# Patient Record
Sex: FEMALE | Race: WHITE | NOT HISPANIC OR LATINO | Employment: OTHER | URBAN - METROPOLITAN AREA
[De-identification: names, ages, dates, MRNs, and addresses within clinical notes are randomized per-mention and may not be internally consistent; named-entity substitution may affect disease eponyms.]

---

## 2018-01-12 ENCOUNTER — APPOINTMENT (OUTPATIENT)
Dept: URGENT CARE | Facility: MEDICAL CENTER | Age: 63
End: 2018-01-12
Payer: OTHER MISCELLANEOUS

## 2018-01-12 PROCEDURE — 99284 EMERGENCY DEPT VISIT MOD MDM: CPT

## 2018-01-12 PROCEDURE — G0383 LEV 4 HOSP TYPE B ED VISIT: HCPCS

## 2018-01-26 ENCOUNTER — APPOINTMENT (OUTPATIENT)
Dept: URGENT CARE | Facility: MEDICAL CENTER | Age: 63
End: 2018-01-26
Payer: OTHER MISCELLANEOUS

## 2018-01-26 PROCEDURE — 99213 OFFICE O/P EST LOW 20 MIN: CPT

## 2018-02-13 ENCOUNTER — ANESTHESIA EVENT (OUTPATIENT)
Dept: PERIOP | Facility: HOSPITAL | Age: 63
End: 2018-02-13
Payer: OTHER MISCELLANEOUS

## 2018-02-13 ENCOUNTER — APPOINTMENT (OUTPATIENT)
Dept: PREADMISSION TESTING | Facility: HOSPITAL | Age: 63
End: 2018-02-13
Payer: OTHER MISCELLANEOUS

## 2018-02-13 ENCOUNTER — OFFICE VISIT (OUTPATIENT)
Dept: OBGYN CLINIC | Facility: MEDICAL CENTER | Age: 63
End: 2018-02-13
Payer: OTHER MISCELLANEOUS

## 2018-02-13 ENCOUNTER — TELEPHONE (OUTPATIENT)
Dept: OBGYN CLINIC | Facility: MEDICAL CENTER | Age: 63
End: 2018-02-13

## 2018-02-13 VITALS
HEIGHT: 60 IN | WEIGHT: 206 LBS | SYSTOLIC BLOOD PRESSURE: 128 MMHG | HEART RATE: 83 BPM | DIASTOLIC BLOOD PRESSURE: 79 MMHG | BODY MASS INDEX: 40.44 KG/M2

## 2018-02-13 DIAGNOSIS — M75.122 COMPLETE TEAR OF LEFT ROTATOR CUFF: Primary | ICD-10-CM

## 2018-02-13 PROBLEM — M75.102 TEAR OF LEFT ROTATOR CUFF: Status: ACTIVE | Noted: 2018-02-13

## 2018-02-13 LAB
ANION GAP SERPL CALCULATED.3IONS-SCNC: 6 MMOL/L (ref 4–13)
ATRIAL RATE: 74 BPM
BASOPHILS # BLD AUTO: 0.05 THOUSANDS/ΜL (ref 0–0.1)
BASOPHILS NFR BLD AUTO: 1 % (ref 0–1)
BUN SERPL-MCNC: 16 MG/DL (ref 5–25)
CALCIUM SERPL-MCNC: 8.9 MG/DL (ref 8.3–10.1)
CHLORIDE SERPL-SCNC: 105 MMOL/L (ref 100–108)
CO2 SERPL-SCNC: 31 MMOL/L (ref 21–32)
CREAT SERPL-MCNC: 1.18 MG/DL (ref 0.6–1.3)
EOSINOPHIL # BLD AUTO: 0.26 THOUSAND/ΜL (ref 0–0.61)
EOSINOPHIL NFR BLD AUTO: 4 % (ref 0–6)
ERYTHROCYTE [DISTWIDTH] IN BLOOD BY AUTOMATED COUNT: 14.3 % (ref 11.6–15.1)
GFR SERPL CREATININE-BSD FRML MDRD: 49 ML/MIN/1.73SQ M
GLUCOSE SERPL-MCNC: 104 MG/DL (ref 65–140)
HCT VFR BLD AUTO: 39.6 % (ref 34.8–46.1)
HGB BLD-MCNC: 13.3 G/DL (ref 11.5–15.4)
LYMPHOCYTES # BLD AUTO: 1.97 THOUSANDS/ΜL (ref 0.6–4.47)
LYMPHOCYTES NFR BLD AUTO: 26 % (ref 14–44)
MCH RBC QN AUTO: 29.2 PG (ref 26.8–34.3)
MCHC RBC AUTO-ENTMCNC: 33.6 G/DL (ref 31.4–37.4)
MCV RBC AUTO: 87 FL (ref 82–98)
MONOCYTES # BLD AUTO: 0.74 THOUSAND/ΜL (ref 0.17–1.22)
MONOCYTES NFR BLD AUTO: 10 % (ref 4–12)
NEUTROPHILS # BLD AUTO: 4.43 THOUSANDS/ΜL (ref 1.85–7.62)
NEUTS SEG NFR BLD AUTO: 59 % (ref 43–75)
NRBC BLD AUTO-RTO: 0 /100 WBCS
P AXIS: 51 DEGREES
PLATELET # BLD AUTO: 259 THOUSANDS/UL (ref 149–390)
PMV BLD AUTO: 10.2 FL (ref 8.9–12.7)
POTASSIUM SERPL-SCNC: 4 MMOL/L (ref 3.5–5.3)
PR INTERVAL: 208 MS
QRS AXIS: -20 DEGREES
QRSD INTERVAL: 90 MS
QT INTERVAL: 410 MS
QTC INTERVAL: 455 MS
RBC # BLD AUTO: 4.56 MILLION/UL (ref 3.81–5.12)
SODIUM SERPL-SCNC: 142 MMOL/L (ref 136–145)
T WAVE AXIS: 22 DEGREES
VENTRICULAR RATE: 74 BPM
WBC # BLD AUTO: 7.45 THOUSAND/UL (ref 4.31–10.16)

## 2018-02-13 PROCEDURE — 93005 ELECTROCARDIOGRAM TRACING: CPT

## 2018-02-13 PROCEDURE — 80048 BASIC METABOLIC PNL TOTAL CA: CPT

## 2018-02-13 PROCEDURE — 93010 ELECTROCARDIOGRAM REPORT: CPT | Performed by: INTERNAL MEDICINE

## 2018-02-13 PROCEDURE — 85025 COMPLETE CBC W/AUTO DIFF WBC: CPT

## 2018-02-13 PROCEDURE — 99204 OFFICE O/P NEW MOD 45 MIN: CPT | Performed by: ORTHOPAEDIC SURGERY

## 2018-02-13 RX ORDER — SODIUM CHLORIDE 9 MG/ML
125 INJECTION, SOLUTION INTRAVENOUS CONTINUOUS
Status: CANCELLED | OUTPATIENT
Start: 2018-02-19

## 2018-02-13 RX ORDER — ENALAPRIL MALEATE 5 MG/1
5 TABLET ORAL EVERY EVENING
COMMUNITY
End: 2021-01-08 | Stop reason: SDUPTHER

## 2018-02-13 RX ORDER — ACETAMINOPHEN 500 MG
500-1000 TABLET ORAL EVERY 6 HOURS PRN
COMMUNITY

## 2018-02-13 RX ORDER — FUROSEMIDE 40 MG/1
40 TABLET ORAL DAILY PRN
COMMUNITY
End: 2020-04-15

## 2018-02-13 RX ORDER — CITALOPRAM 40 MG/1
40 TABLET ORAL EVERY EVENING
Refills: 1 | COMMUNITY
Start: 2017-11-18 | End: 2020-08-04 | Stop reason: SDUPTHER

## 2018-02-13 NOTE — PRE-PROCEDURE INSTRUCTIONS
Pre-Surgery Instructions:   Medication Instructions    acetaminophen (TYLENOL) 500 mg tablet Instructed patient per Anesthesia Guidelines   citalopram (CeleXA) 40 mg tablet Instructed patient per Anesthesia Guidelines   enalapril (VASOTEC) 5 mg tablet Instructed patient per Anesthesia Guidelines   furosemide (LASIX) 40 mg tablet Instructed patient per Anesthesia Guidelines  No meds needed am of surgery per anesthesia DR Lolly Contreras

## 2018-02-13 NOTE — ANESTHESIA PREPROCEDURE EVALUATION
Review of Systems/Medical History  Patient summary reviewed  Chart reviewed      Cardiovascular  Exercise tolerance: poor,  Hypertension controlled,    Pulmonary  Negative pulmonary ROS        GI/Hepatic  Negative GI/hepatic ROS          Negative  ROS        Endo/Other  Negative endo/other ROS      GYN  Negative gynecology ROS          Hematology  Negative hematology ROS      Musculoskeletal  Negative musculoskeletal ROS        Neurology  Negative neurology ROS      Psychology   Depression , being treated for depression,              Physical Exam    Airway    Mallampati score: II  TM Distance: <3 FB  Neck ROM: full     Dental       Cardiovascular  Rhythm: regular, Rate: normal,     Pulmonary  Breath sounds clear to auscultation,     Other Findings        Anesthesia Plan  ASA Score- 2     Anesthesia Type- general and regional with ASA Monitors  Additional Monitors:   Airway Plan:         Plan Factors- Patient instructed to abstain from smoking on day of procedure  Patient did not smoke on day of surgery  Induction- intravenous  Postoperative Plan- Plan for postoperative opioid use  Informed Consent- Anesthetic plan and risks discussed with patient

## 2018-02-13 NOTE — LETTER
February 13, 2018     Patient: Joseph Roberts   YOB: 1955   Date of Visit: 2/13/2018       To Whom it May Concern:    Wes Murillo is under my professional care  She was seen in my office on 2/13/2018  She may return to work with limitations 02/13/18  Work restrictions: Continue current 5 lb lifting restriction until surgery  Anticipate 5-6 months postop for unrestricted duty  If you have any questions or concerns, please don't hesitate to call           Sincerely,          Natalie Spence MD        CC: No Recipients

## 2018-02-13 NOTE — PROGRESS NOTES
Orthopaedic Surgery - Office Note  Jamir Sesay (39 y o  female)   : 1955   MRN: [id number]  Encounter Date: 2018    Chief Complaint   Patient presents with    Left Shoulder - Pain       Assessment / Plan  Left acute supraspinatus tear    · The diagnosis and treatment options were reviewed  · The patient wishes to proceed with left shoulder arthroscopy with rotator cuff repair  · The risks, benefits, and alternatives were discussed  · Written consent was obtained  · Work restrictions: Continue current 5 lb lifting restriction until surgery  Anticipate 5-6 months postop for unrestricted duty     Return for F/U 4-5 days postop  History of Present Illness  Jamir Sesay is a 60 y o  female who presents with left shoulder pain since 18  She was at work moving merchandise across a check out [employer]  and felt a pop  She had immediate shoulder pain and tingling down the entire arm  Since the injury, she has not had any improvement  Shoulder pain is diffuse in the shoulder and she has pain and tingling that radiates to the hand  She has pain at night and with attempted overhead reaching  She had an MRI  She has not had formal PT  She has been on a 5 lb lifting restriction and no overhead reaching at work  She does admit to some mild left shoulder pain prior to the injury, but nothing that warranted treatments  Review of Systems  Pertinent items are noted in HPI  All other systems were reviewed and are negative  Physical Exam  /79   Pulse 83   Ht 5' (1 524 m)   Wt 93 4 kg (206 lb)   BMI 40 23 kg/m²   Cons: Appears well  No apparent distress  Psych: Alert  Oriented x3  Mood and affect normal   Eyes: PERRLA, EOMI  Resp: Normal effort  No audible wheezing or stridor  CV: Palpable pulse  No discernable arrhythmia  No LE edema  Lymph:  No palpable cervical, axillary, or inguinal lymphadenopathy  Skin: Warm  No palpable masses  No visible lesions    Neuro: Normal muscle tone   Normal and symmetric DTR's  Left Shoulder Exam  Alignment / Posture:  Normal shoulder posture  Normal scapular position  Inspection:  No swelling  No deformity  Palpation:  moderate subacromial tenderness  ROM:  Normal neck ROM  Shoulder   Shoulder ER 70  Shoulder IR L4  Strength:  Supraspinatus 4-/5  Infraspinatus 4/5  Subscapularis 5/5  Stability:  No objective shoulder instability  Tests: (+) [place]  (+) Neer  (+) Painful arc  (-) Belly press  (-) Speed  (-) Switzer  Neurovascular:  Sensation intact in Ax/R/M/U nerve distributions  2+ radial pulse  Studies Reviewed  I have personally reviewed pertinent films in PACS  MRI of left shoulder - complete tear of the supraspinatus with retraction to top of humeral head  No T1 sagittal views are available to assess muscle fatty atrophy/infiltration  Procedures  No procedures today  Medical, Surgical, Family, and Social History  The patient's medical history, family history, and social history, were reviewed and updated as appropriate  Past Medical History:   Diagnosis Date    Depression     Hypertension        Past Surgical History:   Procedure Laterality Date    ANKLE SURGERY      HAND SURGERY      HYSTERECTOMY      KNEE SURGERY      OVARIAN CYST REMOVAL      TONSILLECTOMY      TUBAL LIGATION         Family History   Problem Relation Age of Onset    Hypertension Mother     Cancer Mother     Lung cancer Father     Thyroid disease Sister     Depression Sister     Cancer Brother     ROBERT disease Brother        Social History     Occupational History    Not on file       Social History Main Topics    Smoking status: Never Smoker    Smokeless tobacco: Never Used    Alcohol use Not on file    Drug use: Unknown    Sexual activity: Not on file       Allergies   Allergen Reactions    Clindamycin     Erythromycin     Iodine      IV IODINE    Keflex [Cephalexin]     Penicillins          Current Outpatient Prescriptions:     citalopram (CeleXA) 40 mg tablet, Take 40 mg by mouth daily, Disp: , Rfl: 1    enalapril (VASOTEC) 5 mg tablet, Take by mouth, Disp: , Rfl:     furosemide (LASIX) 40 mg tablet, Take by mouth, Disp: , Rfl:       Farideh Rivera MD    Scribe Attestation    I,:    am acting as a scribe while in the presence of the attending physician :        I,:    personally performed the services described in this documentation    as scribed in my presence :

## 2018-02-13 NOTE — LETTER
February 13, 2018     Patient: Benito Acosta   YOB: 1955   Date of Visit: 2/13/2018       To Whom it May Concern:    Linda Jamil is under my professional care  She was seen in my office on 2/13/2018  She will be out of work for surgery starting 2/19/18 until next evaluation  Patient is undergoing left shoulder arthroscopy on 2/19/18  If you have any questions or concerns, please don't hesitate to call           Sincerely,          Ava Baron MD        CC: No Recipients

## 2018-02-19 ENCOUNTER — HOSPITAL ENCOUNTER (OUTPATIENT)
Facility: HOSPITAL | Age: 63
Setting detail: OUTPATIENT SURGERY
Discharge: HOME | End: 2018-02-19
Attending: ORTHOPAEDIC SURGERY | Admitting: ORTHOPAEDIC SURGERY
Payer: OTHER MISCELLANEOUS

## 2018-02-19 ENCOUNTER — ANESTHESIA (OUTPATIENT)
Dept: PERIOP | Facility: HOSPITAL | Age: 63
End: 2018-02-19
Payer: OTHER MISCELLANEOUS

## 2018-02-19 VITALS
HEART RATE: 84 BPM | TEMPERATURE: 99.2 F | BODY MASS INDEX: 40.64 KG/M2 | RESPIRATION RATE: 16 BRPM | DIASTOLIC BLOOD PRESSURE: 65 MMHG | SYSTOLIC BLOOD PRESSURE: 140 MMHG | HEIGHT: 60 IN | OXYGEN SATURATION: 96 % | WEIGHT: 207 LBS

## 2018-02-19 DIAGNOSIS — M75.122 COMPLETE TEAR OF LEFT ROTATOR CUFF: Primary | ICD-10-CM

## 2018-02-19 DIAGNOSIS — M75.122 COMPLETE ROTATOR CUFF TEAR OF LEFT SHOULDER: ICD-10-CM

## 2018-02-19 PROCEDURE — 29827 SHO ARTHRS SRG RT8TR CUF RPR: CPT | Performed by: ORTHOPAEDIC SURGERY

## 2018-02-19 PROCEDURE — 29827 SHO ARTHRS SRG RT8TR CUF RPR: CPT | Performed by: PHYSICIAN ASSISTANT

## 2018-02-19 PROCEDURE — 23430 REPAIR BICEPS TENDON: CPT | Performed by: ORTHOPAEDIC SURGERY

## 2018-02-19 PROCEDURE — C1713 ANCHOR/SCREW BN/BN,TIS/BN: HCPCS | Performed by: ORTHOPAEDIC SURGERY

## 2018-02-19 PROCEDURE — 29826 SHO ARTHRS SRG DECOMPRESSION: CPT | Performed by: ORTHOPAEDIC SURGERY

## 2018-02-19 PROCEDURE — 29826 SHO ARTHRS SRG DECOMPRESSION: CPT | Performed by: PHYSICIAN ASSISTANT

## 2018-02-19 PROCEDURE — 23430 REPAIR BICEPS TENDON: CPT | Performed by: PHYSICIAN ASSISTANT

## 2018-02-19 DEVICE — SYSTEM IMPLANT SPEEDBRIDGE W/4.75 BCMPS SWIVELOCK C: Type: IMPLANTABLE DEVICE | Site: SHOULDER | Status: FUNCTIONAL

## 2018-02-19 DEVICE — ANCHOR SUT 4.5 X 14MM BCMPS CRKSCR FLLY THRD: Type: IMPLANTABLE DEVICE | Site: SHOULDER | Status: FUNCTIONAL

## 2018-02-19 DEVICE — SYSTEM IMPLANT DEL PROX TENODSIS REPAIR: Type: IMPLANTABLE DEVICE | Site: SHOULDER | Status: FUNCTIONAL

## 2018-02-19 RX ORDER — FENTANYL CITRATE 50 UG/ML
INJECTION, SOLUTION INTRAMUSCULAR; INTRAVENOUS AS NEEDED
Status: DISCONTINUED | OUTPATIENT
Start: 2018-02-19 | End: 2018-02-19 | Stop reason: SURG

## 2018-02-19 RX ORDER — NAPROXEN 500 MG/1
500 TABLET ORAL 2 TIMES DAILY WITH MEALS
Qty: 60 TABLET | Refills: 0 | Status: SHIPPED | OUTPATIENT
Start: 2018-02-19 | End: 2019-07-20 | Stop reason: ALTCHOICE

## 2018-02-19 RX ORDER — ROCURONIUM BROMIDE 10 MG/ML
INJECTION, SOLUTION INTRAVENOUS AS NEEDED
Status: DISCONTINUED | OUTPATIENT
Start: 2018-02-19 | End: 2018-02-19 | Stop reason: SURG

## 2018-02-19 RX ORDER — OXYCODONE HYDROCHLORIDE AND ACETAMINOPHEN 5; 325 MG/1; MG/1
1 TABLET ORAL EVERY 4 HOURS PRN
Status: DISCONTINUED | OUTPATIENT
Start: 2018-02-19 | End: 2018-02-19 | Stop reason: HOSPADM

## 2018-02-19 RX ORDER — SODIUM CHLORIDE 9 MG/ML
125 INJECTION, SOLUTION INTRAVENOUS CONTINUOUS
Status: DISCONTINUED | OUTPATIENT
Start: 2018-02-19 | End: 2018-02-19 | Stop reason: HOSPADM

## 2018-02-19 RX ORDER — MEPERIDINE HYDROCHLORIDE 50 MG/ML
12.5 INJECTION INTRAMUSCULAR; INTRAVENOUS; SUBCUTANEOUS
Status: DISCONTINUED | OUTPATIENT
Start: 2018-02-19 | End: 2018-02-19 | Stop reason: HOSPADM

## 2018-02-19 RX ORDER — OXYCODONE HYDROCHLORIDE 5 MG/1
5 TABLET ORAL EVERY 4 HOURS PRN
Qty: 40 TABLET | Refills: 0 | Status: SHIPPED | OUTPATIENT
Start: 2018-02-19 | End: 2018-03-01

## 2018-02-19 RX ORDER — PROPOFOL 10 MG/ML
INJECTION, EMULSION INTRAVENOUS AS NEEDED
Status: DISCONTINUED | OUTPATIENT
Start: 2018-02-19 | End: 2018-02-19 | Stop reason: SURG

## 2018-02-19 RX ORDER — FENTANYL CITRATE/PF 50 MCG/ML
25 SYRINGE (ML) INJECTION
Status: DISCONTINUED | OUTPATIENT
Start: 2018-02-19 | End: 2018-02-19 | Stop reason: HOSPADM

## 2018-02-19 RX ORDER — EPHEDRINE SULFATE 50 MG/ML
INJECTION, SOLUTION INTRAVENOUS AS NEEDED
Status: DISCONTINUED | OUTPATIENT
Start: 2018-02-19 | End: 2018-02-19 | Stop reason: SURG

## 2018-02-19 RX ORDER — ONDANSETRON 2 MG/ML
4 INJECTION INTRAMUSCULAR; INTRAVENOUS ONCE
Status: DISCONTINUED | OUTPATIENT
Start: 2018-02-19 | End: 2018-02-19 | Stop reason: HOSPADM

## 2018-02-19 RX ORDER — ROPIVACAINE HYDROCHLORIDE 5 MG/ML
INJECTION, SOLUTION EPIDURAL; INFILTRATION; PERINEURAL AS NEEDED
Status: DISCONTINUED | OUTPATIENT
Start: 2018-02-19 | End: 2018-02-19 | Stop reason: SURG

## 2018-02-19 RX ORDER — PROMETHAZINE HYDROCHLORIDE 12.5 MG/1
12.5 TABLET ORAL EVERY 6 HOURS PRN
Qty: 30 TABLET | Refills: 0 | Status: SHIPPED | OUTPATIENT
Start: 2018-02-19 | End: 2018-04-24 | Stop reason: HOSPADM

## 2018-02-19 RX ORDER — OXYCODONE HYDROCHLORIDE AND ACETAMINOPHEN 5; 325 MG/1; MG/1
2 TABLET ORAL EVERY 4 HOURS PRN
Status: DISCONTINUED | OUTPATIENT
Start: 2018-02-19 | End: 2018-02-19 | Stop reason: HOSPADM

## 2018-02-19 RX ORDER — GLYCOPYRROLATE 0.2 MG/ML
INJECTION INTRAMUSCULAR; INTRAVENOUS AS NEEDED
Status: DISCONTINUED | OUTPATIENT
Start: 2018-02-19 | End: 2018-02-19 | Stop reason: SURG

## 2018-02-19 RX ORDER — ONDANSETRON 2 MG/ML
INJECTION INTRAMUSCULAR; INTRAVENOUS AS NEEDED
Status: DISCONTINUED | OUTPATIENT
Start: 2018-02-19 | End: 2018-02-19 | Stop reason: SURG

## 2018-02-19 RX ORDER — MIDAZOLAM HYDROCHLORIDE 1 MG/ML
INJECTION INTRAMUSCULAR; INTRAVENOUS AS NEEDED
Status: DISCONTINUED | OUTPATIENT
Start: 2018-02-19 | End: 2018-02-19 | Stop reason: SURG

## 2018-02-19 RX ADMIN — FENTANYL CITRATE 100 MCG: 50 INJECTION, SOLUTION INTRAMUSCULAR; INTRAVENOUS at 09:46

## 2018-02-19 RX ADMIN — FENTANYL CITRATE 100 MCG: 50 INJECTION, SOLUTION INTRAMUSCULAR; INTRAVENOUS at 08:26

## 2018-02-19 RX ADMIN — ROPIVACAINE HYDROCHLORIDE 30 ML: 5 INJECTION, SOLUTION EPIDURAL; INFILTRATION; PERINEURAL at 08:26

## 2018-02-19 RX ADMIN — ONDANSETRON HYDROCHLORIDE 4 MG: 2 INJECTION, SOLUTION INTRAVENOUS at 11:05

## 2018-02-19 RX ADMIN — GLYCOPYRROLATE 0.4 MG: 0.2 INJECTION, SOLUTION INTRAMUSCULAR; INTRAVENOUS at 11:15

## 2018-02-19 RX ADMIN — NEOSTIGMINE METHYLSULFATE 3 MG: 1 INJECTION, SOLUTION INTRAMUSCULAR; INTRAVENOUS; SUBCUTANEOUS at 11:15

## 2018-02-19 RX ADMIN — DEXAMETHASONE SODIUM PHOSPHATE 8 MG: 10 INJECTION INTRAMUSCULAR; INTRAVENOUS at 09:53

## 2018-02-19 RX ADMIN — PROPOFOL 150 MG: 10 INJECTION, EMULSION INTRAVENOUS at 09:46

## 2018-02-19 RX ADMIN — CEFAZOLIN SODIUM 2000 MG: 1 SOLUTION INTRAVENOUS at 09:43

## 2018-02-19 RX ADMIN — SODIUM CHLORIDE: 0.9 INJECTION, SOLUTION INTRAVENOUS at 09:50

## 2018-02-19 RX ADMIN — FENTANYL CITRATE 50 MCG: 50 INJECTION, SOLUTION INTRAMUSCULAR; INTRAVENOUS at 10:46

## 2018-02-19 RX ADMIN — EPHEDRINE SULFATE 20 MG: 50 INJECTION, SOLUTION INTRAMUSCULAR; INTRAVENOUS; SUBCUTANEOUS at 09:43

## 2018-02-19 RX ADMIN — LIDOCAINE HYDROCHLORIDE 40 MG: 20 INJECTION, SOLUTION INTRAVENOUS at 09:46

## 2018-02-19 RX ADMIN — EPHEDRINE SULFATE 15 MG: 50 INJECTION, SOLUTION INTRAMUSCULAR; INTRAVENOUS; SUBCUTANEOUS at 09:46

## 2018-02-19 RX ADMIN — SODIUM CHLORIDE 125 ML/HR: 0.9 INJECTION, SOLUTION INTRAVENOUS at 07:46

## 2018-02-19 RX ADMIN — MIDAZOLAM HYDROCHLORIDE 2 MG: 1 INJECTION, SOLUTION INTRAMUSCULAR; INTRAVENOUS at 08:26

## 2018-02-19 RX ADMIN — ROCURONIUM BROMIDE 50 MG: 10 INJECTION INTRAVENOUS at 09:46

## 2018-02-19 RX ADMIN — EPHEDRINE SULFATE 10 MG: 50 INJECTION, SOLUTION INTRAMUSCULAR; INTRAVENOUS; SUBCUTANEOUS at 09:38

## 2018-02-19 RX ADMIN — CEFAZOLIN SODIUM 1000 MG: 1 SOLUTION INTRAVENOUS at 10:23

## 2018-02-19 RX ADMIN — EPHEDRINE SULFATE 10 MG: 50 INJECTION, SOLUTION INTRAMUSCULAR; INTRAVENOUS; SUBCUTANEOUS at 09:40

## 2018-02-19 NOTE — DISCHARGE INSTRUCTIONS
POSTOPERATIVE INSTRUCTIONS following SHOULDER SURGERY    MEDICATIONS:  · Resume all home medications unless otherwise instructed by your surgeon  · Pain Medication:  Oxycodone 5 mg, 1-3 tablets every 3 hours as needed  · If you were given a regional anesthetic (nerve block), please begin taking the pain medication as soon as you get home, even if you have minimal or no pain  DO NOT WAIT FOR THE NERVE BLOCK TO WEAR OFF  · Possible side effects include nausea, constipation, and urinary retention  If you experience these side effects, please call our office for assistance  · Pain med refills are authorized only during office hours (8am-4pm, Mon-Fri)  · Anti-Inflammatory:  Naproxen 500 mg, 1 tablet every 12 hours for 4 weeks  · TAKE WITH FOOD  Stop if you experience nausea, reflux, or stomach pain  · Nausea Medication:  Promethazine 12 5 mg, 1 tablet every 6 hours as needed  · Fill prescription ONLY if you expericnce severe nausea  WOUND CARE:  · Keep the dressing clean and dry  Light drainage may occur the first 2 days postop  · You may remove the dressings and get the incision wet in the shower 72 hours after surgery  DO NOT remove steri-strips or sutures  DO NOT immerse the incision under water  Carefully pat the incision dry  If there is wound drainage, re-apply a fresh dry gauze dressing  · Please call our office ([phone]) if you experience either of the following:  · Sudden increase in swelling, redness, or warmth at the surgical site  · Excessive incisional drainage that persists beyond the 3rd day after surgery  · Oral temperature greater than 101 degrees, not relieved with Tylenol  · Shortness of breath, chest pain, nausea, or any other concerning symptoms    SWELLING CONTROL:  · Cold Therapy: The cold therapy device may be used either continuously or only as needed, according to your preference  Do not let the pad directly touch your skin    Alternatively, apply ice (20 min on, 20 min off) as often as you feel is necessary  SLING:  · Wear your sling AT ALL TIMES (including sleep) until your first postoperative office visit  You may remove the sling for showering but must keep your arm at your side  ACTIVITY:   · DO NOT lift, carry, push, or pull anything with your operative arm  · Shoulder:  DO NOT actively (on your own) raise your operative arm away from the side of you body or rotate it out away from your body unless instructed by your surgeon or physical therapist   · Place a pillow behind the elbow while lying down  · Sleeping in a more upright position (recliner) may be more comfortable initially  · Wrist / Finger Motion:  With the sling on, move your wrist and fingers through a full range of motion 20 times per hour while awake  PHYSICAL THERAPY:  · Begin therapy 5 TO 7 DAYS AFTER SURGERY  You were given a prescription for therapy at your preoperative office visit  If you do not have physical therapy scheduled yet, please call our office for assistance  FOLLOW-UP APPOINTMENT:  · 4-5 days after surgery with:    Dr Curtis Sneed   [facility], [address] [facility]  [address] [place] [address], [address] [place] [address], [facility], [address]   [phone] ([facility] [address])  [phone] (After Hours)

## 2018-02-19 NOTE — ANESTHESIA POSTPROCEDURE EVALUATION
Post-Op Assessment Note      CV Status:  Stable    Mental Status:  Alert and awake    Hydration Status:  Euvolemic    PONV Controlled:  Controlled    Airway Patency:  Patent  Airway: intubated    Post Op Vitals Reviewed: Yes          Staff: Anesthesiologist           /67 (02/19/18 1155)    Temp      Pulse 76 (02/19/18 1155)   Resp 19 (02/19/18 1155)    SpO2 97 % (02/19/18 1155)

## 2018-02-19 NOTE — ANESTHESIA PROCEDURE NOTES
Peripheral Block    Patient location during procedure: holding area  Start time: 2/19/2018 8:27 AM  End time: 2/19/2018 8:37 AM  Staffing  Anesthesiologist: Flavio Rodríguez  Performed: anesthesiologist   Preanesthetic Checklist  Completed: patient identified, site marked, surgical consent, pre-op evaluation, timeout performed, IV checked, risks and benefits discussed and monitors and equipment checked  Peripheral Block  Patient position: supine  Prep: ChloraPrep  Patient monitoring: heart rate, cardiac monitor, frequent blood pressure checks and continuous pulse ox  Block type: interscalene  Laterality: left  Injection technique: single-shot  Procedures: ultrasound guided  Local infiltration: ropivacaine  Infiltration strength: 0 5 %  Dose: 30 mL  Needle  Needle type: Stimuplex   Needle gauge: 22 G  Needle length: 5 cm  Needle localization: ultrasound guidance  Test dose: negative  Assessment  Injection assessment: incremental injection  Paresthesia pain: none  Heart rate change: no  Slow fractionated injection: yes  Post-procedure:  site cleaned  patient tolerated the procedure well with no immediate complications

## 2018-02-19 NOTE — OP NOTE
OPERATIVE REPORT    PATIENT NAME: Hesham Galeano   :  1955  MRN: [id number]  Pt Location: [facility] OR ROOM 02    SURGERY DATE: 2018    SURGEON(S) and ROLE:  Primary: Sharlotte Castleman, MD  Assisting: Deni Wade PA-C    NOTE:  The presence of a physician assistant was necessary to help with patient positioning, surgical exposure, wound retraction, wound closure, and other key portions of the procedure  No qualified resident was available for this case  PREOPERATIVE DIAGNOSES:  Left Shoulder  Rotator Cuff Tear (supraspinatus and subscapularis)  Biceps Tendinitis    POSTOPERATIVE DIAGNOSES:  Left Shoulder  Rotator Cuff Tear (supraspinatus and subscapularis)  Biceps Tendinitis    PROCEDURES:  Left Shoulder Arthroscopy with:  Rotator Cuff Repair (supraspinatus and subscapularis)  Subacromial Decompression  Open Subpectoral Biceps Tenodesis      ANESTHESIA TYPE:  General endotracheal and Interscalene block    ANESTHESIA STAFF:   Anesthesiologist: Elzbieta Ford DO  CRNA: Mary Nielson CRNA    ESTIMATED BLOOD LOSS:  4 mL    PERIOPERATIVE ANTIBIOTICS:  cefazolin, 2 grams    IMPLANTS:  Arthrex Corkscrew 4 5mm (x1)     Speed Bridge (Swivelock 4 75mm x4)     Biceps Tenodesis Button      Implant Name Type Inv  Item Serial No   Lot No  LRB No  Used Action   ANCHOR SUT 4 5 X 14MM BCMPS CRKSCR FLLY THRD - [id number]  ANCHOR SUT 4 5 X 14MM BCMPS CRKSCR FLLY THRD  [employer] [id number] Left 1 Implanted   SYSTEM IMPLANT DEL PROX TENODSIS REPAIR - [id number]  SYSTEM IMPLANT DEL PROX TENODSIS REPAIR  [address] [id number] Left 1 Implanted   SYSTEM IMPLANT SPEEDBRIDGE W/4 75 BCMPS [place] [id number]   SYSTEM IMPLANT SPEEDBRIDGE W/4 75 BCMPS [address] [id number] Left 1 Implanted       SPECIMENS:  * No specimens in log *      OPERATIVE INDICATIONS:  The patient is a 60 y o  female with left shoulder pain due to rotator cuff tear and biceps tendinitis    Surgical treatment was indicated due to persistent symptoms despite non-surgical treatment  After a thorough discussion of the potential risks, benefits, and alternative treatments, the patient agreed to proceed with surgery  The patient understands that the risks of surgery include, but are not limited to: failure of repair, infection, neurovascular injury, wound healing complications, venous thromboembolism, persistent pain, stiffness, instability, recurrence of symptoms, potential need for additional surgeries, and loss of limb or life  Oral and written consent for surgery was obtained from the patient preoperatively  EXAM UNDER ANESTHESIA:  Operative shoulder:   FE-150   ER-90   AER-90   AIR-90    PROCEDURE AND TECHNIQUE:  On the day of surgery, the patient was identified in the preoperative holding area  The operative site was marked by the surgeon  The patient was taken into the operating room  A time-out was conducted to confirm the patient's identity, the operative site, and the proposed procedure  The patient was anesthetized, and perioperative antibiotics were administered  The patient was positioned beach chair on the OSI frame  All bony prominences were padded  The operative site was prepped and draped using standard sterile technique  A posterior portal was established, and the arthroscope was placed into the glenohumeral joint  An anterosuperior portal was established through the rotator interval   Diagnostic arthroscopy was performed  The glenohumeral joint demonstrated moderate synovitis which was debrided with a motorized shaver  The glenoid demonstrated pristine articular cartilage  The humeral head demonstrated pristine articular cartilage  The long head of the biceps tendon had  severe tendinosis and was partially torn, >50%  The superior labrum had Type 1 fraying  The biceps tendon was released from the superior labrum  The superior labrum was debrided with a motorized shaver to remove all unstable tissue  An open subpectoral biceps tenodesis was performed  An incision was made just lateral to the axillary crease centered over the inferior border of the pectoralis major tendon  The biceps tendon was identified in the biciptial groove and delivered into the wound  The tendon was stitiched with a FiberLoop suture  The biciptial groove was prepared with a rasp, and the tenodesis was secured with a unicortical biceps button  The repair demonstrated appropriate soft tissue tension and excellent stability through a full elbow range of motion  The posterior capsulolabral complex  was intact  The anterior capsulolabral complex was intact       The subscapularis tendon had a complete tear with mild retraction  The subscapularis was debrided to remove the torn and degenerative tissue  The tendon was dissected free from all adhesions and completely released to provide mobilization to the lesser tuberosity  The lesser tuberosity was prepared to a bleeding bone bed with a lev  The subscapularis was repaired with a single row construct with mattress sutures using  one 4 5mm Corkscrew (Arthrex) placed in the lesser tuberosity  The tendon was fixed firmly to its footprint, and the repair was secure through 50 degrees of external rotation  The posterosuperior rotator cuff had a medium full-thickness tear involving the supraspinatus measuring 2 cm anterior to posterior with minimal retraction  The subacromial bursa was inflamed  A thorough subacromial bursectomy was performed through a lateral portal   The rotator cuff was debrided to remove the torn and degenerative tissue  The greater tuberosity was prepared to a bleeding bone bed with a lev  The rotator cuff was repaired with a double row suture bridge construct using four 4 75mm Swivelock (Arthrex) placed in the greater tuberosity  The tendon was fixed securely to its footprint without undue tension, and the repair was stable with gentle shoulder rotation   There was a Type 2 acromion, and acromioplasty was performed  At the conclusion of the procedure, the instruments were withdrawn  The portals and incisions were closed with absorbable sutures and steri-strips  A sterile dressing was applied  The surgical drapes were removed  The operative arm was immobilized in a sling with abduction pillow  The patient was awakened from anesthesia and transported to the PACU in stable condition        COMPLICATIONS:  None    PATIENT DISPOSITION:  PACU       SIGNATURE:  Ham Prado MD  DATE:  February 19, 2018  TIME:  11:21 AM

## 2018-02-23 ENCOUNTER — OFFICE VISIT (OUTPATIENT)
Dept: OBGYN CLINIC | Facility: MEDICAL CENTER | Age: 63
End: 2018-02-23

## 2018-02-23 VITALS
HEIGHT: 60 IN | HEART RATE: 80 BPM | DIASTOLIC BLOOD PRESSURE: 74 MMHG | WEIGHT: 205 LBS | BODY MASS INDEX: 40.25 KG/M2 | SYSTOLIC BLOOD PRESSURE: 112 MMHG

## 2018-02-23 DIAGNOSIS — M75.122 COMPLETE TEAR OF LEFT ROTATOR CUFF: Primary | ICD-10-CM

## 2018-02-23 PROCEDURE — 99024 POSTOP FOLLOW-UP VISIT: CPT | Performed by: ORTHOPAEDIC SURGERY

## 2018-02-23 NOTE — PROGRESS NOTES
Orthopaedic Surgery - Office Note  Elida Jiang (69 y o  female)   : 1955   MRN: [id number]  Encounter Date: 2018    Chief Complaint   Patient presents with    Left Shoulder - Follow-up, Post-op       Assessment / Plan  S/p left shoulder scope with supraspinatus and subscapularis repairs, SAD, open LHB tenodesis    · Begin outpatient PT according to accelerated RCR protocol  · Anti-inflammatories or Tylenol prn pain   · She is out of work due to the surgery  She works as a   I anticipate a minimum of 4-5 months until she can return without restrictions  Return in about 1 month (around 3/23/2018)  History of Present Illness  Elida Jiang is a 60 y o  female who presents s/p left shoulder arthroscopy on 18  Pain is well controlled with oral meds  Denies numbness in the LUE  Denies portal drainage  No difficulties with the sling  PT scheduled to begin next week  Review of Systems  Pertinent items are noted in HPI  All other systems were reviewed and are negative  Physical Exam  /74   Pulse 80   Ht 5' (1 524 m)   Wt 93 kg (205 lb)   BMI 40 04 kg/m²   Cons: Appears well  No apparent distress  Psych: Alert  Oriented x3  Mood and affect normal   Eyes: PERRLA, EOMI  Resp: Normal effort  No audible wheezing or stridor  CV: Palpable pulse  No discernable arrhythmia  No LE edema  Lymph:  No palpable cervical, axillary, or inguinal lymphadenopathy  Skin: Warm  No palpable masses  No visible lesions  Neuro: Normal muscle tone  Normal and symmetric DTR's  Left Shoulder Exam  Alignment / Posture:  Normal scapular position  Inspection:  mild swelling  Incisions clean and dry  Palpation:  moderate tenderness  ROM:  Normal elbow ROM  Normal wrist ROM  Strength:  Able to actively flex & extend elbow  Able to actively dorsiflex & volarflex wrist   Stability:  Not tested  Tests: No pertinent positive or negative tests    Neurovascular:  Sensation intact in Ax/R/M/U nerve distributions  2+ radial pulse  Studies Reviewed  No studies to review    Procedures  No procedures today  Medical, Surgical, Family, and Social History  The patient's medical history, family history, and social history, were reviewed and updated as appropriate  Past Medical History:   Diagnosis Date    Arthritis     Cataract     ashley    Depression     Fluid retention     Headache, acute     Heart murmur     Hyperlipidemia     Hypertension     PONV (postoperative nausea and vomiting)     Rotator cuff tear, left     Wears glasses     Wears partial dentures     upper       Past Surgical History:   Procedure Laterality Date    ABDOMINAL ADHESION SURGERY      ANKLE SURGERY Right     tendon tear    APPENDECTOMY      CARPAL TUNNEL RELEASE Bilateral     COLONOSCOPY      HAND SURGERY Right     ganglion cyst    HEMORROIDECTOMY      HYSTERECTOMY      KNEE ARTHROSCOPY Left     OVARIAN CYST REMOVAL      SD SHLDR ARTHROSCOP,SURG,W/ROTAT CUFF REPR Left 2/19/2018    Procedure: ARTHROSCOPIC REPAIR ROTATOR CUFF,  SAD, BICEP TENODESIS;  Surgeon: Jayy Jenkins MD;  [facility];  Service: Orthopedics    REPAIR RECTOCELE      TONSILLECTOMY      TUBAL LIGATION         Family History   Problem Relation Age of Onset    Hypertension Mother     Cancer Mother     Lung cancer Father     Thyroid disease Sister     Depression Sister     Cancer Brother     ROBERT disease Brother        Social History     Occupational History    Not on file       Social History Main Topics    Smoking status: Never Smoker    Smokeless tobacco: Never Used    Alcohol use Yes      Comment: few x year    Drug use: No    Sexual activity: Not on file       Allergies   Allergen Reactions    Iodine Hives     IV IODINE    Keflex [Cephalexin] Other (See Comments)     Mouth sores    Morphine And Related Hives     IV MORPINE    Penicillins Hives    Clindamycin Other (See Comments)     Pt unsure    Erythromycin Hives         Current Outpatient Prescriptions:     acetaminophen (TYLENOL) 500 mg tablet, Take 500-1,000 mg by mouth every 6 (six) hours as needed for mild pain, Disp: , Rfl:     citalopram (CeleXA) 40 mg tablet, Take 40 mg by mouth every evening  , Disp: , Rfl: 1    enalapril (VASOTEC) 5 mg tablet, Take 5 mg by mouth every evening  , Disp: , Rfl:     furosemide (LASIX) 40 mg tablet, Take 40 mg by mouth daily as needed  , Disp: , Rfl:     naproxen (NAPROSYN) 500 mg tablet, Take 1 tablet (500 mg total) by mouth 2 (two) times a day with meals, Disp: 60 tablet, Rfl: 0    oxyCODONE (ROXICODONE) 5 mg immediate release tablet, Take 1 tablet (5 mg total) by mouth every 4 (four) hours as needed for moderate pain for up to 10 days Max Daily Amount: 30 mg, Disp: 40 tablet, Rfl: 0    promethazine (PHENERGAN) 12 5 MG tablet, Take 1 tablet (12 5 mg total) by mouth every 6 (six) hours as needed for nausea or vomiting, Disp: 30 tablet, Rfl: 0      Shorty Zamorano MD    Scribe Attestation    I,:    am acting as a scribe while in the presence of the attending physician :        I,:    personally performed the services described in this documentation    as scribed in my presence :

## 2018-02-26 ENCOUNTER — EVALUATION (OUTPATIENT)
Dept: PHYSICAL THERAPY | Facility: REHABILITATION | Age: 63
End: 2018-02-26
Payer: OTHER MISCELLANEOUS

## 2018-02-26 DIAGNOSIS — M25.512 ACUTE PAIN OF LEFT SHOULDER: Primary | ICD-10-CM

## 2018-02-26 DIAGNOSIS — M75.122 COMPLETE TEAR OF LEFT ROTATOR CUFF: ICD-10-CM

## 2018-02-26 DIAGNOSIS — S46.002D ROTATOR CUFF INJURY, LEFT, SUBSEQUENT ENCOUNTER: ICD-10-CM

## 2018-02-26 PROCEDURE — 97010 HOT OR COLD PACKS THERAPY: CPT | Performed by: PHYSICAL THERAPIST

## 2018-02-26 PROCEDURE — 97161 PT EVAL LOW COMPLEX 20 MIN: CPT | Performed by: PHYSICAL THERAPIST

## 2018-02-26 PROCEDURE — 97014 ELECTRIC STIMULATION THERAPY: CPT | Performed by: PHYSICAL THERAPIST

## 2018-02-26 NOTE — PROGRESS NOTES
Initial Evaluation    Today's date: 2018  Patient name: Leslie Duffy  : 1955  MRN: [id number]  Referring provider: Rossi Conroy MD  Dx:   Encounter Diagnosis     ICD-10-CM    1  Acute pain of left shoulder M25 512    2  Rotator cuff injury, left, subsequent encounter S46 002D                   Assessment    Assessment details: Pt is a 59 yo female who underwent a rotator cuff repair along with SAD and biceps tenodesis on   She presents with mild pain that is well controlled with medication and limited PROM  She would benefit from physical therapy to progress her through the protocol to restore functional mobility and prepare her for RTW as a   Understanding of Dx/Px/POC: excellent  Goals  STG:  Flex 0-125  Pt demonstrates good understanding of HEP and precautions  LTG: RTW as a   No difficulty with IADLs  Strength and ROM WNL      Plan  Patient would benefit from: skilled PT  Planned modality interventions: TENS, H-Wave and cryotherapy  Planned therapy interventions: joint mobilization, manual therapy, neuromuscular re-education and therapeutic exercise  Frequency: 2x week  Duration in weeks: 12        Subjective Evaluation    History of Present Illness  Date of onset: 2018  Date of surgery: 2018  Mechanism of injury: Lifting a heavy box at work to check someone out and her shoulder popped and hurt  Not a recurrent problem Pain  Current pain ratin  At best pain ratin  At worst pain ratin  Location: axilla  Quality: dull ache  Relieving factors: medications    Patient Goals  Patient goals for therapy: return to work, independence with ADLs/IADLs and increased motion  Patient goal: Wants to be able to go for walks        Objective     Observations   Left Shoulder   Positive for edema  Additional Observation Details  Steri strips in place over portals and biceps incision    Has band aids over posterior incision where she reports there was some bleeding but no steri strips  Ecchymosis in axilla  Palpation   Left   Tenderness of the biceps, infraspinatus and subscapularis       Active Range of Motion     Left Elbow   Flexion: 130 degrees   Extension: 0 degrees     Right Elbow   Normal active range of motion    Left Wrist   Normal active range of motion    Right Wrist   Normal active range of motion    Passive Range of Motion   Left Shoulder   Flexion: 90 degrees   Abduction: 90 degrees   External rotation 45°: 45 degrees     Right Shoulder   Normal passive range of motion    Additional Passive Range of Motion Details  Discomfort at end range    General Comments     Shoulder Comments   Strength and AROM NA          Precautions: per protocol    Daily Treatment Diary     Manual  2/26            PROM 5 min                                                                    Exercise Diary              pendullums 20            Wrist flex/ext 10            Elbow flex AA 10            Sup/pron 10                                                                                                                                                                                                                                Modalities              Ice and H-wave 15 min

## 2018-03-01 ENCOUNTER — OFFICE VISIT (OUTPATIENT)
Dept: PHYSICAL THERAPY | Facility: REHABILITATION | Age: 63
End: 2018-03-01
Payer: OTHER MISCELLANEOUS

## 2018-03-01 DIAGNOSIS — M25.512 ACUTE PAIN OF LEFT SHOULDER: Primary | ICD-10-CM

## 2018-03-01 DIAGNOSIS — S46.002D ROTATOR CUFF INJURY, LEFT, SUBSEQUENT ENCOUNTER: ICD-10-CM

## 2018-03-01 DIAGNOSIS — M75.122 COMPLETE TEAR OF LEFT ROTATOR CUFF: ICD-10-CM

## 2018-03-01 DIAGNOSIS — M75.122 COMPLETE ROTATOR CUFF TEAR OF LEFT SHOULDER: ICD-10-CM

## 2018-03-01 PROCEDURE — 97010 HOT OR COLD PACKS THERAPY: CPT

## 2018-03-01 PROCEDURE — 97014 ELECTRIC STIMULATION THERAPY: CPT

## 2018-03-01 PROCEDURE — 97110 THERAPEUTIC EXERCISES: CPT

## 2018-03-01 PROCEDURE — 97140 MANUAL THERAPY 1/> REGIONS: CPT

## 2018-03-01 NOTE — PROGRESS NOTES
Daily Note     Today's date: 3/1/2018  Patient name: Javon Hopkins  : 1955  MRN: [id number]  Referring provider: Alexander Amaro MD  Dx:   Encounter Diagnosis     ICD-10-CM    1  Acute pain of left shoulder M25 512    2  Complete rotator cuff tear of left shoulder M75 122 ECG 12 lead   3  Rotator cuff injury, left, subsequent encounter S46 002D    4  Complete tear of left rotator cuff M75 122                   Subjective: Very mild pain this morning  Objective: See treatment diary below  Precautions: per protocol    Daily Treatment Diary     Manual   3-1           PROM 5 min 8'                                                                   Exercise Diary              pendullums 20 x           Wrist flex/ext 10 x           Elbow flex AA 10 x           Sup/pron 10 x           scap squeeze  5"x10                                                                                                                                                                                                                  Modalities              Ice and H-wave 15 min 20'                                         Assessment: Tolerated treatment well  Patient would benefit from continued PT    Cuing for relaxation with PROM  With asst NT PT      Plan: Progress treament per protocol

## 2018-03-05 ENCOUNTER — OFFICE VISIT (OUTPATIENT)
Dept: PHYSICAL THERAPY | Facility: REHABILITATION | Age: 63
End: 2018-03-05
Payer: OTHER MISCELLANEOUS

## 2018-03-05 DIAGNOSIS — M75.122 COMPLETE ROTATOR CUFF TEAR OF LEFT SHOULDER: Primary | ICD-10-CM

## 2018-03-05 DIAGNOSIS — M25.512 ACUTE PAIN OF LEFT SHOULDER: ICD-10-CM

## 2018-03-05 PROCEDURE — 97014 ELECTRIC STIMULATION THERAPY: CPT | Performed by: PHYSICAL THERAPIST

## 2018-03-05 PROCEDURE — 97112 NEUROMUSCULAR REEDUCATION: CPT | Performed by: PHYSICAL THERAPIST

## 2018-03-05 PROCEDURE — 97110 THERAPEUTIC EXERCISES: CPT | Performed by: PHYSICAL THERAPIST

## 2018-03-05 NOTE — PROGRESS NOTES
Daily Note     Today's date: 3/5/2018  Patient name: Garrick Logan  : 1955  MRN: [id number]  Referring provider: Chaya Kanner, MD  Dx:   Encounter Diagnosis     ICD-10-CM    1  Complete rotator cuff tear of left shoulder M75 122    2  Acute pain of left shoulder M25 512                   Subjective: Pt reports mild shoulder pain  Still sleeping on the recliner  Objective: See treatment diary below  aily Treatment Diary     Manual   3-1 3/5          PROM 5 min 8' 8'                                                                  Exercise Diary              pendullums 20 x x          Wrist flex/ext 10 x 20          Elbow flex AA 10 x 20          Sup/pron 10 x 20          scap squeeze  5"x10 x          bike   10'          Counter stretch   3" 10x                                                                                                                                                                                       Modalities              Ice and H-wave 15 min 20' x                                        Assessment: Tolerated treatment well  Patient exhibited good technique with therapeutic exercises      Plan: Continue per plan of care

## 2018-03-08 ENCOUNTER — OFFICE VISIT (OUTPATIENT)
Dept: PHYSICAL THERAPY | Facility: REHABILITATION | Age: 63
End: 2018-03-08
Payer: OTHER MISCELLANEOUS

## 2018-03-08 DIAGNOSIS — M25.512 ACUTE PAIN OF LEFT SHOULDER: ICD-10-CM

## 2018-03-08 DIAGNOSIS — M75.122 COMPLETE ROTATOR CUFF TEAR OF LEFT SHOULDER: Primary | ICD-10-CM

## 2018-03-08 DIAGNOSIS — S46.002D ROTATOR CUFF INJURY, LEFT, SUBSEQUENT ENCOUNTER: ICD-10-CM

## 2018-03-08 PROCEDURE — 97140 MANUAL THERAPY 1/> REGIONS: CPT | Performed by: PHYSICAL THERAPIST

## 2018-03-08 PROCEDURE — 97014 ELECTRIC STIMULATION THERAPY: CPT | Performed by: PHYSICAL THERAPIST

## 2018-03-08 PROCEDURE — 97112 NEUROMUSCULAR REEDUCATION: CPT | Performed by: PHYSICAL THERAPIST

## 2018-03-08 NOTE — PROGRESS NOTES
Daily Note     Today's date: 3/8/2018  Patient name: Anoop Carmona  : 1955  MRN: [id number]  Referring provider: Mabel Beth MD  Dx:   Encounter Diagnosis     ICD-10-CM    1  Complete rotator cuff tear of left shoulder M75 122    2  Acute pain of left shoulder M25 512    3  Rotator cuff injury, left, subsequent encounter S46 002D                   Subjective: While wearing her sling she bumped her shoulder yesterday by falling back into her [place] closet  It has been more sore since then  Objective: See treatment diary below  aily Treatment Diary     Manual   3-1 3/5 3/8         PROM 5 min 8' 8' 5 min         Edema massage    5 min                                                    Exercise Diary              pendullums 20 x x 30         Wrist flex/ext 10 x 20 20 ea         Elbow flex AA 10 x 20 x         Sup/pron 10 x 20 x         scap squeeze  5"x10 x x         bike   10' x         Counter stretch   3" 10x x         shrugs    10                                                                                                                                                                         Modalities              Ice and H-wave 15 min 20' x x                                   X=same as last visit    Assessment: ROM is improving steadily  A little swollen in the biceps  This was addressed with massage  Plan: Continue per plan of care

## 2018-03-12 ENCOUNTER — OFFICE VISIT (OUTPATIENT)
Dept: PHYSICAL THERAPY | Facility: REHABILITATION | Age: 63
End: 2018-03-12
Payer: OTHER MISCELLANEOUS

## 2018-03-12 DIAGNOSIS — M75.122 COMPLETE TEAR OF LEFT ROTATOR CUFF: ICD-10-CM

## 2018-03-12 DIAGNOSIS — M75.122 COMPLETE ROTATOR CUFF TEAR OF LEFT SHOULDER: Primary | ICD-10-CM

## 2018-03-12 DIAGNOSIS — M25.512 ACUTE PAIN OF LEFT SHOULDER: ICD-10-CM

## 2018-03-12 DIAGNOSIS — S46.002D ROTATOR CUFF INJURY, LEFT, SUBSEQUENT ENCOUNTER: ICD-10-CM

## 2018-03-12 PROCEDURE — 97112 NEUROMUSCULAR REEDUCATION: CPT | Performed by: PHYSICAL THERAPIST

## 2018-03-12 PROCEDURE — 97140 MANUAL THERAPY 1/> REGIONS: CPT | Performed by: PHYSICAL THERAPIST

## 2018-03-12 PROCEDURE — 97014 ELECTRIC STIMULATION THERAPY: CPT | Performed by: PHYSICAL THERAPIST

## 2018-03-12 NOTE — PROGRESS NOTES
Daily Note     Today's date: 3/12/2018  Patient name: Ifeoma Hough  : 1955  MRN: [id number]  Referring provider: Cheyenne Eli MD  Dx:   Encounter Diagnosis     ICD-10-CM    1  Complete rotator cuff tear of left shoulder M75 122    2  Acute pain of left shoulder M25 512    3  Rotator cuff injury, left, subsequent encounter S46 002D                   Subjective:   Still quite sore  Only taking Tylenol for pain  Has been trying to take it easier  Objective: See treatment diary below  aily Treatment Diary     Manual   3-1 3/5 3/8 3/12        PROM 5 min 8' 8' 5 min x        Edema massage    5 min  and UT 5 min                                                   Exercise Diary              pendullums 20 x x 30 x        Wrist flex/ext 10 x 20 20 ea 1# 10x        Elbow flex AA 10 x 20 x x        Sup/pron 10 x 20 x x        scap squeeze  5"x10 x x x        bike   10' x x        Counter stretch   3" 10x x x        shrugs    10 x        Gripper red digiflex     20                                                                                                                                                           Modalities              Ice and H-wave 15 min 20' x x                                   X=same as last visit    Assessment: A little tight over the upper trap  Still swollen  At first very limited with PROM but then she relaxed and it went well  Plan: Continue per plan of care

## 2018-03-15 ENCOUNTER — OFFICE VISIT (OUTPATIENT)
Dept: PHYSICAL THERAPY | Facility: REHABILITATION | Age: 63
End: 2018-03-15
Payer: OTHER MISCELLANEOUS

## 2018-03-15 DIAGNOSIS — M75.122 COMPLETE TEAR OF LEFT ROTATOR CUFF: ICD-10-CM

## 2018-03-15 DIAGNOSIS — M75.122 COMPLETE ROTATOR CUFF TEAR OF LEFT SHOULDER: Primary | ICD-10-CM

## 2018-03-15 PROCEDURE — 97112 NEUROMUSCULAR REEDUCATION: CPT | Performed by: PHYSICAL THERAPIST

## 2018-03-15 PROCEDURE — 97014 ELECTRIC STIMULATION THERAPY: CPT | Performed by: PHYSICAL THERAPIST

## 2018-03-15 PROCEDURE — 97140 MANUAL THERAPY 1/> REGIONS: CPT | Performed by: PHYSICAL THERAPIST

## 2018-03-15 PROCEDURE — 97110 THERAPEUTIC EXERCISES: CPT | Performed by: PHYSICAL THERAPIST

## 2018-03-19 ENCOUNTER — OFFICE VISIT (OUTPATIENT)
Dept: PHYSICAL THERAPY | Facility: REHABILITATION | Age: 63
End: 2018-03-19
Payer: OTHER MISCELLANEOUS

## 2018-03-19 DIAGNOSIS — S46.002D ROTATOR CUFF INJURY, LEFT, SUBSEQUENT ENCOUNTER: ICD-10-CM

## 2018-03-19 DIAGNOSIS — M25.512 ACUTE PAIN OF LEFT SHOULDER: ICD-10-CM

## 2018-03-19 DIAGNOSIS — M75.122 COMPLETE ROTATOR CUFF TEAR OF LEFT SHOULDER: Primary | ICD-10-CM

## 2018-03-19 PROCEDURE — 97110 THERAPEUTIC EXERCISES: CPT | Performed by: PHYSICAL THERAPIST

## 2018-03-19 PROCEDURE — 97112 NEUROMUSCULAR REEDUCATION: CPT | Performed by: PHYSICAL THERAPIST

## 2018-03-19 PROCEDURE — 97014 ELECTRIC STIMULATION THERAPY: CPT | Performed by: PHYSICAL THERAPIST

## 2018-03-19 NOTE — PROGRESS NOTES
Daily Note     Today's date: 3/19/2018  Patient name: Leslie Duffy  : 1955  MRN: [id number]  Referring provider: Rossi Conroy MD  Dx:   Encounter Diagnosis     ICD-10-CM    1  Complete rotator cuff tear of left shoulder M75 122    2  Acute pain of left shoulder M25 512    3  Rotator cuff injury, left, subsequent encounter S46 002D                   Subjective:   Reports "feeling pretty good"  Low levels of discomfort  Objective: See treatment diary below  Daily Treatment Diary     Manual   3-1 3/5 3/8 3/12 3/15 3/19      PROM 5 min 8' 8' 5 min x x x      Edema massage    5 min  and UT 5 min                                                   Exercise Diary              pendullums 20 x x 30 x x x      Wrist flex/ext 10 x 20 20 ea 1# 10x 1# x 20 x      Elbow flex AA 10 x 20 x x x A 10x      Sup/pron 10 x 20 x x x x      scap squeeze  5"x10 x x x x 15x      bike   10' x x x x      Counter stretch   3" 10x x x x x      shrugs    10 x x 15      Gripper red digiflex     20 x x      Wand AA ER             Scapular PRE s/l                                                                                                                                      Modalities              Ice and H-wave 15 min 20' x x  x x                                X=same as last visit  Picture of new exercise given for HEP  Assessment: PROM is up to expectation per protocol        Plan: Continue with plan of care

## 2018-03-22 ENCOUNTER — OFFICE VISIT (OUTPATIENT)
Dept: PHYSICAL THERAPY | Facility: REHABILITATION | Age: 63
End: 2018-03-22
Payer: OTHER MISCELLANEOUS

## 2018-03-22 DIAGNOSIS — M25.512 ACUTE PAIN OF LEFT SHOULDER: ICD-10-CM

## 2018-03-22 DIAGNOSIS — M75.122 COMPLETE ROTATOR CUFF TEAR OF LEFT SHOULDER: Primary | ICD-10-CM

## 2018-03-22 PROCEDURE — 97110 THERAPEUTIC EXERCISES: CPT

## 2018-03-22 PROCEDURE — 97010 HOT OR COLD PACKS THERAPY: CPT

## 2018-03-22 PROCEDURE — 97014 ELECTRIC STIMULATION THERAPY: CPT

## 2018-03-22 PROCEDURE — 97112 NEUROMUSCULAR REEDUCATION: CPT

## 2018-03-22 NOTE — PROGRESS NOTES
Daily Note     Today's date: 3/22/2018  Patient name: Tracey Galindo  : 1955  MRN: [id number]  Referring provider: German Salas MD  Dx:   Encounter Diagnosis     ICD-10-CM    1  Complete rotator cuff tear of left shoulder M75 122    2  Acute pain of left shoulder M25 512                   Subjective:   Reports feeling sore with the weather    Objective: See treatment diary below  Daily Treatment Diary     Manual   3-1 3/5 3/8 3/12 3/15 3/19 3-     PROM 5 min 8' 8' 5 min x x x x     Edema massage    5 min  and UT 5 min                                                   Exercise Diary              pendullums 20 x x 30 x x x x     Wrist flex/ext 10 x 20 20 ea 1# 10x 1# x 20 x x     Elbow flex AA 10 x 20 x x x A 10x x     Sup/pron 10 x 20 x x x x x     scap squeeze  5"x10 x x x x 15x x     bike   10' x x x x x     Counter stretch   3" 10x x x x x x     shrugs    10 x x 15 x     Gripper red digiflex     20 x x 30     Wand AA ER       10 x     Scapular PRE s/l                                                                                                                                      Modalities              Ice and H-wave 15 min 20' x x  x x x                               X=same as last visit    Assessment:  Difficulty relaxing for PROM     No issues with ROM within protocol     Plan: Continue with plan of care

## 2018-03-26 ENCOUNTER — OFFICE VISIT (OUTPATIENT)
Dept: PHYSICAL THERAPY | Facility: REHABILITATION | Age: 63
End: 2018-03-26
Payer: OTHER MISCELLANEOUS

## 2018-03-26 DIAGNOSIS — M25.512 ACUTE PAIN OF LEFT SHOULDER: ICD-10-CM

## 2018-03-26 DIAGNOSIS — M75.122 COMPLETE ROTATOR CUFF TEAR OF LEFT SHOULDER: Primary | ICD-10-CM

## 2018-03-26 PROCEDURE — 97110 THERAPEUTIC EXERCISES: CPT | Performed by: PHYSICAL THERAPIST

## 2018-03-26 PROCEDURE — 97140 MANUAL THERAPY 1/> REGIONS: CPT | Performed by: PHYSICAL THERAPIST

## 2018-03-26 PROCEDURE — 97014 ELECTRIC STIMULATION THERAPY: CPT | Performed by: PHYSICAL THERAPIST

## 2018-03-26 PROCEDURE — 97112 NEUROMUSCULAR REEDUCATION: CPT | Performed by: PHYSICAL THERAPIST

## 2018-03-26 NOTE — PROGRESS NOTES
Daily Note     Today's date: 3/26/2018  Patient name: Elis Freeman  : 1955  MRN: [id number]  Referring provider: Tommy Bergeron MD  Dx:   Encounter Diagnosis     ICD-10-CM    1  Complete rotator cuff tear of left shoulder M75 122    2  Acute pain of left shoulder M25 512                   Subjective:  A little sore  Notes popping behind her shoulder  Objective: See treatment diary below  Daily Treatment Diary     Manual   3-1 3/5 3/8 3/12 3/15 3/19 3-22 3/26    PROM 5 min 8' 8' 5 min x x x x x    Edema massage    5 min  and UT 5 min    Brief scap STM                                               Exercise Diary              pendullums 20 x x 30 x x x x x    Wrist flex/ext 10 x 20 20 ea 1# 10x 1# x 20 x x x    Elbow flex AA 10 x 20 x x x A 10x x 20    Sup/pron 10 x 20 x x x x x  20x 1#   scap squeeze  5"x10 x x x x 15x x 20    bike   10' x x x x x x    Counter stretch   3" 10x x x x x x     shrugs    10 x x 15 x 20    Gripper red digiflex     20 x x 30 x    Wand AA ER       10 x x    Scapular PRE s/l         10 ea    Table glide flex,scaption and ER         10 ea                                                                                                                Modalities              Ice and H-wave 15 min 20' x x  x x x MH and Hwave                              X=same as last visit    Assessment: Some increased tone throughout the scapular muscle  Brief STM applied to decrease pain and spasm  Moving on to next phase of protocol  PROM is in target  Flexion to about 140 today        Plan: Continue with plan of care

## 2018-03-27 ENCOUNTER — OFFICE VISIT (OUTPATIENT)
Dept: OBGYN CLINIC | Facility: MEDICAL CENTER | Age: 63
End: 2018-03-27

## 2018-03-27 VITALS
HEART RATE: 74 BPM | HEIGHT: 60 IN | DIASTOLIC BLOOD PRESSURE: 76 MMHG | BODY MASS INDEX: 41.19 KG/M2 | SYSTOLIC BLOOD PRESSURE: 117 MMHG | WEIGHT: 209.8 LBS

## 2018-03-27 DIAGNOSIS — M75.122 COMPLETE TEAR OF LEFT ROTATOR CUFF: Primary | ICD-10-CM

## 2018-03-27 PROCEDURE — 99024 POSTOP FOLLOW-UP VISIT: CPT | Performed by: ORTHOPAEDIC SURGERY

## 2018-03-27 NOTE — PROGRESS NOTES
Orthopaedic Surgery - Office Note  Avis Cardoza (19 y o  female)   : 1955   MRN: [id number]  Encounter Date: 3/27/2018    Chief Complaint   Patient presents with    Left Shoulder - Post-op, Follow-up       Assessment / Plan  5 weeks s/p L shoulder arthroscopy with supraspinatus and subscapularis repairs, SAD and open LHB tenodesis  · continue formal physical therapy with progression of motion and continued stretching,    · Patient was instructed still no pushing or pulling or lifting anything heavier than a cup of coffee  · It was discussed with the patient that she may start weaning out of the brace at nighttime for sleep and she may start sleeping in her bed if she feels comfortable  It was also discussed with the patient that it is okay to move her arm at waist level at this point in time  No overhead lifting  · At her next appointment we will talk about returning her back to work as a  with restrictions  Return in about 4 weeks (around 2018) for Recheck  History of Present Illness  Avis Cardoza is a 60 y o  female who presents 5 weeks s/p L shoulder arthroscopy with supraspinatus and subscapularis repairs, SAD and open LHB tenodesis  Pt states that she is overall doing very well however she does note some tenderness over her biceps scar  Pt denies numbness and tingling throughout her day however when she does some exercises at therapy she does notices minimal numbness and tingling however she states this has been improving overtime  Pt states due from being in the shoulder sling she notices that she will get knots in her back which cause clicking in her shoulder however she states that once in therapy once the PT adjusts her shoulder blade this relieves the pain and clicking  Pt is still being compliant with her brace and wears it 23 hours   Adjust shoulder blade ale a knot   She releaves it now leaves it out an hour a day still wearing brace 23 hours a day still sleeping in recliner  Review of Systems  Pertinent items are noted in HPI  All other systems were reviewed and are negative  Physical Exam  /76   Pulse 74   Ht 5' (1 524 m)   Wt 95 2 kg (209 lb 12 8 oz)   BMI 40 97 kg/m²   Cons: Appears well  No apparent distress  Psych: Alert  Oriented x3  Mood and affect normal   Eyes: PERRLA, EOMI  Resp: Normal effort  No audible wheezing or stridor  CV: Palpable pulse  No discernable arrhythmia  No LE edema  Lymph:  No palpable cervical, axillary, or inguinal lymphadenopathy  Skin: Warm  No palpable masses  No visible lesions  Neuro: Normal muscle tone  Normal and symmetric DTR's  Left Shoulder Exam  Alignment / Posture:  Normal shoulder posture  Normal scapular position  Inspection:  No swelling  No edema  No erythema  No ecchymosis  No deformity  Incision healed  Palpation:  mild tenderness at biceps incision site  ROM:  Shoulder   Shoulder ER 50  Strength:  Not tested  Stability:  Not tested  Tests: No pertinent positive or negative tests  Neurovascular:  Sensation intact in Ax/R/M/U nerve distributions  2+ radial pulse  Brisk capillary refill in all fingertips  Studies Reviewed  No studies to review    Procedures  No procedures today  Medical, Surgical, Family, and Social History  The patient's medical history, family history, and social history, were reviewed and updated as appropriate      Past Medical History:   Diagnosis Date    Arthritis     Cataract     ashley    Depression     Fluid retention     Headache, acute     Heart murmur     Hyperlipidemia     Hypertension     PONV (postoperative nausea and vomiting)     Rotator cuff tear, left     Wears glasses     Wears partial dentures     upper       Past Surgical History:   Procedure Laterality Date    ABDOMINAL ADHESION SURGERY      ANKLE SURGERY Right     tendon tear    APPENDECTOMY      CARPAL TUNNEL RELEASE Bilateral     CARPAL TUNNEL RELEASE Left     CARPAL TUNNEL RELEASE Right     COLONOSCOPY      HAND SURGERY Right     ganglion cyst    HEMORROIDECTOMY      HYSTERECTOMY      KNEE ARTHROSCOPY Left     OVARIAN CYST REMOVAL      [place] SHLDR ARTHROSCOP,SURG,W/ROTAT CUFF REPR Left 2/19/2018    Procedure: ARTHROSCOPIC REPAIR ROTATOR CUFF,  SAD, BICEP TENODESIS;  Surgeon: Trevor Bingham MD;  Location: [facility];  Service: Orthopedics    REPAIR RECTOCELE      TONSILLECTOMY      TUBAL LIGATION         Family History   Problem Relation Age of Onset    Hypertension Mother     Cancer Mother     Lung cancer Father     Thyroid disease Sister     Depression Sister     Cancer Brother     ROBERT disease Brother        Social History     Occupational History    Not on file       Social History Main Topics    Smoking status: Never Smoker    Smokeless tobacco: Never Used    Alcohol use Yes      Comment: few x year    Drug use: No    Sexual activity: Not on file       Allergies   Allergen Reactions    Iodine Hives     IV IODINE    Keflex [Cephalexin] Other (See Comments)     Mouth sores    Morphine And Related Hives     IV MORPINE    Penicillins Hives    Clindamycin Other (See Comments)     Pt unsure    Erythromycin Hives         Current Outpatient Prescriptions:     acetaminophen (TYLENOL) 500 mg tablet, Take 500-1,000 mg by mouth every 6 (six) hours as needed for mild pain, Disp: , Rfl:     citalopram (CeleXA) 40 mg tablet, Take 40 mg by mouth every evening  , Disp: , Rfl: 1    enalapril (VASOTEC) 5 mg tablet, Take 5 mg by mouth every evening  , Disp: , Rfl:     furosemide (LASIX) 40 mg tablet, Take 40 mg by mouth daily as needed  , Disp: , Rfl:     naproxen (NAPROSYN) 500 mg tablet, Take 1 tablet (500 mg total) by mouth 2 (two) times a day with meals, Disp: 60 tablet, Rfl: 0    promethazine (PHENERGAN) 12 5 MG tablet, Take 1 tablet (12 5 mg total) by mouth every 6 (six) hours as needed for nausea or vomiting, Disp: 30 tablet, Rfl: 0      Colette Lane, Scribe    Scribe Attestation    I,:   Padmini Red, Bonnie am acting as a scribe while in the presence of the attending physician :        I,:   Sherry West MD personally performed the services described in this documentation    as scribed in my presence :

## 2018-03-29 ENCOUNTER — OFFICE VISIT (OUTPATIENT)
Dept: PHYSICAL THERAPY | Facility: REHABILITATION | Age: 63
End: 2018-03-29
Payer: OTHER MISCELLANEOUS

## 2018-03-29 DIAGNOSIS — M75.122 COMPLETE TEAR OF LEFT ROTATOR CUFF: ICD-10-CM

## 2018-03-29 DIAGNOSIS — M25.512 ACUTE PAIN OF LEFT SHOULDER: ICD-10-CM

## 2018-03-29 DIAGNOSIS — M75.122 COMPLETE ROTATOR CUFF TEAR OF LEFT SHOULDER: Primary | ICD-10-CM

## 2018-03-29 DIAGNOSIS — S46.002D ROTATOR CUFF INJURY, LEFT, SUBSEQUENT ENCOUNTER: ICD-10-CM

## 2018-03-29 PROCEDURE — 97110 THERAPEUTIC EXERCISES: CPT

## 2018-03-29 PROCEDURE — 97010 HOT OR COLD PACKS THERAPY: CPT

## 2018-03-29 PROCEDURE — 97140 MANUAL THERAPY 1/> REGIONS: CPT

## 2018-03-29 PROCEDURE — 97112 NEUROMUSCULAR REEDUCATION: CPT

## 2018-03-29 PROCEDURE — 97014 ELECTRIC STIMULATION THERAPY: CPT

## 2018-03-29 NOTE — PROGRESS NOTES
Daily Note     Today's date: 3/29/2018  Patient name: Arnulfo Gottlieb  : 1955  MRN: [id number]  Referring provider: Kal Kwok MD  Dx:   Encounter Diagnosis     ICD-10-CM    1  Complete rotator cuff tear of left shoulder M75 122    2  Acute pain of left shoulder M25 512    3  Rotator cuff injury, left, subsequent encounter S46 002D    4  Complete tear of left rotator cuff M75 122                   Subjective:  Reports her shoulder is not feeling too sore  She saw MD who was pleased  She is starting to wean out of sling      Objective: See treatment diary below  Daily Treatment Diary     Manual   3-1 3/5 3/8 3/12 3/15 3/19 3-22 3/26 3-29   PROM 5 min 8' 8' 5 min x x x x x x   Edema massage    5 min  and UT 5 min    Brief scap STM X  STM scap                                              Exercise Diary              pendullums 20 x x 30 x x x x x x   Wrist flex/ext 10 x 20 20 ea 1# 10x 1# x 20 x x x 30   Elbow flex AA 10 x 20 x x x A 10x x 20 30   Sup/pron 10 x 20 x x x x x  20x 1# 20   scap squeeze  5"x10 x x x x 15x x 20 30   bike   10' x x x x x x x   Counter stretch   3" 10x x x x x x     shrugs    10 x x 15 x 20 30   Gripper red digiflex     20 x x 30 x x   Wand AA ER       10 x x x   Scapular PRE s/l         10 ea x   Table glide flex,scaption and ER         10 ea x   Wandflx          10   SL ER          10                                                                                     Modalities              Ice and H-wave 15 min 20' x x  x x x MH and Hwave x                             X=same as last visit    Assessment: Tight around scap, looser after STM     Plan: Continue with plan of care

## 2018-04-02 ENCOUNTER — OFFICE VISIT (OUTPATIENT)
Dept: PHYSICAL THERAPY | Facility: REHABILITATION | Age: 63
End: 2018-04-02
Payer: OTHER MISCELLANEOUS

## 2018-04-02 DIAGNOSIS — M25.512 ACUTE PAIN OF LEFT SHOULDER: ICD-10-CM

## 2018-04-02 DIAGNOSIS — M75.122 COMPLETE ROTATOR CUFF TEAR OF LEFT SHOULDER: Primary | ICD-10-CM

## 2018-04-02 PROCEDURE — 97112 NEUROMUSCULAR REEDUCATION: CPT | Performed by: PHYSICAL THERAPIST

## 2018-04-02 PROCEDURE — 97014 ELECTRIC STIMULATION THERAPY: CPT | Performed by: PHYSICAL THERAPIST

## 2018-04-02 PROCEDURE — 97140 MANUAL THERAPY 1/> REGIONS: CPT | Performed by: PHYSICAL THERAPIST

## 2018-04-02 NOTE — PROGRESS NOTES
Daily Note     Today's date: 2018  Patient name: Afsaneh Glasgow  : 1955  MRN: [id number]  Referring provider: Yady Godoy MD  Dx:   Encounter Diagnosis     ICD-10-CM    1  Complete rotator cuff tear of left shoulder M75 122    2  Acute pain of left shoulder M25 512                   Subjective:  Has been weaning out of the sling  Finds the new exercises difficult and is overall more painful today  Objective: See treatment diary below  Daily Treatment Diary     Manual   3-1 3/5 3/8 3/12 3/15 3/19 3-22 3/26 3-29   PROM 5 min 8' 8' 5 min x x x x x x   STM scap 5 min   5 min  and UT 5 min    Brief scap STM X  STM scap                                              Exercise Diary              pendullums 30 x x 30 x x x x x x   Wrist flex/ext 30x 1# x 20 20 ea 1# 10x 1# x 20 x x x 30   Elbow flex AA 30 x 20 x x x A 10x x 20 30   Sup/pron 1# 30x x 20 x x x x x  20x 1# 20   scap squeeze 30 5"x10 x x x x 15x x 20 30   bike 10 min  10' x x x x x x x   Counter stretch HEP  3" 10x x x x x x     shrugs 30   10 x x 15 x 20 30   Gripper red digiflex 30    20 x x 30 x x   Wand AA ER 10      10 x x x   Scapular PRE s/l 10        10 ea x   Table glide flex,scaption and ER 10 ea        10 ea x   Wand flx Press up         10   SL ER 10         10                                                                                     Modalities              MH and H-wave 20 min 20' x x  x x x MH and Hwave x                             X=same as last visit    Assessment: Increased pain likely due to combination of damp weather and progression of protocol  ROM improving steadily        Plan: Continue with plan of care

## 2018-04-05 ENCOUNTER — OFFICE VISIT (OUTPATIENT)
Dept: PHYSICAL THERAPY | Facility: REHABILITATION | Age: 63
End: 2018-04-05
Payer: OTHER MISCELLANEOUS

## 2018-04-05 DIAGNOSIS — M75.122 COMPLETE ROTATOR CUFF TEAR OF LEFT SHOULDER: Primary | ICD-10-CM

## 2018-04-05 DIAGNOSIS — M25.512 ACUTE PAIN OF LEFT SHOULDER: ICD-10-CM

## 2018-04-05 PROCEDURE — 97014 ELECTRIC STIMULATION THERAPY: CPT

## 2018-04-05 PROCEDURE — 97140 MANUAL THERAPY 1/> REGIONS: CPT

## 2018-04-05 PROCEDURE — 97112 NEUROMUSCULAR REEDUCATION: CPT

## 2018-04-05 NOTE — PROGRESS NOTES
Daily Note     Today's date: 2018  Patient name: Melida Todd  : 1955  MRN: [id number]  Referring provider: Peyton Landis MD  Dx:   Encounter Diagnosis     ICD-10-CM    1  Complete rotator cuff tear of left shoulder M75 122    2  Acute pain of left shoulder M25 512                   Subjective:  Mils soreness with being out of sling & starting new ex  Also having some mild pain in L wrist extensors & neck  Objective: See treatment diary below  Daily Treatment Diary     Manual  -5 3/5 3/8 3/12 3/15 3/19 3-22 3/26 3   PROM 5 min x 8' 5 min x x x x x x   STM scap 5 min 10  5 min  and UT 5 min    Brief scap STM X  STM scap                                              Exercise Diary              pendullums 30 x x 30 x x x x x x   Wrist flex/ext 30x 1# x 20 20 ea 1# 10x 1# x 20 x x x 30   Elbow flex  30 x 20 x x x A 10x x 20 30   Sup/pron 1# 30x x 20 x x x x x  20x 1# 20   scap squeeze 30 5"x10 x x x x 15x x 20 30   bike 10 min  10' x x x x x x x   Counter stretch 10 x 3" 10x x x x x x     shrugs 30 x  10 x x 15 x 20 30   Gripper red digiflex  D/C   20 x x 30 x x   Wand AA ER 10      10 x x x   Scapular PRE s/l 10        10 ea x   Table glide flex,scaption and ER 10 ea x       10 ea x   Wand flx & ER Press up 10 ea        10   SL ER 10 x        10   pulley  10                                                                                Modalities              MH and H-wave 20 min 20' x x  x x x MH and Hwave x                             X=same as last visit    Assessment: Instr in wrist ext stretch  STM into neck today & instr in 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

## 2018-04-09 ENCOUNTER — OFFICE VISIT (OUTPATIENT)
Dept: PHYSICAL THERAPY | Facility: REHABILITATION | Age: 63
End: 2018-04-09
Payer: OTHER MISCELLANEOUS

## 2018-04-09 DIAGNOSIS — M75.122 COMPLETE ROTATOR CUFF TEAR OF LEFT SHOULDER: Primary | ICD-10-CM

## 2018-04-09 DIAGNOSIS — M25.512 ACUTE PAIN OF LEFT SHOULDER: ICD-10-CM

## 2018-04-09 PROCEDURE — 97110 THERAPEUTIC EXERCISES: CPT

## 2018-04-09 PROCEDURE — 97140 MANUAL THERAPY 1/> REGIONS: CPT

## 2018-04-09 PROCEDURE — 97014 ELECTRIC STIMULATION THERAPY: CPT

## 2018-04-09 PROCEDURE — 97112 NEUROMUSCULAR REEDUCATION: CPT

## 2018-04-09 NOTE — PROGRESS NOTES
Daily Note     Today's date: 2018  Patient name: Ximena Lea  : 1955  MRN: [id number]  Referring provider: Rafael Gray MD  Dx:   Encounter Diagnosis     ICD-10-CM    1  Complete rotator cuff tear of left shoulder M75 122    2  Acute pain of left shoulder M25 512                   Subjective:  No c/o, felt good over the weekend  Mild soreness in wrist ext  Objective: See treatment diary below  Daily Treatment Diary     Manual   4- 4-9 3/8 3/12 3/15 3/19 3-22 3/26 3-29   PROM 5 min x x 5 min x x x x x x   STM scap 5 min 10 x 5 min  and UT 5 min    Brief scap STM X  STM scap                                              Exercise Diary              pendullums 30 x x 30 x x x x x x   Wrist flex/ext 30x 1# x 20 20 ea 1# 10x 1# x 20 x x x 30   Elbow flex  30 x 20 x x x A 10x x 20 30   Sup/pron 1# 30x x 20 x x x x x  20x 1# 20   scap squeeze 30 5"x10 x x x x 15x x 20 30   bike 10 min  10' x x x x x x x   Counter stretch 10 x 3" 10x x x x x x     shrugs 30 D/C  10 x x 15 x 20 30   Gripper red digiflex  D/C   20 x x 30 x x   Wand AA ER 10      10 x x x   Scapular PRE s/l 10 x x      10 ea x   Table glide flex,scaption and ER 10 ea x x      10 ea x   Wand flx & ER Press up 10 ea X flx & ER       10   SL ER 10 x 30       10   pulley  10 20          Supine punch   10          Rhym stab   20x                                                     Modalities              MH and H-wave 20 min 20' x x  x x x MH and Hwave x                             X=same as last visit    Assessment:   Some R sides UT tightness yet  Instr in UT stretch,   Looser after STM     Christ new ex well     Plan: Continue with plan of care

## 2018-04-12 ENCOUNTER — OFFICE VISIT (OUTPATIENT)
Dept: PHYSICAL THERAPY | Facility: REHABILITATION | Age: 63
End: 2018-04-12
Payer: OTHER MISCELLANEOUS

## 2018-04-12 DIAGNOSIS — M75.122 COMPLETE ROTATOR CUFF TEAR OF LEFT SHOULDER: Primary | ICD-10-CM

## 2018-04-12 DIAGNOSIS — M25.512 ACUTE PAIN OF LEFT SHOULDER: ICD-10-CM

## 2018-04-12 PROCEDURE — 97110 THERAPEUTIC EXERCISES: CPT | Performed by: PHYSICAL THERAPIST

## 2018-04-12 PROCEDURE — 97112 NEUROMUSCULAR REEDUCATION: CPT | Performed by: PHYSICAL THERAPIST

## 2018-04-12 PROCEDURE — 97014 ELECTRIC STIMULATION THERAPY: CPT | Performed by: PHYSICAL THERAPIST

## 2018-04-12 PROCEDURE — 97140 MANUAL THERAPY 1/> REGIONS: CPT | Performed by: PHYSICAL THERAPIST

## 2018-04-12 NOTE — PROGRESS NOTES
Daily Note     Today's date: 2018  Patient name: Rosa Devine  : 1955  MRN: [id number]  Referring provider: Rosa Caro MD  Dx:   Encounter Diagnosis     ICD-10-CM    1  Complete rotator cuff tear of left shoulder M75 122    2  Acute pain of left shoulder M25 512                   Subjective: Reports feeling pretty good  Get sore with some of the exercises  Abd and IR most painful  Supine flexion is also still difficult  Objective: See treatment diary below  Daily Treatment Diary     Manual  --9 4/12 3/12 3/15 3/19 3-22 3/26 3   PROM 5 min x x 5 min x x x x x x   STM scap 5 min 10 x 5 min  and UT 5 min    Brief scap STM X  STM scap                                              Exercise Diary              pendullums 30 x x 30 x x x x x x   Wrist flex/ext 30x 1# x 20 30 ea 1# 10x 1# x 20 x x x 30   Elbow flex  30 x 20 30 x x A 10x x 20 30   Sup/pron 1# 30x x 20 30 x x x x  20x 1# 20   scap squeeze 30 5"x10 x 10"x10 x x 15x x 20 30   bike 10 min  10' x x x x x x x   Counter stretch 10 x 3" 10x HEP x x x x     shrugs 30 D/C   x x 15 x 20 30   Gripper red digiflex  D/C   20 x x 30 x x   Wand AA ER 10      10 x x x   Scapular PRE s/l 10 x x x     10 ea x   Table glide flex,scaption and ER 10 ea x x 15 ea     10 ea x   Wand flx & ER Press up 10 ea X flx & ER And Abd      10   SL ER 10 x 30 x      10   pulley  10 20 x         Supine punch   10 15         Rhym stab   20x x         Towel IR    10" 5x                                       Modalities              MH and H-wave 20 min 20' x x  x x x MH and Hwave x                             X=same as last visit    Assessment:   ROM is improving very well        Plan: Continue with plan of care

## 2018-04-16 ENCOUNTER — OFFICE VISIT (OUTPATIENT)
Dept: PHYSICAL THERAPY | Facility: REHABILITATION | Age: 63
End: 2018-04-16
Payer: OTHER MISCELLANEOUS

## 2018-04-16 DIAGNOSIS — M25.512 ACUTE PAIN OF LEFT SHOULDER: ICD-10-CM

## 2018-04-16 DIAGNOSIS — S46.002D ROTATOR CUFF INJURY, LEFT, SUBSEQUENT ENCOUNTER: ICD-10-CM

## 2018-04-16 DIAGNOSIS — M75.122 COMPLETE ROTATOR CUFF TEAR OF LEFT SHOULDER: Primary | ICD-10-CM

## 2018-04-16 DIAGNOSIS — M75.122 COMPLETE TEAR OF LEFT ROTATOR CUFF: ICD-10-CM

## 2018-04-16 PROCEDURE — 97112 NEUROMUSCULAR REEDUCATION: CPT

## 2018-04-16 PROCEDURE — 97014 ELECTRIC STIMULATION THERAPY: CPT

## 2018-04-16 PROCEDURE — 97010 HOT OR COLD PACKS THERAPY: CPT

## 2018-04-16 PROCEDURE — 97110 THERAPEUTIC EXERCISES: CPT

## 2018-04-16 PROCEDURE — 97140 MANUAL THERAPY 1/> REGIONS: CPT

## 2018-04-16 NOTE — PROGRESS NOTES
Daily Note     Today's date: 2018  Patient name: Netta Jackson  : 1955  MRN: [id number]  Referring provider: Joy Tapia MD  Dx:   Encounter Diagnosis     ICD-10-CM    1  Complete rotator cuff tear of left shoulder M75 122    2  Acute pain of left shoulder M25 512    3  Rotator cuff injury, left, subsequent encounter S46 002D                   Subjective: Reports she did some light work in her yard & was sore from that  4/10 today    Objective: See treatment diary below  Daily Treatment Diary     Manual   4- 4-9 -16 3/15 3/19 3-22 3/26 3-   PROM 5 min x x 5 min x x x x x x   STM scap 5 min 10 x 5 min  and UT 5 min    Brief scap STM X  STM scap                                              Exercise Diary              pendullums 30 x x 30 x x x x x x   Wrist flex/ext 30x 1# x 20 30 ea 2# 30x 1# x 20 x x x 30   Elbow flex  30 x 20 30 x x A 10x x 20 30   Sup/pron 1# 30x x 20 30 X 2# x x x  20x 1# 20   scap squeeze 30 5"x10 x 10"x10 x x 15x x 20 30   bike 10 min  10' x x x x x x x   Counter stretch 10 x 3" 10x HEP  x x x     shrugs 30 D/C    x 15 x 20 30   Gripper red digiflex  D/C    x x 30 x x   Wand AA ER 10      10 x x x   Scapular PRE s/l 10 x x x     10 ea x   Table glide flex,scaption and ER 10 ea x x 15 ea x    10 ea x   Wand flx & ER Press up 10 ea X flx & ER And Abd Stand abd 10 rest supine     10   SL ER 10 x 30 x x     10   pulley  10 20 x x        Supine punch   10 15 x        Rhym stab   20x x x        Towel IR    10" 5x x        jay flx,abd,ER     10                         Modalities              MH and H-wave 20 min 20' x x  x x x MH and Hwave x                             X=same as last visit    Assessment:  Christ ex well despite some increase in soreness    PROM cont to look good     Plan: Continue with plan of care

## 2018-04-19 ENCOUNTER — OFFICE VISIT (OUTPATIENT)
Dept: PHYSICAL THERAPY | Facility: REHABILITATION | Age: 63
End: 2018-04-19
Payer: OTHER MISCELLANEOUS

## 2018-04-19 DIAGNOSIS — M75.122 COMPLETE TEAR OF LEFT ROTATOR CUFF: ICD-10-CM

## 2018-04-19 DIAGNOSIS — M75.122 COMPLETE ROTATOR CUFF TEAR OF LEFT SHOULDER: Primary | ICD-10-CM

## 2018-04-19 DIAGNOSIS — S46.002D ROTATOR CUFF INJURY, LEFT, SUBSEQUENT ENCOUNTER: ICD-10-CM

## 2018-04-19 DIAGNOSIS — M25.512 ACUTE PAIN OF LEFT SHOULDER: ICD-10-CM

## 2018-04-19 PROCEDURE — 97140 MANUAL THERAPY 1/> REGIONS: CPT

## 2018-04-19 PROCEDURE — 97112 NEUROMUSCULAR REEDUCATION: CPT

## 2018-04-19 PROCEDURE — 97014 ELECTRIC STIMULATION THERAPY: CPT

## 2018-04-19 PROCEDURE — 97010 HOT OR COLD PACKS THERAPY: CPT

## 2018-04-19 PROCEDURE — 97110 THERAPEUTIC EXERCISES: CPT

## 2018-04-19 NOTE — PROGRESS NOTES
Daily Note     Today's date: 2018  Patient name: Ximena Lea  : 1955  MRN: [id number]  Referring provider: Rafael Gray MD  Dx:   Encounter Diagnosis     ICD-10-CM    1  Complete rotator cuff tear of left shoulder M75 122    2  Acute pain of left shoulder M25 512    3  Rotator cuff injury, left, subsequent encounter S46 002D                   Subjective: Reports she is recovered from yard work  Gets fatigued quickly with using L arm Notices some popping in shoulder at times  Also having some pain over L olecranon, sore with resting her arm on it    Objective: See treatment diary below  Daily Treatment Diary     Manual   4-5 4-9 -16 4-19 3/19 3-22 3/26 3-29   PROM 5 min x x 5 min x x x x x x   STM scap 5 min 10 x 5 min  and UT 5 min    Brief scap STM X  STM scap                                              Exercise Diary            pendullums 30 x x 30 x x x x x x   Wrist flex/ext 30x 1# x 20 30 ea 2# 30x 2# x 30 x x x 30   Elbow flex  30 x 20 30 x x A 10x x 20 30   Sup/pron 1# 30x x 20 30 X 2# x x x  20x 1# 20   scap squeeze 30 5"x10 x 10"x10 x x 15x x 20 30   bike 10 min  10' x x x x x x x   Counter stretch 10 x 3" 10x HEP   x x     shrugs 30 D/C     15 x 20 30   Gripper red digiflex  D/C    AROM flx,abd 10x x 30 x x   Wand AA ER 10      10 x x x   Scapular PRE s/l 10 x x x     10 ea x   Table glide flex,scaption and ER 10 ea x x 15 ea x x   10 ea x   Wand flx & ER Press up 10 ea X flx & ER And Abd Stand abd 10 rest supine x    10   SL ER 10 x 30 x x x    10   pulley  10 20 x x x       Supine punch   10 15 x x       Rhym stab   20x x x        Towel IR    10" 5x x x       jay flx,abd,     10 X IR & ER                        Modalities              MH and H-wave 20 min 20' x x x x x x MH and Hwave x                             X=same as last visit    Assessment:  Tender over olecranon, PT applied some rock tape  No worse with ex     Did well with new ex     Plan: Continue with plan of care

## 2018-04-23 ENCOUNTER — EVALUATION (OUTPATIENT)
Dept: PHYSICAL THERAPY | Facility: REHABILITATION | Age: 63
End: 2018-04-23
Payer: OTHER MISCELLANEOUS

## 2018-04-23 DIAGNOSIS — S46.002D ROTATOR CUFF INJURY, LEFT, SUBSEQUENT ENCOUNTER: ICD-10-CM

## 2018-04-23 DIAGNOSIS — M75.122 COMPLETE ROTATOR CUFF TEAR OF LEFT SHOULDER: Primary | ICD-10-CM

## 2018-04-23 DIAGNOSIS — M25.512 ACUTE PAIN OF LEFT SHOULDER: ICD-10-CM

## 2018-04-23 PROCEDURE — 97110 THERAPEUTIC EXERCISES: CPT | Performed by: PHYSICAL THERAPIST

## 2018-04-23 PROCEDURE — 97014 ELECTRIC STIMULATION THERAPY: CPT | Performed by: PHYSICAL THERAPIST

## 2018-04-23 PROCEDURE — 97112 NEUROMUSCULAR REEDUCATION: CPT | Performed by: PHYSICAL THERAPIST

## 2018-04-23 PROCEDURE — 97140 MANUAL THERAPY 1/> REGIONS: CPT | Performed by: PHYSICAL THERAPIST

## 2018-04-23 NOTE — PROGRESS NOTES
Re evaluation      Today's date: 2018  Patient name: David Monroe  : 1955  MRN: [id number]  Referring provider: Omaira Jarvis MD  Dx:   Encounter Diagnosis     ICD-10-CM    1  Complete rotator cuff tear of left shoulder M75 122    2  Acute pain of left shoulder M25 512    3  Rotator cuff injury, left, subsequent encounter S46 002D                   Subjective: Pt c/o pain 1/10 in the shoulder  Objective: See treatment diary below  oals  STG:  Flex 0-125  Pt demonstrates good understanding of HEP and precautions  LTG: RTW as a   No difficulty with IADLs  Strength and ROM WNL      Plan  Patient would benefit from: skilled PT  Planned modality interventions: TENS, H-Wave and cryotherapy  Planned therapy interventions: joint mobilization, manual therapy, neuromuscular re-education and therapeutic exercise  Frequency: 2x week  Duration in weeks: 12        Patient Goals  Patient goals for therapy: return to work, independence with ADLs/IADLs and increased motion  Patient goal: Wants to be able to go for walks        Objective     Observations   Left Shoulder   Well healed scars    Palpation   Left   Tenderness of the biceps, infraspinatus and subscapularis       Active Range of Motion     Left Elbow   Flexion: 130 degrees   Extension: 0 degrees     Right Elbow   Normal active range of motion    Left Wrist   Normal active range of motion    Right Wrist   Normal active range of motion    Passive Range of Motion   Left Shoulder   Flexion: 162 degrees   Abduction: 165 degrees   External rotation 90°: 58 degrees   IR at 90 70    Right Shoulder   Normal passive range of motion    AROM  Left shoulder  Flex:110  Abd:105  External rotation: T2  Internal rotation: L5    General Comments     Shoulder Comments   Strength and AROM NA  Manual   4-5 4-9  4-16 4-19 4/23 3-22 3/26 3-29   PROM 5 min x x 5 min x x x x x x   STM scap 5 min 10 x 5 min  and UT 5 min  x  Brief scap STM X  STM scap Exercise Diary     4/12 4-19 4/23      pendullums 30 x x 30 x x x x x x   Wrist flex/ext 30x 1# x 20 30 ea 2# 30x 2# x 30 x x x 30   Elbow flex  30 x 20 30 x x x x 20 30   Sup/pron 1# 30x x 20 30 X 2# x x x  20x 1# 20   scap squeeze 30 5"x10 x 10"x10 x x Red TB 20 x 20 30   bike 10 min  10' x x x x x x x   Counter stretch 10 x 3" 10x HEP    x     shrugs 30 D/C      x 20 30   Gripper red digiflex  D/C    AROM flx,abd 10x x 30 x x   Wand AA ER 10      10 x x x   Scapular PRE s/l 10 x x x     10 ea x   Table glide flex,scaption and ER 10 ea x x 15 ea x x Wall climbs 5 flex and 5 abd  10 ea x   Wand flx & ER Press up 10 ea X flx & ER And Abd Stand abd 10 rest supine x x   10   SL ER 10 x 30 x x x x   10   pulley  10 20 x x x x      Supine punch   10 15 x x 20      Rhym stab   20x x x  x      Towel IR    10" 5x x x x      jay flx,abd,     10 X IR & ER x                       Modalities              MH and H-wave 20 min 20' x x x x x x MH and Hwave x                             X=same as last visit    Assessment: Pt is progressing very well through the protocol and will benefit from continued PT to reach her goals  Plan: Continue per plan of care

## 2018-04-24 ENCOUNTER — OFFICE VISIT (OUTPATIENT)
Dept: OBGYN CLINIC | Facility: MEDICAL CENTER | Age: 63
End: 2018-04-24

## 2018-04-24 VITALS
HEART RATE: 70 BPM | HEIGHT: 60 IN | BODY MASS INDEX: 40.25 KG/M2 | SYSTOLIC BLOOD PRESSURE: 120 MMHG | WEIGHT: 205 LBS | DIASTOLIC BLOOD PRESSURE: 76 MMHG

## 2018-04-24 DIAGNOSIS — M75.122 COMPLETE TEAR OF LEFT ROTATOR CUFF: Primary | ICD-10-CM

## 2018-04-24 PROCEDURE — 99024 POSTOP FOLLOW-UP VISIT: CPT | Performed by: ORTHOPAEDIC SURGERY

## 2018-04-24 NOTE — LETTER
April 24, 2018     Patient: Annie Last   YOB: 1955   Date of Visit: 4/24/2018       To Whom it May Concern:    Nurys Sigala is under my professional care  She was seen in my office on 4/24/2018  She should continue with the restrictions of no lifting with her left arm and no overhead arm movements with the left arm at this time  She will be re-evaluated in 1 month at which time we will give her updated restrictions  If you have any questions or concerns, please don't hesitate to call           Sincerely,          Romain Coughlin MD        CC: Annie Gormane

## 2018-04-24 NOTE — PROGRESS NOTES
Orthopaedic Surgery - Office Note  Elvia Scott (89 y o  female)   : 1955   MRN: [id number]  Encounter Date: 2018    Chief Complaint   Patient presents with    Left Shoulder - Follow-up       Assessment / Plan  S/p L shoulder arthroscopy with supraspinatus and subscapularis repairs, SAD and open LHB tenodesis on 2018  · Continue formal physical therapy on accelerated rotator cuff protocol with progression of motion and continued stretching,    · Patient should continue with lifting restriction of no greater than a couple of water at this time  · Continue with ice and analgesics as needed  · Patient was given a note for work that she could return back with the restriction of no lifting with her left arm and no overhead arm movements  Return in about 4 weeks (around 2018)  History of Present Illness  Elvia Scott is a 60 y o  female s/p L shoulder arthroscopy with supraspinatus and subscapularis repairs, SAD and open LHB tenodesis  The patient is doing well at this time reports minimal pain  She does have stiffness in the shoulder  She has been doing outpatient physical therapy and feels that she is progressing well  She does feel that she gets some stiffness in the shoulder  She does use heat before physical therapy and Tylenol following for any soreness  Review of Systems  Pertinent items are noted in HPI  All other systems were reviewed and are negative  Physical Exam  /76   Pulse 70   Ht 5' (1 524 m)   Wt 93 kg (205 lb)   BMI 40 04 kg/m²   Cons: Appears well  No apparent distress  Psych: Alert  Oriented x3  Mood and affect normal   Eyes: PERRLA, EOMI  Resp: Normal effort  No audible wheezing or stridor  CV: Palpable pulse  No discernable arrhythmia  No LE edema  Lymph:  No palpable cervical, axillary, or inguinal lymphadenopathy  Skin: Warm  No palpable masses  No visible lesions  Neuro: Normal muscle tone  Normal and symmetric DTR's       Left Shoulder Exam  Alignment / Posture:  Normal shoulder posture  Normal scapular position  Inspection:  No swelling  No edema  No erythema  No ecchymosis  No deformity  Incision healed  Palpation:  mild tenderness at biceps incision site  ROM:  Shoulder   Shoulder ER 70  Shoulder IR L5  Strength:  Not tested  Stability:  Not tested  Tests: No pertinent positive or negative tests  Neurovascular:  Sensation intact in Ax/R/M/U nerve distributions  2+ radial pulse  Brisk capillary refill in all fingertips  Studies Reviewed  No studies to review    Procedures  No procedures today  Medical, Surgical, Family, and Social History  The patient's medical history, family history, and social history, were reviewed and updated as appropriate      Past Medical History:   Diagnosis Date    Arthritis     Cataract     ashley    Depression     Fluid retention     Headache, acute     Heart murmur     Hyperlipidemia     Hypertension     PONV (postoperative nausea and vomiting)     Rotator cuff tear, left     Wears glasses     Wears partial dentures     upper       Past Surgical History:   Procedure Laterality Date    ABDOMINAL ADHESION SURGERY      ANKLE SURGERY Right     tendon tear    APPENDECTOMY      CARPAL TUNNEL RELEASE Bilateral     CARPAL TUNNEL RELEASE Left     CARPAL TUNNEL RELEASE Right     COLONOSCOPY      HAND SURGERY Right     ganglion cyst    HEMORROIDECTOMY      HYSTERECTOMY      KNEE ARTHROSCOPY Left     OVARIAN CYST REMOVAL      AR SHLDR ARTHROSCOP,SURG,W/ROTAT CUFF REPR Left 2/19/2018    Procedure: ARTHROSCOPIC REPAIR ROTATOR CUFF,  SAD, BICEP TENODESIS;  Surgeon: Elly Ford MD;  Location: [facility];  Service: Orthopedics    REPAIR RECTOCELE      TONSILLECTOMY      TUBAL LIGATION         Family History   Problem Relation Age of Onset    Hypertension Mother     Cancer Mother     Lung cancer Father     Thyroid disease Sister     Depression Sister     Cancer Brother  ROBERT disease Brother        Social History     Occupational History    Not on file       Social History Main Topics    Smoking status: Never Smoker    Smokeless tobacco: Never Used    Alcohol use Yes      Comment: few x year    Drug use: No    Sexual activity: Not on file       Allergies   Allergen Reactions    Iodine Hives     IV IODINE    Keflex [Cephalexin] Other (See Comments)     Mouth sores    Morphine And Related Hives     IV MORPINE    Penicillins Hives    Clindamycin Other (See Comments)     Pt unsure    Erythromycin Hives         Current Outpatient Prescriptions:     acetaminophen (TYLENOL) 500 mg tablet, Take 500-1,000 mg by mouth every 6 (six) hours as needed for mild pain, Disp: , Rfl:     citalopram (CeleXA) 40 mg tablet, Take 40 mg by mouth every evening  , Disp: , Rfl: 1    enalapril (VASOTEC) 5 mg tablet, Take 5 mg by mouth every evening  , Disp: , Rfl:     furosemide (LASIX) 40 mg tablet, Take 40 mg by mouth daily as needed  , Disp: , Rfl:     naproxen (NAPROSYN) 500 mg tablet, Take 1 tablet (500 mg total) by mouth 2 (two) times a day with meals, Disp: 60 tablet, Rfl: 0      Roly Frances PA-C    Scribe Attestation    I,:    am acting as a scribe while in the presence of the attending physician :        I,:    personally performed the services described in this documentation    as scribed in my presence :

## 2018-04-26 ENCOUNTER — OFFICE VISIT (OUTPATIENT)
Dept: PHYSICAL THERAPY | Facility: REHABILITATION | Age: 63
End: 2018-04-26
Payer: OTHER MISCELLANEOUS

## 2018-04-26 DIAGNOSIS — M75.122 COMPLETE ROTATOR CUFF TEAR OF LEFT SHOULDER: Primary | ICD-10-CM

## 2018-04-26 DIAGNOSIS — S46.002D ROTATOR CUFF INJURY, LEFT, SUBSEQUENT ENCOUNTER: ICD-10-CM

## 2018-04-26 DIAGNOSIS — M25.512 ACUTE PAIN OF LEFT SHOULDER: ICD-10-CM

## 2018-04-26 PROCEDURE — 97110 THERAPEUTIC EXERCISES: CPT | Performed by: PHYSICAL THERAPIST

## 2018-04-26 PROCEDURE — 97112 NEUROMUSCULAR REEDUCATION: CPT | Performed by: PHYSICAL THERAPIST

## 2018-04-26 PROCEDURE — 97140 MANUAL THERAPY 1/> REGIONS: CPT | Performed by: PHYSICAL THERAPIST

## 2018-04-26 NOTE — PROGRESS NOTES
Daily Note      Today's date: 2018  Patient name: Deuce Sequeira  : 1955  MRN: [id number]  Referring provider: Celi Sabillon MD  Dx:   Encounter Diagnosis     ICD-10-CM    1  Complete rotator cuff tear of left shoulder M75 122    2  Acute pain of left shoulder M25 512    3  Rotator cuff injury, left, subsequent encounter S46 002D                   Subjective:     Objective: See treatment diary below    Manual   4-5 4- 4--19 4/23 4/26 3/26 3-29   PROM 5 min x x 5 min x x x x x x   STM scap 5 min 10 x 5 min  and UT 5 min  x  Brief scap STM X  STM scap                                              Exercise Diary     -     pendullums 30 x x 30 x x x  x x   Wrist flex/ext 30x 1# x 20 30 ea 2# 30x 2# x 30 x x x 30   Elbow flex  30 x 20 30 x x x 1# 20 20 30   Sup/pron 1# 30x x 20 30 X 2# x x x  20x 1# 20   scap squeeze 30 5"x10 x 10"x10 x x Red TB 20 x 20 30   bike 10 min  10' x x x x x x x   Counter stretch 10 x 3" 10x HEP    x     shrugs 30 D/C       20 30   Gripper red digiflex  D/C    AROM flx,abd 10x x x x x   Wand AA ER 10      10 x x x   Scapular PRE s/l 10 x x x     10 ea x   Table glide flex,scaption and ER 10 ea x x 15 ea x x Wall climbs 5 flex and 5 abd x 10 ea x   Wand flx & ER Press up 10 ea X flx & ER And Abd Stand abd 10 rest supine x x x  10   SL ER 10 x 30 x x x x 1# 2x10  10   pulley  10 20 x x x x x     Supine punch   10 15 x x 20 30     Rhym stab   20x x x  x x     Towel IR    10" 5x x x x x     jay flx,abd,     10 X IR & ER x x     Sleeper stretch        20" 3x         Modalities              MH and H-wave 20 min 20' x x x x x Just [facility] and Hwave x                             X=same as last visit    Assessment: MD was happy with her progress  Continues to progress well  Plan: Continue per plan of care

## 2018-04-30 ENCOUNTER — OFFICE VISIT (OUTPATIENT)
Dept: PHYSICAL THERAPY | Facility: REHABILITATION | Age: 63
End: 2018-04-30
Payer: OTHER MISCELLANEOUS

## 2018-04-30 DIAGNOSIS — S46.002D ROTATOR CUFF INJURY, LEFT, SUBSEQUENT ENCOUNTER: ICD-10-CM

## 2018-04-30 DIAGNOSIS — M75.122 COMPLETE ROTATOR CUFF TEAR OF LEFT SHOULDER: Primary | ICD-10-CM

## 2018-04-30 DIAGNOSIS — M25.512 ACUTE PAIN OF LEFT SHOULDER: ICD-10-CM

## 2018-04-30 DIAGNOSIS — M75.122 COMPLETE TEAR OF LEFT ROTATOR CUFF: ICD-10-CM

## 2018-04-30 PROCEDURE — 97010 HOT OR COLD PACKS THERAPY: CPT

## 2018-04-30 PROCEDURE — 97140 MANUAL THERAPY 1/> REGIONS: CPT

## 2018-04-30 PROCEDURE — 97110 THERAPEUTIC EXERCISES: CPT

## 2018-04-30 PROCEDURE — 97112 NEUROMUSCULAR REEDUCATION: CPT

## 2018-04-30 NOTE — PROGRESS NOTES
Daily Note      Today's date: 2018  Patient name: Osiel Moura  : 1955  MRN: [id number]  Referring provider: Koko Ortega MD  Dx:   Encounter Diagnosis     ICD-10-CM    1  Complete rotator cuff tear of left shoulder M75 122    2  Acute pain of left shoulder M25 512    3  Rotator cuff injury, left, subsequent encounter S46 002D                   Subjective:  Feels tight bicep into forearm    Objective: See treatment diary below    Manual   4-5 4-9  4-- 4-30 3-29   PROM 5 min x x 5 min x x x x x x   STM scap 5 min 10 x 5 min  and UT 5 min  x   X  STM scap                                              Exercise Diary     - 430    pendullums 30 x x 30 x x x   x   Wrist flex/ext 30x 1# x 20 30 ea 2# 30x 2# x 30 x x x 30   Elbow flex  30 x 20 30 x x x 1# 20 30 30   Sup/pron 1# 30x x 20 30 X 2# x x x 2# 30 1# 20   scap squeeze 30 5"x10 x 10"x10 x x Red TB 20 x 10x 30   bike 10 min  10' x x x x x x x   Counter stretch 10 x 3" 10x HEP    x     shrugs 30 D/C       20 30   Gripper red digiflex  D/C    AROM flx,abd 10x x x x x   Wand AA ER 10      10 x  x   Scapular PRE s/l 10 x x x     Prone flx & HABD 10x x   Table glide flex,scaption and ER 10 ea x x 15 ea x x Wall climbs 5 flex and 5 abd x 10 ea x   Wand flx & ER Press up 10 ea X flx & ER And Abd Stand abd 10 rest supine x x x x 10   SL ER 10 x 30 x x x x 1# 2x10 x 10   pulley  10 20 x x x x x x    Supine punch   10 15 x x 20 30 x    Rhym stab   20x x x  x x x    Towel IR    10" 5x x x x x 15"x3    jay flx,abd,     10 X IR & ER x x HEP    Sleeper stretch        20" 3x x        Modalities              MH and H-wave 20 min 20' x x x x x Just MH x x                             X=same as last visit    Assessment:  Instr in bicep-forearm stretch for HEP      Plan: Continue per plan of care

## 2018-05-03 ENCOUNTER — OFFICE VISIT (OUTPATIENT)
Dept: PHYSICAL THERAPY | Facility: REHABILITATION | Age: 63
End: 2018-05-03
Payer: OTHER MISCELLANEOUS

## 2018-05-03 DIAGNOSIS — M75.122 COMPLETE ROTATOR CUFF TEAR OF LEFT SHOULDER: Primary | ICD-10-CM

## 2018-05-03 PROCEDURE — 97140 MANUAL THERAPY 1/> REGIONS: CPT

## 2018-05-03 PROCEDURE — 97110 THERAPEUTIC EXERCISES: CPT

## 2018-05-03 NOTE — PROGRESS NOTES
Daily Note      Today's date: 5/3/2018  Patient name: Yasmani Hendrix  : 1955  MRN: [id number]  Referring provider: Tonya Francisco MD  Dx:   Encounter Diagnosis     ICD-10-CM    1  Complete rotator cuff tear of left shoulder M75 122                   Subjective: Patient reports having mostly stiffness in shoulder, no c/o much pain  : I have a good pain tolerance "    Objective: See treatment diary below    Manual   4- 4-9 -16 4-19 4/23 4/26 4-30 3-29/ 5/3   PROM 5 min x x 5 min x x x x x x   STM scap 5 min 10 x 5 min  and UT 5 min  x   X  STM scap                                              Exercise Diary     4/12  4-19 4/23 4/26 4-30 5/3   pendullums 30 x x 30 x x x   x   Wrist flex/ext 30x 1# x 20 30 ea 2# 30x 2# x 30 x x x 30   Elbow flex  30 x 20 30 x x x 1# 20 30 30   Sup/pron 1# 30x x 20 30 X 2# x x x 2# 30 1# 20   scap squeeze 30 5"x10 x 10"x10 x x Red TB 20 x 10x 30   bike 10 min  10' x x x x x x X 10   Counter stretch 10 x 3" 10x HEP    x     shrugs 30 D/C       20 30   Gripper red digiflex  D/C    AROM flx,abd 10x x x x x10   Wand AA ER 10      10 x  x   Scapular PRE s/l 10 x x x     Prone flx & HABD 10x x10   Table glide flex,scaption and ER 10 ea x x 15 ea x x Wall climbs 5 flex and 5 abd x 10 ea x10   Wand flx & ER Press up 10 ea X flx & ER And Abd Stand abd 10 rest supine x x x x 10   SL ER 10 x 30 x x x x 1# 2x10 x 10   pulley  10 20 x x x x x x    Supine punch   10 15 x x 20 30 x    Rhym stab   20x x x  x x x    Towel IR    10" 5x x x x x 15"x3    jay flx,abd,     10 X IR & ER x x HEP    Sleeper stretch        20" 3x x        Modalities           /3   MH and H-wave 20 min 20' x x x x x Just MH x x                             X=same as last visit    Assessment:  Patient show HEP compliance  ER slightly limited, tolerated passive range well today  Plan: Continue per plan of care

## 2018-05-07 ENCOUNTER — OFFICE VISIT (OUTPATIENT)
Dept: PHYSICAL THERAPY | Facility: REHABILITATION | Age: 63
End: 2018-05-07
Payer: OTHER MISCELLANEOUS

## 2018-05-07 DIAGNOSIS — M75.122 COMPLETE ROTATOR CUFF TEAR OF LEFT SHOULDER: Primary | ICD-10-CM

## 2018-05-07 PROCEDURE — 97110 THERAPEUTIC EXERCISES: CPT | Performed by: PHYSICAL THERAPIST

## 2018-05-07 PROCEDURE — 97140 MANUAL THERAPY 1/> REGIONS: CPT | Performed by: PHYSICAL THERAPIST

## 2018-05-07 PROCEDURE — 97112 NEUROMUSCULAR REEDUCATION: CPT | Performed by: PHYSICAL THERAPIST

## 2018-05-07 NOTE — PROGRESS NOTES
Daily Note      Today's date: 2018  Patient name: Yuko Mcgee  : 1955  MRN: [id number]  Referring provider: Yasemin lA MD  Dx:   Encounter Diagnosis     ICD-10-CM    1  Complete rotator cuff tear of left shoulder M75 122                   Subjective: Patient reports that she is a little tight today in the front of the shoulder  Objective: See treatment diary below    Manual   4- 4---19 4/23 4/26 4-30 3-/ 5/3   PROM 5 min x x 5 min x x x x x x   STM scap 5 min 10 x 5 min  and UT 5 min  x   X  STM scap   rhym stab x                                          Exercise Diary     4/12  4-19 4/23 4/26 4-30 5/3   pendullums  x x 30 x x x   x   Wrist flex/ext 30x 1# x 20 30 ea 2# 30x 2# x 30 x x x 30   Elbow flex  20 1# x 20 30 x x x 1# 20 30 30   Sup/pron 1# 30x x 20 30 X 2# x x x 2# 30 1# 20   scap squeeze 30 red 5"x10 x 10"x10 x x Red TB 20 x 10x 30   bike 10 min  10' x x x x x x X 10   Counter stretch  x 3" 10x HEP    x     shrugs  D/C       20 30   Gripper red digiflex  D/C    AROM flx,abd 10x x x x x10   Wand AA ER 20x      10 x  x   Scapular PRE s/l  x x x     Prone flx & HABD 10x x10   Table glide flex,scaption and ER  x x 15 ea x x Wall climbs 5 flex and 5 abd x 10 ea x10   Wand flx & ER 20x 10 ea X flx & ER And Abd Stand abd 10 rest supine x x x x 10   SL ER 1# 20x x 30 x x x x 1# 2x10 x 10   pulley 30x 10 20 x x x x x x    Supine punch 30x 1#  10 15 x x 20 30 x    Rhym stab   20x x x  x x x    Towel IR x   10" 5x x x x x 15"x3    jay flx,abd, ER ABD 30x    10 X IR & ER x x HEP    Sleeper stretch        20" 3x x        Modalities           5/3   MH and H-wave 15 min 20' x x x x x Just MH x x                             X=same as last visit    Assessment:  Patient did well with tolerating exercises today  Soreness t/o bicep tendon  STM/MFR to decrease myofascial restrictions  Plan: Continue per plan of care

## 2018-05-10 ENCOUNTER — OFFICE VISIT (OUTPATIENT)
Dept: PHYSICAL THERAPY | Facility: REHABILITATION | Age: 63
End: 2018-05-10
Payer: OTHER MISCELLANEOUS

## 2018-05-10 DIAGNOSIS — S46.002D ROTATOR CUFF INJURY, LEFT, SUBSEQUENT ENCOUNTER: ICD-10-CM

## 2018-05-10 DIAGNOSIS — M25.512 ACUTE PAIN OF LEFT SHOULDER: ICD-10-CM

## 2018-05-10 DIAGNOSIS — M75.122 COMPLETE TEAR OF LEFT ROTATOR CUFF: ICD-10-CM

## 2018-05-10 DIAGNOSIS — M75.122 COMPLETE ROTATOR CUFF TEAR OF LEFT SHOULDER: Primary | ICD-10-CM

## 2018-05-10 PROCEDURE — 97140 MANUAL THERAPY 1/> REGIONS: CPT

## 2018-05-10 PROCEDURE — 97014 ELECTRIC STIMULATION THERAPY: CPT

## 2018-05-10 PROCEDURE — 97112 NEUROMUSCULAR REEDUCATION: CPT

## 2018-05-10 PROCEDURE — 97110 THERAPEUTIC EXERCISES: CPT

## 2018-05-10 NOTE — PROGRESS NOTES
Daily Note      Today's date: 5/10/2018  Patient name: Rosa Devine  : 1955  MRN: [id number]  Referring provider: Rosa Caro MD  Dx:   Encounter Diagnosis     ICD-10-CM    1  Complete rotator cuff tear of left shoulder M75 122    2  Acute pain of left shoulder M25 512    3  Rotator cuff injury, left, subsequent encounter S46 002D    4  Complete tear of left rotator cuff M75 122        Start Time: 815  Stop Time: 915  Total time in clinic (min): 60 minutes    Subjective:  "My shoulder is pretty sore today "  Patient notes continuous paresthesias into a median nerve distribution        Objective: See treatment diary below    Manual  5/7 5/10 4---19 4/23 4/26 4-30 3-29/ 5/3   PROM 5 min 5' x 5 min x x x x x x   STM scap 5 min 10' x 5 min  and UT 5 min  x   X  STM scap   rhym stab x                                          Exercise Diary   5/10  4/12  4-19 4/23 4/26 4-30 5/3   pendullums   x 30 x x x   x   Wrist flex/ext 30x 1# x 20 30 ea 2# 30x 2# x 30 x x x 30   Elbow flex  20 1# x 20 30 x x x 1# 20 30 30   Sup/pron 1# 30x x 20 30 X 2# x x x 2# 30 1# 20   scap squeeze 30 red x x 10"x10 x x Red TB 20 x 10x 30   bike 10 min x 10' x x x x x x X 10   Counter stretch   3" 10x HEP    x     shrugs         20 30   Gripper red digiflex      AROM flx,abd 10x x x x x10   Wand AA ER 20x x     10 x  x   Scapular PRE s/l   x x     Prone flx & HABD 10x x10   Table glide flex,scaption and ER   x 15 ea x x Wall climbs 5 flex and 5 abd x 10 ea x10   Wand flx & ER 20x 20x X flx & ER And Abd Stand abd 10 rest supine x x x x 10   SL ER 1# 20x x 30 x x x x 1# 2x10 x 10   pulley 30x 30x 20 x x x x x x    Supine punch 30x 1# 30x 10 15 x x 20 30 x    Rhym stab   20x x x  x x x    Towel IR x x  10" 5x x x x x 15"x3    jay flx,abd, ER ABD 30x 30x   10 X IR & ER x x HEP    Sleeper stretch        20" 3x x        Modalities   5/10        5/3   MH and H-wave 15 min 15' x x x x x Just MH x x X=same as last visit    Assessment: Patient noted selective tissue tension and muscular fatigue post session  Patient is a good rehabilitation candidate and would benefit from continued skilled PT intervention to maximize her current mobility gains  Plan: Continue per plan of care

## 2018-05-14 ENCOUNTER — OFFICE VISIT (OUTPATIENT)
Dept: PHYSICAL THERAPY | Facility: REHABILITATION | Age: 63
End: 2018-05-14
Payer: OTHER MISCELLANEOUS

## 2018-05-14 DIAGNOSIS — M75.122 COMPLETE ROTATOR CUFF TEAR OF LEFT SHOULDER: Primary | ICD-10-CM

## 2018-05-14 PROCEDURE — 97110 THERAPEUTIC EXERCISES: CPT | Performed by: PHYSICAL THERAPIST

## 2018-05-14 PROCEDURE — 97140 MANUAL THERAPY 1/> REGIONS: CPT | Performed by: PHYSICAL THERAPIST

## 2018-05-14 PROCEDURE — 97112 NEUROMUSCULAR REEDUCATION: CPT | Performed by: PHYSICAL THERAPIST

## 2018-05-14 NOTE — PROGRESS NOTES
Daily Note      Today's date: 2018  Patient name: Chano Agarwal  : 1955  MRN: [id number]  Referring provider: Diane Gutierres MD  Dx:   Encounter Diagnosis     ICD-10-CM    1  Complete rotator cuff tear of left shoulder M75 122                   Subjective:  Patient reports that her shoulder feels a little sore from the weather this weekend  Objective: See treatment diary below    Manual  5/7 5/10 5/14 4/12 4--19 4/23 4/26 4-30 3-/ 5/3   PROM 5 min 5' x 5 min x x x x x x   STM scap 5 min 10' x 5 min  and UT 5 min  x   X  STM scap   rhym stab x  x          G-H mobs   x                           Exercise Diary   5/10  4/12  4-19 4/23 4/26 4-30 5/3   pendullums    30 x x x   x   Wrist flex/ext 30x 1# x 20 30 ea 2# 30x 2# x 30 x x x 30   Elbow flex  20 1# x 20 30 x x x 1# 20 30 30   Sup/pron 1# 30x x 20 30 X 2# x x x 2# 30 1# 20   scap squeeze 30 red x Gr 20 10"x10 x x Red TB 20 x 10x 30   bike 10 min x 10' UBE x x x x x x X 10   Counter stretch   3" 10x HEP    x     shrugs         20 30   Gripper red digiflex      AROM flx,abd 10x x x x x10   Wand AA ER 20x x     10 x  x   Scapular PRE s/l   x x     Prone flx & HABD 10x x10   Table glide flex,scaption and ER    15 ea x x Wall climbs 5 flex and 5 abd x 10 ea x10   Wand flx & ER 20x 20x X flx & ER And Abd Stand abd 10 rest supine x x x x 10   SL ER 1# 20x x 2# 2x 10 x x x x 1# 2x10 x 10   pulley 30x 30x 20 x x x x x x    Supine punch 30x 1# 30x 2# 2x 10 15 x x 20 30 x    Rhym stab   20x x x  x x x    Towel IR x x x 10" 5x x x x x 15"x3    jay flx,abd, ER ABD 30x 30x GR 2x10   10 X IR & ER x x HEP    Sleeper stretch        20" 3x x        Modalities   5/10        5/3   MH and H-wave 15 min 15' 15' x x x x Just MH x x                             X=same as last visit    Assessment: Patient demonstrates improved neuromuscular control with rhythmic stabilization exercises  Posterior G-H mobs performed  Patient tolerated tx well      Plan: Continue per plan of care

## 2018-05-17 ENCOUNTER — OFFICE VISIT (OUTPATIENT)
Dept: PHYSICAL THERAPY | Facility: REHABILITATION | Age: 63
End: 2018-05-17
Payer: OTHER MISCELLANEOUS

## 2018-05-17 DIAGNOSIS — M25.512 ACUTE PAIN OF LEFT SHOULDER: ICD-10-CM

## 2018-05-17 DIAGNOSIS — M75.122 COMPLETE ROTATOR CUFF TEAR OF LEFT SHOULDER: Primary | ICD-10-CM

## 2018-05-17 DIAGNOSIS — S46.002D ROTATOR CUFF INJURY, LEFT, SUBSEQUENT ENCOUNTER: ICD-10-CM

## 2018-05-17 PROCEDURE — 97014 ELECTRIC STIMULATION THERAPY: CPT | Performed by: PHYSICAL THERAPIST

## 2018-05-17 PROCEDURE — 97110 THERAPEUTIC EXERCISES: CPT | Performed by: PHYSICAL THERAPIST

## 2018-05-17 PROCEDURE — 97140 MANUAL THERAPY 1/> REGIONS: CPT | Performed by: PHYSICAL THERAPIST

## 2018-05-17 PROCEDURE — 97112 NEUROMUSCULAR REEDUCATION: CPT | Performed by: PHYSICAL THERAPIST

## 2018-05-17 NOTE — PROGRESS NOTES
Daily Note      Today's date: 2018  Patient name: Neno Sky  : 1955  MRN: [id number]  Referring provider: Nirmal Iglesias MD  Dx:   Encounter Diagnosis     ICD-10-CM    1  Complete rotator cuff tear of left shoulder M75 122    2  Acute pain of left shoulder M25 512    3  Rotator cuff injury, left, subsequent encounter S46 002D                   Subjective:  Feeling pretty good  Trying to do more at home       Objective: See treatment diary below    Manual  5/7 5/10 5/14 5/17 4--19 4/23 4/26 4-30 3-29/ 5/3   PROM 5 min 5' x 5 min x x x x x x   STM scap 5 min 10' x 5 min  and UT 5 min  x   X  STM scap   rhym stab x  x 2x20         G-H mobs   x                           Exercise Diary   5/10  5/17  4--30 5/3   pendullums      x x   x   Wrist flex/ext 30x 1# x 20  2# 30x 2# x 30 x x x 30   Elbow flex  20 1# x 20 2# 30 x x x 1# 20 30 30   Sup/pron 1# 30x x 20  X 2# x x x 2# 30 1# 20   scap squeeze 30 red x Gr 20 x x x Red TB 20 x 10x 30   bike 10 min x 10' UBE x x x x x x X 10   Counter stretch   3" 10x HEP    x     shrugs         20 30   Gripper red digiflex    AROM flex and abd 2x10  AROM flx,abd 10x x x x x10   Wand AA ER 20x x     10 x  x   Scapular PRE s/l   x Prone flex and horiz abd 2x10 2#     Prone flx & HABD 10x x10   Table glide flex,scaption and ER    Wall climbs flex x x Wall climbs 5 flex and 5 abd x 10 ea x10   Wand flx & ER 20x 20x X flx & ER And Abd Stand abd 10 rest supine x x x x 10   SL ER 1# 20x x 2# 2x 10 x x x x 1# 2x10 x 10   pulley 30x 30x 30 x x x x x x    Supine punch 30x 1# 30x 2# 2x 10 20x x x 20 30 x    Rhym stab   20x x x  x x x    Towel IR x x x 10" 5x x x x x 15"x3    jay flx,abd, ER ABD 30x 30x GR 2x10  IR/ER 20 ea 10 X IR & ER x x HEP    Sleeper stretch        20" 3x x        Modalities   5/10        5/3   MH and H-wave 15 min 15' 15' x x x x Just MH x x                             X=same as last visit    Assessment: PROM is nearly full    Plan: Continue per plan of care

## 2018-05-21 ENCOUNTER — OFFICE VISIT (OUTPATIENT)
Dept: PHYSICAL THERAPY | Facility: REHABILITATION | Age: 63
End: 2018-05-21
Payer: OTHER MISCELLANEOUS

## 2018-05-21 DIAGNOSIS — S46.002D ROTATOR CUFF INJURY, LEFT, SUBSEQUENT ENCOUNTER: ICD-10-CM

## 2018-05-21 DIAGNOSIS — M75.122 COMPLETE TEAR OF LEFT ROTATOR CUFF: ICD-10-CM

## 2018-05-21 DIAGNOSIS — M25.512 ACUTE PAIN OF LEFT SHOULDER: ICD-10-CM

## 2018-05-21 DIAGNOSIS — M75.122 COMPLETE ROTATOR CUFF TEAR OF LEFT SHOULDER: Primary | ICD-10-CM

## 2018-05-21 PROCEDURE — 97014 ELECTRIC STIMULATION THERAPY: CPT

## 2018-05-21 PROCEDURE — 97112 NEUROMUSCULAR REEDUCATION: CPT

## 2018-05-21 PROCEDURE — 97140 MANUAL THERAPY 1/> REGIONS: CPT

## 2018-05-21 PROCEDURE — 97110 THERAPEUTIC EXERCISES: CPT

## 2018-05-21 PROCEDURE — 97010 HOT OR COLD PACKS THERAPY: CPT

## 2018-05-21 NOTE — PROGRESS NOTES
Daily Note      Today's date: 2018  Patient name: Afsaneh Glasgow  : 1955  MRN: [id number]  Referring provider: Yady Godoy MD  Dx:   Encounter Diagnosis     ICD-10-CM    1  Complete rotator cuff tear of left shoulder M75 122    2  Acute pain of left shoulder M25 512    3  Rotator cuff injury, left, subsequent encounter S46 002D    4   Complete tear of left rotator cuff M75 122                   Subjective:  Feeling sore, someone jerked her shoulder & she has been also using her arm a lot    Objective: See treatment diary below    Manual  5/7 5/10 5/14 5/17 5---30 3-/ 5/3   PROM 5 min 5' x 5 min x x x x x x   STM scap 5 min 10' x 5 min  and UT 5 min  x   X  STM scap   rhym stab x  x 2x20 See body blade        G-H mobs   x                           Exercise Diary   5/10  5/17  4-19 4/23 4/26 4-30 5/3   pendullums     TB ext gr 20x x x   x   Wrist flex/ext 30x 1# x 20  Body blade 15"x3 2# x 30 x x x 30   Elbow flex  20 1# x 20 2# 30 x x x 1# 20 30 30   Sup/pron 1# 30x x 20  X 2# x x x 2# 30 1# 20   scap squeeze 30 red x Gr 20 x x x Red TB 20 x 10x 30   bike 10 min x 10' UBE x x x x x x X 10   Counter stretch   3" 10x HEP    x     shrugs         20 30   Gripper red digiflex    AROM flex and abd 2x10 x AROM flx,abd 10x x x x x10   Wand AA ER 20x x   x  10 x  x   Scapular PRE s/l   x Prone flex and horiz abd 2x10 2# x    Prone flx & HABD 10x x10   Table glide flex,scaption and ER    Wall climbs flex x x Wall climbs 5 flex and 5 abd x 10 ea x10   Wand flx & ER 20x 20x X flx & ER And Abdx Stand abd 10 rest supine x x x x 10   SL ER 1# 20x x 2# 2x 10 x x x x 1# 2x10 x 10   pulley 30x 30x 30 x x x x x x    Supine punch 30x 1# 30x 2# 2x 10 20x x x 20 30 x    Rhym stab   20x x x  x x x    Towel IR x x x 10" 5x x x x x 15"x3    jay flx,abd, ER ABD 30x 30x GR 2x10  IR/ER 20 ea 20 X IR & ER x x HEP    Sleeper stretch     x   20" 3x x        Modalities   5/10        5/3   MH and H-wave 15 min 15' 15' x x x x Just MH x x                             X=same as last visit    Assessment: Increased soreness today with ex   PROM looks good    Plan: Continue per plan of care

## 2018-05-22 ENCOUNTER — OFFICE VISIT (OUTPATIENT)
Dept: OBGYN CLINIC | Facility: MEDICAL CENTER | Age: 63
End: 2018-05-22
Payer: OTHER MISCELLANEOUS

## 2018-05-22 VITALS
SYSTOLIC BLOOD PRESSURE: 107 MMHG | BODY MASS INDEX: 41.03 KG/M2 | DIASTOLIC BLOOD PRESSURE: 71 MMHG | HEIGHT: 60 IN | WEIGHT: 209 LBS | HEART RATE: 78 BPM

## 2018-05-22 DIAGNOSIS — M75.122 COMPLETE ROTATOR CUFF TEAR OF LEFT SHOULDER: Primary | ICD-10-CM

## 2018-05-22 PROCEDURE — 99213 OFFICE O/P EST LOW 20 MIN: CPT | Performed by: ORTHOPAEDIC SURGERY

## 2018-05-22 NOTE — LETTER
May 22, 2018     Patient: Vickie Willoughby   YOB: 1955   Date of Visit: 5/22/2018       To Whom it May Concern:    Moreno Simon is under my professional care  She was seen in my office on 5/22/2018  She should continue with a 1lb lifting restriction as well as no repetitive overhead lifting  Pt is not expected to be fit for unrestricted duty for another 1-3 months  Pt will be re-evaluated in 4 weeks  If you have any questions or concerns, please don't hesitate to call           Sincerely,          Saray Bennett MD        CC: No Recipients

## 2018-05-22 NOTE — PROGRESS NOTES
Orthopaedic Surgery - Office Note  Stephan Brooks (6 y o  female)   : 1955   MRN: [id number]  Encounter Date: 2018    Chief Complaint   Patient presents with    Left Shoulder - Follow-up     Assessment / Plan  S/p L shoulder arthroscopy with supraspinatus and subscapularis repairs, SAD and open LHB tenodesis on 2018  · Continue outpatient PT with progression to strengthening   · Pt should continue a 1lb lifting restriction with no repetitive overhead lifting  Pt is no expected to be fit for unrestricted duty for another 1-3 months  Pt was given this work note today  Return in about 4 weeks (around 2018) for Recheck  History of Present Illness  Stephan Brooks is a 60 y o  female who presents S/p L shoulder arthroscopy with supraspinatus and subscapularis repairs, SAD and open LHB tenodesis on 2018  Pt states that she is overall doing well and is not experiencing any pain at this point in time  Pt is going to formal physical therapy 2x a week and is tolerating the exercises well  Pt states that she is compliant with her 1lb lifting restriction at this time  Pt c/o numbness and tingling that will occasion radiate down into all four fingers  Pt has not been back to work at this point in time  Review of Systems  Pertinent items are noted in HPI  All other systems were reviewed and are negative  Physical Exam  /71   Pulse 78   Ht 5' (1 524 m)   Wt 94 8 kg (209 lb)   BMI 40 82 kg/m²   Cons: Appears well  No apparent distress  Psych: Alert  Oriented x3  Mood and affect normal   Eyes: PERRLA, EOMI  Resp: Normal effort  No audible wheezing or stridor  CV: Palpable pulse  No discernable arrhythmia  No LE edema  Lymph:  No palpable cervical, axillary, or inguinal lymphadenopathy  Skin: Warm  No palpable masses  No visible lesions  Neuro: Normal muscle tone  Normal and symmetric DTR's  Left Shoulder Exam  Alignment / Posture:  Normal shoulder posture  Normal scapular position  Inspection:  No swelling  Incision healed  Palpation:  No tenderness  ROM:  Shoulder   Shoulder ER 80  Shoulder IR T12  Strength:  Supraspinatus 4-/5  Infraspinatus 4/5  Subscapularis 5/5  Stability:  No objective shoulder instability  Tests: No pertinent positive or negative tests  Neurovascular:  Sensation intact in Ax/R/M/U nerve distributions  2+ radial pulse  Studies Reviewed  No studies to review    Procedures  No procedures today  Medical, Surgical, Family, and Social History  The patient's medical history, family history, and social history, were reviewed and updated as appropriate  Past Medical History:   Diagnosis Date    Arthritis     Cataract     ashley    Depression     Fluid retention     Headache, acute     Heart murmur     Hyperlipidemia     Hypertension     PONV (postoperative nausea and vomiting)     Rotator cuff tear, left     Wears glasses     Wears partial dentures     upper       Past Surgical History:   Procedure Laterality Date    ABDOMINAL ADHESION SURGERY      ANKLE SURGERY Right     tendon tear    APPENDECTOMY      CARPAL TUNNEL RELEASE Bilateral     CARPAL TUNNEL RELEASE Left     CARPAL TUNNEL RELEASE Right     COLONOSCOPY      HAND SURGERY Right     ganglion cyst    HEMORROIDECTOMY      HYSTERECTOMY      KNEE ARTHROSCOPY Left     OVARIAN CYST REMOVAL      IL SHLDR ARTHROSCOP,SURG,W/ROTAT CUFF REPR Left 2/19/2018    Procedure: ARTHROSCOPIC REPAIR ROTATOR CUFF,  SAD, BICEP TENODESIS;  Surgeon: José Miguel Jaeger MD;  Location: [facility] OR;  Service: Orthopedics    REPAIR RECTOCELE      TONSILLECTOMY      TUBAL LIGATION         Family History   Problem Relation Age of Onset    Hypertension Mother     Cancer Mother     Lung cancer Father     Thyroid disease Sister     Depression Sister     Cancer Brother     ROBERT disease Brother        Social History     Occupational History    Not on file       Social History Main Topics    Smoking status: Never Smoker    Smokeless tobacco: Never Used    Alcohol use Yes      Comment: few x year    Drug use: No    Sexual activity: Not on file       Allergies   Allergen Reactions    Iodine Hives     IV IODINE    Keflex [Cephalexin] Other (See Comments)     Mouth sores    Morphine And Related Hives     IV MORPINE    Penicillins Hives    Clindamycin Other (See Comments)     Pt unsure    Erythromycin Hives         Current Outpatient Prescriptions:     acetaminophen (TYLENOL) 500 mg tablet, Take 500-1,000 mg by mouth every 6 (six) hours as needed for mild pain, Disp: , Rfl:     citalopram (CeleXA) 40 mg tablet, Take 40 mg by mouth every evening  , Disp: , Rfl: 1    enalapril (VASOTEC) 5 mg tablet, Take 5 mg by mouth every evening  , Disp: , Rfl:     furosemide (LASIX) 40 mg tablet, Take 40 mg by mouth daily as needed  , Disp: , Rfl:     naproxen (NAPROSYN) 500 mg tablet, Take 1 tablet (500 mg total) by mouth 2 (two) times a day with meals, Disp: 60 tablet, Rfl: 0      Shae Reagan    Scribe Attestation    I,:   Nelson Bran am acting as a scribe while in the presence of the attending physician :        I,:   Anais Davenport MD personally performed the services described in this documentation    as scribed in my presence :

## 2018-05-24 ENCOUNTER — OFFICE VISIT (OUTPATIENT)
Dept: PHYSICAL THERAPY | Facility: REHABILITATION | Age: 63
End: 2018-05-24
Payer: OTHER MISCELLANEOUS

## 2018-05-24 DIAGNOSIS — M25.512 ACUTE PAIN OF LEFT SHOULDER: ICD-10-CM

## 2018-05-24 DIAGNOSIS — S46.002D ROTATOR CUFF INJURY, LEFT, SUBSEQUENT ENCOUNTER: ICD-10-CM

## 2018-05-24 DIAGNOSIS — M75.122 COMPLETE ROTATOR CUFF TEAR OF LEFT SHOULDER: Primary | ICD-10-CM

## 2018-05-24 PROCEDURE — 97140 MANUAL THERAPY 1/> REGIONS: CPT | Performed by: PHYSICAL THERAPIST

## 2018-05-24 PROCEDURE — 97110 THERAPEUTIC EXERCISES: CPT | Performed by: PHYSICAL THERAPIST

## 2018-05-24 PROCEDURE — 97112 NEUROMUSCULAR REEDUCATION: CPT | Performed by: PHYSICAL THERAPIST

## 2018-05-24 NOTE — PROGRESS NOTES
Daily Note      Today's date: 2018  Patient name: Deuce Sequeira  : 1955  MRN: [id number]  Referring provider: Celi Sabillon MD  Dx:   Encounter Diagnosis     ICD-10-CM    1  Complete rotator cuff tear of left shoulder M75 122    2  Acute pain of left shoulder M25 512    3  Rotator cuff injury, left, subsequent encounter S46 002D                   Subjective: Pain level 1/10 this morning        Objective: See treatment diary below    Manual  5/7 5/10 5/14 5/17 5-21 5/24 4/23 4/26 4-30 3-29/ 5/3   PROM 5 min 5' x 5 min x x x x x x   STM scap 5 min 10' x 5 min  and UT 5 min  x   X  STM scap   rhym stab x  x 2x20 See body blade        G-H mobs   x                           Exercise Diary   5/10  5/17  5/24 4/23 4/26 4-30 5/3   pendullums     TB ext gr 20x x x   x   Wrist flex/ext 30x 1# x 20  Body blade 15"x3 x x x x 30   Elbow flex  20 1# x 20 2# 30 x 3# x 1# 20 30 30   Sup/pron 1# 30x x 20  X 2# x x x 2# 30 1# 20   scap squeeze 30 red x Gr 20 x x Blue 2x10 Red TB 20 x 10x 30   bike 10 min x 10' UBE x x x x x x X 10   Counter stretch   3" 10x HEP    x     shrugs         20 30   Gripper red digiflex    AROM flex and abd 2x10 2x15 AROM flx,abd 2x15 x x x x10   Wand AA ER 20x x   x x 10 x  x   Scapular PRE s/l   x Prone flex and horiz abd 2x10 2# x x   Prone flx & HABD 10x x10   Table glide flex,scaption and ER    Wall climbs flex x Wall climbsflex/abd 10 ea Wall climbs 5 flex and 5 abd x 10 ea x10   Wand flx & ER 20x 20x X flx & ER And Abdx Stand abd 10 rest supine x x x x 10   SL ER 1# 20x x 2# 2x 10 x x 30 x 1# 2x10 x 10   pulley 30x 30x 30 x x x x x x    Supine punch 30x 1# 30x 2# 2x 10 20x x 30 20 30 x    Rhym stab   20x x x  x x x    Towel IR x x x 10" 5x x x x x 15"x3    jay flx,abd, ER ABD 30x 30x GR 2x10  IR/ER 20 ea 20 Blue ext, IR & ER x x HEP    Sleeper stretch     x   20" 3x x        Modalities   5/10        5/3   MH and H-wave 15 min 15' 15' x x MH x Just MH x x X=same as last visit    Assessment: Progression to blue band in ER was difficult  Fatigued with increase in flexion and abduction    Plan: Continue per plan of care

## 2018-05-29 ENCOUNTER — TELEPHONE (OUTPATIENT)
Dept: OBGYN CLINIC | Facility: CLINIC | Age: 63
End: 2018-05-29

## 2018-05-29 ENCOUNTER — OFFICE VISIT (OUTPATIENT)
Dept: PHYSICAL THERAPY | Facility: REHABILITATION | Age: 63
End: 2018-05-29
Payer: OTHER MISCELLANEOUS

## 2018-05-29 DIAGNOSIS — M75.122 COMPLETE ROTATOR CUFF TEAR OF LEFT SHOULDER: Primary | ICD-10-CM

## 2018-05-29 DIAGNOSIS — S46.002D ROTATOR CUFF INJURY, LEFT, SUBSEQUENT ENCOUNTER: ICD-10-CM

## 2018-05-29 DIAGNOSIS — M25.512 ACUTE PAIN OF LEFT SHOULDER: ICD-10-CM

## 2018-05-29 PROCEDURE — 97110 THERAPEUTIC EXERCISES: CPT | Performed by: PHYSICAL THERAPIST

## 2018-05-29 PROCEDURE — 97140 MANUAL THERAPY 1/> REGIONS: CPT | Performed by: PHYSICAL THERAPIST

## 2018-05-29 PROCEDURE — 97014 ELECTRIC STIMULATION THERAPY: CPT | Performed by: PHYSICAL THERAPIST

## 2018-05-29 PROCEDURE — 97112 NEUROMUSCULAR REEDUCATION: CPT | Performed by: PHYSICAL THERAPIST

## 2018-05-29 NOTE — PROGRESS NOTES
Daily Note      Today's date: 2018  Patient name: Chrissie Martino  : 1955  MRN: [id number]  Referring provider: Ronny Canela MD  Dx:   Encounter Diagnosis     ICD-10-CM    1  Complete rotator cuff tear of left shoulder M75 122    2  Acute pain of left shoulder M25 512    3  Rotator cuff injury, left, subsequent encounter S46 002D                   Subjective: Pt got a call from her employer saying that she should be going back to work according to the doctor so she pushed herself to do a lot of activity this weekend and is quite sore today  Pt thought that the doctor had wanted her out of work  She will call the MD office to clarify RTW and if so what restrictions need to be met         Objective: See treatment diary below    Manual  5/7 5/10 5/14 5/17 5-21 5/24 5/29 4/26 4-30 3-29/ 5/3   PROM 5 min 5' x 5 min x x x x x x   STM scap 5 min 10' x 5 min  and UT 5 min  x   X  STM scap   rhym stab x  x 2x20 See body blade        G-H mobs   x                           Exercise Diary   5/10  5/17  5/24 5/29 4/26 4-30 5/3   pendullums     TB ext gr 20x x x   x   Wrist flex/ext 30x 1# x 20  Body blade 15"x3 x x x x 30   Elbow flex  20 1# x 20 2# 30 x 3# x 1# 20 30 30   Sup/pron 1# 30x x 20  X 2# x x x 2# 30 1# 20   scap squeeze 30 red x Gr 20 x x Blue 2x10 Blue TB 20 x 10x 30   bike 10 min x 10' UBE x x x x x x X 10   Counter stretch   3" 10x HEP    x     shrugs         20 30   Gripper red digiflex    AROM flex and abd 2x10 2x15 AROM flx,abd 2x15 x x x x10   Wand AA ER 20x x   x x 10 x  x   Scapular PRE s/l   x Prone flex and horiz abd 2x10 2# x x   Prone flx & HABD 10x x10   Table glide flex,scaption and ER    Wall climbs flex x Wall climbsflex/abd 10 ea Wall climbs 5 flex and 5 abd x 10 ea x10   Wand flx & ER 20x 20x X flx & ER And Abdx Stand abd 10 rest supine x x x x 10   SL ER 1# 20x x 2# 2x 10 x x 30 x 1# 2x10 x 10   pulley 30x 30x 30 x x x x x x    Supine punch 30x 1# 30x 2# 2x 10 20x x 30 20 30 x Rhym stab   20x x x x x x x    Towel IR x x x 10" 5x x x x x 15"x3    jay flx,abd, ER ABD 30x 30x GR 2x10  IR/ER 20 ea 20 Blue ext, IR & ER x x HEP    Sleeper stretch     x x  20" 3x x        Modalities   5/10        5/3   MH and H-wave 15 min 15' 15' x x MH x Just MH x x                             X=same as last visit    Assessment: Progression of exercises last visit is still quite challenging so no new progression was added today  Plan: Continue per plan of care    Pt will call the doctor about work status

## 2018-05-29 NOTE — TELEPHONE ENCOUNTER
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

## 2018-05-29 NOTE — TELEPHONE ENCOUNTER
Patient called back as she states she had a missed call  She asks that you call her cell  Number provided below  She states that her arm locked up after doing just a little bit of the light work the other day   She is just concerned if she should be going back to work or not and would like to discuss this with you            [phone]

## 2018-05-30 NOTE — TELEPHONE ENCOUNTER
Note in, patient will notify us if she is going to pick it up at [facility] or she needs it sent some where

## 2018-05-31 ENCOUNTER — OFFICE VISIT (OUTPATIENT)
Dept: PHYSICAL THERAPY | Facility: REHABILITATION | Age: 63
End: 2018-05-31
Payer: OTHER MISCELLANEOUS

## 2018-05-31 DIAGNOSIS — M75.122 COMPLETE ROTATOR CUFF TEAR OF LEFT SHOULDER: Primary | ICD-10-CM

## 2018-05-31 DIAGNOSIS — M25.512 ACUTE PAIN OF LEFT SHOULDER: ICD-10-CM

## 2018-05-31 DIAGNOSIS — S46.002D ROTATOR CUFF INJURY, LEFT, SUBSEQUENT ENCOUNTER: ICD-10-CM

## 2018-05-31 PROCEDURE — 97014 ELECTRIC STIMULATION THERAPY: CPT | Performed by: PHYSICAL THERAPIST

## 2018-05-31 PROCEDURE — 97140 MANUAL THERAPY 1/> REGIONS: CPT | Performed by: PHYSICAL THERAPIST

## 2018-05-31 PROCEDURE — 97110 THERAPEUTIC EXERCISES: CPT | Performed by: PHYSICAL THERAPIST

## 2018-05-31 PROCEDURE — 97112 NEUROMUSCULAR REEDUCATION: CPT | Performed by: PHYSICAL THERAPIST

## 2018-05-31 NOTE — PROGRESS NOTES
Daily Note      Today's date: 2018  Patient name: Chalino Herring  : 1955  MRN: [id number]  Referring provider: Ambika Love MD  Dx:   Encounter Diagnosis     ICD-10-CM    1  Complete rotator cuff tear of left shoulder M75 122    2  Acute pain of left shoulder M25 512    3  Rotator cuff injury, left, subsequent encounter S46 002D                   Subjective: Pt is less sore today  Rested yesterday  Also spoke to the doctor and can return to work on  with restrictions      Objective: See treatment diary below    Manual  5/7 5/10 5/14 5/17 5-21 5/24 5/29 5/31 4-30 3-/ 5/3   PROM 5 min 5' x 5 min x x x x x x   STM scap 5 min 10' x 5 min  and UT 5 min  x x  X  STM scap   rhym stab x  x 2x20 See body blade        G-H mobs   x                           Exercise Diary   5/10  5/17  5/24 5/29 5/31 4-30 5/3   pendullums     TB ext gr 20x x x   x   Wrist flex/ext 30x 1# x 20  Body blade 15"x3 x x x x 30   Elbow flex  20 1# x 20 2# 30 x 3# 30x x 30 30   Sup/pron 1# 30x x 20  X 2# x x x 2# 30 1# 20   scap squeeze 30 red x Gr 20 x x Blue 2x10 Blue TB 30 x 10x 30   bike 10 min x 10' UBE x x x x x x X 10   Counter stretch   3" 10x HEP    x     shrugs         20 30   Gripper red digiflex    AROM flex and abd 2x10 2x15 x AROM flx,abd 20 and 10 x x x10   Wand AA ER 20x x   x x 10 x  x   Scapular PRE s/l   x Prone flex and horiz abd 2x10 2# x x 30x  Prone flx & HABD 10x x10   Table glide flex,scaption and ER    Wall climbs flex x Wall climbsflex/abd 10 ea Wall climbs 5 flex and 5 abd x 10 ea x10   Wand flx & ER 20x 20x X flx & ER And Abdx Stand abd 10 rest supine x x x x 10   SL ER 1# 20x x 2# 2x 10 x x 30 x 1# 2x10 x 10   pulley 30x 30x 30 x x x x x x    Supine punch 30x 1# 30x 2# 2x 10 20x x 30 30 30 x    Rhym stab   20x x x x x x x    Towel IR x x x 10" 5x x x x x 15"x3    jay flx,abd, ER ABD 30x 30x GR 2x10  IR/ER 20 ea 20 Blue ext, IR & ER 30x x HEP    Sleeper stretch     x x 20" 3x 20" 3x x Modalities   5/10        5/3   MH and H-wave 15 min 15' 15' x x MH x MH and Hwave x x                             X=same as last visit    Assessment: Pt much less sore and ROM was full and she was able to increase her reps  Tender over the biceps and pec minor  Hwave applied over same  Plan: Continue per plan of care

## 2018-06-04 ENCOUNTER — OFFICE VISIT (OUTPATIENT)
Dept: PHYSICAL THERAPY | Facility: REHABILITATION | Age: 63
End: 2018-06-04
Payer: OTHER MISCELLANEOUS

## 2018-06-04 DIAGNOSIS — M25.512 ACUTE PAIN OF LEFT SHOULDER: ICD-10-CM

## 2018-06-04 DIAGNOSIS — S46.002D ROTATOR CUFF INJURY, LEFT, SUBSEQUENT ENCOUNTER: ICD-10-CM

## 2018-06-04 DIAGNOSIS — M75.122 COMPLETE ROTATOR CUFF TEAR OF LEFT SHOULDER: Primary | ICD-10-CM

## 2018-06-04 PROCEDURE — 97110 THERAPEUTIC EXERCISES: CPT | Performed by: PHYSICAL THERAPIST

## 2018-06-04 PROCEDURE — 97140 MANUAL THERAPY 1/> REGIONS: CPT | Performed by: PHYSICAL THERAPIST

## 2018-06-04 PROCEDURE — 97112 NEUROMUSCULAR REEDUCATION: CPT | Performed by: PHYSICAL THERAPIST

## 2018-06-04 NOTE — PROGRESS NOTES
Daily Note      Today's date: 2018  Patient name: Ximena Lea  : 1955  MRN: [id number]  Referring provider: Rafael Gray MD  Dx:   Encounter Diagnosis     ICD-10-CM    1  Complete rotator cuff tear of left shoulder M75 122    2  Acute pain of left shoulder M25 512    3  Rotator cuff injury, left, subsequent encounter S46 002D                   Subjective: Feels good today  Starts work this afternoon  Getting some tingling in the ulnar distribution of the hand when doing computer work         Objective: See treatment diary below    Manual  5/7 5/10 5/14 5/17 5-21 5/24 5/29 5/31 6/4 3-29/ 5/3   PROM 5 min 5' x 5 min x x x x x x   STM scap 5 min 10' x 5 min  and UT 5 min  x x x X  STM scap   rhym stab x  x 2x20 See body blade        G-H mobs   x                           Exercise Diary   5/10  5/17  5/24 5/29 5/31 6/4 5/3   pendullums     TB ext gr 20x x x   x   Wrist flex/ext 30x 1# x 20  Body blade 15"x3 x x x x 30   Elbow flex  20 1# x 20 2# 30 x 3# 30x x 30 30   Sup/pron 1# 30x x 20  X 2# x x x  1# 20   scap squeeze 30 red x Gr 20 x x Blue 2x10 Blue TB 30 x x 30   bike 10 min x 10' UBE x x x x x x X 10   Counter stretch   3" 10x HEP    x     shrugs          30   Gripper red digiflex    AROM flex and abd 2x10 2x15 x AROM flx,abd 20 and 10 x 1# 2x10 x10   Wand AA ER 20x x   x x 10 x  x   Scapular PRE s/l   x Prone flex and horiz abd 2x10 2# x x 30x  x x10   Table glide flex,scaption and ER    Wall climbs flex x Wall climbsflex/abd 10 ea Wall climbs 5 flex and 5 abd x 10 ea x10   Wand flx & ER 20x 20x X flx & ER And Abdx Stand abd 10 rest supine x x x x 10   SL ER 1# 20x x 2# 2x 10 x x 30 x 1# 2x10 x 10   pulley 30x 30x 30 x x x x x x    Supine punch 30x 1# 30x 2# 2x 10 20x x 30 30 30 x    Rhym stab   20x x x x x x x    Towel IR x x x 10" 5x x x x x 15"x3    jay flx,abd, ER ABD 30x 30x GR 2x10  IR/ER 20 ea 20 Blue ext, IR & ER 30x x HEP    Sleeper stretch     x x 20" 3x 20" 3x x        Modalities 5/10        5/3   MH and H-wave 15 min 15' 15' x x MH x MH and Hwave MH 10 min x                             X=same as last visit    Assessment: ROM was WNL  Progression of exercises was challenging  Pt advised not to perform any lifting with the left at work today  Ok to do light functional tasks as long at they were not too repetitive  Advised her to perform cervical retractions to address tingling  Plan: Continue per plan of care

## 2018-06-07 ENCOUNTER — OFFICE VISIT (OUTPATIENT)
Dept: PHYSICAL THERAPY | Facility: REHABILITATION | Age: 63
End: 2018-06-07
Payer: OTHER MISCELLANEOUS

## 2018-06-07 DIAGNOSIS — S46.002D ROTATOR CUFF INJURY, LEFT, SUBSEQUENT ENCOUNTER: ICD-10-CM

## 2018-06-07 DIAGNOSIS — M25.512 ACUTE PAIN OF LEFT SHOULDER: ICD-10-CM

## 2018-06-07 DIAGNOSIS — M75.122 COMPLETE ROTATOR CUFF TEAR OF LEFT SHOULDER: Primary | ICD-10-CM

## 2018-06-07 PROCEDURE — 97110 THERAPEUTIC EXERCISES: CPT | Performed by: PHYSICAL THERAPIST

## 2018-06-07 PROCEDURE — 97112 NEUROMUSCULAR REEDUCATION: CPT | Performed by: PHYSICAL THERAPIST

## 2018-06-07 PROCEDURE — 97140 MANUAL THERAPY 1/> REGIONS: CPT | Performed by: PHYSICAL THERAPIST

## 2018-06-07 NOTE — PROGRESS NOTES
Daily Note      Today's date: 2018  Patient name: David Monroe  : 1955  MRN: [id number]  Referring provider: Omaira Jarvis MD  Dx:   Encounter Diagnosis     ICD-10-CM    1  Complete rotator cuff tear of left shoulder M75 122    2  Acute pain of left shoulder M25 512    3  Rotator cuff injury, left, subsequent encounter S46 002D                   Subjective: discomfort toward the end of the work day  Working self check out        Objective: See treatment diary below    Manual  5/7 5/10 5/14 5/17 5-21 5/24 5/29 5/31 6/4 6   PROM 5 min 5' x 5 min x x x x x x   STM scap 5 min 10' x 5 min  and UT 5 min  x x x X  STM scap   rhym stab x  x 2x20 See body blade        G-H mobs   x                           Exercise Diary   5/10  5/17  5/24 5/29 5/31 6/4 6/7   pendullums     TB ext gr 20x x blue x x 30   Wrist flex/ext 30x 1# x 20  Body blade 15"x3 x x x x x   Elbow flex  20 1# x 20 2# 30 x 3# 30x x 30 4# 20x   Sup/pron 1# 30x x 20  X 2# x x x     scap squeeze 30 red x Gr 20 x x Blue 2x10 Blue TB 30 x x 30   bike 10 min x 10' UBE x x x x x x x   Counter stretch   3" 10x HEP    x  robbers red 10   shrugs             Gripper red digiflex    AROM flex and abd 2x10 2x15 x AROM flx,abd 20 and 10 x 1# 2x10 2x10   Wand AA ER 20x x   x x 10 x  x   Scapular PRE s/l   x Prone flex and horiz abd 2x10 2# x x 30x  x x   Table glide flex,scaption and ER    Wall climbs flex x Wall climbsflex/abd 10 ea Wall climbs 5 flex and 5 abd x 10 ea x10   Wand flx & ER 20x 20x X flx & ER And Abdx Stand abd 10 rest supine x x x x abd 10   SL ER 1# 20x x 2# 2x 10 x x 30 x 2# 2x10 x 2# 30   pulley 30x 30x 30 x x x x x x x   Supine punch 30x 1# 30x 2# 2x 10 20x x 30 30 30 x x   Rhym stab   20x x x x x x x    Towel IR x x x 10" 5x x x x x 15"x3 x   jay flx,abd, ER ABD 30x 30x GR 2x10  IR/ER 20 ea 20 Blue ext, IR & ER 30x x x x   Sleeper stretch     x x 20" 3x 20" 3x x x       Modalities   5/10        5/3   MH and H-wave 15 min 15' 15' x x MH x MH and Charlee ASHRAF 10 min x                             X=same as last visit  Showed pt doorway pec stretch  Assessment: Work appears to be going well  She is maintaining light duty and stretching frequently throughout the day  Full PROM in flex and abduction noted today  Plan: Continue per plan of care

## 2018-06-11 ENCOUNTER — EVALUATION (OUTPATIENT)
Dept: PHYSICAL THERAPY | Facility: REHABILITATION | Age: 63
End: 2018-06-11
Payer: OTHER MISCELLANEOUS

## 2018-06-11 DIAGNOSIS — M25.512 ACUTE PAIN OF LEFT SHOULDER: ICD-10-CM

## 2018-06-11 DIAGNOSIS — S46.002D ROTATOR CUFF INJURY, LEFT, SUBSEQUENT ENCOUNTER: ICD-10-CM

## 2018-06-11 DIAGNOSIS — M75.122 COMPLETE ROTATOR CUFF TEAR OF LEFT SHOULDER: Primary | ICD-10-CM

## 2018-06-11 PROCEDURE — 97110 THERAPEUTIC EXERCISES: CPT | Performed by: PHYSICAL THERAPIST

## 2018-06-11 PROCEDURE — 97140 MANUAL THERAPY 1/> REGIONS: CPT | Performed by: PHYSICAL THERAPIST

## 2018-06-11 PROCEDURE — 97112 NEUROMUSCULAR REEDUCATION: CPT | Performed by: PHYSICAL THERAPIST

## 2018-06-11 NOTE — PROGRESS NOTES
Reevaluation      Today's date: 2018  Patient name: Raine Nichols  : 1955  MRN: [id number]  Referring provider: Maurizio Paulson MD  Dx:   Encounter Diagnosis     ICD-10-CM    1  Complete rotator cuff tear of left shoulder M75 122    2  Acute pain of left shoulder M25 512    3  Rotator cuff injury, left, subsequent encounter S46 002D                   Subjective: Her work place is being very accommodating and it is going well  No significant increase in pain  Pain level    STG:  Flex 0-125 met  Pt demonstrates good understanding of HEP and precautions met  LTG: RTW as a  partially met  No difficulty with IADLs met  Strength and ROM WNL partially met    Objective:  AROM:   Flex: 145  Abd: 125  IR: T9  ER:T2  Ext: 75    MMT:  Flex 3  Abd  3  IR 5  ER 5  Ext 5    PROM:  Flex 165  Abd 165  IR 70  ER 80             See treatment diary below        Precautions: protocol    Daily Treatment Diary     Manual              PROM       5 min            STM 10 min                                                       Exercise Diary              UBE 10 min            Pulleys 30            Body blade 15" 3x            Elbow flex 4# 30x            TB rows, ext Blue 30x            TB IR/ER Blue 30x            TB robbers Red 30x            AROM flex, Abd 1# 2x10            SL ER 2# 30x            Supine punches 2# 30x            pec stretch 20"3x            Sleeper stretch 20" 3x            Prone flex/abd 2# 30x            Crate carry NV                                                                                              Modalities              MH                                       X=same as last vist        Assessment: Pt is progressing well through the protocol  Still quite weak in flexion and abduction and would benefit from continued PT  Plan: Continue per plan of care  Add more functional tasks

## 2018-06-14 ENCOUNTER — OFFICE VISIT (OUTPATIENT)
Dept: PHYSICAL THERAPY | Facility: REHABILITATION | Age: 63
End: 2018-06-14
Payer: OTHER MISCELLANEOUS

## 2018-06-14 DIAGNOSIS — S46.002D ROTATOR CUFF INJURY, LEFT, SUBSEQUENT ENCOUNTER: ICD-10-CM

## 2018-06-14 DIAGNOSIS — M75.122 COMPLETE TEAR OF LEFT ROTATOR CUFF: ICD-10-CM

## 2018-06-14 DIAGNOSIS — M75.122 COMPLETE ROTATOR CUFF TEAR OF LEFT SHOULDER: Primary | ICD-10-CM

## 2018-06-14 DIAGNOSIS — M25.512 ACUTE PAIN OF LEFT SHOULDER: ICD-10-CM

## 2018-06-14 PROCEDURE — 97112 NEUROMUSCULAR REEDUCATION: CPT | Performed by: PHYSICAL THERAPIST

## 2018-06-14 PROCEDURE — 97140 MANUAL THERAPY 1/> REGIONS: CPT | Performed by: PHYSICAL THERAPIST

## 2018-06-14 PROCEDURE — 97010 HOT OR COLD PACKS THERAPY: CPT | Performed by: PHYSICAL THERAPIST

## 2018-06-14 PROCEDURE — 97110 THERAPEUTIC EXERCISES: CPT | Performed by: PHYSICAL THERAPIST

## 2018-06-14 NOTE — PROGRESS NOTES
Daily Note     Today's date: 2018  Patient name: Chano Agarwal  : 1955  MRN: [id number]  Referring provider: Diane Gutierres MD  Dx:   Encounter Diagnosis     ICD-10-CM    1  Complete rotator cuff tear of left shoulder M75 122    2  Acute pain of left shoulder M25 512    3  Rotator cuff injury, left, subsequent encounter S46 002D                   Subjective: Feels sore all over today  Has been lifting paint cans with her opposite arm  Also may be sore from rainy weather  Objective: See treatment diary below  Precautions: protocol    Daily Treatment Diary     Manual             PROM       5 min            STM 10 min                                                       Exercise Diary              UBE 10 min x           Pulleys 30 x           Body blade 15" 3x            Elbow flex 4# 30x            TB rows, ext Blue 30x Row black  Ext blue 30ea           TB IR/ER Blue 30x x           TB robbers Red 30x x           AROM flex, Abd 1# 2x10 x           SL ER 2# 30x            Supine punches 2# 30x            pec stretch 20"3x            Sleeper stretch 20" 3x            Prone flex/abd 2# 30x            Crate carry NV 10# 40 ft x2                                                                                             Modalities              MH                                       X=same as last vist    Assessment: Decreased ability to do flex with 1# PREs today  Pt is overdoing it  Advised to take tomorrow off from exercise  Plan: Continue per plan of care

## 2018-06-18 ENCOUNTER — OFFICE VISIT (OUTPATIENT)
Dept: PHYSICAL THERAPY | Facility: REHABILITATION | Age: 63
End: 2018-06-18
Payer: OTHER MISCELLANEOUS

## 2018-06-18 DIAGNOSIS — M25.512 ACUTE PAIN OF LEFT SHOULDER: ICD-10-CM

## 2018-06-18 DIAGNOSIS — M75.122 COMPLETE TEAR OF LEFT ROTATOR CUFF: ICD-10-CM

## 2018-06-18 DIAGNOSIS — M75.122 COMPLETE ROTATOR CUFF TEAR OF LEFT SHOULDER: Primary | ICD-10-CM

## 2018-06-18 DIAGNOSIS — S46.002D ROTATOR CUFF INJURY, LEFT, SUBSEQUENT ENCOUNTER: ICD-10-CM

## 2018-06-18 PROCEDURE — 97112 NEUROMUSCULAR REEDUCATION: CPT

## 2018-06-18 PROCEDURE — 97110 THERAPEUTIC EXERCISES: CPT

## 2018-06-18 PROCEDURE — 97140 MANUAL THERAPY 1/> REGIONS: CPT

## 2018-06-18 PROCEDURE — 97010 HOT OR COLD PACKS THERAPY: CPT

## 2018-06-21 ENCOUNTER — OFFICE VISIT (OUTPATIENT)
Dept: PHYSICAL THERAPY | Facility: REHABILITATION | Age: 63
End: 2018-06-21
Payer: OTHER MISCELLANEOUS

## 2018-06-21 DIAGNOSIS — M75.122 COMPLETE TEAR OF LEFT ROTATOR CUFF: ICD-10-CM

## 2018-06-21 DIAGNOSIS — S46.002D ROTATOR CUFF INJURY, LEFT, SUBSEQUENT ENCOUNTER: ICD-10-CM

## 2018-06-21 DIAGNOSIS — M75.122 COMPLETE ROTATOR CUFF TEAR OF LEFT SHOULDER: Primary | ICD-10-CM

## 2018-06-21 DIAGNOSIS — M25.512 ACUTE PAIN OF LEFT SHOULDER: ICD-10-CM

## 2018-06-21 PROCEDURE — 97112 NEUROMUSCULAR REEDUCATION: CPT

## 2018-06-21 PROCEDURE — 97010 HOT OR COLD PACKS THERAPY: CPT

## 2018-06-21 PROCEDURE — 97140 MANUAL THERAPY 1/> REGIONS: CPT

## 2018-06-21 PROCEDURE — 97110 THERAPEUTIC EXERCISES: CPT

## 2018-06-21 NOTE — PROGRESS NOTES
Daily Note     Today's date: 2018  Patient name: Arabella Moore  : 1955  MRN: [id number]  Referring provider: Vero Apodaca MD  Dx:   Encounter Diagnosis     ICD-10-CM    1  Complete rotator cuff tear of left shoulder M75 122    2  Acute pain of left shoulder M25 512    3  Rotator cuff injury, left, subsequent encounter S46 002D    4  Complete tear of left rotator cuff M75 122                   Subjective: Much better      Objective: See treatment diary below  Precautions: protocol    Daily Treatment Diary     Manual   6         PROM       5 min  x          STM 10 min  x                                                     Exercise Diary              UBE 10 min x x x         Pulleys 30 x x x         Body blade 15" 3x  x X  30"x2         Elbow flex 4# 30x  x x         TB rows, ext Blue 30x Row black  Ext blue 30ea x x         TB IR/ER Blue 30x x x Black  20         TB robbers Red 30x x yellow  20x x         AROM flex, Abd 1# 2x10 x x x         SL ER 2# 30x  x 3# 20         Supine punches 2# 30x  x 3# 20         pec stretch 20"3x            Sleeper stretch 20" 3x  x x         Prone flex/abd 2# 30x  x x         Crate carry NV 10# 40 ft x2 x 3x         Crate lift floor to waist   10#     10x x         Wall climbs flx & abd x x x 10x                                                                 Modalities              MH    x                                   X=same as last vist    Assessment: Fatigue with increases but no pain  PROM was very good today  Minimal spasm      Plan: Continue per plan of care

## 2018-06-25 ENCOUNTER — OFFICE VISIT (OUTPATIENT)
Dept: PHYSICAL THERAPY | Facility: REHABILITATION | Age: 63
End: 2018-06-25
Payer: OTHER MISCELLANEOUS

## 2018-06-25 DIAGNOSIS — M75.122 COMPLETE ROTATOR CUFF TEAR OF LEFT SHOULDER: Primary | ICD-10-CM

## 2018-06-25 DIAGNOSIS — S46.002D ROTATOR CUFF INJURY, LEFT, SUBSEQUENT ENCOUNTER: ICD-10-CM

## 2018-06-25 DIAGNOSIS — M25.512 ACUTE PAIN OF LEFT SHOULDER: ICD-10-CM

## 2018-06-25 DIAGNOSIS — M75.122 COMPLETE TEAR OF LEFT ROTATOR CUFF: ICD-10-CM

## 2018-06-25 PROCEDURE — G8991 OTHER PT/OT GOAL STATUS: HCPCS

## 2018-06-25 PROCEDURE — 97112 NEUROMUSCULAR REEDUCATION: CPT

## 2018-06-25 PROCEDURE — G8990 OTHER PT/OT CURRENT STATUS: HCPCS

## 2018-06-25 PROCEDURE — 97140 MANUAL THERAPY 1/> REGIONS: CPT

## 2018-06-25 PROCEDURE — 97110 THERAPEUTIC EXERCISES: CPT

## 2018-06-25 PROCEDURE — 97014 ELECTRIC STIMULATION THERAPY: CPT

## 2018-06-25 PROCEDURE — 97010 HOT OR COLD PACKS THERAPY: CPT

## 2018-06-25 NOTE — PROGRESS NOTES
Daily Note     Today's date: 2018  Patient name: Ximena Lea  : 1955  MRN: [id number]  Referring provider: Rafael Gray MD  Dx:   Encounter Diagnosis     ICD-10-CM    1  Complete rotator cuff tear of left shoulder M75 122    2  Acute pain of left shoulder M25 512    3  Rotator cuff injury, left, subsequent encounter S46 002D    4  Complete tear of left rotator cuff M75 122                   Subjective: Had problems with registers at work & needed to use L arm more yesterday  She was very sore after that & took 4 (2 AM & 2 PM) aleve for pain  She is more sore than usual today yet  Cont to get N/T in L hand on & off thru the day      Objective: See treatment diary below  Precautions: protocol    Daily Treatment Diary     Manual          PROM       5 min  x  x        STM 10 min  x  x                                                   Exercise Diary              UBE 10 min x x x x        Pulleys 30 x x x x        Body blade 15" 3x  x X  30"x2 x        Elbow flex 4# 30x  x x x        TB rows, ext Blue 30x Row black  Ext blue 30ea x x D/C row      x        TB IR/ER Blue 30x x x Black  20 30        TB robbers Red 30x x yellow  20x x x        AROM flex, Abd 1# 2x10 x x x x        SL ER 2# 30x  x 3# 20 X 30        Supine punches 2# 30x  x 3# 20 X 30        pec stretch 20"3x            Sleeper stretch 20" 3x  x x x        Prone flex/abd 2# 30x  x x x        Crate carry NV 10# 40 ft x2 x 3x x        Crate lift floor to waist   10#     10x x x        Wall climbs flx & abd x x x 10x X 20        FW bent row     4# 2x10                                                   Modalities              MH    x & Hwave  today                                  X=same as last vist    Assessment:  Tight in pec minor w TP that recreated N/T in hand  Some tenderness over bicep tendon    H wave today to decrease pain  PT instr in ulnar NG to address N/T   Decreased pain after H wave      Plan: Continue per plan of care

## 2018-06-26 ENCOUNTER — OFFICE VISIT (OUTPATIENT)
Dept: OBGYN CLINIC | Facility: MEDICAL CENTER | Age: 63
End: 2018-06-26
Payer: OTHER MISCELLANEOUS

## 2018-06-26 VITALS
HEART RATE: 76 BPM | SYSTOLIC BLOOD PRESSURE: 125 MMHG | BODY MASS INDEX: 41.23 KG/M2 | DIASTOLIC BLOOD PRESSURE: 77 MMHG | HEIGHT: 60 IN | WEIGHT: 210 LBS

## 2018-06-26 DIAGNOSIS — M75.122 COMPLETE ROTATOR CUFF TEAR OF LEFT SHOULDER: Primary | ICD-10-CM

## 2018-06-26 PROCEDURE — 99213 OFFICE O/P EST LOW 20 MIN: CPT | Performed by: ORTHOPAEDIC SURGERY

## 2018-06-26 NOTE — LETTER
6/26/2018    Patient: Arabella Moore  YOB: 1955  Date of visit: 6/26/2018    To whom it may concern:    Arabella Moore is under my professional care and was seen in the office on 6/26/2018  Work restrictions: 10 pound lifting restriction and no repetitive overhead reaching  Until next MD evaluation in 6 weeks       Please contact us if you have any questions        Sincerely,      Claire Vergara MD

## 2018-06-26 NOTE — PROGRESS NOTES
Orthopaedic Surgery - Office Note  Brenton Roberts (84 y o  female)   : 1955   MRN: [id number]  Encounter Date: 2018    Chief Complaint   Patient presents with    Left Shoulder - Follow-up     Assessment / Plan  S/p L shoulder arthroscopy with supraspinatus and subscapularis repairs, SAD and open LHB tenodesis, making appropriate progress    · Continue outpatient PT, focus on strengthening  · 10 lb lifting restriction with no repetitive overhead lifting  Pt is expected to be fit for unrestricted duty around months postoperatively  Pt was given this work note today  Return in about 6 weeks (around 2018)  History of Present Illness  Brenton Roberts is a 60 y o  female who presents S/p L shoulder arthroscopy with supraspinatus and subscapularis repairs, SAD and open LHB tenodesis on 2018  She is continuing to make appropriate progress  She has some muscle fatigue and cramping, but no pain  She has been on a 1 lb lifting restriction at work and is tolerating this well  Review of Systems  Pertinent items are noted in HPI  All other systems were reviewed and are negative  Physical Exam  /77   Pulse 76   Ht 5' (1 524 m)   Wt 95 3 kg (210 lb)   BMI 41 01 kg/m²   Cons: Appears well  No apparent distress  Psych: Alert  Oriented x3  Mood and affect normal   Eyes: PERRLA, EOMI  Resp: Normal effort  No audible wheezing or stridor  CV: Palpable pulse  No discernable arrhythmia  No LE edema  Lymph:  No palpable cervical, axillary, or inguinal lymphadenopathy  Skin: Warm  No palpable masses  No visible lesions  Neuro: Normal muscle tone  Normal and symmetric DTR's  Left Shoulder Exam  Alignment / Posture:  Normal shoulder posture  Normal scapular position  Inspection:  No swelling  Incision healed  Palpation:  No tenderness  ROM:  Shoulder   Shoulder ER 80  Shoulder IR T8  Strength:  Supraspinatus 4-/5  Infraspinatus 4/5  Subscapularis 5/5    Stability:  No objective shoulder instability  Tests: (-) Machelle Irwin  (-) Neer  (-) Painful arc  (-) Belly press  Neurovascular:  Sensation intact in Ax/R/M/U nerve distributions  2+ radial pulse  Studies Reviewed  No studies to review    Procedures  No procedures today  Medical, Surgical, Family, and Social History  The patient's medical history, family history, and social history, were reviewed and updated as appropriate  Past Medical History:   Diagnosis Date    Arthritis     Cataract     ashley    Depression     Fluid retention     Headache, acute     Heart murmur     Hyperlipidemia     Hypertension     PONV (postoperative nausea and vomiting)     Rotator cuff tear, left     Wears glasses     Wears partial dentures     upper       Past Surgical History:   Procedure Laterality Date    ABDOMINAL ADHESION SURGERY      ANKLE SURGERY Right     tendon tear    APPENDECTOMY      CARPAL TUNNEL RELEASE Bilateral     CARPAL TUNNEL RELEASE Left     CARPAL TUNNEL RELEASE Right     COLONOSCOPY      HAND SURGERY Right     ganglion cyst    HEMORROIDECTOMY      HYSTERECTOMY      KNEE ARTHROSCOPY Left     OVARIAN CYST REMOVAL      KS SHLDR ARTHROSCOP,SURG,W/ROTAT CUFF REPR Left 2/19/2018    Procedure: ARTHROSCOPIC REPAIR ROTATOR CUFF,  SAD, BICEP TENODESIS;  Surgeon: Zeus Zhao MD;  Location: [facility];  Service: Orthopedics    REPAIR RECTOCELE      TONSILLECTOMY      TUBAL LIGATION         Family History   Problem Relation Age of Onset    Hypertension Mother     Cancer Mother     Lung cancer Father     Thyroid disease Sister     Depression Sister     Cancer Brother     ROBERT disease Brother        Social History     Occupational History    Not on file       Social History Main Topics    Smoking status: Never Smoker    Smokeless tobacco: Never Used    Alcohol use Yes      Comment: few x year    Drug use: No    Sexual activity: Not on file       Allergies   Allergen Reactions    Iodine Hives IV IODINE    Keflex [Cephalexin] Other (See Comments)     Mouth sores    Morphine And Related Hives     IV MORPINE    Penicillins Hives    Clindamycin Other (See Comments)     Pt unsure    Erythromycin Hives         Current Outpatient Prescriptions:     acetaminophen (TYLENOL) 500 mg tablet, Take 500-1,000 mg by mouth every 6 (six) hours as needed for mild pain, Disp: , Rfl:     citalopram (CeleXA) 40 mg tablet, Take 40 mg by mouth every evening  , Disp: , Rfl: 1    enalapril (VASOTEC) 5 mg tablet, Take 5 mg by mouth every evening  , Disp: , Rfl:     furosemide (LASIX) 40 mg tablet, Take 40 mg by mouth daily as needed  , Disp: , Rfl:     naproxen (NAPROSYN) 500 mg tablet, Take 1 tablet (500 mg total) by mouth 2 (two) times a day with meals, Disp: 60 tablet, Rfl: 0      Maira Alas MD    Scribe Attestation    I,:    am acting as a scribe while in the presence of the attending physician :        I,:    personally performed the services described in this documentation    as scribed in my presence :

## 2018-06-28 ENCOUNTER — OFFICE VISIT (OUTPATIENT)
Dept: PHYSICAL THERAPY | Facility: REHABILITATION | Age: 63
End: 2018-06-28
Payer: OTHER MISCELLANEOUS

## 2018-06-28 DIAGNOSIS — M75.122 COMPLETE ROTATOR CUFF TEAR OF LEFT SHOULDER: Primary | ICD-10-CM

## 2018-06-28 PROCEDURE — 97140 MANUAL THERAPY 1/> REGIONS: CPT | Performed by: PHYSICAL THERAPIST

## 2018-06-28 PROCEDURE — 97112 NEUROMUSCULAR REEDUCATION: CPT | Performed by: PHYSICAL THERAPIST

## 2018-06-28 PROCEDURE — 97110 THERAPEUTIC EXERCISES: CPT | Performed by: PHYSICAL THERAPIST

## 2018-06-28 NOTE — PROGRESS NOTES
Daily Note     Today's date: 2018  Patient name: Nemesio Olsen  : 1955  MRN: [id number]  Referring provider: Erasmo Ortega MD  Dx:   Encounter Diagnosis     ICD-10-CM    1  Complete rotator cuff tear of left shoulder M75 122                   Subjective:  Patient reports that she is still on light duty at work and has occasional tingling/numbness in the L UE  Objective: See treatment diary below    Precautions: protocol    Daily Treatment Diary     Manual         PROM       5 min  x  x x       STM 10 min  x  x X UT                                                  Exercise Diary              UBE 10 min x x x x x       Pulleys 30 x x x x x       Body blade 15" 3x  x X  30"x2 x x       Elbow flex 4# 30x  x x x X 5#       TB rows, ext Blue 30x Row black  Ext blue 30ea x x D/C row      x x       TB IR/ER Blue 30x x x Black  20 30 x       TB robbers Red 30x x yellow  20x x x x       AROM flex, Abd 1# 2x10 x x x x X abd/ scap on wall       SL ER 2# 30x  x 3# 20 X 30 x       Supine punches 2# 30x  x 3# 20 X 30 x       pec stretch 20"3x     x       Sleeper stretch 20" 3x  x x x np       Prone flex/abd 2# 30x  x x x x       Crate carry NV 10# 40 ft x2 x 3x x x       Crate lift floor to waist   10#     10x x x x       Wall climbs flx & abd x x x 10x X 20 x       FW bent row     4# 2x10 3x10 4#                                                  Modalities              MH    x & Hwave  today x                                 X=same as last vist    Assessment:  Pec minor tightness and moderate myofascial restrictions throughout  Decreased scapular neuromuscular control throughout  Requires verbal and tactile cues to decrease UT dominance     Plan: Continue per plan of care

## 2018-07-02 ENCOUNTER — OFFICE VISIT (OUTPATIENT)
Dept: PHYSICAL THERAPY | Facility: REHABILITATION | Age: 63
End: 2018-07-02
Payer: OTHER MISCELLANEOUS

## 2018-07-02 DIAGNOSIS — S46.002D ROTATOR CUFF INJURY, LEFT, SUBSEQUENT ENCOUNTER: ICD-10-CM

## 2018-07-02 DIAGNOSIS — M75.122 COMPLETE ROTATOR CUFF TEAR OF LEFT SHOULDER: Primary | ICD-10-CM

## 2018-07-02 DIAGNOSIS — M25.512 ACUTE PAIN OF LEFT SHOULDER: ICD-10-CM

## 2018-07-02 PROCEDURE — 97014 ELECTRIC STIMULATION THERAPY: CPT | Performed by: PHYSICAL THERAPIST

## 2018-07-02 PROCEDURE — 97140 MANUAL THERAPY 1/> REGIONS: CPT | Performed by: PHYSICAL THERAPIST

## 2018-07-02 PROCEDURE — 97112 NEUROMUSCULAR REEDUCATION: CPT | Performed by: PHYSICAL THERAPIST

## 2018-07-02 PROCEDURE — 97110 THERAPEUTIC EXERCISES: CPT | Performed by: PHYSICAL THERAPIST

## 2018-07-02 NOTE — PROGRESS NOTES
Daily Note     Today's date: 2018  Patient name: Deuce Sequeira  : 1955  MRN: [id number]  Referring provider: Celi Sabillon MD  Dx:   Encounter Diagnosis     ICD-10-CM    1  Complete rotator cuff tear of left shoulder M75 122    2  Acute pain of left shoulder M25 512    3  Rotator cuff injury, left, subsequent encounter S46 002D                   Subjective: UT has been less painful  Still has occasional tingling  Objective: See treatment diary below    Precautions: protocol    Daily Treatment Diary     Manual        PROM       5 min  x  x x x      STM 10 min  x  x X UT x                                                 Exercise Diary              UBE 10 min x x x x x x      Pulleys 30 x x x x x x      Body blade 15" 3x  x X  30"x2 x x x      Elbow flex 4# 30x  x x x X 5# x      TB rows, ext Blue 30x Row black  Ext blue 30ea x x D/C row      x x x      TB IR/ER Blue 30x x x Black  20 30 x x      TB robbers Red 30x x yellow  20x x x x x      AROM flex, Abd 1# 2x10 x x x x X abd/ scap on wall x      SL ER 2# 30x  x 3# 20 X 30 x 4# 20      Supine punches 2# 30x  x 3# 20 X 30 x 4# 20x      pec stretch 20"3x     x       Sleeper stretch 20" 3x  x x x np       Prone flex/abd 2# 30x  x x x x x      Crate carry NV 10# 40 ft x2 x 3x x x  15#     Crate lift floor to waist   10#     10x x x x  15#     Wall climbs flx & abd x x x 10x X 20 x x      FW bent row     4# 2x10 3x10 4# x      Side lying flexion       2# 30x                                    Modalities              MH    x & Hwave  today x MH and Hwave                                X=same as last vist    Assessment:  Still requires verbal cuing to decrease use of UT  Added SL flexion to facilitate better scapular control and less substitution  Spasm in UT persists  Continue to progress functional activities and facilitate neuromuscular control  Plan: Continue per plan of care

## 2018-07-05 ENCOUNTER — OFFICE VISIT (OUTPATIENT)
Dept: PHYSICAL THERAPY | Facility: REHABILITATION | Age: 63
End: 2018-07-05
Payer: OTHER MISCELLANEOUS

## 2018-07-05 DIAGNOSIS — M75.122 COMPLETE ROTATOR CUFF TEAR OF LEFT SHOULDER: Primary | ICD-10-CM

## 2018-07-05 DIAGNOSIS — M25.512 ACUTE PAIN OF LEFT SHOULDER: ICD-10-CM

## 2018-07-05 PROCEDURE — 97110 THERAPEUTIC EXERCISES: CPT | Performed by: PHYSICAL THERAPIST

## 2018-07-05 PROCEDURE — 97112 NEUROMUSCULAR REEDUCATION: CPT | Performed by: PHYSICAL THERAPIST

## 2018-07-05 PROCEDURE — 97140 MANUAL THERAPY 1/> REGIONS: CPT | Performed by: PHYSICAL THERAPIST

## 2018-07-05 NOTE — PROGRESS NOTES
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 but shoulder was fatigued afterward  Hwave does not make much of a difference in pain  Objective: See treatment diary below    Precautions: protocol    Daily Treatment Diary     Manual       PROM       5 min  x  x x x x     STM 10 min  x  x X UT x x                                                Exercise Diary              UBE 10 min x x x x x x x     Pulleys 30 x x x x x x x     Body blade 15" 3x  x X  30"x2 x x x x     Elbow flex 4# 30x  x x x X 5# x x     TB rows, ext Blue 30x Row black  Ext blue 30ea x x D/C row      x x x x     TB IR/ER Blue 30x x x Black  20 30 x x x     TB robbers Red 30x x yellow  20x x x x x x     AROM flex, Abd 1# 2x10 x x x x X abd/ scap on wall x 2x15     SL ER 2# 30x  x 3# 20 X 30 x 4# 20 x     Supine punches 2# 30x  x 3# 20 X 30 x 4# 20x x     pec stretch 20"3x     x       Sleeper stretch 20" 3x  x x x np       Prone flex/abd 2# 30x  x x x x x      Crate carry NV 10# 40 ft x2 x 3x x x  15# 25 ft 10x     Crate lift floor to waist   10#     10x x x x  15# 5x     Wall climbs flx & abd x x x 10x X 20 x x      FW bent row     4# 2x10 3x10 4# x x     Side lying flexion       2# 30x x                                   Modalities              MH    x & Hwave  today x MH and Hwave MH                               X=same as last vist    Assessment: Still needs cuing to decrease shoulder hike/ UT activation  Needs continued strengthening  Plan: Continue per plan of care

## 2018-07-09 ENCOUNTER — OFFICE VISIT (OUTPATIENT)
Dept: PHYSICAL THERAPY | Facility: REHABILITATION | Age: 63
End: 2018-07-09
Payer: OTHER MISCELLANEOUS

## 2018-07-09 DIAGNOSIS — M25.512 ACUTE PAIN OF LEFT SHOULDER: ICD-10-CM

## 2018-07-09 DIAGNOSIS — S46.002D ROTATOR CUFF INJURY, LEFT, SUBSEQUENT ENCOUNTER: ICD-10-CM

## 2018-07-09 DIAGNOSIS — M75.122 COMPLETE TEAR OF LEFT ROTATOR CUFF: ICD-10-CM

## 2018-07-09 DIAGNOSIS — M75.122 COMPLETE ROTATOR CUFF TEAR OF LEFT SHOULDER: Primary | ICD-10-CM

## 2018-07-09 PROCEDURE — 97140 MANUAL THERAPY 1/> REGIONS: CPT

## 2018-07-09 PROCEDURE — 97112 NEUROMUSCULAR REEDUCATION: CPT

## 2018-07-09 PROCEDURE — 97110 THERAPEUTIC EXERCISES: CPT

## 2018-07-09 NOTE — PROGRESS NOTES
Daily Note     Today's date: 2018  Patient name: Xander Nickerson  : 1955  MRN: [id number]  Referring provider: Dianna Bush MD  Dx:   Encounter Diagnosis     ICD-10-CM    1  Complete rotator cuff tear of left shoulder M75 122    2  Acute pain of left shoulder M25 512    3  Rotator cuff injury, left, subsequent encounter S46 002D    4  Complete tear of left rotator cuff M75 122                   Subjective: Reports she has not been getting as sore with work & ADL  She will have more pain when she has PT & work on the same days  She will try to not book PT on work days  Objective: See treatment diary below    Precautions: protocol    Daily Treatment Diary     Manual   7-    PROM       5 min  x  x x x x x    STM 10 min  x  x X UT x x x                                               Exercise Diary              UBE 10 min x x x x x x x x    Pulleys 30 x x x x x x x x    Body blade 15" 3x  x X  30"x2 x x x x x    Elbow flex 4# 30x  x x x X 5# x x x    TB rows, ext Blue 30x Row black  Ext blue 30ea x x D/C row      x x x x Black   ext    TB IR/ER Blue 30x x x Black  20 30 x x x x    TB robbers Red 30x x yellow  20x x x x x x x    AROM flex, Abd 1# 2x10 x x x x X abd/ scap on wall x 2x15 X 2x10    SL ER 2# 30x  x 3# 20 X 30 x 4# 20 x 20    Supine punches 2# 30x  x 3# 20 X 30 x 4# 20x x 30    pec stretch 20"3x     x       Sleeper stretch 20" 3x  x x x np   HEP    Prone flex/abd 2# 30x  x x x x x  x    Crate carry NV 10# 40 ft x2 x 3x x x  15# 25 ft 10x x    Crate lift floor to waist   10#     10x x x x  15# 5x x    Wall climbs flx & abd x x x 10x X 20 x x  HEP    FW bent row     4# 2x10 3x10 4# x x 5# 2x15    Side lying flexion       2# 30x x x    Median nerve slider         10                     Modalities              MH    x & Hwave  today x MH and Hwave MH                               X=same as last vist    Assessment:  Corrected position for TB IR     Cont to need cuing for shnorma burr   Added nerve glides for N/T  Plan: Continue per plan of care

## 2018-07-12 ENCOUNTER — OFFICE VISIT (OUTPATIENT)
Dept: PHYSICAL THERAPY | Facility: REHABILITATION | Age: 63
End: 2018-07-12
Payer: OTHER MISCELLANEOUS

## 2018-07-12 DIAGNOSIS — S46.002D ROTATOR CUFF INJURY, LEFT, SUBSEQUENT ENCOUNTER: ICD-10-CM

## 2018-07-12 DIAGNOSIS — M25.512 ACUTE PAIN OF LEFT SHOULDER: ICD-10-CM

## 2018-07-12 DIAGNOSIS — M75.122 COMPLETE TEAR OF LEFT ROTATOR CUFF: ICD-10-CM

## 2018-07-12 DIAGNOSIS — M75.122 COMPLETE ROTATOR CUFF TEAR OF LEFT SHOULDER: Primary | ICD-10-CM

## 2018-07-12 PROCEDURE — 97110 THERAPEUTIC EXERCISES: CPT

## 2018-07-12 PROCEDURE — 97140 MANUAL THERAPY 1/> REGIONS: CPT

## 2018-07-12 PROCEDURE — 97112 NEUROMUSCULAR REEDUCATION: CPT

## 2018-07-12 NOTE — PROGRESS NOTES
Daily Note     Today's date: 2018  Patient name: Olga Lazcano  : 1955  MRN: [id number]  Referring provider: Dexter Ocasio MD  Dx:   Encounter Diagnosis     ICD-10-CM    1  Complete rotator cuff tear of left shoulder M75 122    2  Acute pain of left shoulder M25 512    3  Rotator cuff injury, left, subsequent encounter S46 002D    4  Complete tear of left rotator cuff M75 122                   Subjective: Reports nerve glides have been helping     C/o aching in shoulder    Objective: See treatment diary below    Precautions: protocol    Daily Treatment Diary     Manual  --   PROM       5 min  x  x x x x x    STM 10 min  x  x X UT x x x                                               Exercise Diary              UBE 10 min x x x x x x x x x   Pulleys 30 x x x x x x x x x   Body blade 15" 3x  x X  30"x2 x x x x x x   Elbow flex 4# 30x  x x x X 5# x x x x   TB rows, ext Blue 30x Row black  Ext blue 30ea x x D/C row      x x x x Black   ext Apollo 25# x 20   TB IR/ER Blue 30x x x Black  20 30 x x x x X black IR, blue ER   TB robbers Red 30x x yellow  20x x x x x x x Red x   AROM flex, Abd 1# 2x10 x x x x X abd/ scap on wall x 2x15 X 2x10 @ wall   SL ER 2# 30x  x 3# 20 X 30 x 4# 20 x 20 x   Supine punches 2# 30x  x 3# 20 X 30 x 4# 20x x 30 x   pec stretch 20"3x     x       Sleeper stretch 20" 3x  x x x np   HEP    Prone flex/abd 2# 30x  x x x x x  x x   Crate carry NV 10# 40 ft x2 x 3x x x  15# 25 ft 10x x x   Crate lift floor to waist   10#     10x x x x  15# 5x x x   Wall climbs flx & abd x x x 10x X 20 x x  HEP    FW bent row     4# 2x10 3x10 4# x x 5# 2x15 x   Side lying flexion       2# 30x x x 20x   Median nerve slider         10                     Modalities              MH    x & Hwave  today x MH and Hwave                                X=same as last vist    Assessment:    Cont to need cuing for posture, she had better form with AROM flx & abd standing against wall  R side is tighter than L , over use at work & home  She does have some tenderness over L bicep tendon, monitor this  Plan: Continue per plan of care

## 2018-07-16 ENCOUNTER — OFFICE VISIT (OUTPATIENT)
Dept: PHYSICAL THERAPY | Facility: REHABILITATION | Age: 63
End: 2018-07-16
Payer: OTHER MISCELLANEOUS

## 2018-07-16 DIAGNOSIS — M75.122 COMPLETE ROTATOR CUFF TEAR OF LEFT SHOULDER: Primary | ICD-10-CM

## 2018-07-16 DIAGNOSIS — M25.512 ACUTE PAIN OF LEFT SHOULDER: ICD-10-CM

## 2018-07-16 DIAGNOSIS — S46.002D ROTATOR CUFF INJURY, LEFT, SUBSEQUENT ENCOUNTER: ICD-10-CM

## 2018-07-16 DIAGNOSIS — M75.122 COMPLETE TEAR OF LEFT ROTATOR CUFF: ICD-10-CM

## 2018-07-16 PROCEDURE — 97140 MANUAL THERAPY 1/> REGIONS: CPT

## 2018-07-16 PROCEDURE — 97110 THERAPEUTIC EXERCISES: CPT

## 2018-07-16 PROCEDURE — 97010 HOT OR COLD PACKS THERAPY: CPT

## 2018-07-16 PROCEDURE — 97112 NEUROMUSCULAR REEDUCATION: CPT

## 2018-07-16 NOTE — PROGRESS NOTES
Daily Note     Today's date: 2018  Patient name: Jeanine Andrews  : 1955  MRN: [id number]  Referring provider: Harrison Roberson MD  Dx:   Encounter Diagnosis     ICD-10-CM    1  Complete rotator cuff tear of left shoulder M75 122    2  Acute pain of left shoulder M25 512    3  Rotator cuff injury, left, subsequent encounter S46 002D    4  Complete tear of left rotator cuff M75 122                   Subjective: c/o aching    N/T cont to decrease   She has not had the burning at bicep tendon    Objective: See treatment diary below    Precautions: protocol    Daily Treatment Diary     Manual   6-18 6- 6 7- 7-   PROM       5 min  x  x x x x x x   STM 10 min  x  x X UT x x x x                                              Exercise Diary              UBE 10 min x x x x x x x x x   Pulleys 30 x x x x x x x x x   Body blade 15" 3x  x X  30"x2 x x x x x x   Elbow flex 4# 30x  x x x X 5# x x x x   TB rows, ext Blue 30x Row black  Ext blue 30ea x x D/C row      x x x x Black   ext Apollo 25# x 20   TB IR/ER Blue 30x x x Black  20 30 x x x x X black IR, blue ER   TB robbers Red 30x x yellow  20x x x x x x x Red x   AROM flex, Abd 1# 2x10 x x x x X abd/ scap on wall x 2x15 X 2x10 @ wall   SL ER 2# 30x  x 3# 20 X 30 x 4# 20 x 20 x   Supine punches 2# 30x  x 3# 20 X 30 x 4# 20x x 30 x   pec stretch 20"3x     x       Sleeper stretch 20" 3x  x x x np   HEP    Prone flex/abd 2# 30x  x x x x x  x x   Crate carry NV 10# 40 ft x2 x 3x x x  15# 25 ft 10x x x   Crate lift floor to waist   10#     10x x x x  15# 5x x x   Wall climbs flx & abd x x x 10x X 20 x x  HEP    FW bent row     4# 2x10 3x10 4# x x 5# 2x15 x   Side lying flexion       2# 30x x x 20x   Median nerve slider         10                     Modalities              MH    x & Hwave  today x MH and Hwave MH                               X=same as last vist  Precautions: protocol, HBP    Daily Treatment Diary     Manual  7-16 PROM x            STM x                                                       Exercise Diary              UBE 10'            pulley 30x            TB: IR Black 30x            ER Blue 30x            robbers Red 30x            Apollo ext 25# 30x            Elbow curls 5# 30x            Body blade 30"x2            AROM flx, abd 1#            Crate floor to chair 15# 5x            Crate carry 15# 25'x10            FW bent row 5# 30x                         Supine punch 5# 30x            SL ER 4# 20x            SL flx 2# 20x            Prone flx 2# 30x            Prone HABD 2# 30x                                          Modalities              MH x                                            Assessment:    Tighter on R side than L, overuse with work  Decreases with STM  Plan: Continue per plan of care

## 2018-07-19 ENCOUNTER — OFFICE VISIT (OUTPATIENT)
Dept: PHYSICAL THERAPY | Facility: REHABILITATION | Age: 63
End: 2018-07-19
Payer: OTHER MISCELLANEOUS

## 2018-07-19 DIAGNOSIS — S46.002D ROTATOR CUFF INJURY, LEFT, SUBSEQUENT ENCOUNTER: ICD-10-CM

## 2018-07-19 DIAGNOSIS — M75.122 COMPLETE ROTATOR CUFF TEAR OF LEFT SHOULDER: Primary | ICD-10-CM

## 2018-07-19 DIAGNOSIS — M25.512 ACUTE PAIN OF LEFT SHOULDER: ICD-10-CM

## 2018-07-19 PROCEDURE — 97140 MANUAL THERAPY 1/> REGIONS: CPT | Performed by: PHYSICAL THERAPIST

## 2018-07-19 PROCEDURE — 97010 HOT OR COLD PACKS THERAPY: CPT | Performed by: PHYSICAL THERAPIST

## 2018-07-19 PROCEDURE — 97110 THERAPEUTIC EXERCISES: CPT | Performed by: PHYSICAL THERAPIST

## 2018-07-19 PROCEDURE — 97112 NEUROMUSCULAR REEDUCATION: CPT | Performed by: PHYSICAL THERAPIST

## 2018-07-19 NOTE — PROGRESS NOTES
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    Objective: See treatment diary below    Assessment:    Needs cues with exercises  Demonstrates fairly good technique with same one she gets going  Cervical and upper traps and rhomboids very tight  Coming from her neck  Added neck mobs - see how she reacts   (good initial reaction)      Plan: Continue per plan of care       Precautions: protocol    Daily Treatment Diary     Manual   7 7-9 7-12   PROM       5 min  x  x x x x x x   STM 10 min  x  x X UT x x x x                                              Exercise Diary              UBE 10 min x x x x x x x x x   Pulleys 30 x x x x x x x x x   Body blade 15" 3x  x X  30"x2 x x x x x x   Elbow flex 4# 30x  x x x X 5# x x x x   TB rows, ext Blue 30x Row black  Ext blue 30ea x x D/C row      x x x x Black   ext Apollo 25# x 20   TB IR/ER Blue 30x x x Black  20 30 x x x x X black IR, blue ER   TB robbers Red 30x x yellow  20x x x x x x x Red x   AROM flex, Abd 1# 2x10 x x x x X abd/ scap on wall x 2x15 X 2x10 @ wall   SL ER 2# 30x  x 3# 20 X 30 x 4# 20 x 20 x   Supine punches 2# 30x  x 3# 20 X 30 x 4# 20x x 30 x   pec stretch 20"3x     x       Sleeper stretch 20" 3x  x x x np   HEP    Prone flex/abd 2# 30x  x x x x x  x x   Crate carry NV 10# 40 ft x2 x 3x x x  15# 25 ft 10x x x   Crate lift floor to waist   10#     10x x x x  15# 5x x x   Wall climbs flx & abd x x x 10x X 20 x x  HEP    FW bent row     4# 2x10 3x10 4# x x 5# 2x15 x   Side lying flexion       2# 30x x x 20x   Median nerve slider         10                     Modalities              MH    x & Hwave  today x MH and Hwave MH                               X=same as last vist  Precautions: protocol, HBP    Daily Treatment Diary     Manual  7-16 7-19           PROM x x           STM x x           Grade V cervical mob to right  x                                         Exercise Diary              UBE 10' x           pulley 30x x           TB: IR Black 30x x           ER Blue 30x x           robbers Red 30x            Apollo ext 25# 30x 35#  30x           Elbow curls 5# 30x x           Body blade 30"x2 x           AROM flx, abd 1# 2#  3x10           Crate floor to chair 15# 5x np           Crate carry 15# 25'x10 20#  25'x10           FW bent row 5# 30x x                        Supine punch 5# 30x x           SL ER 4# 20x 4# 30x           SL flx 2# 20x 2# 30x           Prone flx 2# 30x 2# 30x           Prone HABD 2# 30x 2# 30x                                         Modalities              MH x

## 2018-07-23 ENCOUNTER — OFFICE VISIT (OUTPATIENT)
Dept: PHYSICAL THERAPY | Facility: REHABILITATION | Age: 63
End: 2018-07-23
Payer: OTHER MISCELLANEOUS

## 2018-07-23 DIAGNOSIS — M25.512 ACUTE PAIN OF LEFT SHOULDER: ICD-10-CM

## 2018-07-23 DIAGNOSIS — M75.122 COMPLETE ROTATOR CUFF TEAR OF LEFT SHOULDER: Primary | ICD-10-CM

## 2018-07-23 DIAGNOSIS — S46.002D ROTATOR CUFF INJURY, LEFT, SUBSEQUENT ENCOUNTER: ICD-10-CM

## 2018-07-23 DIAGNOSIS — M75.122 COMPLETE TEAR OF LEFT ROTATOR CUFF: ICD-10-CM

## 2018-07-23 PROCEDURE — 97010 HOT OR COLD PACKS THERAPY: CPT

## 2018-07-23 PROCEDURE — 97140 MANUAL THERAPY 1/> REGIONS: CPT

## 2018-07-23 PROCEDURE — 97110 THERAPEUTIC EXERCISES: CPT

## 2018-07-23 PROCEDURE — 97112 NEUROMUSCULAR REEDUCATION: CPT

## 2018-07-23 NOTE — PROGRESS NOTES
Daily Note     Today's date: 2018  Patient name: Vickie Willoughby  : 1955  MRN: [id number]  Referring provider: Sj He MD  Dx:   Encounter Diagnosis     ICD-10-CM    1  Complete rotator cuff tear of left shoulder M75 122    2  Acute pain of left shoulder M25 512    3  Rotator cuff injury, left, subsequent encounter S46 002D    4  Complete tear of left rotator cuff M75 122            Subjective: Pt c/o aching in shoulder with the weather  Cerv MOBS were helpful    Objective: See treatment diary below    Assessment:    Weakness yet & fatigue  Not as tight scap & UT    Plan: Continue per plan of care       Precautions: protocol    Daily Treatment Diary     Manual   7- 7   PROM       5 min  x  x x x x x x   STM 10 min  x  x X UT x x x x                                              Exercise Diary              UBE 10 min x x x x x x x x x   Pulleys 30 x x x x x x x x x   Body blade 15" 3x  x X  30"x2 x x x x x x   Elbow flex 4# 30x  x x x X 5# x x x x   TB rows, ext Blue 30x Row black  Ext blue 30ea x x D/C row      x x x x Black   ext Apollo 25# x 20   TB IR/ER Blue 30x x x Black  20 30 x x x x X black IR, blue ER   TB robbers Red 30x x yellow  20x x x x x x x Red x   AROM flex, Abd 1# 2x10 x x x x X abd/ scap on wall x 2x15 X 2x10 @ wall   SL ER 2# 30x  x 3# 20 X 30 x 4# 20 x 20 x   Supine punches 2# 30x  x 3# 20 X 30 x 4# 20x x 30 x   pec stretch 20"3x     x       Sleeper stretch 20" 3x  x x x np   HEP    Prone flex/abd 2# 30x  x x x x x  x x   Crate carry NV 10# 40 ft x2 x 3x x x  15# 25 ft 10x x x   Crate lift floor to waist   10#     10x x x x  15# 5x x x   Wall climbs flx & abd x x x 10x X 20 x x  HEP    FW bent row     4# 2x10 3x10 4# x x 5# 2x15 x   Side lying flexion       2# 30x x x 20x   Median nerve slider         10                     Modalities              MH    x & Charlee  today x PASCALE and Charlee ASHRAF                               X=same as last vist  Precautions: protocol, HBP    Daily Treatment Diary     Manual  7-16 7-19 7-23          PROM x x x          STM x x           Grade V cervical mob to right  x                                         Exercise Diary              UBE 10' x x          pulley 30x x x          TB: IR Black 30x x x          ER Blue 30x x Black x          robbers Red 30x  x          Apollo ext 25# 30x 35#  30x x          Elbow curls 5# 30x x 6# 30x          Body blade 30"x2 x x          AROM flx, abd 1# 2#  3x10 x          Crate floor to chair 15# 5x np           Crate carry 15# 25'x10 20#  25'x10 Lift & carry x          FW bent row 5# 30x x 6#  30x                       Supine punch 5# 30x x 6# 30x          SL ER 4# 20x 4# 30x x          SL flx 2# 20x 2# 30x x          Prone flx 2# 30x 2# 30x x          Prone HABD 2# 30x 2# 30x x                                        Modalities              MH x

## 2018-07-26 ENCOUNTER — EVALUATION (OUTPATIENT)
Dept: PHYSICAL THERAPY | Facility: REHABILITATION | Age: 63
End: 2018-07-26
Payer: OTHER MISCELLANEOUS

## 2018-07-26 DIAGNOSIS — M25.512 ACUTE PAIN OF LEFT SHOULDER: ICD-10-CM

## 2018-07-26 DIAGNOSIS — M75.122 COMPLETE ROTATOR CUFF TEAR OF LEFT SHOULDER: Primary | ICD-10-CM

## 2018-07-26 DIAGNOSIS — S46.002D ROTATOR CUFF INJURY, LEFT, SUBSEQUENT ENCOUNTER: ICD-10-CM

## 2018-07-26 PROCEDURE — 97112 NEUROMUSCULAR REEDUCATION: CPT | Performed by: PHYSICAL THERAPIST

## 2018-07-26 PROCEDURE — 97140 MANUAL THERAPY 1/> REGIONS: CPT | Performed by: PHYSICAL THERAPIST

## 2018-07-26 PROCEDURE — 97110 THERAPEUTIC EXERCISES: CPT | Performed by: PHYSICAL THERAPIST

## 2018-07-26 NOTE — PROGRESS NOTES
Re evaluation    Today's date: 2018  Patient name: Osiel Moura  : 1955  MRN: [id number]  Referring provider: Koko Ortega MD  Dx:   Encounter Diagnosis     ICD-10-CM    1  Complete rotator cuff tear of left shoulder M75 122    2  Acute pain of left shoulder M25 512    3  Rotator cuff injury, left, subsequent encounter S46 002D                   Subjective: Pt reports feeling good  0/10 pain  Anxious to return to her normal position as work  Objective: See treatment diary below  STG:  Flex 0-125 met  Pt demonstrates good understanding of HEP and precautions met  LTG: RTW as a  partially met  No difficulty with IADLs met  Strength and ROM WNL partially met    Objective:  AROM:   Flex: 165  Abd: 155  IR: T9  ER:T3  Ext: 75    MMT:  Flex 3+  Abd  3  IR 5  ER 5  Ext 5    PROM:  Flex 170  Abd 175  IR 80  ER 85  Daily Treatment Diary     Manual           PROM x x x x         STM x x           Grade V cervical mob to right  x                                         Exercise Diary              UBE 10' x x x         pulley 30x x x x         TB: IR Black 30x x x x         ER Blue 30x x Black x x         robbers Red 30x  x x         Apollo ext 25# 30x 35#  30x x x         Elbow curls 5# 30x x 6# 30x x         Body blade 30"x2 x x x         AROM flx, abd 1# 2#  3x10 x x         Crate floor to chair 15# 5x np           Crate carry 15# 25'x10 20#  25'x10 Lift & carry x x         FW bent row 5# 30x x 6#  30x x                      Supine punch 5# 30x x 6# 30x x         SL ER 4# 20x 4# 30x x 5# 20x         SL flx 2# 20x 2# 30x x 3# 20x         Prone flx 2# 30x 2# 30x x 3# 20x         Prone HABD 2# 30x 2# 30x x 3# 20x                                       Modalities              MH x                                      X=same as last visit    Assessment: Pt is demonstrating full AROM now but remains weak in flexion and abduction    She should be able to do her job as a  but I would have her avoid overhead lifting and ask for assist with very heavy items  Plan: Continue per plan of care

## 2018-07-27 ENCOUNTER — OFFICE VISIT (OUTPATIENT)
Dept: OBGYN CLINIC | Facility: MEDICAL CENTER | Age: 63
End: 2018-07-27
Payer: OTHER MISCELLANEOUS

## 2018-07-27 VITALS
HEART RATE: 69 BPM | WEIGHT: 209 LBS | BODY MASS INDEX: 41.03 KG/M2 | HEIGHT: 60 IN | DIASTOLIC BLOOD PRESSURE: 84 MMHG | SYSTOLIC BLOOD PRESSURE: 131 MMHG

## 2018-07-27 DIAGNOSIS — M75.122 COMPLETE ROTATOR CUFF TEAR OF LEFT SHOULDER: Primary | ICD-10-CM

## 2018-07-27 PROCEDURE — 99213 OFFICE O/P EST LOW 20 MIN: CPT | Performed by: ORTHOPAEDIC SURGERY

## 2018-07-27 NOTE — PROGRESS NOTES
Orthopaedic Surgery - Office Note  Chrissie Martino (67 y o  female)   : 1955   MRN: [id number]  Encounter Date: 2018    Chief Complaint   Patient presents with    Left Shoulder - Follow-up     Assessment / Plan  S/p L shoulder arthroscopy with supraspinatus and subscapularis repairs, SAD and open LHB tenodesis on 2018, making appropriate progress    · Continue outpatient PT, continued to improve strengthening  ·  She may increase her lifting restriction to a 20 lb lifting restriction, now with overhead movements and 5 lb overhead lifting restriction  The patient will follow up with us again in 4 weeks at which time we will give her updated restrictions  Usual expectation for unrestricted duty is around 6 months postoperatively  · Continue with ice and analgesics as needed  Return in about 4 weeks (around 2018)  History of Present Illness  Chrissie Martino is a 60 y o  female who presents S/p L shoulder arthroscopy with supraspinatus and subscapularis repairs, SAD and open LHB tenodesis on 2018  She is continuing to make appropriate progress  She is return to work with a 10 lb lifting restriction and is tolerating it well  She still does not feel that she can do her full duty of 40 lb lifting but has no pain doing which she is doing now  She does ice on occasion and she has continued with outpatient physical therapy routinely to improve her strength  Review of Systems  Pertinent items are noted in HPI  All other systems were reviewed and are negative  Physical Exam  /84   Pulse 69   Ht 5' (1 524 m)   Wt 94 8 kg (209 lb)   BMI 40 82 kg/m²   Cons: Appears well  No apparent distress  Psych: Alert  Oriented x3  Mood and affect normal   Eyes: PERRLA, EOMI  Resp: Normal effort  No audible wheezing or stridor  CV: Palpable pulse  No discernable arrhythmia  No LE edema  Lymph:  No palpable cervical, axillary, or inguinal lymphadenopathy  Skin: Warm    No palpable masses  No visible lesions  Neuro: Normal muscle tone  Normal and symmetric DTR's  Left Shoulder Exam  Alignment / Posture:  Normal shoulder posture  Normal scapular position  Inspection:  No swelling  Incision healed  Palpation:  No tenderness  ROM:  Shoulder °  Shoulder ER 80  Shoulder IR T8  Strength:  Supraspinatus 4/5  Infraspinatus 4/5  Subscapularis 5/5  Stability:  No objective shoulder instability  Tests: (-) Shagufta Leija  (-) Neer  (-) Painful arc  (-) Belly press  Neurovascular:  Sensation intact in Ax/R/M/U nerve distributions  2+ radial pulse  Studies Reviewed  No studies to review    Procedures  No procedures today  Medical, Surgical, Family, and Social History  The patient's medical history, family history, and social history, were reviewed and updated as appropriate      Past Medical History:   Diagnosis Date    Arthritis     Cataract     ashley    Depression     Fluid retention     Headache, acute     Heart murmur     Hyperlipidemia     Hypertension     PONV (postoperative nausea and vomiting)     Rotator cuff tear, left     Wears glasses     Wears partial dentures     upper       Past Surgical History:   Procedure Laterality Date    ABDOMINAL ADHESION SURGERY      ANKLE SURGERY Right     tendon tear    APPENDECTOMY      CARPAL TUNNEL RELEASE Bilateral     CARPAL TUNNEL RELEASE Left     CARPAL TUNNEL RELEASE Right     COLONOSCOPY      HAND SURGERY Right     ganglion cyst    HEMORROIDECTOMY      HYSTERECTOMY      KNEE ARTHROSCOPY Left     OVARIAN CYST REMOVAL      NM SHLDR ARTHROSCOP,SURG,W/ROTAT CUFF REPR Left 2/19/2018    Procedure: ARTHROSCOPIC REPAIR ROTATOR CUFF,  SAD, BICEP TENODESIS;  Surgeon: Leelee Cardona MD;  Location: [facility];  Service: Orthopedics    REPAIR RECTOCELE      TONSILLECTOMY      TUBAL LIGATION         Family History   Problem Relation Age of Onset    Hypertension Mother     Cancer Mother     Lung cancer Father     Thyroid disease Sister     Depression Sister     Cancer Brother     ROBERT disease Brother        Social History     Occupational History    Not on file       Social History Main Topics    Smoking status: Never Smoker    Smokeless tobacco: Never Used    Alcohol use Yes      Comment: few x year    Drug use: No    Sexual activity: Not on file       Allergies   Allergen Reactions    Iodine Hives     IV IODINE    Keflex [Cephalexin] Other (See Comments)     Mouth sores    Morphine And Related Hives     IV MORPINE    Penicillins Hives    Clindamycin Other (See Comments)     Pt unsure    Erythromycin Hives         Current Outpatient Prescriptions:     acetaminophen (TYLENOL) 500 mg tablet, Take 500-1,000 mg by mouth every 6 (six) hours as needed for mild pain, Disp: , Rfl:     citalopram (CeleXA) 40 mg tablet, Take 40 mg by mouth every evening  , Disp: , Rfl: 1    enalapril (VASOTEC) 5 mg tablet, Take 5 mg by mouth every evening  , Disp: , Rfl:     furosemide (LASIX) 40 mg tablet, Take 40 mg by mouth daily as needed  , Disp: , Rfl:     naproxen (NAPROSYN) 500 mg tablet, Take 1 tablet (500 mg total) by mouth 2 (two) times a day with meals, Disp: 60 tablet, Rfl: 0      Fei Calderón PA-C    Scribe Attestation    I,:    am acting as a scribe while in the presence of the attending physician :        I,:    personally performed the services described in this documentation    as scribed in my presence :

## 2018-07-27 NOTE — LETTER
July 27, 2018     Patient: Piercehenrry Teresa   YOB: 1955   Date of Visit: 7/27/2018       To Whom it May Concern:    Zander Barraza is under my professional care  She was seen in my office on 7/27/2018  She may increase her lifting restriction to a 20 lb lifting restriction, now with overhead movements and 5 lb overhead lifting restriction  The patient will follow up with us again in 4 weeks at which time we will give her updated restrictions  Usual expectation for unrestricted duty is around 6 months postoperatively  If you have any questions or concerns, please don't hesitate to call           Sincerely,          Claire Vergara MD        CC: Arabella Moore

## 2018-07-30 ENCOUNTER — OFFICE VISIT (OUTPATIENT)
Dept: PHYSICAL THERAPY | Facility: REHABILITATION | Age: 63
End: 2018-07-30
Payer: OTHER MISCELLANEOUS

## 2018-07-30 DIAGNOSIS — M75.122 COMPLETE TEAR OF LEFT ROTATOR CUFF: ICD-10-CM

## 2018-07-30 DIAGNOSIS — M75.122 COMPLETE ROTATOR CUFF TEAR OF LEFT SHOULDER: Primary | ICD-10-CM

## 2018-07-30 DIAGNOSIS — M25.512 ACUTE PAIN OF LEFT SHOULDER: ICD-10-CM

## 2018-07-30 DIAGNOSIS — S46.002D ROTATOR CUFF INJURY, LEFT, SUBSEQUENT ENCOUNTER: ICD-10-CM

## 2018-07-30 PROCEDURE — 97112 NEUROMUSCULAR REEDUCATION: CPT

## 2018-07-30 PROCEDURE — 97010 HOT OR COLD PACKS THERAPY: CPT

## 2018-07-30 PROCEDURE — 97110 THERAPEUTIC EXERCISES: CPT

## 2018-07-30 PROCEDURE — 97140 MANUAL THERAPY 1/> REGIONS: CPT

## 2018-07-30 NOTE — PROGRESS NOTES
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  No c/o pain over the weekend  Occasional N/T which will resolve with nerve glides      Objective: See treatment diary below    Manual   7 7        PROM x x x x x        STM x x   x        Grade V cervical mob to right  x                                         Exercise Diary              UBE 10' x x x x        pulley 30x x x x x        TB: IR Black 30x x x x x        ER Blue 30x x Black x x x        robbers Red 30x  x x Green 30x        Apollo ext 25# 30x 35#  30x x x x        Elbow curls 5# 30x x 6# 30x x x        Body blade 30"x2 x x x x        AROM flx, abd 1# 2#  3x10 x x x        Crate floor to chair 15# 5x np           Crate carry 15# 25'x10 20#  25'x10 Lift & carry x x x        FW bent row 5# 30x x 6#  30x x x                     Supine punch 5# 30x x 6# 30x x NT        SL ER 4# 20x 4# 30x x 5# 20x x        SL flx 2# 20x 2# 30x x 3# 20x x        Prone flx 2# 30x 2# 30x x 3# 20x 25        Prone HABD 2# 30x 2# 30x x 3# 20x 25                                      Modalities              MH x    x                                  X=same as last time  Assessment: Tolerated treatment well  Patient would benefit from continued PT    Fatigue with ex but not pain  Overall tightness R scap & UT are decreased      Plan: Progress treament per protocol

## 2018-08-02 ENCOUNTER — OFFICE VISIT (OUTPATIENT)
Dept: PHYSICAL THERAPY | Facility: REHABILITATION | Age: 63
End: 2018-08-02
Payer: OTHER MISCELLANEOUS

## 2018-08-02 DIAGNOSIS — S46.002D ROTATOR CUFF INJURY, LEFT, SUBSEQUENT ENCOUNTER: ICD-10-CM

## 2018-08-02 DIAGNOSIS — M25.512 ACUTE PAIN OF LEFT SHOULDER: ICD-10-CM

## 2018-08-02 DIAGNOSIS — M75.122 COMPLETE TEAR OF LEFT ROTATOR CUFF: ICD-10-CM

## 2018-08-02 DIAGNOSIS — M75.122 COMPLETE ROTATOR CUFF TEAR OF LEFT SHOULDER: Primary | ICD-10-CM

## 2018-08-02 PROCEDURE — 97112 NEUROMUSCULAR REEDUCATION: CPT

## 2018-08-02 PROCEDURE — 97110 THERAPEUTIC EXERCISES: CPT

## 2018-08-02 PROCEDURE — 97140 MANUAL THERAPY 1/> REGIONS: CPT

## 2018-08-02 NOTE — PROGRESS NOTES
Daily Note     Today's date: 2018  Patient name: David Monroe  : 1955  MRN: [id number]  Referring provider: Omaira Jarvis MD  Dx:   Encounter Diagnosis     ICD-10-CM    1  Complete rotator cuff tear of left shoulder M75 122    2  Acute pain of left shoulder M25 512    3  Rotator cuff injury, left, subsequent encounter S46 002D    4  Complete tear of left rotator cuff M75 122                   Subjective:  Sore with the weather & just got out of work      Objective: See treatment diary below    Manual   7 8-2       PROM x x x x x x       STM x x   x x       Grade V cervical mob to right  x                                         Exercise Diary              UBE 10' x x x x x       pulley 30x x x x x D/C       TB: IR Black 30x x x x x HEP       ER Blue 30x x Black x x x HEP       robbers Red 30x  x x Green 30x HEP       Apollo ext 25# 30x 35#  30x x x x x       Elbow curls 5# 30x x 6# 30x x x x       Body blade 30"x2 x x x x x       AROM flx, abd 1# 2#  3x10 x x x x       Crate floor to chair 15# 5x np           Crate carry 15# 25'x10 20#  25'x10 Lift & carry x x x x       FW bent row 5# 30x x 6#  30x x x x                    Supine punch 5# 30x x 6# 30x x NT Table push up + 20x       SL ER 4# 20x 4# 30x x 5# 20x x x       SL flx 2# 20x 2# 30x x 3# 20x x x       Prone flx 2# 30x 2# 30x x 3# 20x 25 X 30x       Prone HABD 2# 30x 2# 30x x 3# 20x 25 X 30x                                     Modalities              MH x    x                                  X=same as last time  Assessment: Tolerated treatment well  Patient would benefit from continued PT   Overusing R trap, cont to have spasm      Plan: Progress treament per protocol

## 2018-08-06 ENCOUNTER — OFFICE VISIT (OUTPATIENT)
Dept: PHYSICAL THERAPY | Facility: REHABILITATION | Age: 63
End: 2018-08-06
Payer: OTHER MISCELLANEOUS

## 2018-08-06 DIAGNOSIS — M75.122 COMPLETE TEAR OF LEFT ROTATOR CUFF: ICD-10-CM

## 2018-08-06 DIAGNOSIS — S46.002D ROTATOR CUFF INJURY, LEFT, SUBSEQUENT ENCOUNTER: ICD-10-CM

## 2018-08-06 DIAGNOSIS — M25.512 ACUTE PAIN OF LEFT SHOULDER: ICD-10-CM

## 2018-08-06 DIAGNOSIS — M75.122 COMPLETE ROTATOR CUFF TEAR OF LEFT SHOULDER: Primary | ICD-10-CM

## 2018-08-06 PROCEDURE — 97112 NEUROMUSCULAR REEDUCATION: CPT

## 2018-08-06 PROCEDURE — 97140 MANUAL THERAPY 1/> REGIONS: CPT

## 2018-08-06 PROCEDURE — 97110 THERAPEUTIC EXERCISES: CPT

## 2018-08-06 NOTE — PROGRESS NOTES
Daily Note     Today's date: 2018  Patient name: Cyndi Crigler  : 1955  MRN: [id number]  Referring provider: Nirmal Iglesias MD  Dx:   Encounter Diagnosis     ICD-10-CM    1  Complete rotator cuff tear of left shoulder M75 122    2  Acute pain of left shoulder M25 512    3  Rotator cuff injury, left, subsequent encounter S46 002D    4  Complete tear of left rotator cuff M75 122                   Subjective:  Reports she has been able to increase activity level w/o pain  Objective: See treatment diary below    Manual  - 7- 7 8-2 8-6      PROM x x x x x x x      STM x x   x x       Grade V cervical mob to right  x                                         Exercise Diary              UBE 10' x x x x x x      pulley 30x x x x x D/C       TB: IR Black 30x x x x x HEP       ER Blue 30x x Black x x x HEP       robbers Red 30x  x x Green 30x HEP       Apollo ext 25# 30x 35#  30x x x x x x      Elbow curls 5# 30x x 6# 30x x x x x      Body blade 30"x2 x x x x x x      AROM flx, abd 1# 2#  3x10 x x x x x      Crate floor to chair 15# 5x np           Crate carry 15# 25'x10 20#  25'x10 Lift & carry x x x x x      FW bent row 5# 30x x 6#  30x x x x x                   Supine punch 5# 30x x 6# 30x x NT Table push up + 20x x      SL ER 4# 20x 4# 30x x 5# 20x x x X 30      SL flx 2# 20x 2# 30x x 3# 20x x x 30x      Prone flx 2# 30x 2# 30x x 3# 20x 25 X 30x x      Prone HABD 2# 30x 2# 30x x 3# 20x 25 X 30x x                                    Modalities              MH x    x                                  X=same as last time  Assessment: Tolerated treatment well  Patient would benefit from continued PT   Cont to have R sided spasm  Some end range discomfort with PROM L      Plan: Progress treament per protocol

## 2018-08-09 ENCOUNTER — OFFICE VISIT (OUTPATIENT)
Dept: PHYSICAL THERAPY | Facility: REHABILITATION | Age: 63
End: 2018-08-09
Payer: OTHER MISCELLANEOUS

## 2018-08-09 DIAGNOSIS — M75.122 COMPLETE TEAR OF LEFT ROTATOR CUFF: ICD-10-CM

## 2018-08-09 DIAGNOSIS — M75.122 COMPLETE ROTATOR CUFF TEAR OF LEFT SHOULDER: Primary | ICD-10-CM

## 2018-08-09 DIAGNOSIS — S46.002D ROTATOR CUFF INJURY, LEFT, SUBSEQUENT ENCOUNTER: ICD-10-CM

## 2018-08-09 DIAGNOSIS — M25.512 ACUTE PAIN OF LEFT SHOULDER: ICD-10-CM

## 2018-08-09 PROCEDURE — 97140 MANUAL THERAPY 1/> REGIONS: CPT

## 2018-08-09 PROCEDURE — 97110 THERAPEUTIC EXERCISES: CPT

## 2018-08-09 PROCEDURE — 97112 NEUROMUSCULAR REEDUCATION: CPT

## 2018-08-09 NOTE — PROGRESS NOTES
Daily Note     Today's date: 2018  Patient name: Deuce Sequeira  : 1955  MRN: [id number]  Referring provider: Celi Sabillon MD  Dx:   Encounter Diagnosis     ICD-10-CM    1  Complete rotator cuff tear of left shoulder M75 122    2  Acute pain of left shoulder M25 512    3  Rotator cuff injury, left, subsequent encounter S46 002D    4  Complete tear of left rotator cuff M75 122                   Subjective:  Reports she was pushing farhana on carboard boxes at work to compress them today, this made L shoulder sore  Objective: See treatment diary below    Manual  7 7- 7- 8-2 8-6 8-9     PROM x x x x x x x x     STM x x   x x  NT     Grade V cervical mob to right  x                                         Exercise Diary              UBE 10' x x x x x x x     pulley 30x x x x x D/C       TB: IR Black 30x x x x x HEP       ER Blue 30x x Black x x x HEP       robbers Red 30x  x x Green 30x HEP       Apollo ext 25# 30x 35#  30x x x x x x 20x     Elbow curls 5# 30x x 6# 30x x x x x 20x     Body blade 30"x2 x x x x x x 45"x2     AROM flx, abd 1# 2#  3x10 x x x x x 20x     Crate floor to chair 15# 5x np           Crate carry 15# 25'x10 20#  25'x10 Lift & carry x x x x x x     FW bent row 5# 30x x 6#  30x x x x x 20x                  Supine punch 5# 30x x 6# 30x x NT Table push up + 20x x x     SL ER 4# 20x 4# 30x x 5# 20x x x X 30 20     SL flx 2# 20x 2# 30x x 3# 20x x x 30x 20     Prone flx 2# 30x 2# 30x x 3# 20x 25 X 30x x 20     Prone HABD 2# 30x 2# 30x x 3# 20x 25 X 30x x 20                                   Modalities              MH x    x                                  X=same as last time  Assessment: Tolerated treatment well  Patient would benefit from continued PT  Decreased reps today as she came in right from work & is more sore than uausl      Plan: Progress treament per protocol

## 2018-08-13 ENCOUNTER — OFFICE VISIT (OUTPATIENT)
Dept: PHYSICAL THERAPY | Facility: REHABILITATION | Age: 63
End: 2018-08-13
Payer: OTHER MISCELLANEOUS

## 2018-08-13 DIAGNOSIS — M75.122 COMPLETE TEAR OF LEFT ROTATOR CUFF: ICD-10-CM

## 2018-08-13 DIAGNOSIS — M75.122 COMPLETE ROTATOR CUFF TEAR OF LEFT SHOULDER: Primary | ICD-10-CM

## 2018-08-13 DIAGNOSIS — S46.002D ROTATOR CUFF INJURY, LEFT, SUBSEQUENT ENCOUNTER: ICD-10-CM

## 2018-08-13 DIAGNOSIS — M25.512 ACUTE PAIN OF LEFT SHOULDER: ICD-10-CM

## 2018-08-13 PROCEDURE — 97140 MANUAL THERAPY 1/> REGIONS: CPT | Performed by: PHYSICAL THERAPIST

## 2018-08-13 PROCEDURE — 97112 NEUROMUSCULAR REEDUCATION: CPT | Performed by: PHYSICAL THERAPIST

## 2018-08-13 PROCEDURE — 97110 THERAPEUTIC EXERCISES: CPT | Performed by: PHYSICAL THERAPIST

## 2018-08-13 NOTE — PROGRESS NOTES
Daily Note     Today's date: 2018  Patient name: Raine Nichols  : 1955  MRN: [id number]  Referring provider: Maurizio Paulson MD  Dx:   Encounter Diagnosis     ICD-10-CM    1  Complete rotator cuff tear of left shoulder M75 122    2  Acute pain of left shoulder M25 512    3  Rotator cuff injury, left, subsequent encounter S46 002D    4  Complete tear of left rotator cuff M75 122                   Subjective:  Pt reports that she has had some soreness in her L  Forearm  Objective: See treatment diary below    Manual  - 7- 7- 8-2 8-6 8-9     PROM x x x x x x x x x    STM x x   x x  NT     Grade V cervical mob to right  x                                         Exercise Diary              UBE 10' x x x x x x x x    pulley 30x x x x x D/C       TB: IR Black 30x x x x x HEP       ER Blue 30x x Black x x x HEP       robbers Red 30x  x x Green 30x HEP       Apollo ext 25# 30x 35#  30x x x x x x 20x #45 x20    Elbow curls 5# 30x x 6# 30x x x x x 20x x    Body blade 30"x2 x x x x x x 45"x2 x    AROM flx, abd 1# 2#  3x10 x x x x x 20x x30    Crate floor to chair 15# 5x np           Crate carry 15# 25'x10 20#  25'x10 Lift & carry x x x x x x x    FW bent row 5# 30x x 6#  30x x x x x 20x x                 Supine punch 5# 30x x 6# 30x x NT Table push up + 20x x x x    SL ER 4# 20x 4# 30x x 5# 20x x x X 30 20 x    SL flx 2# 20x 2# 30x x 3# 20x x x 30x 20 x    Prone flx 2# 30x 2# 30x x 3# 20x 25 X 30x x 20 x    Prone HABD 2# 30x 2# 30x x 3# 20x 25 X 30x x 20 x                                  Modalities              MH x    x                                  X=same as last time  Assessment: Pt had UT substitution for flex and abd and required VC for correction  She was able to progress the apollo ext with no c/o of pain  Pt required frequent rest breaks t/o session  She was educated on the importance of no using UT substitution when she is at home  Pt verbalized understanding   Pt would benefit from skilled outpatient PT to address posture, pain, muscular endurance, and strength in order to maximize her level of function  Plan: Progress treament per protocol

## 2018-08-16 ENCOUNTER — OFFICE VISIT (OUTPATIENT)
Dept: PHYSICAL THERAPY | Facility: REHABILITATION | Age: 63
End: 2018-08-16
Payer: OTHER MISCELLANEOUS

## 2018-08-16 DIAGNOSIS — M25.512 ACUTE PAIN OF LEFT SHOULDER: ICD-10-CM

## 2018-08-16 DIAGNOSIS — M75.122 COMPLETE ROTATOR CUFF TEAR OF LEFT SHOULDER: Primary | ICD-10-CM

## 2018-08-16 DIAGNOSIS — M75.122 COMPLETE TEAR OF LEFT ROTATOR CUFF: ICD-10-CM

## 2018-08-16 DIAGNOSIS — S46.002D ROTATOR CUFF INJURY, LEFT, SUBSEQUENT ENCOUNTER: ICD-10-CM

## 2018-08-16 PROCEDURE — 97112 NEUROMUSCULAR REEDUCATION: CPT

## 2018-08-16 PROCEDURE — 97110 THERAPEUTIC EXERCISES: CPT

## 2018-08-16 PROCEDURE — 97140 MANUAL THERAPY 1/> REGIONS: CPT

## 2018-08-16 NOTE — PROGRESS NOTES
Daily Note     Today's date: 2018  Patient name: Chrissie Martino  : 1955  MRN: [id number]  Referring provider: Ronny Canela MD  Dx:   Encounter Diagnosis     ICD-10-CM    1  Complete rotator cuff tear of left shoulder M75 122    2  Acute pain of left shoulder M25 512    3  Rotator cuff injury, left, subsequent encounter S46 002D    4  Complete tear of left rotator cuff M75 122                   Subjective:  Pt  Reports she has been using L UE more at work  Reports she has not been getting  sore  Reports she has difficulty reaching out with arm at 79 with overextending it      Objective: See treatment diary below    Manual  7- 7- 7- 8-2 8-6 8--   PROM x x x x x x x x x x   STM x x   x x  NT     Grade V cervical mob to right  x                                         Exercise Diary              UBE 10' x x x x x x x x x   pulley 30x x x x x D/C       TB: IR Black 30x x x x x HEP       ER Blue 30x x Black x x x HEP       robbers Red 30x  x x Green 30x HEP       Apollo ext 25# 30x 35#  30x x x x x x 20x #45 x20 x   Elbow curls 5# 30x x 6# 30x x x x x 20x x x   Body blade 30"x2 x x x x x x 45"x2 x 3x   AROM flx, abd 1# 2#  3x10 x x x x x 20x x30 x   Crate floor to chair 15# 5x np           Crate carry 15# 25'x10 20#  25'x10 Lift & carry x x x x x x x x   FW bent row 5# 30x x 6#  30x x x x x 20x x x30x                Supine punch 5# 30x x 6# 30x x NT Table push up + 20x x x x x   SL ER 4# 20x 4# 30x x 5# 20x x x X 30 20 x 30x   SL flx 2# 20x 2# 30x x 3# 20x x x 30x 20 x 30x   Prone flx 2# 30x 2# 30x x 3# 20x 25 X 30x x 20 x X 30x   Prone HABD 2# 30x 2# 30x x 3# 20x 25 X 30x x 20 x 1# small range                                 Modalities              MH x    x                                  X=same as last time  Assessment:   Changed prone abd to work in smaller range to get arm fully abducted  Some cuing yet for UT hiking        Plan: Progress treament per protocol

## 2018-08-20 ENCOUNTER — OFFICE VISIT (OUTPATIENT)
Dept: PHYSICAL THERAPY | Facility: REHABILITATION | Age: 63
End: 2018-08-20
Payer: OTHER MISCELLANEOUS

## 2018-08-20 DIAGNOSIS — M75.122 COMPLETE ROTATOR CUFF TEAR OF LEFT SHOULDER: Primary | ICD-10-CM

## 2018-08-20 DIAGNOSIS — S46.002D ROTATOR CUFF INJURY, LEFT, SUBSEQUENT ENCOUNTER: ICD-10-CM

## 2018-08-20 DIAGNOSIS — M25.512 ACUTE PAIN OF LEFT SHOULDER: ICD-10-CM

## 2018-08-20 PROCEDURE — 97140 MANUAL THERAPY 1/> REGIONS: CPT | Performed by: PHYSICAL THERAPIST

## 2018-08-20 PROCEDURE — 97110 THERAPEUTIC EXERCISES: CPT | Performed by: PHYSICAL THERAPIST

## 2018-08-20 PROCEDURE — 97112 NEUROMUSCULAR REEDUCATION: CPT | Performed by: PHYSICAL THERAPIST

## 2018-08-20 NOTE — PROGRESS NOTES
Daily Note     Today's date: 2018  Patient name: Afsaneh Glasgow  : 1955  MRN: [id number]  Referring provider: Yady Godoy MD  Dx:   Encounter Diagnosis     ICD-10-CM    1  Complete rotator cuff tear of left shoulder M75 122    2  Acute pain of left shoulder M25 512    3  Rotator cuff injury, left, subsequent encounter S46 002D                   Subjective:  Pt reports that she was able to take boxes off a conveyor belt yesterday and was not sore afterward  They were not very heavy       Objective: See treatment diary below    Manual  7- 7- 7- 8-2 8-6 8-9  8-16   PROM x x x x x x x x x x   STM x x   x x  NT     Grade V cervical mob to right  x                                         Exercise Diary              UBE 10' x x x x x x x x x   pulley 30x x x x x D/C       TB: IR Black 30x x x x x HEP       ER Blue 30x x Black x x x HEP       robbers Red 30x  x x Green 30x HEP       Apollo ext 25# 30x 35#  30x x x x x x 20x #45 x20 x   Elbow curls 5# 30x x 6# 30x x x x x 20x x x   Body blade 30"x2 x x x x x x 45"x2 x 3x   AROM flx, abd 1# 2#  3x10 x x x x x 20x x30 x   Crate floor to chair 15# 5x np           Crate carry 15# 25'x10 20#  25'x10 Lift & carry x x x x x x x x   FW bent row 5# 30x x 6#  30x x x x x 20x x x30x                Supine punch 5# 30x x 6# 30x x NT Table push up + 20x x x x x   SL ER 4# 20x 4# 30x x 5# 20x x x X 30 20 x 30x   SL flx 2# 20x 2# 30x x 3# 20x x x 30x 20 x 30x   Prone flx 2# 30x 2# 30x x 3# 20x 25 X 30x x 20 x X 30x   Prone HABD 2# 30x 2# 30x x 3# 20x 25 X 30x x 20 x 1# small range                                 Modalities              MH x    x                                  X=same as last time      Daily Treatment Diary     Manual              PROM 8 min            STM Brief                                                       Exercise Diary              Apollo ext 45# 30x            Elbow curls 7#30x            Fwd bent row 7# 30x Body blade 3x45"            UBE 10'            Crate lift and carry 20# and put on 38" table 6x            AROM flex/abd 2# 30x            Table push up             Prone flex/abd Flex 3#  Abd 30x, 3# 10 and 1# 20 ER            SL ER 5# 30x            SL flex 3# 30x                                                                                                                                     Modalities                                                             Assessment:  Pt is demonstrating improved strength in the higher ranges, 4- to 4  Struggled a little putting the crate on the higher surface but was able to do it  Plan: Progress treament per protocol

## 2018-08-23 ENCOUNTER — OFFICE VISIT (OUTPATIENT)
Dept: PHYSICAL THERAPY | Facility: REHABILITATION | Age: 63
End: 2018-08-23
Payer: OTHER MISCELLANEOUS

## 2018-08-23 DIAGNOSIS — M75.122 COMPLETE ROTATOR CUFF TEAR OF LEFT SHOULDER: Primary | ICD-10-CM

## 2018-08-23 DIAGNOSIS — M25.512 ACUTE PAIN OF LEFT SHOULDER: ICD-10-CM

## 2018-08-23 DIAGNOSIS — M75.122 COMPLETE TEAR OF LEFT ROTATOR CUFF: ICD-10-CM

## 2018-08-23 DIAGNOSIS — S46.002D ROTATOR CUFF INJURY, LEFT, SUBSEQUENT ENCOUNTER: ICD-10-CM

## 2018-08-23 PROCEDURE — 97140 MANUAL THERAPY 1/> REGIONS: CPT

## 2018-08-23 PROCEDURE — 97112 NEUROMUSCULAR REEDUCATION: CPT

## 2018-08-23 PROCEDURE — 97110 THERAPEUTIC EXERCISES: CPT

## 2018-08-23 NOTE — PROGRESS NOTES
Daily Note     Today's date: 2018  Patient name: Melida Todd  : 1955  MRN: [id number]  Referring provider: Peyton Landis MD  Dx:   Encounter Diagnosis     ICD-10-CM    1  Complete rotator cuff tear of left shoulder M75 122    2  Acute pain of left shoulder M25 512    3  Rotator cuff injury, left, subsequent encounter S46 002D    4  Complete tear of left rotator cuff M75 122                   Subjective:  Has been using arm more at work with minimal soreness      Objective: See treatment diary below    Manual   8- 8-6 8-   PROM x x x x x x x x x x   STM x x   x x  NT     Grade V cervical mob to right  x                                         Exercise Diary              UBE 10' x x x x x x x x x   pulley 30x x x x x D/C       TB: IR Black 30x x x x x HEP       ER Blue 30x x Black x x x HEP       robbers Red 30x  x x Green 30x HEP       Apollo ext 25# 30x 35#  30x x x x x x 20x #45 x20 x   Elbow curls 5# 30x x 6# 30x x x x x 20x x x   Body blade 30"x2 x x x x x x 45"x2 x 3x   AROM flx, abd 1# 2#  3x10 x x x x x 20x x30 x   Crate floor to chair 15# 5x np           Crate carry 15# 25'x10 20#  25'x10 Lift & carry x x x x x x x x   FW bent row 5# 30x x 6#  30x x x x x 20x x x30x                Supine punch 5# 30x x 6# 30x x NT Table push up + 20x x x x x   SL ER 4# 20x 4# 30x x 5# 20x x x X 30 20 x 30x   SL flx 2# 20x 2# 30x x 3# 20x x x 30x 20 x 30x   Prone flx 2# 30x 2# 30x x 3# 20x 25 X 30x x 20 x X 30x   Prone HABD 2# 30x 2# 30x x 3# 20x 25 X 30x x 20 x 1# small range                                 Modalities              MH x    x                                  X=same as last time      Daily Treatment Diary     Manual             PROM 8 min x           STM Brief                                                       Exercise Diary              Apollo ext 45# 30x X  20x           Elbow curls 7#30x x           Fwd bent row 7# 30x x           Body blade 3x45" x           UBE 10' x           Crate lift and carry 20# and put on 38" table 6x X 10x           AROM flex/abd 2# 30x x           Table push up  20           Prone flex/abd Flex 3#  Abd 30x, 3# 10 and 1# 20 ER x           SL ER 5# 30x x           SL flex 3# 30x x           Shelf lift  To 2nd shelf 3# x10                                                                                                                       Modalities                                                             Assessment:  Worked on lifting in higher range, this was challenging        Plan: Progress treament per protocol

## 2018-08-27 ENCOUNTER — APPOINTMENT (OUTPATIENT)
Dept: PHYSICAL THERAPY | Facility: REHABILITATION | Age: 63
End: 2018-08-27
Payer: OTHER MISCELLANEOUS

## 2018-08-28 ENCOUNTER — OFFICE VISIT (OUTPATIENT)
Dept: OBGYN CLINIC | Facility: MEDICAL CENTER | Age: 63
End: 2018-08-28
Payer: OTHER MISCELLANEOUS

## 2018-08-28 VITALS
DIASTOLIC BLOOD PRESSURE: 76 MMHG | BODY MASS INDEX: 41.03 KG/M2 | WEIGHT: 209 LBS | HEART RATE: 71 BPM | SYSTOLIC BLOOD PRESSURE: 113 MMHG | HEIGHT: 60 IN

## 2018-08-28 DIAGNOSIS — M75.122 COMPLETE ROTATOR CUFF TEAR OF LEFT SHOULDER: Primary | ICD-10-CM

## 2018-08-28 PROCEDURE — 99213 OFFICE O/P EST LOW 20 MIN: CPT | Performed by: ORTHOPAEDIC SURGERY

## 2018-08-28 NOTE — PROGRESS NOTES
Orthopaedic Surgery - Office Note  Harika Braxton (63 y o  female)   : 1955   MRN: [id number]  Encounter Date: 2018    Chief Complaint   Patient presents with    Left Shoulder - Follow-up     Assessment / Plan  S/p L shoulder arthroscopy with supraspinatus and subscapularis repairs, SAD and open LHB tenodesis on 2018    · Patient will progress to home physical therapy at this time  · Patient may start working full duty without restriction at this time  · Continue with ice and analgesics as needed  Return if symptoms worsen or fail to improve  History of Present Illness  Harika Braxton is a 60 y o  female who presents S/p L shoulder arthroscopy with supraspinatus and subscapularis repairs, SAD and open LHB tenodesis on 2018  Patient is continue to advance her activity and is tolerating everything well  She currently has been tolerating 20 lb work restriction without a problem and feels she could return back to work full duty without restriction  She continues to ice as needed and occasionally takes Tylenol if sore after physical therapy  Review of Systems  Pertinent items are noted in HPI  All other systems were reviewed and are negative  Physical Exam  /76   Pulse 71   Ht 5' (1 524 m)   Wt 94 8 kg (209 lb)   BMI 40 82 kg/m²   Cons: Appears well  No apparent distress  Psych: Alert  Oriented x3  Mood and affect normal   Eyes: PERRLA, EOMI  Resp: Normal effort  No audible wheezing or stridor  CV: Palpable pulse  No discernable arrhythmia  No LE edema  Lymph:  No palpable cervical, axillary, or inguinal lymphadenopathy  Skin: Warm  No palpable masses  No visible lesions  Neuro: Normal muscle tone  Normal and symmetric DTR's  Left Shoulder Exam  Alignment / Posture:  Normal shoulder posture  Normal scapular position  Inspection:  No swelling  Incision healed  Palpation:  No tenderness  ROM:  Shoulder °  Shoulder ER 80  Shoulder IR T8    Strength: Supraspinatus 4+/5  Infraspinatus 4+/5  Subscapularis 5/5  Stability:  No objective shoulder instability  Tests: (-) Shagufta Leija  (-) Michael  (-) Painful arc  (-) Belly press  Neurovascular:  Sensation intact in Ax/R/M/U nerve distributions  2+ radial pulse  Studies Reviewed  No studies to review    Procedures  No procedures today  Medical, Surgical, Family, and Social History  The patient's medical history, family history, and social history, were reviewed and updated as appropriate  Past Medical History:   Diagnosis Date    Arthritis     Cataract     ashley    Depression     Fluid retention     Headache, acute     Heart murmur     Hyperlipidemia     Hypertension     PONV (postoperative nausea and vomiting)     Rotator cuff tear, left     Wears glasses     Wears partial dentures     upper       Past Surgical History:   Procedure Laterality Date    ABDOMINAL ADHESION SURGERY      ANKLE SURGERY Right     tendon tear    APPENDECTOMY      CARPAL TUNNEL RELEASE Bilateral     CARPAL TUNNEL RELEASE Left     CARPAL TUNNEL RELEASE Right     COLONOSCOPY      HAND SURGERY Right     ganglion cyst    HEMORROIDECTOMY      HYSTERECTOMY      KNEE ARTHROSCOPY Left     OVARIAN CYST REMOVAL      NE SHLDR ARTHROSCOP,SURG,W/ROTAT CUFF REPR Left 2/19/2018    Procedure: ARTHROSCOPIC REPAIR ROTATOR CUFF,  SAD, BICEP TENODESIS;  Surgeon: Leelee Cardona MD;  Location: [facility] OR;  Service: Orthopedics    REPAIR RECTOCELE      TONSILLECTOMY      TUBAL LIGATION         Family History   Problem Relation Age of Onset    Hypertension Mother     Cancer Mother     Lung cancer Father     Thyroid disease Sister     Depression Sister     Cancer Brother     ROBERT disease Brother        Social History     Occupational History    Not on file       Social History Main Topics    Smoking status: Never Smoker    Smokeless tobacco: Never Used    Alcohol use Yes      Comment: few x year    Drug use: No    Sexual activity: Not on file       Allergies   Allergen Reactions    Iodine Hives     IV IODINE    Keflex [Cephalexin] Other (See Comments)     Mouth sores    Morphine And Related Hives     IV MORPINE    Penicillins Hives    Clindamycin Other (See Comments)     Pt unsure    Erythromycin Hives         Current Outpatient Prescriptions:     acetaminophen (TYLENOL) 500 mg tablet, Take 500-1,000 mg by mouth every 6 (six) hours as needed for mild pain, Disp: , Rfl:     citalopram (CeleXA) 40 mg tablet, Take 40 mg by mouth every evening  , Disp: , Rfl: 1    enalapril (VASOTEC) 5 mg tablet, Take 5 mg by mouth every evening  , Disp: , Rfl:     furosemide (LASIX) 40 mg tablet, Take 40 mg by mouth daily as needed  , Disp: , Rfl:     naproxen (NAPROSYN) 500 mg tablet, Take 1 tablet (500 mg total) by mouth 2 (two) times a day with meals, Disp: 60 tablet, Rfl: 0      Reji Tam PA-C    Scribe Attestation    I,:    am acting as a scribe while in the presence of the attending physician :        I,:    personally performed the services described in this documentation    as scribed in my presence :

## 2018-08-28 NOTE — LETTER
August 28, 2018     Patient: Olga Lazcano   YOB: 1955   Date of Visit: 8/28/2018       To Whom it May Concern:    Leopoldo Cove is under my professional care  She was seen in my office on 8/28/2018  She may return to work at full duty with no restriction at this time  If you have any questions or concerns, please don't hesitate to call           Sincerely,          William Che MD        CC: Olga Lazcano

## 2018-08-30 ENCOUNTER — APPOINTMENT (OUTPATIENT)
Dept: PHYSICAL THERAPY | Facility: REHABILITATION | Age: 63
End: 2018-08-30
Payer: OTHER MISCELLANEOUS

## 2019-01-25 ENCOUNTER — TRANSCRIBE ORDERS (OUTPATIENT)
Dept: ADMINISTRATIVE | Facility: HOSPITAL | Age: 64
End: 2019-01-25

## 2019-01-25 DIAGNOSIS — M25.572 ACUTE LEFT ANKLE PAIN: Primary | ICD-10-CM

## 2019-01-25 DIAGNOSIS — M54.16 LUMBAR RADICULOPATHY: ICD-10-CM

## 2019-03-18 ENCOUNTER — HOSPITAL ENCOUNTER (OUTPATIENT)
Dept: NEUROLOGY | Facility: CLINIC | Age: 64
Discharge: HOME | End: 2019-03-18
Payer: COMMERCIAL

## 2019-03-18 DIAGNOSIS — M25.572 ACUTE LEFT ANKLE PAIN: ICD-10-CM

## 2019-03-18 DIAGNOSIS — M54.16 LUMBAR RADICULOPATHY: ICD-10-CM

## 2019-03-18 PROCEDURE — 95886 MUSC TEST DONE W/N TEST COMP: CPT | Performed by: PHYSICAL MEDICINE & REHABILITATION

## 2019-03-18 PROCEDURE — 95909 NRV CNDJ TST 5-6 STUDIES: CPT | Performed by: PHYSICAL MEDICINE & REHABILITATION

## 2019-03-18 NOTE — PROCEDURES
Courtney Gu   [facility] [address], [address]   [phone]        Name: Luis Maciel  Patient ID: [id number]   Age: 58 Years 10 Months  YOB: 1955   Account Number:    Gender: Female   Technologist:    Date of Exam: 3/18/2019 08:02   Referring Physician: Rachel Ortega PA-C  Temperature: 28   Examining Physician: Steve Granger MD  Height:                 Patient History:   58 y o female wtih 1 year history of left foot/ankle pain  It radiates to knee and occasionally hip  She endorses back pain, nonradiating  Referred for lumbar radiculopathy evaluation  5/5 bilateral LE throughout         [address]      Nerve / Sites Muscle Latency Ref  Amplitude Ref  Duration Rel Amp Distance Lat Diff Velocity Ref  ms ms mV mV ms % mm ms m/s m/s   L Peroneal - EDB      Ankle EDB 4 38 ?5 50 5 0 ?2 0 5 68 100 80         Fib head EDB 9 64  4 6  5 89 91 1 240 5 26 46 ?40      Pop fossa EDB 11 56  3 6  6 09 78 8 90 1 93 47 ?40            7 19     L Tibial - AH      Ankle AH 5 83 ?6 00 5 5 ?4 0 6 09 100 100         Pop fossa AH 12 29  4 9  6 82 88 6 290 6 46 45 ?40       SNC      Nerve / Sites Rec  Site Onset Lat Peak Lat Ref  Amp Ref  Distance Velocity Ref  ms ms ms µV µV mm m/s m/s   L Sural - Ankle (Calf)      Calf Ankle 2 81 3 65 ?4 00 7 5 ?6 0 140 50 ?40   L Superficial peroneal - Ankle      Lat leg Ankle 2 71 3 23 ?3 50 7 1 ?6 0 100 37 ?40       H Reflex      Nerve H Lat    ms    Left Right Ref  Tibial - Sole[place] 28 8  ? 34 0       EMG         Needle EMG Examination     Insertional Spontaneous MUAP   Muscle Nerve Roots Activity Fib PSW Fasc Dur  Amp Poly Config Recruitment   L  Vastus medialis Femoral L2-L4 Normal None None None Normal Normal None Normal Normal   L  Tibialis anterior Peroneal L4-L5 Normal None None None Normal Normal None Normal Normal   L  Gastrocnemius (Medial head) Tibial S1-S2 Normal None None None Normal Normal None Normal Normal   L   Peroneus longus Peroneal L5-S1 Normal None None None Normal Normal None Normal Normal   L  Gluteus medius Superior gluteal L4-S1 Normal None None None Normal Normal None Normal Normal   L  Biceps femoris (short head) Sciatic (peroneal division) L5-S2 Normal None None None Normal Normal None Normal Normal         Findings:   Motor:  Left peroneal compound motor action potential (CMAP) to the extensor digitorum brevis demonstrated normal distal latency, normal amplitude, and normal conduction velocity across the fibular head and lower leg  Left tibial compound motor action potential (CMAP) to the abductor hallucis demonstrated normal distal latency, normal amplitude, and normal conduction velocity across the lower leg  Sensory:  Left sural sensory nerve action potential (SNAP) demonstrated normal peak latency and normal amplitude  Left superficial peroneal sensory nerve action potential (SNAP) demonstrated normal peak latency and normal amplitude  Left tibial H-reflex demonstrated normal latency  EMG:  A disposable monopolar needle was used to study selected muscles in the left lower extremity including the tibialis anterior, medial gastrocnemius, peroneus longus, vastus medialis, and gluteus medius  All muscles tested demonstrated normal insertional activity, no abnormal spontaneous activity, and normal volitional motor unit action potentials  Impression: Normal study  1  There is no electrodiagnostic evidence of a left tibial, peroneal, or sural mononeuropathy  2  There is no electrodiagnostic evidence of a left lumbosacral plexopathy or lumbar radiculopathy  3  There is no electrodiagnostic evidence of a large fiber peripheral polyneuropathy               ___________________________  Felicia Hurt MD

## 2019-03-19 ENCOUNTER — APPOINTMENT (OUTPATIENT)
Dept: URGENT CARE | Age: 64
End: 2019-03-19
Payer: OTHER MISCELLANEOUS

## 2019-03-19 PROCEDURE — 99282 EMERGENCY DEPT VISIT SF MDM: CPT

## 2019-03-19 PROCEDURE — G0381 LEV 2 HOSP TYPE B ED VISIT: HCPCS

## 2019-03-20 ENCOUNTER — APPOINTMENT (OUTPATIENT)
Dept: URGENT CARE | Age: 64
End: 2019-03-20
Payer: OTHER MISCELLANEOUS

## 2019-03-20 PROCEDURE — 99213 OFFICE O/P EST LOW 20 MIN: CPT | Performed by: PHYSICIAN ASSISTANT

## 2019-07-20 ENCOUNTER — HOSPITAL ENCOUNTER (EMERGENCY)
Facility: HOSPITAL | Age: 64
Discharge: HOME | End: 2019-07-20
Attending: EMERGENCY MEDICINE | Admitting: EMERGENCY MEDICINE
Payer: COMMERCIAL

## 2019-07-20 VITALS
OXYGEN SATURATION: 94 % | TEMPERATURE: 97.3 F | BODY MASS INDEX: 38.06 KG/M2 | DIASTOLIC BLOOD PRESSURE: 55 MMHG | HEART RATE: 77 BPM | RESPIRATION RATE: 18 BRPM | WEIGHT: 194.89 LBS | SYSTOLIC BLOOD PRESSURE: 110 MMHG

## 2019-07-20 DIAGNOSIS — R53.1 WEAKNESS: Primary | ICD-10-CM

## 2019-07-20 LAB
ANION GAP SERPL CALCULATED.3IONS-SCNC: 8 MMOL/L (ref 4–13)
BASOPHILS # BLD AUTO: 0.06 THOUSANDS/ΜL (ref 0–0.1)
BASOPHILS NFR BLD AUTO: 1 % (ref 0–1)
BUN SERPL-MCNC: 17 MG/DL (ref 5–25)
CALCIUM SERPL-MCNC: 9.5 MG/DL (ref 8.3–10.1)
CHLORIDE SERPL-SCNC: 104 MMOL/L (ref 100–108)
CO2 SERPL-SCNC: 28 MMOL/L (ref 21–32)
CREAT SERPL-MCNC: 0.83 MG/DL (ref 0.6–1.3)
EOSINOPHIL # BLD AUTO: 0.28 THOUSAND/ΜL (ref 0–0.61)
EOSINOPHIL NFR BLD AUTO: 4 % (ref 0–6)
ERYTHROCYTE [DISTWIDTH] IN BLOOD BY AUTOMATED COUNT: 12.9 % (ref 11.6–15.1)
GFR SERPL CREATININE-BSD FRML MDRD: 75 ML/MIN/1.73SQ M
GLUCOSE SERPL-MCNC: 123 MG/DL (ref 65–140)
HCT VFR BLD AUTO: 41.2 % (ref 34.8–46.1)
HGB BLD-MCNC: 13.6 G/DL (ref 11.5–15.4)
IMM GRANULOCYTES # BLD AUTO: 0.03 THOUSAND/UL (ref 0–0.2)
IMM GRANULOCYTES NFR BLD AUTO: 0 % (ref 0–2)
LYMPHOCYTES # BLD AUTO: 1.82 THOUSANDS/ΜL (ref 0.6–4.47)
LYMPHOCYTES NFR BLD AUTO: 26 % (ref 14–44)
MCH RBC QN AUTO: 28.6 PG (ref 26.8–34.3)
MCHC RBC AUTO-ENTMCNC: 33 G/DL (ref 31.4–37.4)
MCV RBC AUTO: 87 FL (ref 82–98)
MONOCYTES # BLD AUTO: 0.56 THOUSAND/ΜL (ref 0.17–1.22)
MONOCYTES NFR BLD AUTO: 8 % (ref 4–12)
NEUTROPHILS # BLD AUTO: 4.15 THOUSANDS/ΜL (ref 1.85–7.62)
NEUTS SEG NFR BLD AUTO: 61 % (ref 43–75)
NRBC BLD AUTO-RTO: 0 /100 WBCS
PLATELET # BLD AUTO: 285 THOUSANDS/UL (ref 149–390)
PMV BLD AUTO: 9.5 FL (ref 8.9–12.7)
POTASSIUM SERPL-SCNC: 3.7 MMOL/L (ref 3.5–5.3)
RBC # BLD AUTO: 4.75 MILLION/UL (ref 3.81–5.12)
SODIUM SERPL-SCNC: 140 MMOL/L (ref 136–145)
WBC # BLD AUTO: 6.9 THOUSAND/UL (ref 4.31–10.16)

## 2019-07-20 PROCEDURE — 99283 EMERGENCY DEPT VISIT LOW MDM: CPT | Performed by: EMERGENCY MEDICINE

## 2019-07-20 PROCEDURE — 93005 ELECTROCARDIOGRAM TRACING: CPT

## 2019-07-20 PROCEDURE — 85025 COMPLETE CBC W/AUTO DIFF WBC: CPT | Performed by: EMERGENCY MEDICINE

## 2019-07-20 PROCEDURE — 80048 BASIC METABOLIC PNL TOTAL CA: CPT | Performed by: EMERGENCY MEDICINE

## 2019-07-20 PROCEDURE — 36415 COLL VENOUS BLD VENIPUNCTURE: CPT | Performed by: EMERGENCY MEDICINE

## 2019-07-20 PROCEDURE — 99283 EMERGENCY DEPT VISIT LOW MDM: CPT

## 2019-07-20 RX ADMIN — SODIUM CHLORIDE 1000 ML: 0.9 INJECTION, SOLUTION INTRAVENOUS at 21:53

## 2019-07-21 LAB
ATRIAL RATE: 67 BPM
P AXIS: 67 DEGREES
PR INTERVAL: 230 MS
QRS AXIS: 6 DEGREES
QRSD INTERVAL: 92 MS
QT INTERVAL: 408 MS
QTC INTERVAL: 431 MS
T WAVE AXIS: 42 DEGREES
VENTRICULAR RATE: 67 BPM

## 2019-07-21 PROCEDURE — 93010 ELECTROCARDIOGRAM REPORT: CPT | Performed by: INTERNAL MEDICINE

## 2019-07-21 NOTE — ED PROVIDER NOTES
History  Chief Complaint   Patient presents with    Fatigue     reports fatigue, nausea, body aches started today  also reports decreased urinary output  pt also reports HA     50-year-old female, by her niece  The patient said she did not sleep very well last night, woke up at 3:15 a m  And then again at 6:15 a m  Syble Brian She then overslept for work  She says when she awoke later this morning, she felt tired, fatigued and run down  She went to work, was in a hot environment, drank 32 oz of liquid only urinated once or twice  She says since awaking this morning she has been feeling, tired, fatigued, rundown, generalized body aches, some occasional gradual-onset headache  No analgesic was some headaches at home  No recent her neck infections, no recent head or neck surgeries  No trauma  No dysuria, no hematuria, no abdominal pain with some occasional nausea earlier which has resolved  No one else sick with similar symptoms, no diarrhea or constipation  Prior to Admission Medications   Prescriptions Last Dose Informant Patient Reported?  Taking?   acetaminophen (TYLENOL) 500 mg tablet  Self Yes Yes   Sig: Take 500-1,000 mg by mouth every 6 (six) hours as needed for mild pain   citalopram (CeleXA) 40 mg tablet  Self Yes Yes   Sig: Take 40 mg by mouth every evening     enalapril (VASOTEC) 5 mg tablet  Self Yes Yes   Sig: Take 5 mg by mouth every evening     furosemide (LASIX) 40 mg tablet  Self Yes Yes   Sig: Take 40 mg by mouth daily as needed        Facility-Administered Medications: None       Past Medical History:   Diagnosis Date    Arthritis     Cataract     ashley    Depression     Fluid retention     Headache, acute     Heart murmur     Hyperlipidemia     Hypertension     PONV (postoperative nausea and vomiting)     Rotator cuff tear, left     Wears glasses     Wears partial dentures     upper       Past Surgical History:   Procedure Laterality Date    ABDOMINAL ADHESION SURGERY      ANKLE SURGERY Right     tendon tear    APPENDECTOMY      CARPAL TUNNEL RELEASE Bilateral     CARPAL TUNNEL RELEASE Left     CARPAL TUNNEL RELEASE Right     COLONOSCOPY      HAND SURGERY Right     ganglion cyst    HEMORROIDECTOMY      HYSTERECTOMY      ILEOSTOMY      KNEE ARTHROSCOPY Left     OVARIAN CYST REMOVAL      IA SHLDR ARTHROSCOP,SURG,W/ROTAT CUFF REPR Left 2/19/2018    Procedure: ARTHROSCOPIC REPAIR ROTATOR CUFF,  SAD, BICEP TENODESIS;  Surgeon: Elly Ford MD;  Location: [facility];  Service: Orthopedics    REPAIR RECTOCELE      TONSILLECTOMY      TUBAL LIGATION         Family History   Problem Relation Age of Onset    Hypertension Mother     Cancer Mother     Lung cancer Father     Thyroid disease Sister     Depression Sister     Cancer Brother     ROBERT disease Brother      I have reviewed and agree with the history as documented  Social History     Tobacco Use    Smoking status: Never Smoker    Smokeless tobacco: Never Used   Substance Use Topics    Alcohol use: Yes     Comment: few x year    Drug use: No        Review of Systems   Constitutional: Negative for unexpected weight change  HENT: Negative for hearing loss and tinnitus  Eyes: Negative for visual disturbance  Respiratory: Negative for cough and shortness of breath  Cardiovascular: Negative for chest pain and palpitations  Gastrointestinal:        As per HPI   Genitourinary: Negative for dysuria and hematuria  Musculoskeletal:        As per HPI   Skin: Negative for rash  Neurological: Positive for weakness  Hematological: Does not bruise/bleed easily  Psychiatric/Behavioral: Negative for dysphoric mood  Physical Exam  Physical Exam   Constitutional: She is oriented to person, place, and time  No distress  HENT:   Head: Normocephalic  Eyes: Pupils are equal, round, and reactive to light  Conjunctivae and EOM are normal    Neck: Neck supple     Cardiovascular: Regular rhythm and normal heart sounds  Pulmonary/Chest: Breath sounds normal  No respiratory distress  She has no wheezes  She has no rales  Abdominal: Soft  Bowel sounds are normal  She exhibits no distension  There is no tenderness  There is no rebound and no guarding  Musculoskeletal: Normal range of motion  Neurological: She is alert and oriented to person, place, and time  No sensory deficit  Awake alert and oriented x3, no slurred speech, no facial droop, cranial nerves 2-12 are intact, no deficit on finger-to-nose testing, no pronator drift, normal sensation to light touch throughout the entire body, strength is 5/5 in the bilateral upper and lower extremities   Skin: Skin is warm and dry  No rash noted     Psychiatric: Her behavior is normal        Vital Signs  ED Triage Vitals   Temperature Pulse Respirations Blood Pressure SpO2   07/20/19 2057 07/20/19 2057 07/20/19 2057 07/20/19 2057 07/20/19 2057   (!) 97 3 °F (36 3 °C) 78 16 151/70 96 %      Temp Source Heart Rate Source Patient Position - Orthostatic VS BP Location FiO2 (%)   07/20/19 2057 07/20/19 2259 07/20/19 2057 07/20/19 2057 --   Temporal Monitor Sitting Right arm       Pain Score       07/20/19 2057       5           Vitals:    07/20/19 2057 07/20/19 2259   BP: 151/70 110/55   Pulse: 78 77   Patient Position - Orthostatic VS: Sitting Lying         Visual Acuity      ED Medications  Medications   sodium chloride 0 9 % bolus 1,000 mL (0 mL Intravenous Stopped 7/20/19 2223)       Diagnostic Studies  Results Reviewed     Procedure Component Value Units Date/Time    Basic metabolic panel [[id number]] Collected:  07/20/19 2151    Lab Status:  Final result Specimen:  Blood from Arm, Right Updated:  07/20/19 2230     Sodium 140 mmol/L      Potassium 3 7 mmol/L      Chloride 104 mmol/L      CO2 28 mmol/L      ANION GAP 8 mmol/L      BUN 17 mg/dL      Creatinine 0 83 mg/dL      Glucose 123 mg/dL      Calcium 9 5 mg/dL      eGFR 75 ml/min/1 73sq m     Narrative:       Consolidated [employer] guidelines for Chronic Kidney Disease (CKD):     Stage 1 with normal or high GFR (GFR > 90 mL/min/1 73 square meters)    Stage 2 Mild CKD (GFR = 60-89 mL/min/1 73 square meters)    Stage 3A Moderate CKD (GFR = 45-59 mL/min/1 73 square meters)    Stage 3B Moderate CKD (GFR = 30-44 mL/min/1 73 square meters)    Stage 4 Severe CKD (GFR = 15-29 mL/min/1 73 square meters)    Stage 5 End Stage CKD (GFR <15 mL/min/1 73 square meters)  Note: GFR calculation is accurate only with a steady state creatinine    CBC and differential [[id number]] Collected:  07/20/19 2151    Lab Status:  Final result Specimen:  Blood from Arm, Right Updated:  07/20/19 2203     WBC 6 90 Thousand/uL      RBC 4 75 Million/uL      Hemoglobin 13 6 g/dL      Hematocrit 41 2 %      MCV 87 fL      MCH 28 6 pg      MCHC 33 0 g/dL      RDW 12 9 %      MPV 9 5 fL      Platelets 445 Thousands/uL      nRBC 0 /100 WBCs      Neutrophils Relative 61 %      Immat GRANS % 0 %      Lymphocytes Relative 26 %      Monocytes Relative 8 %      Eosinophils Relative 4 %      Basophils Relative 1 %      Neutrophils Absolute 4 15 Thousands/µL      Immature Grans Absolute 0 03 Thousand/uL      Lymphocytes Absolute 1 82 Thousands/µL      Monocytes Absolute 0 56 Thousand/µL      Eosinophils Absolute 0 28 Thousand/µL      Basophils Absolute 0 06 Thousands/µL                  No orders to display              Procedures  Procedures       ED Course                               MDM  Number of Diagnoses or Management Options  Weakness:   Diagnosis management comments: On reassessment the patient was feeling the same  Patient has no focal weakness, no acute lab abnormalities which would explain the weakness  While the cause of the patient's complaints is most likely benign, it is possible that this is the early presentation of a more serious condition   I did discuss this diagnostic uncertainty with the patient, the importance of follow up care, as well as the need to return to immediately return to the closest emergency department for the concerning signs and symptoms listed in the discharge instructions  The patient stated they were aware of this diagnostic uncertainty, understood the importance of follow up and were comfortable being discharged  I believe that discharge home with outpatient follow up for further evaluation is medically appropriate  I discussed supportive care, importance of follow-up and return precautions  Patient expressed understanding  Amount and/or Complexity of Data Reviewed  Clinical lab tests: ordered and reviewed  Tests in the medicine section of CPT®: ordered and reviewed        Disposition  Final diagnoses:   Weakness     Time reflects when diagnosis was documented in both MDM as applicable and the Disposition within this note     Time User Action Codes Description Comment    7/20/2019 10:54 PM Consuelo Leidestiny Add [R53 1] Weakness       ED Disposition     ED Disposition Condition Date/Time Comment    Discharge Stable Sat Jul 20, 2019 10:54 PM Doug Morrison discharge to home/self care  Follow-up Information     Follow up With Specialties Details Why Contact Info    Barbara Soto DO Family Medicine Schedule an appointment as soon as possible for a visit in 2 days  [phone]            Discharge Medication List as of 7/20/2019 10:57 PM      CONTINUE these medications which have NOT CHANGED    Details   acetaminophen (TYLENOL) 500 mg tablet Take 500-1,000 mg by mouth every 6 (six) hours as needed for mild pain, Historical Med      citalopram (CeleXA) 40 mg tablet Take 40 mg by mouth every evening  , Starting Sat 11/18/2017, Historical Med      enalapril (VASOTEC) 5 mg tablet Take 5 mg by mouth every evening  , Historical Med      furosemide (LASIX) 40 mg tablet Take 40 mg by mouth daily as needed  , Historical Med           No discharge procedures on file      ED Provider  Electronically Signed by           Caryn Sykes  07/21/19 [place]  07/21/19 [id number]

## 2020-04-15 ENCOUNTER — TELEMEDICINE (OUTPATIENT)
Dept: INTERNAL MEDICINE CLINIC | Facility: CLINIC | Age: 65
End: 2020-04-15
Payer: MEDICARE

## 2020-04-15 VITALS
DIASTOLIC BLOOD PRESSURE: 72 MMHG | SYSTOLIC BLOOD PRESSURE: 118 MMHG | BODY MASS INDEX: 40.44 KG/M2 | WEIGHT: 206 LBS | HEIGHT: 60 IN

## 2020-04-15 DIAGNOSIS — Z78.0 POST-MENOPAUSAL: ICD-10-CM

## 2020-04-15 DIAGNOSIS — Z13.31 POSITIVE DEPRESSION SCREENING: ICD-10-CM

## 2020-04-15 DIAGNOSIS — M15.9 PRIMARY OSTEOARTHRITIS INVOLVING MULTIPLE JOINTS: ICD-10-CM

## 2020-04-15 DIAGNOSIS — F32.A DEPRESSION, UNSPECIFIED DEPRESSION TYPE: ICD-10-CM

## 2020-04-15 DIAGNOSIS — M50.30 DDD (DEGENERATIVE DISC DISEASE), CERVICAL: ICD-10-CM

## 2020-04-15 DIAGNOSIS — Z13.220 SCREENING FOR LIPID DISORDERS: ICD-10-CM

## 2020-04-15 DIAGNOSIS — Z12.11 SCREEN FOR COLON CANCER: ICD-10-CM

## 2020-04-15 DIAGNOSIS — F41.8 ANXIETY WITH DEPRESSION: ICD-10-CM

## 2020-04-15 DIAGNOSIS — Z13.1 SCREENING FOR DIABETES MELLITUS (DM): ICD-10-CM

## 2020-04-15 DIAGNOSIS — G89.4 CHRONIC PAIN SYNDROME: ICD-10-CM

## 2020-04-15 DIAGNOSIS — Z11.59 NEED FOR HEPATITIS C SCREENING TEST: ICD-10-CM

## 2020-04-15 DIAGNOSIS — Z11.4 SCREENING FOR HIV (HUMAN IMMUNODEFICIENCY VIRUS): ICD-10-CM

## 2020-04-15 DIAGNOSIS — I10 ESSENTIAL HYPERTENSION: Primary | ICD-10-CM

## 2020-04-15 DIAGNOSIS — Z13.0 SCREENING FOR DEFICIENCY ANEMIA: ICD-10-CM

## 2020-04-15 DIAGNOSIS — Z13.29 SCREENING FOR THYROID DISORDER: ICD-10-CM

## 2020-04-15 DIAGNOSIS — L30.9 DERMATITIS: ICD-10-CM

## 2020-04-15 DIAGNOSIS — Z12.39 SCREENING FOR BREAST CANCER: ICD-10-CM

## 2020-04-15 DIAGNOSIS — E78.2 MIXED HYPERLIPIDEMIA: ICD-10-CM

## 2020-04-15 DIAGNOSIS — F32.0 MILD MAJOR DEPRESSION, SINGLE EPISODE (HCC): ICD-10-CM

## 2020-04-15 DIAGNOSIS — E28.39 MENOPAUSE OVARIAN FAILURE: ICD-10-CM

## 2020-04-15 PROBLEM — R60.0 LOCALIZED EDEMA: Status: ACTIVE | Noted: 2020-04-15

## 2020-04-15 PROBLEM — M54.50 CHRONIC MIDLINE LOW BACK PAIN WITHOUT SCIATICA: Status: ACTIVE | Noted: 2020-04-15

## 2020-04-15 PROBLEM — M15.0 PRIMARY OSTEOARTHRITIS INVOLVING MULTIPLE JOINTS: Status: ACTIVE | Noted: 2020-04-15

## 2020-04-15 PROBLEM — R26.9 GAIT ABNORMALITY: Status: ACTIVE | Noted: 2020-04-15

## 2020-04-15 PROBLEM — K63.5 BENIGN COLON POLYP: Status: ACTIVE | Noted: 2020-04-15

## 2020-04-15 PROBLEM — K57.90 DIVERTICULOSIS: Status: ACTIVE | Noted: 2020-04-15

## 2020-04-15 PROBLEM — K76.0 FATTY LIVER: Status: ACTIVE | Noted: 2020-04-15

## 2020-04-15 PROBLEM — G89.29 CHRONIC MIDLINE LOW BACK PAIN WITHOUT SCIATICA: Status: ACTIVE | Noted: 2020-04-15

## 2020-04-15 PROCEDURE — 99214 OFFICE O/P EST MOD 30 MIN: CPT | Performed by: INTERNAL MEDICINE

## 2020-04-15 RX ORDER — CLOTRIMAZOLE AND BETAMETHASONE DIPROPIONATE 10; .64 MG/G; MG/G
CREAM TOPICAL 2 TIMES DAILY
Qty: 45 G | Refills: 0 | Status: SHIPPED | OUTPATIENT
Start: 2020-04-15 | End: 2021-08-02

## 2020-04-20 ENCOUNTER — TELEPHONE (OUTPATIENT)
Dept: ADMINISTRATIVE | Facility: OTHER | Age: 65
End: 2020-04-20

## 2020-04-21 ENCOUNTER — APPOINTMENT (OUTPATIENT)
Dept: LAB | Facility: CLINIC | Age: 65
End: 2020-04-21
Payer: MEDICARE

## 2020-04-21 DIAGNOSIS — F41.8 ANXIETY WITH DEPRESSION: ICD-10-CM

## 2020-04-21 DIAGNOSIS — E78.2 MIXED HYPERLIPIDEMIA: ICD-10-CM

## 2020-04-21 DIAGNOSIS — F32.0 MILD MAJOR DEPRESSION, SINGLE EPISODE (HCC): ICD-10-CM

## 2020-04-21 DIAGNOSIS — Z11.59 NEED FOR HEPATITIS C SCREENING TEST: ICD-10-CM

## 2020-04-21 DIAGNOSIS — I10 ESSENTIAL HYPERTENSION: ICD-10-CM

## 2020-04-21 DIAGNOSIS — Z11.4 SCREENING FOR HIV (HUMAN IMMUNODEFICIENCY VIRUS): ICD-10-CM

## 2020-04-21 LAB
ALBUMIN SERPL BCP-MCNC: 4.1 G/DL (ref 3.5–5)
ALP SERPL-CCNC: 90 U/L (ref 46–116)
ALT SERPL W P-5'-P-CCNC: 20 U/L (ref 12–78)
ANION GAP SERPL CALCULATED.3IONS-SCNC: 7 MMOL/L (ref 4–13)
AST SERPL W P-5'-P-CCNC: 11 U/L (ref 5–45)
BASOPHILS # BLD AUTO: 0.07 THOUSANDS/ΜL (ref 0–0.1)
BASOPHILS NFR BLD AUTO: 1 % (ref 0–1)
BILIRUB SERPL-MCNC: 0.53 MG/DL (ref 0.2–1)
BUN SERPL-MCNC: 15 MG/DL (ref 5–25)
CALCIUM SERPL-MCNC: 9.5 MG/DL (ref 8.3–10.1)
CHLORIDE SERPL-SCNC: 105 MMOL/L (ref 100–108)
CHOLEST SERPL-MCNC: 254 MG/DL (ref 50–200)
CO2 SERPL-SCNC: 28 MMOL/L (ref 21–32)
CREAT SERPL-MCNC: 0.8 MG/DL (ref 0.6–1.3)
CREAT UR-MCNC: 34.8 MG/DL
EOSINOPHIL # BLD AUTO: 0.14 THOUSAND/ΜL (ref 0–0.61)
EOSINOPHIL NFR BLD AUTO: 2 % (ref 0–6)
ERYTHROCYTE [DISTWIDTH] IN BLOOD BY AUTOMATED COUNT: 12.7 % (ref 11.6–15.1)
GFR SERPL CREATININE-BSD FRML MDRD: 78 ML/MIN/1.73SQ M
GLUCOSE P FAST SERPL-MCNC: 96 MG/DL (ref 65–99)
HCT VFR BLD AUTO: 42.5 % (ref 34.8–46.1)
HCV AB SER QL: NORMAL
HDLC SERPL-MCNC: 48 MG/DL
HGB BLD-MCNC: 14.1 G/DL (ref 11.5–15.4)
IMM GRANULOCYTES # BLD AUTO: 0.01 THOUSAND/UL (ref 0–0.2)
IMM GRANULOCYTES NFR BLD AUTO: 0 % (ref 0–2)
LDLC SERPL CALC-MCNC: 181 MG/DL (ref 0–100)
LYMPHOCYTES # BLD AUTO: 1.34 THOUSANDS/ΜL (ref 0.6–4.47)
LYMPHOCYTES NFR BLD AUTO: 23 % (ref 14–44)
MCH RBC QN AUTO: 28.8 PG (ref 26.8–34.3)
MCHC RBC AUTO-ENTMCNC: 33.2 G/DL (ref 31.4–37.4)
MCV RBC AUTO: 87 FL (ref 82–98)
MICROALBUMIN UR-MCNC: <5 MG/L (ref 0–20)
MICROALBUMIN/CREAT 24H UR: <14 MG/G CREATININE (ref 0–30)
MONOCYTES # BLD AUTO: 0.46 THOUSAND/ΜL (ref 0.17–1.22)
MONOCYTES NFR BLD AUTO: 8 % (ref 4–12)
NEUTROPHILS # BLD AUTO: 3.79 THOUSANDS/ΜL (ref 1.85–7.62)
NEUTS SEG NFR BLD AUTO: 66 % (ref 43–75)
NRBC BLD AUTO-RTO: 0 /100 WBCS
PLATELET # BLD AUTO: 243 THOUSANDS/UL (ref 149–390)
PMV BLD AUTO: 9.8 FL (ref 8.9–12.7)
POTASSIUM SERPL-SCNC: 4.2 MMOL/L (ref 3.5–5.3)
PROT SERPL-MCNC: 7.6 G/DL (ref 6.4–8.2)
RBC # BLD AUTO: 4.9 MILLION/UL (ref 3.81–5.12)
SODIUM SERPL-SCNC: 140 MMOL/L (ref 136–145)
TRIGL SERPL-MCNC: 126 MG/DL
TSH SERPL DL<=0.05 MIU/L-ACNC: 2.44 UIU/ML (ref 0.36–3.74)
WBC # BLD AUTO: 5.81 THOUSAND/UL (ref 4.31–10.16)

## 2020-04-21 PROCEDURE — 36415 COLL VENOUS BLD VENIPUNCTURE: CPT

## 2020-04-21 PROCEDURE — 87389 HIV-1 AG W/HIV-1&-2 AB AG IA: CPT

## 2020-04-21 PROCEDURE — 82043 UR ALBUMIN QUANTITATIVE: CPT | Performed by: INTERNAL MEDICINE

## 2020-04-21 PROCEDURE — 80061 LIPID PANEL: CPT

## 2020-04-21 PROCEDURE — 84443 ASSAY THYROID STIM HORMONE: CPT

## 2020-04-21 PROCEDURE — 86803 HEPATITIS C AB TEST: CPT

## 2020-04-21 PROCEDURE — 82570 ASSAY OF URINE CREATININE: CPT | Performed by: INTERNAL MEDICINE

## 2020-04-21 PROCEDURE — 85025 COMPLETE CBC W/AUTO DIFF WBC: CPT

## 2020-04-21 PROCEDURE — 80053 COMPREHEN METABOLIC PANEL: CPT

## 2020-04-22 LAB — HIV 1+2 AB+HIV1 P24 AG SERPL QL IA: NORMAL

## 2020-04-24 DIAGNOSIS — E78.2 MIXED HYPERLIPIDEMIA: Primary | ICD-10-CM

## 2020-04-24 RX ORDER — EZETIMIBE 10 MG/1
10 TABLET ORAL DAILY
Qty: 90 TABLET | Refills: 1 | Status: SHIPPED | OUTPATIENT
Start: 2020-04-24 | End: 2020-11-16

## 2020-04-29 ENCOUNTER — TELEMEDICINE (OUTPATIENT)
Dept: INTERNAL MEDICINE CLINIC | Facility: CLINIC | Age: 65
End: 2020-04-29
Payer: MEDICARE

## 2020-04-29 VITALS — WEIGHT: 206 LBS | BODY MASS INDEX: 40.44 KG/M2 | HEIGHT: 60 IN

## 2020-04-29 DIAGNOSIS — E78.2 MIXED HYPERLIPIDEMIA: Primary | ICD-10-CM

## 2020-04-29 DIAGNOSIS — L30.9 DERMATITIS: ICD-10-CM

## 2020-04-29 DIAGNOSIS — Z00.00 MEDICARE ANNUAL WELLNESS VISIT, INITIAL: ICD-10-CM

## 2020-04-29 PROCEDURE — G0402 INITIAL PREVENTIVE EXAM: HCPCS | Performed by: INTERNAL MEDICINE

## 2020-04-29 PROCEDURE — 99213 OFFICE O/P EST LOW 20 MIN: CPT | Performed by: INTERNAL MEDICINE

## 2020-06-09 ENCOUNTER — HOSPITAL ENCOUNTER (OUTPATIENT)
Dept: BONE DENSITY | Facility: HOSPITAL | Age: 65
Discharge: HOME | End: 2020-06-09
Payer: MEDICARE

## 2020-06-09 ENCOUNTER — HOSPITAL ENCOUNTER (OUTPATIENT)
Dept: MAMMOGRAPHY | Facility: HOSPITAL | Age: 65
Discharge: HOME | End: 2020-06-09
Payer: MEDICARE

## 2020-06-09 VITALS — HEIGHT: 60 IN | BODY MASS INDEX: 39.27 KG/M2 | WEIGHT: 200 LBS

## 2020-06-09 DIAGNOSIS — Z12.39 SCREENING FOR BREAST CANCER: ICD-10-CM

## 2020-06-09 DIAGNOSIS — E28.39 MENOPAUSE OVARIAN FAILURE: ICD-10-CM

## 2020-06-09 PROCEDURE — 77063 BREAST TOMOSYNTHESIS BI: CPT

## 2020-06-09 PROCEDURE — 77067 SCR MAMMO BI INCL CAD: CPT

## 2020-06-09 PROCEDURE — 77080 DXA BONE DENSITY AXIAL: CPT

## 2020-06-19 ENCOUNTER — OFFICE VISIT (OUTPATIENT)
Dept: INTERNAL MEDICINE CLINIC | Facility: CLINIC | Age: 65
End: 2020-06-19
Payer: MEDICARE

## 2020-06-19 VITALS
WEIGHT: 208.13 LBS | HEART RATE: 82 BPM | HEIGHT: 60 IN | TEMPERATURE: 97.2 F | OXYGEN SATURATION: 97 % | DIASTOLIC BLOOD PRESSURE: 80 MMHG | SYSTOLIC BLOOD PRESSURE: 124 MMHG | BODY MASS INDEX: 40.86 KG/M2

## 2020-06-19 DIAGNOSIS — M85.89 OSTEOPENIA OF MULTIPLE SITES: Primary | ICD-10-CM

## 2020-06-19 PROCEDURE — 3008F BODY MASS INDEX DOCD: CPT | Performed by: INTERNAL MEDICINE

## 2020-06-19 PROCEDURE — 3079F DIAST BP 80-89 MM HG: CPT | Performed by: INTERNAL MEDICINE

## 2020-06-19 PROCEDURE — 1036F TOBACCO NON-USER: CPT | Performed by: INTERNAL MEDICINE

## 2020-06-19 PROCEDURE — 3074F SYST BP LT 130 MM HG: CPT | Performed by: INTERNAL MEDICINE

## 2020-06-19 PROCEDURE — 99213 OFFICE O/P EST LOW 20 MIN: CPT | Performed by: INTERNAL MEDICINE

## 2020-08-04 DIAGNOSIS — F32.A DEPRESSION, UNSPECIFIED DEPRESSION TYPE: Primary | ICD-10-CM

## 2020-08-04 RX ORDER — CITALOPRAM 40 MG/1
40 TABLET ORAL EVERY EVENING
Qty: 90 TABLET | Refills: 1 | Status: SHIPPED | OUTPATIENT
Start: 2020-08-04 | End: 2021-02-09

## 2020-08-19 PROBLEM — E66.01 MORBID OBESITY (HCC): Status: ACTIVE | Noted: 2020-08-19

## 2020-08-21 ENCOUNTER — OFFICE VISIT (OUTPATIENT)
Dept: INTERNAL MEDICINE CLINIC | Facility: CLINIC | Age: 65
End: 2020-08-21
Payer: MEDICARE

## 2020-08-21 VITALS
BODY MASS INDEX: 40.86 KG/M2 | DIASTOLIC BLOOD PRESSURE: 72 MMHG | HEIGHT: 60 IN | SYSTOLIC BLOOD PRESSURE: 118 MMHG | TEMPERATURE: 97.8 F | WEIGHT: 208.13 LBS | OXYGEN SATURATION: 96 % | HEART RATE: 76 BPM

## 2020-08-21 DIAGNOSIS — Z23 ENCOUNTER FOR VACCINATION: ICD-10-CM

## 2020-08-21 DIAGNOSIS — E78.2 MIXED HYPERLIPIDEMIA: ICD-10-CM

## 2020-08-21 DIAGNOSIS — I10 ESSENTIAL HYPERTENSION: Primary | ICD-10-CM

## 2020-08-21 DIAGNOSIS — G89.4 CHRONIC PAIN SYNDROME: ICD-10-CM

## 2020-08-21 DIAGNOSIS — M15.9 PRIMARY OSTEOARTHRITIS INVOLVING MULTIPLE JOINTS: ICD-10-CM

## 2020-08-21 DIAGNOSIS — F32.A DEPRESSION, UNSPECIFIED DEPRESSION TYPE: ICD-10-CM

## 2020-08-21 PROCEDURE — 3008F BODY MASS INDEX DOCD: CPT | Performed by: INTERNAL MEDICINE

## 2020-08-21 PROCEDURE — 3074F SYST BP LT 130 MM HG: CPT | Performed by: INTERNAL MEDICINE

## 2020-08-21 PROCEDURE — 4040F PNEUMOC VAC/ADMIN/RCVD: CPT | Performed by: INTERNAL MEDICINE

## 2020-08-21 PROCEDURE — 90670 PCV13 VACCINE IM: CPT | Performed by: INTERNAL MEDICINE

## 2020-08-21 PROCEDURE — 3078F DIAST BP <80 MM HG: CPT | Performed by: INTERNAL MEDICINE

## 2020-08-21 PROCEDURE — G0009 ADMIN PNEUMOCOCCAL VACCINE: HCPCS | Performed by: INTERNAL MEDICINE

## 2020-08-21 PROCEDURE — 1036F TOBACCO NON-USER: CPT | Performed by: INTERNAL MEDICINE

## 2020-08-21 PROCEDURE — 99214 OFFICE O/P EST MOD 30 MIN: CPT | Performed by: INTERNAL MEDICINE

## 2020-08-21 RX ORDER — ZOSTER VACCINE RECOMBINANT, ADJUVANTED 50 MCG/0.5
0.5 KIT INTRAMUSCULAR ONCE
Qty: 1 EACH | Refills: 1 | Status: SHIPPED | OUTPATIENT
Start: 2020-08-21 | End: 2020-08-21

## 2020-08-21 NOTE — PATIENT INSTRUCTIONS

## 2020-08-21 NOTE — PROGRESS NOTES
Assessment/Plan:  Problem List Items Addressed This Visit        Cardiovascular and Mediastinum    Hypertension - Primary       Musculoskeletal and Integument    Primary osteoarthritis involving multiple joints       Other    Depression    Mixed hyperlipidemia    Chronic pain syndrome      Other Visit Diagnoses     Encounter for vaccination        Relevant Medications    Zoster Vac Recomb Adjuvanted (Shingrix) 50 MCG/0 5ML SUSR    Other Relevant Orders    PNEUMOCOCCAL CONJUGATE VACCINE 13-VALENT GREATER THAN 6 MONTHS (Completed)           Diagnoses and all orders for this visit:    Essential hypertension    Primary osteoarthritis involving multiple joints    Chronic pain syndrome    Depression, unspecified depression type    Mixed hyperlipidemia    Encounter for vaccination  -     PNEUMOCOCCAL CONJUGATE VACCINE 13-VALENT GREATER THAN 6 MONTHS  -     Zoster Vac Recomb Adjuvanted (Shingrix) 50 MCG/0 5ML SUSR; Inject 0 5 mL into a muscle once for 1 dose Repeat dose in 2 to 6 months        No problem-specific Assessment & Plan notes found for this encounter  A/P: Doing well and labs are up to date  Discussed vaccines and will get her first pneumonia vaccine and script for shingles  Continue current treatment and RTC four months for routine  Subjective:      Patient ID: Nate Malin is a 71 y o  female  WF RTC for f/u htn, hyperlipidemia, etc  Doing well and no new issues  Remains active w/o difficulty and no falls  DJD/chroinic  pain is manageable  MDD is controlled  Labs are up to date  Due for vaccines  The following portions of the patient's history were reviewed and updated as appropriate:   She has a past medical history of Arthritis, Cataract, Depression, Fluid retention, Headache, acute, Heart murmur, Hyperlipidemia, Hypertension, PONV (postoperative nausea and vomiting), Rotator cuff tear, left, Wears glasses, and Wears partial dentures  ,  does not have any pertinent problems on file  ,   has a past surgical history that includes Ankle surgery (Right); Tonsillectomy; Tubal ligation; Hysterectomy; Ovarian cyst removal; Carpal tunnel release (Bilateral); Hand surgery (Right); Abdominal adhesion surgery; Knee arthroscopy (Left); Appendectomy; Colonoscopy; Hemorroidectomy; Repair rectocele; pr shldr arthroscop,surg,w/rotat cuff repr (Left, 2/19/2018); Carpal tunnel release (Left); Carpal tunnel release (Right); Ileostomy; Anal sphincteroplasty; Knee arthrocentesis; and Tonsillectomy and adenoidectomy  ,  family history includes Bone cancer in her family and mother; COPD in her mother; Colon cancer in her family and mother; Depression in her sister; Diabetes in her sister; Emphysema in her mother; ROBERT disease in her brother; Hypertension in her brother, family, mother, sister, and sister; Lead poisoning in her father; No Known Problems in her daughter, maternal aunt, maternal aunt, maternal aunt, maternal aunt, maternal aunt, maternal grandmother, paternal aunt, and paternal grandmother; Skin cancer in her mother; Throat cancer in her brother; Thyroid disease in her sister  ,   reports that she has never smoked  She has never used smokeless tobacco  She reports current alcohol use  She reports that she does not use drugs  ,  is allergic to iodine; keflex [cephalexin]; morphine and related; penicillins; benadryl [diphenhydramine]; clindamycin; and erythromycin     Current Outpatient Medications   Medication Sig Dispense Refill    acetaminophen (TYLENOL) 500 mg tablet Take 500-1,000 mg by mouth every 6 (six) hours as needed for mild pain      citalopram (CeleXA) 40 mg tablet Take 1 tablet (40 mg total) by mouth every evening 90 tablet 1    clotrimazole-betamethasone (LOTRISONE) 1-0 05 % cream Apply topically 2 (two) times a day 45 g 0    enalapril (VASOTEC) 5 mg tablet Take 5 mg by mouth every evening        ezetimibe (ZETIA) 10 mg tablet Take 1 tablet (10 mg total) by mouth daily 90 tablet 1    Nutritional Supp - Diet Aids (DIETERS DETOX PO) Take by mouth      Nutritional Supplements (ADULT GROWTH HORMONE SUPPORT PO) Take by mouth      Tea Tree Oil (NO FUNGUS NAIL PROTECTANT EX) Apply topically      Zoster Vac Recomb Adjuvanted (Shingrix) 50 MCG/0 5ML SUSR Inject 0 5 mL into a muscle once for 1 dose Repeat dose in 2 to 6 months 1 each 1     No current facility-administered medications for this visit  Review of Systems   Constitutional: Negative for activity change, chills, diaphoresis, fatigue and fever  HENT: Negative  Eyes: Negative for visual disturbance  Respiratory: Negative for cough, chest tightness, shortness of breath and wheezing  Cardiovascular: Negative for chest pain, palpitations and leg swelling  Gastrointestinal: Negative for abdominal pain, constipation, diarrhea, nausea and vomiting  Endocrine: Negative for cold intolerance and heat intolerance  Genitourinary: Negative for difficulty urinating, dysuria and frequency  Musculoskeletal: Negative for arthralgias, gait problem and myalgias  Neurological: Negative for dizziness, seizures, syncope, weakness, light-headedness and headaches  Psychiatric/Behavioral: Negative for confusion, dysphoric mood and sleep disturbance  The patient is not nervous/anxious  PHQ-9 Depression Screening    PHQ-9:    Frequency of the following problems over the past two weeks:             Objective:  Vitals:    08/21/20 1028   BP: 118/72   Pulse: 76   Temp: 97 8 °F (36 6 °C)   SpO2: 96%   Weight: 94 4 kg (208 lb 2 oz)   Height: 5' (1 524 m)     Body mass index is 40 65 kg/m²  Physical Exam  Vitals signs and nursing note reviewed  Constitutional:       General: She is not in acute distress  Appearance: Normal appearance  HENT:      Head: Normocephalic and atraumatic  Mouth/Throat:      Mouth: Mucous membranes are moist    Eyes:      Extraocular Movements: Extraocular movements intact        Conjunctiva/sclera: Conjunctivae normal       Pupils: Pupils are equal, round, and reactive to light  Neck:      Musculoskeletal: Neck supple  Vascular: No carotid bruit  Cardiovascular:      Rate and Rhythm: Normal rate and regular rhythm  Heart sounds: Normal heart sounds  Pulmonary:      Effort: Pulmonary effort is normal  No respiratory distress  Breath sounds: Normal breath sounds  No wheezing or rales  Abdominal:      General: Bowel sounds are normal  There is no distension  Palpations: Abdomen is soft  Tenderness: There is no abdominal tenderness  Musculoskeletal:      Right lower leg: No edema  Left lower leg: No edema  Neurological:      General: No focal deficit present  Mental Status: She is alert and oriented to person, place, and time  Mental status is at baseline  Psychiatric:         Mood and Affect: Mood normal          Behavior: Behavior normal          Thought Content:  Thought content normal          Judgment: Judgment normal

## 2020-10-27 PROCEDURE — 88305 TISSUE EXAM BY PATHOLOGIST: CPT | Performed by: PATHOLOGY

## 2020-10-28 ENCOUNTER — LAB REQUISITION (OUTPATIENT)
Dept: LAB | Facility: HOSPITAL | Age: 65
End: 2020-10-28
Payer: MEDICARE

## 2020-10-28 DIAGNOSIS — Z86.010 PERSONAL HISTORY OF COLONIC POLYPS: ICD-10-CM

## 2020-10-28 DIAGNOSIS — Z12.11 ENCOUNTER FOR SCREENING FOR MALIGNANT NEOPLASM OF COLON: ICD-10-CM

## 2020-11-16 DIAGNOSIS — E78.2 MIXED HYPERLIPIDEMIA: ICD-10-CM

## 2020-11-16 RX ORDER — EZETIMIBE 10 MG/1
TABLET ORAL
Qty: 90 TABLET | Refills: 1 | Status: SHIPPED | OUTPATIENT
Start: 2020-11-16 | End: 2021-05-15

## 2020-12-21 ENCOUNTER — OFFICE VISIT (OUTPATIENT)
Dept: INTERNAL MEDICINE CLINIC | Facility: CLINIC | Age: 65
End: 2020-12-21
Payer: MEDICARE

## 2020-12-21 VITALS
RESPIRATION RATE: 18 BRPM | SYSTOLIC BLOOD PRESSURE: 118 MMHG | TEMPERATURE: 97.4 F | BODY MASS INDEX: 40.64 KG/M2 | HEART RATE: 83 BPM | OXYGEN SATURATION: 96 % | DIASTOLIC BLOOD PRESSURE: 78 MMHG | HEIGHT: 60 IN | WEIGHT: 207 LBS

## 2020-12-21 DIAGNOSIS — Z13.31 POSITIVE DEPRESSION SCREENING: ICD-10-CM

## 2020-12-21 DIAGNOSIS — L30.9 DERMATITIS: ICD-10-CM

## 2020-12-21 DIAGNOSIS — R45.84 ANHEDONIA: ICD-10-CM

## 2020-12-21 DIAGNOSIS — F32.1 MODERATE MAJOR DEPRESSION, SINGLE EPISODE (HCC): ICD-10-CM

## 2020-12-21 DIAGNOSIS — Z23 ENCOUNTER FOR VACCINATION: ICD-10-CM

## 2020-12-21 DIAGNOSIS — F41.8 ANXIETY WITH DEPRESSION: ICD-10-CM

## 2020-12-21 DIAGNOSIS — E66.01 MORBID OBESITY (HCC): ICD-10-CM

## 2020-12-21 DIAGNOSIS — K76.0 FATTY LIVER: ICD-10-CM

## 2020-12-21 DIAGNOSIS — G25.2 COARSE TREMORS: ICD-10-CM

## 2020-12-21 DIAGNOSIS — G89.4 CHRONIC PAIN SYNDROME: ICD-10-CM

## 2020-12-21 DIAGNOSIS — F33.9 DEPRESSION, RECURRENT (HCC): ICD-10-CM

## 2020-12-21 DIAGNOSIS — I10 ESSENTIAL HYPERTENSION: Primary | ICD-10-CM

## 2020-12-21 DIAGNOSIS — E78.2 MIXED HYPERLIPIDEMIA: ICD-10-CM

## 2020-12-21 DIAGNOSIS — F32.A DEPRESSION, UNSPECIFIED DEPRESSION TYPE: ICD-10-CM

## 2020-12-21 PROBLEM — M47.817 LUMBOSACRAL SPONDYLOSIS WITHOUT MYELOPATHY: Status: ACTIVE | Noted: 2020-09-16

## 2020-12-21 PROCEDURE — 99214 OFFICE O/P EST MOD 30 MIN: CPT | Performed by: INTERNAL MEDICINE

## 2020-12-21 RX ORDER — TRAZODONE HYDROCHLORIDE 50 MG/1
50 TABLET ORAL
Qty: 90 TABLET | Refills: 3 | Status: SHIPPED | OUTPATIENT
Start: 2020-12-21 | End: 2021-01-22 | Stop reason: SDUPTHER

## 2020-12-21 RX ORDER — ARIPIPRAZOLE 2 MG/1
2 TABLET ORAL EVERY MORNING
Qty: 90 TABLET | Refills: 1 | Status: SHIPPED | OUTPATIENT
Start: 2020-12-21 | End: 2021-01-22 | Stop reason: SDUPTHER

## 2020-12-21 RX ORDER — NYSTATIN 100000 [USP'U]/G
POWDER TOPICAL 2 TIMES DAILY
Qty: 60 G | Refills: 1 | Status: SHIPPED | OUTPATIENT
Start: 2020-12-21

## 2020-12-28 ENCOUNTER — LAB (OUTPATIENT)
Dept: LAB | Facility: CLINIC | Age: 65
End: 2020-12-28
Payer: MEDICARE

## 2020-12-28 DIAGNOSIS — G25.2 COARSE TREMORS: ICD-10-CM

## 2020-12-28 DIAGNOSIS — E78.2 MIXED HYPERLIPIDEMIA: ICD-10-CM

## 2020-12-28 LAB
ALBUMIN SERPL BCP-MCNC: 4 G/DL (ref 3.5–5)
ALP SERPL-CCNC: 92 U/L (ref 46–116)
ALT SERPL W P-5'-P-CCNC: 28 U/L (ref 12–78)
ANION GAP SERPL CALCULATED.3IONS-SCNC: 3 MMOL/L (ref 4–13)
AST SERPL W P-5'-P-CCNC: 12 U/L (ref 5–45)
BASOPHILS # BLD AUTO: 0.06 THOUSANDS/ΜL (ref 0–0.1)
BASOPHILS NFR BLD AUTO: 1 % (ref 0–1)
BILIRUB SERPL-MCNC: 0.41 MG/DL (ref 0.2–1)
BUN SERPL-MCNC: 26 MG/DL (ref 5–25)
CALCIUM SERPL-MCNC: 9.7 MG/DL (ref 8.3–10.1)
CHLORIDE SERPL-SCNC: 107 MMOL/L (ref 100–108)
CO2 SERPL-SCNC: 30 MMOL/L (ref 21–32)
CREAT SERPL-MCNC: 0.95 MG/DL (ref 0.6–1.3)
EOSINOPHIL # BLD AUTO: 0.35 THOUSAND/ΜL (ref 0–0.61)
EOSINOPHIL NFR BLD AUTO: 5 % (ref 0–6)
ERYTHROCYTE [DISTWIDTH] IN BLOOD BY AUTOMATED COUNT: 13.4 % (ref 11.6–15.1)
GFR SERPL CREATININE-BSD FRML MDRD: 63 ML/MIN/1.73SQ M
GLUCOSE P FAST SERPL-MCNC: 99 MG/DL (ref 65–99)
HCT VFR BLD AUTO: 44.3 % (ref 34.8–46.1)
HGB BLD-MCNC: 14.5 G/DL (ref 11.5–15.4)
IMM GRANULOCYTES # BLD AUTO: 0.02 THOUSAND/UL (ref 0–0.2)
IMM GRANULOCYTES NFR BLD AUTO: 0 % (ref 0–2)
LDLC SERPL DIRECT ASSAY-MCNC: 130 MG/DL (ref 0–100)
LYMPHOCYTES # BLD AUTO: 1.65 THOUSANDS/ΜL (ref 0.6–4.47)
LYMPHOCYTES NFR BLD AUTO: 24 % (ref 14–44)
MCH RBC QN AUTO: 28.7 PG (ref 26.8–34.3)
MCHC RBC AUTO-ENTMCNC: 32.7 G/DL (ref 31.4–37.4)
MCV RBC AUTO: 88 FL (ref 82–98)
MONOCYTES # BLD AUTO: 0.51 THOUSAND/ΜL (ref 0.17–1.22)
MONOCYTES NFR BLD AUTO: 7 % (ref 4–12)
NEUTROPHILS # BLD AUTO: 4.43 THOUSANDS/ΜL (ref 1.85–7.62)
NEUTS SEG NFR BLD AUTO: 63 % (ref 43–75)
NRBC BLD AUTO-RTO: 0 /100 WBCS
PLATELET # BLD AUTO: 259 THOUSANDS/UL (ref 149–390)
PMV BLD AUTO: 9.7 FL (ref 8.9–12.7)
POTASSIUM SERPL-SCNC: 4.3 MMOL/L (ref 3.5–5.3)
PROT SERPL-MCNC: 7.4 G/DL (ref 6.4–8.2)
RBC # BLD AUTO: 5.06 MILLION/UL (ref 3.81–5.12)
SODIUM SERPL-SCNC: 140 MMOL/L (ref 136–145)
TRIGL SERPL-MCNC: 128 MG/DL
TSH SERPL DL<=0.05 MIU/L-ACNC: 2.93 UIU/ML (ref 0.36–3.74)
WBC # BLD AUTO: 7.02 THOUSAND/UL (ref 4.31–10.16)

## 2020-12-28 PROCEDURE — 84443 ASSAY THYROID STIM HORMONE: CPT

## 2020-12-28 PROCEDURE — 84478 ASSAY OF TRIGLYCERIDES: CPT

## 2020-12-28 PROCEDURE — 85025 COMPLETE CBC W/AUTO DIFF WBC: CPT

## 2020-12-28 PROCEDURE — 80053 COMPREHEN METABOLIC PANEL: CPT

## 2020-12-28 PROCEDURE — 36415 COLL VENOUS BLD VENIPUNCTURE: CPT

## 2020-12-28 PROCEDURE — 83721 ASSAY OF BLOOD LIPOPROTEIN: CPT

## 2021-01-08 DIAGNOSIS — I10 ESSENTIAL HYPERTENSION: Primary | ICD-10-CM

## 2021-01-08 RX ORDER — ENALAPRIL MALEATE 5 MG/1
5 TABLET ORAL EVERY EVENING
Qty: 90 TABLET | Refills: 0 | Status: SHIPPED | OUTPATIENT
Start: 2021-01-08 | End: 2021-03-18

## 2021-01-22 ENCOUNTER — OFFICE VISIT (OUTPATIENT)
Dept: INTERNAL MEDICINE CLINIC | Facility: CLINIC | Age: 66
End: 2021-01-22
Payer: MEDICARE

## 2021-01-22 VITALS
HEIGHT: 60 IN | HEART RATE: 89 BPM | DIASTOLIC BLOOD PRESSURE: 66 MMHG | OXYGEN SATURATION: 95 % | BODY MASS INDEX: 40.52 KG/M2 | WEIGHT: 206.38 LBS | TEMPERATURE: 98.9 F | SYSTOLIC BLOOD PRESSURE: 118 MMHG

## 2021-01-22 DIAGNOSIS — G25.2 COARSE TREMORS: ICD-10-CM

## 2021-01-22 DIAGNOSIS — G47.9 SLEEP DISORDER: ICD-10-CM

## 2021-01-22 DIAGNOSIS — F33.9 DEPRESSION, RECURRENT (HCC): ICD-10-CM

## 2021-01-22 DIAGNOSIS — R45.84 ANHEDONIA: ICD-10-CM

## 2021-01-22 DIAGNOSIS — F32.1 MODERATE MAJOR DEPRESSION, SINGLE EPISODE (HCC): Primary | ICD-10-CM

## 2021-01-22 DIAGNOSIS — E66.01 MORBID OBESITY (HCC): ICD-10-CM

## 2021-01-22 DIAGNOSIS — E78.2 MIXED HYPERLIPIDEMIA: ICD-10-CM

## 2021-01-22 DIAGNOSIS — L30.9 DERMATITIS: ICD-10-CM

## 2021-01-22 PROCEDURE — 99214 OFFICE O/P EST MOD 30 MIN: CPT | Performed by: INTERNAL MEDICINE

## 2021-01-22 RX ORDER — PRIMIDONE 50 MG/1
25 TABLET ORAL EVERY 8 HOURS SCHEDULED
Qty: 270 TABLET | Refills: 0 | Status: SHIPPED | OUTPATIENT
Start: 2021-01-22 | End: 2021-02-22 | Stop reason: SDUPTHER

## 2021-01-22 RX ORDER — ARIPIPRAZOLE 5 MG/1
5 TABLET ORAL EVERY MORNING
Qty: 90 TABLET | Refills: 1 | Status: SHIPPED | OUTPATIENT
Start: 2021-01-22 | End: 2021-07-07

## 2021-01-22 RX ORDER — TRAZODONE HYDROCHLORIDE 100 MG/1
100 TABLET ORAL
Qty: 90 TABLET | Refills: 1 | Status: SHIPPED | OUTPATIENT
Start: 2021-01-22 | End: 2021-07-07

## 2021-01-22 NOTE — PROGRESS NOTES
BMI Counseling: There is no height or weight on file to calculate BMI  The BMI is above normal  Nutrition recommendations include decreasing portion sizes and encouraging healthy choices of fruits and vegetables  Exercise recommendations include moderate physical activity 150 minutes/week  No pharmacotherapy was ordered  Assessment/Plan:  Problem List Items Addressed This Visit        Other    Depression, recurrent ([facility] )    Relevant Medications    ARIPiprazole (ABILIFY) 5 mg tablet    traZODone (DESYREL) 100 mg tablet    Mixed hyperlipidemia    Morbid obesity ([facility] )      Other Visit Diagnoses     Moderate major depression, single episode ([facility] )    -  Primary    Relevant Medications    ARIPiprazole (ABILIFY) 5 mg tablet    traZODone (DESYREL) 100 mg tablet    Dermatitis        Coarse tremors        Relevant Medications    primidone (MYSOLINE) 50 mg tablet    Anhedonia        Sleep disorder               Diagnoses and all orders for this visit:    Moderate major depression, single episode (HCC)    Dermatitis    Coarse tremors  -     primidone (MYSOLINE) 50 mg tablet; Take 0 5 tablets (25 mg total) by mouth every 8 (eight) hours    Anhedonia    Sleep disorder    Mixed hyperlipidemia    Depression, recurrent (HCC)  -     ARIPiprazole (ABILIFY) 5 mg tablet; Take 1 tablet (5 mg total) by mouth every morning  -     traZODone (DESYREL) 100 mg tablet; Take 1 tablet (100 mg total) by mouth daily at bedtime    Morbid obesity (HCC)        No problem-specific Assessment & Plan notes found for this encounter  A/P: Doing ok and tolerating the meds, but could be better  Discussed the labs  Will watch her diet closer and continue the zetia  Will increase the abilify and trazodone  Recommend counseling and needs to reduce her stressors(ie move out of her daughter's house)  Suspect an essential/familial tremor and will try mysoline  RTC one month for f/u MDD, sleep, and tremors       Subjective:      Patient ID: Andrea Alas is a 76 y o  female  WF RTC for f/u labs, MDD/MAGGIE, sleep issues, rash, and tremors  Abilify and trazodone add  Labs were good except for borderline LDL  Tolerating the meds  Reports MDD/MAGGIE and sleep are no worse, but not much better  Stressors still persists  No suicidal ideations  Rash is better  Tremors unchanged  No new issues  The following portions of the patient's history were reviewed and updated as appropriate:   She has a past medical history of Arthritis, Cataract, Depression, Fluid retention, Headache, acute, Heart murmur, Hyperlipidemia, Hypertension, PONV (postoperative nausea and vomiting), Rotator cuff tear, left, Wears glasses, and Wears partial dentures  ,  does not have any pertinent problems on file  ,   has a past surgical history that includes Ankle surgery (Right); Tonsillectomy; Tubal ligation; Hysterectomy; Ovarian cyst removal; Carpal tunnel release (Bilateral); Hand surgery (Right); Abdominal adhesion surgery; Knee arthroscopy (Left); Appendectomy; Colonoscopy; Hemorroidectomy; Repair rectocele; pr shldr arthroscop,surg,w/rotat cuff repr (Left, 2/19/2018); Carpal tunnel release (Left); Carpal tunnel release (Right); Ileostomy; Anal sphincteroplasty; Knee arthrocentesis; and Tonsillectomy and adenoidectomy  ,  family history includes Alzheimer's disease in her sister; Bone cancer in her family and mother; COPD in her mother; Colon cancer in her family and mother; Depression in her sister; Diabetes in her sister; Emphysema in her mother; ROBERT disease in her brother; Hypertension in her brother, family, mother, sister, and sister; Lead poisoning in her father; No Known Problems in her daughter, maternal aunt, maternal aunt, maternal aunt, maternal aunt, maternal aunt, maternal grandmother, paternal aunt, and paternal grandmother; Skin cancer in her mother; Throat cancer in her brother; Thyroid disease in her sister  ,   reports that she has never smoked   She has never used smokeless tobacco  She reports current alcohol use  She reports that she does not use drugs  ,  is allergic to iodine; keflex [cephalexin]; morphine and related; penicillins; benadryl [diphenhydramine]; clindamycin; and erythromycin     Current Outpatient Medications   Medication Sig Dispense Refill    acetaminophen (TYLENOL) 500 mg tablet Take 500-1,000 mg by mouth every 6 (six) hours as needed for mild pain      ARIPiprazole (ABILIFY) 5 mg tablet Take 1 tablet (5 mg total) by mouth every morning 90 tablet 1    citalopram (CeleXA) 40 mg tablet Take 1 tablet (40 mg total) by mouth every evening 90 tablet 1    enalapril (Vasotec) 5 mg tablet Take 1 tablet (5 mg total) by mouth every evening 90 tablet 0    ezetimibe (ZETIA) 10 mg tablet take 1 tablet by mouth once daily 90 tablet 1    traZODone (DESYREL) 100 mg tablet Take 1 tablet (100 mg total) by mouth daily at bedtime 90 tablet 1    clotrimazole-betamethasone (LOTRISONE) 1-0 05 % cream Apply topically 2 (two) times a day (Patient not taking: Reported on 12/21/2020) 45 g 0    nystatin (MYCOSTATIN) powder Apply topically 2 (two) times a day 60 g 1    primidone (MYSOLINE) 50 mg tablet Take 0 5 tablets (25 mg total) by mouth every 8 (eight) hours 270 tablet 0    Tea Tree Oil (NO FUNGUS NAIL PROTECTANT EX) Apply topically       No current facility-administered medications for this visit  Review of Systems   Constitutional: Negative for activity change, chills, diaphoresis, fatigue and fever  Respiratory: Negative for cough, chest tightness, shortness of breath and wheezing  Cardiovascular: Negative for chest pain, palpitations and leg swelling  Gastrointestinal: Negative for abdominal pain, constipation, diarrhea, nausea and vomiting  Genitourinary: Negative for difficulty urinating, dysuria and frequency  Musculoskeletal: Negative for arthralgias, gait problem and myalgias  Neurological: Positive for tremors   Negative for dizziness, seizures, syncope, weakness, light-headedness and headaches  Psychiatric/Behavioral: Positive for dysphoric mood and sleep disturbance  Negative for confusion and suicidal ideas  The patient is nervous/anxious  PHQ-9 Depression Screening    PHQ-9:   Frequency of the following problems over the past two weeks:            Objective:  Vitals:    01/22/21 1046   BP: 118/66   Pulse: 89   Temp: 98 9 °F (37 2 °C)   SpO2: 95%   Weight: 93 6 kg (206 lb 6 oz)   Height: 5' (1 524 m)     Body mass index is 40 3 kg/m²  Physical Exam  Constitutional:       General: She is not in acute distress  Appearance: Normal appearance  She is not ill-appearing  HENT:      Head: Normocephalic and atraumatic  Mouth/Throat:      Mouth: Mucous membranes are moist    Eyes:      Extraocular Movements: Extraocular movements intact  Conjunctiva/sclera: Conjunctivae normal       Pupils: Pupils are equal, round, and reactive to light  Cardiovascular:      Rate and Rhythm: Normal rate and regular rhythm  Heart sounds: Normal heart sounds  Pulmonary:      Effort: Pulmonary effort is normal  No respiratory distress  Breath sounds: Normal breath sounds  No wheezing or rales  Neurological:      General: No focal deficit present  Mental Status: She is alert and oriented to person, place, and time  Mental status is at baseline  Comments: Coarse tremors of bilat hands   Psychiatric:         Mood and Affect: Mood normal          Behavior: Behavior normal          Thought Content: Thought content normal          Judgment: Judgment normal          BMI Counseling: Body mass index is 40 3 kg/m²  The BMI is above normal  Nutrition recommendations include reducing portion sizes, decreasing overall calorie intake, reducing intake of saturated fat and trans fat and reducing intake of cholesterol  Exercise recommendations include moderate aerobic physical activity for 150 minutes/week

## 2021-01-22 NOTE — PATIENT INSTRUCTIONS
Obesity   AMBULATORY CARE:   Obesity  is when your body mass index (BMI) is greater than 30  Your healthcare provider will use your height and weight to measure your BMI  The risks of obesity include  many health problems, such as injuries or physical disability  You may need tests to check for the following:  · Diabetes    · High blood pressure or high cholesterol    · Heart disease    · Gallbladder or liver disease    · Cancer of the colon, breast, prostate, liver, or kidney    · Sleep apnea    · Arthritis or gout    Seek care immediately if:   · You have a severe headache, confusion, or difficulty speaking  · You have weakness on one side of your body  · You have chest pain, sweating, or shortness of breath  Contact your healthcare provider if:   · You have symptoms of gallbladder or liver disease, such as pain in your upper abdomen  · You have knee or hip pain and discomfort while walking  · You have symptoms of diabetes, such as intense hunger and thirst, and frequent urination  · You have symptoms of sleep apnea, such as snoring or daytime sleepiness  · You have questions or concerns about your condition or care  Treatment for obesity  focuses on helping you lose weight to improve your health  Even a small decrease in BMI can reduce the risk for many health problems  Your healthcare provider will help you set a weight-loss goal   · Lifestyle changes  are the first step in treating obesity  These include making healthy food choices and getting regular physical activity  Your healthcare provider may suggest a weight-loss program that involves coaching, education, and therapy  · Medicine  may help you lose weight when it is used with a healthy diet and physical activity  · Surgery  can help you lose weight if you are very obese and have other health problems  There are several types of weight-loss surgery  Ask your healthcare provider for more information      Be successful losing weight:   · Set small, realistic goals  An example of a small goal is to walk for 20 minutes 5 days a week  Anther goal is to lose 5% of your body weight  · Tell friends, family members, and coworkers about your goals  and ask for their support  Ask a friend to lose weight with you, or join a weight-loss support group  · Identify foods or triggers that may cause you to overeat , and find ways to avoid them  Remove tempting high-calorie foods from your home and workplace  Place a bowl of fresh fruit on your kitchen counter  If stress causes you to eat, then find other ways to cope with stress  · Keep a diary to track what you eat and drink  Also write down how many minutes of physical activity you do each day  Weigh yourself once a week and record it in your diary  Eating changes: You will need to eat 500 to 1,000 fewer calories each day than you currently eat to lose 1 to 2 pounds a week  The following changes will help you cut calories:  · Eat smaller portions  Use small plates, no larger than 9 inches in diameter  Fill your plate half full of fruits and vegetables  Measure your food using measuring cups until you know what a serving size looks like  · Eat 3 meals and 1 or 2 snacks each day  Plan your meals in advance  Luis Enrique Higuera and eat at home most of the time  Eat slowly  Do not skip meals  Skipping meals can lead to overeating later in the day  This can make it harder for you to lose weight  Talk with a dietitian to help you make a meal plan and schedule that is right for you  · Eat fruits and vegetables at every meal   They are low in calories and high in fiber, which makes you feel full  Do not add butter, margarine, or cream sauce to vegetables  Use herbs to season steamed vegetables  · Eat less fat and fewer fried foods  Eat more baked or grilled chicken and fish  These protein sources are lower in calories and fat than red meat  Limit fast food   Dress your salads with olive oil and vinegar instead of bottled dressing  · Limit the amount of sugar you eat  Do not drink sugary beverages  Limit alcohol  Activity changes:  Physical activity is good for your body in many ways  It helps you burn calories and build strong muscles  It decreases stress and depression, and improves your mood  It can also help you sleep better  Talk to your healthcare provider before you begin an exercise program   · Exercise for at least 30 minutes 5 days a week  Start slowly  Set aside time each day for physical activity that you enjoy and that is convenient for you  It is best to do both weight training and an activity that increases your heart rate, such as walking, bicycling, or swimming  · Find ways to be more active  Do yard work and housecleaning  Walk up the stairs instead of using elevators  Spend your leisure time going to events that require walking, such as outdoor festivals or fairs  This extra physical activity can help you lose weight and keep it off  Follow up with your healthcare provider as directed: You may need to meet with a dietitian  Write down your questions so you remember to ask them during your visits  © Copyright [address] [place] [address] Drive Information is for End User's use only and may not be sold, redistributed or otherwise used for commercial purposes  All illustrations and images included in CareNotes® are the copyrighted property of A D A M , Inc  or [facility] Martín ramone   The above information is an  only  It is not intended as medical advice for individual conditions or treatments  Talk to your doctor, nurse or pharmacist before following any medical regimen to see if it is safe and effective for you  Low Fat Diet   AMBULATORY CARE:   A low-fat diet  is an eating plan that is low in total fat, unhealthy fat, and cholesterol  You may need to follow a low-fat diet if you have trouble digesting or absorbing fat   You may also need to follow this diet if you have high cholesterol  You can also lower your cholesterol by increasing the amount of fiber in your diet  Soluble fiber is a type of fiber that helps to decrease cholesterol levels  Different types of fat in food:   · Limit unhealthy fats  A diet that is high in cholesterol, saturated fat, and trans fat may cause unhealthy cholesterol levels  Unhealthy cholesterol levels increase your risk of heart disease  ? Cholesterol:  Limit intake of cholesterol to less than 200 mg per day  Cholesterol is found in meat, eggs, and dairy  ? Saturated fat:  Limit saturated fat to less than 7% of your total daily calories  Ask your dietitian how many calories you need each day  Saturated fat is found in butter, cheese, ice cream, whole milk, and palm oil  Saturated fat is also found in meat, such as beef, pork, chicken skin, and processed meats  Processed meats include sausage, hot dogs, and bologna  ? Trans fat:  Avoid trans fat as much as possible  Trans fat is used in fried and baked foods  Foods that say trans fat free on the label may still have up to 0 5 grams of trans fat per serving  · Include healthy fats  Replace foods that are high in saturated and trans fat with foods high in healthy fats  This may help to decrease high cholesterol levels  ? Monounsaturated fats: These are found in avocados, nuts, and vegetable oils, such as olive, canola, and sunflower oil  ? Polyunsaturated fats: These can be found in vegetable oils, such as soybean or corn oil  Omega-3 fats can help to decrease the risk of heart disease  Omega-3 fats are found in fish, such as salmon, herring, trout, and tuna  Omega-3 fats can also be found in plant foods, such as walnuts, flaxseed, soybeans, and canola oil  Foods to limit or avoid:   · Grains:      ? Snacks that are made with partially hydrogenated oils, such as chips, regular crackers, and butter-flavored popcorn    ?  High-fat baked goods, such as biscuits, croissants, doughnuts, pies, cookies, and pastries    · Dairy:      ? Whole milk, 2% milk, and yogurt and ice cream made with whole milk    ? Half and half creamer, heavy cream, and whipping cream    ? Cheese, cream cheese, and sour cream    · Meats and proteins:      ? High-fat cuts of meat (T-bone steak, regular hamburger, and ribs)    ? Fried meat, poultry (turkey and chicken), and fish    ? Poultry (chicken and turkey) with skin    ? Cold cuts (salami or bologna), hot dogs, lou, and sausage    ? Whole eggs and egg yolks    · Vegetables and fruits with added fat:      ? Fried vegetables or vegetables in butter or high-fat sauces, such as cream or cheese sauces    ? Fried fruit or fruit served with butter or cream    · Fats:      ? Butter, stick margarine, and shortening    ? Coconut, palm oil, and palm kernel oil    Foods to include:   · Grains:      ? Whole-grain breads, cereals, pasta, and brown rice    ? Low-fat crackers and pretzels    · Vegetables and fruits:      ? Fresh, frozen, or canned vegetables (no salt or low-sodium)    ? Fresh, frozen, dried, or canned fruit (canned in light syrup or fruit juice)    ? Avocado    · Low-fat dairy products:      ? Nonfat (skim) or 1% milk    ? Nonfat or low-fat cheese, yogurt, and cottage cheese    · Meats and proteins:      ? Chicken or turkey with no skin    ? Baked or broiled fish    ? Lean beef and pork (loin, round, extra lean hamburger)    ? Beans and peas, unsalted nuts, soy products    ? Egg whites and substitutes    ? Seeds and nuts    · Fats:      ? Unsaturated oil, such as canola, olive, peanut, soybean, or sunflower oil    ? Soft or liquid margarine and vegetable oil spread    ? Low-fat salad dressing    Other ways to decrease fat:   · Read food labels before you buy foods  Choose foods that have less than 30% of calories from fat  Choose low-fat or fat-free dairy products  Remember that fat free does not mean calorie free   These foods still contain calories, and too many calories can lead to weight gain  · Trim fat from meat and avoid fried food  Trim all visible fat from meat before you cook it  Remove the skin from poultry  Do not salgado meat, fish, or poultry  Bake, roast, boil, or broil these foods instead  Avoid fried foods  Eat a baked potato instead of Western Suze fries  Steam vegetables instead of sautéing them in butter  · Add less fat to foods  Use imitation lou bits on salads and baked potatoes instead of regular lou bits  Use fat-free or low-fat salad dressings instead of regular dressings  Use low-fat or nonfat butter-flavored topping instead of regular butter or margarine on popcorn and other foods  Ways to decrease fat in recipes:  Replace high-fat ingredients with low-fat or nonfat ones  This may cause baked goods to be drier than usual  You may need to use nonfat cooking spray on pans to prevent food from sticking  You also may need to change the amount of other ingredients, such as water, in the recipe  Try the following:  · Use low-fat or light margarine instead of regular margarine or shortening  · Use lean ground turkey breast or chicken, or lean ground beef (less than 5% fat) instead of hamburger  · Add 1 teaspoon of canola oil to 8 ounces of skim milk instead of using cream or half and half  · Use grated zucchini, carrots, or apples in breads instead of coconut  · Use blenderized, low-fat cottage cheese, plain tofu, or low-fat ricotta cheese instead of cream cheese  · Use 1 egg white and 1 teaspoon of canola oil, or use ¼ cup (2 ounces) of fat-free egg substitute instead of a whole egg  · Replace half of the oil that is called for in a recipe with applesauce when you bake  Use 3 tablespoons of cocoa powder and 1 tablespoon of canola oil instead of a square of baking chocolate  How to increase fiber:  Eat enough high-fiber foods to get 20 to 30 grams of fiber every day   Slowly increase your fiber intake to avoid stomach cramps, gas, and other problems  · Eat 3 ounces of whole-grain foods each day  An ounce is about 1 slice of bread  Eat whole-grain breads, such as whole-wheat bread  Whole wheat, whole-wheat flour, or other whole grains should be listed as the first ingredient on the food label  Replace white flour with whole-grain flour or use half of each in recipes  Whole-grain flour is heavier than white flour, so you may have to add more yeast or baking powder  · Eat a high-fiber cereal for breakfast   Oatmeal is a good source of soluble fiber  Look for cereals that have bran or fiber in the name  Choose whole-grain products, such as brown rice, barley, and whole-wheat pasta  · Eat more beans, peas, and lentils  For example, add beans to soups or salads  Eat at least 5 cups of fruits and vegetables each day  Eat fruits and vegetables with the peel because the peel is high in fiber  © Copyright [address] Information is for End User's use only and may not be sold, redistributed or otherwise used for commercial purposes  All illustrations and images included in CareNotes® are the copyrighted property of A D A M , Inc  or [employer] [facility]  The above information is an  only  It is not intended as medical advice for individual conditions or treatments  Talk to your doctor, nurse or pharmacist before following any medical regimen to see if it is safe and effective for you  Heart Healthy Diet   AMBULATORY CARE:   A heart healthy diet  is an eating plan low in unhealthy fats and sodium (salt)  The plan is high in healthy fats and fiber  A heart healthy diet helps improve your cholesterol levels and lowers your risk for heart disease and stroke  A dietitian will teach you how to read and understand food labels  Heart healthy diet guidelines to follow:   · Choose foods that contain healthy fats  ? Unsaturated fats  include monounsaturated and polyunsaturated fats  Unsaturated fat is found in foods such as soybean, canola, olive, corn, and safflower oils  It is also found in soft tub margarine that is made with liquid vegetable oil  ? Omega-3 fat  is found in certain fish, such as salmon, tuna, and trout, and in walnuts and flaxseed  Eat fish high in omega-3 fats at least 2 times a week  · Get 20 to 30 grams of fiber each day  Fruits, vegetables, whole-grain foods, and legumes (cooked beans) are good sources of fiber  · Limit or do not have unhealthy fats  ? Cholesterol  is found in animal foods, such as eggs and lobster, and in dairy products made from whole milk  Limit cholesterol to less than 200 mg each day  ? Saturated fat  is found in meats, such as lou and hamburger  It is also found in chicken or turkey skin, whole milk, and butter  Limit saturated fat to less than 7% of your total daily calories  ? Trans fat  is found in packaged foods, such as potato chips and cookies  It is also in hard margarine, some fried foods, and shortening  Do not eat foods that contain trans fats  · Limit sodium as directed  You may be told to limit sodium to 2,000 to 2,300 mg each day  Choose low-sodium or no-salt-added foods  Add little or no salt to food you prepare  Use herbs and spices in place of salt  Include the following in your heart healthy plan:  Ask your dietitian or healthcare provider how many servings to have from each of the following food groups:  · Grains:      ? Whole-wheat breads, cereals, and pastas, and brown rice    ? Low-fat, low-sodium crackers and chips    · Vegetables:      ? Broccoli, green beans, green peas, and spinach    ? Collards, kale, and lima beans    ? Carrots, sweet potatoes, tomatoes, and peppers    ? Canned vegetables with no salt added    · Fruits:      ? Bananas, peaches, pears, and pineapple    ? Grapes, raisins, and dates    ?  Oranges, tangerines, grapefruit, orange juice, and grapefruit juice    ? Apricots, mangoes, melons, and papaya    ? Raspberries and strawberries    ? Canned fruit with no added sugar    · Low-fat dairy:      ? Nonfat (skim) milk, 1% milk, and low-fat almond, cashew, or soy milks fortified with calcium    ? Low-fat cheese, regular or frozen yogurt, and cottage cheese    · Meats and proteins:      ? Lean cuts of beef and pork (loin, leg, round), skinless chicken and turkey    ? Legumes, soy products, egg whites, or nuts    Limit or do not include the following in your heart healthy plan:   · Unhealthy fats and oils:      ? Whole or 2% milk, cream cheese, sour cream, or cheese    ? High-fat cuts of beef (T-bone steaks, ribs), chicken or turkey with skin, and organ meats such as liver    ? Butter, stick margarine, shortening, and cooking oils such as coconut or palm oil    · Foods and liquids high in sodium:      ? Packaged foods, such as frozen dinners, cookies, macaroni and cheese, and cereals with more than 300 mg of sodium per serving    ? Vegetables with added sodium, such as instant potatoes, vegetables with added sauces, or regular canned vegetables    ? Cured or smoked meats, such as hot dogs, lou, and sausage    ? High-sodium ketchup, barbecue sauce, salad dressing, pickles, olives, soy sauce, or miso    · Foods and liquids high in sugar:      ? Candy, cake, cookies, pies, or doughnuts    ? Soft drinks (soda), sports drinks, or sweetened tea    ? Canned or dry mixes for cakes, soups, sauces, or gravies    Other healthy heart guidelines:   · Do not smoke  Nicotine and other chemicals in cigarettes and cigars can cause lung and heart damage  Ask your healthcare provider for information if you currently smoke and need help to quit  E-cigarettes or smokeless tobacco still contain nicotine  Talk to your healthcare provider before you use these products  · Limit or do not drink alcohol as directed  Alcohol can damage your heart and raise your blood pressure   Your healthcare provider may give you specific daily and weekly limits  The general recommended limit is 1 drink a day for women 21 or older and for men 72 or older  Do not have more than 3 drinks in a day or 7 in a week  The recommended limit is 2 drinks a day for men 24to 59years of age  Do not have more than 4 drinks in a day or 14 in a week  A drink of alcohol is 12 ounces of beer, 5 ounces of wine, or 1½ ounces of liquor  · Exercise regularly  Exercise can help you maintain a healthy weight and improve your blood pressure and cholesterol levels  Regular exercise can also decrease your risk for heart problems  Ask your healthcare provider about the best exercise plan for you  Do not start an exercise program without asking your healthcare provider  Follow up with your doctor or cardiologist as directed:  Write down your questions so you remember to ask them during your visits  © Copyright [facility] [address] Information is for End User's use only and may not be sold, redistributed or otherwise used for commercial purposes  All illustrations and images included in CareNotes® are the copyrighted property of A D A M , Inc  or [facility] Martín Mojica   The above information is an  only  It is not intended as medical advice for individual conditions or treatments  Talk to your doctor, nurse or pharmacist before following any medical regimen to see if it is safe and effective for you

## 2021-01-25 ENCOUNTER — TELEPHONE (OUTPATIENT)
Dept: INTERNAL MEDICINE CLINIC | Facility: CLINIC | Age: 66
End: 2021-01-25

## 2021-01-25 DIAGNOSIS — F32.1 MODERATE MAJOR DEPRESSION, SINGLE EPISODE (HCC): Primary | ICD-10-CM

## 2021-01-25 NOTE — TELEPHONE ENCOUNTER
Pt called, stated last time she was here, she discussed with you her depression and seeing a counselor, could you please put in a referral?

## 2021-02-09 DIAGNOSIS — F32.A DEPRESSION, UNSPECIFIED DEPRESSION TYPE: ICD-10-CM

## 2021-02-09 RX ORDER — CITALOPRAM 40 MG/1
TABLET ORAL
Qty: 90 TABLET | Refills: 1 | Status: SHIPPED | OUTPATIENT
Start: 2021-02-09 | End: 2021-08-14

## 2021-02-22 ENCOUNTER — OFFICE VISIT (OUTPATIENT)
Dept: INTERNAL MEDICINE CLINIC | Facility: CLINIC | Age: 66
End: 2021-02-22
Payer: MEDICARE

## 2021-02-22 VITALS
BODY MASS INDEX: 42.01 KG/M2 | TEMPERATURE: 97.2 F | HEART RATE: 86 BPM | DIASTOLIC BLOOD PRESSURE: 70 MMHG | OXYGEN SATURATION: 97 % | SYSTOLIC BLOOD PRESSURE: 118 MMHG | RESPIRATION RATE: 18 BRPM | HEIGHT: 60 IN | WEIGHT: 214 LBS

## 2021-02-22 DIAGNOSIS — G47.9 SLEEP DISORDER: ICD-10-CM

## 2021-02-22 DIAGNOSIS — F33.9 DEPRESSION, RECURRENT (HCC): Primary | ICD-10-CM

## 2021-02-22 DIAGNOSIS — G25.2 COARSE TREMORS: ICD-10-CM

## 2021-02-22 DIAGNOSIS — R45.84 ANHEDONIA: ICD-10-CM

## 2021-02-22 PROCEDURE — 99213 OFFICE O/P EST LOW 20 MIN: CPT | Performed by: INTERNAL MEDICINE

## 2021-02-22 RX ORDER — MULTIVITAMIN
1 TABLET ORAL DAILY
COMMUNITY
End: 2022-01-07

## 2021-02-22 RX ORDER — PRIMIDONE 50 MG/1
50 TABLET ORAL EVERY 8 HOURS SCHEDULED
Qty: 270 TABLET | Refills: 0
Start: 2021-02-22 | End: 2021-06-28 | Stop reason: SDUPTHER

## 2021-02-22 NOTE — PROGRESS NOTES
Assessment/Plan:  Problem List Items Addressed This Visit        Other    Depression, recurrent ([address] ) - Primary      Other Visit Diagnoses     Coarse tremors        Relevant Medications    primidone (MYSOLINE) 50 mg tablet    Anhedonia        Sleep disorder               Diagnoses and all orders for this visit:    Depression, recurrent (HCC)    Coarse tremors  -     primidone (MYSOLINE) 50 mg tablet; Take 1 tablet (50 mg total) by mouth every 8 (eight) hours    Anhedonia    Sleep disorder    Other orders  -     Cholecalciferol (VITAMIN D3 PO); Take 50 mcg by mouth daily  -     Multiple Vitamin (multivitamin) tablet; Take 1 tablet by mouth daily        No problem-specific Assessment & Plan notes found for this encounter  A/P: No worse, but could be better  Discussed med change for MDD and sleep and pt feels these will improve in the near future as her stressors decrease like moving into her own place, recent divorce settlement, and starting a new job  Will increase the mysoline for the tremors  May need to try sinemet  RTC one month for routine and f/u  Subjective:      Patient ID: Lindy Sharp is a 76 y o  female  WF RTC for f/u MDD, sleep issues, and tremors after increasing her Abilify and trazodone  Started mysoline for the tremors  Tolerating the meds  Depression slightly better and so is the sleep  Still with a lot of stressors  Has tried to get into counseling, but is having problems with the insurance  Tremors not much better  No new issues  The following portions of the patient's history were reviewed and updated as appropriate:   She has a past medical history of Arthritis, Cataract, Depression, Fluid retention, Headache, acute, Heart murmur, Hyperlipidemia, Hypertension, PONV (postoperative nausea and vomiting), Rotator cuff tear, left, Wears glasses, and Wears partial dentures  ,  does not have any pertinent problems on file  ,   has a past surgical history that includes Ankle surgery (Right); Tonsillectomy; Tubal ligation; Hysterectomy; Ovarian cyst removal; Carpal tunnel release (Bilateral); Hand surgery (Right); Abdominal adhesion surgery; Knee arthroscopy (Left); Appendectomy; Colonoscopy; Hemorroidectomy; Repair rectocele; pr shldr arthroscop,surg,w/rotat cuff repr (Left, 2/19/2018); Carpal tunnel release (Left); Carpal tunnel release (Right); Ileostomy; Anal sphincteroplasty; Knee arthrocentesis; and Tonsillectomy and adenoidectomy  ,  family history includes Alzheimer's disease in her sister; Bone cancer in her family and mother; COPD in her mother; Colon cancer in her family and mother; Depression in her sister; Diabetes in her sister; Emphysema in her mother; ROBERT disease in her brother; Hypertension in her brother, family, mother, sister, and sister; Lead poisoning in her father; No Known Problems in her daughter, maternal aunt, maternal aunt, maternal aunt, maternal aunt, maternal aunt, maternal grandmother, paternal aunt, and paternal grandmother; Skin cancer in her mother; Throat cancer in her brother; Thyroid disease in her sister  ,   reports that she has never smoked  She has never used smokeless tobacco  She reports current alcohol use  She reports that she does not use drugs  ,  is allergic to iodine; keflex [cephalexin]; morphine and related; penicillins; benadryl [diphenhydramine]; clindamycin; and erythromycin     Current Outpatient Medications   Medication Sig Dispense Refill    acetaminophen (TYLENOL) 500 mg tablet Take 500-1,000 mg by mouth every 6 (six) hours as needed for mild pain      ARIPiprazole (ABILIFY) 5 mg tablet Take 1 tablet (5 mg total) by mouth every morning 90 tablet 1    Cholecalciferol (VITAMIN D3 PO) Take 50 mcg by mouth daily      citalopram (CeleXA) 40 mg tablet take 1 tablet by mouth every evening 90 tablet 1    enalapril (Vasotec) 5 mg tablet Take 1 tablet (5 mg total) by mouth every evening 90 tablet 0    ezetimibe (ZETIA) 10 mg tablet take 1 tablet by mouth once daily 90 tablet 1    Multiple Vitamin (multivitamin) tablet Take 1 tablet by mouth daily      nystatin (MYCOSTATIN) powder Apply topically 2 (two) times a day 60 g 1    primidone (MYSOLINE) 50 mg tablet Take 1 tablet (50 mg total) by mouth every 8 (eight) hours 270 tablet 0    traZODone (DESYREL) 100 mg tablet Take 1 tablet (100 mg total) by mouth daily at bedtime 90 tablet 1    clotrimazole-betamethasone (LOTRISONE) 1-0 05 % cream Apply topically 2 (two) times a day (Patient not taking: Reported on 12/21/2020) 45 g 0    Tea Tree Oil (NO FUNGUS NAIL PROTECTANT EX) Apply topically       No current facility-administered medications for this visit  Review of Systems   Constitutional: Negative for activity change, chills, diaphoresis, fatigue and fever  Respiratory: Negative for cough, chest tightness, shortness of breath and wheezing  Cardiovascular: Negative for chest pain, palpitations and leg swelling  Gastrointestinal: Negative for abdominal pain, constipation, diarrhea, nausea and vomiting  Genitourinary: Negative for difficulty urinating, dysuria and frequency  Musculoskeletal: Negative for arthralgias, gait problem and myalgias  Neurological: Positive for tremors  Negative for light-headedness and headaches  Psychiatric/Behavioral: Positive for dysphoric mood and sleep disturbance  Negative for confusion, self-injury and suicidal ideas  The patient is not nervous/anxious  PHQ-9 Depression Screening    PHQ-9:   Frequency of the following problems over the past two weeks:            Objective:  Vitals:    02/22/21 1247   BP: 118/70   BP Location: Left arm   Patient Position: Sitting   Cuff Size: Adult   Pulse: 86   Resp: 18   Temp: (!) 97 2 °F (36 2 °C)   TempSrc: Temporal   SpO2: 97%   Weight: 97 1 kg (214 lb)   Height: 5' (1 524 m)     Body mass index is 41 79 kg/m²  Physical Exam  Vitals signs and nursing note reviewed     Constitutional:       General: She is not in acute distress  Appearance: Normal appearance  She is not ill-appearing  HENT:      Head: Normocephalic and atraumatic  Mouth/Throat:      Mouth: Mucous membranes are moist    Eyes:      Extraocular Movements: Extraocular movements intact  Conjunctiva/sclera: Conjunctivae normal       Pupils: Pupils are equal, round, and reactive to light  Cardiovascular:      Rate and Rhythm: Normal rate and regular rhythm  Heart sounds: Normal heart sounds  Pulmonary:      Effort: Pulmonary effort is normal  No respiratory distress  Breath sounds: Normal breath sounds  No wheezing or rales  Neurological:      General: No focal deficit present  Mental Status: She is alert and oriented to person, place, and time  Mental status is at baseline  Psychiatric:         Mood and Affect: Mood normal          Behavior: Behavior normal          Thought Content:  Thought content normal          Judgment: Judgment normal

## 2021-02-28 ENCOUNTER — OFFICE VISIT (OUTPATIENT)
Dept: URGENT CARE | Facility: CLINIC | Age: 66
End: 2021-02-28
Payer: MEDICARE

## 2021-02-28 ENCOUNTER — APPOINTMENT (EMERGENCY)
Dept: RADIOLOGY | Facility: HOSPITAL | Age: 66
End: 2021-02-28
Payer: MEDICARE

## 2021-02-28 ENCOUNTER — HOSPITAL ENCOUNTER (OUTPATIENT)
Facility: HOSPITAL | Age: 66
Setting detail: OBSERVATION
Discharge: HOME | End: 2021-03-01
Attending: FAMILY MEDICINE | Admitting: INTERNAL MEDICINE
Payer: MEDICARE

## 2021-02-28 VITALS
OXYGEN SATURATION: 98 % | DIASTOLIC BLOOD PRESSURE: 88 MMHG | HEART RATE: 73 BPM | RESPIRATION RATE: 20 BRPM | SYSTOLIC BLOOD PRESSURE: 162 MMHG

## 2021-02-28 DIAGNOSIS — R07.9 CHEST PAIN: Primary | ICD-10-CM

## 2021-02-28 DIAGNOSIS — R07.9 CHEST PAIN, UNSPECIFIED TYPE: Primary | ICD-10-CM

## 2021-02-28 PROBLEM — I16.0 HYPERTENSIVE URGENCY: Status: ACTIVE | Noted: 2021-02-28

## 2021-02-28 LAB
ANION GAP SERPL CALCULATED.3IONS-SCNC: 7 MMOL/L (ref 4–13)
APTT PPP: 27 SECONDS (ref 23–37)
BASOPHILS # BLD AUTO: 0.1 THOUSANDS/ΜL (ref 0–0.1)
BASOPHILS NFR BLD AUTO: 1 % (ref 0–2)
BNP SERPL-MCNC: 47 PG/ML (ref 1–100)
BUN SERPL-MCNC: 19 MG/DL (ref 7–25)
CALCIUM SERPL-MCNC: 9.4 MG/DL (ref 8.6–10.5)
CHLORIDE SERPL-SCNC: 100 MMOL/L (ref 98–107)
CO2 SERPL-SCNC: 30 MMOL/L (ref 21–31)
CREAT SERPL-MCNC: 0.76 MG/DL (ref 0.6–1.2)
EOSINOPHIL # BLD AUTO: 0.5 THOUSAND/ΜL (ref 0–0.61)
EOSINOPHIL NFR BLD AUTO: 8 % (ref 0–5)
ERYTHROCYTE [DISTWIDTH] IN BLOOD BY AUTOMATED COUNT: 13.6 % (ref 11.5–14.5)
FLUAV RNA RESP QL NAA+PROBE: NEGATIVE
FLUBV RNA RESP QL NAA+PROBE: NEGATIVE
GFR SERPL CREATININE-BSD FRML MDRD: 82 ML/MIN/1.73SQ M
GLUCOSE SERPL-MCNC: 102 MG/DL (ref 65–99)
HCT VFR BLD AUTO: 36.1 % (ref 42–47)
HGB BLD-MCNC: 12.3 G/DL (ref 12–16)
INR PPP: 1.02 (ref 0.84–1.19)
LYMPHOCYTES # BLD AUTO: 1.5 THOUSANDS/ΜL (ref 0.6–4.47)
LYMPHOCYTES NFR BLD AUTO: 26 % (ref 21–51)
MCH RBC QN AUTO: 29.3 PG (ref 26–34)
MCHC RBC AUTO-ENTMCNC: 34 G/DL (ref 31–37)
MCV RBC AUTO: 86 FL (ref 81–99)
MONOCYTES # BLD AUTO: 0.5 THOUSAND/ΜL (ref 0.17–1.22)
MONOCYTES NFR BLD AUTO: 9 % (ref 2–12)
NEUTROPHILS # BLD AUTO: 3.3 THOUSANDS/ΜL (ref 1.4–6.5)
NEUTS SEG NFR BLD AUTO: 56 % (ref 42–75)
PLATELET # BLD AUTO: 236 THOUSANDS/UL (ref 149–390)
PMV BLD AUTO: 7.7 FL (ref 8.6–11.7)
POTASSIUM SERPL-SCNC: 4.2 MMOL/L (ref 3.5–5.5)
PROTHROMBIN TIME: 13.3 SECONDS (ref 11.6–14.5)
RBC # BLD AUTO: 4.19 MILLION/UL (ref 3.9–5.2)
RSV RNA RESP QL NAA+PROBE: NEGATIVE
SARS-COV-2 RNA RESP QL NAA+PROBE: NEGATIVE
SODIUM SERPL-SCNC: 137 MMOL/L (ref 134–143)
TROPONIN I SERPL-MCNC: <0.03 NG/ML
WBC # BLD AUTO: 5.8 THOUSAND/UL (ref 4.8–10.8)

## 2021-02-28 PROCEDURE — 85610 PROTHROMBIN TIME: CPT | Performed by: PHYSICIAN ASSISTANT

## 2021-02-28 PROCEDURE — 1124F ACP DISCUSS-NO DSCNMKR DOCD: CPT | Performed by: PHYSICIAN ASSISTANT

## 2021-02-28 PROCEDURE — G0463 HOSPITAL OUTPT CLINIC VISIT: HCPCS | Performed by: NURSE PRACTITIONER

## 2021-02-28 PROCEDURE — 0241U HB NFCT DS VIR RESP RNA 4 TRGT: CPT | Performed by: PHYSICIAN ASSISTANT

## 2021-02-28 PROCEDURE — 99285 EMERGENCY DEPT VISIT HI MDM: CPT

## 2021-02-28 PROCEDURE — 99220 PR INITIAL OBSERVATION CARE/DAY 70 MINUTES: CPT | Performed by: INTERNAL MEDICINE

## 2021-02-28 PROCEDURE — 99213 OFFICE O/P EST LOW 20 MIN: CPT | Performed by: NURSE PRACTITIONER

## 2021-02-28 PROCEDURE — 71045 X-RAY EXAM CHEST 1 VIEW: CPT

## 2021-02-28 PROCEDURE — 99285 EMERGENCY DEPT VISIT HI MDM: CPT | Performed by: PHYSICIAN ASSISTANT

## 2021-02-28 PROCEDURE — 83880 ASSAY OF NATRIURETIC PEPTIDE: CPT | Performed by: PHYSICIAN ASSISTANT

## 2021-02-28 PROCEDURE — 84484 ASSAY OF TROPONIN QUANT: CPT | Performed by: PHYSICIAN ASSISTANT

## 2021-02-28 PROCEDURE — 93005 ELECTROCARDIOGRAM TRACING: CPT

## 2021-02-28 PROCEDURE — 93005 ELECTROCARDIOGRAM TRACING: CPT | Performed by: NURSE PRACTITIONER

## 2021-02-28 PROCEDURE — 94640 AIRWAY INHALATION TREATMENT: CPT

## 2021-02-28 PROCEDURE — 85025 COMPLETE CBC W/AUTO DIFF WBC: CPT | Performed by: PHYSICIAN ASSISTANT

## 2021-02-28 PROCEDURE — 36415 COLL VENOUS BLD VENIPUNCTURE: CPT | Performed by: PHYSICIAN ASSISTANT

## 2021-02-28 PROCEDURE — 85730 THROMBOPLASTIN TIME PARTIAL: CPT | Performed by: PHYSICIAN ASSISTANT

## 2021-02-28 PROCEDURE — 80048 BASIC METABOLIC PNL TOTAL CA: CPT | Performed by: PHYSICIAN ASSISTANT

## 2021-02-28 PROCEDURE — 84484 ASSAY OF TROPONIN QUANT: CPT | Performed by: INTERNAL MEDICINE

## 2021-02-28 RX ORDER — ONDANSETRON 2 MG/ML
4 INJECTION INTRAMUSCULAR; INTRAVENOUS EVERY 6 HOURS PRN
Status: DISCONTINUED | OUTPATIENT
Start: 2021-02-28 | End: 2021-03-01 | Stop reason: HOSPADM

## 2021-02-28 RX ORDER — ACETAMINOPHEN 325 MG/1
650 TABLET ORAL EVERY 6 HOURS PRN
Status: DISCONTINUED | OUTPATIENT
Start: 2021-02-28 | End: 2021-03-01 | Stop reason: HOSPADM

## 2021-02-28 RX ORDER — CITALOPRAM 20 MG/1
40 TABLET ORAL EVERY EVENING
Status: DISCONTINUED | OUTPATIENT
Start: 2021-02-28 | End: 2021-03-01 | Stop reason: HOSPADM

## 2021-02-28 RX ORDER — ASPIRIN 81 MG/1
324 TABLET, CHEWABLE ORAL ONCE
Status: DISCONTINUED | OUTPATIENT
Start: 2021-02-28 | End: 2021-02-28 | Stop reason: HOSPADM

## 2021-02-28 RX ORDER — SODIUM CHLORIDE 9 MG/ML
3 INJECTION INTRAVENOUS
Status: DISCONTINUED | OUTPATIENT
Start: 2021-02-28 | End: 2021-03-01 | Stop reason: HOSPADM

## 2021-02-28 RX ORDER — LORAZEPAM 2 MG/ML
0.5 INJECTION INTRAMUSCULAR ONCE
Status: COMPLETED | OUTPATIENT
Start: 2021-02-28 | End: 2021-02-28

## 2021-02-28 RX ORDER — TRAZODONE HYDROCHLORIDE 50 MG/1
100 TABLET ORAL
Status: DISCONTINUED | OUTPATIENT
Start: 2021-02-28 | End: 2021-03-01 | Stop reason: HOSPADM

## 2021-02-28 RX ORDER — ARIPIPRAZOLE 5 MG/1
5 TABLET ORAL EVERY MORNING
Status: DISCONTINUED | OUTPATIENT
Start: 2021-02-28 | End: 2021-03-01 | Stop reason: HOSPADM

## 2021-02-28 RX ORDER — PRIMIDONE 50 MG/1
25 TABLET ORAL EVERY 8 HOURS SCHEDULED
Status: DISCONTINUED | OUTPATIENT
Start: 2021-02-28 | End: 2021-03-01 | Stop reason: HOSPADM

## 2021-02-28 RX ORDER — ALBUTEROL SULFATE 2.5 MG/3ML
2.5 SOLUTION RESPIRATORY (INHALATION) ONCE
Status: COMPLETED | OUTPATIENT
Start: 2021-02-28 | End: 2021-02-28

## 2021-02-28 RX ORDER — LISINOPRIL 20 MG/1
20 TABLET ORAL DAILY
Status: DISCONTINUED | OUTPATIENT
Start: 2021-02-28 | End: 2021-03-01 | Stop reason: HOSPADM

## 2021-02-28 RX ORDER — HYDRALAZINE HYDROCHLORIDE 20 MG/ML
5 INJECTION INTRAMUSCULAR; INTRAVENOUS EVERY 6 HOURS PRN
Status: DISCONTINUED | OUTPATIENT
Start: 2021-02-28 | End: 2021-03-01 | Stop reason: HOSPADM

## 2021-02-28 RX ORDER — EZETIMIBE 10 MG/1
10 TABLET ORAL DAILY
Status: DISCONTINUED | OUTPATIENT
Start: 2021-02-28 | End: 2021-03-01 | Stop reason: HOSPADM

## 2021-02-28 RX ORDER — PRIMIDONE 50 MG/1
50 TABLET ORAL EVERY 8 HOURS SCHEDULED
Status: DISCONTINUED | OUTPATIENT
Start: 2021-02-28 | End: 2021-02-28

## 2021-02-28 RX ADMIN — LORAZEPAM 0.5 MG: 2 INJECTION INTRAMUSCULAR; INTRAVENOUS at 13:58

## 2021-02-28 RX ADMIN — ASPIRIN 324 MG: 81 TABLET, CHEWABLE ORAL at 11:27

## 2021-02-28 RX ADMIN — ALBUTEROL SULFATE 2.5 MG: 2.5 SOLUTION RESPIRATORY (INHALATION) at 13:58

## 2021-02-28 RX ADMIN — ENOXAPARIN SODIUM 40 MG: 40 INJECTION SUBCUTANEOUS at 16:36

## 2021-02-28 RX ADMIN — EZETIMIBE 10 MG: 10 TABLET ORAL at 21:00

## 2021-02-28 RX ADMIN — PRIMIDONE 25 MG: 50 TABLET ORAL at 21:53

## 2021-02-28 RX ADMIN — TRAZODONE HYDROCHLORIDE 100 MG: 50 TABLET ORAL at 21:53

## 2021-02-28 RX ADMIN — CITALOPRAM HYDROBROMIDE 40 MG: 20 TABLET ORAL at 17:23

## 2021-02-28 NOTE — ASSESSMENT & PLAN NOTE
· SBP greater than 200 in the ER  · Patient is only on enalapril at home which she states she takes at night, did not take a dose today  · Also states that she does not watch her diet and eats whatever she wants  · Will initiate lisinopril 20 mg daily in place of patient's home enalapril  · Hydralazine p r n    · Will add Norvasc if BP still elevated

## 2021-02-28 NOTE — ED PROVIDER NOTES
History  Chief Complaint   Patient presents with    Chest Pain       77-year-old female history of hypertension, hyperlipidemia, obesity and a family history of cardiac disease presents complaining of chest pain  She reports that it started at approximately 6:00 a m  this morning  She describes it as a tight squeezing like sensation that is in the left side of her chest radiating up to the left shoulder and neck  She denies pain going into her back  States that she took her blood pressure medication last night and that her family members were smoking inside the house which is abnormal for her  She remembers wheezing a bit this morning which is also abnormal for her  She denies a history of asthma or COPD and has not smoked since she was 11years old  She states that the pain has been intermittent but that it seemed to be relieved after she had 324 of aspirin prior to arrival at Urgent Care  EMS gave 1 sublingual nitro that she reports no relief in symptoms but rather it giving her a headache  She denies feeling short of breath this time  Denies any fevers, chills, cough, abdominal pain, urinary symptoms, lower leg pain or swelling or history of DVT / PE  Prior to Admission Medications   Prescriptions Last Dose Informant Patient Reported? Taking?    ARIPiprazole (ABILIFY) 5 mg tablet   No No   Sig: Take 1 tablet (5 mg total) by mouth every morning   Cholecalciferol (VITAMIN D3 PO)   Yes No   Sig: Take 50 mcg by mouth daily   Multiple Vitamin (multivitamin) tablet   Yes No   Sig: Take 1 tablet by mouth daily   Tea Tree Oil (NO FUNGUS NAIL PROTECTANT EX)   Yes No   Sig: Apply topically   acetaminophen (TYLENOL) 500 mg tablet  Self Yes No   Sig: Take 500-1,000 mg by mouth every 6 (six) hours as needed for mild pain   citalopram (CeleXA) 40 mg tablet   No No   Sig: take 1 tablet by mouth every evening   clotrimazole-betamethasone (LOTRISONE) 1-0 05 % cream   No No   Sig: Apply topically 2 (two) times a day   Patient not taking: Reported on 12/21/2020   enalapril (Vasotec) 5 mg tablet   No No   Sig: Take 1 tablet (5 mg total) by mouth every evening   ezetimibe (ZETIA) 10 mg tablet   No No   Sig: take 1 tablet by mouth once daily   nystatin (MYCOSTATIN) powder   No No   Sig: Apply topically 2 (two) times a day   primidone (MYSOLINE) 50 mg tablet   No No   Sig: Take 1 tablet (50 mg total) by mouth every 8 (eight) hours   traZODone (DESYREL) 100 mg tablet   No No   Sig: Take 1 tablet (100 mg total) by mouth daily at bedtime      Facility-Administered Medications Last Administration Doses Remaining   aspirin chewable tablet 324 mg 2/28/2021 11:27 AM 0          Past Medical History:   Diagnosis Date    Arthritis     Cataract     ashley    Depression     Fluid retention     Headache, acute     Heart murmur     Hyperlipidemia     Hypertension     PONV (postoperative nausea and vomiting)     Rotator cuff tear, left     Wears glasses     Wears partial dentures     upper       Past Surgical History:   Procedure Laterality Date    ABDOMINAL ADHESION SURGERY      ANAL SPHINCTEROPLASTY      ANKLE SURGERY Right     tendon tear    APPENDECTOMY      CARPAL TUNNEL RELEASE Bilateral     CARPAL TUNNEL RELEASE Left     CARPAL TUNNEL RELEASE Right     COLONOSCOPY      HAND SURGERY Right     ganglion cyst    HEMORROIDECTOMY      HYSTERECTOMY      ILEOSTOMY      KNEE ARTHROCENTESIS      ganglion Cyst    KNEE ARTHROSCOPY Left     OVARIAN CYST REMOVAL      IL SHLDR ARTHROSCOP,SURG,W/ROTAT CUFF REPR Left 2/19/2018    Procedure: ARTHROSCOPIC REPAIR ROTATOR CUFF,  SAD, BICEP TENODESIS;  Surgeon: Valeria Oakes MD;  Location: [facility];  Service: Orthopedics    REPAIR RECTOCELE      TONSILLECTOMY      TONSILLECTOMY AND ADENOIDECTOMY      TUBAL LIGATION         Family History   Problem Relation Age of Onset    Hypertension Mother     Colon cancer Mother     Bone cancer Mother     COPD Mother     Emphysema Mother     Skin cancer Mother     Lead poisoning Father         lead poisoning in lungs     Thyroid disease Sister     Depression Sister     Hypertension Sister     Alzheimer's disease Sister     ROBERT disease Brother     Throat cancer Brother     Hypertension Brother     Colon cancer Family     Bone cancer Family     Hypertension Family     Diabetes Sister     Hypertension Sister     No Known Problems Daughter     No Known Problems Maternal Grandmother     No Known Problems Paternal Grandmother     No Known Problems Maternal Aunt     No Known Problems Maternal Aunt     No Known Problems Maternal Aunt     No Known Problems Maternal Aunt     No Known Problems Maternal Aunt     No Known Problems Paternal Aunt      I have reviewed and agree with the history as documented  E-Cigarette/Vaping    E-Cigarette Use Never User      E-Cigarette/Vaping Substances     Social History     Tobacco Use    Smoking status: Never Smoker    Smokeless tobacco: Never Used   Substance Use Topics    Alcohol use: Yes     Comment: few x year    Drug use: No       Review of Systems   Constitutional: Negative for chills, fatigue and fever  HENT: Negative for ear pain and sore throat  Eyes: Negative for pain  Respiratory: Negative for cough, shortness of breath and wheezing  Cardiovascular: Positive for chest pain  Negative for palpitations and leg swelling  Gastrointestinal: Negative for abdominal pain, constipation, diarrhea, nausea and vomiting  Endocrine: Negative for polyuria  Genitourinary: Negative for dysuria and pelvic pain  Musculoskeletal: Negative for arthralgias, myalgias, neck pain and neck stiffness  Skin: Negative for rash  Neurological: Negative for dizziness, syncope, light-headedness and headaches  All other systems reviewed and are negative  Physical Exam  Physical Exam  Constitutional:       Appearance: She is well-developed  She is obese     HENT:      Head: Normocephalic and atraumatic  Mouth/Throat:      Pharynx: No oropharyngeal exudate  Neck:      Musculoskeletal: Normal range of motion  Cardiovascular:      Rate and Rhythm: Normal rate and regular rhythm  Heart sounds: Normal heart sounds  Pulmonary:      Effort: Pulmonary effort is normal       Breath sounds: Normal breath sounds  Abdominal:      General: Bowel sounds are normal       Palpations: Abdomen is soft  Tenderness: There is no abdominal tenderness  Musculoskeletal: Normal range of motion  Skin:     General: Skin is warm  Capillary Refill: Capillary refill takes less than 2 seconds  Neurological:      General: No focal deficit present  Mental Status: She is alert and oriented to person, place, and time  Vital Signs  ED Triage Vitals [02/28/21 1214]   Temperature Pulse Respirations Blood Pressure SpO2   (!) 97 2 °F (36 2 °C) 72 18 148/85 98 %      Temp Source Heart Rate Source Patient Position - Orthostatic VS BP Location FiO2 (%)   Temporal Monitor Sitting Left arm --      Pain Score       3           Vitals:    02/28/21 1214   BP: 148/85   Pulse: 72   Patient Position - Orthostatic VS: Sitting         Visual Acuity      ED Medications  Medications   sodium chloride (PF) 0 9 % injection 3 mL (has no administration in time range)   albuterol inhalation solution 2 5 mg (has no administration in time range)   LORazepam (ATIVAN) injection 0 5 mg (has no administration in time range)       Diagnostic Studies  Results Reviewed     Procedure Component Value Units Date/Time    COVID19, Influenza A/B, RSV PCR, [facility] [[id number]]  (Normal) Collected: 02/28/21 1226    Lab Status: Final result Specimen: Nasopharyngeal Swab Updated: 02/28/21 1332     SARS-CoV-2 Negative     INFLUENZA A PCR Negative     INFLUENZA B PCR Negative     RSV PCR Negative    Narrative: This test has been authorized by FDA under an EUA (Emergency Use Assay) for use by authorized laboratories  Clinical caution and judgement should be used with the interpretation of these results with consideration of the clinical impression and other laboratory testing  Testing reported as "Positive" or "Negative" has been proven to be accurate according to standard laboratory validation requirements  All testing is performed with control materials showing appropriate reactivity at standard intervals      Troponin I [[id number]]     Lab Status: No result Specimen: Blood     Protime-INR [[id number]]  (Normal) Collected: 02/28/21 1224    Lab Status: Final result Specimen: Blood from Arm, Left Updated: 02/28/21 1317     Protime 13 3 seconds      INR 1 02    APTT [[id number]]  (Normal) Collected: 02/28/21 1224    Lab Status: Final result Specimen: Blood from Arm, Left Updated: 02/28/21 1317     PTT 27 seconds     B-Type Natriuretic Peptide ([address]) [[id number]]  (Normal) Collected: 02/28/21 1224    Lab Status: Final result Specimen: Blood from Arm, Left Updated: 02/28/21 1309     BNP 47 pg/mL     Basic metabolic panel [[id number]]  (Abnormal) Collected: 02/28/21 1224    Lab Status: Final result Specimen: Blood from Arm, Left Updated: 02/28/21 1308     Sodium 137 mmol/L      Potassium 4 2 mmol/L      Chloride 100 mmol/L      CO2 30 mmol/L      ANION GAP 7 mmol/L      BUN 19 mg/dL      Creatinine 0 76 mg/dL      Glucose 102 mg/dL      Calcium 9 4 mg/dL      eGFR 82 ml/min/1 73sq m     Narrative:      Mickey guidelines for Chronic Kidney Disease (CKD):     Stage 1 with normal or high GFR (GFR > 90 mL/min/1 73 square meters)    Stage 2 Mild CKD (GFR = 60-89 mL/min/1 73 square meters)    Stage 3A Moderate CKD (GFR = 45-59 mL/min/1 73 square meters)    Stage 3B Moderate CKD (GFR = 30-44 mL/min/1 73 square meters)    Stage 4 Severe CKD (GFR = 15-29 mL/min/1 73 square meters)    Stage 5 End Stage CKD (GFR <15 mL/min/1 73 square meters)  Note: GFR calculation is accurate only with a steady state creatinine    Troponin I repeat in 3 hrs [[id number]]  (Normal) Collected: 02/28/21 1224    Lab Status: Final result Specimen: Blood from Arm, Left Updated: 02/28/21 1307     Troponin I <0 03 ng/mL     CBC and differential [[id number]]  (Abnormal) Collected: 02/28/21 1224    Lab Status: Final result Specimen: Blood from Arm, Left Updated: 02/28/21 1249     WBC 5 80 Thousand/uL      RBC 4 19 Million/uL      Hemoglobin 12 3 g/dL      Hematocrit 36 1 %      MCV 86 fL      MCH 29 3 pg      MCHC 34 0 g/dL      RDW 13 6 %      MPV 7 7 fL      Platelets 289 Thousands/uL      Neutrophils Relative 56 %      Lymphocytes Relative 26 %      Monocytes Relative 9 %      Eosinophils Relative 8 %      Basophils Relative 1 %      Neutrophils Absolute 3 30 Thousands/µL      Lymphocytes Absolute 1 50 Thousands/µL      Monocytes Absolute 0 50 Thousand/µL      Eosinophils Absolute 0 50 Thousand/µL      Basophils Absolute 0 10 Thousands/µL                  X-ray chest 1 view portable    (Results Pending)              Procedures  ECG 12 Lead Documentation Only    Date/Time: 2/28/2021 1:33 PM  Performed by: Mickey Faith PA-C  Authorized by:  Mickey Faith PA-C     ECG reviewed by me, the ED Provider: yes    Patient location:  ED  Previous ECG:     Previous ECG:  Compared to current    Similarity:  No change    Comparison to cardiac monitor: Yes    Interpretation:     Interpretation: normal    Rate:     ECG rate:  72    ECG rate assessment: normal    Rhythm:     Rhythm: sinus rhythm    Ectopy:     Ectopy: none    QRS:     QRS axis:  Normal  Conduction:     Conduction: normal    ST segments:     ST segments:  Normal  T waves:     T waves: normal    Comments:      No evidence of acute cardiac ischemia             ED Course             HEART Risk Score      Most Recent Value   Heart Score Risk Calculator   History  1 Filed at: 02/28/2021 1323   ECG  0 Filed at: 02/28/2021 1323   Age  2 Filed at: 02/28/2021 1323   Risk Factors 2 Filed at: 02/28/2021 1323   Troponin  0 Filed at: 02/28/2021 1323   HEART Score  5 Filed at: 02/28/2021 1323                      SBIRT 20yo+      Most Recent Value   SBIRT (25 yo +)   In order to provide better care to our patients, we are screening all of our patients for alcohol and drug use  Would it be okay to ask you these screening questions? Yes Filed at: 02/28/2021 1218   Initial Alcohol Screen: US AUDIT-C    1  How often do you have a drink containing alcohol?  0 Filed at: 02/28/2021 1218   2  How many drinks containing alcohol do you have on a typical day you are drinking? 0 Filed at: 02/28/2021 1218   3a  Male UNDER 65: How often do you have five or more drinks on one occasion? 0 Filed at: 02/28/2021 1218   3b  FEMALE Any Age, or MALE 65+: How often do you have 4 or more drinks on one occassion? 0 Filed at: 02/28/2021 1218   Audit-C Score  0 Filed at: 02/28/2021 1218   TAI: How many times in the past year have you    Used an illegal drug or used a prescription medication for non-medical reasons? Never Filed at: 02/28/2021 1218                    MDM  Number of Diagnoses or Management Options  Chest pain:   Diagnosis management comments: Patient presented complaining of chest pain radiating to the left shoulder and jaw  History concerning for cardiac chest pain however patient has been under a lot were stress with a negative troponin and EKG  Suspect anxiety as most likely etiology of chest pain however due to heart score of at least 5, will admit for observation  Patient agreeable to plan        Disposition  Final diagnoses:   Chest pain     Time reflects when diagnosis was documented in both MDM as applicable and the Disposition within this note     Time User Action Codes Description Comment    2/28/2021  1:32 PM Brigid Dill Add [R07 9] Chest pain       ED Disposition     ED Disposition Condition Date/Time Comment    Admit [facility] Feb 28, 2021  1:32 PM Case was discussed with   Sarah Alegre and the patient's admission status was agreed to be Admission Status: observation status to the service of Dr aSrah Alegre  Follow-up Information    None         Patient's Medications   Discharge Prescriptions    No medications on file     No discharge procedures on file      PDMP Review     None          ED Provider  Electronically Signed by           Julian Baig PA-C  02/28/21 3925

## 2021-02-28 NOTE — H&P
H&P- Sandeep Zhu 1955, 76 y o  female MRN: [id number]    Unit/Bed#: -02 Encounter: [id number]    Primary Care Provider: Ty Mckeon DO   Date and time admitted to hospital: 2/28/2021 12:09 PM        * Hypertensive urgency  Assessment & Plan  · SBP greater than 200 in the ER  · Patient is only on enalapril at home which she states she takes at night, did not take a dose today  · Also states that she does not watch her diet and eats whatever she wants  · Will initiate lisinopril 20 mg daily in place of patient's home enalapril  · Hydralazine p r n  · Will add Norvasc if BP still elevated    Chest pain  Assessment & Plan  · EKG reviewed in the ER, no signs of acute ischemic changes  · Initial troponin negative, will continue to trend  · Monitor on tele  · Check echo  · Cardio eval in the morning  · Possibly secondary to hypertensive urgency as blood pressure was noted to be elevated with an SBP greater than 200 in the ER    Morbid obesity (HCC)  Assessment & Plan  · Follow-up with PCP for possible bariatric referral or weight management program    Mixed hyperlipidemia  Assessment & Plan  · Continue Zetia    Depression, recurrent ([facility] [address] )  Assessment & Plan  · Stable  · Continue home medications      VTE Prophylaxis: Enoxaparin (Lovenox)  Code Status:  Full code  POLST: There is no POLST form on file for this patient (pre-hospital)  Discussion with family:  Spoke with patient's significant other at bedside regarding plan of care    Anticipated Length of Stay:  Patient will be admitted on an Observation basis with an anticipated length of stay of  < 2 midnights  Justification for Hospital Stay:  Chest pain    Chief Complaint:   Chest pain    History of Present Illness:    Sandeep Zhu is a 76 y o  female who presents with chest pain  Patient states that she developed chest pain this morning which woke her up from sleep  Pain was left-sided, sharp/aching pain  4/10 at worse    Radiating to left upper extremity  No shortness of breath or palpitations  Patient did have a similar episode many years ago which ended up being diagnosed as anxiety  Patient denies any fever or chills  No cough  In the ER patient's blood pressure was noted to be elevated  Patient was given nitro which did not help much  Initial troponin negative  Review of Systems:  Review of Systems   Constitutional: Positive for activity change and fatigue  Respiratory: Negative for cough  Cardiovascular: Positive for chest pain and leg swelling  Negative for palpitations  Gastrointestinal: Negative for abdominal pain  All other systems reviewed and are negative  Past Medical and Surgical History:   Past Medical History:   Diagnosis Date    Arthritis     Cataract     ashley    Depression     Fluid retention     Headache, acute     Heart murmur     Hyperlipidemia     Hypertension     PONV (postoperative nausea and vomiting)     Rotator cuff tear, left     Wears glasses     Wears partial dentures     upper       Past Surgical History:   Procedure Laterality Date    ABDOMINAL ADHESION SURGERY      ANAL SPHINCTEROPLASTY      ANKLE SURGERY Right     tendon tear    APPENDECTOMY      CARPAL TUNNEL RELEASE Bilateral     CARPAL TUNNEL RELEASE Left     CARPAL TUNNEL RELEASE Right     COLONOSCOPY      HAND SURGERY Right     ganglion cyst    HEMORROIDECTOMY      HYSTERECTOMY      ILEOSTOMY      KNEE ARTHROCENTESIS      ganglion Cyst    KNEE ARTHROSCOPY Left     OVARIAN CYST REMOVAL      KS SHLDR ARTHROSCOP,SURG,W/ROTAT CUFF REPR Left 2/19/2018    Procedure: ARTHROSCOPIC REPAIR ROTATOR CUFF,  SAD, BICEP TENODESIS;  Surgeon: Anette Chan MD;  Location: [facility];  Service: Orthopedics    REPAIR RECTOCELE      TONSILLECTOMY      TONSILLECTOMY AND ADENOIDECTOMY      TUBAL LIGATION         Meds/Allergies:  Prior to Admission medications    Medication Sig Start Date End Date Taking?  Authorizing Provider ARIPiprazole (ABILIFY) 5 mg tablet Take 1 tablet (5 mg total) by mouth every morning 1/22/21  Yes Harjinder Barragan DO   Cholecalciferol (VITAMIN D3 PO) Take 50 mcg by mouth daily   Yes Historical Provider, MD   citalopram (CeleXA) 40 mg tablet take 1 tablet by mouth every evening 2/9/21  Yes Harjinder Barragan DO   clotrimazole-betamethasone (LOTRISONE) 1-0 05 % cream Apply topically 2 (two) times a day 4/15/20  Yes Harjinder Barragan DO   enalapril (Vasotec) 5 mg tablet Take 1 tablet (5 mg total) by mouth every evening 1/8/21  Yes Harjinder Barragan DO   ezetimibe (ZETIA) 10 mg tablet take 1 tablet by mouth once daily 11/16/20  Yes Harjinder Barragan DO   Multiple Vitamin (multivitamin) tablet Take 1 tablet by mouth daily   Yes Historical Provider, MD   nystatin (MYCOSTATIN) powder Apply topically 2 (two) times a day 12/21/20  Yes Harjinder Barragan DO   primidone (MYSOLINE) 50 mg tablet Take 1 tablet (50 mg total) by mouth every 8 (eight) hours 2/22/21  Yes Harjinder Barragan DO   traZODone (DESYREL) 100 mg tablet Take 1 tablet (100 mg total) by mouth daily at bedtime 1/22/21  Yes Harjinder Barragan DO   acetaminophen (TYLENOL) 500 mg tablet Take 500-1,000 mg by mouth every 6 (six) hours as needed for mild pain    Historical Provider, MD   Tea Tree Oil (NO FUNGUS NAIL PROTECTANT EX) Apply topically    Historical Provider, MD     I have reviewed home medications with patient personally  Allergies:    Allergies   Allergen Reactions    Iodine (Food Allergy) Hives     IV IODINE    Keflex [Cephalexin] Other (See Comments)     Mouth sores    Morphine And Related Hives     IV MORPINE    Penicillins Hives    Benadryl [Diphenhydramine] Itching and Swelling     IV benadryl in hospital    Ciprofloxacin Hives    Clindamycin Other (See Comments)     Pt unsure    Erythromycin Hives       Social History:  Marital Status:    Occupation:  None  Patient Pre-hospital Living Situation:  Lives with boyfriend  Patient Pre-hospital Level of Mobility:  Ambulatory with cane  Patient Pre-hospital Diet Restrictions:  None  Substance Use History:   Social History     Substance and Sexual Activity   Alcohol Use Yes    Comment: few x year     Social History     Tobacco Use   Smoking Status Never Smoker   Smokeless Tobacco Never Used     Social History     Substance and Sexual Activity   Drug Use No       Family History:  I have reviewed the patients family history    Physical Exam:   Vitals:   Blood Pressure: 128/74 (02/28/21 1449)  Pulse: 88 (02/28/21 1449)  Temperature: 97 9 °F (36 6 °C) (02/28/21 1449)  Temp Source: Tympanic (02/28/21 1449)  Respirations: 16 (02/28/21 1449)  Weight - Scale: 98 6 kg (217 lb 6 oz) (02/28/21 1449)  SpO2: 98 % (02/28/21 1449)    Physical Exam  Constitutional:       General: She is not in acute distress  Appearance: She is obese  HENT:      Head: Normocephalic and atraumatic  Nose: Nose normal       Mouth/Throat:      Mouth: Mucous membranes are moist    Eyes:      Extraocular Movements: Extraocular movements intact  Conjunctiva/sclera: Conjunctivae normal    Neck:      Musculoskeletal: Normal range of motion and neck supple  Cardiovascular:      Rate and Rhythm: Normal rate and regular rhythm  Pulmonary:      Effort: Pulmonary effort is normal  No respiratory distress  Abdominal:      Palpations: Abdomen is soft  Tenderness: There is no abdominal tenderness  Musculoskeletal: Normal range of motion  Comments: Trace bilateral lower extremity edema   Skin:     General: Skin is warm and dry  Neurological:      General: No focal deficit present  Mental Status: She is alert  Cranial Nerves: No cranial nerve deficit  Psychiatric:         Mood and Affect: Mood normal          Behavior: Behavior normal          Additional Data:   Lab Results: I have personally reviewed pertinent reports      Results from last 7 days   Lab Units 02/28/21  1224   WBC Thousand/uL 5 80   HEMOGLOBIN g/dL 12 3 HEMATOCRIT % 36 1*   PLATELETS Thousands/uL 236   NEUTROS PCT % 56   LYMPHS PCT % 26   MONOS PCT % 9   EOS PCT % 8*     Results from last 7 days   Lab Units 02/28/21  1224   SODIUM mmol/L 137   POTASSIUM mmol/L 4 2   CHLORIDE mmol/L 100   CO2 mmol/L 30   BUN mg/dL 19   CREATININE mg/dL 0 76   CALCIUM mg/dL 9 4     Results from last 7 days   Lab Units 02/28/21  1224   INR  1 02               Imaging: I have personally reviewed pertinent reports  X-ray chest 1 view portable   Final Result by Turner Wood MD (02/28 4355)      No significant interval change from previous examination  No acute abnormalities  Workstation performed: [id number]           Epic Records Reviewed: Yes     ** Please Note: This note has been constructed using a voice recognition system   **

## 2021-02-28 NOTE — PROGRESS NOTES
[facility]        NAME: Blossom Vila is a 76 y o  female  : 1955    MRN: [id number]  DATE: 2021  TIME: 11:33 AM    Assessment and Plan   Chest pain, unspecified type [R07 9]  1  Chest pain, unspecified type  aspirin chewable tablet 324 mg       Patient arrived to clinic an EKG was immediately obtained  No acute changes on EKG  Patient was given 324 chewable aspirin  EMS was called for transportation to the ER  Patient Instructions     There are no Patient Instructions on file for this visit  Chief Complaint     Chief Complaint   Patient presents with    Chest Pain         History of Present Illness   Blossom Vila presents to the clinic c/o    This is a 76year old female here today with chest pain and SOB  She states symptoms started today upon awakening at 6 AM   She states it is left sided  She states it is intermittent  She describes it as pressure  She denies any history of   MI in the past   She does have a history of hypertension  Denies history of diabetes  She has noticed shortness of breath and some wheezing  She does have a history of asthma  She states  This does not feel like her normal asthma  She has not used an inhaler in a long time  She does not have an inhaler at home  She denies any numbness or tingling down her arm  She has some low back pain but states she has at baseline  She does have a slight headache  She denies any fevers bodies or chills  No cough or congestion  No recent illness  She denies any exposure to COVID-19  She has not tried anything at home for pain  Review of Systems   Review of Systems   Constitutional: Negative for activity change, chills, fatigue and fever  HENT: Negative for congestion, postnasal drip, rhinorrhea, sinus pressure and sinus pain  Cardiovascular: Positive for chest pain  Gastrointestinal: Negative for diarrhea, nausea and vomiting  Genitourinary: Negative      Musculoskeletal: Negative  Neurological: Negative  Psychiatric/Behavioral: Negative  Current Medications     Long-Term Medications   Medication Sig Dispense Refill    ARIPiprazole (ABILIFY) 5 mg tablet Take 1 tablet (5 mg total) by mouth every morning 90 tablet 1    citalopram (CeleXA) 40 mg tablet take 1 tablet by mouth every evening 90 tablet 1    enalapril (Vasotec) 5 mg tablet Take 1 tablet (5 mg total) by mouth every evening 90 tablet 0    ezetimibe (ZETIA) 10 mg tablet take 1 tablet by mouth once daily 90 tablet 1    Multiple Vitamin (multivitamin) tablet Take 1 tablet by mouth daily      nystatin (MYCOSTATIN) powder Apply topically 2 (two) times a day 60 g 1    primidone (MYSOLINE) 50 mg tablet Take 1 tablet (50 mg total) by mouth every 8 (eight) hours 270 tablet 0    traZODone (DESYREL) 100 mg tablet Take 1 tablet (100 mg total) by mouth daily at bedtime 90 tablet 1    clotrimazole-betamethasone (LOTRISONE) 1-0 05 % cream Apply topically 2 (two) times a day (Patient not taking: Reported on 12/21/2020) 45 g 0       Current Allergies     Allergies as of 02/28/2021 - Reviewed 02/28/2021   Allergen Reaction Noted    Iodine (food allergy) Hives 02/13/2018    Keflex [cephalexin] Other (See Comments) 02/13/2018    Morphine and related Hives 02/13/2018    Penicillins Hives 02/13/2018    Benadryl [diphenhydramine] Itching and Swelling 04/15/2020    Clindamycin Other (See Comments) 02/13/2018    Erythromycin Hives 02/13/2018            The following portions of the patient's history were reviewed and updated as appropriate: allergies, current medications, past family history, past medical history, past social history, past surgical history and problem list     Objective   /88   Pulse 73   Resp 20   SpO2 98%        Physical Exam     Physical Exam  Constitutional:       Appearance: She is not toxic-appearing  Comments: Appears sob when walking into exam room      Cardiovascular:      Rate and Rhythm: Normal rate and regular rhythm  Heart sounds: Normal heart sounds  Pulmonary:      Effort: Pulmonary effort is normal  Tachypnea present  No respiratory distress  Breath sounds: Normal breath sounds  Neurological:      General: No focal deficit present  Mental Status: She is alert and oriented to person, place, and time     Psychiatric:         Mood and Affect: Mood normal          Behavior: Behavior normal

## 2021-02-28 NOTE — ASSESSMENT & PLAN NOTE
· EKG reviewed in the ER, no signs of acute ischemic changes  · Initial troponin negative, will continue to trend  · Monitor on tele  · Check echo  · Cardio eval in the morning  · Possibly secondary to hypertensive urgency as blood pressure was noted to be elevated with an SBP greater than 200 in the ER

## 2021-03-01 ENCOUNTER — APPOINTMENT (OUTPATIENT)
Dept: NON INVASIVE DIAGNOSTICS | Facility: HOSPITAL | Age: 66
End: 2021-03-01
Payer: MEDICARE

## 2021-03-01 ENCOUNTER — TRANSITIONAL CARE MANAGEMENT (OUTPATIENT)
Dept: INTERNAL MEDICINE CLINIC | Facility: CLINIC | Age: 66
End: 2021-03-01

## 2021-03-01 VITALS
RESPIRATION RATE: 16 BRPM | SYSTOLIC BLOOD PRESSURE: 122 MMHG | WEIGHT: 217.37 LBS | BODY MASS INDEX: 42.45 KG/M2 | DIASTOLIC BLOOD PRESSURE: 57 MMHG | OXYGEN SATURATION: 94 % | TEMPERATURE: 98 F | HEART RATE: 81 BPM

## 2021-03-01 LAB
ATRIAL RATE: 70 BPM
ATRIAL RATE: 72 BPM
ATRIAL RATE: 73 BPM
ATRIAL RATE: 89 BPM
ATRIAL RATE: 91 BPM
GLUCOSE SERPL-MCNC: 88 MG/DL (ref 65–140)
P AXIS: 24 DEGREES
P AXIS: 28 DEGREES
P AXIS: 46 DEGREES
P AXIS: 49 DEGREES
P AXIS: 54 DEGREES
PR INTERVAL: 176 MS
PR INTERVAL: 198 MS
PR INTERVAL: 202 MS
PR INTERVAL: 208 MS
PR INTERVAL: 216 MS
QRS AXIS: -12 DEGREES
QRS AXIS: -16 DEGREES
QRS AXIS: -18 DEGREES
QRS AXIS: -20 DEGREES
QRS AXIS: -4 DEGREES
QRSD INTERVAL: 84 MS
QRSD INTERVAL: 88 MS
QRSD INTERVAL: 88 MS
QRSD INTERVAL: 92 MS
QRSD INTERVAL: 94 MS
QT INTERVAL: 380 MS
QT INTERVAL: 396 MS
QT INTERVAL: 396 MS
QT INTERVAL: 402 MS
QT INTERVAL: 402 MS
QTC INTERVAL: 427 MS
QTC INTERVAL: 433 MS
QTC INTERVAL: 442 MS
QTC INTERVAL: 462 MS
QTC INTERVAL: 494 MS
T WAVE AXIS: 14 DEGREES
T WAVE AXIS: 15 DEGREES
T WAVE AXIS: 22 DEGREES
T WAVE AXIS: 29 DEGREES
T WAVE AXIS: 44 DEGREES
VENTRICULAR RATE: 70 BPM
VENTRICULAR RATE: 72 BPM
VENTRICULAR RATE: 73 BPM
VENTRICULAR RATE: 89 BPM
VENTRICULAR RATE: 91 BPM

## 2021-03-01 PROCEDURE — 99217 PR OBSERVATION CARE DISCHARGE MANAGEMENT: CPT | Performed by: INTERNAL MEDICINE

## 2021-03-01 PROCEDURE — 93010 ELECTROCARDIOGRAM REPORT: CPT | Performed by: INTERNAL MEDICINE

## 2021-03-01 PROCEDURE — 93306 TTE W/DOPPLER COMPLETE: CPT | Performed by: INTERNAL MEDICINE

## 2021-03-01 PROCEDURE — 99204 OFFICE O/P NEW MOD 45 MIN: CPT | Performed by: INTERNAL MEDICINE

## 2021-03-01 PROCEDURE — 82948 REAGENT STRIP/BLOOD GLUCOSE: CPT

## 2021-03-01 PROCEDURE — 93306 TTE W/DOPPLER COMPLETE: CPT

## 2021-03-01 RX ADMIN — ENOXAPARIN SODIUM 40 MG: 40 INJECTION SUBCUTANEOUS at 09:33

## 2021-03-01 RX ADMIN — ARIPIPRAZOLE 5 MG: 5 TABLET ORAL at 09:33

## 2021-03-01 RX ADMIN — PRIMIDONE 25 MG: 50 TABLET ORAL at 05:28

## 2021-03-01 RX ADMIN — LISINOPRIL 20 MG: 20 TABLET ORAL at 09:33

## 2021-03-01 NOTE — DISCHARGE SUMMARY
Discharge- Mason Healy 1955, 76 y o  female MRN: [id number]    Unit/Bed#: -02 Encounter: [id number]    Primary Care Provider: Adwoa Yang DO   Date and time admitted to hospital: 2/28/2021 12:09 PM        * Hypertensive urgency  Assessment & Plan  · Blood pressure has improved  · Patient did not require significant intervention for blood pressure control in the hospital  · Continue home enalapril  · Follow-up with PCP and Cardiology for further blood pressure management    Chest pain  Assessment & Plan  · Resolved  · Troponins negative x3  · Cardiology input appreciated  · Outpatient stress test    Morbid obesity ([facility] [address] )  Assessment & Plan  · Follow-up with PCP for possible bariatric referral or weight management program    Mixed hyperlipidemia  Assessment & Plan  · Continue Zetia    Depression, recurrent ([facility] )  Assessment & Plan  · Stable  · Continue home medications      Discharging Physician / Practitioner: Aleksandar Young MD  PCP: Adwoa Yang DO  Admission Date:   Admission Orders (From admission, onward)     Ordered        02/28/21 1333  Place in Observation  Once                   Discharge Date: 03/01/21    Resolved Problems  Date Reviewed: 3/1/2021    None          Consultations During Hospital Stay:  · Cardiology    Procedures Performed:   · None    Significant Findings / Test Results:   · Chest pain    Incidental Findings:   · None     Test Results Pending at Discharge (will require follow up): · None     Outpatient Tests Requested:  · Stress testing with Cardiology as outpatient    Complications:     None    Reason for Admission:  Chest pain    Hospital Course:     Mason Healy is a 76 y o  female patient who originally presented to the hospital on 2/28/2021 due to chest pain  Patient found to have hypertensive urgency and admitted for observation  Troponins were negative x3  Blood pressure significantly improved    Patient was seen by cardiology recommended outpatient stress testing  Patient resumed on home enalapril  Please see above list of diagnoses and related plan for additional information  Condition at Discharge: stable     Discharge Day Visit / Exam:     Subjective:  No complaints at this time    Vitals: Blood Pressure: 122/57 (03/01/21 0700)  Pulse: 81 (03/01/21 0700)  Temperature: 98 °F (36 7 °C) (03/01/21 0700)  Temp Source: Temporal (03/01/21 0700)  Respirations: 16 (03/01/21 0700)  Weight - Scale: 98 6 kg (217 lb 6 oz) (02/28/21 1449)  SpO2: 94 % (03/01/21 0700)     Exam:   Physical Exam  Constitutional:       General: She is not in acute distress  Appearance: She is obese  HENT:      Head: Normocephalic and atraumatic  Nose: Nose normal       Mouth/Throat:      Mouth: Mucous membranes are moist    Eyes:      Extraocular Movements: Extraocular movements intact  Conjunctiva/sclera: Conjunctivae normal    Neck:      Musculoskeletal: Normal range of motion and neck supple  Cardiovascular:      Rate and Rhythm: Normal rate and regular rhythm  Pulmonary:      Effort: Pulmonary effort is normal  No respiratory distress  Abdominal:      Palpations: Abdomen is soft  Tenderness: There is no abdominal tenderness  Musculoskeletal: Normal range of motion  General: No swelling  Skin:     General: Skin is warm and dry  Neurological:      General: No focal deficit present  Mental Status: She is alert  Cranial Nerves: No cranial nerve deficit  Psychiatric:         Mood and Affect: Mood normal          Behavior: Behavior normal          Discharge instructions/Information to patient and family:   See after visit summary for information provided to patient and family  Provisions for Follow-Up Care:  See after visit summary for information related to follow-up care and any pertinent home health orders        Disposition:     Home      Planned Readmission:    no     Discharge Statement:  I spent 35 minutes discharging the patient  This time was spent on the day of discharge  I had direct contact with the patient on the day of discharge  Greater than 50% of the total time was spent examining patient, answering all patient questions, arranging and discussing plan of care with patient as well as directly providing post-discharge instructions  Additional time then spent on discharge activities  Discharge Medications:  See after visit summary for reconciled discharge medications provided to patient and family        ** Please Note: This note has been constructed using a voice recognition system **

## 2021-03-01 NOTE — CONSULTS
Consult- Gabby Lockett 1955, 76 y o  female MRN: [id number]    Unit/Bed#: -02 Encounter: [id number]    Primary Care Provider: Duke Lei DO   Date and time admitted to hospital: 2/28/2021 12:09 PM      Inpatient consult to Cardiology  Consult performed by: IVAN Ascencio  Consult ordered by: Mark Fitch MD        Chest pain  Assessment & Plan  Likely related to hypertension, anxiety  Troponin negative x3, no ischemic changes on EKG  We will arrange outpatient stress echo    Mixed hyperlipidemia  Assessment & Plan  Continue Zetia    * Hypertensive urgency  Assessment & Plan  Blood pressure improved following Ativan  Hydralazine ordered p r n , none given  Continue home enalapril 5 mg daily      Other summary comments:   Blood pressure improved following administration of lorazepam   Patient that she has been under a lot of stress lately  Chest pain likely related to hypertension and anxiety  No concerning findings on workup thus far  Stable for discharge from a cardiac perspective for outpatient follow-up  Outpatient Cardiologist: none    HPI: Gabby Lockett is seen in consultation for hypertension and chest pain  Antoine Posada is a pleasant 62-year-old female who presented to the emergency department with chest pain  She was noted to be hypertensive at that time  Presently she is comfortable, she did have a brief episode of left-sided chest discomfort this morning that was self-limited, lasting 5 min, without any associated factors  Antoine Posada notes that she has been under a lot of stress lately  She recently went through a divorce  She is currently living at her daughter's living room  She is trying to get herself reestablished  She notes that she is a little short of breath when walking to the bathroom and has felt mild dyspnea over the past few days  It has been improving  She relates this to being exposed to secondhand smoke in her daughter's home    She also notes that she sometimes feels like she can't take a deep breath  She notes that this occurs when she is feeling more anxious  She denies any palpitations, lightheadedness, she does have chronic, but mild lower extremity edema  She reports a cardiac evaluation over 20 years ago  She was lost to follow-up when her physician left the practice  Details of these encounters are not available at this time  EKG:   SR, no ischemia     MOST  RECENT CARDIAC IMAGING:   none      Review of Systems: a 10 point review of systems was conducted and is negative except for as mentioned in the HPI or as below          Historical Information   Past Medical History:   Diagnosis Date    Arthritis     Cataract     ashley    Depression     Fluid retention     Headache, acute     Heart murmur     Hyperlipidemia     Hypertension     PONV (postoperative nausea and vomiting)     Rotator cuff tear, left     Wears glasses     Wears partial dentures     upper     Past Surgical History:   Procedure Laterality Date    ABDOMINAL ADHESION SURGERY      ANAL SPHINCTEROPLASTY      ANKLE SURGERY Right     tendon tear    APPENDECTOMY      CARPAL TUNNEL RELEASE Bilateral     CARPAL TUNNEL RELEASE Left     CARPAL TUNNEL RELEASE Right     COLONOSCOPY      HAND SURGERY Right     ganglion cyst    HEMORROIDECTOMY      HYSTERECTOMY      ILEOSTOMY      KNEE ARTHROCENTESIS      ganglion Cyst    KNEE ARTHROSCOPY Left     OVARIAN CYST REMOVAL      CO SHLDR ARTHROSCOP,SURG,W/ROTAT CUFF REPR Left 2/19/2018    Procedure: ARTHROSCOPIC REPAIR ROTATOR CUFF,  SAD, BICEP TENODESIS;  Surgeon: Chris Loza MD;  Location: [facility];  Service: Orthopedics    REPAIR RECTOCELE      TONSILLECTOMY      TONSILLECTOMY AND ADENOIDECTOMY      TUBAL LIGATION       Social History     Substance and Sexual Activity   Alcohol Use Yes    Comment: few x year     Social History     Substance and Sexual Activity   Drug Use No     Social History     Tobacco Use Smoking Status Never Smoker   Smokeless Tobacco Never Used       Family History:   Grandfather with hx MI in his 52's    Meds/Allergies   all current active meds have been reviewed  Facility-Administered Medications Prior to Admission   Medication    [COMPLETED] aspirin chewable tablet 324 mg     Medications Prior to Admission   Medication    ARIPiprazole (ABILIFY) 5 mg tablet    Cholecalciferol (VITAMIN D3 PO)    citalopram (CeleXA) 40 mg tablet    clotrimazole-betamethasone (LOTRISONE) 1-0 05 % cream    enalapril (Vasotec) 5 mg tablet    ezetimibe (ZETIA) 10 mg tablet    Multiple Vitamin (multivitamin) tablet    nystatin (MYCOSTATIN) powder    primidone (MYSOLINE) 50 mg tablet    traZODone (DESYREL) 100 mg tablet    acetaminophen (TYLENOL) 500 mg tablet    Tea Tree Oil (NO FUNGUS NAIL PROTECTANT EX)       Allergies   Allergen Reactions    Iodine (Food Allergy) Hives     IV IODINE    Keflex [Cephalexin] Other (See Comments)     Mouth sores    Morphine And Related Hives     IV MORPINE    Penicillins Hives    Benadryl [Diphenhydramine] Itching and Swelling     IV benadryl in hospital    Ciprofloxacin Hives    Clindamycin Other (See Comments)     Pt unsure    Erythromycin Hives       Objective   Vitals: Blood pressure 122/57, pulse 81, temperature 98 °F (36 7 °C), temperature source Temporal, resp  rate 16, weight 98 6 kg (217 lb 6 oz), SpO2 94 %, not currently breastfeeding , Body mass index is 42 45 kg/m² ,   Orthostatic Blood Pressures      Most Recent Value   Blood Pressure  122/57 filed at 03/01/2021 0700   Patient Position - Orthostatic VS  Lying filed at 03/01/2021 4607          Systolic (15QMB), WQB:332 , Min:113 , BZZ:187     Diastolic (87NFN), JVS:05, Min:57, Max:95    Physical Exam:    General:  Normal appearance in no distress  Eyes:  Anicteric  Oral mucosa:  Moist   Neck:  No JVD  Carotid upstrokes are brisk without bruits  No masses    Chest:  Clear to auscultation and percussion  Cardiac:  No palpable PMI  Normal S1 and S2  No murmur gallop or rub  Abdomen:  Soft and nontender  No palpable organomegaly or aortic enlargement  Extremities:  Trace lower extremity edema  Musculoskeletal:  Symmetric  Vascular:  Pedal pulses are intact  Neuro:  Grossly symmetric  Psych:  Alert and oriented x3      Lab Results:     Troponins:   Results from last 7 days   Lab Units 02/28/21  1818 02/28/21  1527 02/28/21  1224   TROPONIN I ng/mL <0 03 <0 03 <0 03     BNP:   Results from last 6 Months   Lab Units 02/28/21  1224   BNP pg/mL 47       CBC :   Results from last 7 days   Lab Units 02/28/21  1224   WBC Thousand/uL 5 80   HEMOGLOBIN g/dL 12 3   HEMATOCRIT % 36 1*   MCV fL 86   PLATELETS Thousands/uL 236     TSH:     CMP:   Results from last 7 days   Lab Units 02/28/21  1224   POTASSIUM mmol/L 4 2   CHLORIDE mmol/L 100   CO2 mmol/L 30   BUN mg/dL 19   CREATININE mg/dL 0 76   EGFR ml/min/1 73sq m 82     Lipid Profile:     Coags:   Results from last 7 days   Lab Units 02/28/21  1224   INR  1 02

## 2021-03-01 NOTE — NURSING NOTE
Patient discharged home  IV removed  Verbalizes understanding of discharge instructions  Patient has all belongigns   Escorted out by [address]

## 2021-03-01 NOTE — UTILIZATION REVIEW
Initial Clinical Review    Admission: Date/Time/Statement:   Admission Orders (From admission, onward)     Ordered        02/28/21 1333  Place in Observation  Once                   Orders Placed This Encounter   Procedures    Place in Observation     Standing Status:   Standing     Number of Occurrences:   1     Order Specific Question:   Level of Care     Answer:   Med Surg [16]     Order Specific Question:   Bed request comments     Answer:   tele     ED Arrival Information     Expected Arrival Acuity Means of Arrival Escorted By Service Admission Type    2/28/2021 11:37 2/28/2021 12:06 [facility]    Arrival Complaint    chest pain        Chief Complaint   Patient presents with    Chest Pain     Assessment/Plan:    76year old female presents to ed via ems for evaluation and treatment of hypertension which quickly resolved   3-1 discharged home with self care            ED Triage Vitals [02/28/21 1214]   (!) 97 2 °F (36 2 °C) 72 18 148/85 98 %      Temporal Monitor         Pain Score       3          02/28/21 98 6 kg (217 lb 6 oz)     Additional Vital Signs:     Date/Time  Temp  Pulse  Resp  BP  MAP (mmHg)  SpO2  O2 Device    03/01/21 0700  98 °F (36 7 °C)  81  16  122/57  --  94 %  None (Room air)    03/01/21 0300  97 7 °F (36 5 °C)  68  18  136/65  94  95 %  None (Room air)    02/28/21 2219  97 3 °F (36 3 °C)Abnormal   79  18  138/64  --  95 %  None (Room air)    02/28/21 2018  --  --  --  --  --  --  None (Room air)    02/28/21 1857  97 4 °F (36 3 °C)Abnormal   90  18  113/77  --  96 %  None (Room air)    02/28/21 1449  97 9 °F (36 6 °C)  88  16  128/74  --  98 %  None (Room air)    02/28/21 1402  --  71  16  167/65  107  98 %  None (Room air)    02/28/21 1400  --  67  12  206/95Abnormal   136  99 %  None (Room air)                  Pertinent Labs/Diagnostic Test Results:     X-ray chest 1 view portable   Final  (02/28 1416)      No significant interval change from previous examination  No acute abnormalities             Results from last 7 days   Lab Units 02/28/21  1226   SARS-COV-2  Negative     Results from last 7 days   Lab Units 02/28/21  1224   WBC Thousand/uL 5 80   HEMOGLOBIN g/dL 12 3   HEMATOCRIT % 36 1*   PLATELETS Thousands/uL 236   NEUTROS ABS Thousands/µL 3 30         Results from last 7 days   Lab Units 02/28/21  1224   SODIUM mmol/L 137   POTASSIUM mmol/L 4 2   CHLORIDE mmol/L 100   CO2 mmol/L 30   ANION GAP mmol/L 7   BUN mg/dL 19   CREATININE mg/dL 0 76   EGFR ml/min/1 73sq m 82   CALCIUM mg/dL 9 4         Results from last 7 days   Lab Units 03/01/21  0545   POC GLUCOSE mg/dl 88     Results from last 7 days   Lab Units 02/28/21  1224   GLUCOSE RANDOM mg/dL 102*         Results from last 7 days   Lab Units 02/28/21  1818 02/28/21  1527 02/28/21  1224   TROPONIN I ng/mL <0 03 <0 03 <0 03         Results from last 7 days   Lab Units 02/28/21  1224   PROTIME seconds 13 3   INR  1 02   PTT seconds 27       Results from last 7 days   Lab Units 02/28/21  1224   BNP pg/mL 47       Results from last 7 days   Lab Units 02/28/21  1226   INFLUENZA A PCR  Negative   INFLUENZA B PCR  Negative   RSV PCR  Negative       ED Treatment:   Medication Administration from 02/28/2021 1137 to 02/28/2021 1414       Date/Time Order Dose Route Action     02/28/2021 1358 albuterol inhalation solution 2 5 mg 2 5 mg Nebulization Given     02/28/2021 1358 LORazepam (ATIVAN) injection 0 5 mg 0 5 mg Intravenous Given        Past Medical History:   Diagnosis Date    Arthritis     Cataract     ashley    Depression     Fluid retention     Headache, acute     Heart murmur     Hyperlipidemia     Hypertension     PONV (postoperative nausea and vomiting)     Rotator cuff tear, left     Wears glasses     Wears partial dentures     upper     Present on Admission:   Depression, recurrent ([address] )   Mixed hyperlipidemia   Morbid obesity ([facility] [address] )      Admitting Diagnosis: Chest pain [R07 9]  Age/Sex: 76 y o  female  Admission Orders:  Scheduled Medications:  ARIPiprazole, 5 mg, Oral, QAM  citalopram, 40 mg, Oral, QPM  enoxaparin, 40 mg, Subcutaneous, Daily  ezetimibe, 10 mg, Oral, Daily  lisinopril, 20 mg, Oral, Daily  primidone, 25 mg, Oral, Q8H BRENDAN  traZODone, 100 mg, Oral, HS      Continuous IV Infusions:     PRN Meds:  acetaminophen, 650 mg, Oral, Q6H PRN  hydrALAZINE, 5 mg, Intravenous, Q6H PRN  ondansetron, 4 mg, Intravenous, Q6H PRN  sodium chloride (PF), 3 mL, Intravenous, Q1H PRN        IP CONSULT TO CARDIOLOGY    Network Utilization Review Department  ATTENTION: Please call with any questions or concerns to [phone] and carefully listen to the prompts so that you are directed to the right person  All voicemails are confidential   Ochoa Campuzano all requests for admission clinical reviews, approved or denied determinations and any other requests to dedicated fax number below belonging to the campus where the patient is receiving treatment   List of dedicated fax numbers for the Facilities:  [address] DENIALS (Administrative/Medical Necessity) [phone]   [address] (Maternity/NICU/Pediatrics) [phone] [address] Nico Nye 9761 (  Oskar Khan "Mary Jane" 103) [address] [place] [address] O[address] [place] [address] [phone]

## 2021-03-01 NOTE — CASE MANAGEMENT
Pt is not a 30 day readmission, pt was made aware of cm role, pt is aware that she is here as an obs status for hypertensive urgency & chest pain, pt is independent, works & drives, she lives with her daughter in a 4 story home ( basement, 1& 2 & attic) br on the 1st & 2nd floor, pt stays on the 1st floor, she stated she does sometimes need to use the 2nd floor br, DME: cane, RX plan [facility] [place] no hx of HHC, hx of depression, pt wear glasses and she states she is able to read the medications labels, pt denies smoking and states she drinks alcohol on rare occasions  d/c order written, pt denies any d/c needs, pt's family will transport the pt home, pt is in agreement with the d/c and d/c plan home, CM reviewed d/c planning process including the following: identifying help at home, patient preference for d/c planning needs, availability of treatment team to discuss questions or concerns patient and/or family may have regarding understanding medications and recognizing signs and symptoms once discharged  CM also encouraged patient to follow up with all recommended appointments after discharge  Patient advised of importance for patient and family to participate in managing patients medical well being  Patient/caregiver received discharge checklist   Content reviewed  Patient/caregiver encouraged to participate in discharge plan of care prior to discharge home

## 2021-03-01 NOTE — ASSESSMENT & PLAN NOTE
Blood pressure improved following Ativan  Hydralazine ordered p r n , none given  Continue home enalapril 5 mg daily

## 2021-03-01 NOTE — ASSESSMENT & PLAN NOTE
Likely related to hypertension, anxiety  Troponin negative x3, no ischemic changes on EKG  We will arrange outpatient stress echo

## 2021-03-01 NOTE — NURSING NOTE
Awake and pleasant  Sitting up in bed  resp easy  No sob  Lungs clear  Skin is dry to touch  C/o mild pain under left breast periodically  Abdomen is soft and obese  Positive bsx4  Plus 1 ble edema  Tele maintained nsr first degree av block  Call bell given

## 2021-03-01 NOTE — ASSESSMENT & PLAN NOTE
· Blood pressure has improved  · Patient did not require significant intervention for blood pressure control in the hospital  · Continue home enalapril  · Follow-up with PCP and Cardiology for further blood pressure management

## 2021-03-01 NOTE — PLAN OF CARE
Problem: Potential for Falls  Goal: Patient will remain free of falls  Description: INTERVENTIONS:  - Assess patient frequently for physical needs  -  Identify cognitive and physical deficits and behaviors that affect risk of falls    -  [facility] fall precautions as indicated by assessment   - Educate patient/family on patient safety including physical limitations  - Instruct patient to call for assistance with activity based on assessment  - Modify environment to reduce risk of injury  - Consider OT/PT consult to assist with strengthening/mobility  Outcome: Progressing     Problem: PAIN - ADULT  Goal: Verbalizes/displays adequate comfort level or baseline comfort level  Description: Interventions:  - Encourage patient to monitor pain and request assistance  - Assess pain using appropriate pain scale  - Administer analgesics based on type and severity of pain and evaluate response  - Implement non-pharmacological measures as appropriate and evaluate response  - Consider cultural and social influences on pain and pain management  - Notify physician/advanced practitioner if interventions unsuccessful or patient reports new pain  Outcome: Progressing     Problem: INFECTION - ADULT  Goal: Absence or prevention of progression during hospitalization  Description: INTERVENTIONS:  - Assess and monitor for signs and symptoms of infection  - Monitor lab/diagnostic results  - Monitor all insertion sites, i e  indwelling lines, tubes, and drains  - Monitor endotracheal if appropriate and nasal secretions for changes in amount and color  - [facility] appropriate cooling/warming therapies per order  - Administer medications as ordered  - Instruct and encourage patient and family to use good hand hygiene technique  - Identify and instruct in appropriate isolation precautions for identified infection/condition  Outcome: Progressing  Goal: Absence of fever/infection during neutropenic period  Description: INTERVENTIONS:  - Monitor WBC    Outcome: Progressing     Problem: SAFETY ADULT  Goal: Patient will remain free of falls  Description: INTERVENTIONS:  - Assess patient frequently for physical needs  -  Identify cognitive and physical deficits and behaviors that affect risk of falls  -  [place] fall precautions as indicated by assessment   - Educate patient/family on patient safety including physical limitations  - Instruct patient to call for assistance with activity based on assessment  - Modify environment to reduce risk of injury  - Consider OT/PT consult to assist with strengthening/mobility  Outcome: Progressing  Goal: Maintain or return to baseline ADL function  Description: INTERVENTIONS:  - Assess patient frequently for physical needs  -  Identify cognitive and physical deficits and behaviors that affect risk of falls    -  [place] fall precautions as indicated by assessment   - Educate patient/family on patient safety including physical limitations  - Instruct patient to call for assistance with activity based on assessment  - Modify environment to reduce risk of injury  - Consider OT/PT consult to assist with strengthening/mobility  Outcome: Progressing  Goal: Maintain or return mobility status to optimal level  Description: INTERVENTIONS:  -  Assess patient's ability to carry out ADLs; assess patient's baseline for ADL function and identify physical deficits which impact ability to perform ADLs (bathing, care of mouth/teeth, toileting, grooming, dressing, etc )  - Assess/evaluate cause of self-care deficits   - Assess range of motion  - Assess patient's mobility; develop plan if impaired  - Assess patient's need for assistive devices and provide as appropriate  - Encourage maximum independence but intervene and supervise when necessary  - Involve family in performance of ADLs  - Assess for home care needs following discharge   - Consider OT consult to assist with ADL evaluation and planning for discharge  - Provide patient education as appropriate  Outcome: Progressing     Problem: DISCHARGE PLANNING  Goal: Discharge to home or other facility with appropriate resources  Description: INTERVENTIONS:  - Identify barriers to discharge w/patient and caregiver  - Arrange for needed discharge resources and transportation as appropriate  - Identify discharge learning needs (meds, wound care, etc )  - Arrange for interpretive services to assist at discharge as needed  - Refer to Case Management Department for coordinating discharge planning if the patient needs post-hospital services based on physician/advanced practitioner order or complex needs related to functional status, cognitive ability, or social support system  Outcome: Progressing     Problem: Knowledge Deficit  Goal: Patient/family/caregiver demonstrates understanding of disease process, treatment plan, medications, and discharge instructions  Description: Complete learning assessment and assess knowledge base    Interventions:  - Provide teaching at level of understanding  - Provide teaching via preferred learning methods  Outcome: Progressing

## 2021-03-02 ENCOUNTER — TELEPHONE (OUTPATIENT)
Dept: INTERNAL MEDICINE CLINIC | Facility: CLINIC | Age: 66
End: 2021-03-02

## 2021-03-02 NOTE — TELEPHONE ENCOUNTER
----- Message from Mahogany Harris DO sent at 2/22/2021  1:04 PM EST -----  Call pt in one week and see how her tremors are doing

## 2021-03-02 NOTE — TELEPHONE ENCOUNTER
Have pt increase the mysoline from 50 tid to 100mg tid  BUT take one in the AM one in the PM and then two at bedtime for three days and then One in the AM and two in the PM and HS for three days and the two po tid  Call pt in three weeks for up date

## 2021-03-08 ENCOUNTER — OFFICE VISIT (OUTPATIENT)
Dept: INTERNAL MEDICINE CLINIC | Facility: CLINIC | Age: 66
End: 2021-03-08
Payer: MEDICARE

## 2021-03-08 VITALS
TEMPERATURE: 97.6 F | OXYGEN SATURATION: 97 % | HEART RATE: 106 BPM | BODY MASS INDEX: 42.41 KG/M2 | DIASTOLIC BLOOD PRESSURE: 70 MMHG | SYSTOLIC BLOOD PRESSURE: 138 MMHG | HEIGHT: 60 IN | WEIGHT: 216 LBS | RESPIRATION RATE: 18 BRPM

## 2021-03-08 DIAGNOSIS — R07.89 ATYPICAL CHEST PAIN: Primary | ICD-10-CM

## 2021-03-08 DIAGNOSIS — R06.00 DOE (DYSPNEA ON EXERTION): ICD-10-CM

## 2021-03-08 DIAGNOSIS — F41.1 GAD (GENERALIZED ANXIETY DISORDER): ICD-10-CM

## 2021-03-08 DIAGNOSIS — I16.0 HYPERTENSIVE URGENCY: ICD-10-CM

## 2021-03-08 LAB
ATRIAL RATE: 91 BPM
P AXIS: 49 DEGREES
PR INTERVAL: 216 MS
QRS AXIS: -20 DEGREES
QRSD INTERVAL: 94 MS
QT INTERVAL: 402 MS
QTC INTERVAL: 494 MS
T WAVE AXIS: 14 DEGREES
VENTRICULAR RATE: 91 BPM

## 2021-03-08 PROCEDURE — 99495 TRANSJ CARE MGMT MOD F2F 14D: CPT | Performed by: INTERNAL MEDICINE

## 2021-03-08 PROCEDURE — 93010 ELECTROCARDIOGRAM REPORT: CPT | Performed by: INTERNAL MEDICINE

## 2021-03-08 NOTE — PROGRESS NOTES
Assessment/Plan:     No problem-specific Assessment & Plan notes found for this encounter  Diagnoses and all orders for this visit:    Atypical chest pain  -     Echo stress test w contrast if indicated; Future    Hypertensive urgency  -     Echo stress test w contrast if indicated; Future    CHRISTIANSON (dyspnea on exertion)  -     Echo stress test w contrast if indicated; Future    MAGGIE (generalized anxiety disorder)    Other orders  -     Calcium Carbonate (CALCIUM 600 PO); Take by mouth daily     A/P: Back to her baseline  ??cause  BP is better  Activity and diet back to baseline  Tolerating the meds  Labs at d/c were good  ?CHRISTIANSON due to deconditioning and if MAGGIE may be playing a role  Continue current treatment and will arrange for stress echo as recommended  RTC as scheduled in several weeks for routine  Keep f/u with counseling  If stress is negative, consider PFT's  Doubt PE  Subjective:     Patient ID: Baldo Langford is a 76 y o  female  WF presents for f/u short admission for CP  W/u revealed very high BP  R/o for ischemia with serial ekg and enzymes  Dx with hypertensive urgency and quickly came under controlled and d/c to home on her prior meds  Since d/c, doing ok and no fever or chills  No CP, SOB, edema, palpitations, orthopnea, or PND  No stroke like events  Activity and diet are back to baseline  Tolerating her meds  No new c/o's  Review of Systems      Objective:     Physical Exam      Vitals:    03/08/21 1347   BP: 138/70   BP Location: Left arm   Patient Position: Sitting   Cuff Size: Adult   Pulse: (!) 106   Resp: 18   Temp: 97 6 °F (36 4 °C)   TempSrc: Temporal   SpO2: 97%   Weight: 98 kg (216 lb)   Height: 5' (1 524 m)       Transitional Care Management Review:  Baldo Langford is a 76 y o  female here for TCM follow up       During the TCM phone call patient stated:    TCM Call (since 2/5/2021)     Date and time call was made  3/1/2021  5:39 PM    Hospital care reviewed  Records reviewed Patient was hospitialized at  [address]        Date of Admission  02/28/21    Date of discharge  03/01/21    Diagnosis  hypertensive urgency    Disposition  Home    Were the patients medications reviewed and updated  Yes    Current Symptoms  None      TCM Call (since 2/5/2021)     Post hospital issues  None    Should patient be enrolled in anticoag monitoring? No    Scheduled for follow up?   Yes    Did you obtain your prescribed medications  Yes    Do you need help managing your prescriptions or medications  No    Is transportation to your appointment needed  No    I have advised the patient to call PCP with any new or worsening symptoms  verito Gandhi, DO

## 2021-03-08 NOTE — PATIENT INSTRUCTIONS

## 2021-03-12 ENCOUNTER — OFFICE VISIT (OUTPATIENT)
Dept: URGENT CARE | Facility: CLINIC | Age: 66
End: 2021-03-12
Payer: MEDICARE

## 2021-03-12 VITALS
HEIGHT: 60 IN | SYSTOLIC BLOOD PRESSURE: 173 MMHG | DIASTOLIC BLOOD PRESSURE: 77 MMHG | BODY MASS INDEX: 40.44 KG/M2 | WEIGHT: 206 LBS | TEMPERATURE: 98.2 F | OXYGEN SATURATION: 97 % | HEART RATE: 71 BPM | RESPIRATION RATE: 18 BRPM

## 2021-03-12 DIAGNOSIS — J30.2 SEASONAL ALLERGIC RHINITIS, UNSPECIFIED TRIGGER: Primary | ICD-10-CM

## 2021-03-12 PROCEDURE — G0463 HOSPITAL OUTPT CLINIC VISIT: HCPCS | Performed by: NURSE PRACTITIONER

## 2021-03-12 PROCEDURE — 99213 OFFICE O/P EST LOW 20 MIN: CPT | Performed by: NURSE PRACTITIONER

## 2021-03-12 RX ORDER — LORATADINE 10 MG/1
10 TABLET ORAL DAILY
Qty: 30 TABLET | Refills: 0 | Status: SHIPPED | OUTPATIENT
Start: 2021-03-12

## 2021-03-12 RX ORDER — FLUTICASONE PROPIONATE 50 MCG
1 SPRAY, SUSPENSION (ML) NASAL DAILY
Qty: 9.9 ML | Refills: 0 | Status: SHIPPED | OUTPATIENT
Start: 2021-03-12 | End: 2022-01-07

## 2021-03-12 NOTE — PATIENT INSTRUCTIONS
Take medication as directed  Rest and drink plenty of fluids  A cool mist humidifier and saline rinse can be helpful  If you develop a worsening cough, chest pain, shortness of breath, palpitations, loss of taste or smell,  prolonged high fever, severe headache, dizziness, any new or concerning symptoms please return or proceed ER  Recommend following up with PCP in 3-5 days        Allergic Rhinitis   WHAT YOU NEED TO KNOW:   Allergic rhinitis, or hay fever, is swelling of the inside of your nose  The swelling is a reaction to allergens in the air  An allergen can be anything that causes an allergic reaction  Allergies to weeds, grass, trees, or mold often cause seasonal allergic rhinitis  Indoor dust mites, cockroaches, pet dander, or mold can also cause allergic rhinitis  DISCHARGE INSTRUCTIONS:   Call 911 for the following:   · You have chest pain or shortness of breath  Return to the emergency department if:   · You have severe pain  · You cough up blood  Contact your healthcare provider if:   · You have a fever  · You have ear or sinus pain, or a headache  · Your symptoms get worse, even after treatment  · You have yellow, green, brown, or bloody mucus coming from your nose  · Your nose is bleeding or you have pain inside your nose  · You have trouble sleeping because of your symptoms  · You have questions or concerns about your condition or care  Medicines:   · Medicines  help decrease your symptoms and clear your stuffy nose  · Take your medicine as directed  Contact your healthcare provider if you think your medicine is not helping or if you have side effects  Tell him of her if you are allergic to any medicine  Keep a list of the medicines, vitamins, and herbs you take  Include the amounts, and when and why you take them  Bring the list or the pill bottles to follow-up visits  Carry your medicine list with you in case of an emergency      How to manage allergic rhinitis:  The best way to manage allergic rhinitis is to avoid allergens that can trigger your symptoms  Any of the following may help decrease your symptoms:  · Rinse your nose and sinuses  with a salt water solution or use a salt water nasal spray  This will help thin the mucus in your nose and rinse away pollen and dirt  It will also help reduce swelling so you can breathe normally  Ask your healthcare provider how often to rinse your nose  · Reduce exposure to dust mites  Wash sheets and towels in hot water every week  Cover your pillows and mattresses with allergen-free covers  Limit the number of stuffed animals and soft toys your child has  Wash your child's toys in hot water regularly  Vacuum weekly and use a vacuum  with an air filter  If possible, get rid of carpets and curtains  These collect dust and dust mites  · Reduce exposure to pollen  Keep windows and doors closed in your house and car  Stay inside when air pollution or the pollen count is high  Run your air conditioner on recycle, and change air filters often  Shower and wash your hair before bed every night to rinse away pollen  · Reduce exposure to pet dander  If possible, do not keep cats, dogs, birds, or other pets  If you do keep pets in your home, keep them out of bedrooms and carpeted rooms  Bathe them often  · Reduce exposure to mold  Do not spend time in basements  Choose artificial plants instead of live plants  Keep your home's humidity at less than 45%  Do not have ponds or standing water in your home or yard  · Do not smoke  Avoid others who smoke  Ask your healthcare provider for information if you currently smoke and need help to quit  Follow up with your healthcare provider as directed: You may need to see an allergist often to control your symptoms  Write down your questions so you remember to ask them during your visits    © Copyright [employer] 2020 Information is for End User's use only and may not be sold, redistributed or otherwise used for commercial purposes  All illustrations and images included in CareNotes® are the copyrighted property of [employer]  or Jannet Pierre  The above information is an  only  It is not intended as medical advice for individual conditions or treatments  Talk to your doctor, nurse or pharmacist before following any medical regimen to see if it is safe and effective for you

## 2021-03-12 NOTE — PROGRESS NOTES
[address] [facility]        NAME: Andrea Alas is a 76 y o  female  : 1955    MRN: [id number]  DATE: 2021  TIME: 11:24 AM    Assessment and Plan   Seasonal allergic rhinitis, unspecified trigger [J30 2]  1  Seasonal allergic rhinitis, unspecified trigger  loratadine (CLARITIN) 10 mg tablet    fluticasone (FLONASE) 50 mcg/act nasal spray         Patient Instructions     Patient Instructions   Take medication as directed  Rest and drink plenty of fluids  A cool mist humidifier and saline rinse can be helpful  If you develop a worsening cough, chest pain, shortness of breath, palpitations, loss of taste or smell,  prolonged high fever, severe headache, dizziness, any new or concerning symptoms please return or proceed ER  Recommend following up with PCP in 3-5 days        Allergic Rhinitis   WHAT YOU NEED TO KNOW:   Allergic rhinitis, or hay fever, is swelling of the inside of your nose  The swelling is a reaction to allergens in the air  An allergen can be anything that causes an allergic reaction  Allergies to weeds, grass, trees, or mold often cause seasonal allergic rhinitis  Indoor dust mites, cockroaches, pet dander, or mold can also cause allergic rhinitis  DISCHARGE INSTRUCTIONS:   Call 911 for the following:   · You have chest pain or shortness of breath  Return to the emergency department if:   · You have severe pain  · You cough up blood  Contact your healthcare provider if:   · You have a fever  · You have ear or sinus pain, or a headache  · Your symptoms get worse, even after treatment  · You have yellow, green, brown, or bloody mucus coming from your nose  · Your nose is bleeding or you have pain inside your nose  · You have trouble sleeping because of your symptoms  · You have questions or concerns about your condition or care  Medicines:   · Medicines  help decrease your symptoms and clear your stuffy nose  · Take your medicine as directed  Contact your healthcare provider if you think your medicine is not helping or if you have side effects  Tell him of her if you are allergic to any medicine  Keep a list of the medicines, vitamins, and herbs you take  Include the amounts, and when and why you take them  Bring the list or the pill bottles to follow-up visits  Carry your medicine list with you in case of an emergency  How to manage allergic rhinitis:  The best way to manage allergic rhinitis is to avoid allergens that can trigger your symptoms  Any of the following may help decrease your symptoms:  · Rinse your nose and sinuses  with a salt water solution or use a salt water nasal spray  This will help thin the mucus in your nose and rinse away pollen and dirt  It will also help reduce swelling so you can breathe normally  Ask your healthcare provider how often to rinse your nose  · Reduce exposure to dust mites  Wash sheets and towels in hot water every week  Cover your pillows and mattresses with allergen-free covers  Limit the number of stuffed animals and soft toys your child has  Wash your child's toys in hot water regularly  Vacuum weekly and use a vacuum  with an air filter  If possible, get rid of carpets and curtains  These collect dust and dust mites  · Reduce exposure to pollen  Keep windows and doors closed in your house and car  Stay inside when air pollution or the pollen count is high  Run your air conditioner on recycle, and change air filters often  Shower and wash your hair before bed every night to rinse away pollen  · Reduce exposure to pet dander  If possible, do not keep cats, dogs, birds, or other pets  If you do keep pets in your home, keep them out of bedrooms and carpeted rooms  Bathe them often  · Reduce exposure to mold  Do not spend time in basements  Choose artificial plants instead of live plants  Keep your home's humidity at less than 45%   Do not have ponds or standing water in your home or yard      · Do not smoke  Avoid others who smoke  Ask your healthcare provider for information if you currently smoke and need help to quit  Follow up with your healthcare provider as directed: You may need to see an allergist often to control your symptoms  Write down your questions so you remember to ask them during your visits  © Copyright [address] Information is for End User's use only and may not be sold, redistributed or otherwise used for commercial purposes  All illustrations and images included in CareNotes® are the copyrighted property of [employer] M [employer] Inc  or [address] [place] [address]  The above information is an  only  It is not intended as medical advice for individual conditions or treatments  Talk to your doctor, nurse or pharmacist before following any medical regimen to see if it is safe and effective for you  Follow up with PCP in 3-5 days  Proceed to  ER if symptoms worsen  Chief Complaint     Chief Complaint   Patient presents with    Nasal Congestion    Earache     pt states her right ear feels blocked          History of Present Illness         Patient is a 18-year-old female who presents with a one-day history of right ear pressure,  Rhinorrhea, and itchy watery eyes  Patient states "I think it's just my allergies  "  Patient states she typically takes Claritin but ran out  Denies any eye discharge, pain, or redness  Denies any sinus pain or pressure, earache, or sore throat  Denies any cough, chest pain, palpitations  Denies any loss of taste or smell  Denies any recent sick contacts or known exposure COVID-19      Review of Systems   Review of Systems   Constitutional: Negative for chills, diaphoresis, fatigue and fever  HENT: Positive for congestion and rhinorrhea  Negative for ear discharge, ear pain, facial swelling, postnasal drip, sinus pressure, sinus pain, sore throat, tinnitus and trouble swallowing  Eyes: Negative    Negative for pain, discharge, redness and itching  Watery eyes     Respiratory: Negative for cough, chest tightness, shortness of breath, wheezing and stridor  Cardiovascular: Negative for chest pain and palpitations  Gastrointestinal: Negative  Musculoskeletal: Negative for arthralgias, back pain, joint swelling, myalgias, neck pain and neck stiffness  Neurological: Negative for dizziness, facial asymmetry, weakness, light-headedness, numbness and headaches           Current Medications       Current Outpatient Medications:     acetaminophen (TYLENOL) 500 mg tablet, Take 500-1,000 mg by mouth every 6 (six) hours as needed for mild pain, Disp: , Rfl:     ARIPiprazole (ABILIFY) 5 mg tablet, Take 1 tablet (5 mg total) by mouth every morning, Disp: 90 tablet, Rfl: 1    Calcium Carbonate (CALCIUM 600 PO), Take by mouth daily, Disp: , Rfl:     Cholecalciferol (VITAMIN D3 PO), Take 50 mcg by mouth daily, Disp: , Rfl:     citalopram (CeleXA) 40 mg tablet, take 1 tablet by mouth every evening, Disp: 90 tablet, Rfl: 1    enalapril (Vasotec) 5 mg tablet, Take 1 tablet (5 mg total) by mouth every evening, Disp: 90 tablet, Rfl: 0    ezetimibe (ZETIA) 10 mg tablet, take 1 tablet by mouth once daily, Disp: 90 tablet, Rfl: 1    Multiple Vitamin (multivitamin) tablet, Take 1 tablet by mouth daily, Disp: , Rfl:     nystatin (MYCOSTATIN) powder, Apply topically 2 (two) times a day, Disp: 60 g, Rfl: 1    primidone (MYSOLINE) 50 mg tablet, Take 1 tablet (50 mg total) by mouth every 8 (eight) hours, Disp: 270 tablet, Rfl: 0    Tea Tree Oil (NO FUNGUS NAIL PROTECTANT EX), Apply topically, Disp: , Rfl:     traZODone (DESYREL) 100 mg tablet, Take 1 tablet (100 mg total) by mouth daily at bedtime, Disp: 90 tablet, Rfl: 1    clotrimazole-betamethasone (LOTRISONE) 1-0 05 % cream, Apply topically 2 (two) times a day (Patient not taking: Reported on 3/12/2021), Disp: 45 g, Rfl: 0    fluticasone (FLONASE) 50 mcg/act nasal spray, 1 spray into each nostril daily, Disp: 9 9 mL, Rfl: 0    loratadine (CLARITIN) 10 mg tablet, Take 1 tablet (10 mg total) by mouth daily, Disp: 30 tablet, Rfl: 0    Current Allergies     Allergies as of 03/12/2021 - Reviewed 03/12/2021   Allergen Reaction Noted    Iodine Hives 02/13/2018    Keflex [cephalexin] Other (See Comments) 02/13/2018    Morphine and related Hives 02/13/2018    Penicillins Hives 02/13/2018    Benadryl [diphenhydramine] Itching and Swelling 04/15/2020    Ciprofloxacin Hives 02/28/2021    Clindamycin Other (See Comments) 02/13/2018    Erythromycin Hives 02/13/2018            The following portions of the patient's history were reviewed and updated as appropriate: allergies, current medications, past family history, past medical history, past social history, past surgical history and problem list      Past Medical History:   Diagnosis Date    Arthritis     Cataract     ashley    Depression     Fluid retention     Headache, acute     Heart murmur     Hyperlipidemia     Hypertension     PONV (postoperative nausea and vomiting)     Rotator cuff tear, left     Wears glasses     Wears partial dentures     upper       Past Surgical History:   Procedure Laterality Date    ABDOMINAL ADHESION SURGERY      ANAL SPHINCTEROPLASTY      ANKLE SURGERY Right     tendon tear    APPENDECTOMY      CARPAL TUNNEL RELEASE Bilateral     CARPAL TUNNEL RELEASE Left     CARPAL TUNNEL RELEASE Right     COLONOSCOPY      HAND SURGERY Right     ganglion cyst    HEMORROIDECTOMY      HYSTERECTOMY      ILEOSTOMY      KNEE ARTHROCENTESIS      ganglion Cyst    KNEE ARTHROSCOPY Left     OVARIAN CYST REMOVAL      DE SHLDR ARTHROSCOP,SURG,W/ROTAT CUFF REPR Left 2/19/2018    Procedure: ARTHROSCOPIC REPAIR ROTATOR CUFF,  SAD, BICEP TENODESIS;  Surgeon: Radha Camejo MD;  Location: [facility];  Service: Orthopedics    REPAIR RECTOCELE      TONSILLECTOMY      TONSILLECTOMY AND ADENOIDECTOMY      TUBAL LIGATION         Family History   Problem Relation Age of Onset    Hypertension Mother     Colon cancer Mother     Bone cancer Mother     COPD Mother     Emphysema Mother     Skin cancer Mother     Lead poisoning Father         lead poisoning in lungs     Thyroid disease Sister     Depression Sister     Hypertension Sister     Alzheimer's disease Sister     ROBERT disease Brother     Throat cancer Brother     Hypertension Brother     Colon cancer Family     Bone cancer Family     Hypertension Family     Diabetes Sister     Hypertension Sister     Heart failure Sister     No Known Problems Daughter     No Known Problems Maternal Grandmother     No Known Problems Paternal Grandmother     No Known Problems Maternal Aunt     No Known Problems Maternal Aunt     No Known Problems Maternal Aunt     No Known Problems Maternal Aunt     No Known Problems Maternal Aunt     No Known Problems Paternal Aunt          Medications have been verified  Objective   BP (!) 173/77   Pulse 71   Temp 98 2 °F (36 8 °C) (Temporal)   Resp 18   Ht 5' (1 524 m)   Wt 93 4 kg (206 lb)   SpO2 97%   BMI 40 23 kg/m²   No LMP recorded  Patient has had a hysterectomy  Physical Exam     Physical Exam  Constitutional:       General: She is not in acute distress  Appearance: She is well-developed  She is not diaphoretic  HENT:      Head: Normocephalic and atraumatic  Right Ear: Hearing, ear canal and external ear normal  A middle ear effusion is present  Left Ear: Hearing, ear canal and external ear normal  A middle ear effusion is present  Nose: Nose normal       Right Sinus: No maxillary sinus tenderness or frontal sinus tenderness  Left Sinus: No maxillary sinus tenderness or frontal sinus tenderness  Mouth/Throat:      Pharynx: Oropharynx is clear  Uvula midline  Cardiovascular:      Rate and Rhythm: Normal rate and regular rhythm        Heart sounds: Normal heart sounds, S1 normal and S2 normal    Pulmonary:      Effort: Pulmonary effort is normal       Breath sounds: Normal breath sounds  Lymphadenopathy:      Cervical: No cervical adenopathy  Skin:     General: Skin is warm and dry  Capillary Refill: Capillary refill takes less than 2 seconds  Neurological:      Mental Status: She is alert and oriented to person, place, and time

## 2021-03-18 ENCOUNTER — OFFICE VISIT (OUTPATIENT)
Dept: CARDIOLOGY CLINIC | Facility: CLINIC | Age: 66
End: 2021-03-18
Payer: MEDICARE

## 2021-03-18 VITALS
BODY MASS INDEX: 41.03 KG/M2 | DIASTOLIC BLOOD PRESSURE: 78 MMHG | HEIGHT: 60 IN | HEART RATE: 76 BPM | SYSTOLIC BLOOD PRESSURE: 136 MMHG | WEIGHT: 209 LBS

## 2021-03-18 DIAGNOSIS — E78.2 MIXED HYPERLIPIDEMIA: ICD-10-CM

## 2021-03-18 DIAGNOSIS — I10 ESSENTIAL HYPERTENSION: ICD-10-CM

## 2021-03-18 DIAGNOSIS — R07.9 CHEST PAIN, UNSPECIFIED TYPE: Primary | ICD-10-CM

## 2021-03-18 DIAGNOSIS — E66.01 MORBID OBESITY (HCC): ICD-10-CM

## 2021-03-18 PROCEDURE — 99214 OFFICE O/P EST MOD 30 MIN: CPT | Performed by: INTERNAL MEDICINE

## 2021-03-18 RX ORDER — ENALAPRIL MALEATE 10 MG/1
10 TABLET ORAL DAILY
Qty: 90 TABLET | Refills: 1 | Status: SHIPPED | OUTPATIENT
Start: 2021-03-18 | End: 2021-03-30 | Stop reason: SDUPTHER

## 2021-03-18 NOTE — PROGRESS NOTES
[facility] [address] [facility], [address]   [address] [facility], [address]   [phone]                                              Cardiology Office [address], 76 y o  female  YOB: 1955  MRN: [id number] Encounter: [id number]      PCP - Cindy Jerome,     Assessment  1  Chest pain  2  Hypertension  3  Hyperlipidemia  4  Morbid obesity, Body mass index is 40 82 kg/m²  Plan  Chest pain  · Symptoms mostly improved since discharge, were atypical  · ? CAD v anxiety  · Stress echo is pending for next Wednesday 3/24, and she thinks she will be able to exercise for it    Hypertension  · Currently on enalapril 5 mg daily   · Blood pressure today 136/78 and has been little bit on the higher side recently,  And was significantly elevated in the hospital as well   · Increase enalapril to 10 mg daily    Hyperlipidemia  · Previously intolerant to atorvastatin - cramps  · Currently ezetimibe 10  · Check lipid panel to consider rosuvastatin    ECG today -  No results found for this visit on 03/18/21  Orders Placed This Encounter   Procedures    Lipid Panel with Direct LDL reflex     Return in about 4 months (around 7/18/2021), or if symptoms worsen or fail to improve  History of Present Illness    69-year-old female comes in as a new patient for establishment of care after recent hospitalization for chest pain  On 1st March, patient came in with to the hospital with sudden onset left-sided chest pressure which woke her up from sleep in the morning  She received nitroglycerin without much relief  Subsequently her blood pressure was noted to be elevated in the 200s and she received Ativan which improved her pain and blood pressure  She has had a lot of social stressors recently  She was ruled out with 3 negative troponins and discharged with outpatient stress      Since discharge, she has not had any further major recurrences of chest pain or shortness of breath    She ambulates with the help of a cane, due to pain, but feels she can exercise on treadmill for stress test     Historical Information   Past Medical History:   Diagnosis Date    Arthritis     Cataract     ashley    Depression     Fluid retention     Headache, acute     Heart murmur     Hyperlipidemia     Hypertension     PONV (postoperative nausea and vomiting)     Rotator cuff tear, left     Wears glasses     Wears partial dentures     upper     Past Surgical History:   Procedure Laterality Date    ABDOMINAL ADHESION SURGERY      ANAL SPHINCTEROPLASTY      ANKLE SURGERY Right     tendon tear    APPENDECTOMY      CARPAL TUNNEL RELEASE Bilateral     CARPAL TUNNEL RELEASE Left     CARPAL TUNNEL RELEASE Right     COLONOSCOPY      HAND SURGERY Right     ganglion cyst    HEMORROIDECTOMY      HYSTERECTOMY      ILEOSTOMY      KNEE ARTHROCENTESIS      ganglion Cyst    KNEE ARTHROSCOPY Left     OVARIAN CYST REMOVAL      IN SHLDR ARTHROSCOP,SURG,W/ROTAT CUFF REPR Left 2/19/2018    Procedure: ARTHROSCOPIC REPAIR ROTATOR CUFF,  SAD, BICEP TENODESIS;  Surgeon: Castillo Shepherd MD;  Location: [facility];  Service: Orthopedics    REPAIR RECTOCELE      TONSILLECTOMY      TONSILLECTOMY AND ADENOIDECTOMY      TUBAL LIGATION       Family History   Problem Relation Age of Onset    Hypertension Mother     Colon cancer Mother     Bone cancer Mother    Tristan Hoops COPD Mother     Emphysema Mother     Skin cancer Mother     Lead poisoning Father         lead poisoning in lungs     Thyroid disease Sister     Depression Sister     Hypertension Sister     Alzheimer's disease Sister     ROBERT disease Brother     Throat cancer Brother     Hypertension Brother     Colon cancer Family     Bone cancer Family     Hypertension Family     Diabetes Sister     Hypertension Sister     Heart failure Sister     No Known Problems Daughter     No Known Problems Maternal Grandmother     No Known Problems Paternal Grandmother     No Known Problems Maternal Aunt     No Known Problems Maternal Aunt     No Known Problems Maternal Aunt     No Known Problems Maternal Aunt     No Known Problems Maternal Aunt     No Known Problems Paternal Aunt      Current Outpatient Medications on File Prior to Visit   Medication Sig Dispense Refill    acetaminophen (TYLENOL) 500 mg tablet Take 500-1,000 mg by mouth every 6 (six) hours as needed for mild pain      ARIPiprazole (ABILIFY) 5 mg tablet Take 1 tablet (5 mg total) by mouth every morning 90 tablet 1    Calcium Carbonate (CALCIUM 600 PO) Take by mouth daily      Cholecalciferol (VITAMIN D3 PO) Take 50 mcg by mouth daily      citalopram (CeleXA) 40 mg tablet take 1 tablet by mouth every evening 90 tablet 1    ezetimibe (ZETIA) 10 mg tablet take 1 tablet by mouth once daily 90 tablet 1    fluticasone (FLONASE) 50 mcg/act nasal spray 1 spray into each nostril daily 9 9 mL 0    loratadine (CLARITIN) 10 mg tablet Take 1 tablet (10 mg total) by mouth daily 30 tablet 0    Multiple Vitamin (multivitamin) tablet Take 1 tablet by mouth daily      nystatin (MYCOSTATIN) powder Apply topically 2 (two) times a day 60 g 1    primidone (MYSOLINE) 50 mg tablet Take 1 tablet (50 mg total) by mouth every 8 (eight) hours 270 tablet 0    Tea Tree Oil (NO FUNGUS NAIL PROTECTANT EX) Apply topically      traZODone (DESYREL) 100 mg tablet Take 1 tablet (100 mg total) by mouth daily at bedtime 90 tablet 1    clotrimazole-betamethasone (LOTRISONE) 1-0 05 % cream Apply topically 2 (two) times a day (Patient not taking: Reported on 3/12/2021) 45 g 0     No current facility-administered medications on file prior to visit        Allergies   Allergen Reactions    Iodine Hives     IV IODINE    Keflex [Cephalexin] Other (See Comments)     Mouth sores    Morphine And Related Hives     IV MORPINE    Penicillins Hives    Benadryl [Diphenhydramine] Itching and Swelling     IV benadryl in hospital    Ciprofloxacin Hives    Clindamycin Other (See Comments)     Pt unsure    Erythromycin Hives     Social History     Socioeconomic History    Marital status:      Spouse name: None    Number of children: None    Years of education: None    Highest education level: None   Occupational History    None   Social Needs    Financial resource strain: None    Food insecurity     Worry: None     Inability: None    Transportation needs     Medical: None     Non-medical: None   Tobacco Use    Smoking status: Never Smoker    Smokeless tobacco: Never Used   Substance and Sexual Activity    Alcohol use: Yes     Frequency: Monthly or less     Comment: few x year    Drug use: No    Sexual activity: Not Currently     Partners: Male   Lifestyle    Physical activity     Days per week: None     Minutes per session: None    Stress: None   Relationships    Social connections     Talks on phone: None     Gets together: None     Attends Gnosticist service: None     Active member of club or organization: None     Attends meetings of clubs or organizations: None     Relationship status: None    Intimate partner violence     Fear of current or ex partner: None     Emotionally abused: None     Physically abused: None     Forced sexual activity: None   Other Topics Concern    None   Social History Narrative    Getting   Two children    Lives with her daughter    On disability  Prior   Review of Systems   All other systems reviewed and are negative  Vitals:  Vitals:    03/18/21 1430   BP: 136/78   Pulse: 76   Weight: 94 8 kg (209 lb)   Height: 5' (1 524 m)     BMI - Body mass index is 40 82 kg/m²    Wt Readings from Last 7 Encounters:   03/18/21 94 8 kg (209 lb)   03/12/21 93 4 kg (206 lb)   03/08/21 98 kg (216 lb)   02/28/21 98 6 kg (217 lb 6 oz)   02/22/21 97 1 kg (214 lb)   01/22/21 93 6 kg (206 lb 6 oz)   12/21/20 93 9 kg (207 lb)       Physical Exam  Vitals signs and nursing note reviewed  Constitutional:       General: She is not in acute distress  Appearance: Normal appearance  She is well-developed  She is obese  She is not ill-appearing  HENT:      Head: Normocephalic and atraumatic  Nose: No congestion  Eyes:      General: No scleral icterus  Conjunctiva/sclera: Conjunctivae normal    Neck:      Musculoskeletal: Neck supple  Vascular: No carotid bruit or JVD  Cardiovascular:      Rate and Rhythm: Normal rate and regular rhythm  Pulses: Normal pulses  Heart sounds: Normal heart sounds  No murmur  No friction rub  No gallop  Pulmonary:      Effort: Pulmonary effort is normal  No respiratory distress  Breath sounds: Normal breath sounds  No rales  Abdominal:      General: There is no distension  Palpations: Abdomen is soft  Tenderness: There is no abdominal tenderness  Musculoskeletal:         General: No swelling or tenderness  Right lower leg: Edema present  Left lower leg: Edema present  Skin:     General: Skin is warm  Neurological:      General: No focal deficit present  Mental Status: She is alert and oriented to person, place, and time  Mental status is at baseline  Psychiatric:         Mood and Affect: Mood normal          Behavior: Behavior normal          Thought Content:  Thought content normal          Labs:  CBC:   Lab Results   Component Value Date    WBC 5 80 02/28/2021    RBC 4 19 02/28/2021    HGB 12 3 02/28/2021    HCT 36 1 (L) 02/28/2021    MCV 86 02/28/2021     02/28/2021    RDW 13 6 02/28/2021       CMP:   Lab Results   Component Value Date     09/24/2014    K 4 2 02/28/2021     02/28/2021    CO2 30 02/28/2021    ANIONGAP 8 09/24/2014    BUN 19 02/28/2021    CREATININE 0 76 02/28/2021    EGFR 82 02/28/2021    GLUCOSE 116 09/24/2014    CALCIUM 9 4 02/28/2021    AST 12 12/28/2020    ALT 28 12/28/2020    ALKPHOS 92 12/28/2020       Magnesium:  No results found for: MG    Lipid Profile:   Lab Results   Component Value Date    HDL 48 2020    TRIG 128 2020    LDLCALC 181 (H) 2020       Thyroid Studies:   Lab Results   Component Value Date    [id number] 2 930 2020       No components found for: [facility]    Lab Results   Component Value Date    INR 1 02 2021   5    Imaging: X-ray Chest 1 View Portable    Result Date: 2021  Narrative: CHEST INDICATION:   chest pain  COMPARISON:  2015 EXAM PERFORMED/VIEWS:  XR CHEST PORTABLE  AP semierect FINDINGS: Cardiomediastinal silhouette appears unremarkable  The lungs are clear  No pneumothorax or pleural effusion  Osseous structures appear within normal limits for patient age  Impression: No significant interval change from previous examination  No acute abnormalities  Workstation performed: [id number]       Cardiac testing:   Results for orders placed during the hospital encounter of 21   Echo complete with contrast if indicated    Narrative [address] [facility], [address]    Transthoracic Echocardiogram  2D, M-mode, Doppler, and Color Doppler    Study date:  01-Mar-2021    Patient: Erick Khan  MR number: [id number]  Account number: [de-identified]  : 1955  Age: 76 years  Gender: Female  Status: Outpatient  Location: [facility]   Height: 60 in  Weight: 216 5 lb  BP: 122/ 57 mmHg    Indications: Chest pain  Diagnoses: R07 9 - Chest pain, unspecified    Sonographer:  Leonarda Banuelos RDCS  Referring Physician:  Gerardo Blount MD  Group:  [facility]  Interpreting Physician:  Damian Christopher MD    SUMMARY    LEFT VENTRICLE:  Size was normal   Systolic function was normal  Ejection fraction was estimated to be 60 %  There were no regional wall motion abnormalities  Wall thickness was mildly increased    The changes were consistent with concentric remodeling (increased wall thickness with normal wall mass)  MITRAL VALVE:  There was mild annular calcification  There was trace regurgitation  HISTORY: Symptoms: chest pain  Lower extremity edema  PRIOR HISTORY: Risk factors: hypertension, hypercholesterolemia, and morbid obesity  PROCEDURE: The study was performed in the [facility]  This was a routine study  The transthoracic approach was used  The study included complete 2D imaging, M-mode, complete spectral Doppler, and color Doppler  The  heart rate was 72 bpm, at the start of the study  Images were obtained from the parasternal, apical, subcostal, and suprasternal notch acoustic windows  Echocardiographic views were limited due to decreased penetration and lung  interference  This was a technically difficult study  LEFT VENTRICLE: Size was normal  Systolic function was normal  Ejection fraction was estimated to be 60 %  There were no regional wall motion abnormalities  Wall thickness was mildly increased  The changes were consistent with concentric  remodeling (increased wall thickness with normal wall mass)  DOPPLER: Left ventricular diastolic function parameters were normal     RIGHT VENTRICLE: The size was normal  Systolic function was normal  Wall thickness was normal     LEFT ATRIUM: Size was normal     RIGHT ATRIUM: Size was normal     MITRAL VALVE: There was mild annular calcification  Valve structure was normal  There was normal leaflet separation  DOPPLER: The transmitral velocity was within the normal range  There was no evidence for stenosis  There was trace  regurgitation  AORTIC VALVE: The valve was trileaflet  Leaflets exhibited mildly increased thickness, mild calcification, and lower normal cuspal separation  DOPPLER: Transaortic velocity was minimally increased  There was no evidence for stenosis  There  was no significant regurgitation  TRICUSPID VALVE: The valve structure was normal  There was normal leaflet separation   DOPPLER: The transtricuspid velocity was within the normal range  There was no evidence for stenosis  There was no significant regurgitation  PULMONIC VALVE: Leaflets exhibited normal thickness, no calcification, and normal cuspal separation  DOPPLER: The transpulmonic velocity was within the normal range  There was trace regurgitation  PERICARDIUM: There was no pericardial effusion  The pericardium was normal in appearance  AORTA: The root exhibited normal size  SYSTEMIC VEINS: IVC: The inferior vena cava was not well visualized  The inferior vena cava was normal in size  SYSTEM MEASUREMENT TABLES    2D  %FS: 32 21 %  Ao Diam: 2 88 cm  Ao asc: 3 18 cm  EDV(Teich): 93 69 ml  EF(Teich): 60 54 %  ESV(Teich): 36 97 ml  IVSd: 1 33 cm  LA Area: 18 19 cm2  LA Diam: 3 78 cm  LVEDV MOD A4C: 99 62 ml  LVEF MOD A4C: 66 08 %  LVESV MOD A4C: 33 79 ml  LVIDd: 4 53 cm  LVIDs: 3 07 cm  LVLd A4C: 7 35 cm  LVLs A4C: 5 92 cm  LVOT Diam: 2 cm  LVPWd: 1 18 cm  RA Area: 9 49 cm2  RVIDd: 2 77 cm  SV MOD A4C: 65 83 ml  SV(Teich): 56 72 ml    CW  AV Env  Ti: 304 47 ms  AV VTI: 33 67 cm  AV Vmax: 1 59 m/s  AV Vmean: 1 11 m/s  AV maxPG: 10 08 mmHg  AV meanP 58 mmHg  PV Env  Ti: 304 47 ms  PV VTI: 22 11 cm  PV Vmax: 0 98 m/s  PV Vmean: 0 73 m/s  PV maxPG: 3 87 mmHg  PV meanP 34 mmHg    MM  TAPSE: 2 27 cm    PW  AIDAN (VTI): 1 93 cm2  AIDAN Vmax: 1 83 cm2  AVAI (VTI): 0 cm2/m2  AVAI Vmax: 0 cm2/m2  E' Sept: 0 09 m/s  E/E' Sept: 8 67  LVOT Env  Ti: 296 86 ms  LVOT VTI: 20 62 cm  LVOT Vmax: 0 92 m/s  LVOT Vmean: 0 69 m/s  LVOT maxPG: 3 4 mmHg  LVOT meanP 14 mmHg  LVSI Dopp: 33 67 ml/m2  LVSV Dopp: 64 99 ml  MV A Cristian: 0 92 m/s  MV Dec [place]: 3 17 m/s2  MV DecT: 236 25 ms  MV E Cristian: 0 75 m/s  MV E/A Ratio: 0 81  RVOT Env  Ti: 319 7 ms  RVOT VTI: 13 63 cm  RVOT Vmax: 0 68 m/s  RVOT Vmean: 0 43 m/s  RVOT maxP 83 mmHg  RVOT meanP 86 mmHg    [facility] Accredited Echocardiography Laboratory    Prepared and electronically signed by    Ramila Veras MD  Signed 01-Mar-2021 12:04:58       No results found for this or any previous visit  No results found for this or any previous visit  No results found for this or any previous visit

## 2021-03-20 ENCOUNTER — OFFICE VISIT (OUTPATIENT)
Dept: URGENT CARE | Age: 66
End: 2021-03-20
Payer: MEDICARE

## 2021-03-20 VITALS — RESPIRATION RATE: 16 BRPM | HEART RATE: 76 BPM | OXYGEN SATURATION: 97 % | TEMPERATURE: 97.1 F

## 2021-03-20 DIAGNOSIS — L29.9 ITCHY SKIN: Primary | ICD-10-CM

## 2021-03-20 PROCEDURE — G0463 HOSPITAL OUTPT CLINIC VISIT: HCPCS | Performed by: PREVENTIVE MEDICINE

## 2021-03-20 PROCEDURE — 99203 OFFICE O/P NEW LOW 30 MIN: CPT | Performed by: PREVENTIVE MEDICINE

## 2021-03-20 RX ORDER — PERMETHRIN 50 MG/G
CREAM TOPICAL ONCE
Qty: 60 G | Refills: 0 | Status: SHIPPED | OUTPATIENT
Start: 2021-03-20 | End: 2021-03-20

## 2021-03-20 RX ORDER — PREDNISONE 10 MG/1
20 TABLET ORAL DAILY
Qty: 10 TABLET | Refills: 0 | Status: SHIPPED | OUTPATIENT
Start: 2021-03-20 | End: 2021-03-25

## 2021-03-20 NOTE — PATIENT INSTRUCTIONS
Scabies   AMBULATORY CARE:   Scabies  is a skin condition that is caused by scabies mites  Scabies mites are tiny bugs that burrow, lay eggs, and live underneath the skin  Scabies is spread through close contact with a person who has scabies  This includes having sex, sleeping in the same bed, or sharing towels or clothing  Scabies can spread quickly and must be treated as soon as it is found  Common signs and symptoms: You may not know you have scabies until a few weeks after mites are under your skin  Scabies mites are too small to be seen on your body  You may have any of the following:  · Red, raised bumps on your skin    · Bad itching that is usually worse at night    · Burrow marks (short wavy lines) between your fingers, or on your ankles, elbows, groin, armpits, or breasts    Seek care immediately if:   · You develop a fever and red, swollen, painful areas on your skin  Contact your healthcare provider if:   · The bites become crusty or filled with pus  · You have worsening itching after scabies treatment  · You have new bite or burrow marks after treatment  · You have questions or concerns about your condition or care  Treatment:  Your healthcare provider may want to treat scabies even if signs of mites are not found  Several kinds of medicine may be used to treat scabies  The medicine may be a cream or pill  Always follow the directions for the scabies medicine you are told to use  · Your healthcare provider may tell you to rub a thin layer of the medicine onto your entire body from the neck down  · Leave the cream on for the amount of time that is required for the medicine you are using  This may be between 8 to 14 hours  · Take a bath or shower to wash all medicine from your skin after the scabies treatment is done  · Put on clean clothes after you have rinsed the medicine off   You may need another scabies treatment in about 7 to 10 days if you continue to have symptoms  Help relieve itching: Your skin may continue to itch for 2 or 3 weeks, even after the scabies mites are gone  Over-the-counter antihistamines or cortisone cream may help relieve itching  Trim your fingernails so you do not spread any mites that are still alive after treatment  Do not scratch your skin  Scratches may cause a skin infection  A cool bath may also help relieve the itching  Prevent the spread of scabies:   · Have all family members use scabies medicine  Tell all sex partners and anyone who has shared your clothing or bed for the past month about the scabies  Tell them to ask their healthcare provider for scabies medicine even if they have no itching, rash, or burrow marks  · Wash all items that you have used since 3 days before you learned about your scabies  Use hot water to wash all clothing, bedding, and towels  Dry them for at least 20 minutes on the hot cycle of a dryer  Take items to be dry cleaned that cannot be washed in a washing machine  Place any clothing or bedding that cannot be washed or dry cleaned in a closed plastic bag for 1 week  · Do not have close body contact with anyone until the scabies mites are gone  Talk to your healthcare provider about how long you need to wait  Also ask about public places to avoid, such as the gym  Return to school or work: You may return to school or work 24 hours after using scabies medicine  Follow up with your healthcare provider as directed:  Write down your questions so you remember to ask them during your visits  © Copyright [address] Information is for End User's use only and may not be sold, redistributed or otherwise used for commercial purposes  All illustrations and images included in CareNotes® are the copyrighted property of [employer]  or [facility] Martín Mojica   The above information is an  only  It is not intended as medical advice for individual conditions or treatments   Talk to your doctor, nurse or pharmacist before following any medical regimen to see if it is safe and effective for you

## 2021-03-24 ENCOUNTER — HOSPITAL ENCOUNTER (OUTPATIENT)
Dept: NON INVASIVE DIAGNOSTICS | Facility: HOSPITAL | Age: 66
Discharge: HOME | End: 2021-03-24
Attending: INTERNAL MEDICINE
Payer: MEDICARE

## 2021-03-24 DIAGNOSIS — I16.0 HYPERTENSIVE URGENCY: ICD-10-CM

## 2021-03-24 DIAGNOSIS — R07.89 ATYPICAL CHEST PAIN: ICD-10-CM

## 2021-03-24 DIAGNOSIS — R06.00 DOE (DYSPNEA ON EXERTION): ICD-10-CM

## 2021-03-24 PROCEDURE — 93351 STRESS TTE COMPLETE: CPT | Performed by: INTERNAL MEDICINE

## 2021-03-24 PROCEDURE — 93350 STRESS TTE ONLY: CPT

## 2021-03-24 NOTE — PROGRESS NOTES
[employer] [facility]        NAME: Ross Nova is a 76 y o  female  : 1955    MRN: [id number]  DATE: 2021  TIME: 7:03 PM    Assessment and Plan   Itchy skin [L29 9]  1  Itchy skin  permethrin (ELIMITE) 5 % cream    predniSONE 10 mg tablet         Patient Instructions       Follow up with PCP in 3-5 days  Proceed to  ER if symptoms worsen  Chief Complaint     Chief Complaint   Patient presents with    Rash     pt presents with rash that started 2 weeks ago// tried OTC medications without relief         History of Present Illness       Rash  This is a new problem  The current episode started 1 to 4 weeks ago  The problem is unchanged  The rash is diffuse  The rash is characterized by redness, dryness and peeling  She was exposed to nothing  Pertinent negatives include no fever  Past treatments include nothing  The treatment provided no relief  Review of Systems   Review of Systems   Constitutional: Negative  Negative for fever  Respiratory: Negative  Cardiovascular: Negative  Skin: Positive for rash  All other systems reviewed and are negative          Current Medications       Current Outpatient Medications:     acetaminophen (TYLENOL) 500 mg tablet, Take 500-1,000 mg by mouth every 6 (six) hours as needed for mild pain, Disp: , Rfl:     ARIPiprazole (ABILIFY) 5 mg tablet, Take 1 tablet (5 mg total) by mouth every morning, Disp: 90 tablet, Rfl: 1    Calcium Carbonate (CALCIUM 600 PO), Take by mouth daily, Disp: , Rfl:     Cholecalciferol (VITAMIN D3 PO), Take 50 mcg by mouth daily, Disp: , Rfl:     citalopram (CeleXA) 40 mg tablet, take 1 tablet by mouth every evening, Disp: 90 tablet, Rfl: 1    enalapril (VASOTEC) 10 mg tablet, Take 1 tablet (10 mg total) by mouth daily, Disp: 90 tablet, Rfl: 1    ezetimibe (ZETIA) 10 mg tablet, take 1 tablet by mouth once daily, Disp: 90 tablet, Rfl: 1    fluticasone (FLONASE) 50 mcg/act nasal spray, 1 spray into each nostril daily, Disp: 9 9 mL, Rfl: 0    loratadine (CLARITIN) 10 mg tablet, Take 1 tablet (10 mg total) by mouth daily, Disp: 30 tablet, Rfl: 0    Multiple Vitamin (multivitamin) tablet, Take 1 tablet by mouth daily, Disp: , Rfl:     nystatin (MYCOSTATIN) powder, Apply topically 2 (two) times a day, Disp: 60 g, Rfl: 1    primidone (MYSOLINE) 50 mg tablet, Take 1 tablet (50 mg total) by mouth every 8 (eight) hours, Disp: 270 tablet, Rfl: 0    traZODone (DESYREL) 100 mg tablet, Take 1 tablet (100 mg total) by mouth daily at bedtime, Disp: 90 tablet, Rfl: 1    clotrimazole-betamethasone (LOTRISONE) 1-0 05 % cream, Apply topically 2 (two) times a day (Patient not taking: Reported on 3/12/2021), Disp: 45 g, Rfl: 0    predniSONE 10 mg tablet, Take 2 tablets (20 mg total) by mouth daily for 5 days, Disp: 10 tablet, Rfl: 0    Tea Tree Oil (NO FUNGUS NAIL PROTECTANT EX), Apply topically, Disp: , Rfl:     Current Allergies     Allergies as of 03/20/2021 - Reviewed 03/20/2021   Allergen Reaction Noted    Iodine Hives 02/13/2018    Keflex [cephalexin] Other (See Comments) 02/13/2018    Morphine and related Hives 02/13/2018    Penicillins Hives 02/13/2018    Benadryl [diphenhydramine] Itching and Swelling 04/15/2020    Ciprofloxacin Hives 02/28/2021    Clindamycin Other (See Comments) 02/13/2018    Erythromycin Hives 02/13/2018            The following portions of the patient's history were reviewed and updated as appropriate: allergies, current medications, past family history, past medical history, past social history, past surgical history and problem list      Past Medical History:   Diagnosis Date    Arthritis     Cataract     ashley    Depression     Fluid retention     Headache, acute     Heart murmur     Hyperlipidemia     Hypertension     PONV (postoperative nausea and vomiting)     Rotator cuff tear, left     Wears glasses     Wears partial dentures     upper       Past Surgical History:   Procedure Laterality Date    ABDOMINAL ADHESION SURGERY      ANAL SPHINCTEROPLASTY      ANKLE SURGERY Right     tendon tear    APPENDECTOMY      CARPAL TUNNEL RELEASE Bilateral     CARPAL TUNNEL RELEASE Left     CARPAL TUNNEL RELEASE Right     COLONOSCOPY      HAND SURGERY Right     ganglion cyst    HEMORROIDECTOMY      HYSTERECTOMY      ILEOSTOMY      KNEE ARTHROCENTESIS      ganglion Cyst    KNEE ARTHROSCOPY Left     OVARIAN CYST REMOVAL      CT SHLDR ARTHROSCOP,SURG,W/ROTAT CUFF REPR Left 2/19/2018    Procedure: ARTHROSCOPIC REPAIR ROTATOR CUFF,  SAD, BICEP TENODESIS;  Surgeon: Chris Loza MD;  Location: [facility];  Service: Orthopedics    REPAIR RECTOCELE      TONSILLECTOMY      TONSILLECTOMY AND ADENOIDECTOMY      TUBAL LIGATION         Family History   Problem Relation Age of Onset    Hypertension Mother     Colon cancer Mother     Bone cancer Mother     COPD Mother     Emphysema Mother     Skin cancer Mother     Lead poisoning Father         lead poisoning in lungs     Thyroid disease Sister     Depression Sister     Hypertension Sister     Alzheimer's disease Sister     ROBERT disease Brother     Throat cancer Brother     Hypertension Brother     Colon cancer Family     Bone cancer Family     Hypertension Family     Diabetes Sister     Hypertension Sister     Heart failure Sister     No Known Problems Daughter     No Known Problems Maternal Grandmother     No Known Problems Paternal Grandmother     No Known Problems Maternal Aunt     No Known Problems Maternal Aunt     No Known Problems Maternal Aunt     No Known Problems Maternal Aunt     No Known Problems Maternal Aunt     No Known Problems Paternal Aunt          Medications have been verified  Objective   Pulse 76   Temp (!) 97 1 °F (36 2 °C)   Resp 16   SpO2 97%        Physical Exam     Physical Exam  Vitals signs and nursing note reviewed  Constitutional:       Appearance: She is well-developed  Cardiovascular:      Rate and Rhythm: Normal rate and regular rhythm  Pulmonary:      Effort: Pulmonary effort is normal       Breath sounds: Normal breath sounds  Skin:     Findings: Erythema and rash present  Rash is papular

## 2021-03-29 LAB
CHEST PAIN STATEMENT: NORMAL
MAX DIASTOLIC BP: 90 MMHG
MAX HEART RATE: 164 BPM
MAX PREDICTED HEART RATE: 154 BPM
MAX. SYSTOLIC BP: 160 MMHG
PROTOCOL NAME: NORMAL
REASON FOR TERMINATION: NORMAL
TARGET HR FORMULA: NORMAL
TEST INDICATION: NORMAL
TIME IN EXERCISE PHASE: NORMAL

## 2021-03-30 ENCOUNTER — APPOINTMENT (OUTPATIENT)
Dept: LAB | Facility: CLINIC | Age: 66
End: 2021-03-30
Payer: MEDICARE

## 2021-03-30 ENCOUNTER — OFFICE VISIT (OUTPATIENT)
Dept: INTERNAL MEDICINE CLINIC | Facility: CLINIC | Age: 66
End: 2021-03-30
Payer: MEDICARE

## 2021-03-30 VITALS
WEIGHT: 213 LBS | HEIGHT: 60 IN | DIASTOLIC BLOOD PRESSURE: 74 MMHG | OXYGEN SATURATION: 99 % | HEART RATE: 69 BPM | TEMPERATURE: 96.1 F | BODY MASS INDEX: 41.82 KG/M2 | SYSTOLIC BLOOD PRESSURE: 132 MMHG

## 2021-03-30 DIAGNOSIS — E78.2 MIXED HYPERLIPIDEMIA: ICD-10-CM

## 2021-03-30 DIAGNOSIS — G89.4 CHRONIC PAIN SYNDROME: ICD-10-CM

## 2021-03-30 DIAGNOSIS — Z12.31 ENCOUNTER FOR SCREENING MAMMOGRAM FOR MALIGNANT NEOPLASM OF BREAST: Primary | ICD-10-CM

## 2021-03-30 DIAGNOSIS — F33.9 DEPRESSION, RECURRENT (HCC): ICD-10-CM

## 2021-03-30 DIAGNOSIS — I10 ESSENTIAL HYPERTENSION: ICD-10-CM

## 2021-03-30 DIAGNOSIS — M15.9 PRIMARY OSTEOARTHRITIS INVOLVING MULTIPLE JOINTS: ICD-10-CM

## 2021-03-30 DIAGNOSIS — E66.01 MORBID OBESITY (HCC): ICD-10-CM

## 2021-03-30 DIAGNOSIS — Z79.899 LONG TERM CURRENT USE OF ANTIPSYCHOTIC MEDICATION: ICD-10-CM

## 2021-03-30 DIAGNOSIS — R06.00 DOE (DYSPNEA ON EXERTION): ICD-10-CM

## 2021-03-30 PROBLEM — M75.102 TEAR OF LEFT ROTATOR CUFF: Status: RESOLVED | Noted: 2018-02-13 | Resolved: 2021-03-30

## 2021-03-30 PROBLEM — R07.9 CHEST PAIN: Status: RESOLVED | Noted: 2021-02-28 | Resolved: 2021-03-30

## 2021-03-30 PROBLEM — I16.0 HYPERTENSIVE URGENCY: Status: RESOLVED | Noted: 2021-02-28 | Resolved: 2021-03-30

## 2021-03-30 PROBLEM — M54.50 LOW BACK PAIN: Status: RESOLVED | Noted: 2020-04-15 | Resolved: 2021-03-30

## 2021-03-30 LAB
ALBUMIN SERPL BCP-MCNC: 4 G/DL (ref 3.5–5)
ALP SERPL-CCNC: 83 U/L (ref 46–116)
ALT SERPL W P-5'-P-CCNC: 22 U/L (ref 12–78)
ANION GAP SERPL CALCULATED.3IONS-SCNC: 3 MMOL/L (ref 4–13)
AST SERPL W P-5'-P-CCNC: 7 U/L (ref 5–45)
BILIRUB SERPL-MCNC: 0.24 MG/DL (ref 0.2–1)
BUN SERPL-MCNC: 17 MG/DL (ref 5–25)
CALCIUM SERPL-MCNC: 9.3 MG/DL (ref 8.3–10.1)
CHLORIDE SERPL-SCNC: 108 MMOL/L (ref 100–108)
CHOLEST SERPL-MCNC: 219 MG/DL (ref 50–200)
CO2 SERPL-SCNC: 29 MMOL/L (ref 21–32)
CREAT SERPL-MCNC: 0.95 MG/DL (ref 0.6–1.3)
EST. AVERAGE GLUCOSE BLD GHB EST-MCNC: 114 MG/DL
GFR SERPL CREATININE-BSD FRML MDRD: 63 ML/MIN/1.73SQ M
GLUCOSE P FAST SERPL-MCNC: 108 MG/DL (ref 65–99)
HBA1C MFR BLD: 5.6 %
HDLC SERPL-MCNC: 52 MG/DL
LDLC SERPL CALC-MCNC: 119 MG/DL (ref 0–100)
POTASSIUM SERPL-SCNC: 4.1 MMOL/L (ref 3.5–5.3)
PROLACTIN SERPL-MCNC: 3 NG/ML
PROT SERPL-MCNC: 7.2 G/DL (ref 6.4–8.2)
SODIUM SERPL-SCNC: 140 MMOL/L (ref 136–145)
TRIGL SERPL-MCNC: 241 MG/DL

## 2021-03-30 PROCEDURE — 36415 COLL VENOUS BLD VENIPUNCTURE: CPT

## 2021-03-30 PROCEDURE — 83036 HEMOGLOBIN GLYCOSYLATED A1C: CPT

## 2021-03-30 PROCEDURE — 80061 LIPID PANEL: CPT

## 2021-03-30 PROCEDURE — 99214 OFFICE O/P EST MOD 30 MIN: CPT | Performed by: INTERNAL MEDICINE

## 2021-03-30 PROCEDURE — 84146 ASSAY OF PROLACTIN: CPT

## 2021-03-30 PROCEDURE — 80053 COMPREHEN METABOLIC PANEL: CPT

## 2021-03-30 RX ORDER — ENALAPRIL MALEATE 10 MG/1
10 TABLET ORAL DAILY
Qty: 90 TABLET | Refills: 1 | Status: SHIPPED | OUTPATIENT
Start: 2021-03-30 | End: 2022-03-18 | Stop reason: SDUPTHER

## 2021-03-30 NOTE — PATIENT INSTRUCTIONS

## 2021-03-30 NOTE — PROGRESS NOTES
Assessment/Plan:  Problem List Items Addressed This Visit        Cardiovascular and Mediastinum    Hypertension    Relevant Medications    enalapril (VASOTEC) 10 mg tablet    Other Relevant Orders    Comprehensive metabolic panel       Musculoskeletal and Integument    Primary osteoarthritis involving multiple joints       Other    Morbid obesity (HCC)    Mixed hyperlipidemia    Depression, recurrent ([address] )    Relevant Orders    Prolactin    Hemoglobin A1C    Chronic pain syndrome      Other Visit Diagnoses     Encounter for screening mammogram for malignant neoplasm of breast    -  Primary    Relevant Orders    Mammo screening bilateral w 3d & cad    Long term current use of antipsychotic medication         Relevant Orders    Hemoglobin A1C    CHRISTIANSON (dyspnea on exertion)        Relevant Orders    Complete PFT with post bronchodilator           Diagnoses and all orders for this visit:    Encounter for screening mammogram for malignant neoplasm of breast  -     Mammo screening bilateral w 3d & cad; Future    Essential hypertension  -     Comprehensive metabolic panel; Future  -     enalapril (VASOTEC) 10 mg tablet; Take 1 tablet (10 mg total) by mouth daily    Primary osteoarthritis involving multiple joints    Depression, recurrent (HCC)  -     Prolactin; Future  -     Hemoglobin A1C; Future    Mixed hyperlipidemia    Chronic pain syndrome    Morbid obesity (HCC)    Long term current use of antipsychotic medication   -     Hemoglobin A1C; Future    CHRISTIANSON (dyspnea on exertion)  -     Complete PFT with post bronchodilator; Future        No problem-specific Assessment & Plan notes found for this encounter  A/P: Doing better and BP is good  Will check labs and order mammo  Discussed stress echo  Discussed the CHRISTIANSON and will check PFT's  Keep f/u with pain management  Continue current treatment and RTC four months for routine  Subjective:      Patient ID: Trevin Davidson is a 76 y o  female      WF RTC for f/u atypical CP and SOB and routine for htn, hyperlipidemia, etc  Doing ok and no new issues  Stress echo was normal  No more CP and SOB no worse  Denies smoking, but reports significant second hand exposure  occassional wheezing, but better since starting the anti histamine  Remains active w/o difficulty and no falls  MDD/MAGGIE is controlled  Chronic pain is manageable  Due for labs and mammo  The following portions of the patient's history were reviewed and updated as appropriate:   She has a past medical history of Arthritis, Cataract, Depression, Fluid retention, Headache, acute, Heart murmur, Hyperlipidemia, Hypertension, PONV (postoperative nausea and vomiting), Rotator cuff tear, left, Wears glasses, and Wears partial dentures  ,  does not have any pertinent problems on file  ,   has a past surgical history that includes Ankle surgery (Right); Tonsillectomy; Tubal ligation; Hysterectomy; Ovarian cyst removal; Carpal tunnel release (Bilateral); Hand surgery (Right); Abdominal adhesion surgery; Knee arthroscopy (Left); Appendectomy; Colonoscopy; Hemorroidectomy; Repair rectocele; pr shldr arthroscop,surg,w/rotat cuff repr (Left, 2/19/2018); Carpal tunnel release (Left); Carpal tunnel release (Right); Ileostomy; Anal sphincteroplasty; Knee arthrocentesis; and Tonsillectomy and adenoidectomy  ,  family history includes Alzheimer's disease in her sister; Bone cancer in her family and mother; COPD in her mother; Colon cancer in her family and mother; Depression in her sister; Diabetes in her sister; Emphysema in her mother; ROBERT disease in her brother; Heart failure in her sister; Hypertension in her brother, family, mother, sister, and sister; Lead poisoning in her father; No Known Problems in her daughter, maternal aunt, maternal aunt, maternal aunt, maternal aunt, maternal aunt, maternal grandmother, paternal aunt, and paternal grandmother; Skin cancer in her mother; Throat cancer in her brother;  Thyroid disease in her sister  ,   reports that she has never smoked  She has never used smokeless tobacco  She reports current alcohol use  She reports that she does not use drugs  ,  is allergic to iodine; keflex [cephalexin]; morphine and related; penicillins; benadryl [diphenhydramine]; ciprofloxacin; clindamycin; and erythromycin     Current Outpatient Medications   Medication Sig Dispense Refill    acetaminophen (TYLENOL) 500 mg tablet Take 500-1,000 mg by mouth every 6 (six) hours as needed for mild pain      ARIPiprazole (ABILIFY) 5 mg tablet Take 1 tablet (5 mg total) by mouth every morning 90 tablet 1    Calcium Carbonate (CALCIUM 600 PO) Take by mouth daily      Cholecalciferol (VITAMIN D3 PO) Take 50 mcg by mouth daily      citalopram (CeleXA) 40 mg tablet take 1 tablet by mouth every evening 90 tablet 1    ezetimibe (ZETIA) 10 mg tablet take 1 tablet by mouth once daily 90 tablet 1    fluticasone (FLONASE) 50 mcg/act nasal spray 1 spray into each nostril daily 9 9 mL 0    loratadine (CLARITIN) 10 mg tablet Take 1 tablet (10 mg total) by mouth daily 30 tablet 0    Multiple Vitamin (multivitamin) tablet Take 1 tablet by mouth daily      nystatin (MYCOSTATIN) powder Apply topically 2 (two) times a day 60 g 1    primidone (MYSOLINE) 50 mg tablet Take 1 tablet (50 mg total) by mouth every 8 (eight) hours 270 tablet 0    traZODone (DESYREL) 100 mg tablet Take 1 tablet (100 mg total) by mouth daily at bedtime 90 tablet 1    clotrimazole-betamethasone (LOTRISONE) 1-0 05 % cream Apply topically 2 (two) times a day 45 g 0    enalapril (VASOTEC) 10 mg tablet Take 1 tablet (10 mg total) by mouth daily 90 tablet 1    Tea Tree Oil (NO FUNGUS NAIL PROTECTANT EX) Apply topically       No current facility-administered medications for this visit  Review of Systems   Constitutional: Negative for activity change, chills, diaphoresis, fatigue and fever  HENT: Negative  Eyes: Negative for visual disturbance     Respiratory: Positive for shortness of breath and wheezing  Negative for cough and chest tightness  Cardiovascular: Negative for chest pain, palpitations and leg swelling  Gastrointestinal: Negative for abdominal pain, constipation, diarrhea, nausea and vomiting  Endocrine: Negative for cold intolerance and heat intolerance  Genitourinary: Negative for difficulty urinating, dysuria and frequency  Musculoskeletal: Negative for arthralgias, gait problem and myalgias  Neurological: Negative for seizures, syncope, weakness, light-headedness and headaches  Psychiatric/Behavioral: Negative for confusion, dysphoric mood and sleep disturbance  The patient is not nervous/anxious  PHQ-9 Depression Screening    PHQ-9:   Frequency of the following problems over the past two weeks:      Little interest or pleasure in doing things: 0 - not at all  Feeling down, depressed, or hopeless: 0 - not at all  Trouble falling or staying asleep, or sleeping too much: 0 - not at all  Feeling tired or having little energy: 0 - not at all  Poor appetite or overeatin - not at all  Feeling bad about yourself - or that you are a failure or have let yourself or your family down: 0 - not at all  Trouble concentrating on things, such as reading the newspaper or watching television: 0 - not at all  Moving or speaking so slowly that other people could have noticed  Or the opposite - being so fidgety or restless that you have been moving around a lot more than usual: 0 - not at all  Thoughts that you would be better off dead, or of hurting yourself in some way: 0 - not at all  PHQ-2 Score: 0  PHQ-9 Score: 0        Objective:  Vitals:    21 1101   BP: 132/74   Pulse: 69   Temp: (!) 96 1 °F (35 6 °C)   SpO2: 99%   Weight: 96 6 kg (213 lb)   Height: 5' (1 524 m)     Body mass index is 41 6 kg/m²  Physical Exam  Vitals signs and nursing note reviewed  Constitutional:       General: She is not in acute distress       Appearance: Normal appearance  She is not ill-appearing  HENT:      Head: Normocephalic and atraumatic  Mouth/Throat:      Mouth: Mucous membranes are moist    Eyes:      Extraocular Movements: Extraocular movements intact  Conjunctiva/sclera: Conjunctivae normal       Pupils: Pupils are equal, round, and reactive to light  Neck:      Musculoskeletal: Neck supple  Vascular: No carotid bruit  Cardiovascular:      Rate and Rhythm: Normal rate and regular rhythm  Heart sounds: Normal heart sounds  Pulmonary:      Effort: Pulmonary effort is normal  No respiratory distress  Breath sounds: Normal breath sounds  No wheezing or rales  Abdominal:      General: Bowel sounds are normal  There is no distension  Palpations: Abdomen is soft  Tenderness: There is no abdominal tenderness  Musculoskeletal:      Right lower leg: No edema  Left lower leg: No edema  Neurological:      General: No focal deficit present  Mental Status: She is alert and oriented to person, place, and time  Mental status is at baseline  Psychiatric:         Mood and Affect: Mood normal          Behavior: Behavior normal          Thought Content:  Thought content normal          Judgment: Judgment normal

## 2021-04-01 DIAGNOSIS — E78.2 MIXED HYPERLIPIDEMIA: Primary | ICD-10-CM

## 2021-04-01 RX ORDER — OMEGA-3-ACID ETHYL ESTERS 1 G/1
2 CAPSULE, LIQUID FILLED ORAL 2 TIMES DAILY
Qty: 180 CAPSULE | Refills: 0 | Status: SHIPPED | OUTPATIENT
Start: 2021-04-01

## 2021-04-05 DIAGNOSIS — I10 ESSENTIAL HYPERTENSION: ICD-10-CM

## 2021-04-05 RX ORDER — ENALAPRIL MALEATE 5 MG/1
5 TABLET ORAL EVERY EVENING
Qty: 90 TABLET | Refills: 1 | Status: SHIPPED | OUTPATIENT
Start: 2021-04-05 | End: 2021-08-02

## 2021-04-23 ENCOUNTER — OFFICE VISIT (OUTPATIENT)
Dept: OBGYN CLINIC | Facility: MEDICAL CENTER | Age: 66
End: 2021-04-23
Payer: MEDICARE

## 2021-04-23 VITALS
DIASTOLIC BLOOD PRESSURE: 64 MMHG | SYSTOLIC BLOOD PRESSURE: 110 MMHG | BODY MASS INDEX: 41.43 KG/M2 | WEIGHT: 211 LBS | HEIGHT: 60 IN

## 2021-04-23 DIAGNOSIS — Z11.59 ENCOUNTER FOR SCREENING FOR OTHER VIRAL DISEASES: ICD-10-CM

## 2021-04-23 DIAGNOSIS — N95.2 VAGINAL ATROPHY: ICD-10-CM

## 2021-04-23 DIAGNOSIS — Z11.3 ROUTINE SCREENING FOR STI (SEXUALLY TRANSMITTED INFECTION): ICD-10-CM

## 2021-04-23 DIAGNOSIS — Z01.419 ENCOUNTER FOR GYNECOLOGICAL EXAMINATION: Primary | ICD-10-CM

## 2021-04-23 PROCEDURE — 87491 CHLMYD TRACH DNA AMP PROBE: CPT | Performed by: STUDENT IN AN ORGANIZED HEALTH CARE EDUCATION/TRAINING PROGRAM

## 2021-04-23 PROCEDURE — 87591 N.GONORRHOEAE DNA AMP PROB: CPT | Performed by: STUDENT IN AN ORGANIZED HEALTH CARE EDUCATION/TRAINING PROGRAM

## 2021-04-23 PROCEDURE — G0101 CA SCREEN;PELVIC/BREAST EXAM: HCPCS | Performed by: STUDENT IN AN ORGANIZED HEALTH CARE EDUCATION/TRAINING PROGRAM

## 2021-04-23 RX ORDER — ESTRADIOL 0.1 MG/G
2 CREAM VAGINAL 2 TIMES WEEKLY
Qty: 42.5 G | Refills: 3 | Status: SHIPPED | OUTPATIENT
Start: 2021-04-26 | End: 2022-04-26

## 2021-04-23 NOTE — PROGRESS NOTES
[facility] [address] [place] [address]    ASSESSMENT/PLAN: Michelle Huerta is a 76 y o  [id number] who presents for annual gynecologic exam     Encounter for routine gynecologic examination  - Routine well woman exam completed today  - Cervical Cancer Screening complete  S/p hysterectomy for pelvic pain   - STI screening offered including HIV: Requests  - Breast Cancer Screening: Last Mammogram 06/09/2020, ordered  - Colorectal cancer screening 2020  - The following were reviewed in today's visit: Vaginal atrophy  Desire to start Estrace cream   No conraindications  Additional problems addressed at this visit:  1  Encounter for gynecological examination    2  Vaginal atrophy  -     estradiol (ESTRACE) 0 1 mg/g vaginal cream; Insert 2 g into the vagina 2 (two) times a week    3  Routine screening for STI (sexually transmitted infection)  -     HIV 1/2 ANTIGEN/ANTIBODY (4TH GENERATION) W REFLEX [facility]; Future  -     Hepatitis B surface antigen; Future  -     RPR; Future    4  Encounter for screening for other viral diseases   -     Hepatitis B surface antigen; Future  -     Chlamydia/GC amplified DNA by PCR          CC:  Annual Gynecologic Examination    HPI: Michelle Huerta is a 76 y o  [id number] who presents for annual gynecologic examination  She reports no new changes to her health  She reports no breast concerns  Her last mammogram was 2020  Her last colonoscopy was 1 years ago and recommended follow up is in 10 years  She is postmenopausal and reports no postmenopausal bleeding  She has some mild vaginal discharge  No vulvar or vaginal lesions, pelvic pain  She has no sexual health concerns and is currently sexually active  Requests STI testing  She has no symptoms of pelvic organ prolapse, urinary, or fecal incontinence  She denies intimate partner violence            The following portions of the patient's history were reviewed and updated as appropriate: allergies, current medications, past family history, past medical history, obstetric history, gynecologic history, past social history, past surgical history and problem list     Review of Systems   Constitutional: Negative for chills and fever  Respiratory: Negative for cough and shortness of breath  Cardiovascular: Negative for chest pain and leg swelling  Gastrointestinal: Negative for abdominal pain, nausea and vomiting  Genitourinary: Negative for dysuria, frequency and urgency  Neurological: Negative for dizziness, light-headedness and headaches  Objective:  /64   Ht 5' (1 524 m)   Wt 95 7 kg (211 lb)   BMI 41 21 kg/m²    Physical Exam  Exam conducted with a chaperone present  Constitutional:       Appearance: Normal appearance  HENT:      Head: Normocephalic and atraumatic  Cardiovascular:      Rate and Rhythm: Normal rate  Pulmonary:      Effort: Pulmonary effort is normal    Chest:      Breasts: Breasts are symmetrical          Right: Normal  No swelling, bleeding, inverted nipple, mass, nipple discharge, skin change or tenderness  Left: Normal  No swelling, bleeding, inverted nipple, mass, nipple discharge, skin change or tenderness  Abdominal:      General: There is no distension  Tenderness: There is no abdominal tenderness  There is no guarding  Genitourinary:     Exam position: Lithotomy position  Pubic Area: No rash  Labia:         Right: No rash, tenderness or lesion  Left: No rash, tenderness or lesion  Urethra: No prolapse, urethral swelling or urethral lesion  Vagina: Normal  No vaginal discharge, erythema, tenderness, bleeding or lesions  Uterus: Absent  Adnexa:         Right: No mass, tenderness or fullness  Left: No mass, tenderness or fullness  Comments: Cervix and Uterus are surgically absent  No palpable abnormalities at the vaginal cuff    Lymphadenopathy:      Upper Body:      Right upper body: No axillary adenopathy  Left upper body: No axillary adenopathy  Lower Body: No right inguinal adenopathy  No left inguinal adenopathy  Neurological:      Mental Status: She is alert               Kasia Verma MD  [facility]  04/23/21 2:22 PM

## 2021-04-26 ENCOUNTER — APPOINTMENT (OUTPATIENT)
Dept: LAB | Facility: CLINIC | Age: 66
End: 2021-04-26
Payer: MEDICARE

## 2021-04-26 DIAGNOSIS — Z11.3 ROUTINE SCREENING FOR STI (SEXUALLY TRANSMITTED INFECTION): ICD-10-CM

## 2021-04-26 DIAGNOSIS — Z11.59 ENCOUNTER FOR SCREENING FOR OTHER VIRAL DISEASES: ICD-10-CM

## 2021-04-26 LAB
C TRACH DNA SPEC QL NAA+PROBE: NEGATIVE
HBV SURFACE AG SER QL: NORMAL
N GONORRHOEA DNA SPEC QL NAA+PROBE: NEGATIVE
RPR SER QL: NORMAL

## 2021-04-26 PROCEDURE — 87340 HEPATITIS B SURFACE AG IA: CPT

## 2021-04-26 PROCEDURE — 87389 HIV-1 AG W/HIV-1&-2 AB AG IA: CPT

## 2021-04-26 PROCEDURE — 36415 COLL VENOUS BLD VENIPUNCTURE: CPT

## 2021-04-26 PROCEDURE — 86592 SYPHILIS TEST NON-TREP QUAL: CPT

## 2021-04-27 ENCOUNTER — TELEPHONE (OUTPATIENT)
Dept: OBGYN CLINIC | Facility: MEDICAL CENTER | Age: 66
End: 2021-04-27

## 2021-04-27 LAB — HIV 1+2 AB+HIV1 P24 AG SERPL QL IA: NORMAL

## 2021-04-27 NOTE — TELEPHONE ENCOUNTER
Patient called regarding lab work results  Explained to patient that once provider reviews results she will be getting a call from clinical team to go over them, please review

## 2021-04-29 ENCOUNTER — TELEPHONE (OUTPATIENT)
Dept: OBGYN CLINIC | Facility: MEDICAL CENTER | Age: 66
End: 2021-04-29

## 2021-04-29 NOTE — TELEPHONE ENCOUNTER
Patient called in office today slightly upset, pt has been calling every day this week to find out labwork results from earlier this week  Explained that it takes the office a few days for lab results to come back, as well has having a provider review them  Patient explained that she wants a call back as soon as possible regarding these results  Please review

## 2021-04-30 ENCOUNTER — HOSPITAL ENCOUNTER (OUTPATIENT)
Dept: PULMONOLOGY | Facility: HOSPITAL | Age: 66
Discharge: HOME | End: 2021-04-30
Attending: INTERNAL MEDICINE
Payer: MEDICARE

## 2021-04-30 DIAGNOSIS — R06.00 DOE (DYSPNEA ON EXERTION): ICD-10-CM

## 2021-04-30 PROCEDURE — 94060 EVALUATION OF WHEEZING: CPT | Performed by: INTERNAL MEDICINE

## 2021-04-30 PROCEDURE — 94729 DIFFUSING CAPACITY: CPT

## 2021-04-30 PROCEDURE — 94060 EVALUATION OF WHEEZING: CPT

## 2021-04-30 PROCEDURE — 94729 DIFFUSING CAPACITY: CPT | Performed by: INTERNAL MEDICINE

## 2021-04-30 PROCEDURE — 94726 PLETHYSMOGRAPHY LUNG VOLUMES: CPT | Performed by: INTERNAL MEDICINE

## 2021-04-30 PROCEDURE — 94760 N-INVAS EAR/PLS OXIMETRY 1: CPT

## 2021-04-30 PROCEDURE — 94726 PLETHYSMOGRAPHY LUNG VOLUMES: CPT

## 2021-04-30 RX ORDER — ALBUTEROL SULFATE 2.5 MG/3ML
2.5 SOLUTION RESPIRATORY (INHALATION) ONCE AS NEEDED
Status: COMPLETED | OUTPATIENT
Start: 2021-04-30 | End: 2021-04-30

## 2021-04-30 RX ADMIN — ALBUTEROL SULFATE 2.5 MG: 2.5 SOLUTION RESPIRATORY (INHALATION) at 08:09

## 2021-05-15 DIAGNOSIS — E78.2 MIXED HYPERLIPIDEMIA: ICD-10-CM

## 2021-05-15 RX ORDER — EZETIMIBE 10 MG/1
TABLET ORAL
Qty: 90 TABLET | Refills: 1 | Status: SHIPPED | OUTPATIENT
Start: 2021-05-15 | End: 2021-08-02 | Stop reason: SDUPTHER

## 2021-05-21 ENCOUNTER — TELEPHONE (OUTPATIENT)
Dept: INTERNAL MEDICINE CLINIC | Facility: CLINIC | Age: 66
End: 2021-05-21

## 2021-05-21 NOTE — TELEPHONE ENCOUNTER
Pt requesting refills on zetia 10mg #90 please send to [facility] in [place]

## 2021-06-09 ENCOUNTER — HOSPITAL ENCOUNTER (OUTPATIENT)
Dept: MAMMOGRAPHY | Facility: HOSPITAL | Age: 66
Discharge: HOME | End: 2021-06-09
Attending: INTERNAL MEDICINE
Payer: MEDICARE

## 2021-06-09 VITALS — WEIGHT: 211 LBS | BODY MASS INDEX: 41.43 KG/M2 | HEIGHT: 60 IN

## 2021-06-09 DIAGNOSIS — Z12.31 ENCOUNTER FOR SCREENING MAMMOGRAM FOR MALIGNANT NEOPLASM OF BREAST: ICD-10-CM

## 2021-06-09 PROCEDURE — 77067 SCR MAMMO BI INCL CAD: CPT

## 2021-06-09 PROCEDURE — 77063 BREAST TOMOSYNTHESIS BI: CPT

## 2021-06-12 DIAGNOSIS — F33.9 DEPRESSION, RECURRENT (HCC): ICD-10-CM

## 2021-06-13 RX ORDER — ARIPIPRAZOLE 2 MG/1
TABLET ORAL
Qty: 90 TABLET | Refills: 1 | Status: SHIPPED | OUTPATIENT
Start: 2021-06-13 | End: 2021-08-02

## 2021-06-28 DIAGNOSIS — G25.2 COARSE TREMORS: ICD-10-CM

## 2021-06-28 RX ORDER — PRIMIDONE 50 MG/1
50 TABLET ORAL EVERY 8 HOURS SCHEDULED
Qty: 270 TABLET | Refills: 1 | Status: SHIPPED | OUTPATIENT
Start: 2021-06-28 | End: 2022-01-07 | Stop reason: SDUPTHER

## 2021-06-28 NOTE — TELEPHONE ENCOUNTER
Pt id requesting a refill on Primidone 50mg, TID  Please send to [facility] in [facility]

## 2021-07-07 DIAGNOSIS — F33.9 DEPRESSION, RECURRENT (HCC): ICD-10-CM

## 2021-07-07 RX ORDER — ARIPIPRAZOLE 5 MG/1
TABLET ORAL
Qty: 90 TABLET | Refills: 1 | Status: SHIPPED | OUTPATIENT
Start: 2021-07-07 | End: 2022-01-07

## 2021-07-07 RX ORDER — TRAZODONE HYDROCHLORIDE 100 MG/1
TABLET ORAL
Qty: 90 TABLET | Refills: 1 | Status: SHIPPED | OUTPATIENT
Start: 2021-07-07 | End: 2021-08-02 | Stop reason: SDUPTHER

## 2021-08-02 ENCOUNTER — APPOINTMENT (OUTPATIENT)
Dept: LAB | Facility: CLINIC | Age: 66
End: 2021-08-02
Payer: MEDICARE

## 2021-08-02 ENCOUNTER — OFFICE VISIT (OUTPATIENT)
Dept: INTERNAL MEDICINE CLINIC | Facility: CLINIC | Age: 66
End: 2021-08-02
Payer: MEDICARE

## 2021-08-02 VITALS
OXYGEN SATURATION: 96 % | RESPIRATION RATE: 18 BRPM | TEMPERATURE: 97.5 F | WEIGHT: 209 LBS | DIASTOLIC BLOOD PRESSURE: 78 MMHG | SYSTOLIC BLOOD PRESSURE: 112 MMHG | BODY MASS INDEX: 41.03 KG/M2 | HEART RATE: 96 BPM | HEIGHT: 60 IN

## 2021-08-02 DIAGNOSIS — E78.2 MIXED HYPERLIPIDEMIA: ICD-10-CM

## 2021-08-02 DIAGNOSIS — Z13.31 POSITIVE DEPRESSION SCREENING: ICD-10-CM

## 2021-08-02 DIAGNOSIS — K76.0 FATTY LIVER: ICD-10-CM

## 2021-08-02 DIAGNOSIS — F33.9 DEPRESSION, RECURRENT (HCC): ICD-10-CM

## 2021-08-02 DIAGNOSIS — F32.1 MODERATE MAJOR DEPRESSION, SINGLE EPISODE (HCC): ICD-10-CM

## 2021-08-02 DIAGNOSIS — E66.01 MORBID OBESITY (HCC): ICD-10-CM

## 2021-08-02 DIAGNOSIS — M15.9 PRIMARY OSTEOARTHRITIS INVOLVING MULTIPLE JOINTS: ICD-10-CM

## 2021-08-02 DIAGNOSIS — J30.2 SEASONAL ALLERGIES: ICD-10-CM

## 2021-08-02 DIAGNOSIS — I10 ESSENTIAL HYPERTENSION: Primary | ICD-10-CM

## 2021-08-02 DIAGNOSIS — G89.4 CHRONIC PAIN SYNDROME: ICD-10-CM
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
## 2021-08-02 LAB
CREAT UR-MCNC: 27 MG/DL
MICROALBUMIN UR-MCNC: <5 MG/L (ref 0–20)
MICROALBUMIN/CREAT 24H UR: <19 MG/G CREATININE (ref 0–30)

## 2021-08-02 PROCEDURE — 99214 OFFICE O/P EST MOD 30 MIN: CPT | Performed by: INTERNAL MEDICINE

## 2021-08-02 PROCEDURE — 82043 UR ALBUMIN QUANTITATIVE: CPT | Performed by: INTERNAL MEDICINE

## 2021-08-02 PROCEDURE — 82570 ASSAY OF URINE CREATININE: CPT | Performed by: INTERNAL MEDICINE

## 2021-08-02 RX ORDER — MONTELUKAST SODIUM 10 MG/1
10 TABLET ORAL
Qty: 90 TABLET | Refills: 3 | Status: SHIPPED | OUTPATIENT
Start: 2021-08-02 | End: 2022-07-20

## 2021-08-02 RX ORDER — EZETIMIBE 10 MG/1
10 TABLET ORAL DAILY
Qty: 90 TABLET | Refills: 1 | Status: SHIPPED | OUTPATIENT
Start: 2021-08-02 | End: 2022-01-24

## 2021-08-02 RX ORDER — TRAZODONE HYDROCHLORIDE 100 MG/1
100 TABLET ORAL
Qty: 90 TABLET | Refills: 1 | Status: SHIPPED | OUTPATIENT
Start: 2021-08-02 | End: 2022-07-12

## 2021-08-02 NOTE — PROGRESS NOTES
Assessment/Plan:  Problem List Items Addressed This Visit        Digestive    Fatty liver       Cardiovascular and Mediastinum    Hypertension - Primary    Relevant Orders    Microalbumin / creatinine urine ratio       Musculoskeletal and Integument    Primary osteoarthritis involving multiple joints       Other    Morbid obesity (HCC)    Mixed hyperlipidemia    Relevant Medications    ezetimibe (ZETIA) 10 mg tablet    Depression, recurrent (HCC)    Relevant Medications    traZODone (DESYREL) 100 mg tablet    Chronic pain syndrome      Other Visit Diagnoses     Positive depression screening        Moderate major depression, single episode ([facility] )        Relevant Medications    traZODone (DESYREL) 100 mg tablet    Fall, initial encounter        Seasonal allergies        Relevant Medications    montelukast (SINGULAIR) 10 mg tablet           Diagnoses and all orders for this visit:    Essential hypertension  -     Microalbumin / creatinine urine ratio    Mixed hyperlipidemia  -     ezetimibe (ZETIA) 10 mg tablet; Take 1 tablet (10 mg total) by mouth daily    Depression, recurrent (HCC)  -     traZODone (DESYREL) 100 mg tablet; Take 1 tablet (100 mg total) by mouth daily at bedtime    Chronic pain syndrome    Morbid obesity (HCC)    Primary osteoarthritis involving multiple joints    Fatty liver    Positive depression screening    Moderate major depression, single episode ([facility] )    Fall, initial encounter    Seasonal allergies  -     montelukast (SINGULAIR) 10 mg tablet; Take 1 tablet (10 mg total) by mouth daily at bedtime    Other orders  -     Cancel: CBC and differential; Future  -     Cancel: Comprehensive metabolic panel; Future  -     Cancel: Lipid Panel with Direct LDL reflex; Future  -     Cancel: Hemoglobin A1C; Future  -     Cancel: TSH, 3rd generation with Free T4 reflex; Future  -     Cancel: Prolactin; Future        No problem-specific Assessment & Plan notes found for this encounter      A/P: Doing well and will check her urine  MDD controlled and continue celexa  Will add singulair for the allergies  Discussed PT for fall and defers  Continue current treatment and RTC four months for routine  Keep f/u with the specialist for f/u positive genetic testing        Subjective:      Patient ID: Jr Silva is a 76 y o  female  [facility] RTC for f/u HTN, hyperlipidemia, etc Pt requested a genetic testing for CAD after she was called and was told that she qualified for this test  Pt told that her insurance may not cover it, but she still wanted it and reported that she was told that she qualified for it and it would be covered  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  Doing ok and no new issues  ROS positive for allergies  Taking INS and claritin    Remains active w/o difficulty but one mechanical fall w/o injury  Chronic pain is manageable  MAGGIE/MDD is controlled  Labs up to date except for microalbumin  The following portions of the patient's history were reviewed and updated as appropriate:   She has a past medical history of Arthritis, Cataract, Depression, Fluid retention, Headache, acute, Heart murmur, Hyperlipidemia, Hypertension, PONV (postoperative nausea and vomiting), Rotator cuff tear, left, Wears glasses, and Wears partial dentures  ,  does not have any pertinent problems on file  ,   has a past surgical history that includes Ankle surgery (Right); Tonsillectomy; Tubal ligation; Hysterectomy; Ovarian cyst removal; Carpal tunnel release (Bilateral); Hand surgery (Right); Abdominal adhesion surgery; Knee arthroscopy (Left); Appendectomy; Colonoscopy; Hemorroidectomy; Repair rectocele; pr shldr arthroscop,surg,w/rotat cuff repr (Left, 2/19/2018); Carpal tunnel release (Left); Carpal tunnel release (Right); Ileostomy; Anal sphincteroplasty; Knee arthrocentesis; and Tonsillectomy and adenoidectomy  ,  family history includes Alzheimer's disease in her sister and sister; Bone cancer in her family and mother; COPD in her mother; Colon cancer in her family and mother; Depression in her sister; Diabetes in her sister; Emphysema in her mother; ROBERT disease in her brother; Heart failure in her sister; Hypertension in her brother, family, mother, sister, and sister; Lead poisoning in her father; No Known Problems in her daughter, maternal aunt, maternal aunt, maternal aunt, maternal aunt, maternal aunt, maternal grandmother, paternal aunt, and paternal grandmother; Skin cancer in her mother; Throat cancer in her brother; Thyroid disease in her sister  ,   reports that she has never smoked  She has never used smokeless tobacco  She reports previous alcohol use  She reports that she does not use drugs  ,  is allergic to iodine - food allergy, keflex [cephalexin], morphine and related, penicillins, benadryl [diphenhydramine], ciprofloxacin, clindamycin, and erythromycin     Current Outpatient Medications   Medication Sig Dispense Refill    acetaminophen (TYLENOL) 500 mg tablet Take 500-1,000 mg by mouth every 6 (six) hours as needed for mild pain      ARIPiprazole (ABILIFY) 5 mg tablet take 1 tablet by mouth every morning 90 tablet 1    Calcium Carbonate (CALCIUM 600 PO) Take by mouth daily      Cholecalciferol (VITAMIN D3 PO) Take 50 mcg by mouth daily      citalopram (CeleXA) 40 mg tablet take 1 tablet by mouth every evening 90 tablet 1    enalapril (VASOTEC) 10 mg tablet Take 1 tablet (10 mg total) by mouth daily 90 tablet 1    estradiol (ESTRACE) 0 1 mg/g vaginal cream Insert 2 g into the vagina 2 (two) times a week 42 5 g 3    ezetimibe (ZETIA) 10 mg tablet Take 1 tablet (10 mg total) by mouth daily 90 tablet 1    fluticasone (FLONASE) 50 mcg/act nasal spray 1 spray into each nostril daily 9 9 mL 0    loratadine (CLARITIN) 10 mg tablet Take 1 tablet (10 mg total) by mouth daily 30 tablet 0    Multiple Vitamin (multivitamin) tablet Take 1 tablet by mouth daily      nystatin (MYCOSTATIN) powder Apply topically 2 (two) times a day 60 g 1    omega-3-acid ethyl esters (LOVAZA) 1 g capsule Take 2 capsules (2 g total) by mouth 2 (two) times a day 180 capsule 0    primidone (MYSOLINE) 50 mg tablet Take 1 tablet (50 mg total) by mouth every 8 (eight) hours 270 tablet 1    traZODone (DESYREL) 100 mg tablet Take 1 tablet (100 mg total) by mouth daily at bedtime 90 tablet 1    montelukast (SINGULAIR) 10 mg tablet Take 1 tablet (10 mg total) by mouth daily at bedtime 90 tablet 3    Tea Tree Oil (NO FUNGUS NAIL PROTECTANT EX) Apply topically (Patient not taking: Reported on 2021)       No current facility-administered medications for this visit  Review of Systems   Constitutional: Negative for activity change, chills, diaphoresis, fatigue and fever  HENT: Negative  Eyes: Negative for visual disturbance  Respiratory: Negative for cough, chest tightness, shortness of breath and wheezing  Cardiovascular: Negative for chest pain, palpitations and leg swelling  Gastrointestinal: Negative for abdominal pain, constipation, diarrhea, nausea and vomiting  Endocrine: Negative for cold intolerance and heat intolerance  Genitourinary: Negative for difficulty urinating, dysuria and frequency  Musculoskeletal: Negative for arthralgias, gait problem and myalgias  Neurological: Negative for dizziness, seizures, syncope, weakness, light-headedness and headaches  Psychiatric/Behavioral: Negative for confusion, dysphoric mood and sleep disturbance  The patient is not nervous/anxious          PHQ-9 Depression Screening    PHQ-9:   Frequency of the following problems over the past two weeks:      Little interest or pleasure in doing things: 1 - several days  Feeling down, depressed, or hopeless: 1 - several days  Trouble falling or staying asleep, or sleeping too much: 1 - several days  Feeling tired or having little energy: 1 - several days  Poor appetite or overeatin - not at all  Feeling bad about yourself - or that you are a failure or have let yourself or your family down: 0 - not at all  Trouble concentrating on things, such as reading the newspaper or watching television: 0 - not at all  Moving or speaking so slowly that other people could have noticed  Or the opposite - being so fidgety or restless that you have been moving around a lot more than usual: 0 - not at all  Thoughts that you would be better off dead, or of hurting yourself in some way: 0 - not at all  PHQ-2 Score: 2  PHQ-9 Score: 4        Objective:  Vitals:    08/02/21 1337   BP: 112/78   BP Location: Left arm   Patient Position: Sitting   Cuff Size: Large   Pulse: 96   Resp: 18   Temp: 97 5 °F (36 4 °C)   TempSrc: Temporal   SpO2: 96%   Weight: 94 8 kg (209 lb)   Height: 5' (1 524 m)     Body mass index is 40 82 kg/m²  Physical Exam  Constitutional:       General: She is not in acute distress  Appearance: Normal appearance  She is not ill-appearing  HENT:      Head: Normocephalic and atraumatic  Mouth/Throat:      Mouth: Mucous membranes are moist    Eyes:      Extraocular Movements: Extraocular movements intact  Conjunctiva/sclera: Conjunctivae normal       Pupils: Pupils are equal, round, and reactive to light  Neck:      Vascular: No carotid bruit  Cardiovascular:      Rate and Rhythm: Normal rate and regular rhythm  Heart sounds: Normal heart sounds  Pulmonary:      Effort: Pulmonary effort is normal  No respiratory distress  Breath sounds: Normal breath sounds  No wheezing or rales  Abdominal:      General: Bowel sounds are normal  There is no distension  Palpations: Abdomen is soft  Tenderness: There is no abdominal tenderness  Musculoskeletal:      Cervical back: Neck supple  Right lower leg: No edema  Left lower leg: No edema  Neurological:      General: No focal deficit present  Mental Status: She is alert and oriented to person, place, and time  Mental status is at baseline  Psychiatric:         Mood and Affect: Mood normal          Behavior: Behavior normal          Thought Content: Thought content normal          Judgment: Judgment normal          Depression Screening Follow-up Plan: Patient's depression screening was positive with a PHQ-2 score of 2  Their PHQ-9 score was 4  Patient assessed for underlying major depression  They have no active suicidal ideations  Brief counseling provided and recommend additional follow-up/re-evaluation next office visit  Falls Plan of Care: Balance, strength, and gait training instructions were provided

## 2021-08-02 NOTE — PATIENT INSTRUCTIONS
Chronic Hypertension   AMBULATORY CARE:   Hypertension  is high blood pressure  Your blood pressure is the force of your blood moving against the walls of your arteries  Hypertension causes your blood pressure to get so high that your heart has to work much harder than normal  This can damage your heart  Even if you have hypertension for years, lifestyle changes, medicines, or both can help bring your blood pressure to normal   Call your local emergency number (911 in the [address] [facility]) or have someone call if:   · You have chest pain  · You have any of the following signs of a heart attack:      ? Squeezing, pressure, or pain in your chest    ? You may  also have any of the following:     § Discomfort or pain in your back, neck, jaw, stomach, or arm    § Shortness of breath    § Nausea or vomiting    § Lightheadedness or a sudden cold sweat    · You become confused or have difficulty speaking  · You suddenly feel lightheaded or have trouble breathing  Seek care immediately if:   · You have a severe headache or vision loss  · You have weakness in an arm or leg  Call your doctor if:   · You feel faint, dizzy, confused, or drowsy  · You have been taking your blood pressure medicine but your pressure is higher than your provider says it should be  · You have questions or concerns about your condition or care  Treatment for chronic hypertension  may include medicine to lower your blood pressure and cholesterol levels  A low cholesterol level helps prevent heart disease and makes it easier to control your blood pressure  Heart disease can make your blood pressure harder to control  You may also need to make lifestyle changes  What you need to know about the stages of hypertension:       · Normal blood pressure is 119/79 or lower   Your healthcare provider may only check your blood pressure each year if it stays at a normal level  · Elevated blood pressure is 120/79 to 129/79    This is sometimes called prehypertension  Your healthcare provider may suggest lifestyle changes to help lower your blood pressure to a normal level  He or she may then check it again in 3 to 6 months  · Stage 1 hypertension is 130/80  to 139/89   Your provider may recommend lifestyle changes, medication, and checks every 3 to 6 months until your blood pressure is controlled  · Stage 2 hypertension is 140/90 or higher   Your provider will recommend lifestyle changes and have you take 2 kinds of hypertension medicines  You will also need to have your blood pressure checked monthly until it is controlled  Manage chronic hypertension:   · Check your blood pressure at home  Avoid smoking, caffeine, and exercise at least 30 minutes before checking your blood pressure  Sit and rest for 5 minutes before you take your blood pressure  Extend your arm and support it on a flat surface  Your arm should be at the same level as your heart  Follow the directions that came with your blood pressure monitor  Check your blood pressure 2 times, 1 minute apart, before you take your medicine in the morning  Also check your blood pressure before your evening meal  Keep a record of your readings and bring it to your follow-up visits  Ask your healthcare provider what your blood pressure should be  · Manage any other health conditions you have  Health conditions such as diabetes can increase your risk for hypertension  Follow your healthcare provider's instructions and take all your medicines as directed  Talk to your healthcare provider about any new health conditions you have recently developed  · Ask about all medicines  Certain medicines can increase your blood pressure  Examples include oral birth control pills, decongestants, herbal supplements, and NSAIDs, such as ibuprofen  Your healthcare provider can tell you which medicines are safe for you to take  This includes prescription and over-the-counter medicines      Lifestyle changes you can make to lower your blood pressure: Your provider may want you to make more lifestyle changes if you are having trouble controlling your blood pressure  This may feel difficult over time, especially if you think you are making good changes but your pressure is still high  It might help to focus on one new change at a time  For example, try to add 1 more day of exercise, or exercise for an extra 10 minutes on 2 days  Small changes can make a big difference  Your healthcare provider can also refer you to specialists such as a dietitian who can help you make small changes  Your family members may be included in helping you learn to create lifestyle changes, such as the following:  · Limit sodium (salt) as directed  Too much sodium can affect your fluid balance  Check labels to find low-sodium or no-salt-added foods  Some low-sodium foods use potassium salts for flavor  Too much potassium can also cause health problems  Your healthcare provider will tell you how much sodium and potassium are safe for you to have in a day  He or she may recommend that you limit sodium to 2,300 mg a day  · Follow the meal plan recommended by your healthcare provider  A dietitian or your provider can give you more information on low-sodium plans or the DASH (Dietary Approaches to Stop Hypertension) eating plan  The DASH plan is low in sodium, processed sugar, unhealthy fats, and total fat  It is high in potassium, calcium, and fiber  These can be found in vegetables, fruit, and whole-grain foods  · Be physically active throughout the day  Physical activity, such as exercise, can help control your blood pressure and your weight  Be physically active for at least 30 minutes per day, on most days of the week  Include aerobic activity, such as walking or riding a bicycle  Also include strength training at least 2 times each week  Your healthcare providers can help you create a physical activity plan  · Decrease stress  This may help lower your blood pressure  Learn ways to relax, such as deep breathing or listening to music  · Limit alcohol as directed  Alcohol can increase your blood pressure  A drink of alcohol is 12 ounces of beer, 5 ounces of wine, or 1½ ounces of liquor  · Do not smoke  Nicotine and other chemicals in cigarettes and cigars can increase your blood pressure and also cause lung damage  Ask your healthcare provider for information if you currently smoke and need help to quit  E-cigarettes or smokeless tobacco still contain nicotine  Talk to your healthcare provider before you use these products  Follow up with your doctor as directed: You will need to return to have your blood pressure checked and to have other lab tests done  Write down your questions so you remember to ask them during your visits  © Copyright [employer] 2021 Information is for End User's use only and may not be sold, redistributed or otherwise used for commercial purposes  All illustrations and images included in CareNotes® are the copyrighted property of [employer] D A M [employer] Inc  or [facility] [employer]   The above information is an  only  It is not intended as medical advice for individual conditions or treatments  Talk to your doctor, nurse or pharmacist before following any medical regimen to see if it is safe and effective for you  Depression   AMBULATORY CARE:   Depression  is a medical condition that causes feelings of sadness or hopelessness that do not go away  Depression may cause you to lose interest in things you used to enjoy  These feelings may interfere with your daily life    Common symptoms include the following:   · Appetite changes, or weight gain or loss    · Trouble going to sleep or staying asleep, or sleeping too much    · Fatigue or lack of energy    · Feeling restless, irritable, or withdrawn    · Feeling worthless, hopeless, discouraged, or guilty    · Trouble concentrating, remembering things, doing daily tasks, or making decisions    · Thoughts about hurting or killing yourself    Call your local emergency number (911 in the [address]) if:   · You think about harming yourself or someone else  · You have done something on purpose to hurt yourself  Call your therapist or doctor if:   · Your symptoms do not improve  · You cannot make it to your next appointment  · You have new symptoms  · You have questions or concerns about your condition or care  The following resources are available at any time to help you, if needed:   · [address] [facility]: 1-148.760.5468 (7-545-237-RWTS)     · Suicide Hotline: 8-718.696.1602 (7-296-PTLBORT)     · For a list of international numbers: https://save org/find-help/international-resources/    Treatment for depression  may include medicine to relieve depression  Medicine is often used together with therapy  Therapy is a way for you to talk about your feelings and anything that may be causing depression  Therapy can be done alone or in a group  It may also be done with family members or a significant other  Self-care:   · Get regular physical activity  Try to be active for 30 minutes, 3 to 5 days a week  Physical activity can help relieve depression  Work with your healthcare provider to develop a plan that you enjoy  It may help to ask someone to be active with you  · Create a regular sleep schedule  A routine can help you relax before bed  Listen to music, read, or do yoga  Try to go to bed and wake up at the same time every day  Sleep is important for emotional health  · Eat a variety of healthy foods  Healthy foods include fruits, vegetables, whole-grain breads, low-fat dairy products, lean meats, fish, and cooked beans  A healthy meal plan is low in fat, salt, and added sugar  · Do not drink alcohol or use drugs  Alcohol and drugs can make depression worse   Talk to your therapist or doctor if you need help quitting  Follow up with your healthcare provider as directed: Your healthcare provider will monitor your progress at follow-up visits  He or she will also monitor your medicine if you take antidepressants  Your healthcare provider will ask if the medicine is helping  Tell him or her about any side effects or problems you may have with your medicine  The type or amount of medicine may need to be changed  Write down your questions so you remember to ask them during your visits  © Copyright [employer] 2021 Information is for End User's use only and may not be sold, redistributed or otherwise used for commercial purposes  All illustrations and images included in CareNotes® are the copyrighted property of [employer] M [employer] Inc  or [employer]   The above information is an  only  It is not intended as medical advice for individual conditions or treatments  Talk to your doctor, nurse or pharmacist before following any medical regimen to see if it is safe and effective for you  Fall Prevention   AMBULATORY CARE:   Fall prevention  includes ways to make your home and other areas safer  It also includes ways you can move more carefully to prevent a fall  Health conditions that cause changes in your blood pressure, vision, or muscle strength and coordination may increase your risk for falls  Medicines may also increase your risk for falls if they make you dizzy, weak, or sleepy  Call 911 or have someone else call if:   · You have fallen and are unconscious  · You have fallen and cannot move part of your body  Contact your healthcare provider if:   · You have fallen and have pain or a headache  · You have questions or concerns about your condition or care  Fall prevention tips:   · Stand or sit up slowly  This may help you keep your balance and prevent falls  · Use assistive devices as directed    Your healthcare provider may suggest that you use a cane or walker to help you keep your balance  You may need to have grab bars put in your bathroom near the toilet or in the shower  · Wear shoes that fit well and have soles that   Wear shoes both inside and outside  Use slippers with good   Do not wear shoes with high heels  · Wear a personal alarm  This is a device that allows you to call 911 if you fall and need help  Ask your healthcare provider for more information  · Stay active  Exercise can help strengthen your muscles and improve your balance  Your healthcare provider may recommend water aerobics or walking  He or she may also recommend physical therapy to improve your coordination  Never start an exercise program without talking to your healthcare provider first          · Manage your medical conditions  Keep all appointments with your healthcare providers  Visit your eye doctor as directed  Home safety tips:       · Add items to prevent falls in the bathroom  Put nonslip strips on your bath or shower floor to prevent you from slipping  Use a bath mat if you do not have carpet in the bathroom  This will prevent you from falling when you step out of the bath or shower  Use a shower seat so you do not need to stand while you shower  Sit on the toilet or a chair in your bathroom to dry yourself and put on clothing  This will prevent you from losing your balance from drying or dressing yourself while you are standing  · Keep paths clear  Remove books, shoes, and other objects from walkways and stairs  Place cords for telephones and lamps out of the way so that you do not need to walk over them  Tape them down if you cannot move them  Remove small rugs  If you cannot remove a rug, secure it with double-sided tape  This will prevent you from tripping  · Install bright lights in your home  Use night lights to help light paths to the bathroom or kitchen  Always turn on the light before you start walking      · Keep items you use often on shelves within reach  Do not use a step stool to help you reach an item  · Paint or place reflective tape on the edges of your stairs  This will help you see the stairs better  Follow up with your healthcare provider as directed:  Write down your questions so you remember to ask them during your visits  © Copyright [employer] 2021 Information is for End User's use only and may not be sold, redistributed or otherwise used for commercial purposes  All illustrations and images included in CareNotes® are the copyrighted property of [employer]  or [facility] Martín Mojica   The above information is an  only  It is not intended as medical advice for individual conditions or treatments  Talk to your doctor, nurse or pharmacist before following any medical regimen to see if it is safe and effective for you

## 2021-08-05 ENCOUNTER — OFFICE VISIT (OUTPATIENT)
Dept: CARDIOLOGY CLINIC | Facility: CLINIC | Age: 66
End: 2021-08-05
Payer: MEDICARE

## 2021-08-05 VITALS
DIASTOLIC BLOOD PRESSURE: 72 MMHG | SYSTOLIC BLOOD PRESSURE: 110 MMHG | BODY MASS INDEX: 40.84 KG/M2 | HEART RATE: 68 BPM | WEIGHT: 208 LBS | HEIGHT: 60 IN

## 2021-08-05 DIAGNOSIS — E66.01 MORBID OBESITY (HCC): ICD-10-CM

## 2021-08-05 DIAGNOSIS — Z15.89 GENETIC PREDISPOSITION TO CARDIOMYOPATHY: ICD-10-CM

## 2021-08-05 DIAGNOSIS — R60.0 PEDAL EDEMA: ICD-10-CM

## 2021-08-05 DIAGNOSIS — E78.2 MIXED HYPERLIPIDEMIA: ICD-10-CM

## 2021-08-05 DIAGNOSIS — I10 ESSENTIAL HYPERTENSION: ICD-10-CM

## 2021-08-05 DIAGNOSIS — G47.33 OSA (OBSTRUCTIVE SLEEP APNEA): ICD-10-CM

## 2021-08-05 DIAGNOSIS — R06.01 ORTHOPNEA: Primary | ICD-10-CM

## 2021-08-05 PROCEDURE — 99214 OFFICE O/P EST MOD 30 MIN: CPT | Performed by: INTERNAL MEDICINE

## 2021-08-05 RX ORDER — ROSUVASTATIN CALCIUM 5 MG/1
5 TABLET, COATED ORAL DAILY
Qty: 30 TABLET | Refills: 1 | Status: SHIPPED | OUTPATIENT
Start: 2021-08-05 | End: 2021-10-01 | Stop reason: SDUPTHER

## 2021-08-05 RX ORDER — FUROSEMIDE 40 MG/1
40 TABLET ORAL DAILY PRN
Qty: 30 TABLET | Refills: 0 | Status: SHIPPED | OUTPATIENT
Start: 2021-08-05 | End: 2021-09-01 | Stop reason: SDUPTHER

## 2021-08-05 NOTE — PROGRESS NOTES
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  · Echo is without any dilated cardiomyopathy, EF 60%  · Unclear significance  · Can monitor clinically    Hypertension  · BP better controlled on increased dose of enalapril  · Continue enalapril 10    Hyperlipidemia   4/21/2020 09:35 12/28/2020 09:43 3/30/2021 11:27   Cholesterol 254 (H)  219 (H)   Triglycerides 126 128 241 (H)   HDL 48  52   LDL Calculated 181 (H)  119 (H)   LDL CHOLESTEROL DIRECT  130 (H)      · Previously intolerant to atorvastatin - cramps  · Currently ezetimibe 10  · Start rosuvastatin 5    REKHA  · Has clinical features to suggest sleep apnea  · Check formal sleep study    ECG today -  No results found for this visit on 08/05/21       Orders Placed This Encounter   Procedures    Lipid Panel with Direct LDL reflex    Home Study    Diagnostic Sleep Study     Return in about 6 months (around 2/5/2022), or if symptoms worsen or fail to improve  History of Present Illness    79-year-old female comes in as a new patient for establishment of care after recent hospitalization for chest pain  On 1st March, patient came in with to the hospital with sudden onset left-sided chest pressure which woke her up from sleep in the morning  She received nitroglycerin without much relief  Subsequently her blood pressure was noted to be elevated in the 200s and she received Ativan which improved her pain and blood pressure  She has had a lot of social stressors recently  She was ruled out with 3 negative troponins and discharged with outpatient stress  Since discharge, she has not had any further major recurrences of chest pain or shortness of breath  She ambulates with the help of a cane, due to pain, but feels she can exercise on treadmill for stress test     Interval history - 8/5/2021  She comes back after completing stress testing  She has been doing well over the past few months, and her chest pain has otherwise resolved  She is currently working with cleaning places, and occasionally gets short of breath with it  She does report some orthopnea, and sleeps elevated  Also has edema    Additionally , she states that she recently underwent some genetic testing which had abnormal results and has questions regarding the same  On further inquiry she states that she had previously but a weight loss supplement from bile about a tree, and had received some in epic testing at along with it, and so underwent same, and was found to have pathogenic mutation and as a result was asked to discuss it here today        Historical Information   Past Medical History:   Diagnosis Date    Arthritis     Cataract     ashley    Depression     Fluid retention     Headache, acute     Heart murmur     Hyperlipidemia     Hypertension     PONV (postoperative nausea and vomiting)     Rotator cuff tear, left     Wears glasses     Wears partial dentures     upper     Past Surgical History:   Procedure Laterality Date    ABDOMINAL ADHESION SURGERY      ANAL SPHINCTEROPLASTY      ANKLE SURGERY Right     tendon tear    APPENDECTOMY      CARPAL TUNNEL RELEASE Bilateral     CARPAL TUNNEL RELEASE Left     CARPAL TUNNEL RELEASE Right     COLONOSCOPY      HAND SURGERY Right     ganglion cyst    HEMORROIDECTOMY      HYSTERECTOMY      ILEOSTOMY      KNEE ARTHROCENTESIS      ganglion Cyst    KNEE ARTHROSCOPY Left     OVARIAN CYST REMOVAL      IN SHLDR ARTHROSCOP,SURG,W/ROTAT CUFF REPR Left 2/19/2018    Procedure: ARTHROSCOPIC REPAIR ROTATOR CUFF,  SAD, BICEP TENODESIS;  Surgeon: Antionette Real MD;  Location: [facility];  Service: Orthopedics    REPAIR RECTOCELE      TONSILLECTOMY      TONSILLECTOMY AND ADENOIDECTOMY      TUBAL LIGATION       Family History   Problem Relation Age of Onset    Hypertension Mother     Colon cancer Mother     Bone cancer Mother     COPD Mother     Emphysema Mother     Skin cancer Mother     Lead poisoning Father         lead poisoning in lungs     Thyroid disease Sister     Depression Sister     Hypertension Sister     Alzheimer's disease Sister     ROBERT disease Brother     Throat cancer Brother     Hypertension Brother     Colon cancer Family     Bone cancer Family     Hypertension Family     Diabetes Sister     Hypertension Sister     Heart failure Sister     Alzheimer's disease Sister     No Known Problems Daughter     No Known Problems Maternal Grandmother     No Known Problems Paternal Grandmother     No Known Problems Maternal Aunt     No Known Problems Maternal Aunt     No Known Problems Maternal Aunt     No Known Problems Maternal Aunt     No Known Problems Maternal Aunt     No Known Problems Paternal Aunt      Current Outpatient Medications on File Prior to Visit   Medication Sig Dispense Refill    acetaminophen (TYLENOL) 500 mg tablet Take 500-1,000 mg by mouth every 6 (six) hours as needed for mild pain      ARIPiprazole (ABILIFY) 5 mg tablet take 1 tablet by mouth every morning 90 tablet 1    Calcium Carbonate (CALCIUM 600 PO) Take by mouth daily      Cholecalciferol (VITAMIN D3 PO) Take 50 mcg by mouth daily      citalopram (CeleXA) 40 mg tablet take 1 tablet by mouth every evening 90 tablet 1    enalapril (VASOTEC) 10 mg tablet Take 1 tablet (10 mg total) by mouth daily 90 tablet 1    estradiol (ESTRACE) 0 1 mg/g vaginal cream Insert 2 g into the vagina 2 (two) times a week 42 5 g 3    ezetimibe (ZETIA) 10 mg tablet Take 1 tablet (10 mg total) by mouth daily 90 tablet 1    fluticasone (FLONASE) 50 mcg/act nasal spray 1 spray into each nostril daily 9 9 mL 0    loratadine (CLARITIN) 10 mg tablet Take 1 tablet (10 mg total) by mouth daily 30 tablet 0    montelukast (SINGULAIR) 10 mg tablet Take 1 tablet (10 mg total) by mouth daily at bedtime 90 tablet 3    Multiple Vitamin (multivitamin) tablet Take 1 tablet by mouth daily      nystatin (MYCOSTATIN) powder Apply topically 2 (two) times a day 60 g 1    omega-3-acid ethyl esters (LOVAZA) 1 g capsule Take 2 capsules (2 g total) by mouth 2 (two) times a day 180 capsule 0    primidone (MYSOLINE) 50 mg tablet Take 1 tablet (50 mg total) by mouth every 8 (eight) hours 270 tablet 1    traZODone (DESYREL) 100 mg tablet Take 1 tablet (100 mg total) by mouth daily at bedtime 90 tablet 1    Tea Tree Oil (NO FUNGUS NAIL PROTECTANT EX) Apply topically (Patient not taking: Reported on 8/2/2021)       No current facility-administered medications on file prior to visit       Allergies   Allergen Reactions    Iodine - Food Allergy Hives     IV IODINE    Keflex [Cephalexin] Other (See Comments)     Mouth sores    Morphine And Related Hives     IV MORPINE    Penicillins Hives    Benadryl [Diphenhydramine] Itching and Swelling     IV benadryl in hospital    Ciprofloxacin Hives    Clindamycin Other (See Comments)     Pt unsure    Erythromycin Hives     Social History     Socioeconomic History    Marital status:      Spouse name: None    Number of children: None    Years of education: None    Highest education level: None   Occupational History    None   Tobacco Use    Smoking status: Never Smoker    Smokeless tobacco: Never Used   Vaping Use    Vaping Use: Never used   Substance and Sexual Activity    Alcohol use: Not Currently     Comment: few x year    Drug use: No    Sexual activity: Yes     Partners: Male   Other Topics Concern    None   Social History Narrative    Getting   Two children    Lives with her daughter    On disability  Prior   Social Determinants of Health     Financial Resource Strain:     Difficulty of Paying Living Expenses:    Food Insecurity:     Worried About Running Out of Food in the Last Year:     [address] in the Last Year:    Transportation Needs:     Lack of Transportation (Medical):  Lack of Transportation (Non-Medical):    Physical Activity:     Days of Exercise per Week:     Minutes of Exercise per Session:    Stress:     Feeling of Stress :    Social Connections:     Frequency of Communication with Friends and Family:     Frequency of Social Gatherings with Friends and Family:     Attends Judaism Services:     Active Member of Clubs or Organizations:     Attends Club or Organization Meetings:     Marital Status:    Intimate Partner Violence:     Fear of Current or Ex-Partner:     Emotionally Abused:     Physically Abused:     Sexually Abused:         Review of Systems   All other systems reviewed and are negative  Vitals:  Vitals:    08/05/21 1325   BP: 110/72   Pulse: 68   Weight: 94 3 kg (208 lb)   Height: 5' (1 524 m)     BMI - Body mass index is 40 62 kg/m²    Wt Readings from Last 7 Encounters:   08/05/21 94 3 kg (208 lb) 08/02/21 94 8 kg (209 lb)   06/09/21 95 7 kg (211 lb)   04/23/21 95 7 kg (211 lb)   03/30/21 96 6 kg (213 lb)   03/18/21 94 8 kg (209 lb)   03/12/21 93 4 kg (206 lb)       Physical Exam  Vitals and nursing note reviewed  Constitutional:       General: She is not in acute distress  Appearance: Normal appearance  She is well-developed  She is obese  She is not ill-appearing  HENT:      Head: Normocephalic and atraumatic  Nose: No congestion  Eyes:      General: No scleral icterus  Conjunctiva/sclera: Conjunctivae normal    Neck:      Vascular: No carotid bruit or JVD  Cardiovascular:      Rate and Rhythm: Normal rate and regular rhythm  Pulses: Normal pulses  Heart sounds: Normal heart sounds  No murmur heard  No friction rub  No gallop  Pulmonary:      Effort: Pulmonary effort is normal  No respiratory distress  Breath sounds: Normal breath sounds  No rales  Abdominal:      General: There is no distension  Palpations: Abdomen is soft  Tenderness: There is no abdominal tenderness  Musculoskeletal:         General: No swelling or tenderness  Cervical back: Neck supple  Right lower leg: Edema present  Left lower leg: Edema present  Skin:     General: Skin is warm  Neurological:      General: No focal deficit present  Mental Status: She is alert and oriented to person, place, and time  Mental status is at baseline  Psychiatric:         Mood and Affect: Mood normal          Behavior: Behavior normal          Thought Content:  Thought content normal          Labs:  CBC:   Lab Results   Component Value Date    WBC 5 80 02/28/2021    RBC 4 19 02/28/2021    HGB 12 3 02/28/2021    HCT 36 1 (L) 02/28/2021    MCV 86 02/28/2021     02/28/2021    RDW 13 6 02/28/2021       CMP:   Lab Results   Component Value Date     09/24/2014    K 4 1 03/30/2021     03/30/2021    CO2 29 03/30/2021    ANIONGAP 8 09/24/2014    BUN 17 03/30/2021 CREATININE 0 95 2021    EGFR 63 2021    GLUCOSE 116 2014    CALCIUM 9 3 2021    AST 7 2021    ALT 22 2021    ALKPHOS 83 2021       Magnesium:  No results found for: MG    Lipid Profile:   Lab Results   Component Value Date    HDL 52 2021    TRIG 241 (H) 2021    LDLCALC 119 (H) 2021       Thyroid Studies:   Lab Results   Component Value Date    MOS5OIRBLFBH 2 930 2020       No components found for: [employer] [place]    Lab Results   Component Value Date    INR 1 02 2021   5    Imaging: X-ray Chest 1 View Portable    Result Date: 2021  Narrative: CHEST INDICATION:   chest pain  COMPARISON:  2015 EXAM PERFORMED/VIEWS:  XR CHEST PORTABLE  AP semierect FINDINGS: Cardiomediastinal silhouette appears unremarkable  The lungs are clear  No pneumothorax or pleural effusion  Osseous structures appear within normal limits for patient age  Impression: No significant interval change from previous examination  No acute abnormalities  Workstation performed: [id number]       Cardiac testing:   Results for orders placed during the hospital encounter of 21   Echo complete with contrast if indicated    Narrative [address]  [address]    Transthoracic Echocardiogram  2D, M-mode, Doppler, and Color Doppler    Study date:  01-Mar-2021    Patient: Davida Santos  MR number: [id number]  Account number: [de-identified]  : 1955  Age: 76 years  Gender: Female  Status: Outpatient  [facility]   Height: 60 in  Weight: 216 5 lb  BP: 122/ 57 mmHg    Indications: Chest pain      Diagnoses: R07 9 - Chest pain, unspecified    Sonographer:  Nathen Sparrow RDCS  Referring Physician:  Sonia Christina MD  Group:  [facility]  Interpreting Physician:  Gabe Rios MD    SUMMARY    LEFT VENTRICLE:  Size was normal   Systolic function was normal  Ejection fraction was estimated to be 60 %  There were no regional wall motion abnormalities  Wall thickness was mildly increased  The changes were consistent with concentric remodeling (increased wall thickness with normal wall mass)  MITRAL VALVE:  There was mild annular calcification  There was trace regurgitation  HISTORY: Symptoms: chest pain  Lower extremity edema  PRIOR HISTORY: Risk factors: hypertension, hypercholesterolemia, and morbid obesity  PROCEDURE: The study was performed in the [facility]  This was a routine study  The transthoracic approach was used  The study included complete 2D imaging, M-mode, complete spectral Doppler, and color Doppler  The  heart rate was 72 bpm, at the start of the study  Images were obtained from the parasternal, apical, subcostal, and suprasternal notch acoustic windows  Echocardiographic views were limited due to decreased penetration and lung  interference  This was a technically difficult study  LEFT VENTRICLE: Size was normal  Systolic function was normal  Ejection fraction was estimated to be 60 %  There were no regional wall motion abnormalities  Wall thickness was mildly increased  The changes were consistent with concentric  remodeling (increased wall thickness with normal wall mass)  DOPPLER: Left ventricular diastolic function parameters were normal     RIGHT VENTRICLE: The size was normal  Systolic function was normal  Wall thickness was normal     LEFT ATRIUM: Size was normal     RIGHT ATRIUM: Size was normal     MITRAL VALVE: There was mild annular calcification  Valve structure was normal  There was normal leaflet separation  DOPPLER: The transmitral velocity was within the normal range  There was no evidence for stenosis  There was trace  regurgitation  AORTIC VALVE: The valve was trileaflet  Leaflets exhibited mildly increased thickness, mild calcification, and lower normal cuspal separation   DOPPLER: Transaortic velocity was minimally increased  There was no evidence for stenosis  There  was no significant regurgitation  TRICUSPID VALVE: The valve structure was normal  There was normal leaflet separation  DOPPLER: The transtricuspid velocity was within the normal range  There was no evidence for stenosis  There was no significant regurgitation  PULMONIC VALVE: Leaflets exhibited normal thickness, no calcification, and normal cuspal separation  DOPPLER: The transpulmonic velocity was within the normal range  There was trace regurgitation  PERICARDIUM: There was no pericardial effusion  The pericardium was normal in appearance  AORTA: The root exhibited normal size  SYSTEMIC VEINS: IVC: The inferior vena cava was not well visualized  The inferior vena cava was normal in size  SYSTEM MEASUREMENT TABLES    2D  %FS: 32 21 %  Ao Diam: 2 88 cm  Ao asc: 3 18 cm  EDV(Teich): 93 69 ml  EF(Teich): 60 54 %  ESV(Teich): 36 97 ml  IVSd: 1 33 cm  LA Area: 18 19 cm2  LA Diam: 3 78 cm  LVEDV MOD A4C: 99 62 ml  LVEF MOD A4C: 66 08 %  LVESV MOD A4C: 33 79 ml  LVIDd: 4 53 cm  LVIDs: 3 07 cm  LVLd A4C: 7 35 cm  LVLs A4C: 5 92 cm  LVOT Diam: 2 cm  LVPWd: 1 18 cm  RA Area: 9 49 cm2  RVIDd: 2 77 cm  SV MOD A4C: 65 83 ml  SV(Teich): 56 72 ml    CW  AV Env  Ti: 304 47 ms  AV VTI: 33 67 cm  AV Vmax: 1 59 m/s  AV Vmean: 1 11 m/s  AV maxPG: 10 08 mmHg  AV meanP 58 mmHg  PV Env  Ti: 304 47 ms  PV VTI: 22 11 cm  PV Vmax: 0 98 m/s  PV Vmean: 0 73 m/s  PV maxPG: 3 87 mmHg  PV meanP 34 mmHg    MM  TAPSE: 2 27 cm    PW  AIDAN (VTI): 1 93 cm2  AIDAN Vmax: 1 83 cm2  AVAI (VTI): 0 cm2/m2  AVAI Vmax: 0 cm2/m2  E' Sept: 0 09 m/s  E/E' Sept: 8 67  LVOT Env  Ti: 296 86 ms  LVOT VTI: 20 62 cm  LVOT Vmax: 0 92 m/s  LVOT Vmean: 0 69 m/s  LVOT maxPG: 3 4 mmHg  LVOT meanP 14 mmHg  LVSI Dopp: 33 67 ml/m2  LVSV Dopp: 64 99 ml  MV A Cristian: 0 92 m/s  MV Dec Chase: 3 17 m/s2  MV DecT: 236 25 ms  MV E Cristian: 0 75 m/s  MV E/A Ratio: 0 81  RVOT Env  Ti: 319 7 ms  RVOT VTI: 13 63 cm  RVOT Vmax: 0 68 m/s  RVOT Vmean: 0 43 m/s  RVOT maxP 83 mmHg  RVOT meanP 86 mmHg    [facility]    Prepared and electronically signed by    Cheryle Moore MD  Signed 01-Mar-2021 12:04:58       No results found for this or any previous visit  No results found for this or any previous visit  No results found for this or any previous visit

## 2021-08-06 ENCOUNTER — HOSPITAL ENCOUNTER (OUTPATIENT)
Dept: SLEEP CENTER | Facility: CLINIC | Age: 66
Discharge: HOME | End: 2021-08-06
Payer: MEDICARE

## 2021-08-06 ENCOUNTER — TELEPHONE (OUTPATIENT)
Dept: SLEEP CENTER | Facility: CLINIC | Age: 66
End: 2021-08-06

## 2021-08-06 DIAGNOSIS — G47.33 OSA (OBSTRUCTIVE SLEEP APNEA): ICD-10-CM

## 2021-08-06 PROCEDURE — 95810 POLYSOM 6/> YRS 4/> PARAM: CPT

## 2021-08-06 NOTE — TELEPHONE ENCOUNTER
----- Message from Jennyfer Laurent MD sent at 8/6/2021  1:33 PM EDT -----  approved  ----- Message -----  From: Katrin Palmer  Sent: 6/8/2171  10:00 AM EDT  To: Sleep Medicine [facility] Provider    This sleep study needs approval      If approved please sign and return to clerical pool  If denied please include reasons why  Also provide alternative testing if warranted  Please sign and return to clerical pool

## 2021-08-07 NOTE — PROGRESS NOTES
Sleep Study Documentation    Pre-Sleep Study       Sleep testing procedure explained to patient:YES    Patient napped prior to study:NO    Caffeine:Dayshift worker after 12PM   Caffeine use:NO    Alcohol:Dayshift workers after 5PM: Alcohol use:NO    Typical day for patient:YES       Study Documentation    Sleep Study Indications: Snoring, Excessive Daytime Sleepiness, Chronic or AM headache, Unrefreshing sleep, Witnessed apneas    Sleep Study: Diagnostic   Snore:Severe  Supplemental O2: no    O2 flow rate (L/min) range   O2 flow rate (L/min) final   Minimum SaO2 79%  Baseline SaO2 97%    EKG abnormalities: no     EEG abnormalities: no    Sleep Study Recorded < 2 hours: N/A    Sleep Study Recorded > 2 hours but incomplete study: N/A    Sleep Study Recorded 6 hours but no sleep obtained: NO    Patient classification: employed       Post-Sleep Study    Medication used at bedtime or during sleep study:YES prescription sleep aid and other prescription medications    Patient reports time it took to fall asleep:less than 20 minutes    Patient reports waking up during study:3 or more times  Patient reports returning to sleep without difficulty  Patient reports sleeping 4 to 6 hours without dreaming  Patient reports sleep during study:better than usual    Patient rated sleepiness: Somewhat sleepy or tired    PAP treatment:no

## 2021-08-09 DIAGNOSIS — G47.33 OSA (OBSTRUCTIVE SLEEP APNEA): Primary | ICD-10-CM

## 2021-08-11 ENCOUNTER — TELEPHONE (OUTPATIENT)
Dept: INTERNAL MEDICINE CLINIC | Facility: CLINIC | Age: 66
End: 2021-08-11

## 2021-08-13 DIAGNOSIS — G47.33 OSA (OBSTRUCTIVE SLEEP APNEA): Primary | ICD-10-CM

## 2021-08-13 DIAGNOSIS — F32.A DEPRESSION, UNSPECIFIED DEPRESSION TYPE: ICD-10-CM

## 2021-08-14 RX ORDER — CITALOPRAM 40 MG/1
TABLET ORAL
Qty: 90 TABLET | Refills: 1 | Status: SHIPPED | OUTPATIENT
Start: 2021-08-14 | End: 2022-02-14

## 2021-08-18 ENCOUNTER — TELEPHONE (OUTPATIENT)
Dept: CARDIOLOGY CLINIC | Facility: CLINIC | Age: 66
End: 2021-08-18

## 2021-08-18 NOTE — TELEPHONE ENCOUNTER
Bartolome Galeana recently saw you  She needs cardiac clearance for total left knee replacement 9/28 with dr Lennox Dally  She has a form for you to fill out  Do you need to see her again or are you willing to clear her off of your last visit? She has an EKG scheduled 9/9, I can always bring her in earlier if you want that before you clear her

## 2021-08-18 NOTE — TELEPHONE ENCOUNTER
Spoke to isai, she is not having any symptoms/issues  She said she will fax us the form that needs to be signed and sent to dr eastman's office

## 2021-09-01 DIAGNOSIS — R60.0 PEDAL EDEMA: ICD-10-CM

## 2021-09-01 DIAGNOSIS — R06.01 ORTHOPNEA: ICD-10-CM

## 2021-09-01 RX ORDER — FUROSEMIDE 40 MG/1
40 TABLET ORAL DAILY PRN
Qty: 30 TABLET | Refills: 1 | Status: SHIPPED | OUTPATIENT
Start: 2021-09-01 | End: 2021-10-29 | Stop reason: SDUPTHER

## 2021-09-02 ENCOUNTER — TELEPHONE (OUTPATIENT)
Dept: SLEEP CENTER | Facility: CLINIC | Age: 66
End: 2021-09-02

## 2021-09-02 NOTE — TELEPHONE ENCOUNTER
----- Message from Ochoa Santillan MD sent at 9/2/2021 12:05 PM EDT -----  Approved  ----- Message -----  From: Wendi Mckenzie  Sent: 9/2/2021   9:03 AM EDT  To: Sleep Medicine [facility] Provider    This sleep study needs approval      If approved please sign and return to clerical pool  If denied please include reasons why  Also provide alternative testing if warranted  Please sign and return to clerical pool

## 2021-09-03 ENCOUNTER — HOSPITAL ENCOUNTER (OUTPATIENT)
Dept: SLEEP CENTER | Facility: CLINIC | Age: 66
Discharge: HOME | End: 2021-09-03
Payer: MEDICARE

## 2021-09-03 DIAGNOSIS — G47.33 OSA (OBSTRUCTIVE SLEEP APNEA): ICD-10-CM

## 2021-09-03 PROCEDURE — 95811 POLYSOM 6/>YRS CPAP 4/> PARM: CPT

## 2021-09-04 NOTE — PROGRESS NOTES
Sleep Study Documentation    Pre-Sleep Study       Sleep testing procedure explained to patient:YES    Patient napped prior to study:NO    Caffeine:Dayshift worker after 12PM   Caffeine use:YES- soda  12 ounces    Alcohol:Dayshift workers after 5PM: Alcohol use:NO    Typical day for patient:YES       Study Documentation    Sleep Study Indications:  REKHA-diagnosed in-lab study at [facility]  Sleep Study: Treatment   Optimal PAP pressure:  19 cm H2O  Leak:Small  Snore:Eliminated  REM Obtained:yes  Supplemental O2: no    Minimum SaO2  87 %  Baseline SaO2  95 9 %  PAP mask tried (list all) ResMed AirFit N20 Small Nasal Mask with no humidity  ResMed AirTouch F20 Medium-switched to Small-Full Face Mask with no humidity  PAP mask choice (final) ResMed AirTouch F20 Full Fall Face  PAP mask type:full face  PAP pressure at which snoring was eliminated  19 cm H2O  Minimum SaO2 at final PAP pressure  94 %  Mode of Therapy:CPAP  ETCO2:No  CPAP changed to BiPAP:No    Mode of Therapy:CPAP    EKG abnormalities: no     EEG abnormalities: yes:  ECG artifact throughout study  Averaged Ms  Sleep Study Recorded < 2 hours: N/A    Sleep Study Recorded > 2 hours but incomplete study: N/A    Sleep Study Recorded 6 hours but no sleep obtained: NO           Post-Sleep Study    Medication used at bedtime or during sleep study:YES prescription sleep aid, other prescription medications  Patient reports time it took to fall asleep:less than 20 minutes    Patient reports waking up during study:1 to 2 times  Patient reports returning to sleep in 10 to 30 minutes  Patient reports sleeping 6 to 8 hours with dreaming  Patient reports sleep during study:better than usual    Patient rated sleepiness: Not sleepy or tired    PAP treatment:yes: Post PAP treatment patient reports feeling better and  would wear PAP mask at home

## 2021-09-07 ENCOUNTER — TELEPHONE (OUTPATIENT)
Dept: SLEEP CENTER | Facility: CLINIC | Age: 66
End: 2021-09-07

## 2021-09-07 NOTE — TELEPHONE ENCOUNTER
Spoke with patient, advised sleep study resulted and recommend treatment  Asked if patient following up with sleep specialist patient states she will follow up with PCP and call if she needs to schedule consult

## 2021-09-13 ENCOUNTER — TELEPHONE (OUTPATIENT)
Dept: SLEEP CENTER | Facility: CLINIC | Age: 66
End: 2021-09-13

## 2021-09-13 NOTE — TELEPHONE ENCOUNTER
----- Message from Afshan Davidson MD sent at 9/10/2021 12:37 PM EDT -----  Approved  ----- Message -----  From: Kwadwo Meyer  Sent: 8/18/2021   9:14 AM EDT  To: Sleep Medicine [place] Provider    This sleep study needs approval      If approved please sign and return to clerical pool  If denied please include reasons why  Also provide alternative testing if warranted  Please sign and return to clerical pool

## 2021-09-20 ENCOUNTER — CONSULT (OUTPATIENT)
Dept: INTERNAL MEDICINE CLINIC | Facility: CLINIC | Age: 66
End: 2021-09-20
Payer: MEDICARE

## 2021-09-20 VITALS
TEMPERATURE: 97.6 F | HEART RATE: 67 BPM | WEIGHT: 204.13 LBS | HEIGHT: 60 IN | SYSTOLIC BLOOD PRESSURE: 110 MMHG | DIASTOLIC BLOOD PRESSURE: 70 MMHG | OXYGEN SATURATION: 97 % | BODY MASS INDEX: 40.08 KG/M2

## 2021-09-20 DIAGNOSIS — M15.9 PRIMARY OSTEOARTHRITIS INVOLVING MULTIPLE JOINTS: ICD-10-CM

## 2021-09-20 DIAGNOSIS — I10 ESSENTIAL HYPERTENSION: ICD-10-CM

## 2021-09-20 DIAGNOSIS — Z01.818 VISIT FOR PRE-OPERATIVE EXAMINATION: Primary | ICD-10-CM

## 2021-09-20 DIAGNOSIS — N39.0 URINARY TRACT INFECTION WITHOUT HEMATURIA, SITE UNSPECIFIED: ICD-10-CM

## 2021-09-20 PROCEDURE — 99214 OFFICE O/P EST MOD 30 MIN: CPT | Performed by: INTERNAL MEDICINE

## 2021-09-20 RX ORDER — NITROFURANTOIN 25; 75 MG/1; MG/1
100 CAPSULE ORAL 2 TIMES DAILY
Qty: 10 CAPSULE | Refills: 0 | Status: SHIPPED | OUTPATIENT
Start: 2021-09-20 | End: 2021-09-25

## 2021-09-20 NOTE — PROGRESS NOTES
Assessment/Plan:  Problem List Items Addressed This Visit        Cardiovascular and Mediastinum    Hypertension       Musculoskeletal and Integument    Primary osteoarthritis involving multiple joints      Other Visit Diagnoses     Visit for pre-operative examination    -  Primary    Relevant Medications    nitrofurantoin (MACROBID) 100 mg capsule    Urinary tract infection without hematuria, site unspecified        Relevant Medications    nitrofurantoin (MACROBID) 100 mg capsule           Diagnoses and all orders for this visit:    Visit for pre-operative examination  -     nitrofurantoin (MACROBID) 100 mg capsule; Take 1 capsule (100 mg total) by mouth 2 (two) times a day for 5 days    Primary osteoarthritis involving multiple joints    Essential hypertension    Urinary tract infection without hematuria, site unspecified  -     nitrofurantoin (MACROBID) 100 mg capsule; Take 1 capsule (100 mg total) by mouth 2 (two) times a day for 5 days        No problem-specific Assessment & Plan notes found for this encounter  A/P: PAT tests reviewed and c/o ekg, urine, and labs  EKG w/o acute changes and pt with a negative stress echo 3/21  Labs ok,but urine with borderline UTI  Will treat with macrobid based on sensitivities and pt allergies due to upcoming sx    Pt and co-morbidities are stable  Pt is a [employer] I and carries a cardiac risk of about 1%  Recommend holding any ASA, NSAID's, omega 3, and MVT one week prior to the procedure  ONe the day prior to sx, would hold her vasotec  ON the day of sx, may take abilify, celexa, and primidone with a sip of water  Pt to remove her dentures prior to sx  Given body habitus, aggressive pulmonary secretion needed and DVT prophylaxis  No other recs at this time  Thanks and good luck  Subjective:      Patient ID: Afsaneh Glasgow is a 78 y o  female      WF presents at the request of Dr Bibi Francisco for pre-op eval for upcoming left TKR  tentatively scheduled for 9/28/21  Since last visit, doing well and no recent illnesses  Remains active w/o difficulty and no falls  No travel history  Denies depression  No recent illnesses  No fever, chills, or sweats  No unexplained wt changes  Denies CP, SOB, or palpitations  No edema  No orthopnea or PND  No sz or syncope  No changes in bowel or bladder habits  PMH includes REKHA, fatty liver, diverticulosis, HTN, DJD/DDD, hyperlipidemia, and MDD  Past sx include tubal, tonsils, hysto, ovarian cyst, shoulder sx, adhesions, anal sx, appy, CTR, colonoscopy, Hemorrhoids, ileostomy, TKR, etc  and reports no problems with prior procedures or anesthesia  Denies smoking and rare ETOH use  No history of DVT or PE  NO history of bleeding issues and is not on anti-coagulants  Has dental plates  Denies C spine issues  No objections to getting blood products if deemed necessary  Had PAT testing done  The following portions of the patient's history were reviewed and updated as appropriate:   She has a past medical history of Arthritis, Cataract, Depression, Fluid retention, Headache, acute, Heart murmur, Hyperlipidemia, Hypertension, PONV (postoperative nausea and vomiting), Rotator cuff tear, left, Wears glasses, and Wears partial dentures  ,  does not have any pertinent problems on file  ,   has a past surgical history that includes Ankle surgery (Right); Tonsillectomy; Tubal ligation; Hysterectomy; Ovarian cyst removal; Carpal tunnel release (Bilateral); Hand surgery (Right); Abdominal adhesion surgery; Knee arthroscopy (Left); Appendectomy; Colonoscopy; Hemorroidectomy; Repair rectocele; pr shldr arthroscop,surg,w/rotat cuff repr (Left, 2/19/2018); Carpal tunnel release (Left); Carpal tunnel release (Right); Ileostomy; Anal sphincteroplasty; Knee arthrocentesis; and Tonsillectomy and adenoidectomy  ,  family history includes Alzheimer's disease in her sister and sister; Bone cancer in her family and mother; COPD in her mother; Colon cancer in her family and mother; Depression in her sister; Diabetes in her sister; Emphysema in her mother; ROBERT disease in her brother; Heart failure in her sister; Hypertension in her brother, family, mother, sister, and sister; Lead poisoning in her father; No Known Problems in her daughter, maternal aunt, maternal aunt, maternal aunt, maternal aunt, maternal aunt, maternal grandmother, paternal aunt, and paternal grandmother; Skin cancer in her mother; Throat cancer in her brother; Thyroid disease in her sister  ,   reports that she has never smoked  She has never used smokeless tobacco  She reports previous alcohol use  She reports that she does not use drugs  ,  is allergic to iodine - food allergy, keflex [cephalexin], morphine and related, penicillins, benadryl [diphenhydramine], ciprofloxacin, clindamycin, and erythromycin     Current Outpatient Medications   Medication Sig Dispense Refill    acetaminophen (TYLENOL) 500 mg tablet Take 500-1,000 mg by mouth every 6 (six) hours as needed for mild pain      ARIPiprazole (ABILIFY) 5 mg tablet take 1 tablet by mouth every morning 90 tablet 1    Calcium Carbonate (CALCIUM 600 PO) Take by mouth daily      citalopram (CeleXA) 40 mg tablet take 1 tablet by mouth every evening 90 tablet 1    enalapril (VASOTEC) 10 mg tablet Take 1 tablet (10 mg total) by mouth daily 90 tablet 1    estradiol (ESTRACE) 0 1 mg/g vaginal cream Insert 2 g into the vagina 2 (two) times a week 42 5 g 3    ezetimibe (ZETIA) 10 mg tablet Take 1 tablet (10 mg total) by mouth daily 90 tablet 1    fluticasone (FLONASE) 50 mcg/act nasal spray 1 spray into each nostril daily 9 9 mL 0    furosemide (LASIX) 40 mg tablet Take 1 tablet (40 mg total) by mouth daily as needed (worsening shortness of breath, weight gain > 3lbs/day) 30 tablet 1    loratadine (CLARITIN) 10 mg tablet Take 1 tablet (10 mg total) by mouth daily 30 tablet 0    montelukast (SINGULAIR) 10 mg tablet Take 1 tablet (10 mg total) by mouth daily at bedtime 90 tablet 3    Multiple Vitamin (multivitamin) tablet Take 1 tablet by mouth daily      nystatin (MYCOSTATIN) powder Apply topically 2 (two) times a day 60 g 1    omega-3-acid ethyl esters (LOVAZA) 1 g capsule Take 2 capsules (2 g total) by mouth 2 (two) times a day 180 capsule 0    primidone (MYSOLINE) 50 mg tablet Take 1 tablet (50 mg total) by mouth every 8 (eight) hours 270 tablet 1    rosuvastatin (CRESTOR) 5 mg tablet Take 1 tablet (5 mg total) by mouth daily 30 tablet 1    traZODone (DESYREL) 100 mg tablet Take 1 tablet (100 mg total) by mouth daily at bedtime 90 tablet 1    Cholecalciferol (VITAMIN D3 PO) Take 50 mcg by mouth daily      nitrofurantoin (MACROBID) 100 mg capsule Take 1 capsule (100 mg total) by mouth 2 (two) times a day for 5 days 10 capsule 0    Tea Tree Oil (NO FUNGUS NAIL PROTECTANT EX) Apply topically (Patient not taking: Reported on 8/2/2021)       No current facility-administered medications for this visit  Review of Systems   Constitutional: Positive for activity change  Negative for chills, diaphoresis, fatigue and fever  HENT: Negative  Eyes: Negative for visual disturbance  Respiratory: Negative for cough, chest tightness, shortness of breath and wheezing  Cardiovascular: Negative for chest pain, palpitations and leg swelling  Gastrointestinal: Negative for abdominal pain, constipation, diarrhea, nausea and vomiting  Endocrine: Negative for cold intolerance and heat intolerance  Genitourinary: Negative for difficulty urinating, dysuria and frequency  Musculoskeletal: Positive for arthralgias and gait problem  Negative for joint swelling and myalgias  Neurological: Negative for dizziness, seizures, syncope, weakness, light-headedness and headaches  Psychiatric/Behavioral: Negative for confusion, dysphoric mood and sleep disturbance  The patient is not nervous/anxious          PHQ-9 Depression Screening    PHQ-9:   Frequency of the following problems over the past two weeks:            Objective:  Vitals:    09/20/21 1508   BP: 110/70   Pulse: 67   Temp: 97 6 °F (36 4 °C)   SpO2: 97%   Weight: 92 6 kg (204 lb 2 oz)   Height: 5' (1 524 m)     Body mass index is 39 87 kg/m²  Physical Exam  Vitals and nursing note reviewed  Constitutional:       General: She is not in acute distress  Appearance: Normal appearance  She is obese  She is not ill-appearing  HENT:      Head: Normocephalic and atraumatic  Comments: NO oropharyngeal obstruction, but partial plate noted  Mouth/Throat:      Mouth: Mucous membranes are moist    Eyes:      Extraocular Movements: Extraocular movements intact  Conjunctiva/sclera: Conjunctivae normal       Pupils: Pupils are equal, round, and reactive to light  Neck:      Vascular: No carotid bruit  Comments: No c spine restriction  Cardiovascular:      Rate and Rhythm: Normal rate and regular rhythm  Heart sounds: Normal heart sounds  Pulmonary:      Effort: Pulmonary effort is normal  No respiratory distress  Breath sounds: Normal breath sounds  No wheezing or rales  Abdominal:      General: Bowel sounds are normal  There is no distension  Palpations: Abdomen is soft  Tenderness: There is no abdominal tenderness  Musculoskeletal:      Cervical back: Neck supple  Right lower leg: No edema  Left lower leg: No edema  Neurological:      General: No focal deficit present  Mental Status: She is alert and oriented to person, place, and time  Mental status is at baseline  Psychiatric:         Mood and Affect: Mood normal          Behavior: Behavior normal          Thought Content:  Thought content normal          Judgment: Judgment normal

## 2021-09-21 ENCOUNTER — TELEPHONE (OUTPATIENT)
Dept: INTERNAL MEDICINE CLINIC | Facility: CLINIC | Age: 66
End: 2021-09-21

## 2021-10-01 DIAGNOSIS — E78.2 MIXED HYPERLIPIDEMIA: ICD-10-CM

## 2021-10-01 RX ORDER — ROSUVASTATIN CALCIUM 5 MG/1
5 TABLET, COATED ORAL DAILY
Qty: 30 TABLET | Refills: 5 | Status: SHIPPED | OUTPATIENT
Start: 2021-10-01 | End: 2022-04-18 | Stop reason: SDUPTHER

## 2021-10-25 ENCOUNTER — RA CDI HCC (OUTPATIENT)
Dept: OTHER | Facility: HOSPITAL | Age: 66
End: 2021-10-25

## 2021-10-29 DIAGNOSIS — R60.0 PEDAL EDEMA: ICD-10-CM

## 2021-10-29 DIAGNOSIS — R06.01 ORTHOPNEA: ICD-10-CM

## 2021-10-29 RX ORDER — FUROSEMIDE 40 MG/1
40 TABLET ORAL DAILY PRN
Qty: 30 TABLET | Refills: 5 | Status: SHIPPED | OUTPATIENT
Start: 2021-10-29 | End: 2022-04-18

## 2021-11-01 ENCOUNTER — OFFICE VISIT (OUTPATIENT)
Dept: INTERNAL MEDICINE CLINIC | Facility: CLINIC | Age: 66
End: 2021-11-01
Payer: MEDICARE

## 2021-11-01 VITALS
HEART RATE: 79 BPM | WEIGHT: 208.4 LBS | BODY MASS INDEX: 40.91 KG/M2 | SYSTOLIC BLOOD PRESSURE: 136 MMHG | TEMPERATURE: 98.7 F | OXYGEN SATURATION: 99 % | DIASTOLIC BLOOD PRESSURE: 80 MMHG | HEIGHT: 60 IN | RESPIRATION RATE: 16 BRPM

## 2021-11-01 DIAGNOSIS — Z96.652 STATUS POST TOTAL LEFT KNEE REPLACEMENT: Primary | ICD-10-CM

## 2021-11-01 DIAGNOSIS — I10 PRIMARY HYPERTENSION: ICD-10-CM

## 2021-11-01 DIAGNOSIS — Z23 ENCOUNTER FOR VACCINATION: ICD-10-CM

## 2021-11-01 DIAGNOSIS — M15.9 PRIMARY OSTEOARTHRITIS INVOLVING MULTIPLE JOINTS: ICD-10-CM

## 2021-11-01 PROCEDURE — 99214 OFFICE O/P EST MOD 30 MIN: CPT | Performed by: INTERNAL MEDICINE

## 2021-11-01 PROCEDURE — G0008 ADMIN INFLUENZA VIRUS VAC: HCPCS | Performed by: INTERNAL MEDICINE

## 2021-11-01 PROCEDURE — 90662 IIV NO PRSV INCREASED AG IM: CPT | Performed by: INTERNAL MEDICINE

## 2021-11-01 RX ORDER — ASPIRIN 81 MG/1
81 TABLET ORAL 2 TIMES DAILY
COMMUNITY
Start: 2021-09-29 | End: 2021-11-10

## 2021-11-01 RX ORDER — METHOCARBAMOL 500 MG/1
500 TABLET, FILM COATED ORAL EVERY 6 HOURS PRN
COMMUNITY
Start: 2021-10-12

## 2021-11-21 ENCOUNTER — OFFICE VISIT (OUTPATIENT)
Dept: URGENT CARE | Facility: CLINIC | Age: 66
End: 2021-11-21
Payer: MEDICARE

## 2021-11-21 VITALS
TEMPERATURE: 97.6 F | SYSTOLIC BLOOD PRESSURE: 168 MMHG | DIASTOLIC BLOOD PRESSURE: 78 MMHG | HEART RATE: 86 BPM | RESPIRATION RATE: 16 BRPM | OXYGEN SATURATION: 99 %

## 2021-11-21 DIAGNOSIS — L24.9 IRRITANT CONTACT DERMATITIS, UNSPECIFIED TRIGGER: Primary | ICD-10-CM

## 2021-11-21 PROCEDURE — 96372 THER/PROPH/DIAG INJ SC/IM: CPT | Performed by: NURSE PRACTITIONER

## 2021-11-21 PROCEDURE — 99213 OFFICE O/P EST LOW 20 MIN: CPT | Performed by: NURSE PRACTITIONER

## 2021-11-21 PROCEDURE — G0463 HOSPITAL OUTPT CLINIC VISIT: HCPCS | Performed by: NURSE PRACTITIONER

## 2021-11-21 RX ORDER — DEXAMETHASONE SODIUM PHOSPHATE 10 MG/ML
10 INJECTION, SOLUTION INTRAMUSCULAR; INTRAVENOUS ONCE
Status: COMPLETED | OUTPATIENT
Start: 2021-11-21 | End: 2021-11-21

## 2021-11-21 RX ORDER — TRIAMCINOLONE ACETONIDE 1 MG/G
CREAM TOPICAL 2 TIMES DAILY PRN
Qty: 60 G | Refills: 1 | Status: SHIPPED | OUTPATIENT
Start: 2021-11-21

## 2021-11-21 RX ADMIN — DEXAMETHASONE SODIUM PHOSPHATE 10 MG: 10 INJECTION, SOLUTION INTRAMUSCULAR; INTRAVENOUS at 20:18

## 2022-01-04 ENCOUNTER — OFFICE VISIT (OUTPATIENT)
Dept: URGENT CARE | Facility: CLINIC | Age: 67
End: 2022-01-04
Payer: MEDICARE

## 2022-01-04 VITALS
RESPIRATION RATE: 18 BRPM | HEIGHT: 60 IN | TEMPERATURE: 97.3 F | WEIGHT: 206 LBS | HEART RATE: 81 BPM | OXYGEN SATURATION: 96 % | BODY MASS INDEX: 40.44 KG/M2

## 2022-01-04 DIAGNOSIS — J06.9 VIRAL URI WITH COUGH: Primary | ICD-10-CM

## 2022-01-04 PROCEDURE — 99213 OFFICE O/P EST LOW 20 MIN: CPT | Performed by: PHYSICIAN ASSISTANT

## 2022-01-04 PROCEDURE — 87636 SARSCOV2 & INF A&B AMP PRB: CPT | Performed by: PHYSICIAN ASSISTANT

## 2022-01-04 PROCEDURE — G0463 HOSPITAL OUTPT CLINIC VISIT: HCPCS | Performed by: PHYSICIAN ASSISTANT

## 2022-01-04 NOTE — PATIENT INSTRUCTIONS
COVID/Flu swab performed in office, results to be in and 1-3 days, quarantine until results are in  Continue over-the-counter ibuprofen/ Tylenol or OTC cold medication as needed for symptoms  Adequate rest and fluid hydration are recommended  Follow-up PCP  Return or be seen in ER with any progressing worsening symptoms  Patient understands and is agreeable with this plan

## 2022-01-04 NOTE — PROGRESS NOTES
[facility]        NAME: Ifeoma Hough is a 78 y o  female  : 1955    MRN: [id number]  DATE: 2022  TIME: 10:43 AM    Assessment and Plan   Viral URI with cough [J06 9]  1  Viral URI with cough  COVID/FLU- Office Collect         Patient Instructions     Patient Instructions   COVID/Flu swab performed in office, results to be in and 1-3 days, quarantine until results are in  Continue over-the-counter ibuprofen/ Tylenol or OTC cold medication as needed for symptoms  Adequate rest and fluid hydration are recommended  Follow-up PCP  Return or be seen in ER with any progressing worsening symptoms  Patient understands and is agreeable with this plan  Follow up with PCP in 3-5 days  Proceed to  ER if symptoms worsen  Chief Complaint     Chief Complaint   Patient presents with    Cough     started yesterday, pt describes as "croupy"         History of Present Illness       Patient is a 57-year-old female presenting today with cold symptoms x1 day  Patient notes starting yesterday she was experiencing some nasal congestion, postnasal drip and a dry cough, notes that she has had multiple sick contacts at home as she is staying with her daughter and grand children who are experiencing similar symptoms  Has not taken any medications or alleviating factors for her symptoms  Notes that she is fully vaccinated for COVID-19  Denies fever, chills, shortness of breath, trouble swallowing, N/V/D  Review of Systems   Review of Systems   Constitutional: Negative for chills, fatigue and fever  HENT: Positive for congestion, postnasal drip and sore throat  Eyes: Negative for redness and itching  Respiratory: Positive for cough  Negative for chest tightness and shortness of breath  Cardiovascular: Negative for chest pain  Gastrointestinal: Negative for diarrhea, nausea and vomiting  Musculoskeletal: Negative for arthralgias and myalgias     Neurological: Negative for light-headedness and headaches           Current Medications       Current Outpatient Medications:     acetaminophen (TYLENOL) 500 mg tablet, Take 500-1,000 mg by mouth every 6 (six) hours as needed for mild pain  , Disp: , Rfl:     ARIPiprazole (ABILIFY) 5 mg tablet, take 1 tablet by mouth every morning, Disp: 90 tablet, Rfl: 1    Calcium Carbonate (CALCIUM 600 PO), Take by mouth daily  , Disp: , Rfl:     Cholecalciferol (VITAMIN D3 PO), Take 50 mcg by mouth daily  , Disp: , Rfl:     citalopram (CeleXA) 40 mg tablet, take 1 tablet by mouth every evening, Disp: 90 tablet, Rfl: 1    enalapril (VASOTEC) 10 mg tablet, Take 1 tablet (10 mg total) by mouth daily, Disp: 90 tablet, Rfl: 1    estradiol (ESTRACE) 0 1 mg/g vaginal cream, Insert 2 g into the vagina 2 (two) times a week, Disp: 42 5 g, Rfl: 3    ezetimibe (ZETIA) 10 mg tablet, Take 1 tablet (10 mg total) by mouth daily, Disp: 90 tablet, Rfl: 1    furosemide (LASIX) 40 mg tablet, Take 1 tablet (40 mg total) by mouth daily as needed (worsening shortness of breath, weight gain > 3lbs/day), Disp: 30 tablet, Rfl: 5    fluticasone (FLONASE) 50 mcg/act nasal spray, 1 spray into each nostril daily (Patient not taking: Reported on 1/4/2022 ), Disp: 9 9 mL, Rfl: 0    loratadine (CLARITIN) 10 mg tablet, Take 1 tablet (10 mg total) by mouth daily, Disp: 30 tablet, Rfl: 0    methocarbamol (ROBAXIN) 500 mg tablet, Take 500 mg by mouth every 6 (six) hours as needed, Disp: , Rfl:     montelukast (SINGULAIR) 10 mg tablet, Take 1 tablet (10 mg total) by mouth daily at bedtime, Disp: 90 tablet, Rfl: 3    Multiple Vitamin (multivitamin) tablet, Take 1 tablet by mouth daily (Patient not taking: Reported on 11/1/2021), Disp: , Rfl:     nystatin (MYCOSTATIN) powder, Apply topically 2 (two) times a day, Disp: 60 g, Rfl: 1    omega-3-acid ethyl esters (LOVAZA) 1 g capsule, Take 2 capsules (2 g total) by mouth 2 (two) times a day (Patient not taking: Reported on 11/1/2021), Disp: 180 capsule, Rfl: 0    primidone (MYSOLINE) 50 mg tablet, Take 1 tablet (50 mg total) by mouth every 8 (eight) hours, Disp: 270 tablet, Rfl: 1    rosuvastatin (CRESTOR) 5 mg tablet, Take 1 tablet (5 mg total) by mouth daily, Disp: 30 tablet, Rfl: 5    Tea Tree Oil (NO FUNGUS NAIL PROTECTANT EX), Apply topically (Patient not taking: Reported on 8/2/2021), Disp: , Rfl:     traZODone (DESYREL) 100 mg tablet, Take 1 tablet (100 mg total) by mouth daily at bedtime, Disp: 90 tablet, Rfl: 1    triamcinolone (KENALOG) 0 1 % cream, Apply topically 2 (two) times a day as needed for irritation or rash, Disp: 60 g, Rfl: 1    Current Allergies     Allergies as of 01/04/2022 - Reviewed 01/04/2022   Allergen Reaction Noted    Iodine - food allergy Hives 02/13/2018    Keflex [cephalexin] Other (See Comments) 02/13/2018    Morphine and related Hives 02/13/2018    Penicillins Hives 02/13/2018    Benadryl [diphenhydramine] Itching and Swelling 04/15/2020    Ciprofloxacin Hives 02/28/2021    Clindamycin Other (See Comments) 02/13/2018    Erythromycin Hives 02/13/2018            The following portions of the patient's history were reviewed and updated as appropriate: allergies, current medications, past family history, past medical history, past social history, past surgical history and problem list      Past Medical History:   Diagnosis Date    Arthritis     Cataract     ashley    Depression     Fluid retention     Headache, acute     Heart murmur     Hyperlipidemia     Hypertension     PONV (postoperative nausea and vomiting)     Rotator cuff tear, left     Wears glasses     Wears partial dentures     upper       Past Surgical History:   Procedure Laterality Date    ABDOMINAL ADHESION SURGERY      ANAL SPHINCTEROPLASTY      ANKLE SURGERY Right     tendon tear    APPENDECTOMY      CARPAL TUNNEL RELEASE Bilateral     CARPAL TUNNEL RELEASE Left     CARPAL TUNNEL RELEASE Right     COLONOSCOPY      HAND SURGERY Right     ganglion cyst    HEMORROIDECTOMY      HYSTERECTOMY      ILEOSTOMY      JOINT REPLACEMENT      KNEE ARTHROCENTESIS      ganglion Cyst    KNEE ARTHROSCOPY Left     OVARIAN CYST REMOVAL      MA SHLDR ARTHROSCOP,SURG,W/ROTAT CUFF REPR Left 2/19/2018    Procedure: ARTHROSCOPIC REPAIR ROTATOR CUFF,  SAD, BICEP TENODESIS;  Surgeon: Ava Baron MD;  Location: [facility];  Service: Orthopedics    REPAIR RECTOCELE      TONSILLECTOMY      TONSILLECTOMY AND ADENOIDECTOMY      TUBAL LIGATION         Family History   Problem Relation Age of Onset    Hypertension Mother     Colon cancer Mother     Bone cancer Mother     COPD Mother     Emphysema Mother     Skin cancer Mother     Lead poisoning Father         lead poisoning in lungs     Thyroid disease Sister     Depression Sister     Hypertension Sister     Alzheimer's disease Sister     ROBERT disease Brother     Throat cancer Brother     Hypertension Brother     Colon cancer Family     Bone cancer Family     Hypertension Family     Diabetes Sister     Hypertension Sister     Heart failure Sister     Alzheimer's disease Sister     No Known Problems Daughter     No Known Problems Maternal Grandmother     No Known Problems Paternal Grandmother     No Known Problems Maternal Aunt     No Known Problems Maternal Aunt     No Known Problems Maternal Aunt     No Known Problems Maternal Aunt     No Known Problems Maternal Aunt     No Known Problems Paternal Aunt          Medications have been verified  Objective   Pulse 81   Temp (!) 97 3 °F (36 3 °C) (Temporal)   Resp 18   Ht 5' (1 524 m)   Wt 93 4 kg (206 lb)   SpO2 96%   BMI 40 23 kg/m²        Physical Exam     Physical Exam  Vitals reviewed  Constitutional:       General: She is not in acute distress  Appearance: Normal appearance  She is not toxic-appearing  HENT:      Head: Normocephalic and atraumatic        Right Ear: Tympanic membrane, ear canal and external ear normal       Left Ear: Tympanic membrane, ear canal and external ear normal       Nose: Congestion present  Comments: Inflamed nasal mucosa     Mouth/Throat:      Mouth: Mucous membranes are moist       Pharynx: Oropharynx is clear  No oropharyngeal exudate or posterior oropharyngeal erythema  Eyes:      Conjunctiva/sclera: Conjunctivae normal    Cardiovascular:      Rate and Rhythm: Normal rate and regular rhythm  Pulses: Normal pulses  Heart sounds: Normal heart sounds  Pulmonary:      Effort: Pulmonary effort is normal       Breath sounds: Normal breath sounds  Lymphadenopathy:      Cervical: No cervical adenopathy  Skin:     General: Skin is warm  Capillary Refill: Capillary refill takes less than 2 seconds  Neurological:      General: No focal deficit present  Mental Status: She is alert and oriented to person, place, and time

## 2022-01-07 ENCOUNTER — OFFICE VISIT (OUTPATIENT)
Dept: INTERNAL MEDICINE CLINIC | Facility: CLINIC | Age: 67
End: 2022-01-07
Payer: MEDICARE

## 2022-01-07 VITALS
HEIGHT: 60 IN | OXYGEN SATURATION: 95 % | SYSTOLIC BLOOD PRESSURE: 130 MMHG | DIASTOLIC BLOOD PRESSURE: 80 MMHG | WEIGHT: 207.38 LBS | BODY MASS INDEX: 40.72 KG/M2 | HEART RATE: 90 BPM | TEMPERATURE: 97.8 F

## 2022-01-07 DIAGNOSIS — E66.01 MORBID OBESITY (HCC): ICD-10-CM

## 2022-01-07 DIAGNOSIS — I10 PRIMARY HYPERTENSION: Primary | ICD-10-CM

## 2022-01-07 DIAGNOSIS — G47.33 OSA (OBSTRUCTIVE SLEEP APNEA): ICD-10-CM

## 2022-01-07 DIAGNOSIS — G89.4 CHRONIC PAIN SYNDROME: ICD-10-CM

## 2022-01-07 DIAGNOSIS — Z13.31 NEGATIVE DEPRESSION SCREENING: ICD-10-CM

## 2022-01-07 DIAGNOSIS — F33.9 DEPRESSION, RECURRENT (HCC): ICD-10-CM

## 2022-01-07 DIAGNOSIS — G25.2 COARSE TREMORS: ICD-10-CM

## 2022-01-07 DIAGNOSIS — Z00.00 MEDICARE ANNUAL WELLNESS VISIT, SUBSEQUENT: ICD-10-CM

## 2022-01-07 DIAGNOSIS — Z23 ENCOUNTER FOR VACCINATION: ICD-10-CM

## 2022-01-07 PROCEDURE — 99214 OFFICE O/P EST MOD 30 MIN: CPT | Performed by: INTERNAL MEDICINE

## 2022-01-07 PROCEDURE — G0438 PPPS, INITIAL VISIT: HCPCS | Performed by: INTERNAL MEDICINE

## 2022-01-07 PROCEDURE — 1123F ACP DISCUSS/DSCN MKR DOCD: CPT | Performed by: INTERNAL MEDICINE

## 2022-01-07 PROCEDURE — 90732 PPSV23 VACC 2 YRS+ SUBQ/IM: CPT | Performed by: INTERNAL MEDICINE

## 2022-01-07 PROCEDURE — G0009 ADMIN PNEUMOCOCCAL VACCINE: HCPCS | Performed by: INTERNAL MEDICINE

## 2022-01-07 RX ORDER — PRIMIDONE 50 MG/1
100 TABLET ORAL EVERY 8 HOURS SCHEDULED
Qty: 540 TABLET | Refills: 1 | Status: SHIPPED | OUTPATIENT
Start: 2022-01-07 | End: 2022-07-05

## 2022-01-07 RX ORDER — ARIPIPRAZOLE 5 MG/1
TABLET ORAL
Qty: 90 TABLET | Refills: 1 | Status: SHIPPED | OUTPATIENT
Start: 2022-01-07 | End: 2022-03-17

## 2022-01-07 NOTE — PROGRESS NOTES
Assessment and Plan:     Problem List Items Addressed This Visit        Respiratory    REKHA (obstructive sleep apnea)       Cardiovascular and Mediastinum    Hypertension - Primary       Other    Depression, recurrent (HCC)    Chronic pain syndrome      Other Visit Diagnoses     Encounter for vaccination        Negative depression screening        Medicare annual wellness visit, subsequent               Preventive health issues were discussed with patient, and age appropriate screening tests were ordered as noted in patient's After Visit Summary  Personalized health advice and appropriate referrals for health education or preventive services given if needed, as noted in patient's After Visit Summary       History of Present Illness:     Patient presents for Medicare Annual Wellness visit    Patient Care Team:  Nasim Saha DO as PCP - General (Internal Medicine)     Problem List:     Patient Active Problem List   Diagnosis    Complete rotator cuff tear of left shoulder    Depression, recurrent ([address] )    Hypertension    Mixed hyperlipidemia    Primary osteoarthritis involving multiple joints    DDD (degenerative disc disease), cervical    Post-menopausal    Chronic pain syndrome    Fatty liver    Benign colon polyp    Diverticulosis    Localized edema    Gait abnormality    Morbid obesity ([address] )    Lumbosacral spondylosis without myelopathy    REKHA (obstructive sleep apnea)      Past Medical and Surgical History:     Past Medical History:   Diagnosis Date    Arthritis     Cataract     ashley    Depression     Fluid retention     Headache, acute     Heart murmur     Hyperlipidemia     Hypertension     PONV (postoperative nausea and vomiting)     Rotator cuff tear, left     Wears glasses     Wears partial dentures     upper     Past Surgical History:   Procedure Laterality Date    ABDOMINAL ADHESION SURGERY      ANAL SPHINCTEROPLASTY      ANKLE SURGERY Right     tendon tear    APPENDECTOMY  CARPAL TUNNEL RELEASE Bilateral     CARPAL TUNNEL RELEASE Left     CARPAL TUNNEL RELEASE Right     COLONOSCOPY      HAND SURGERY Right     ganglion cyst    HEMORROIDECTOMY      HYSTERECTOMY      ILEOSTOMY      JOINT REPLACEMENT      KNEE ARTHROCENTESIS      ganglion Cyst    KNEE ARTHROSCOPY Left     OVARIAN CYST REMOVAL      SC SHLDR ARTHROSCOP,SURG,W/ROTAT CUFF REPR Left 2/19/2018    Procedure: ARTHROSCOPIC REPAIR ROTATOR CUFF,  SAD, BICEP TENODESIS;  Surgeon: Ava Baron MD;  Location: [facility];  Service: Orthopedics    REPAIR RECTOCELE      TONSILLECTOMY      TONSILLECTOMY AND ADENOIDECTOMY      TUBAL LIGATION        Family History:     Family History   Problem Relation Age of Onset    Hypertension Mother     Colon cancer Mother     Bone cancer Mother     COPD Mother     Emphysema Mother     Skin cancer Mother     Lead poisoning Father         lead poisoning in lungs     Thyroid disease Sister     Depression Sister     Hypertension Sister     Alzheimer's disease Sister     ROBERT disease Brother     Throat cancer Brother     Hypertension Brother     Colon cancer Family     Bone cancer Family     Hypertension Family     Diabetes Sister     Hypertension Sister     Heart failure Sister     Alzheimer's disease Sister     No Known Problems Daughter     No Known Problems Maternal Grandmother     No Known Problems Paternal Grandmother     No Known Problems Maternal Aunt     No Known Problems Maternal Aunt     No Known Problems Maternal Aunt     No Known Problems Maternal Aunt     No Known Problems Maternal Aunt     No Known Problems Paternal Aunt       Social History:     Social History     Socioeconomic History    Marital status:      Spouse name: None    Number of children: None    Years of education: None    Highest education level: None   Occupational History    Occupation: retired   Tobacco Use    Smoking status: Never Smoker    Smokeless tobacco: Never Used   Vaping Use    Vaping Use: Never used   Substance and Sexual Activity    Alcohol use: Not Currently     Comment: few x year    Drug use: No    Sexual activity: Yes     Partners: Male   Other Topics Concern    None   Social History Narrative    Getting   Two children    Lives with her daughter    On disability  Prior         Social Determinants of Health     Financial Resource Strain: Not on file   Food Insecurity: Not on file   Transportation Needs: Not on file   Physical Activity: Not on file   Stress: Not on file   Social Connections: Not on file   Intimate Partner Violence: Not on file   Housing Stability: Not on file      Medications and Allergies:     Current Outpatient Medications   Medication Sig Dispense Refill    acetaminophen (TYLENOL) 500 mg tablet Take 500-1,000 mg by mouth every 6 (six) hours as needed for mild pain        ARIPiprazole (ABILIFY) 5 mg tablet take 1 tablet by mouth every morning 90 tablet 1    Calcium Carbonate (CALCIUM 600 PO) Take by mouth daily        Cholecalciferol (VITAMIN D3 PO) Take 50 mcg by mouth daily        citalopram (CeleXA) 40 mg tablet take 1 tablet by mouth every evening 90 tablet 1    enalapril (VASOTEC) 10 mg tablet Take 1 tablet (10 mg total) by mouth daily 90 tablet 1    estradiol (ESTRACE) 0 1 mg/g vaginal cream Insert 2 g into the vagina 2 (two) times a week 42 5 g 3    ezetimibe (ZETIA) 10 mg tablet Take 1 tablet (10 mg total) by mouth daily 90 tablet 1    furosemide (LASIX) 40 mg tablet Take 1 tablet (40 mg total) by mouth daily as needed (worsening shortness of breath, weight gain > 3lbs/day) 30 tablet 5    loratadine (CLARITIN) 10 mg tablet Take 1 tablet (10 mg total) by mouth daily 30 tablet 0    methocarbamol (ROBAXIN) 500 mg tablet Take 500 mg by mouth every 6 (six) hours as needed      omega-3-acid ethyl esters (LOVAZA) 1 g capsule Take 2 capsules (2 g total) by mouth 2 (two) times a day 180 capsule 0    primidone (MYSOLINE) 50 mg tablet Take 1 tablet (50 mg total) by mouth every 8 (eight) hours 270 tablet 1    rosuvastatin (CRESTOR) 5 mg tablet Take 1 tablet (5 mg total) by mouth daily 30 tablet 5    traZODone (DESYREL) 100 mg tablet Take 1 tablet (100 mg total) by mouth daily at bedtime 90 tablet 1    triamcinolone (KENALOG) 0 1 % cream Apply topically 2 (two) times a day as needed for irritation or rash 60 g 1    fluticasone (FLONASE) 50 mcg/act nasal spray 1 spray into each nostril daily (Patient not taking: Reported on 1/4/2022 ) 9 9 mL 0    montelukast (SINGULAIR) 10 mg tablet Take 1 tablet (10 mg total) by mouth daily at bedtime 90 tablet 3    Multiple Vitamin (multivitamin) tablet Take 1 tablet by mouth daily (Patient not taking: Reported on 11/1/2021)      nystatin (MYCOSTATIN) powder Apply topically 2 (two) times a day 60 g 1    Tea Tree Oil (NO FUNGUS NAIL PROTECTANT EX) Apply topically (Patient not taking: Reported on 8/2/2021)       No current facility-administered medications for this visit       Allergies   Allergen Reactions    Iodine - Food Allergy Hives     IV IODINE    Keflex [Cephalexin] Other (See Comments)     Mouth sores    Morphine And Related Hives     IV MORPINE    Penicillins Hives    Benadryl [Diphenhydramine] Itching and Swelling     IV benadryl in hospital    Ciprofloxacin Hives    Clindamycin Other (See Comments)     Pt unsure    Erythromycin Hives      Immunizations:     Immunization History   Administered Date(s) Administered    COVID-19 MODERNA VACC 0 5 ML IM 04/01/2021, 04/29/2021    INFLUENZA 09/16/2014, 10/19/2018    Influenza Injectable, MDCK, Preservative Free, Quadrivalent, 0 5 mL 10/19/2018    Influenza Split High Dose Preservative Free IM 10/18/2019    Influenza, high dose seasonal 0 7 mL 08/25/2020, 11/01/2021    Pneumococcal Conjugate 13-Valent 08/21/2020    Tdap 07/18/2014    Tuberculin Skin Test 10/07/2021      Health Maintenance:         Topic Date Due    Breast Cancer Screening: Mammogram  06/09/2022    Colorectal Cancer Screening  10/27/2023    Hepatitis C Screening  Completed         Topic Date Due    Pneumococcal Vaccine: 65+ Years (2 of 2 - PPSV23) 08/21/2021    COVID-19 Vaccine (3 - Booster for Moderna series) 10/29/2021      Medicare Health Risk Assessment:     /80   Pulse 90   Temp 97 8 °F (36 6 °C)   Ht 5' (1 524 m)   Wt 94 1 kg (207 lb 6 oz)   SpO2 95%   BMI 40 50 kg/m²      Obdulio Torres is here for her Subsequent Wellness visit  Last Medicare Wellness visit information reviewed, patient interviewed and updates made to the record today  Health Risk Assessment:   Patient rates overall health as good  Patient feels that their physical health rating is slightly worse  Patient is satisfied with their life  Eyesight was rated as same  Hearing was rated as same  Patient feels that their emotional and mental health rating is same  Patients states they are never, rarely angry  Patient states they are never, rarely unusually tired/fatigued  Pain experienced in the last 7 days has been none  Patient states that she has experienced no weight loss or gain in last 6 months  Depression Screening:   PHQ-9 Score: 0      Urinary Incontinence Screening:   Patient has not leaked urine accidently in the last six months  Home Safety:  Patient does not have trouble with stairs inside or outside of their home  Patient has working smoke alarms and has working carbon monoxide detector  Home safety hazards include: none  Nutrition:   Current diet is Regular  Medications:   Patient is currently taking over-the-counter supplements  OTC medications include: see medication list  Patient is able to manage medications  Activities of Daily Living (ADLs)/Instrumental Activities of Daily Living (IADLs):   Walk and transfer into and out of bed and chair?: Yes  Dress and groom yourself?: Yes    Bathe or shower yourself?: Yes    Feed yourself? Yes  Do your laundry/housekeeping?: Yes  Manage your money, pay your bills and track your expenses?: Yes  Make your own meals?: Yes    Do your own shopping?: Yes    Previous Hospitalizations:   Any hospitalizations or ED visits within the last 12 months?: Yes    How many hospitalizations have you had in the last year?: 1-2    Advance Care Planning:   Living will: No    Durable POA for healthcare: No    Advanced directive: No    Advanced directive counseling given: Yes    Five wishes given: Yes    Patient declined ACP directive: No    End of Life Decisions reviewed with patient: Yes    Provider agrees with end of life decisions: Yes      Comments: Will discuss further once pt reviews information given today  Cognitive Screening:   Provider or family/friend/caregiver concerned regarding cognition?: No    PREVENTIVE SCREENINGS      Cardiovascular Screening:    General: Screening Not Indicated, History Lipid Disorder and Screening Current      Diabetes Screening:     General: Screening Current      Colorectal Cancer Screening:     General: Screening Current      Breast Cancer Screening:     General: Screening Current      Cervical Cancer Screening:    General: Screening Not Indicated      Osteoporosis Screening:    General: Screening Current      Abdominal Aortic Aneurysm (AAA) Screening:        General: Screening Not Indicated      Lung Cancer Screening:     General: Screening Not Indicated      Hepatitis C Screening:    General: Screening Current    Screening, Brief Intervention, and Referral to Treatment (SBIRT)    Screening  Typical number of drinks in a day: 0  Typical number of drinks in a week: 0  Interpretation: Low risk drinking behavior      Single Item Drug Screening:  How often have you used an illegal drug (including marijuana) or a prescription medication for non-medical reasons in the past year? never    Single Item Drug Screen Score: 0  Interpretation: Negative screen for possible drug use disorder    Other Counseling Topics:   Car/seat belt/driving safety, sunscreen and regular weightbearing exercise and calcium and vitamin D intake  A/P: Doing well and no falls  Denies depression and feels safe at home  Diverse diet  No problems operating a MV and uses seat belts  No living will or POA  Information give for pt to review  No DME or referrals needed today  RTC in one year for medicare wellness       Caryn Wood DO

## 2022-01-07 NOTE — PROGRESS NOTES
BMI Counseling: Body mass index is 40 5 kg/m²  The BMI is above normal  Nutrition recommendations include decreasing portion sizes and encouraging healthy choices of fruits and vegetables  Exercise recommendations include moderate physical activity 150 minutes/week  No pharmacotherapy was ordered  Rationale for BMI follow-up plan is due to patient being overweight or obese  Assessment/Plan:  Problem List Items Addressed This Visit        Respiratory    RKEHA (obstructive sleep apnea)       Cardiovascular and Mediastinum    Hypertension - Primary       Other    Depression, recurrent (HCC)    Chronic pain syndrome      Other Visit Diagnoses     Encounter for vaccination        Negative depression screening        Medicare annual wellness visit, subsequent               Diagnoses and all orders for this visit:    Primary hypertension    Chronic pain syndrome    Depression, recurrent ([address] )    REKHA (obstructive sleep apnea)    Encounter for vaccination    Negative depression screening    Medicare annual wellness visit, subsequent        No problem-specific Assessment & Plan notes found for this encounter  A/P: DOing ok except for the tremors  Labs are up to date  ?tremors worse due to natural progression of her disease or appears the abilify may have been increased at the time it worsened  Will wean off the abilify  Will increase the primidone  No s/s of Parkinson's otherwise  May consider sinemet trial or referral to neuro  Will update her second pneumonia vaccine  Awaiting pt getting her CPAP  Continue current treatment and RTC six weeks for f/u tremors and CPAP  Subjective:      Patient ID: Jcarlos Barragan is a 78 y o  female  [facility] RTC for f/u htn, DJD, etc  Doing ok, but reports some tremors are worse  No new meds or dietary changes  Remains active w/o difficulty and no falls  Chronic pain is controlled  MDD/MAGGIE is manageable  Labs are up to date , Due for vaccines         The following portions of the patient's history were reviewed and updated as appropriate:   She has a past medical history of Arthritis, Cataract, Depression, Fluid retention, Headache, acute, Heart murmur, Hyperlipidemia, Hypertension, PONV (postoperative nausea and vomiting), Rotator cuff tear, left, Wears glasses, and Wears partial dentures  ,  does not have any pertinent problems on file  ,   has a past surgical history that includes Ankle surgery (Right); Tonsillectomy; Tubal ligation; Hysterectomy; Ovarian cyst removal; Carpal tunnel release (Bilateral); Hand surgery (Right); Abdominal adhesion surgery; Knee arthroscopy (Left); Appendectomy; Colonoscopy; Hemorroidectomy; Repair rectocele; pr shldr arthroscop,surg,w/rotat cuff repr (Left, 2/19/2018); Carpal tunnel release (Left); Carpal tunnel release (Right); Ileostomy; Anal sphincteroplasty; Knee arthrocentesis; Tonsillectomy and adenoidectomy; and Joint replacement  ,  family history includes Alzheimer's disease in her sister and sister; Bone cancer in her family and mother; COPD in her mother; Colon cancer in her family and mother; Depression in her sister; Diabetes in her sister; Emphysema in her mother; ROBERT disease in her brother; Heart failure in her sister; Hypertension in her brother, family, mother, sister, and sister; Lead poisoning in her father; No Known Problems in her daughter, maternal aunt, maternal aunt, maternal aunt, maternal aunt, maternal aunt, maternal grandmother, paternal aunt, and paternal grandmother; Skin cancer in her mother; Throat cancer in her brother; Thyroid disease in her sister  ,   reports that she has never smoked  She has never used smokeless tobacco  She reports previous alcohol use  She reports that she does not use drugs  ,  is allergic to iodine - food allergy, keflex [cephalexin], morphine and related, penicillins, benadryl [diphenhydramine], ciprofloxacin, clindamycin, and erythromycin     Current Outpatient Medications   Medication Sig Dispense Refill  acetaminophen (TYLENOL) 500 mg tablet Take 500-1,000 mg by mouth every 6 (six) hours as needed for mild pain        ARIPiprazole (ABILIFY) 5 mg tablet take 1 tablet by mouth every morning 90 tablet 1    Calcium Carbonate (CALCIUM 600 PO) Take by mouth daily        Cholecalciferol (VITAMIN D3 PO) Take 50 mcg by mouth daily        citalopram (CeleXA) 40 mg tablet take 1 tablet by mouth every evening 90 tablet 1    enalapril (VASOTEC) 10 mg tablet Take 1 tablet (10 mg total) by mouth daily 90 tablet 1    estradiol (ESTRACE) 0 1 mg/g vaginal cream Insert 2 g into the vagina 2 (two) times a week 42 5 g 3    ezetimibe (ZETIA) 10 mg tablet Take 1 tablet (10 mg total) by mouth daily 90 tablet 1    furosemide (LASIX) 40 mg tablet Take 1 tablet (40 mg total) by mouth daily as needed (worsening shortness of breath, weight gain > 3lbs/day) 30 tablet 5    loratadine (CLARITIN) 10 mg tablet Take 1 tablet (10 mg total) by mouth daily 30 tablet 0    methocarbamol (ROBAXIN) 500 mg tablet Take 500 mg by mouth every 6 (six) hours as needed      omega-3-acid ethyl esters (LOVAZA) 1 g capsule Take 2 capsules (2 g total) by mouth 2 (two) times a day 180 capsule 0    primidone (MYSOLINE) 50 mg tablet Take 1 tablet (50 mg total) by mouth every 8 (eight) hours 270 tablet 1    rosuvastatin (CRESTOR) 5 mg tablet Take 1 tablet (5 mg total) by mouth daily 30 tablet 5    traZODone (DESYREL) 100 mg tablet Take 1 tablet (100 mg total) by mouth daily at bedtime 90 tablet 1    triamcinolone (KENALOG) 0 1 % cream Apply topically 2 (two) times a day as needed for irritation or rash 60 g 1    fluticasone (FLONASE) 50 mcg/act nasal spray 1 spray into each nostril daily (Patient not taking: Reported on 1/4/2022 ) 9 9 mL 0    montelukast (SINGULAIR) 10 mg tablet Take 1 tablet (10 mg total) by mouth daily at bedtime 90 tablet 3    Multiple Vitamin (multivitamin) tablet Take 1 tablet by mouth daily (Patient not taking: Reported on 2021)      nystatin (MYCOSTATIN) powder Apply topically 2 (two) times a day 60 g 1    Tea Tree Oil (NO FUNGUS NAIL PROTECTANT EX) Apply topically (Patient not taking: Reported on 2021)       No current facility-administered medications for this visit  Review of Systems   Constitutional: Negative for activity change, chills, diaphoresis, fatigue and fever  HENT: Negative  Eyes: Negative for visual disturbance  Respiratory: Negative for cough, chest tightness, shortness of breath and wheezing  Cardiovascular: Negative for chest pain, palpitations and leg swelling  Gastrointestinal: Negative for abdominal pain, constipation, diarrhea, nausea and vomiting  Endocrine: Negative for cold intolerance and heat intolerance  Genitourinary: Negative for difficulty urinating, dysuria and frequency  Musculoskeletal: Negative for arthralgias, gait problem and myalgias  Neurological: Positive for tremors  Negative for dizziness, seizures, syncope, weakness, light-headedness and headaches  Psychiatric/Behavioral: Negative for confusion, dysphoric mood and sleep disturbance  The patient is not nervous/anxious  PHQ-2/9 Depression Screening    Little interest or pleasure in doing things: 0 - not at all  Feeling down, depressed, or hopeless: 0 - not at all  Trouble falling or staying asleep, or sleeping too much: 0 - not at all  Feeling tired or having little energy: 0 - not at all  Poor appetite or overeatin - not at all  Feeling bad about yourself - or that you are a failure or have let yourself or your family down: 0 - not at all  Trouble concentrating on things, such as reading the newspaper or watching television: 0 - not at all  Moving or speaking so slowly that other people could have noticed   Or the opposite - being so fidgety or restless that you have been moving around a lot more than usual: 0 - not at all  Thoughts that you would be better off dead, or of hurting yourself in some way: 0 - not at all  PHQ-9 Score: 0   PHQ-9 Interpretation: No or Minimal depression         Objective:  Vitals:    01/07/22 0809   BP: 130/80   Pulse: 90   Temp: 97 8 °F (36 6 °C)   SpO2: 95%   Weight: 94 1 kg (207 lb 6 oz)   Height: 5' (1 524 m)     Body mass index is 40 5 kg/m²  Physical Exam  Vitals and nursing note reviewed  Constitutional:       General: She is not in acute distress  Appearance: Normal appearance  She is not ill-appearing  HENT:      Head: Normocephalic and atraumatic  Mouth/Throat:      Mouth: Mucous membranes are moist    Eyes:      Extraocular Movements: Extraocular movements intact  Conjunctiva/sclera: Conjunctivae normal       Pupils: Pupils are equal, round, and reactive to light  Neck:      Vascular: No carotid bruit  Cardiovascular:      Rate and Rhythm: Normal rate and regular rhythm  Heart sounds: Normal heart sounds  Pulmonary:      Effort: Pulmonary effort is normal  No respiratory distress  Breath sounds: Normal breath sounds  No wheezing or rales  Abdominal:      General: Bowel sounds are normal  There is no distension  Palpations: Abdomen is soft  Tenderness: There is no abdominal tenderness  Musculoskeletal:      Cervical back: Neck supple  Right lower leg: No edema  Left lower leg: No edema  Neurological:      General: No focal deficit present  Mental Status: She is alert and oriented to person, place, and time  Mental status is at baseline  Comments: Coarse tremors    Psychiatric:         Mood and Affect: Mood normal          Behavior: Behavior normal          Thought Content:  Thought content normal          Judgment: Judgment normal

## 2022-01-07 NOTE — PATIENT INSTRUCTIONS
Medicare Preventive Visit Patient Instructions  Thank you for completing your Welcome to Medicare Visit or Medicare Annual Wellness Visit today  Your next wellness visit will be due in one year (1/8/2023)  The screening/preventive services that you may require over the next 5-10 years are detailed below  Some tests may not apply to you based off risk factors and/or age  Screening tests ordered at today's visit but not completed yet may show as past due  Also, please note that scanned in results may not display below  Preventive Screenings:  Service Recommendations Previous Testing/Comments   Colorectal Cancer Screening  * Colonoscopy    * Fecal Occult Blood Test (FOBT)/Fecal Immunochemical Test (FIT)  * Fecal DNA/Cologuard Test  * Flexible Sigmoidoscopy Age: 54-65 years old   Colonoscopy: every 10 years (may be performed more frequently if at higher risk)  OR  FOBT/FIT: every 1 year  OR  Cologuard: every 3 years  OR  Sigmoidoscopy: every 5 years  Screening may be recommended earlier than age 48 if at higher risk for colorectal cancer  Also, an individualized decision between you and your healthcare provider will decide whether screening between the ages of 74-80 would be appropriate  Colonoscopy: 10/27/2020  FOBT/FIT: Not on file  Cologuard: Not on file  Sigmoidoscopy: Not on file    Screening Current     Breast Cancer Screening Age: 36 years old  Frequency: every 1-2 years  Not required if history of left and right mastectomy Mammogram: 06/09/2021    Screening Current   Cervical Cancer Screening Between the ages of 21-29, pap smear recommended once every 3 years  Between the ages of 33-67, can perform pap smear with HPV co-testing every 5 years     Recommendations may differ for women with a history of total hysterectomy, cervical cancer, or abnormal pap smears in past  Pap Smear: 04/23/2021    Screening Not Indicated   Hepatitis C Screening Once for adults born between 1945 and 1965  More frequently in patients at high risk for Hepatitis C Hep C Antibody: 04/21/2020    Screening Current   Diabetes Screening 1-2 times per year if you're at risk for diabetes or have pre-diabetes Fasting glucose: 108 mg/dL   A1C: 5 6 %    Screening Current   Cholesterol Screening Once every 5 years if you don't have a lipid disorder  May order more often based on risk factors  Lipid panel: 03/30/2021    Screening Not Indicated  History Lipid Disorder     Other Preventive Screenings Covered by Medicare:  1  Abdominal Aortic Aneurysm (AAA) Screening: covered once if your at risk  You're considered to be at risk if you have a family history of AAA  2  Lung Cancer Screening: covers low dose CT scan once per year if you meet all of the following conditions: (1) Age 50-69; (2) No signs or symptoms of lung cancer; (3) Current smoker or have quit smoking within the last 15 years; (4) You have a tobacco smoking history of at least 30 pack years (packs per day multiplied by number of years you smoked); (5) You get a written order from a healthcare provider  3  Glaucoma Screening: covered annually if you're considered high risk: (1) You have diabetes OR (2) Family history of glaucoma OR (3)  aged 48 and older OR (3)  American aged 72 and older  3  Osteoporosis Screening: covered every 2 years if you meet one of the following conditions: (1) You're estrogen deficient and at risk for osteoporosis based off medical history and other findings; (2) Have a vertebral abnormality; (3) On glucocorticoid therapy for more than 3 months; (4) Have primary hyperparathyroidism; (5) On osteoporosis medications and need to assess response to drug therapy  · Last bone density test (DXA Scan): 06/09/2020   5  HIV Screening: covered annually if you're between the age of 15-65  Also covered annually if you are younger than 13 and older than 72 with risk factors for HIV infection   For pregnant patients, it is covered up to 3 times per pregnancy  Immunizations:  Immunization Recommendations   Influenza Vaccine Annual influenza vaccination during flu season is recommended for all persons aged >= 6 months who do not have contraindications   Pneumococcal Vaccine (Prevnar and Pneumovax)  * Prevnar = PCV13  * Pneumovax = PPSV23   Adults 25-60 years old: 1-3 doses may be recommended based on certain risk factors  Adults 72 years old: Prevnar (PCV13) vaccine recommended followed by Pneumovax (PPSV23) vaccine  If already received PPSV23 since turning 65, then PCV13 recommended at least one year after PPSV23 dose  Hepatitis B Vaccine 3 dose series if at intermediate or high risk (ex: diabetes, end stage renal disease, liver disease)   Tetanus (Td) Vaccine - COST NOT COVERED BY MEDICARE PART B Following completion of primary series, a booster dose should be given every 10 years to maintain immunity against tetanus  Td may also be given as tetanus wound prophylaxis  Tdap Vaccine - COST NOT COVERED BY MEDICARE PART B Recommended at least once for all adults  For pregnant patients, recommended with each pregnancy  Shingles Vaccine (Shingrix) - COST NOT COVERED BY MEDICARE PART B  2 shot series recommended in those aged 48 and above     Health Maintenance Due:      Topic Date Due    Breast Cancer Screening: Mammogram  06/09/2022    Colorectal Cancer Screening  10/27/2023    Hepatitis C Screening  Completed     Immunizations Due:      Topic Date Due    Pneumococcal Vaccine: 65+ Years (2 of 2 - PPSV23) 08/21/2021    COVID-19 Vaccine (3 - Booster for Moderna series) 10/29/2021     Advance Directives   What are advance directives? Advance directives are legal documents that state your wishes and plans for medical care  These plans are made ahead of time in case you lose your ability to make decisions for yourself  Advance directives can apply to any medical decision, such as the treatments you want, and if you want to donate organs     What are the types of advance directives? There are many types of advance directives, and each state has rules about how to use them  You may choose a combination of any of the following:  · Living will: This is a written record of the treatment you want  You can also choose which treatments you do not want, which to limit, and which to stop at a certain time  This includes surgery, medicine, IV fluid, and tube feedings  · Durable power of  for healthcare [facility]): This is a written record that states who you want to make healthcare choices for you when you are unable to make them for yourself  This person, called a proxy, is usually a family member or a friend  You may choose more than 1 proxy  · Do not resuscitate (DNR) order:  A DNR order is used in case your heart stops beating or you stop breathing  It is a request not to have certain forms of treatment, such as CPR  A DNR order may be included in other types of advance directives  · Medical directive: This covers the care that you want if you are in a coma, near death, or unable to make decisions for yourself  You can list the treatments you want for each condition  Treatment may include pain medicine, surgery, blood transfusions, dialysis, IV or tube feedings, and a ventilator (breathing machine)  · Values history: This document has questions about your views, beliefs, and how you feel and think about life  This information can help others choose the care that you would choose  Why are advance directives important? An advance directive helps you control your care  Although spoken wishes may be used, it is better to have your wishes written down  Spoken wishes can be misunderstood, or not followed  Treatments may be given even if you do not want them  An advance directive may make it easier for your family to make difficult choices about your care  Urinary Incontinence   Urinary incontinence (UI)  is when you lose control of your bladder   UI develops because your bladder cannot store or empty urine properly  The 3 most common types of UI are stress incontinence, urge incontinence, or both  Medicines:   · May be given to help strengthen your bladder control  Report any side effects of medication to your healthcare provider  Do pelvic muscle exercises often:  Your pelvic muscles help you stop urinating  Squeeze these muscles tight for 5 seconds, then relax for 5 seconds  Gradually work up to squeezing for 10 seconds  Do 3 sets of 15 repetitions a day, or as directed  This will help strengthen your pelvic muscles and improve bladder control  Train your bladder:  Go to the bathroom at set times, such as every 2 hours, even if you do not feel the urge to go  You can also try to hold your urine when you feel the urge to go  For example, hold your urine for 5 minutes when you feel the urge to go  As that becomes easier, hold your urine for 10 minutes  Self-care:   · Keep a UI record  Write down how often you leak urine and how much you leak  Make a note of what you were doing when you leaked urine  · Drink liquids as directed  You may need to limit the amount of liquid you drink to help control your urine leakage  Do not drink any liquid right before you go to bed  Limit or do not have drinks that contain caffeine or alcohol  · Prevent constipation  Eat a variety of high-fiber foods  Good examples are high-fiber cereals, beans, vegetables, and whole-grain breads  Walking is the best way to trigger your intestines to have a bowel movement  · Exercise regularly and maintain a healthy weight  Weight loss and exercise will decrease pressure on your bladder and help you control your leakage  · Use a catheter as directed  to help empty your bladder  A catheter is a tiny, plastic tube that is put into your bladder to drain your urine  · Go to behavior therapy as directed    Behavior therapy may be used to help you learn to control your urge to urinate  Weight Management   Why it is important to manage your weight:  Being overweight increases your risk of health conditions such as heart disease, high blood pressure, type 2 diabetes, and certain types of cancer  It can also increase your risk for osteoarthritis, sleep apnea, and other respiratory problems  Aim for a slow, steady weight loss  Even a small amount of weight loss can lower your risk of health problems  How to lose weight safely:  A safe and healthy way to lose weight is to eat fewer calories and get regular exercise  You can lose up about 1 pound a week by decreasing the number of calories you eat by 500 calories each day  Healthy meal plan for weight management:  A healthy meal plan includes a variety of foods, contains fewer calories, and helps you stay healthy  A healthy meal plan includes the following:  · Eat whole-grain foods more often  A healthy meal plan should contain fiber  Fiber is the part of grains, fruits, and vegetables that is not broken down by your body  Whole-grain foods are healthy and provide extra fiber in your diet  Some examples of whole-grain foods are whole-wheat breads and pastas, oatmeal, brown rice, and bulgur  · Eat a variety of vegetables every day  Include dark, leafy greens such as spinach, kale, teri greens, and mustard greens  Eat yellow and orange vegetables such as carrots, sweet potatoes, and winter squash  · Eat a variety of fruits every day  Choose fresh or canned fruit (canned in its own juice or light syrup) instead of juice  Fruit juice has very little or no fiber  · Eat low-fat dairy foods  Drink fat-free (skim) milk or 1% milk  Eat fat-free yogurt and low-fat cottage cheese  Try low-fat cheeses such as mozzarella and other reduced-fat cheeses  · Choose meat and other protein foods that are low in fat  Choose beans or other legumes such as split peas or lentils   Choose fish, skinless poultry (chicken or turkey), or lean cuts of red meat (beef or pork)  Before you cook meat or poultry, cut off any visible fat  · Use less fat and oil  Try baking foods instead of frying them  Add less fat, such as margarine, sour cream, regular salad dressing and mayonnaise to foods  Eat fewer high-fat foods  Some examples of high-fat foods include french fries, doughnuts, ice cream, and cakes  · Eat fewer sweets  Limit foods and drinks that are high in sugar  This includes candy, cookies, regular soda, and sweetened drinks  Exercise:  Exercise at least 30 minutes per day on most days of the week  Some examples of exercise include walking, biking, dancing, and swimming  You can also fit in more physical activity by taking the stairs instead of the elevator or parking farther away from stores  Ask your healthcare provider about the best exercise plan for you  © Copyright [employer] 2018 Information is for End User's use only and may not be sold, redistributed or otherwise used for commercial purposes  All illustrations and images included in CareNotes® are the copyrighted property of [employer]  or [address] [place] [address]  Chronic Hypertension   AMBULATORY CARE:   Hypertension  is high blood pressure  Your blood pressure is the force of your blood moving against the walls of your arteries  Hypertension causes your blood pressure to get so high that your heart has to work much harder than normal  This can damage your heart  Even if you have hypertension for years, lifestyle changes, medicines, or both can help bring your blood pressure to normal   Call your local emergency number (911 in the [address] [facility]) or have someone call if:   · You have chest pain  · You have any of the following signs of a heart attack:      ?  Squeezing, pressure, or pain in your chest    ? You may  also have any of the following:     § Discomfort or pain in your back, neck, jaw, stomach, or arm    § Shortness of breath    § Nausea or vomiting    § Lightheadedness or a sudden cold sweat    · You become confused or have difficulty speaking  · You suddenly feel lightheaded or have trouble breathing  Seek care immediately if:   · You have a severe headache or vision loss  · You have weakness in an arm or leg  Call your doctor or cardiologist if:   · You feel faint, dizzy, confused, or drowsy  · You have been taking your blood pressure medicine but your pressure is higher than your provider says it should be  · You have questions or concerns about your condition or care  Treatment for chronic hypertension  may include medicine to lower your blood pressure and cholesterol levels  A low cholesterol level helps prevent heart disease and makes it easier to control your blood pressure  Heart disease can make your blood pressure harder to control  You may also need to make lifestyle changes  What you need to know about the stages of hypertension:       · Normal blood pressure is 119/79 or lower   Your healthcare provider may only check your blood pressure each year if it stays at a normal level  · Elevated blood pressure is 120/79 to 129/79   This is sometimes called prehypertension  Your healthcare provider may suggest lifestyle changes to help lower your blood pressure to a normal level  He or she may then check it again in 3 to 6 months  · Stage 1 hypertension is 130/80  to 139/89   Your provider may recommend lifestyle changes, medication, and checks every 3 to 6 months until your blood pressure is controlled  · Stage 2 hypertension is 140/90 or higher   Your provider will recommend lifestyle changes and have you take 2 kinds of hypertension medicines  You will also need to have your blood pressure checked monthly until it is controlled  Manage chronic hypertension:   · Check your blood pressure at home  Avoid smoking, caffeine, and exercise at least 30 minutes before checking your blood pressure  Sit and rest for 5 minutes before you take your blood pressure  Extend your arm and support it on a flat surface  Your arm should be at the same level as your heart  Follow the directions that came with your blood pressure monitor  Check your blood pressure 2 times, 1 minute apart, before you take your medicine in the morning  Also check your blood pressure before your evening meal  Keep a record of your readings and bring it to your follow-up visits  Ask your healthcare provider what your blood pressure should be  · Manage any other health conditions you have  Health conditions such as diabetes can increase your risk for hypertension  Follow your healthcare provider's instructions and take all your medicines as directed  Talk to your healthcare provider about any new health conditions you have recently developed  · Ask about all medicines  Certain medicines can increase your blood pressure  Examples include oral birth control pills, decongestants, herbal supplements, and NSAIDs, such as ibuprofen  Your healthcare provider can tell you which medicines are safe for you to take  This includes prescription and over-the-counter medicines  Lifestyle changes you can make to lower your blood pressure: Your provider may want you to make more lifestyle changes if you are having trouble controlling your blood pressure  This may feel difficult over time, especially if you think you are making good changes but your pressure is still high  It might help to focus on one new change at a time  For example, try to add 1 more day of exercise, or exercise for an extra 10 minutes on 2 days  Small changes can make a big difference  Your healthcare provider can also refer you to specialists such as a dietitian who can help you make small changes  Your family members may be included in helping you learn to create lifestyle changes, such as the following:     · Limit sodium (salt) as directed  Too much sodium can affect your fluid balance   Check labels to find low-sodium or no-salt-added foods  Some low-sodium foods use potassium salts for flavor  Too much potassium can also cause health problems  Your healthcare provider will tell you how much sodium and potassium are safe for you to have in a day  He or she may recommend that you limit sodium to 2,300 mg a day  · Follow the meal plan recommended by your healthcare provider  A dietitian or your provider can give you more information on low-sodium plans or the DASH (Dietary Approaches to Stop Hypertension) eating plan  The DASH plan is low in sodium, processed sugar, unhealthy fats, and total fat  It is high in potassium, calcium, and fiber  These can be found in vegetables, fruit, and whole-grain foods  · Be physically active throughout the day  Physical activity, such as exercise, can help control your blood pressure and your weight  Be physically active for at least 30 minutes per day, on most days of the week  Include aerobic activity, such as walking or riding a bicycle  Also include strength training at least 2 times each week  Your healthcare providers can help you create a physical activity plan  · Decrease stress  This may help lower your blood pressure  Learn ways to relax, such as deep breathing or listening to music  · Limit alcohol as directed  Alcohol can increase your blood pressure  A drink of alcohol is 12 ounces of beer, 5 ounces of wine, or 1½ ounces of liquor  · Do not smoke  Nicotine and other chemicals in cigarettes and cigars can increase your blood pressure and also cause lung damage  Ask your healthcare provider for information if you currently smoke and need help to quit  E-cigarettes or smokeless tobacco still contain nicotine  Talk to your healthcare provider before you use these products  Follow up with your doctor or cardiologist as directed: You will need to return to have your blood pressure checked and to have other lab tests done   Write down your questions so you remember to ask them during your visits  © Copyright [employer] 2021 Information is for End User's use only and may not be sold, redistributed or otherwise used for commercial purposes  All illustrations and images included in CareNotes® are the copyrighted property of [employer]  or Jannet Pierre  The above information is an  only  It is not intended as medical advice for individual conditions or treatments  Talk to your doctor, nurse or pharmacist before following any medical regimen to see if it is safe and effective for you  Medicare Preventive Visit Patient Instructions  Thank you for completing your Welcome to Medicare Visit or Medicare Annual Wellness Visit today  Your next wellness visit will be due in one year (1/8/2023)  The screening/preventive services that you may require over the next 5-10 years are detailed below  Some tests may not apply to you based off risk factors and/or age  Screening tests ordered at today's visit but not completed yet may show as past due  Also, please note that scanned in results may not display below  Preventive Screenings:  Service Recommendations Previous Testing/Comments   Colorectal Cancer Screening  * Colonoscopy    * Fecal Occult Blood Test (FOBT)/Fecal Immunochemical Test (FIT)  * Fecal DNA/Cologuard Test  * Flexible Sigmoidoscopy Age: 54-65 years old   Colonoscopy: every 10 years (may be performed more frequently if at higher risk)  OR  FOBT/FIT: every 1 year  OR  Cologuard: every 3 years  OR  Sigmoidoscopy: every 5 years  Screening may be recommended earlier than age 48 if at higher risk for colorectal cancer  Also, an individualized decision between you and your healthcare provider will decide whether screening between the ages of 74-80 would be appropriate   Colonoscopy: 10/27/2020  FOBT/FIT: Not on file  Cologuard: Not on file  Sigmoidoscopy: Not on file    Screening Current     Breast Cancer Screening Age: 40+ years old  Frequency: every 1-2 years  Not required if history of left and right mastectomy Mammogram: 06/09/2021    Screening Current   Cervical Cancer Screening Between the ages of 21-29, pap smear recommended once every 3 years  Between the ages of 33-67, can perform pap smear with HPV co-testing every 5 years  Recommendations may differ for women with a history of total hysterectomy, cervical cancer, or abnormal pap smears in past  Pap Smear: 04/23/2021    Screening Not Indicated   Hepatitis C Screening Once for adults born between 1945 and 1965  More frequently in patients at high risk for Hepatitis C Hep C Antibody: 04/21/2020    Screening Current   Diabetes Screening 1-2 times per year if you're at risk for diabetes or have pre-diabetes Fasting glucose: 108 mg/dL   A1C: 5 6 %    Screening Current   Cholesterol Screening Once every 5 years if you don't have a lipid disorder  May order more often based on risk factors  Lipid panel: 03/30/2021    Screening Not Indicated  History Lipid Disorder     Other Preventive Screenings Covered by Medicare:  6  Abdominal Aortic Aneurysm (AAA) Screening: covered once if your at risk  You're considered to be at risk if you have a family history of AAA  7  Lung Cancer Screening: covers low dose CT scan once per year if you meet all of the following conditions: (1) Age 50-69; (2) No signs or symptoms of lung cancer; (3) Current smoker or have quit smoking within the last 15 years; (4) You have a tobacco smoking history of at least 30 pack years (packs per day multiplied by number of years you smoked); (5) You get a written order from a healthcare provider  8  Glaucoma Screening: covered annually if you're considered high risk: (1) You have diabetes OR (2) Family history of glaucoma OR (3)  aged 48 and older OR (3)  American aged 72 and older  5   Osteoporosis Screening: covered every 2 years if you meet one of the following conditions: (1) You're estrogen deficient and at risk for osteoporosis based off medical history and other findings; (2) Have a vertebral abnormality; (3) On glucocorticoid therapy for more than 3 months; (4) Have primary hyperparathyroidism; (5) On osteoporosis medications and need to assess response to drug therapy  · Last bone density test (DXA Scan): 06/09/2020  10  HIV Screening: covered annually if you're between the age of 12-76  Also covered annually if you are younger than 13 and older than 72 with risk factors for HIV infection  For pregnant patients, it is covered up to 3 times per pregnancy  Immunizations:  Immunization Recommendations   Influenza Vaccine Annual influenza vaccination during flu season is recommended for all persons aged >= 6 months who do not have contraindications   Pneumococcal Vaccine (Prevnar and Pneumovax)  * Prevnar = PCV13  * Pneumovax = PPSV23   Adults 25-60 years old: 1-3 doses may be recommended based on certain risk factors  Adults 72 years old: Prevnar (PCV13) vaccine recommended followed by Pneumovax (PPSV23) vaccine  If already received PPSV23 since turning 65, then PCV13 recommended at least one year after PPSV23 dose  Hepatitis B Vaccine 3 dose series if at intermediate or high risk (ex: diabetes, end stage renal disease, liver disease)   Tetanus (Td) Vaccine - COST NOT COVERED BY MEDICARE PART B Following completion of primary series, a booster dose should be given every 10 years to maintain immunity against tetanus  Td may also be given as tetanus wound prophylaxis  Tdap Vaccine - COST NOT COVERED BY MEDICARE PART B Recommended at least once for all adults  For pregnant patients, recommended with each pregnancy     Shingles Vaccine (Shingrix) - COST NOT COVERED BY MEDICARE PART B  2 shot series recommended in those aged 48 and above     Health Maintenance Due:      Topic Date Due    Breast Cancer Screening: Mammogram  06/09/2022    Colorectal Cancer Screening  10/27/2023    Hepatitis C Screening  Completed     Immunizations Due:      Topic Date Due    Pneumococcal Vaccine: 65+ Years (2 of 2 - PPSV23) 08/21/2021    COVID-19 Vaccine (3 - Booster for Moderna series) 10/29/2021     Advance Directives   What are advance directives? Advance directives are legal documents that state your wishes and plans for medical care  These plans are made ahead of time in case you lose your ability to make decisions for yourself  Advance directives can apply to any medical decision, such as the treatments you want, and if you want to donate organs  What are the types of advance directives? There are many types of advance directives, and each state has rules about how to use them  You may choose a combination of any of the following:  · Living will: This is a written record of the treatment you want  You can also choose which treatments you do not want, which to limit, and which to stop at a certain time  This includes surgery, medicine, IV fluid, and tube feedings  · Durable power of  for healthcare [facility]): This is a written record that states who you want to make healthcare choices for you when you are unable to make them for yourself  This person, called a proxy, is usually a family member or a friend  You may choose more than 1 proxy  · Do not resuscitate (DNR) order:  A DNR order is used in case your heart stops beating or you stop breathing  It is a request not to have certain forms of treatment, such as CPR  A DNR order may be included in other types of advance directives  · Medical directive: This covers the care that you want if you are in a coma, near death, or unable to make decisions for yourself  You can list the treatments you want for each condition  Treatment may include pain medicine, surgery, blood transfusions, dialysis, IV or tube feedings, and a ventilator (breathing machine)  · Values history:   This document has questions about your views, beliefs, and how you feel and think about life  This information can help others choose the care that you would choose  Why are advance directives important? An advance directive helps you control your care  Although spoken wishes may be used, it is better to have your wishes written down  Spoken wishes can be misunderstood, or not followed  Treatments may be given even if you do not want them  An advance directive may make it easier for your family to make difficult choices about your care  Urinary Incontinence   Urinary incontinence (UI)  is when you lose control of your bladder  UI develops because your bladder cannot store or empty urine properly  The 3 most common types of UI are stress incontinence, urge incontinence, or both  Medicines:   · May be given to help strengthen your bladder control  Report any side effects of medication to your healthcare provider  Do pelvic muscle exercises often:  Your pelvic muscles help you stop urinating  Squeeze these muscles tight for 5 seconds, then relax for 5 seconds  Gradually work up to squeezing for 10 seconds  Do 3 sets of 15 repetitions a day, or as directed  This will help strengthen your pelvic muscles and improve bladder control  Train your bladder:  Go to the bathroom at set times, such as every 2 hours, even if you do not feel the urge to go  You can also try to hold your urine when you feel the urge to go  For example, hold your urine for 5 minutes when you feel the urge to go  As that becomes easier, hold your urine for 10 minutes  Self-care:   · Keep a UI record  Write down how often you leak urine and how much you leak  Make a note of what you were doing when you leaked urine  · Drink liquids as directed  You may need to limit the amount of liquid you drink to help control your urine leakage  Do not drink any liquid right before you go to bed  Limit or do not have drinks that contain caffeine or alcohol  · Prevent constipation  Eat a variety of high-fiber foods   Good examples are high-fiber cereals, beans, vegetables, and whole-grain breads  Walking is the best way to trigger your intestines to have a bowel movement  · Exercise regularly and maintain a healthy weight  Weight loss and exercise will decrease pressure on your bladder and help you control your leakage  · Use a catheter as directed  to help empty your bladder  A catheter is a tiny, plastic tube that is put into your bladder to drain your urine  · Go to behavior therapy as directed  Behavior therapy may be used to help you learn to control your urge to urinate  Weight Management   Why it is important to manage your weight:  Being overweight increases your risk of health conditions such as heart disease, high blood pressure, type 2 diabetes, and certain types of cancer  It can also increase your risk for osteoarthritis, sleep apnea, and other respiratory problems  Aim for a slow, steady weight loss  Even a small amount of weight loss can lower your risk of health problems  How to lose weight safely:  A safe and healthy way to lose weight is to eat fewer calories and get regular exercise  You can lose up about 1 pound a week by decreasing the number of calories you eat by 500 calories each day  Healthy meal plan for weight management:  A healthy meal plan includes a variety of foods, contains fewer calories, and helps you stay healthy  A healthy meal plan includes the following:  · Eat whole-grain foods more often  A healthy meal plan should contain fiber  Fiber is the part of grains, fruits, and vegetables that is not broken down by your body  Whole-grain foods are healthy and provide extra fiber in your diet  Some examples of whole-grain foods are whole-wheat breads and pastas, oatmeal, brown rice, and bulgur  · Eat a variety of vegetables every day  Include dark, leafy greens such as spinach, kale, teri greens, and mustard greens   Eat yellow and orange vegetables such as carrots, sweet potatoes, and winter squash  · Eat a variety of fruits every day  Choose fresh or canned fruit (canned in its own juice or light syrup) instead of juice  Fruit juice has very little or no fiber  · Eat low-fat dairy foods  Drink fat-free (skim) milk or 1% milk  Eat fat-free yogurt and low-fat cottage cheese  Try low-fat cheeses such as mozzarella and other reduced-fat cheeses  · Choose meat and other protein foods that are low in fat  Choose beans or other legumes such as split peas or lentils  Choose fish, skinless poultry (chicken or turkey), or lean cuts of red meat (beef or pork)  Before you cook meat or poultry, cut off any visible fat  · Use less fat and oil  Try baking foods instead of frying them  Add less fat, such as margarine, sour cream, regular salad dressing and mayonnaise to foods  Eat fewer high-fat foods  Some examples of high-fat foods include french fries, doughnuts, ice cream, and cakes  · Eat fewer sweets  Limit foods and drinks that are high in sugar  This includes candy, cookies, regular soda, and sweetened drinks  Exercise:  Exercise at least 30 minutes per day on most days of the week  Some examples of exercise include walking, biking, dancing, and swimming  You can also fit in more physical activity by taking the stairs instead of the elevator or parking farther away from stores  Ask your healthcare provider about the best exercise plan for you  © Copyright [employer] 2018 Information is for End User's use only and may not be sold, redistributed or otherwise used for commercial purposes   All illustrations and images included in CareNotes® are the copyrighted property of A D A M , Inc  or [address] [place] [address] [employer]

## 2022-01-09 LAB
FLUAV RNA RESP QL NAA+PROBE: NEGATIVE
FLUBV RNA RESP QL NAA+PROBE: NEGATIVE
SARS-COV-2 RNA RESP QL NAA+PROBE: POSITIVE

## 2022-01-10 ENCOUNTER — TELEMEDICINE (OUTPATIENT)
Dept: INTERNAL MEDICINE CLINIC | Facility: CLINIC | Age: 67
End: 2022-01-10
Payer: MEDICARE

## 2022-01-10 DIAGNOSIS — U07.1 LAB TEST POSITIVE FOR DETECTION OF COVID-19 VIRUS: Primary | ICD-10-CM

## 2022-01-10 PROCEDURE — 99213 OFFICE O/P EST LOW 20 MIN: CPT | Performed by: INTERNAL MEDICINE

## 2022-01-10 NOTE — PROGRESS NOTES
COVID-19 Outpatient Progress Note    Assessment/Plan:    Problem List Items Addressed This Visit     None      Visit Diagnoses     Lab test positive for detection of COVID-19 virus    -  Primary         Disposition:     Patient has COVID-19 infection  Based off CDC guidelines, they were recommended to isolate for 5 days from the date of the positive test  If they remain asymptomatic, isolation may be ended followed by 5 days of wearing a mask when around othes to minimize risk of infecting others  If they have a fever, continue to stay home until fever resolves for at least 24 hours  I recommended continued isolation until at least 24 hours have passed since recovery defined as resolution of fever without the use of fever-reducing medications AND improvement in COVID symptoms AND 10 days have passed since onset of symptoms (or 10 days have passed since date of first positive viral diagnostic test for asymptomatic patients)  A/P: Day # 6 since onset of COVID s/s  Covid test was positive    Doing better and main s/s is slight cough and nasal congestion  NO fever or SOB  Doesn't meet the criteria for monoclonal ab infusion  Can lift isolation/quarantine, increase fluids, PRN motrin/tylenol, and breathing exercises  RTC as scheduled  for f/u  I have spent 20 minutes directly with the patient  Greater than 50% of this time was spent in counseling/coordination of care regarding: diagnostic results, prognosis, risks and benefits of treatment options, instructions for management, patient and family education, importance of treatment compliance, risk factor reductions and impressions        Encounter provider Carleene Phalen, DO    Provider located at [address] [place] [id number]    Recent Visits  Date Type Provider Dept   01/07/22 Office Visit Carleene Phalen, DO [facility] recent visits within past 7 days and meeting all other requirements  Today's Visits  Date Type Provider Dept   01/10/22 Telemedicine DO Gopi Ramirez today's visits and meeting all other requirements  Future Appointments  No visits were found meeting these conditions  Showing future appointments within next 150 days and meeting all other requirements       Patient agrees to participate in a virtual check in via telephone or video visit instead of presenting to the office to address urgent/immediate medical needs  Patient is aware this is a billable service  After connecting through [facility], the patient was identified by name and date of birth  Ifeoma Hough was informed that this was a telemedicine visit and that the exam was being conducted confidentially over secure lines  My office door was closed  Other methods to assure confidentiality were taken: unable to navigate [email or web address] or epic  [email or web address] used    No one else was in the room  Ifeoma Hough acknowledged consent and understanding of privacy and security of the telemedicine visit  I informed the patient that I have reviewed her record in Epic and presented the opportunity for her to ask any questions regarding the visit today  The patient agreed to participate  Verification of patient location:  Patient is located in the following state in which I hold an active license: [place]    Subjective:   Ifeoma Hough is a 78 y o  female who has been screened for COVID-19  Symptom change since last report: improving  Patient's symptoms include nasal congestion, rhinorrhea and cough  Patient denies fever, chills, fatigue, sore throat, shortness of breath, chest tightness, abdominal pain, nausea, vomiting, diarrhea, myalgias and headaches  - Date of symptom onset: 1/4/2022  - Date of positive COVID-19 PCR: 1/4/2022  Type of test: PCR    COVID-19 vaccination status: Fully vaccinated (primary series)    Miguel Chakraborty has been staying home and has isolated themselves in her home   She is taking care to not share personal items and is cleaning all surfaces that are touched often, like counters, tabletops, and doorknobs using household cleaning sprays or wipes  She is wearing a mask when she leaves her room       Lab Results   Component Value Date    SARSCOV2 Positive (A) 01/04/2022     Past Medical History:   Diagnosis Date    Arthritis     Cataract     ashley    Depression     Fluid retention     Headache, acute     Heart murmur     Hyperlipidemia     Hypertension     PONV (postoperative nausea and vomiting)     Rotator cuff tear, left     Wears glasses     Wears partial dentures     upper     Past Surgical History:   Procedure Laterality Date    ABDOMINAL ADHESION SURGERY      ANAL SPHINCTEROPLASTY      ANKLE SURGERY Right     tendon tear    APPENDECTOMY      CARPAL TUNNEL RELEASE Bilateral     CARPAL TUNNEL RELEASE Left     CARPAL TUNNEL RELEASE Right     COLONOSCOPY      HAND SURGERY Right     ganglion cyst    HEMORROIDECTOMY      HYSTERECTOMY      ILEOSTOMY      JOINT REPLACEMENT      KNEE ARTHROCENTESIS      ganglion Cyst    KNEE ARTHROSCOPY Left     OVARIAN CYST REMOVAL      MS SHLDR ARTHROSCOP,SURG,W/ROTAT CUFF REPR Left 2/19/2018    Procedure: ARTHROSCOPIC REPAIR ROTATOR CUFF,  SAD, BICEP TENODESIS;  Surgeon: Dc Kendall MD;  Location: [facility];  Service: Orthopedics    REPAIR RECTOCELE      TONSILLECTOMY      TONSILLECTOMY AND ADENOIDECTOMY      TUBAL LIGATION       Current Outpatient Medications   Medication Sig Dispense Refill    acetaminophen (TYLENOL) 500 mg tablet Take 500-1,000 mg by mouth every 6 (six) hours as needed for mild pain        ARIPiprazole (ABILIFY) 5 mg tablet take 1 tablet by mouth every morning (Patient taking differently: 2 5 mg  ) 90 tablet 1    Calcium Carbonate (CALCIUM 600 PO) Take by mouth daily        Cholecalciferol (VITAMIN D3 PO) Take 50 mcg by mouth daily        citalopram (CeleXA) 40 mg tablet take 1 tablet by mouth every evening 90 tablet 1    enalapril (VASOTEC) 10 mg tablet Take 1 tablet (10 mg total) by mouth daily 90 tablet 1    estradiol (ESTRACE) 0 1 mg/g vaginal cream Insert 2 g into the vagina 2 (two) times a week 42 5 g 3    ezetimibe (ZETIA) 10 mg tablet Take 1 tablet (10 mg total) by mouth daily 90 tablet 1    furosemide (LASIX) 40 mg tablet Take 1 tablet (40 mg total) by mouth daily as needed (worsening shortness of breath, weight gain > 3lbs/day) 30 tablet 5    loratadine (CLARITIN) 10 mg tablet Take 1 tablet (10 mg total) by mouth daily 30 tablet 0    methocarbamol (ROBAXIN) 500 mg tablet Take 500 mg by mouth every 6 (six) hours as needed      montelukast (SINGULAIR) 10 mg tablet Take 1 tablet (10 mg total) by mouth daily at bedtime 90 tablet 3    nystatin (MYCOSTATIN) powder Apply topically 2 (two) times a day 60 g 1    omega-3-acid ethyl esters (LOVAZA) 1 g capsule Take 2 capsules (2 g total) by mouth 2 (two) times a day 180 capsule 0    primidone (MYSOLINE) 50 mg tablet Take 2 tablets (100 mg total) by mouth every 8 (eight) hours 540 tablet 1    rosuvastatin (CRESTOR) 5 mg tablet Take 1 tablet (5 mg total) by mouth daily 30 tablet 5    traZODone (DESYREL) 100 mg tablet Take 1 tablet (100 mg total) by mouth daily at bedtime 90 tablet 1    triamcinolone (KENALOG) 0 1 % cream Apply topically 2 (two) times a day as needed for irritation or rash 60 g 1    Tea Tree Oil (NO FUNGUS NAIL PROTECTANT EX) Apply topically (Patient not taking: Reported on 8/2/2021)       No current facility-administered medications for this visit       Allergies   Allergen Reactions    Iodine - Food Allergy Hives     IV IODINE    Keflex [Cephalexin] Other (See Comments)     Mouth sores    Morphine And Related Hives     IV MORPINE    Penicillins Hives    Benadryl [Diphenhydramine] Itching and Swelling     IV benadryl in hospital    Ciprofloxacin Hives    Clindamycin Other (See Comments)     Pt unsure    Erythromycin Hives Review of Systems   Constitutional: Positive for activity change  Negative for chills, diaphoresis, fatigue and fever  HENT: Positive for congestion, postnasal drip and rhinorrhea  Negative for ear discharge, ear pain, facial swelling, sinus pressure, sinus pain and sore throat  No loss of taste/smell  Respiratory: Positive for cough  Negative for chest tightness, shortness of breath and wheezing  Cardiovascular: Negative for chest pain, palpitations and leg swelling  Gastrointestinal: Negative for abdominal pain, constipation, diarrhea, nausea and vomiting  Genitourinary: Negative for difficulty urinating, dysuria and frequency  Musculoskeletal: Negative for arthralgias, gait problem and myalgias  Neurological: Negative for light-headedness and headaches  Psychiatric/Behavioral: Negative for confusion  The patient is not nervous/anxious  Objective:    Vitals:       Physical Exam  Vitals and nursing note reviewed  Constitutional:       General: She is not in acute distress  Appearance: Normal appearance  She is not ill-appearing  HENT:      Head: Normocephalic and atraumatic  Mouth/Throat:      Mouth: Mucous membranes are moist    Eyes:      Extraocular Movements: Extraocular movements intact  Conjunctiva/sclera: Conjunctivae normal       Pupils: Pupils are equal, round, and reactive to light  Pulmonary:      Effort: Pulmonary effort is normal  No respiratory distress  Neurological:      General: No focal deficit present  Mental Status: She is alert and oriented to person, place, and time  Mental status is at baseline  Psychiatric:         Mood and Affect: Mood normal          Behavior: Behavior normal          Thought Content: Thought content normal          Judgment: Judgment normal          VIRTUAL VISIT [address] verbally agrees to participate in [employer]   Pt is aware that Virtual Care Services could be limited without vital signs or the ability to perform a full hands-on physical exam  Sri Martins understands she or the provider may request at any time to terminate the video visit and request the patient to seek care or treatment in person

## 2022-01-10 NOTE — PATIENT INSTRUCTIONS
How to Recover from COVID-19 at [address]:   What you need to know about recovering from COVID-19 at home:  COVID-19 can cause a range of symptoms, from mild to severe  If you do not need to be treated in a hospital, you will be given instructions to use at home  You will need to watch for worsening symptoms and seek immediate care if needed  You will also need to stay physically apart from others so you do not spread the virus to anyone  Information about COVID-19 is still being learned  It is not known if a person can be infected with the virus again after recovering from COVID-19  It is also not known if or for how long the virus can continue to be passed to others  If you think someone in your home may be infected,  do the following to protect others:  · If emergency care is needed,  tell the  about the possible infection, or call ahead and tell the emergency department  · Call a healthcare provider  for instructions if symptoms are mild  Anyone who may be infected should not  arrive without calling first  The provider will need to protect staff members and other patients  · The person who may be infected needs to wear a face covering  while getting medical care  This will help lower the risk of infecting others  Coverings are not used for anyone who is younger than 2 years, has breathing problems, or cannot remove it  The provider can give you instructions for anyone who cannot wear a covering  Call your local emergency number (911 in the [address] [place] [address],[address]) or an emergency department if:   · You have trouble breathing or shortness of breath at rest     · You have chest pain or pressure that lasts longer than 5 minutes  · You become confused or hard to wake  · Your lips or face are blue  · You have a fever of 104°F (40°C) or higher  Call your doctor if:   · You have new, returning, or worsening symptoms      · Someone in your home does not  have symptoms of COVID-19 but had close physical contact within 14 days with you  · You have questions or concerns about your condition or care  Self-care:  Mild symptoms may get better on their own  The following may be used to manage your symptoms:  · Decongestants  help reduce nasal congestion and help you breathe more easily  If you take decongestant pills, they may make you feel restless or cause problems with your sleep  Do not use decongestant sprays for more than a few days  · Cough suppressants  help reduce coughing  Ask your healthcare provider which type of cough medicine is best for you  · To soothe a sore throat,  gargle with warm salt water, or use throat lozenges or a throat spray  Your healthcare provider may recommend a cough medicine  Drink more liquids to thin and loosen mucus and to prevent dehydration  Use decongestants or saline drops as directed for nasal congestion  · NSAIDs , such as ibuprofen, help decrease swelling, pain, and fever  NSAIDs can cause stomach bleeding or kidney problems in certain people  If you take blood thinner medicine, always ask your healthcare provider if NSAIDs are safe for you  Always read the medicine label and follow directions  · Acetaminophen  decreases pain and fever  It is available without a doctor's order  Ask how much to take and how often to take it  Follow directions  Read the labels of all other medicines you are using to see if they also contain acetaminophen, or ask your doctor or pharmacist  Acetaminophen can cause liver damage if not taken correctly  Do not use more than 4 grams (4,000 milligrams) total of acetaminophen in one day  Keep others safe while you are recovering at home:  Healthcare providers will give you specific instructions to follow  The following are general guidelines to remind you how to keep others safe until you are well:     · Wash your hands often  Use soap and water as much as possible   You can use hand  that contains alcohol if soap and water are not available  Do not share towels with anyone  If you use paper towels, throw them away in a lined trash can kept in your room or area  Use a covered trash can, if possible  · Cover sneezes and coughs  Turn your face away and cover your mouth and nose with a tissue  Throw the tissue away  Use the bend of your arm if a tissue is not available  Then wash your hands well with soap and water or use hand   · Wear a face covering (mask) around anyone who does not live in your home  A covering will help prevent you from passing the virus to others  It is not known for sure if or for how long the virus can be passed after recovery  Do not  wear a plastic face shield instead of a covering  Use a cloth covering with at least 2 layers  You can also create layers by putting a cloth covering over a disposable non-medical mask  Cover your mouth and your nose  The covering should fit snugly against the bridge of your nose  Securely fasten it under your chin and on the sides of your face  A face covering is not a substitute for other safety measures  Continue social distancing and washing your hands often  · Do not go out of your home unless it is necessary  If possible, ask someone who is not infected to go out for groceries, medicines, and household items  Ask your healthcare provider for other ways to have appointments  Some providers offer phone, video, or other types of appointments  If you need to be seen in person, call ahead to make sure the office will be ready for you  · Do not let anyone into your home, room, or area unless it is necessary  If possible, stay in a separate area or room of your home if you live with others  No one should go into the area or room except to give you care  Only allow medical professionals or other necessary helpers in  Wear a face covering  Remind them to wear face coverings and to wash their hands   If possible, ask someone who is well to care for your baby  You can put breast milk in bottles for the person to use, if needed  Wear a clean face covering if you need to breastfeed or express or pump breast milk  Family members and friends should not visit you  You can visit with others by phone, video chat, e-mail, or similar systems  It is important to stay connected with others in your life while you recover  · Talk to your healthcare provider about your baby  Tell him or her if you have any questions or concerns about caring for or bonding with your baby  He or she will tell you when to bring your baby in for check-ups and vaccines  He or she will also tell you what to do if you think your baby was infected with the coronavirus  · Do not handle live animals unless it is necessary  Until more is known, it is best not to touch, play with, or handle live animals  Some animals, including pets, have been infected with the new coronavirus  Do not handle or care for animals until you are well  Care includes feeding, petting, and cuddling your pet  Do not let your pet lick you or share your food  Ask someone who is not infected to take care of your pet, if possible  If you must care for a pet, wear a face covering  Wash your hands before and after you give care  Talk to your healthcare provider about how to keep a service animal safe, if needed  · Follow directions from your healthcare provider for being around others after you recover  It is not known for sure if or for how long a recovered person can pass the virus to others  Your provider may give you instructions, such as continuing social distancing or wearing a face covering around others  The following are general guidelines for when you can be around others:    ? If you never developed any symptoms,  wait at least 10 days after your positive test  Your provider may want you to have 2 negative tests in a row at least 24 hours apart  This depends on how available testing is in your area  ?  If you did have symptoms,  wait at least 10 days after the symptoms first appeared  Then you will need to have no fever for 24 hours without fever medicine  Most of your symptoms will also need to be gone  A loss of taste or smell may continue for several months  It is considered okay to be around others if this is your only symptom  ? If you were hospitalized for COVID-19 and needed oxygen,  your provider will tell you how long to wait  You may need to wait until 20 days after symptoms appeared  It may be less if you have 2 negative tests in a row at least 24 hours apart  This will depend on how available testing is in your area  Follow up with your doctor as directed:  Write down your questions so you remember to ask them during your visits  For more information:   · Centers for Disease Control and Prevention  [address] , [address]  Phone: 8- 496 - [phone]  Web Address: [place]    © 4617 [facility] 2021 Information is for End User's use only and may not be sold, redistributed or otherwise used for commercial purposes  All illustrations and images included in CareNotes® are the copyrighted property of [employer]  or [facility] [place] [facility]   The above information is an  only  It is not intended as medical advice for individual conditions or treatments  Talk to your doctor, nurse or pharmacist before following any medical regimen to see if it is safe and effective for you

## 2022-01-11 ENCOUNTER — TELEMEDICINE (OUTPATIENT)
Dept: INTERNAL MEDICINE CLINIC | Facility: CLINIC | Age: 67
End: 2022-01-11
Payer: MEDICARE

## 2022-01-11 DIAGNOSIS — U07.1 LAB TEST POSITIVE FOR DETECTION OF COVID-19 VIRUS: Primary | ICD-10-CM

## 2022-01-11 PROCEDURE — 99213 OFFICE O/P EST LOW 20 MIN: CPT | Performed by: INTERNAL MEDICINE

## 2022-01-11 NOTE — PATIENT INSTRUCTIONS
How to Recover from COVID-19 at [address]:   What you need to know about recovering from COVID-19 at home:  COVID-19 can cause a range of symptoms, from mild to severe  If you do not need to be treated in a hospital, you will be given instructions to use at home  You will need to watch for worsening symptoms and seek immediate care if needed  You will also need to stay physically apart from others so you do not spread the virus to anyone  Information about COVID-19 is still being learned  It is not known if a person can be infected with the virus again after recovering from COVID-19  It is also not known if or for how long the virus can continue to be passed to others  If you think someone in your home may be infected,  do the following to protect others:  · If emergency care is needed,  tell the  about the possible infection, or call ahead and tell the emergency department  · Call a healthcare provider  for instructions if symptoms are mild  Anyone who may be infected should not  arrive without calling first  The provider will need to protect staff members and other patients  · The person who may be infected needs to wear a face covering  while getting medical care  This will help lower the risk of infecting others  Coverings are not used for anyone who is younger than 2 years, has breathing problems, or cannot remove it  The provider can give you instructions for anyone who cannot wear a covering  Call your local emergency number (911 in the [address] [place] [address],[address]) or an emergency department if:   · You have trouble breathing or shortness of breath at rest     · You have chest pain or pressure that lasts longer than 5 minutes  · You become confused or hard to wake  · Your lips or face are blue  · You have a fever of 104°F (40°C) or higher  Call your doctor if:   · You have new, returning, or worsening symptoms      · Someone in your home does not  have symptoms of COVID-19 but had close physical contact within 14 days with you  · You have questions or concerns about your condition or care  Self-care:  Mild symptoms may get better on their own  The following may be used to manage your symptoms:  · Decongestants  help reduce nasal congestion and help you breathe more easily  If you take decongestant pills, they may make you feel restless or cause problems with your sleep  Do not use decongestant sprays for more than a few days  · Cough suppressants  help reduce coughing  Ask your healthcare provider which type of cough medicine is best for you  · To soothe a sore throat,  gargle with warm salt water, or use throat lozenges or a throat spray  Your healthcare provider may recommend a cough medicine  Drink more liquids to thin and loosen mucus and to prevent dehydration  Use decongestants or saline drops as directed for nasal congestion  · NSAIDs , such as ibuprofen, help decrease swelling, pain, and fever  NSAIDs can cause stomach bleeding or kidney problems in certain people  If you take blood thinner medicine, always ask your healthcare provider if NSAIDs are safe for you  Always read the medicine label and follow directions  · Acetaminophen  decreases pain and fever  It is available without a doctor's order  Ask how much to take and how often to take it  Follow directions  Read the labels of all other medicines you are using to see if they also contain acetaminophen, or ask your doctor or pharmacist  Acetaminophen can cause liver damage if not taken correctly  Do not use more than 4 grams (4,000 milligrams) total of acetaminophen in one day  Keep others safe while you are recovering at home:  Healthcare providers will give you specific instructions to follow  The following are general guidelines to remind you how to keep others safe until you are well:     · Wash your hands often  Use soap and water as much as possible   You can use hand  that contains alcohol if soap and water are not available  Do not share towels with anyone  If you use paper towels, throw them away in a lined trash can kept in your room or area  Use a covered trash can, if possible  · Cover sneezes and coughs  Turn your face away and cover your mouth and nose with a tissue  Throw the tissue away  Use the bend of your arm if a tissue is not available  Then wash your hands well with soap and water or use hand   · Wear a face covering (mask) around anyone who does not live in your home  A covering will help prevent you from passing the virus to others  It is not known for sure if or for how long the virus can be passed after recovery  Do not  wear a plastic face shield instead of a covering  Use a cloth covering with at least 2 layers  You can also create layers by putting a cloth covering over a disposable non-medical mask  Cover your mouth and your nose  The covering should fit snugly against the bridge of your nose  Securely fasten it under your chin and on the sides of your face  A face covering is not a substitute for other safety measures  Continue social distancing and washing your hands often  · Do not go out of your home unless it is necessary  If possible, ask someone who is not infected to go out for groceries, medicines, and household items  Ask your healthcare provider for other ways to have appointments  Some providers offer phone, video, or other types of appointments  If you need to be seen in person, call ahead to make sure the office will be ready for you  · Do not let anyone into your home, room, or area unless it is necessary  If possible, stay in a separate area or room of your home if you live with others  No one should go into the area or room except to give you care  Only allow medical professionals or other necessary helpers in  Wear a face covering  Remind them to wear face coverings and to wash their hands   If possible, ask someone who is well to care for your baby  You can put breast milk in bottles for the person to use, if needed  Wear a clean face covering if you need to breastfeed or express or pump breast milk  Family members and friends should not visit you  You can visit with others by phone, video chat, e-mail, or similar systems  It is important to stay connected with others in your life while you recover  · Talk to your healthcare provider about your baby  Tell him or her if you have any questions or concerns about caring for or bonding with your baby  He or she will tell you when to bring your baby in for check-ups and vaccines  He or she will also tell you what to do if you think your baby was infected with the coronavirus  · Do not handle live animals unless it is necessary  Until more is known, it is best not to touch, play with, or handle live animals  Some animals, including pets, have been infected with the new coronavirus  Do not handle or care for animals until you are well  Care includes feeding, petting, and cuddling your pet  Do not let your pet lick you or share your food  Ask someone who is not infected to take care of your pet, if possible  If you must care for a pet, wear a face covering  Wash your hands before and after you give care  Talk to your healthcare provider about how to keep a service animal safe, if needed  · Follow directions from your healthcare provider for being around others after you recover  It is not known for sure if or for how long a recovered person can pass the virus to others  Your provider may give you instructions, such as continuing social distancing or wearing a face covering around others  The following are general guidelines for when you can be around others:    ? If you never developed any symptoms,  wait at least 10 days after your positive test  Your provider may want you to have 2 negative tests in a row at least 24 hours apart  This depends on how available testing is in your area  ?  If you did have symptoms,  wait at least 10 days after the symptoms first appeared  Then you will need to have no fever for 24 hours without fever medicine  Most of your symptoms will also need to be gone  A loss of taste or smell may continue for several months  It is considered okay to be around others if this is your only symptom  ? If you were hospitalized for COVID-19 and needed oxygen,  your provider will tell you how long to wait  You may need to wait until 20 days after symptoms appeared  It may be less if you have 2 negative tests in a row at least 24 hours apart  This will depend on how available testing is in your area  Follow up with your doctor as directed:  Write down your questions so you remember to ask them during your visits  For more information:   · Centers for Disease Control and Prevention  [address] , [address]  Phone: 1- 573 - 936-7681  Web Address: [employer]    © 9818 [facility] 2021 Information is for End User's use only and may not be sold, redistributed or otherwise used for commercial purposes  All illustrations and images included in CareNotes® are the copyrighted property of [employer]  or [facility]   The above information is an  only  It is not intended as medical advice for individual conditions or treatments  Talk to your doctor, nurse or pharmacist before following any medical regimen to see if it is safe and effective for you

## 2022-01-11 NOTE — PROGRESS NOTES
COVID-19 Outpatient Progress Note    Assessment/Plan:    Problem List Items Addressed This Visit     None      Visit Diagnoses     Lab test positive for detection of COVID-19 virus    -  Primary         Disposition:     Patient has COVID-19 infection  Based off CDC guidelines, they were recommended to isolate for 5 days from the date of the positive test  If they remain asymptomatic, isolation may be ended followed by 5 days of wearing a mask when around othes to minimize risk of infecting others  If they have a fever, continue to stay home until fever resolves for at least 24 hours  I recommended continued isolation until at least 24 hours have passed since recovery defined as resolution of fever without the use of fever-reducing medications AND improvement in COVID symptoms AND 10 days have passed since onset of symptoms (or 10 days have passed since date of first positive viral diagnostic test for asymptomatic patients)  A/P: Day # 7 since onset of COVID s/s  Covid test was positive  Doing much better and no fevers and just a slight cough  NO SOB    Can lift  Isolation/quarantine, but continue with  increase fluids, PRN motrin/tylenol, and breathing exercises  RTC as scheduled for f/u  I have spent 15 minutes directly with the patient  Greater than 50% of this time was spent in counseling/coordination of care regarding: prognosis, risks and benefits of treatment options, instructions for management, patient and family education, importance of treatment compliance, risk factor reductions and impressions        Encounter provider Clover Ricardo DO    Provider located at [address] [place] [id number]    Recent Visits  Date Type Provider Dept   01/10/22 Telemedicine Clover Ricardo DO [facility]   01/07/22 Office Visit DO Gopi Coulter recent visits within past 7 days and meeting all other requirements  Today's Visits  Date Type Provider Dept   01/11/22 Telemedicine DO Gopi Butlre today's visits and meeting all other requirements  Future Appointments  No visits were found meeting these conditions  Showing future appointments within next 150 days and meeting all other requirements     This virtual check-in was done via [address] and patient was informed that this is a secure, HIPAA-compliant platform  She agrees to proceed  Patient agrees to participate in a virtual check in via telephone or video visit instead of presenting to the office to address urgent/immediate medical needs  Patient is aware this is a billable service  After connecting through [facility], the patient was identified by name and date of birth  Usha Molina was informed that this was a telemedicine visit and that the exam was being conducted confidentially over secure lines  My office door was closed  No one else was in the room  Usha Molina acknowledged consent and understanding of privacy and security of the telemedicine visit  I informed the patient that I have reviewed her record in Epic and presented the opportunity for her to ask any questions regarding the visit today  The patient agreed to participate  Verification of patient location:  Patient is located in the following state in which I hold an active license: [place]    Subjective:   Usha Molina is a 78 y o  female who has been screened for COVID-19  Symptom change since last report: improving  Patient's symptoms include cough  Patient denies fever, chills, fatigue, congestion, rhinorrhea, sore throat, anosmia, loss of taste, shortness of breath, chest tightness, abdominal pain, nausea, vomiting, diarrhea, myalgias and headaches  - Date of symptom onset: 1/4/2022  - Date of positive COVID-19 PCR: 1/4/2022   Type of test: PCR    COVID-19 vaccination status: Fully vaccinated (primary series)    Pepe Buck has been staying home and has isolated themselves in her home  She is taking care to not share personal items and is cleaning all surfaces that are touched often, like counters, tabletops, and doorknobs using household cleaning sprays or wipes  She is wearing a mask when she leaves her room       Lab Results   Component Value Date    SARSCOV2 Positive (A) 01/04/2022     Past Medical History:   Diagnosis Date    Arthritis     Cataract     ashley    Depression     Fluid retention     Headache, acute     Heart murmur     Hyperlipidemia     Hypertension     PONV (postoperative nausea and vomiting)     Rotator cuff tear, left     Wears glasses     Wears partial dentures     upper     Past Surgical History:   Procedure Laterality Date    ABDOMINAL ADHESION SURGERY      ANAL SPHINCTEROPLASTY      ANKLE SURGERY Right     tendon tear    APPENDECTOMY      CARPAL TUNNEL RELEASE Bilateral     CARPAL TUNNEL RELEASE Left     CARPAL TUNNEL RELEASE Right     COLONOSCOPY      HAND SURGERY Right     ganglion cyst    HEMORROIDECTOMY      HYSTERECTOMY      ILEOSTOMY      JOINT REPLACEMENT      KNEE ARTHROCENTESIS      ganglion Cyst    KNEE ARTHROSCOPY Left     OVARIAN CYST REMOVAL      NV SHLDR ARTHROSCOP,SURG,W/ROTAT CUFF REPR Left 2/19/2018    Procedure: ARTHROSCOPIC REPAIR ROTATOR CUFF,  SAD, BICEP TENODESIS;  Surgeon: Po Gracia MD;  Location: [facility];  Service: Orthopedics    REPAIR RECTOCELE      TONSILLECTOMY      TONSILLECTOMY AND ADENOIDECTOMY      TUBAL LIGATION       Current Outpatient Medications   Medication Sig Dispense Refill    acetaminophen (TYLENOL) 500 mg tablet Take 500-1,000 mg by mouth every 6 (six) hours as needed for mild pain        ARIPiprazole (ABILIFY) 5 mg tablet take 1 tablet by mouth every morning (Patient taking differently: 2 5 mg  ) 90 tablet 1    Calcium Carbonate (CALCIUM 600 PO) Take by mouth daily        Cholecalciferol (VITAMIN D3 PO) Take 50 mcg by mouth daily        citalopram (CeleXA) 40 mg tablet take 1 tablet by mouth every evening 90 tablet 1    enalapril (VASOTEC) 10 mg tablet Take 1 tablet (10 mg total) by mouth daily 90 tablet 1    estradiol (ESTRACE) 0 1 mg/g vaginal cream Insert 2 g into the vagina 2 (two) times a week 42 5 g 3    ezetimibe (ZETIA) 10 mg tablet Take 1 tablet (10 mg total) by mouth daily 90 tablet 1    furosemide (LASIX) 40 mg tablet Take 1 tablet (40 mg total) by mouth daily as needed (worsening shortness of breath, weight gain > 3lbs/day) 30 tablet 5    loratadine (CLARITIN) 10 mg tablet Take 1 tablet (10 mg total) by mouth daily 30 tablet 0    methocarbamol (ROBAXIN) 500 mg tablet Take 500 mg by mouth every 6 (six) hours as needed      montelukast (SINGULAIR) 10 mg tablet Take 1 tablet (10 mg total) by mouth daily at bedtime 90 tablet 3    nystatin (MYCOSTATIN) powder Apply topically 2 (two) times a day 60 g 1    omega-3-acid ethyl esters (LOVAZA) 1 g capsule Take 2 capsules (2 g total) by mouth 2 (two) times a day 180 capsule 0    primidone (MYSOLINE) 50 mg tablet Take 2 tablets (100 mg total) by mouth every 8 (eight) hours 540 tablet 1    rosuvastatin (CRESTOR) 5 mg tablet Take 1 tablet (5 mg total) by mouth daily 30 tablet 5    Tea Tree Oil (NO FUNGUS NAIL PROTECTANT EX) Apply topically        traZODone (DESYREL) 100 mg tablet Take 1 tablet (100 mg total) by mouth daily at bedtime 90 tablet 1    triamcinolone (KENALOG) 0 1 % cream Apply topically 2 (two) times a day as needed for irritation or rash 60 g 1     No current facility-administered medications for this visit       Allergies   Allergen Reactions    Iodine - Food Allergy Hives     IV IODINE    Keflex [Cephalexin] Other (See Comments)     Mouth sores    Morphine And Related Hives     IV MORPINE    Penicillins Hives    Benadryl [Diphenhydramine] Itching and Swelling     IV benadryl in hospital    Ciprofloxacin Hives    Clindamycin Other (See Comments)     Pt unsure    Erythromycin Hives       Review of Systems   Constitutional: Positive for activity change  Negative for chills, diaphoresis, fatigue and fever  HENT: Negative for congestion, ear discharge, ear pain, facial swelling, postnasal drip, rhinorrhea, sinus pressure, sinus pain and sore throat  No loss of taste/smell  Respiratory: Positive for cough  Negative for chest tightness, shortness of breath and wheezing  Cardiovascular: Negative for chest pain, palpitations and leg swelling  Gastrointestinal: Negative for abdominal pain, constipation, diarrhea, nausea and vomiting  Genitourinary: Negative for difficulty urinating, dysuria and frequency  Musculoskeletal: Negative for arthralgias, gait problem and myalgias  Neurological: Negative for light-headedness and headaches  Psychiatric/Behavioral: Negative for confusion  The patient is not nervous/anxious  Objective:    Vitals:       Physical Exam  Vitals and nursing note reviewed  Constitutional:       General: She is not in acute distress  Appearance: Normal appearance  She is not ill-appearing  HENT:      Head: Normocephalic and atraumatic  Mouth/Throat:      Mouth: Mucous membranes are moist    Eyes:      Extraocular Movements: Extraocular movements intact  Conjunctiva/sclera: Conjunctivae normal       Pupils: Pupils are equal, round, and reactive to light  Pulmonary:      Effort: Pulmonary effort is normal  No respiratory distress  Neurological:      General: No focal deficit present  Mental Status: She is alert and oriented to person, place, and time  Mental status is at baseline  Psychiatric:         Mood and Affect: Mood normal          Behavior: Behavior normal          Thought Content: Thought content normal          Judgment: Judgment normal          VIRTUAL VISIT [address] verbally agrees to participate in [facility]   Pt is aware that Virtual Care Services could be limited without vital signs or the ability to perform a full hands-on physical exam  Sri Perez understands she or the provider may request at any time to terminate the video visit and request the patient to seek care or treatment in person

## 2022-01-22 DIAGNOSIS — E78.2 MIXED HYPERLIPIDEMIA: ICD-10-CM

## 2022-01-24 RX ORDER — EZETIMIBE 10 MG/1
TABLET ORAL
Qty: 90 TABLET | Refills: 1 | Status: SHIPPED | OUTPATIENT
Start: 2022-01-24 | End: 2022-07-20

## 2022-02-13 DIAGNOSIS — F32.A DEPRESSION, UNSPECIFIED DEPRESSION TYPE: ICD-10-CM

## 2022-02-14 RX ORDER — CITALOPRAM 40 MG/1
TABLET ORAL
Qty: 90 TABLET | Refills: 1 | Status: SHIPPED | OUTPATIENT
Start: 2022-02-14

## 2022-02-17 ENCOUNTER — OFFICE VISIT (OUTPATIENT)
Dept: CARDIOLOGY CLINIC | Facility: CLINIC | Age: 67
End: 2022-02-17
Payer: MEDICARE

## 2022-02-17 VITALS
HEART RATE: 73 BPM | WEIGHT: 205.8 LBS | BODY MASS INDEX: 40.4 KG/M2 | SYSTOLIC BLOOD PRESSURE: 98 MMHG | HEIGHT: 60 IN | DIASTOLIC BLOOD PRESSURE: 60 MMHG

## 2022-02-17 DIAGNOSIS — E78.2 MIXED HYPERLIPIDEMIA: ICD-10-CM

## 2022-02-17 DIAGNOSIS — I10 ESSENTIAL HYPERTENSION: ICD-10-CM

## 2022-02-17 DIAGNOSIS — Z15.89 GENETIC PREDISPOSITION TO CARDIOMYOPATHY: ICD-10-CM

## 2022-02-17 DIAGNOSIS — R60.0 PEDAL EDEMA: Primary | ICD-10-CM

## 2022-02-17 DIAGNOSIS — G47.33 OSA (OBSTRUCTIVE SLEEP APNEA): ICD-10-CM

## 2022-02-17 DIAGNOSIS — E66.01 MORBID OBESITY (HCC): ICD-10-CM

## 2022-02-17 PROCEDURE — 93000 ELECTROCARDIOGRAM COMPLETE: CPT | Performed by: INTERNAL MEDICINE

## 2022-02-17 PROCEDURE — 99214 OFFICE O/P EST MOD 30 MIN: CPT | Performed by: INTERNAL MEDICINE

## 2022-02-17 NOTE — PROGRESS NOTES
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  · Echo is without any dilated cardiomyopathy, EF 60%  · Unclear significance  · Can monitor clinically    Hypertension  · BP on lower side today at 98/60, but asymptomatic without any dizziness  · Continue enalapril 10 for now --> if persistently below 100/60, consider lowering enalapril to 5 mg daily  · Has follow up with Dr Hadley tomorrow    Hyperlipidemia   4/21/2020 09:35 12/28/2020 09:43 3/30/2021 11:27   Cholesterol 254 (H)  219 (H)   Triglycerides 126 128 241 (H)   HDL 48  52   LDL Calculated 181 (H)  119 (H)   LDL CHOLESTEROL DIRECT  130 (H)      · Previously intolerant to atorvastatin - cramps  · Tolerating rosuvastatin 5mg well  · Continue rosuvastatin 5 mg + ezetimibe 10 mg daily  · Follow up lipid panel    REKHA  · CPAP titration study done in 9/2021, but still not using CPAP  · Encouraged follow up with sleep medicine  · Weight loss suggested    ? Positional vertigo  · 3 short episodes over the past 2 weeks with laying down or turning head from side to side  · Mild, and self-resolved within a few seconds   · Monitor clinically and follow up with PCP    ECG today -  Results for orders placed or performed in visit on 02/17/22   POCT ECG    Impression    Normal sinus rhythm  Low-voltage QRS   Borderline ECG        Orders Placed This Encounter   Procedures    POCT ECG     Return in about 6 months (around 8/17/2022), or if symptoms worsen or fail to improve  History of Present Illness   78 y o   female comes in as a new patient for establishment of care after recent hospitalization for chest pain  On 1st March, patient came in with to the hospital with sudden onset left-sided chest pressure which woke her up from sleep in the morning  She received nitroglycerin without much relief  Subsequently her blood pressure was noted to be elevated in the 200s and she received Ativan which improved her pain and blood pressure  She has had a lot of social stressors recently  She was ruled out with 3 negative troponins and discharged with outpatient stress  Since discharge, she has not had any further major recurrences of chest pain or shortness of breath  She ambulates with the help of a cane, due to pain, but feels she can exercise on treadmill for stress test     Interval history - 8/5/2021  She comes back after completing stress testing  She has been doing well over the past few months, and her chest pain has otherwise resolved  She is currently working with cleaning places, and occasionally gets short of breath with it  She does report some orthopnea, and sleeps elevated  Also has edema    Additionally , she states that she recently underwent some genetic testing which had abnormal results and has questions regarding the same    On further inquiry she states that she had previously but a weight loss supplement from bile about a tree, and had received some in epic testing at along with it, and so underwent same, and was found to have pathogenic mutation and as a result was asked to discuss it here today  Interval history - 2/17/2022  She comes back for follow-up after about 6 months  She has been doing well over the past few months and reports improvement in edema and is feeling much better  No current complains of shortness of breath or orthopnea  She continues to use Lasix about 1-2 times per week  No complains of chest pain, palpitation  She does report having some symptoms of head spinning sensation when she lays down or when she turns her head from side to side over the past 2 weeks, about 3 episodes, which have resolved on their own          Historical Information   Past Medical History:   Diagnosis Date    Arthritis     Cataract     ashley    Depression     Fluid retention     Headache, acute     Heart murmur     Hyperlipidemia     Hypertension     PONV (postoperative nausea and vomiting)     Rotator cuff tear, left     Wears glasses     Wears partial dentures     upper     Past Surgical History:   Procedure Laterality Date    ABDOMINAL ADHESION SURGERY      ANAL SPHINCTEROPLASTY      ANKLE SURGERY Right     tendon tear    APPENDECTOMY      CARPAL TUNNEL RELEASE Bilateral     CARPAL TUNNEL RELEASE Left     CARPAL TUNNEL RELEASE Right     COLONOSCOPY      HAND SURGERY Right     ganglion cyst    HEMORROIDECTOMY      HYSTERECTOMY      ILEOSTOMY      JOINT REPLACEMENT      KNEE ARTHROCENTESIS      ganglion Cyst    KNEE ARTHROSCOPY Left     OVARIAN CYST REMOVAL      DE SHLDR ARTHROSCOP,SURG,W/ROTAT CUFF REPR Left 2/19/2018    Procedure: ARTHROSCOPIC REPAIR ROTATOR CUFF,  SAD, BICEP TENODESIS;  Surgeon: Jolynn Murphy MD;  Location: [facility] OR;  Service: Orthopedics    REPAIR RECTOCELE      REPLACEMENT TOTAL KNEE Left 09/28/2021    TONSILLECTOMY      TONSILLECTOMY AND ADENOIDECTOMY      TUBAL LIGATION       Family History   Problem Relation Age of Onset    Hypertension Mother     Colon cancer Mother     Bone cancer Mother     COPD Mother     Emphysema Mother     Skin cancer Mother     Lead poisoning Father         lead poisoning in lungs     Thyroid disease Sister     Depression Sister     Hypertension Sister     Alzheimer's disease Sister     ROBERT disease Brother     Throat cancer Brother     Hypertension Brother     Colon cancer Family     Bone cancer Family     Hypertension Family     Diabetes Sister     Hypertension Sister     Heart failure Sister     Alzheimer's disease Sister     No Known Problems Daughter     No Known Problems Maternal Grandmother     No Known Problems Paternal Grandmother     No Known Problems Maternal Aunt     No Known Problems Maternal Aunt     No Known Problems Maternal Aunt     No Known Problems Maternal Aunt     No Known Problems Maternal Aunt     No Known Problems Paternal Aunt      Current Outpatient Medications on File Prior to Visit   Medication Sig Dispense Refill    acetaminophen (TYLENOL) 500 mg tablet Take 500-1,000 mg by mouth every 6 (six) hours as needed for mild pain        Calcium Carbonate (CALCIUM 600 PO) Take by mouth daily        Cholecalciferol (VITAMIN D3 PO) Take 50 mcg by mouth daily        citalopram (CeleXA) 40 mg tablet take 1 tablet by mouth every evening 90 tablet 1    enalapril (VASOTEC) 10 mg tablet Take 1 tablet (10 mg total) by mouth daily 90 tablet 1    estradiol (ESTRACE) 0 1 mg/g vaginal cream Insert 2 g into the vagina 2 (two) times a week 42 5 g 3    ezetimibe (ZETIA) 10 mg tablet take 1 tablet by mouth once daily 90 tablet 1    furosemide (LASIX) 40 mg tablet Take 1 tablet (40 mg total) by mouth daily as needed (worsening shortness of breath, weight gain > 3lbs/day) 30 tablet 5    montelukast (SINGULAIR) 10 mg tablet Take 1 tablet (10 mg total) by mouth daily at bedtime 90 tablet 3    nystatin (MYCOSTATIN) powder Apply topically 2 (two) times a day 60 g 1    omega-3-acid ethyl esters (LOVAZA) 1 g capsule Take 2 capsules (2 g total) by mouth 2 (two) times a day 180 capsule 0    primidone (MYSOLINE) 50 mg tablet Take 2 tablets (100 mg total) by mouth every 8 (eight) hours 540 tablet 1    rosuvastatin (CRESTOR) 5 mg tablet Take 1 tablet (5 mg total) by mouth daily 30 tablet 5    traZODone (DESYREL) 100 mg tablet Take 1 tablet (100 mg total) by mouth daily at bedtime 90 tablet 1    ARIPiprazole (ABILIFY) 5 mg tablet take 1 tablet by mouth every morning (Patient not taking: Reported on 2/17/2022) 90 tablet 1    loratadine (CLARITIN) 10 mg tablet Take 1 tablet (10 mg total) by mouth daily (Patient not taking: Reported on 2/17/2022 ) 30 tablet 0    methocarbamol (ROBAXIN) 500 mg tablet Take 500 mg by mouth every 6 (six) hours as needed (Patient not taking: Reported on 2/17/2022 )      Tea Tree Oil (NO FUNGUS NAIL PROTECTANT EX) Apply topically   (Patient not taking: Reported on 2/17/2022 )      triamcinolone (KENALOG) 0 1 % cream Apply topically 2 (two) times a day as needed for irritation or rash (Patient not taking: Reported on 2/17/2022 ) 60 g 1     No current facility-administered medications on file prior to visit       Allergies   Allergen Reactions    Iodine - Food Allergy Hives     IV IODINE    Keflex [Cephalexin] Other (See Comments)     Mouth sores    Morphine And Related Hives     IV MORPINE    Penicillins Hives    Benadryl [Diphenhydramine] Itching and Swelling     IV benadryl in hospital    Ciprofloxacin Hives    Clindamycin Other (See Comments)     Pt unsure    Erythromycin Hives     Social History     Socioeconomic History    Marital status:      Spouse name: None    Number of children: None    Years of education: None    Highest education level: None   Occupational History    Occupation: retired   Tobacco Use    Smoking status: Never Smoker    Smokeless tobacco: Never Used   Vaping Use    Vaping Use: Never used   Substance and Sexual Activity    Alcohol use: Not Currently     Comment: few x year    Drug use: No    Sexual activity: Yes     Partners: Male   Other Topics Concern    None   Social History Narrative    Getting   Two children    Lives with her daughter    On disability  Prior   Social Determinants of Health     Financial Resource Strain: Not on file   Food Insecurity: Not on file   Transportation Needs: Not on file   Physical Activity: Not on file   Stress: Not on file   Social Connections: Not on file   Intimate Partner Violence: Not on file   Housing Stability: Not on file        Review of Systems   All other systems reviewed and are negative  Vitals:  Vitals:    02/17/22 0852   BP: 98/60   Pulse: 73   Weight: 93 4 kg (205 lb 12 8 oz)   Height: 5' (1 524 m)     BMI - Body mass index is 40 19 kg/m²  Wt Readings from Last 7 Encounters:   02/17/22 93 4 kg (205 lb 12 8 oz)   01/07/22 94 1 kg (207 lb 6 oz)   01/04/22 93 4 kg (206 lb)   11/01/21 94 5 kg (208 lb 6 4 oz)   09/20/21 92 6 kg (204 lb 2 oz)   08/05/21 94 3 kg (208 lb)   08/02/21 94 8 kg (209 lb)       Physical Exam  Vitals and nursing note reviewed  Constitutional:       General: She is not in acute distress  Appearance: Normal appearance  She is well-developed  She is obese  She is not ill-appearing  HENT:      Head: Normocephalic and atraumatic  Nose: No congestion  Eyes:      General: No scleral icterus  Conjunctiva/sclera: Conjunctivae normal    Neck:      Vascular: No carotid bruit or JVD  Cardiovascular:      Rate and Rhythm: Normal rate and regular rhythm  Pulses: Normal pulses  Heart sounds: Normal heart sounds  No murmur heard  No friction rub  No gallop  Pulmonary:      Effort: Pulmonary effort is normal  No respiratory distress  Breath sounds: Normal breath sounds  No rales     Abdominal:      General: There is no distension  Palpations: Abdomen is soft  Tenderness: There is no abdominal tenderness  Musculoskeletal:         General: No swelling or tenderness  Cervical back: Neck supple  Right lower leg: Edema present  Left lower leg: Edema present  Skin:     General: Skin is warm  Neurological:      General: No focal deficit present  Mental Status: She is alert and oriented to person, place, and time  Mental status is at baseline  Psychiatric:         Mood and Affect: Mood normal          Behavior: Behavior normal          Thought Content: Thought content normal          Labs:  CBC:   Lab Results   Component Value Date    WBC 5 80 02/28/2021    RBC 4 19 02/28/2021    HGB 12 3 02/28/2021    HCT 36 1 (L) 02/28/2021    MCV 86 02/28/2021     02/28/2021    RDW 13 6 02/28/2021       CMP:   Lab Results   Component Value Date     09/24/2014    K 4 1 03/30/2021     03/30/2021    CO2 29 03/30/2021    ANIONGAP 8 09/24/2014    BUN 17 03/30/2021    CREATININE 0 95 03/30/2021    EGFR 63 03/30/2021    GLUCOSE 116 09/24/2014    CALCIUM 9 3 03/30/2021    AST 7 03/30/2021    ALT 22 03/30/2021    ALKPHOS 83 03/30/2021       Magnesium:  No results found for: MG    Lipid Profile:   Lab Results   Component Value Date    HDL 52 03/30/2021    TRIG 241 (H) 03/30/2021    LDLCALC 119 (H) 03/30/2021       Thyroid Studies:   Lab Results   Component Value Date    [id number] 2 930 12/28/2020       No components found for: SeeFuture [facility]    Lab Results   Component Value Date    INR 1 02 02/28/2021   5    Imaging: X-ray Chest 1 View Portable    Result Date: 2/28/2021  Narrative: CHEST INDICATION:   chest pain  COMPARISON:  12/11/2015 EXAM PERFORMED/VIEWS:  XR CHEST PORTABLE  AP semierect FINDINGS: Cardiomediastinal silhouette appears unremarkable  The lungs are clear  No pneumothorax or pleural effusion  Osseous structures appear within normal limits for patient age       Impression: No significant interval change from previous examination  No acute abnormalities  Workstation performed: [id number]       Cardiac testing:   Results for orders placed during the hospital encounter of 21   Echo complete with contrast if indicated    Narrative [address]  [address]    Transthoracic Echocardiogram  2D, M-mode, Doppler, and Color Doppler    Study date:  01-Mar-2021    Patient: Jeanne May  MR number: [id number]  Account number: [de-identified]  : 1955  Age: 76 years  Gender: Female  Status: Outpatient  Location: [facility]   Height: 60 in  Weight: 216 5 lb  BP: 122/ 57 mmHg    Indications: Chest pain  Diagnoses: R07 9 - Chest pain, unspecified    Sonographer:  Yue Mcpherson RDCS  Referring Physician:  Lord Michael MD  Group:  [facility]  Interpreting Physician:  Harris Quiñones MD    SUMMARY    LEFT VENTRICLE:  Size was normal   Systolic function was normal  Ejection fraction was estimated to be 60 %  There were no regional wall motion abnormalities  Wall thickness was mildly increased  The changes were consistent with concentric remodeling (increased wall thickness with normal wall mass)  MITRAL VALVE:  There was mild annular calcification  There was trace regurgitation  HISTORY: Symptoms: chest pain  Lower extremity edema  PRIOR HISTORY: Risk factors: hypertension, hypercholesterolemia, and morbid obesity  PROCEDURE: The study was performed in the [facility]  This was a routine study  The transthoracic approach was used  The study included complete 2D imaging, M-mode, complete spectral Doppler, and color Doppler  The  heart rate was 72 bpm, at the start of the study  Images were obtained from the parasternal, apical, subcostal, and suprasternal notch acoustic windows  Echocardiographic views were limited due to decreased penetration and lung  interference   This was a technically difficult study  LEFT VENTRICLE: Size was normal  Systolic function was normal  Ejection fraction was estimated to be 60 %  There were no regional wall motion abnormalities  Wall thickness was mildly increased  The changes were consistent with concentric  remodeling (increased wall thickness with normal wall mass)  DOPPLER: Left ventricular diastolic function parameters were normal     RIGHT VENTRICLE: The size was normal  Systolic function was normal  Wall thickness was normal     LEFT ATRIUM: Size was normal     RIGHT ATRIUM: Size was normal     MITRAL VALVE: There was mild annular calcification  Valve structure was normal  There was normal leaflet separation  DOPPLER: The transmitral velocity was within the normal range  There was no evidence for stenosis  There was trace  regurgitation  AORTIC VALVE: The valve was trileaflet  Leaflets exhibited mildly increased thickness, mild calcification, and lower normal cuspal separation  DOPPLER: Transaortic velocity was minimally increased  There was no evidence for stenosis  There  was no significant regurgitation  TRICUSPID VALVE: The valve structure was normal  There was normal leaflet separation  DOPPLER: The transtricuspid velocity was within the normal range  There was no evidence for stenosis  There was no significant regurgitation  PULMONIC VALVE: Leaflets exhibited normal thickness, no calcification, and normal cuspal separation  DOPPLER: The transpulmonic velocity was within the normal range  There was trace regurgitation  PERICARDIUM: There was no pericardial effusion  The pericardium was normal in appearance  AORTA: The root exhibited normal size  SYSTEMIC VEINS: IVC: The inferior vena cava was not well visualized  The inferior vena cava was normal in size      SYSTEM MEASUREMENT TABLES    2D  %FS: 32 21 %  Ao Diam: 2 88 cm  Ao asc: 3 18 cm  EDV(Teich): 93 69 ml  EF(Teich): 60 54 %  ESV(Teich): 36 97 ml  IVSd: 1 33 cm  LA Area: 18 19 cm2  LA Diam: 3 78 cm  LVEDV MOD A4C: 99 62 ml  LVEF MOD A4C: 66 08 %  LVESV MOD A4C: 33 79 ml  LVIDd: 4 53 cm  LVIDs: 3 07 cm  LVLd A4C: 7 35 cm  LVLs A4C: 5 92 cm  LVOT Diam: 2 cm  LVPWd: 1 18 cm  RA Area: 9 49 cm2  RVIDd: 2 77 cm  SV MOD A4C: 65 83 ml  SV(Teich): 56 72 ml    CW  AV Env  Ti: 304 47 ms  AV VTI: 33 67 cm  AV Vmax: 1 59 m/s  AV Vmean: 1 11 m/s  AV maxPG: 10 08 mmHg  AV meanP 58 mmHg  PV Env  Ti: 304 47 ms  PV VTI: 22 11 cm  PV Vmax: 0 98 m/s  PV Vmean: 0 73 m/s  PV maxPG: 3 87 mmHg  PV meanP 34 mmHg    MM  TAPSE: 2 27 cm    PW  AIDAN (VTI): 1 93 cm2  AIDAN Vmax: 1 83 cm2  AVAI (VTI): 0 cm2/m2  AVAI Vmax: 0 cm2/m2  E' Sept: 0 09 m/s  E/E' Sept: 8 67  LVOT Env  Ti: 296 86 ms  LVOT VTI: 20 62 cm  LVOT Vmax: 0 92 m/s  LVOT Vmean: 0 69 m/s  LVOT maxPG: 3 4 mmHg  LVOT meanP 14 mmHg  LVSI Dopp: 33 67 ml/m2  LVSV Dopp: 64 99 ml  MV A Cristian: 0 92 m/s  MV Dec Gordon: 3 17 m/s2  MV DecT: 236 25 ms  MV E Cristian: 0 75 m/s  MV E/A Ratio: 0 81  RVOT Env  Ti: 319 7 ms  RVOT VTI: 13 63 cm  RVOT Vmax: 0 68 m/s  RVOT Vmean: 0 43 m/s  RVOT maxP 83 mmHg  RVOT meanP 86 mmHg    [employer] Accredited Echocardiography Laboratory    Prepared and electronically signed by    Ruby Hodge MD  Signed 01-Mar-2021 12:04:58       No results found for this or any previous visit  No results found for this or any previous visit  No results found for this or any previous visit

## 2022-02-17 NOTE — PATIENT INSTRUCTIONS

## 2022-02-18 ENCOUNTER — OFFICE VISIT (OUTPATIENT)
Dept: INTERNAL MEDICINE CLINIC | Facility: CLINIC | Age: 67
End: 2022-02-18
Payer: MEDICARE

## 2022-02-18 VITALS
WEIGHT: 205.6 LBS | HEIGHT: 60 IN | TEMPERATURE: 97.9 F | DIASTOLIC BLOOD PRESSURE: 76 MMHG | SYSTOLIC BLOOD PRESSURE: 110 MMHG | HEART RATE: 75 BPM | BODY MASS INDEX: 40.37 KG/M2 | OXYGEN SATURATION: 97 %

## 2022-02-18 DIAGNOSIS — G25.2 COARSE TREMORS: Primary | ICD-10-CM

## 2022-02-18 DIAGNOSIS — I10 PRIMARY HYPERTENSION: ICD-10-CM

## 2022-02-18 DIAGNOSIS — F33.9 DEPRESSION, RECURRENT (HCC): ICD-10-CM

## 2022-02-18 PROCEDURE — 99213 OFFICE O/P EST LOW 20 MIN: CPT | Performed by: INTERNAL MEDICINE

## 2022-02-18 NOTE — PROGRESS NOTES
Assessment/Plan:  Problem List Items Addressed This Visit        Cardiovascular and Mediastinum    Hypertension       Other    Depression, recurrent (HCC)    Coarse tremors - Primary    Relevant Medications    carbidopa-levodopa (Sinemet)  mg per tablet           Diagnoses and all orders for this visit:    Coarse tremors  -     carbidopa-levodopa (Sinemet)  mg per tablet; Take 1 tablet by mouth 3 (three) times a day    Depression, recurrent ([address] )    Primary hypertension        No problem-specific Assessment & Plan notes found for this encounter  A/P: Doing ok, but tremors still persists and interferring with QOL  Will add sinemet  Appreciate cards input and BP ok today  Will continue current dose of ACEI and pt to start checking BP as an OP  MDD is good despite stopping the adjunct therapy and will continue current treatment  RTC one month for f/u BP and tremors  Subjective:      Patient ID: Jack Rosenthal is a 78 y o  female  WF RTC for f/u worsening tremors felt to possibly be due to natural progression or abilify  Abilify weaned off and primidone increased  Tolerating the meds  MDD is good  Reports tremors are no better  Seen by cards yesterday and doing well except for a low BP with pt asymptomatic  Karrie Nissen No new issues  The following portions of the patient's history were reviewed and updated as appropriate:   She has a past medical history of Arthritis, Cataract, Depression, Fluid retention, Headache, acute, Heart murmur, Hyperlipidemia, Hypertension, PONV (postoperative nausea and vomiting), Rotator cuff tear, left, Wears glasses, and Wears partial dentures  ,  does not have any pertinent problems on file  ,   has a past surgical history that includes Ankle surgery (Right); Tonsillectomy; Tubal ligation; Hysterectomy; Ovarian cyst removal; Carpal tunnel release (Bilateral); Hand surgery (Right); Abdominal adhesion surgery; Knee arthroscopy (Left);  Appendectomy; Colonoscopy; Hemorroidectomy; Repair rectocele; pr shldr arthroscop,surg,w/rotat cuff repr (Left, 2/19/2018); Carpal tunnel release (Left); Carpal tunnel release (Right); Ileostomy; Anal sphincteroplasty; Knee arthrocentesis; Tonsillectomy and adenoidectomy; Joint replacement; and Replacement total knee (Left, 09/28/2021)  ,  family history includes Alzheimer's disease in her sister and sister; Bone cancer in her family and mother; COPD in her mother; Colon cancer in her family and mother; Depression in her sister; Diabetes in her sister; Emphysema in her mother; ROBERT disease in her brother; Heart failure in her sister; Hypertension in her brother, family, mother, sister, and sister; Lead poisoning in her father; No Known Problems in her daughter, maternal aunt, maternal aunt, maternal aunt, maternal aunt, maternal aunt, maternal grandmother, paternal aunt, and paternal grandmother; Skin cancer in her mother; Throat cancer in her brother; Thyroid disease in her sister  ,   reports that she has never smoked  She has never used smokeless tobacco  She reports previous alcohol use  She reports that she does not use drugs  ,  is allergic to iodine - food allergy, keflex [cephalexin], morphine and related, penicillins, benadryl [diphenhydramine], ciprofloxacin, clindamycin, and erythromycin     Current Outpatient Medications   Medication Sig Dispense Refill    acetaminophen (TYLENOL) 500 mg tablet Take 500-1,000 mg by mouth every 6 (six) hours as needed for mild pain        Calcium Carbonate (CALCIUM 600 PO) Take by mouth daily        Cholecalciferol (VITAMIN D3 PO) Take 50 mcg by mouth daily        citalopram (CeleXA) 40 mg tablet take 1 tablet by mouth every evening 90 tablet 1    enalapril (VASOTEC) 10 mg tablet Take 1 tablet (10 mg total) by mouth daily 90 tablet 1    estradiol (ESTRACE) 0 1 mg/g vaginal cream Insert 2 g into the vagina 2 (two) times a week 42 5 g 3    ezetimibe (ZETIA) 10 mg tablet take 1 tablet by mouth once daily 90 tablet 1    furosemide (LASIX) 40 mg tablet Take 1 tablet (40 mg total) by mouth daily as needed (worsening shortness of breath, weight gain > 3lbs/day) 30 tablet 5    loratadine (CLARITIN) 10 mg tablet Take 1 tablet (10 mg total) by mouth daily 30 tablet 0    methocarbamol (ROBAXIN) 500 mg tablet Take 500 mg by mouth every 6 (six) hours as needed        montelukast (SINGULAIR) 10 mg tablet Take 1 tablet (10 mg total) by mouth daily at bedtime 90 tablet 3    nystatin (MYCOSTATIN) powder Apply topically 2 (two) times a day (Patient taking differently: Apply 1 application topically 2 (two) times a day as needed  ) 60 g 1    omega-3-acid ethyl esters (LOVAZA) 1 g capsule Take 2 capsules (2 g total) by mouth 2 (two) times a day (Patient taking differently: Take 1 g by mouth daily  ) 180 capsule 0    primidone (MYSOLINE) 50 mg tablet Take 2 tablets (100 mg total) by mouth every 8 (eight) hours 540 tablet 1    rosuvastatin (CRESTOR) 5 mg tablet Take 1 tablet (5 mg total) by mouth daily 30 tablet 5    traZODone (DESYREL) 100 mg tablet Take 1 tablet (100 mg total) by mouth daily at bedtime 90 tablet 1    ARIPiprazole (ABILIFY) 5 mg tablet take 1 tablet by mouth every morning (Patient not taking: Reported on 2/17/2022) 90 tablet 1    carbidopa-levodopa (Sinemet)  mg per tablet Take 1 tablet by mouth 3 (three) times a day 270 tablet 1    Tea Tree Oil (NO FUNGUS NAIL PROTECTANT EX) Apply topically   (Patient not taking: Reported on 2/17/2022 )      triamcinolone (KENALOG) 0 1 % cream Apply topically 2 (two) times a day as needed for irritation or rash (Patient not taking: Reported on 2/17/2022 ) 60 g 1     No current facility-administered medications for this visit  Review of Systems   Constitutional: Negative for activity change, chills, diaphoresis, fatigue and fever  HENT: Negative  Eyes: Negative for visual disturbance     Respiratory: Negative for cough, chest tightness, shortness of breath and wheezing  Cardiovascular: Negative for chest pain, palpitations and leg swelling  Gastrointestinal: Negative for abdominal pain, constipation, diarrhea, nausea and vomiting  Endocrine: Negative for cold intolerance and heat intolerance  Genitourinary: Negative for difficulty urinating, dysuria and frequency  Musculoskeletal: Negative for arthralgias, gait problem and myalgias  Neurological: Positive for tremors  Negative for dizziness, seizures, light-headedness and headaches  Psychiatric/Behavioral: Negative for confusion  The patient is not nervous/anxious  PHQ-2/9 Depression Screening          Objective:  Vitals:    02/18/22 0811   BP: 110/76   Pulse: 75   Temp: 97 9 °F (36 6 °C)   TempSrc: Tympanic   SpO2: 97%   Weight: 93 3 kg (205 lb 9 6 oz)   Height: 5' (1 524 m)     Body mass index is 40 15 kg/m²  Physical Exam  Vitals and nursing note reviewed  Constitutional:       General: She is not in acute distress  Appearance: Normal appearance  She is not ill-appearing  HENT:      Head: Normocephalic and atraumatic  Mouth/Throat:      Mouth: Mucous membranes are moist    Eyes:      Extraocular Movements: Extraocular movements intact  Conjunctiva/sclera: Conjunctivae normal       Pupils: Pupils are equal, round, and reactive to light  Cardiovascular:      Rate and Rhythm: Normal rate and regular rhythm  Heart sounds: Normal heart sounds  Pulmonary:      Effort: Pulmonary effort is normal  No respiratory distress  Breath sounds: Normal breath sounds  No wheezing or rales  Neurological:      General: No focal deficit present  Mental Status: She is alert and oriented to person, place, and time  Mental status is at baseline  Psychiatric:         Mood and Affect: Mood normal          Behavior: Behavior normal          Thought Content:  Thought content normal          Judgment: Judgment normal

## 2022-03-17 DIAGNOSIS — F33.9 DEPRESSION, RECURRENT (HCC): ICD-10-CM

## 2022-03-17 RX ORDER — ARIPIPRAZOLE 5 MG/1
TABLET ORAL
Qty: 90 TABLET | Refills: 1 | Status: SHIPPED | OUTPATIENT
Start: 2022-03-17

## 2022-03-18 ENCOUNTER — OFFICE VISIT (OUTPATIENT)
Dept: INTERNAL MEDICINE CLINIC | Facility: CLINIC | Age: 67
End: 2022-03-18
Payer: MEDICARE

## 2022-03-18 VITALS
HEIGHT: 60 IN | BODY MASS INDEX: 40.68 KG/M2 | OXYGEN SATURATION: 98 % | DIASTOLIC BLOOD PRESSURE: 66 MMHG | TEMPERATURE: 98.1 F | WEIGHT: 207.2 LBS | SYSTOLIC BLOOD PRESSURE: 102 MMHG | HEART RATE: 71 BPM

## 2022-03-18 DIAGNOSIS — I10 ESSENTIAL HYPERTENSION: ICD-10-CM

## 2022-03-18 DIAGNOSIS — G25.2 COARSE TREMORS: ICD-10-CM

## 2022-03-18 DIAGNOSIS — F33.9 DEPRESSION, RECURRENT (HCC): ICD-10-CM

## 2022-03-18 DIAGNOSIS — K59.00 CONSTIPATION, UNSPECIFIED CONSTIPATION TYPE: ICD-10-CM

## 2022-03-18 DIAGNOSIS — I10 PRIMARY HYPERTENSION: Primary | ICD-10-CM

## 2022-03-18 PROCEDURE — 99213 OFFICE O/P EST LOW 20 MIN: CPT | Performed by: INTERNAL MEDICINE

## 2022-03-18 RX ORDER — ENALAPRIL MALEATE 5 MG/1
5 TABLET ORAL DAILY
Qty: 90 TABLET | Refills: 1 | Status: SHIPPED | OUTPATIENT
Start: 2022-03-18

## 2022-03-18 NOTE — PATIENT INSTRUCTIONS
Chronic Hypertension   AMBULATORY CARE:   Hypertension  is high blood pressure  Your blood pressure is the force of your blood moving against the walls of your arteries  Hypertension causes your blood pressure to get so high that your heart has to work much harder than normal  This can damage your heart  Even if you have hypertension for years, lifestyle changes, medicines, or both can help bring your blood pressure to normal   Call your local emergency number (911 in the [address] [facility]) or have someone call if:   · You have chest pain  · You have any of the following signs of a heart attack:      ? Squeezing, pressure, or pain in your chest    ? You may  also have any of the following:     § Discomfort or pain in your back, neck, jaw, stomach, or arm    § Shortness of breath    § Nausea or vomiting    § Lightheadedness or a sudden cold sweat    · You become confused or have difficulty speaking  · You suddenly feel lightheaded or have trouble breathing  Seek care immediately if:   · You have a severe headache or vision loss  · You have weakness in an arm or leg  Call your doctor or cardiologist if:   · You feel faint, dizzy, confused, or drowsy  · You have been taking your blood pressure medicine but your pressure is higher than your provider says it should be  · You have questions or concerns about your condition or care  Treatment for chronic hypertension  may include medicine to lower your blood pressure and cholesterol levels  A low cholesterol level helps prevent heart disease and makes it easier to control your blood pressure  Heart disease can make your blood pressure harder to control  You may also need to make lifestyle changes  What you need to know about the stages of hypertension:       · Normal blood pressure is 119/79 or lower   Your healthcare provider may only check your blood pressure each year if it stays at a normal level  · Elevated blood pressure is 120/79 to 129/79    This is sometimes called prehypertension  Your healthcare provider may suggest lifestyle changes to help lower your blood pressure to a normal level  He or she may then check it again in 3 to 6 months  · Stage 1 hypertension is 130/80  to 139/89   Your provider may recommend lifestyle changes, medication, and checks every 3 to 6 months until your blood pressure is controlled  · Stage 2 hypertension is 140/90 or higher   Your provider will recommend lifestyle changes and have you take 2 kinds of hypertension medicines  You will also need to have your blood pressure checked monthly until it is controlled  Manage chronic hypertension:   · Check your blood pressure at home  Avoid smoking, caffeine, and exercise at least 30 minutes before checking your blood pressure  Sit and rest for 5 minutes before you take your blood pressure  Extend your arm and support it on a flat surface  Your arm should be at the same level as your heart  Follow the directions that came with your blood pressure monitor  Check your blood pressure 2 times, 1 minute apart, before you take your medicine in the morning  Also check your blood pressure before your evening meal  Keep a record of your readings and bring it to your follow-up visits  Ask your healthcare provider what your blood pressure should be  · Manage any other health conditions you have  Health conditions such as diabetes can increase your risk for hypertension  Follow your healthcare provider's instructions and take all your medicines as directed  Talk to your healthcare provider about any new health conditions you have recently developed  · Ask about all medicines  Certain medicines can increase your blood pressure  Examples include oral birth control pills, decongestants, herbal supplements, and NSAIDs, such as ibuprofen  Your healthcare provider can tell you which medicines are safe for you to take   This includes prescription and over-the-counter medicines  Lifestyle changes you can make to lower your blood pressure: Your provider may want you to make more lifestyle changes if you are having trouble controlling your blood pressure  This may feel difficult over time, especially if you think you are making good changes but your pressure is still high  It might help to focus on one new change at a time  For example, try to add 1 more day of exercise, or exercise for an extra 10 minutes on 2 days  Small changes can make a big difference  Your healthcare provider can also refer you to specialists such as a dietitian who can help you make small changes  Your family members may be included in helping you learn to create lifestyle changes, such as the following:     · Limit sodium (salt) as directed  Too much sodium can affect your fluid balance  Check labels to find low-sodium or no-salt-added foods  Some low-sodium foods use potassium salts for flavor  Too much potassium can also cause health problems  Your healthcare provider will tell you how much sodium and potassium are safe for you to have in a day  He or she may recommend that you limit sodium to 2,300 mg a day  · Follow the meal plan recommended by your healthcare provider  A dietitian or your provider can give you more information on low-sodium plans or the DASH (Dietary Approaches to Stop Hypertension) eating plan  The DASH plan is low in sodium, processed sugar, unhealthy fats, and total fat  It is high in potassium, calcium, and fiber  These can be found in vegetables, fruit, and whole-grain foods  · Be physically active throughout the day  Physical activity, such as exercise, can help control your blood pressure and your weight  Be physically active for at least 30 minutes per day, on most days of the week  Include aerobic activity, such as walking or riding a bicycle  Also include strength training at least 2 times each week   Your healthcare providers can help you create a physical activity plan  · Decrease stress  This may help lower your blood pressure  Learn ways to relax, such as deep breathing or listening to music  · Limit alcohol as directed  Alcohol can increase your blood pressure  A drink of alcohol is 12 ounces of beer, 5 ounces of wine, or 1½ ounces of liquor  · Do not smoke  Nicotine and other chemicals in cigarettes and cigars can increase your blood pressure and also cause lung damage  Ask your healthcare provider for information if you currently smoke and need help to quit  E-cigarettes or smokeless tobacco still contain nicotine  Talk to your healthcare provider before you use these products  Follow up with your doctor or cardiologist as directed: You will need to return to have your blood pressure checked and to have other lab tests done  Write down your questions so you remember to ask them during your visits  © Copyright [employer] 2022 Information is for End User's use only and may not be sold, redistributed or otherwise used for commercial purposes  All illustrations and images included in CareNotes® are the copyrighted property of [employer]  or [facility]   The above information is an  only  It is not intended as medical advice for individual conditions or treatments  Talk to your doctor, nurse or pharmacist before following any medical regimen to see if it is safe and effective for you

## 2022-03-18 NOTE — PROGRESS NOTES
Assessment/Plan:  Problem List Items Addressed This Visit        Cardiovascular and Mediastinum    Hypertension - Primary    Relevant Medications    enalapril (VASOTEC) 5 mg tablet       Other    Depression, recurrent (HCC)    Constipation    Coarse tremors    Relevant Orders    Ambulatory Referral to Neurology      Other Visit Diagnoses     Essential hypertension        Relevant Medications    enalapril (VASOTEC) 5 mg tablet           Diagnoses and all orders for this visit:    Primary hypertension    Coarse tremors  -     Ambulatory Referral to Neurology; Future    Depression, recurrent (HCC)    Essential hypertension  -     enalapril (VASOTEC) 5 mg tablet; Take 1 tablet (5 mg total) by mouth daily    Constipation, unspecified constipation type        No problem-specific Assessment & Plan notes found for this encounter  A/P: Stable and tolerating the meds  MDD is controlled  BP is still low with pt asymptomatic and will decrease the ACEI in half  May need to stop it completely  Tremors no better despite primidone and sinemet  ?destinee or zonegran trial, but will refer to neuro as it is affecting her QOL  Discussed the constipation and recommend starting metamucil  Continue current treatment and RTC three months for routine  Subjective:      Patient ID: Jamir Sesay is a 78 y o  female  WF RTC for f/u HTN, MDD, and tremors  MDD remains stable despite stopping her adjunct therapy  OP BP remain low with pt asyptomatic      Started on sinemet and reports tremors are no better  Tolerating the meds  No new issues, but ROS positive for constipation at times  The following portions of the patient's history were reviewed and updated as appropriate:   She has a past medical history of Arthritis, Cataract, Depression, Fluid retention, Headache, acute, Heart murmur, Hyperlipidemia, Hypertension, PONV (postoperative nausea and vomiting), Rotator cuff tear, left, Wears glasses, and Wears partial dentures  ,  does not have any pertinent problems on file  ,   has a past surgical history that includes Ankle surgery (Right); Tonsillectomy; Tubal ligation; Hysterectomy; Ovarian cyst removal; Carpal tunnel release (Bilateral); Hand surgery (Right); Abdominal adhesion surgery; Knee arthroscopy (Left); Appendectomy; Colonoscopy; Hemorroidectomy; Repair rectocele; pr shldr arthroscop,surg,w/rotat cuff repr (Left, 2/19/2018); Carpal tunnel release (Left); Carpal tunnel release (Right); Ileostomy; Anal sphincteroplasty; Knee arthrocentesis; Tonsillectomy and adenoidectomy; Joint replacement; and Replacement total knee (Left, 09/28/2021)  ,  family history includes Alzheimer's disease in her sister and sister; Bone cancer in her family and mother; COPD in her mother; Colon cancer in her family and mother; Depression in her sister; Diabetes in her sister; Emphysema in her mother; ROBERT disease in her brother; Heart failure in her sister; Hypertension in her brother, family, mother, sister, and sister; Lead poisoning in her father; No Known Problems in her daughter, maternal aunt, maternal aunt, maternal aunt, maternal aunt, maternal aunt, maternal grandmother, paternal aunt, and paternal grandmother; Skin cancer in her mother; Throat cancer in her brother; Thyroid disease in her sister  ,   reports that she has never smoked  She has never used smokeless tobacco  She reports current alcohol use  She reports that she does not use drugs  ,  is allergic to iodine - food allergy, keflex [cephalexin], morphine and related, penicillins, benadryl [diphenhydramine], ciprofloxacin, clindamycin, and erythromycin     Current Outpatient Medications   Medication Sig Dispense Refill    acetaminophen (TYLENOL) 500 mg tablet Take 500-1,000 mg by mouth every 6 (six) hours as needed for mild pain        Calcium Carbonate (CALCIUM 600 PO) Take by mouth daily        carbidopa-levodopa (Sinemet)  mg per tablet Take 1 tablet by mouth 3 (three) times a day 270 tablet 1    Cholecalciferol (VITAMIN D3 PO) Take 50 mcg by mouth daily        citalopram (CeleXA) 40 mg tablet take 1 tablet by mouth every evening 90 tablet 1    enalapril (VASOTEC) 5 mg tablet Take 1 tablet (5 mg total) by mouth daily 90 tablet 1    estradiol (ESTRACE) 0 1 mg/g vaginal cream Insert 2 g into the vagina 2 (two) times a week (Patient taking differently: Insert 2 g into the vagina if needed  ) 42 5 g 3    ezetimibe (ZETIA) 10 mg tablet take 1 tablet by mouth once daily 90 tablet 1    furosemide (LASIX) 40 mg tablet Take 1 tablet (40 mg total) by mouth daily as needed (worsening shortness of breath, weight gain > 3lbs/day) 30 tablet 5    loratadine (CLARITIN) 10 mg tablet Take 1 tablet (10 mg total) by mouth daily 30 tablet 0    montelukast (SINGULAIR) 10 mg tablet Take 1 tablet (10 mg total) by mouth daily at bedtime 90 tablet 3    nystatin (MYCOSTATIN) powder Apply topically 2 (two) times a day (Patient taking differently: Apply 1 application topically 2 (two) times a day as needed  ) 60 g 1    primidone (MYSOLINE) 50 mg tablet Take 2 tablets (100 mg total) by mouth every 8 (eight) hours 540 tablet 1    rosuvastatin (CRESTOR) 5 mg tablet Take 1 tablet (5 mg total) by mouth daily 30 tablet 5    traZODone (DESYREL) 100 mg tablet Take 1 tablet (100 mg total) by mouth daily at bedtime 90 tablet 1    ARIPiprazole (ABILIFY) 5 mg tablet take 1 tablet by mouth every morning (Patient not taking: Reported on 3/18/2022) 90 tablet 1    methocarbamol (ROBAXIN) 500 mg tablet Take 500 mg by mouth every 6 (six) hours as needed   (Patient not taking: Reported on 3/18/2022 )      omega-3-acid ethyl esters (LOVAZA) 1 g capsule Take 2 capsules (2 g total) by mouth 2 (two) times a day (Patient not taking: Reported on 3/18/2022 ) 180 capsule 0    Tea Tree Oil (NO FUNGUS NAIL PROTECTANT EX) Apply topically   (Patient not taking: Reported on 2/17/2022 )      triamcinolone (KENALOG) 0 1 % cream Apply topically 2 (two) times a day as needed for irritation or rash (Patient not taking: Reported on 2/17/2022 ) 60 g 1     No current facility-administered medications for this visit  Review of Systems   Constitutional: Positive for activity change  Negative for chills, diaphoresis, fatigue and fever  Respiratory: Negative for cough, chest tightness, shortness of breath and wheezing  Cardiovascular: Negative for chest pain, palpitations and leg swelling  Gastrointestinal: Positive for constipation  Negative for abdominal pain, diarrhea, nausea and vomiting  Genitourinary: Negative for difficulty urinating, dysuria and frequency  Musculoskeletal: Negative for arthralgias, gait problem and myalgias  Neurological: Positive for tremors  Negative for dizziness, seizures, syncope, weakness, light-headedness and headaches  Psychiatric/Behavioral: Negative for confusion, dysphoric mood and sleep disturbance  The patient is not nervous/anxious  PHQ-2/9 Depression Screening          Objective:  Vitals:    03/18/22 0825   BP: 102/66   Pulse: 71   Temp: 98 1 °F (36 7 °C)   TempSrc: Tympanic   SpO2: 98%   Weight: 94 kg (207 lb 3 2 oz)   Height: 5' (1 524 m)     Body mass index is 40 47 kg/m²  Physical Exam  Vitals and nursing note reviewed  Constitutional:       General: She is not in acute distress  Appearance: Normal appearance  She is obese  She is not ill-appearing  HENT:      Head: Normocephalic and atraumatic  Mouth/Throat:      Mouth: Mucous membranes are moist    Eyes:      Extraocular Movements: Extraocular movements intact  Conjunctiva/sclera: Conjunctivae normal       Pupils: Pupils are equal, round, and reactive to light  Neck:      Vascular: No carotid bruit  Cardiovascular:      Rate and Rhythm: Normal rate and regular rhythm  Heart sounds: Normal heart sounds  Pulmonary:      Effort: Pulmonary effort is normal  No respiratory distress        Breath sounds: Normal breath sounds  No wheezing or rales  Abdominal:      General: Bowel sounds are normal  There is no distension  Palpations: Abdomen is soft  Tenderness: There is no abdominal tenderness  Musculoskeletal:      Cervical back: Neck supple  Right lower leg: No edema  Left lower leg: No edema  Neurological:      General: No focal deficit present  Mental Status: She is alert and oriented to person, place, and time  Mental status is at baseline  Comments: Continued coarse tremors of both hands noted  NO head or leg tremors at this time  Lajean Cassette Psychiatric:         Mood and Affect: Mood normal          Behavior: Behavior normal          Thought Content:  Thought content normal

## 2022-03-25 NOTE — PROGRESS NOTES
Daily Note     Today's date: 2018  Patient name: Xander Nickerson  : 1955  MRN: [id number]  Referring provider: Dianna Bush MD  Dx:   Encounter Diagnosis     ICD-10-CM    1  Complete rotator cuff tear of left shoulder M75 122    2  Acute pain of left shoulder M25 512    3  Rotator cuff injury, left, subsequent encounter [id number]    4  Complete tear of left rotator cuff M75 122                   Subjective: Feeling much better today, she took 2 days off of ex      Objective: See treatment diary below  Precautions: protocol    Daily Treatment Diary     Manual   6-18          PROM       5 min  x          STM 10 min  x                                                     Exercise Diary              UBE 10 min x x          Pulleys 30 x x          Body blade 15" 3x  x          Elbow flex 4# 30x  x          TB rows, ext Blue 30x Row black  Ext blue 30ea x          TB IR/ER Blue 30x x x          TB robbers Red 30x x yellow  20x          AROM flex, Abd 1# 2x10 x x          SL ER 2# 30x  x          Supine punches 2# 30x  x          pec stretch 20"3x            Sleeper stretch 20" 3x  x          Prone flex/abd 2# 30x  x          Crate carry NV 10# 40 ft x2 x          Crate lift floor to waist   10#     10x                                                                               Modalities              MH                                       X=same as last vist    Assessment:   Decreased to yellow TB for robbers, she was compensating with red  Fatigue with ex & scapular soreness  Decreased with STM & MH      Plan: Continue per plan of care  Full

## 2022-04-15 DIAGNOSIS — R60.0 PEDAL EDEMA: ICD-10-CM

## 2022-04-15 DIAGNOSIS — R06.01 ORTHOPNEA: ICD-10-CM

## 2022-04-18 DIAGNOSIS — E78.2 MIXED HYPERLIPIDEMIA: ICD-10-CM

## 2022-04-18 DIAGNOSIS — R60.0 PEDAL EDEMA: ICD-10-CM

## 2022-04-18 DIAGNOSIS — R06.01 ORTHOPNEA: ICD-10-CM

## 2022-04-18 RX ORDER — FUROSEMIDE 40 MG/1
TABLET ORAL
Qty: 30 TABLET | Refills: 5 | Status: SHIPPED | OUTPATIENT
Start: 2022-04-18 | End: 2022-04-18 | Stop reason: SDUPTHER

## 2022-04-18 RX ORDER — FUROSEMIDE 40 MG/1
TABLET ORAL
Qty: 30 TABLET | Refills: 5 | Status: SHIPPED | OUTPATIENT
Start: 2022-04-18

## 2022-04-18 RX ORDER — ROSUVASTATIN CALCIUM 5 MG/1
5 TABLET, COATED ORAL DAILY
Qty: 30 TABLET | Refills: 5 | Status: SHIPPED | OUTPATIENT
Start: 2022-04-18 | End: 2022-07-14

## 2022-06-24 ENCOUNTER — APPOINTMENT (OUTPATIENT)
Dept: RADIOLOGY | Facility: CLINIC | Age: 67
End: 2022-06-24
Payer: MEDICARE

## 2022-06-24 ENCOUNTER — OFFICE VISIT (OUTPATIENT)
Dept: URGENT CARE | Facility: CLINIC | Age: 67
End: 2022-06-24
Payer: MEDICARE

## 2022-06-24 VITALS — HEART RATE: 75 BPM | OXYGEN SATURATION: 95 % | RESPIRATION RATE: 18 BRPM | TEMPERATURE: 97.2 F

## 2022-06-24 DIAGNOSIS — M79.89 FINGER SWELLING: ICD-10-CM

## 2022-06-24 DIAGNOSIS — M79.89 FINGER SWELLING: Primary | ICD-10-CM

## 2022-06-24 PROCEDURE — G0463 HOSPITAL OUTPT CLINIC VISIT: HCPCS | Performed by: NURSE PRACTITIONER

## 2022-06-24 PROCEDURE — 99213 OFFICE O/P EST LOW 20 MIN: CPT | Performed by: NURSE PRACTITIONER

## 2022-06-24 PROCEDURE — 73140 X-RAY EXAM OF FINGER(S): CPT

## 2022-06-24 NOTE — PROGRESS NOTES
[address] [facility]        NAME: Maru Montano is a 78 y o  female  : 1955    MRN: [id number]  DATE: 2022  TIME: 9:54 AM    Assessment and Plan   Finger swelling [M79 89]  1  Finger swelling  XR finger right fifth digit-pinkie    Diclofenac Sodium (VOLTAREN) 1 %     Xray shows no acute osseous abnormality per my read  Patient Instructions     Patient Instructions   Use gel as directed  Tylenol as needed for pain  If you develop any increased pain, swelling, numbness, tingling, or any new or concerning symptoms please return or proceed to ER  Follow up with pcp in 3-5 days  Follow up with PCP in 3-5 days  Proceed to  ER if symptoms worsen  Chief Complaint     Chief Complaint   Patient presents with    Hand Pain     Right pinky finger, pain/lump that started 5 days ago         History of Present Illness       Hand Pain   The incident occurred 5 to 7 days ago  The incident occurred at home  There was no injury mechanism  The pain is present in the right fingers  The quality of the pain is described as aching  The pain does not radiate  The pain is moderate  The pain has been constant since the incident  Pertinent negatives include no muscle weakness, numbness or tingling  The symptoms are aggravated by palpation and movement  She has tried nothing for the symptoms  Review of Systems   Review of Systems   Constitutional: Negative for chills, diaphoresis, fatigue and fever  Respiratory: Negative  Cardiovascular: Negative  Musculoskeletal: Positive for arthralgias and joint swelling  Negative for back pain, gait problem, myalgias, neck pain and neck stiffness  Skin: Negative for rash  Neurological: Negative for tingling, weakness and numbness           Current Medications       Current Outpatient Medications:     acetaminophen (TYLENOL) 500 mg tablet, Take 500-1,000 mg by mouth every 6 (six) hours as needed for mild pain  , Disp: , Rfl:     Calcium Carbonate (CALCIUM 600 PO), Take by mouth daily  , Disp: , Rfl:     carbidopa-levodopa (Sinemet)  mg per tablet, Take 1 tablet by mouth 3 (three) times a day, Disp: 270 tablet, Rfl: 1    Cholecalciferol (VITAMIN D3 PO), Take 50 mcg by mouth daily  , Disp: , Rfl:     citalopram (CeleXA) 40 mg tablet, take 1 tablet by mouth every evening, Disp: 90 tablet, Rfl: 1    Diclofenac Sodium (VOLTAREN) 1 %, Apply 2 g topically 4 (four) times a day, Disp: 100 g, Rfl: 0    enalapril (VASOTEC) 5 mg tablet, Take 1 tablet (5 mg total) by mouth daily, Disp: 90 tablet, Rfl: 1    ezetimibe (ZETIA) 10 mg tablet, take 1 tablet by mouth once daily, Disp: 90 tablet, Rfl: 1    furosemide (LASIX) 40 mg tablet, Take 1 tablet by oral route daily as needed for worsening SOB or weight gain greater than 3 lbs in a day , Disp: 30 tablet, Rfl: 5    loratadine (CLARITIN) 10 mg tablet, Take 1 tablet (10 mg total) by mouth daily, Disp: 30 tablet, Rfl: 0    methocarbamol (ROBAXIN) 500 mg tablet, Take 500 mg by mouth every 6 (six) hours as needed, Disp: , Rfl:     montelukast (SINGULAIR) 10 mg tablet, Take 1 tablet (10 mg total) by mouth daily at bedtime, Disp: 90 tablet, Rfl: 3    primidone (MYSOLINE) 50 mg tablet, Take 2 tablets (100 mg total) by mouth every 8 (eight) hours, Disp: 540 tablet, Rfl: 1    rosuvastatin (CRESTOR) 5 mg tablet, Take 1 tablet (5 mg total) by mouth daily, Disp: 30 tablet, Rfl: 5    traZODone (DESYREL) 100 mg tablet, Take 1 tablet (100 mg total) by mouth daily at bedtime, Disp: 90 tablet, Rfl: 1    ARIPiprazole (ABILIFY) 5 mg tablet, take 1 tablet by mouth every morning (Patient not taking: No sig reported), Disp: 90 tablet, Rfl: 1    estradiol (ESTRACE) 0 1 mg/g vaginal cream, Insert 2 g into the vagina 2 (two) times a week (Patient taking differently: Insert 2 g into the vagina if needed  ), Disp: 42 5 g, Rfl: 3    nystatin (MYCOSTATIN) powder, Apply topically 2 (two) times a day (Patient not taking: Reported on 6/24/2022), Disp: 60 g, Rfl: 1    omega-3-acid ethyl esters (LOVAZA) 1 g capsule, Take 2 capsules (2 g total) by mouth 2 (two) times a day (Patient not taking: No sig reported), Disp: 180 capsule, Rfl: 0    Tea Tree Oil (NO FUNGUS NAIL PROTECTANT EX), Apply topically   (Patient not taking: No sig reported), Disp: , Rfl:     triamcinolone (KENALOG) 0 1 % cream, Apply topically 2 (two) times a day as needed for irritation or rash (Patient not taking: No sig reported), Disp: 60 g, Rfl: 1    Current Allergies     Allergies as of 06/24/2022 - Reviewed 06/24/2022   Allergen Reaction Noted    Iodine - food allergy Hives 02/13/2018    Keflex [cephalexin] Other (See Comments) 02/13/2018    Morphine and related Hives 02/13/2018    Penicillins Hives 02/13/2018    Benadryl [diphenhydramine] Itching and Swelling 04/15/2020    Ciprofloxacin Hives 02/28/2021    Clindamycin Other (See Comments) 02/13/2018    Erythromycin Hives 02/13/2018            The following portions of the patient's history were reviewed and updated as appropriate: allergies, current medications, past family history, past medical history, past social history, past surgical history and problem list      Past Medical History:   Diagnosis Date    Arthritis     Cataract     ashley    Depression     Fluid retention     Headache, acute     Heart murmur     Hyperlipidemia     Hypertension     PONV (postoperative nausea and vomiting)     Rotator cuff tear, left     Wears glasses     Wears partial dentures     upper       Past Surgical History:   Procedure Laterality Date    ABDOMINAL ADHESION SURGERY      ANAL SPHINCTEROPLASTY      ANKLE SURGERY Right     tendon tear    APPENDECTOMY      CARPAL TUNNEL RELEASE Bilateral     CARPAL TUNNEL RELEASE Left     CARPAL TUNNEL RELEASE Right     COLONOSCOPY      HAND SURGERY Right     ganglion cyst    HEMORROIDECTOMY      HYSTERECTOMY      ILEOSTOMY      JOINT REPLACEMENT      KNEE ARTHROCENTESIS ganglion Cyst    KNEE ARTHROSCOPY Left     OVARIAN CYST REMOVAL      NH SHLDR ARTHROSCOP,SURG,W/ROTAT CUFF REPR Left 2/19/2018    Procedure: ARTHROSCOPIC REPAIR ROTATOR CUFF,  SAD, BICEP TENODESIS;  Surgeon: Carol Dove MD;  Location: [facility];  Service: Orthopedics    REPAIR RECTOCELE      REPLACEMENT TOTAL KNEE Left 09/28/2021    TONSILLECTOMY      TONSILLECTOMY AND ADENOIDECTOMY      TUBAL LIGATION         Family History   Problem Relation Age of Onset    Hypertension Mother     Colon cancer Mother     Bone cancer Mother     COPD Mother     Emphysema Mother     Skin cancer Mother     Lead poisoning Father         lead poisoning in lungs     Thyroid disease Sister     Depression Sister     Hypertension Sister     Alzheimer's disease Sister     ROBERT disease Brother     Throat cancer Brother     Hypertension Brother     Colon cancer Family     Bone cancer Family     Hypertension Family     Diabetes Sister     Hypertension Sister     Heart failure Sister     Alzheimer's disease Sister     No Known Problems Daughter     No Known Problems Maternal Grandmother     No Known Problems Paternal Grandmother     No Known Problems Maternal Aunt     No Known Problems Maternal Aunt     No Known Problems Maternal Aunt     No Known Problems Maternal Aunt     No Known Problems Maternal Aunt     No Known Problems Paternal Aunt          Medications have been verified  Objective   Pulse 75   Temp (!) 97 2 °F (36 2 °C)   Resp 18   SpO2 95%   No LMP recorded  Patient has had a hysterectomy  Physical Exam     Physical Exam  Constitutional:       General: She is not in acute distress  Appearance: Normal appearance  She is not diaphoretic  HENT:      Head: Normocephalic and atraumatic  Cardiovascular:      Rate and Rhythm: Normal rate and regular rhythm  Pulses: Normal pulses  Radial pulses are 2+ on the right side and 2+ on the left side        Heart sounds: Normal heart sounds, S1 normal and S2 normal    Pulmonary:      Effort: Pulmonary effort is normal       Breath sounds: Normal breath sounds and air entry  Musculoskeletal:      Left hand: Swelling (to distal aspect of right 5th digit at DIP joint  ) and bony tenderness present  No deformity  Decreased range of motion  Normal strength  Normal sensation  There is no disruption of two-point discrimination  Normal pulse  Skin:     General: Skin is warm and dry  Capillary Refill: Capillary refill takes less than 2 seconds  Neurological:      Mental Status: She is alert

## 2022-06-24 NOTE — PATIENT INSTRUCTIONS
Use gel as directed  Tylenol as needed for pain  If you develop any increased pain, swelling, numbness, tingling, or any new or concerning symptoms please return or proceed to ER  Follow up with pcp in 3-5 days

## 2022-07-01 ENCOUNTER — APPOINTMENT (EMERGENCY)
Dept: RADIOLOGY | Facility: HOSPITAL | Age: 67
End: 2022-07-01
Payer: MEDICARE

## 2022-07-01 ENCOUNTER — HOSPITAL ENCOUNTER (EMERGENCY)
Facility: HOSPITAL | Age: 67
Discharge: HOME | End: 2022-07-01
Attending: INTERNAL MEDICINE
Payer: MEDICARE

## 2022-07-01 VITALS
DIASTOLIC BLOOD PRESSURE: 60 MMHG | HEART RATE: 99 BPM | RESPIRATION RATE: 20 BRPM | SYSTOLIC BLOOD PRESSURE: 132 MMHG | TEMPERATURE: 98.8 F | OXYGEN SATURATION: 95 %

## 2022-07-01 DIAGNOSIS — J45.901 ASTHMA EXACERBATION: ICD-10-CM

## 2022-07-01 DIAGNOSIS — J18.9 PNEUMONIA: Primary | ICD-10-CM

## 2022-07-01 LAB
ALBUMIN SERPL BCP-MCNC: 4.2 G/DL (ref 3.5–5)
ALP SERPL-CCNC: 88 U/L (ref 34–104)
ALT SERPL W P-5'-P-CCNC: 11 U/L (ref 7–52)
ANION GAP SERPL CALCULATED.3IONS-SCNC: 9 MMOL/L (ref 4–13)
AST SERPL W P-5'-P-CCNC: 17 U/L (ref 13–39)
BASOPHILS # BLD AUTO: 0.04 THOUSANDS/ΜL (ref 0–0.1)
BASOPHILS NFR BLD AUTO: 1 % (ref 0–1)
BILIRUB SERPL-MCNC: 0.32 MG/DL (ref 0.2–1)
BUN SERPL-MCNC: 13 MG/DL (ref 5–25)
CALCIUM SERPL-MCNC: 9.1 MG/DL (ref 8.4–10.2)
CHLORIDE SERPL-SCNC: 103 MMOL/L (ref 96–108)
CO2 SERPL-SCNC: 25 MMOL/L (ref 21–32)
CREAT SERPL-MCNC: 1.01 MG/DL (ref 0.6–1.3)
EOSINOPHIL # BLD AUTO: 0.22 THOUSAND/ΜL (ref 0–0.61)
EOSINOPHIL NFR BLD AUTO: 4 % (ref 0–6)
ERYTHROCYTE [DISTWIDTH] IN BLOOD BY AUTOMATED COUNT: 13.2 % (ref 11.6–15.1)
FLUAV RNA RESP QL NAA+PROBE: NEGATIVE
FLUBV RNA RESP QL NAA+PROBE: NEGATIVE
GFR SERPL CREATININE-BSD FRML MDRD: 57 ML/MIN/1.73SQ M
GLUCOSE SERPL-MCNC: 121 MG/DL (ref 65–140)
HCT VFR BLD AUTO: 40.2 % (ref 34.8–46.1)
HGB BLD-MCNC: 13.3 G/DL (ref 11.5–15.4)
IMM GRANULOCYTES # BLD AUTO: 0.02 THOUSAND/UL (ref 0–0.2)
IMM GRANULOCYTES NFR BLD AUTO: 0 % (ref 0–2)
LYMPHOCYTES # BLD AUTO: 1.36 THOUSANDS/ΜL (ref 0.6–4.47)
LYMPHOCYTES NFR BLD AUTO: 26 % (ref 14–44)
MCH RBC QN AUTO: 29.8 PG (ref 26.8–34.3)
MCHC RBC AUTO-ENTMCNC: 33.1 G/DL (ref 31.4–37.4)
MCV RBC AUTO: 90 FL (ref 82–98)
MONOCYTES # BLD AUTO: 0.61 THOUSAND/ΜL (ref 0.17–1.22)
MONOCYTES NFR BLD AUTO: 12 % (ref 4–12)
NEUTROPHILS # BLD AUTO: 2.93 THOUSANDS/ΜL (ref 1.85–7.62)
NEUTS SEG NFR BLD AUTO: 57 % (ref 43–75)
NRBC BLD AUTO-RTO: 0 /100 WBCS
PLATELET # BLD AUTO: 226 THOUSANDS/UL (ref 149–390)
PMV BLD AUTO: 9.6 FL (ref 8.9–12.7)
POTASSIUM SERPL-SCNC: 3.8 MMOL/L (ref 3.5–5.3)
PROT SERPL-MCNC: 6.7 G/DL (ref 6.4–8.4)
RBC # BLD AUTO: 4.46 MILLION/UL (ref 3.81–5.12)
RSV RNA RESP QL NAA+PROBE: NEGATIVE
SARS-COV-2 RNA RESP QL NAA+PROBE: NEGATIVE
SODIUM SERPL-SCNC: 137 MMOL/L (ref 135–147)
WBC # BLD AUTO: 5.18 THOUSAND/UL (ref 4.31–10.16)

## 2022-07-01 PROCEDURE — 94640 AIRWAY INHALATION TREATMENT: CPT

## 2022-07-01 PROCEDURE — 99285 EMERGENCY DEPT VISIT HI MDM: CPT | Performed by: INTERNAL MEDICINE

## 2022-07-01 PROCEDURE — 85025 COMPLETE CBC W/AUTO DIFF WBC: CPT | Performed by: INTERNAL MEDICINE

## 2022-07-01 PROCEDURE — 71045 X-RAY EXAM CHEST 1 VIEW: CPT

## 2022-07-01 PROCEDURE — 80053 COMPREHEN METABOLIC PANEL: CPT | Performed by: INTERNAL MEDICINE

## 2022-07-01 PROCEDURE — 0241U HB NFCT DS VIR RESP RNA 4 TRGT: CPT | Performed by: INTERNAL MEDICINE

## 2022-07-01 PROCEDURE — 96374 THER/PROPH/DIAG INJ IV PUSH: CPT

## 2022-07-01 PROCEDURE — 99285 EMERGENCY DEPT VISIT HI MDM: CPT

## 2022-07-01 PROCEDURE — 93005 ELECTROCARDIOGRAM TRACING: CPT

## 2022-07-01 PROCEDURE — 36415 COLL VENOUS BLD VENIPUNCTURE: CPT | Performed by: INTERNAL MEDICINE

## 2022-07-01 RX ORDER — PREDNISONE 10 MG/1
TABLET ORAL
Qty: 20 TABLET | Refills: 0 | Status: SHIPPED | OUTPATIENT
Start: 2022-07-01 | End: 2022-08-17

## 2022-07-01 RX ORDER — METHYLPREDNISOLONE SODIUM SUCCINATE 125 MG/2ML
80 INJECTION, POWDER, LYOPHILIZED, FOR SOLUTION INTRAMUSCULAR; INTRAVENOUS ONCE
Status: COMPLETED | OUTPATIENT
Start: 2022-07-01 | End: 2022-07-01

## 2022-07-01 RX ORDER — ALBUTEROL SULFATE 90 UG/1
2 AEROSOL, METERED RESPIRATORY (INHALATION) EVERY 4 HOURS PRN
Qty: 18 G | Refills: 0 | Status: SHIPPED | OUTPATIENT
Start: 2022-07-01

## 2022-07-01 RX ORDER — ALBUTEROL SULFATE 2.5 MG/3ML
2.5 SOLUTION RESPIRATORY (INHALATION) ONCE
Status: COMPLETED | OUTPATIENT
Start: 2022-07-01 | End: 2022-07-01

## 2022-07-01 RX ORDER — AZITHROMYCIN 250 MG/1
TABLET, FILM COATED ORAL
Qty: 4 TABLET | Refills: 0 | Status: SHIPPED | OUTPATIENT
Start: 2022-07-01 | End: 2022-07-05

## 2022-07-01 RX ORDER — BENZONATATE 100 MG/1
100 CAPSULE ORAL ONCE
Status: COMPLETED | OUTPATIENT
Start: 2022-07-01 | End: 2022-07-01

## 2022-07-01 RX ORDER — AZITHROMYCIN 250 MG/1
500 TABLET, FILM COATED ORAL ONCE
Status: COMPLETED | OUTPATIENT
Start: 2022-07-01 | End: 2022-07-01

## 2022-07-01 RX ADMIN — BENZONATATE 100 MG: 100 CAPSULE ORAL at 21:24

## 2022-07-01 RX ADMIN — METHYLPREDNISOLONE SODIUM SUCCINATE 80 MG: 125 INJECTION, POWDER, FOR SOLUTION INTRAMUSCULAR; INTRAVENOUS at 21:25

## 2022-07-01 RX ADMIN — AZITHROMYCIN MONOHYDRATE 500 MG: 250 TABLET ORAL at 21:24

## 2022-07-01 RX ADMIN — ALBUTEROL SULFATE 2.5 MG: 2.5 SOLUTION RESPIRATORY (INHALATION) at 21:29

## 2022-07-01 NOTE — ED PROVIDER NOTES
History  Chief Complaint   Patient presents with    Shortness of Breath     Was watching dogs for two weeks and thinks this may be from that  Hx of asthma and unknown allergy to dogs  25-year-old female with underlying asthma issues presents with persistent cough and congestion  Started yesterday  She apparently was house sitting dogs, left the house yesterday  She returned home, started she has mild sinus drainage  Denies sore throat  Denies diarrhea  Denies any other recent travel  Has a chronic depression history as well as Parkinson's  She experienced 1 chill but has not had any fevers  Ambulating back to the room today she was at 93% on room air  Prior to Admission Medications   Prescriptions Last Dose Informant Patient Reported? Taking?    ARIPiprazole (ABILIFY) 5 mg tablet   No No   Sig: take 1 tablet by mouth every morning   Patient not taking: No sig reported   Calcium Carbonate (CALCIUM 600 PO)   Yes No   Sig: Take by mouth daily     Cholecalciferol (VITAMIN D3 PO)   Yes No   Sig: Take 50 mcg by mouth daily     Diclofenac Sodium (VOLTAREN) 1 %   No No   Sig: Apply 2 g topically 4 (four) times a day   Tea Tree Oil (NO FUNGUS NAIL PROTECTANT EX)   Yes No   Sig: Apply topically     Patient not taking: No sig reported   acetaminophen (TYLENOL) 500 mg tablet  Self Yes No   Sig: Take 500-1,000 mg by mouth every 6 (six) hours as needed for mild pain     carbidopa-levodopa (Sinemet)  mg per tablet   No No   Sig: Take 1 tablet by mouth 3 (three) times a day   citalopram (CeleXA) 40 mg tablet   No No   Sig: take 1 tablet by mouth every evening   enalapril (VASOTEC) 5 mg tablet   No No   Sig: Take 1 tablet (5 mg total) by mouth daily   estradiol (ESTRACE) 0 1 mg/g vaginal cream   No No   Sig: Insert 2 g into the vagina 2 (two) times a week   Patient taking differently: Insert 2 g into the vagina if needed     ezetimibe (ZETIA) 10 mg tablet   No No   Sig: take 1 tablet by mouth once daily furosemide (LASIX) 40 mg tablet   No No   Sig: Take 1 tablet by oral route daily as needed for worsening SOB or weight gain greater than 3 lbs in a day     loratadine (CLARITIN) 10 mg tablet   No No   Sig: Take 1 tablet (10 mg total) by mouth daily   methocarbamol (ROBAXIN) 500 mg tablet   Yes No   Sig: Take 500 mg by mouth every 6 (six) hours as needed   montelukast (SINGULAIR) 10 mg tablet   No No   Sig: Take 1 tablet (10 mg total) by mouth daily at bedtime   nystatin (MYCOSTATIN) powder   No No   Sig: Apply topically 2 (two) times a day   Patient not taking: Reported on 6/24/2022   omega-3-acid ethyl esters (LOVAZA) 1 g capsule   No No   Sig: Take 2 capsules (2 g total) by mouth 2 (two) times a day   Patient not taking: No sig reported   primidone (MYSOLINE) 50 mg tablet   No No   Sig: Take 2 tablets (100 mg total) by mouth every 8 (eight) hours   rosuvastatin (CRESTOR) 5 mg tablet   No No   Sig: Take 1 tablet (5 mg total) by mouth daily   traZODone (DESYREL) 100 mg tablet   No No   Sig: Take 1 tablet (100 mg total) by mouth daily at bedtime   triamcinolone (KENALOG) 0 1 % cream   No No   Sig: Apply topically 2 (two) times a day as needed for irritation or rash   Patient not taking: No sig reported      Facility-Administered Medications: None       Past Medical History:   Diagnosis Date    Arthritis     Cataract     ashley    Depression     Fluid retention     Headache, acute     Heart murmur     Hyperlipidemia     Hypertension     PONV (postoperative nausea and vomiting)     Rotator cuff tear, left     Wears glasses     Wears partial dentures     upper       Past Surgical History:   Procedure Laterality Date    ABDOMINAL ADHESION SURGERY      ANAL SPHINCTEROPLASTY      ANKLE SURGERY Right     tendon tear    APPENDECTOMY      CARPAL TUNNEL RELEASE Bilateral     CARPAL TUNNEL RELEASE Left     CARPAL TUNNEL RELEASE Right     COLONOSCOPY      HAND SURGERY Right     ganglion cyst    HEMORROIDECTOMY      HYSTERECTOMY      ILEOSTOMY      JOINT REPLACEMENT      KNEE ARTHROCENTESIS      ganglion Cyst    KNEE ARTHROSCOPY Left     OVARIAN CYST REMOVAL      HI SHLDR ARTHROSCOP,SURG,W/ROTAT CUFF REPR Left 2/19/2018    Procedure: ARTHROSCOPIC REPAIR ROTATOR CUFF,  SAD, BICEP TENODESIS;  Surgeon: Richard Garcia MD;  Location: [facility];  Service: Orthopedics    REPAIR RECTOCELE      REPLACEMENT TOTAL KNEE Left 09/28/2021    TONSILLECTOMY      TONSILLECTOMY AND ADENOIDECTOMY      TUBAL LIGATION         Family History   Problem Relation Age of Onset    Hypertension Mother     Colon cancer Mother     Bone cancer Mother     COPD Mother     Emphysema Mother     Skin cancer Mother     Lead poisoning Father         lead poisoning in lungs     Thyroid disease Sister     Depression Sister     Hypertension Sister     Alzheimer's disease Sister     ROBERT disease Brother     Throat cancer Brother     Hypertension Brother     Colon cancer Family     Bone cancer Family     Hypertension Family     Diabetes Sister     Hypertension Sister     Heart failure Sister     Alzheimer's disease Sister     No Known Problems Daughter     No Known Problems Maternal Grandmother     No Known Problems Paternal Grandmother     No Known Problems Maternal Aunt     No Known Problems Maternal Aunt     No Known Problems Maternal Aunt     No Known Problems Maternal Aunt     No Known Problems Maternal Aunt     No Known Problems Paternal Aunt      I have reviewed and agree with the history as documented      E-Cigarette/Vaping    E-Cigarette Use Never User      E-Cigarette/Vaping Substances    Nicotine No     THC No     CBD No     Flavoring No     Other No     Unknown No      Social History     Tobacco Use    Smoking status: Never Smoker    Smokeless tobacco: Never Used   Vaping Use    Vaping Use: Never used   Substance Use Topics    Alcohol use: Yes     Comment: special events    Drug use: No       Review of Systems   Constitutional: Positive for chills  Negative for fever  HENT: Negative for rhinorrhea and sore throat  Eyes: Negative for visual disturbance  Respiratory: Negative for cough and shortness of breath  Cardiovascular: Negative for chest pain and leg swelling  Gastrointestinal: Negative for abdominal pain, diarrhea, nausea and vomiting  Genitourinary: Negative for dysuria  Musculoskeletal: Negative for back pain and myalgias  Skin: Negative for rash  Neurological: Negative for dizziness and headaches  Psychiatric/Behavioral: Negative for confusion  All other systems reviewed and are negative  Physical Exam  Physical Exam  Vitals and nursing note reviewed  Constitutional:       Appearance: She is well-developed  Comments: Over weight   HENT:      Head: Normocephalic and atraumatic  Nose: Nose normal       Mouth/Throat:      Pharynx: No pharyngeal swelling or oropharyngeal exudate  Eyes:      General: No scleral icterus  Conjunctiva/sclera: Conjunctivae normal       Pupils: Pupils are equal, round, and reactive to light  Neck:      Vascular: No JVD  Trachea: No tracheal deviation  Cardiovascular:      Rate and Rhythm: Normal rate and regular rhythm  Heart sounds: Normal heart sounds  No murmur heard  Pulmonary:      Effort: Pulmonary effort is normal  No respiratory distress  Breath sounds: Normal breath sounds  No wheezing or rales  Comments: Mild end-expiratory wheezing  Abdominal:      General: Bowel sounds are normal       Palpations: Abdomen is soft  Tenderness: There is no abdominal tenderness  There is no guarding  Musculoskeletal:         General: No tenderness  Normal range of motion  Cervical back: Normal range of motion and neck supple  Skin:     General: Skin is warm and dry  Neurological:      Mental Status: She is alert and oriented to person, place, and time        Cranial Nerves: No cranial nerve deficit  Sensory: No sensory deficit  Motor: No abnormal muscle tone  Comments: 5/5 motor, nl sens   Psychiatric:         Behavior: Behavior normal          Vital Signs  ED Triage Vitals   Temperature Pulse Respirations Blood Pressure SpO2   07/01/22 1958 07/01/22 1958 07/01/22 1958 07/01/22 2000 07/01/22 1958   98 8 °F (37 1 °C) 91 (!) 26 138/69 94 %      Temp Source Heart Rate Source Patient Position - Orthostatic VS BP Location FiO2 (%)   07/01/22 1958 07/01/22 1958 07/01/22 1958 07/01/22 1958 --   Oral Monitor Sitting Left arm       Pain Score       07/01/22 2017       No Pain           Vitals:    07/01/22 1958 07/01/22 2000   BP:  138/69   Pulse: 91    Patient Position - Orthostatic VS: Sitting          Visual Acuity      ED Medications  Medications   albuterol inhalation solution 2 5 mg (2 5 mg Nebulization Given 7/1/22 2129)   azithromycin (ZITHROMAX) tablet 500 mg (500 mg Oral Given 7/1/22 2124)   methylPREDNISolone sodium succinate (Solu-MEDROL) injection 80 mg (80 mg Intravenous Given 7/1/22 2125)   benzonatate (TESSALON PERLES) capsule 100 mg (100 mg Oral Given 7/1/22 2124)       Diagnostic Studies  Results Reviewed     Procedure Component Value Units Date/Time    COVID/FLU/RSV - 2 hour TAT [[id number]]  (Normal) Collected: 07/01/22 2021    Lab Status: Final result Specimen: Nares from Nose Updated: 07/01/22 2105     SARS-CoV-2 Negative     INFLUENZA A PCR Negative     INFLUENZA B PCR Negative     RSV PCR Negative    Narrative:      FOR PEDIATRIC PATIENTS - copy/paste COVID Guidelines URL to browser: [email or web address]    SARS-CoV-2 assay is a Nucleic Acid Amplification assay intended for the  qualitative detection of nucleic acid from SARS-CoV-2 in nasopharyngeal  swabs  Results are for the presumptive identification of SARS-CoV-2 RNA      Positive results are indicative of infection with SARS-CoV-2, the virus  causing COVID-19, but do not rule out bacterial infection or co-infection  with other viruses  Laboratories within the [place] and its  territories are required to report all positive results to the appropriate  public health authorities  Negative results do not preclude SARS-CoV-2  infection and should not be used as the sole basis for treatment or other  patient management decisions  Negative results must be combined with  clinical observations, patient history, and epidemiological information  This test has not been FDA cleared or approved  This test has been authorized by FDA under an Emergency Use Authorization  (EUA)  This test is only authorized for the duration of time the  declaration that circumstances exist justifying the authorization of the  emergency use of an in vitro diagnostic tests for detection of SARS-CoV-2  virus and/or diagnosis of COVID-19 infection under section 564(b)(1) of  the Act, 21 U  S C  871UZV-7(I)(5), unless the authorization is terminated  or revoked sooner  The test has been validated but independent review by FDA  and CLIA is pending  Test performed using [employer]: This RT-PCR assay targets N2,  a region unique to SARS-CoV-2  A conserved region in the E-gene was chosen  for pan-Sarbecovirus detection which includes SARS-CoV-2      Comprehensive metabolic panel [[id number]] Collected: 07/01/22 2012    Lab Status: Final result Specimen: Blood from Arm, Right Updated: 07/01/22 2044     Sodium 137 mmol/L      Potassium 3 8 mmol/L      Chloride 103 mmol/L      CO2 25 mmol/L      ANION GAP 9 mmol/L      BUN 13 mg/dL      Creatinine 1 01 mg/dL      Glucose 121 mg/dL      Calcium 9 1 mg/dL      AST 17 U/L      ALT 11 U/L      Alkaline Phosphatase 88 U/L      Total Protein 6 7 g/dL      Albumin 4 2 g/dL      Total Bilirubin 0 32 mg/dL      eGFR 57 ml/min/1 73sq m     Narrative:      [place] guidelines for Chronic Kidney Disease (CKD):     Stage 1 with normal or high GFR (GFR > 90 mL/min/1 73 square meters)    Stage 2 Mild CKD (GFR = 60-89 mL/min/1 73 square meters)    Stage 3A Moderate CKD (GFR = 45-59 mL/min/1 73 square meters)    Stage 3B Moderate CKD (GFR = 30-44 mL/min/1 73 square meters)    Stage 4 Severe CKD (GFR = 15-29 mL/min/1 73 square meters)    Stage 5 End Stage CKD (GFR <15 mL/min/1 73 square meters)  Note: GFR calculation is accurate only with a steady state creatinine    CBC and differential [[id number]] Collected: 07/01/22 2012    Lab Status: Final result Specimen: Blood from Arm, Right Updated: 07/01/22 2022     WBC 5 18 Thousand/uL      RBC 4 46 Million/uL      Hemoglobin 13 3 g/dL      Hematocrit 40 2 %      MCV 90 fL      MCH 29 8 pg      MCHC 33 1 g/dL      RDW 13 2 %      MPV 9 6 fL      Platelets 303 Thousands/uL      nRBC 0 /100 WBCs      Neutrophils Relative 57 %      Immat GRANS % 0 %      Lymphocytes Relative 26 %      Monocytes Relative 12 %      Eosinophils Relative 4 %      Basophils Relative 1 %      Neutrophils Absolute 2 93 Thousands/µL      Immature Grans Absolute 0 02 Thousand/uL      Lymphocytes Absolute 1 36 Thousands/µL      Monocytes Absolute 0 61 Thousand/µL      Eosinophils Absolute 0 22 Thousand/µL      Basophils Absolute 0 04 Thousands/µL                  XR chest 1 view portable   ED Interpretation by Tiki Stoner DO (07/01 2103)   Possible right lower lobe infiltrate                 Procedures  ECG 12 Lead Documentation Only    Date/Time: 7/1/2022 8:27 PM  Performed by: Tiki Stoner DO  Authorized by: Tiki Stoner DO     Indications / Diagnosis:  Cough  ECG reviewed by me, the ED Provider: yes    Patient location:  ED  Previous ECG:     Previous ECG:  Compared to current    Similarity:  No change    Comparison to cardiac monitor: Yes    Interpretation:     Interpretation: non-specific    Rate:     ECG rate assessment: normal    Rhythm:     Rhythm: sinus rhythm    Ectopy:     Ectopy: none    QRS:     QRS axis:  Left    QRS intervals:  Normal  Conduction:     Conduction: normal    ST segments:     ST segments:  Normal  T waves:     T waves: normal               ED Course  ED Course as of 07/01/22 2130 Fri Jul 01, 2022 2102 COVID Rapid Antigen   2121 Communicated with patient's nurse with regards to her tremor which may follow her neb  She may need weight little while before discharge  Overall she is ready for discharge, vitals in oxygen saturations remained stable enter COVID is negative  MDM    Disposition  Final diagnoses:   Pneumonia   Asthma exacerbation     Time reflects when diagnosis was documented in both MDM as applicable and the Disposition within this note     Time User Action Codes Description Comment    7/1/2022  9:11 PM Viveca Locks Add [J18 9] Pneumonia     7/1/2022  9:11 PM Viveca Locks Add [R68 411] Asthma exacerbation       ED Disposition     ED Disposition   Discharge    Condition   Stable    Date/Time   Fri Jul 1, 2022  9:11 PM    [facility]  discharge to home/self care  Follow-up Information     Follow up With Specialties Details Why Contact Info Carleene Phalen,  Internal Medicine Call  For follow-up says in 7 days if possible Joselyn Strickland [phone] [facility] [address]  [phone]            Patient's Medications   Discharge Prescriptions    ALBUTEROL (PROAIR HFA) 90 MCG/ACT INHALER    Inhale 2 puffs every 4 (four) hours as needed for wheezing       Start Date: 7/1/2022  End Date: --       Order Dose: 2 puffs       Quantity: 18 g    Refills: 0    AZITHROMYCIN (ZITHROMAX) 250 MG TABLET    1 tablet daily x 4 days       Start Date: 7/1/2022  End Date: 7/5/2022       Order Dose: --       Quantity: 4 tablet    Refills: 0    PREDNISONE 10 MG TABLET    4 pills for 2 days, then 3 pills for 2 days, then 2 pills for 2 days then 1 pill for 2 days         Start Date: 7/1/2022  End Date: --       Order Dose: -- Quantity: 20 tablet    Refills: 0       No discharge procedures on file      PDMP Review     None          ED Provider  Electronically Signed by           Iván Harris DO  07/01/22 2125       Iván Harris DO  07/01/22 2130

## 2022-07-02 NOTE — ED NOTES
Per Triage RN, Luis Enrique Suresh, patient ambulated to room and maintained a 93% pulse ox reading while on room air        Aguila Zarate RN  07/01/22 2019

## 2022-07-02 NOTE — DISCHARGE INSTRUCTIONS
Note, your on a blood pressure pill that actually causes cough, so if your cough persist may want to consider alternative      Take Robitussin DM, 5 cc every 4 hours as needed for cough

## 2022-07-03 LAB
ATRIAL RATE: 93 BPM
P AXIS: 54 DEGREES
PR INTERVAL: 200 MS
QRS AXIS: -27 DEGREES
QRSD INTERVAL: 90 MS
QT INTERVAL: 376 MS
QTC INTERVAL: 467 MS
T WAVE AXIS: 33 DEGREES
VENTRICULAR RATE: 93 BPM

## 2022-07-03 PROCEDURE — 93010 ELECTROCARDIOGRAM REPORT: CPT | Performed by: INTERNAL MEDICINE

## 2022-07-05 DIAGNOSIS — G25.2 COARSE TREMORS: ICD-10-CM

## 2022-07-05 RX ORDER — PRIMIDONE 50 MG/1
TABLET ORAL
Qty: 540 TABLET | Refills: 1 | Status: SHIPPED | OUTPATIENT
Start: 2022-07-05

## 2022-07-12 DIAGNOSIS — F33.9 DEPRESSION, RECURRENT (HCC): ICD-10-CM

## 2022-07-12 RX ORDER — TRAZODONE HYDROCHLORIDE 100 MG/1
TABLET ORAL
Qty: 90 TABLET | Refills: 1 | Status: SHIPPED | OUTPATIENT
Start: 2022-07-12

## 2022-07-14 DIAGNOSIS — E78.2 MIXED HYPERLIPIDEMIA: ICD-10-CM

## 2022-07-14 RX ORDER — ROSUVASTATIN CALCIUM 5 MG/1
5 TABLET, COATED ORAL DAILY
Qty: 90 TABLET | Refills: 3 | Status: SHIPPED | OUTPATIENT
Start: 2022-07-14

## 2022-07-18 PROBLEM — R06.02 SHORTNESS OF BREATH: Status: ACTIVE | Noted: 2022-07-18

## 2022-07-19 DIAGNOSIS — E78.2 MIXED HYPERLIPIDEMIA: ICD-10-CM

## 2022-07-19 DIAGNOSIS — J30.2 SEASONAL ALLERGIES: ICD-10-CM

## 2022-07-20 RX ORDER — MONTELUKAST SODIUM 10 MG/1
TABLET ORAL
Qty: 90 TABLET | Refills: 3 | Status: SHIPPED | OUTPATIENT
Start: 2022-07-20

## 2022-07-20 RX ORDER — EZETIMIBE 10 MG/1
TABLET ORAL
Qty: 90 TABLET | Refills: 1 | Status: SHIPPED | OUTPATIENT
Start: 2022-07-20

## 2022-07-29 ENCOUNTER — APPOINTMENT (OUTPATIENT)
Dept: RADIOLOGY | Facility: CLINIC | Age: 67
End: 2022-07-29
Payer: MEDICARE

## 2022-07-29 ENCOUNTER — OFFICE VISIT (OUTPATIENT)
Dept: URGENT CARE | Facility: CLINIC | Age: 67
End: 2022-07-29
Payer: MEDICARE

## 2022-07-29 VITALS
OXYGEN SATURATION: 97 % | DIASTOLIC BLOOD PRESSURE: 83 MMHG | RESPIRATION RATE: 18 BRPM | SYSTOLIC BLOOD PRESSURE: 131 MMHG | HEART RATE: 88 BPM | HEIGHT: 60 IN | WEIGHT: 200.4 LBS | TEMPERATURE: 97.6 F | BODY MASS INDEX: 39.34 KG/M2

## 2022-07-29 DIAGNOSIS — S89.92XA LEFT KNEE INJURY, INITIAL ENCOUNTER: ICD-10-CM

## 2022-07-29 DIAGNOSIS — M79.18 MUSCULOSKELETAL PAIN: Primary | ICD-10-CM

## 2022-07-29 DIAGNOSIS — S49.92XA ARM INJURY, LEFT, INITIAL ENCOUNTER: ICD-10-CM

## 2022-07-29 PROCEDURE — G0463 HOSPITAL OUTPT CLINIC VISIT: HCPCS | Performed by: NURSE PRACTITIONER

## 2022-07-29 PROCEDURE — 73564 X-RAY EXAM KNEE 4 OR MORE: CPT

## 2022-07-29 PROCEDURE — 73030 X-RAY EXAM OF SHOULDER: CPT

## 2022-07-29 PROCEDURE — 99213 OFFICE O/P EST LOW 20 MIN: CPT | Performed by: NURSE PRACTITIONER

## 2022-07-29 PROCEDURE — 73060 X-RAY EXAM OF HUMERUS: CPT

## 2022-07-29 NOTE — PROGRESS NOTES
[employer]        NAME: Anoop Carmona is a 78 y o  female  : 1955    MRN: [id number]  DATE: 2022  TIME: 1:17 PM      Assessment and Plan     Musculoskeletal pain [M79 18]  1  Musculoskeletal pain     2  Arm injury, left, initial encounter  XR shoulder 2+ vw left    XR humerus left   3  Left knee injury, initial encounter  XR knee 4+ vw left injury         Patient Instructions     Patient Instructions     No fractures seen on x-ray; arthritic changes noted  Hardware in left knee intact  Radiology does the final read; if they see anything I did not, we will call you  The first 24-48 hours is the most painful  Rest, use ice and/or heat (ice helps inflammation; heat can relax tense muscles)  Continue using over the counter medications to treat symptoms  If pain is not improving, follow-up with ortho or your PCP  Musculoskeletal Pain   AMBULATORY CARE:   Musculoskeletal pain  can occur in muscles, bones, ligaments, tendons, or nerves  The pain can be dull, achy, or sharp  You may have pain and tenderness to the touch as well  The pain can occur anywhere in your body  Musculoskeletal pain can be from an injury, or a medical condition such as polymyositis  Seek care immediately if:   · You have severe pain when you move the area  · You lose feeling in the area  · You have new or worse pain or swelling in the area  Your skin may feel tight  Call your doctor or pain specialist if:   · You have a fever  · You have pain that does not get better with treatment  · You have trouble sleeping because of your pain  · Your painful area becomes more tender, red, and warm to the touch  · You have less movement of the painful area  · You have questions or concerns about your condition or care  Treatment  may include any of the following:  · NSAIDs  help decrease swelling and pain or fever  This medicine is available with or without a doctor's order   NSAIDs can cause stomach bleeding or kidney problems in certain people  If you take blood thinner medicine, always ask your healthcare provider if NSAIDs are safe for you  Always read the medicine label and follow directions  · Acetaminophen  decreases pain and fever  It is available without a doctor's order  Ask how much to take and how often to take it  Follow directions  Read the labels of all other medicines you are using to see if they also contain acetaminophen, or ask your doctor or pharmacist  Acetaminophen can cause liver damage if not taken correctly  Do not use more than 4 grams (4,000 milligrams) total of acetaminophen in one day  · Muscle relaxers  help relax your muscles to decrease pain and muscle spasms  · Steroids  may be given to decrease redness, pain, and swelling  Self-care:   · Rest as directed  Avoid activity that causes pain  You may be able to return to normal activity when you can move without pain  Follow directions for rest and activity  You are at risk for injury for 3 weeks after your symptoms go away  · Ice the painful area to decrease pain and swelling  Use an ice pack, or put ice in a plastic bag and cover it with a towel  Always  put a cloth between the ice and your skin  Apply the ice as often as directed for the first 24 to 48 hours  · Apply compression to the area, if directed  Your healthcare provider may want you to use a splint, brace, or elastic bandage  Compression helps decrease pain and swelling in an arm or leg  A splint, brace, or bandage will also help protect the painful area when you move around  · Elevate a painful arm or leg to reduce swelling and pain  Elevate your limb while you are sitting or lying  Prop a painful leg on pillows to keep it above the level of your heart  Follow up with your doctor or pain specialist as directed: You may need more tests to help healthcare providers find the cause of your muscle pain   You may need physical therapy to learn muscle strengthening exercises  Write down your questions so you remember to ask them during your visits  © Copyright [employer] 2022 Information is for End User's use only and may not be sold, redistributed or otherwise used for commercial purposes  All illustrations and images included in CareNotes® are the copyrighted property of [employer]  or [employer]  The above information is an  only  It is not intended as medical advice for individual conditions or treatments  Talk to your doctor, nurse or pharmacist before following any medical regimen to see if it is safe and effective for you  Follow up with PCP in 3-5 days  Proceed to  ER if symptoms worsen  Chief Complaint     Chief Complaint   Patient presents with    Fall     Dogs made her fall injuring her Left side arm and knee  happened last night  History of Present Illness     At the house, patient let her dogs out before their bed (around 2130)  One of them tripped her--she fell face-forward--bent glasses, left shoulder pain, left knee pain  Left shoulder pain is worst; notes she had prior rotator cuff surgery approx 2016  Just had left total knee Sept 2021  Took 2 aleve this morning for pain; no significant improvement  States arm feels heavy, hurts to use a spoon, etc   Patient is right hand dominant  Review of Systems     Review of Systems   Musculoskeletal: Positive for arthralgias  All other systems reviewed and are negative          Current Medications       Current Outpatient Medications:     acetaminophen (TYLENOL) 500 mg tablet, Take 500-1,000 mg by mouth every 6 (six) hours as needed for mild pain  , Disp: , Rfl:     albuterol (ProAir HFA) 90 mcg/act inhaler, Inhale 2 puffs every 4 (four) hours as needed for wheezing, Disp: 18 g, Rfl: 0    Calcium Carbonate (CALCIUM 600 PO), Take by mouth daily  , Disp: , Rfl:     carbidopa-levodopa (Sinemet)  mg per tablet, Take 1 tablet by mouth 3 (three) times a day, Disp: 270 tablet, Rfl: 1    Cholecalciferol (VITAMIN D3 PO), Take 50 mcg by mouth daily  , Disp: , Rfl:     citalopram (CeleXA) 40 mg tablet, take 1 tablet by mouth every evening, Disp: 90 tablet, Rfl: 1    Diclofenac Sodium (VOLTAREN) 1 %, Apply 2 g topically 4 (four) times a day, Disp: 100 g, Rfl: 0    enalapril (VASOTEC) 5 mg tablet, Take 1 tablet (5 mg total) by mouth daily, Disp: 90 tablet, Rfl: 1    ezetimibe (ZETIA) 10 mg tablet, take 1 tablet by mouth once daily, Disp: 90 tablet, Rfl: 1    furosemide (LASIX) 40 mg tablet, Take 1 tablet by oral route daily as needed for worsening SOB or weight gain greater than 3 lbs in a day , Disp: 30 tablet, Rfl: 5    loratadine (CLARITIN) 10 mg tablet, Take 1 tablet (10 mg total) by mouth daily, Disp: 30 tablet, Rfl: 0    methocarbamol (ROBAXIN) 500 mg tablet, Take 500 mg by mouth every 6 (six) hours as needed, Disp: , Rfl:     montelukast (SINGULAIR) 10 mg tablet, take 1 tablet by mouth at bedtime, Disp: 90 tablet, Rfl: 3    primidone (MYSOLINE) 50 mg tablet, take 2 tablets by mouth every 8 hours, Disp: 540 tablet, Rfl: 1    rosuvastatin (CRESTOR) 5 mg tablet, Take 1 tablet (5 mg total) by mouth daily, Disp: 90 tablet, Rfl: 3    traZODone (DESYREL) 100 mg tablet, take 1 tablet by mouth at bedtime, Disp: 90 tablet, Rfl: 1    ARIPiprazole (ABILIFY) 5 mg tablet, take 1 tablet by mouth every morning (Patient not taking: No sig reported), Disp: 90 tablet, Rfl: 1    estradiol (ESTRACE) 0 1 mg/g vaginal cream, Insert 2 g into the vagina 2 (two) times a week (Patient taking differently: Insert 2 g into the vagina if needed  ), Disp: 42 5 g, Rfl: 3    nystatin (MYCOSTATIN) powder, Apply topically 2 (two) times a day (Patient not taking: No sig reported), Disp: 60 g, Rfl: 1    omega-3-acid ethyl esters (LOVAZA) 1 g capsule, Take 2 capsules (2 g total) by mouth 2 (two) times a day (Patient not taking: No sig reported), Disp: 180 capsule, Rfl: 0    predniSONE 10 mg tablet, 4 pills for 2 days, then 3 pills for 2 days, then 2 pills for 2 days then 1 pill for 2 days   (Patient not taking: Reported on 7/29/2022), Disp: 20 tablet, Rfl: 0    Tea Tree Oil (NO FUNGUS NAIL PROTECTANT EX), Apply topically   (Patient not taking: No sig reported), Disp: , Rfl:     triamcinolone (KENALOG) 0 1 % cream, Apply topically 2 (two) times a day as needed for irritation or rash (Patient not taking: No sig reported), Disp: 60 g, Rfl: 1    Current Allergies     Allergies as of 07/29/2022 - Reviewed 07/29/2022   Allergen Reaction Noted    Iodine - food allergy Hives 02/13/2018    Keflex [cephalexin] Other (See Comments) 02/13/2018    Morphine and related Hives 02/13/2018    Penicillins Hives 02/13/2018    Benadryl [diphenhydramine] Itching and Swelling 04/15/2020    Ciprofloxacin Hives 02/28/2021    Clindamycin Other (See Comments) 02/13/2018    Erythromycin Hives 02/13/2018              The following portions of the patient's history were reviewed and updated as appropriate: allergies, current medications, past family history, past medical history, past social history, past surgical history and problem list      Past Medical History:   Diagnosis Date    Arthritis     Cataract     ashley    Depression     Fluid retention     Headache, acute     Heart murmur     Hyperlipidemia     Hypertension     PONV (postoperative nausea and vomiting)     Rotator cuff tear, left     Wears glasses     Wears partial dentures     upper       Past Surgical History:   Procedure Laterality Date    ABDOMINAL ADHESION SURGERY      ANAL SPHINCTEROPLASTY      ANKLE SURGERY Right     tendon tear    APPENDECTOMY      CARPAL TUNNEL RELEASE Bilateral     CARPAL TUNNEL RELEASE Left     CARPAL TUNNEL RELEASE Right     COLONOSCOPY      HAND SURGERY Right     ganglion cyst    HEMORROIDECTOMY      HYSTERECTOMY      ILEOSTOMY      JOINT REPLACEMENT      KNEE ARTHROCENTESIS      ganglion Cyst    KNEE ARTHROSCOPY Left     OVARIAN CYST REMOVAL      VA SHLDR ARTHROSCOP,SURG,W/ROTAT CUFF REPR Left 2/19/2018    Procedure: ARTHROSCOPIC REPAIR ROTATOR CUFF,  SAD, BICEP TENODESIS;  Surgeon: Isabelle Altman MD;  Location: [facility];  Service: Orthopedics    REPAIR RECTOCELE      REPLACEMENT TOTAL KNEE Left 09/28/2021    TONSILLECTOMY      TONSILLECTOMY AND ADENOIDECTOMY      TUBAL LIGATION         Family History   Problem Relation Age of Onset    Hypertension Mother     Colon cancer Mother     Bone cancer Mother     COPD Mother     Emphysema Mother     Skin cancer Mother     Lead poisoning Father         lead poisoning in lungs     Thyroid disease Sister     Depression Sister     Hypertension Sister     Alzheimer's disease Sister     ROBERT disease Brother     Throat cancer Brother     Hypertension Brother     Colon cancer Family     Bone cancer Family     Hypertension Family     Diabetes Sister     Hypertension Sister     Heart failure Sister     Alzheimer's disease Sister     No Known Problems Daughter     No Known Problems Maternal Grandmother     No Known Problems Paternal Grandmother     No Known Problems Maternal Aunt     No Known Problems Maternal Aunt     No Known Problems Maternal Aunt     No Known Problems Maternal Aunt     No Known Problems Maternal Aunt     No Known Problems Paternal Aunt          Medications have been verified  Objective     /83   Pulse 88   Temp 97 6 °F (36 4 °C)   Resp 18   Ht 5' (1 524 m)   Wt 90 9 kg (200 lb 6 4 oz)   SpO2 97%   BMI 39 14 kg/m²   No LMP recorded  Patient has had a hysterectomy  Physical Exam     Physical Exam  Vitals and nursing note reviewed  Constitutional:       General: She is not in acute distress  Appearance: Normal appearance  She is well-developed  She is not ill-appearing, toxic-appearing or diaphoretic  HENT:      Head: Normocephalic and atraumatic  Pulmonary:      Effort: Pulmonary effort is normal  No respiratory distress  Abdominal:      General: There is no distension  Palpations: Abdomen is soft  Musculoskeletal:         General: Tenderness and signs of injury present  No swelling or deformity  Normal range of motion  Left shoulder: Bony tenderness (scapula) present  No swelling, deformity, effusion, laceration, tenderness or crepitus  Normal range of motion (normal ROM, full ROM increases pain)  Normal strength (but heavy lifting (large purse) increases pain)  Normal pulse  Left upper arm: Bony tenderness (mid humerus; less on proximal and distal aspects) present  No swelling, edema, deformity, lacerations or tenderness  Cervical back: Normal range of motion and neck supple  Left knee: No swelling, deformity, lacerations or bony tenderness  Normal range of motion  Tenderness (generalized soreness; no focal areas of pain  Concerned since only 10 months post-op) present  Skin:     General: Skin is warm and dry  Capillary Refill: Capillary refill takes less than 2 seconds  Neurological:      General: No focal deficit present  Mental Status: She is alert and oriented to person, place, and time  Psychiatric:         Mood and Affect: Mood normal          Behavior: Behavior normal          Thought Content:  Thought content normal          Judgment: Judgment normal

## 2022-08-04 ENCOUNTER — OFFICE VISIT (OUTPATIENT)
Dept: CARDIOLOGY CLINIC | Facility: CLINIC | Age: 67
End: 2022-08-04
Payer: MEDICARE

## 2022-08-04 VITALS
WEIGHT: 198 LBS | DIASTOLIC BLOOD PRESSURE: 86 MMHG | BODY MASS INDEX: 38.87 KG/M2 | HEART RATE: 68 BPM | HEIGHT: 60 IN | SYSTOLIC BLOOD PRESSURE: 130 MMHG

## 2022-08-04 DIAGNOSIS — R06.01 ORTHOPNEA: ICD-10-CM

## 2022-08-04 DIAGNOSIS — E66.01 MORBID OBESITY (HCC): ICD-10-CM

## 2022-08-04 DIAGNOSIS — G47.33 OSA (OBSTRUCTIVE SLEEP APNEA): ICD-10-CM

## 2022-08-04 DIAGNOSIS — Z15.89 GENETIC PREDISPOSITION TO CARDIOMYOPATHY: ICD-10-CM

## 2022-08-04 DIAGNOSIS — E78.2 MIXED HYPERLIPIDEMIA: ICD-10-CM

## 2022-08-04 DIAGNOSIS — I10 ESSENTIAL HYPERTENSION: ICD-10-CM

## 2022-08-04 DIAGNOSIS — R01.1 CARDIAC MURMUR: Primary | ICD-10-CM

## 2022-08-04 DIAGNOSIS — R60.0 PEDAL EDEMA: ICD-10-CM

## 2022-08-04 PROCEDURE — 99214 OFFICE O/P EST MOD 30 MIN: CPT | Performed by: INTERNAL MEDICINE

## 2022-08-04 NOTE — PROGRESS NOTES
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 - TTN p [id number]  REKHA  Morbid obesity, Body mass index is 38 67 kg/m²  Cardiac testing  Stress echo - 3/2021 - did 6 min, no  significant ischemia, but did not have recurrence of chest pain with it    Plan  Cardiac murmur  Her edema and orthopnea have improved with intermittent Lasix dosing and she continues to use it about 2-3 times per month  Continue Lasix 40 mg p r n  for weight gain greater than 3 lb or increased pedal edema  On last echo in 2021, he had aortic sclerosis, and now has mild aortic stenosis murmur  Will get follow-up echocardiogram prior to next visit early next year    Positive genetic testing for cardiomyopathy  She had apparently received an ad from [facility] [employer] [facility] regarding genetic testing, and underwent same ? 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  Echo 2021 was without any dilated cardiomyopathy, EF 60%  Unclear significance of genetic test result  Can monitor clinically & follow up on echocardiogram    Hypertension  Blood pressure well controlled, now at 130/86  Due to persistent low blood pressure & ?vertigo, her enalapril was decreased to 5 mg daily earlier this year  Continue enalapril 5 mg daily, Lasix 40 mg p r n      Hyperlipidemia    Previously intolerant to atorvastatin - cramps  But has been tolerating low dose rosuvastatin 5 mg daily  Continue rosuvastatin 5 mg + ezetimibe 10 mg daily  Follow up lipid panel still pending --> complete same    REKHA  CPAP titration study done in 9/2021, but still not using CPAP  She reports having had socially issues delaying her seeking care for this, but she herself now reports that she is motivated to follow up on the CPAP and will be doing so over the next couple of months  Follow up with sleep medicine  Weight loss suggested    ECG today -  No results found for this visit on 08/04/22  Orders Placed This Encounter   Procedures    Lipid Panel with Direct LDL reflex    Echo complete w/ contrast if indicated     Return in about 6 months (around 2/4/2023), or if symptoms worsen or fail to improve  History of Present Illness   78 y o   female comes in as a new patient for establishment of care after recent hospitalization for chest pain  On 1st March, patient came in with to the hospital with sudden onset left-sided chest pressure which woke her up from sleep in the morning  She received nitroglycerin without much relief  Subsequently her blood pressure was noted to be elevated in the 200s and she received Ativan which improved her pain and blood pressure  She has had a lot of social stressors recently  She was ruled out with 3 negative troponins and discharged with outpatient stress  Since discharge, she has not had any further major recurrences of chest pain or shortness of breath  She ambulates with the help of a cane, due to pain, but feels she can exercise on treadmill for stress test     Interval history - 8/5/2021  She comes back after completing stress testing  She has been doing well over the past few months, and her chest pain has otherwise resolved  She is currently working with cleaning places, and occasionally gets short of breath with it  She does report some orthopnea, and sleeps elevated  Also has edema    Additionally , she states that she recently underwent some genetic testing which had abnormal results and has questions regarding the same    On further inquiry she states that she had previously but a weight loss supplement from bile about a tree, and had received some in epic testing at along with it, and so underwent same, and was found to have pathogenic mutation and as a result was asked to discuss it here today  Interval history - 2/17/2022  She comes back for follow-up after about 6 months  She has been doing well over the past few months and reports improvement in edema and is feeling much better  No current complains of shortness of breath or orthopnea  She continues to use Lasix about 1-2 times per week  No complains of chest pain, palpitation  She does report having some symptoms of head spinning sensation when she lays down or when she turns her head from side to side over the past 2 weeks, about 3 episodes, which have resolved on their own  Interval history - 8/4/2022  He comes back for follow-up after about 6 months  She has been doing well overall and has not had any major complains of shortness of breath, orthopnea  Her pedal edema has improved as well  She had reported vertigo during my prior visit, but reports complete resolution of the same  She remains compliant with medications and has been working towards weight loss, and has lost about 10 lbs over the last 6 months        Historical Information   Past Medical History:   Diagnosis Date    Arthritis     Cataract     ashley    Depression     Fluid retention     Headache, acute     Heart murmur     Hyperlipidemia     Hypertension     Pedal edema     PONV (postoperative nausea and vomiting)     Rotator cuff tear, left     Wears glasses     Wears partial dentures     upper     Past Surgical History:   Procedure Laterality Date    ABDOMINAL ADHESION SURGERY      ANAL SPHINCTEROPLASTY      ANKLE SURGERY Right     tendon tear    APPENDECTOMY      CARPAL TUNNEL RELEASE Bilateral     CARPAL TUNNEL RELEASE Left     CARPAL TUNNEL RELEASE Right     COLONOSCOPY      HAND SURGERY Right ganglion cyst    HEMORROIDECTOMY      HYSTERECTOMY      ILEOSTOMY      JOINT REPLACEMENT      KNEE ARTHROCENTESIS      ganglion Cyst    KNEE ARTHROSCOPY Left     OVARIAN CYST REMOVAL      DE SHLDR ARTHROSCOP,SURG,W/ROTAT CUFF REPR Left 2/19/2018    Procedure: ARTHROSCOPIC REPAIR ROTATOR CUFF,  SAD, BICEP TENODESIS;  Surgeon: Jayy Jenkins MD;  Location: [facility];  Service: Orthopedics    REPAIR RECTOCELE      REPLACEMENT TOTAL KNEE Left 09/28/2021    TONSILLECTOMY      TONSILLECTOMY AND ADENOIDECTOMY      TUBAL LIGATION       Family History   Problem Relation Age of Onset    Hypertension Mother     Colon cancer Mother     Bone cancer Mother     COPD Mother     Emphysema Mother     Skin cancer Mother     Lead poisoning Father         lead poisoning in lungs     Thyroid disease Sister     Depression Sister     Hypertension Sister     Alzheimer's disease Sister     ROBERT disease Brother     Throat cancer Brother     Hypertension Brother     Colon cancer Family     Bone cancer Family     Hypertension Family     Diabetes Sister     Hypertension Sister     Heart failure Sister     Alzheimer's disease Sister     No Known Problems Daughter     No Known Problems Maternal Grandmother     No Known Problems Paternal Grandmother     No Known Problems Maternal Aunt     No Known Problems Maternal Aunt     No Known Problems Maternal Aunt     No Known Problems Maternal Aunt     No Known Problems Maternal Aunt     No Known Problems Paternal Aunt      Current Outpatient Medications on File Prior to Visit   Medication Sig Dispense Refill    acetaminophen (TYLENOL) 500 mg tablet Take 500-1,000 mg by mouth every 6 (six) hours as needed for mild pain        albuterol (ProAir HFA) 90 mcg/act inhaler Inhale 2 puffs every 4 (four) hours as needed for wheezing 18 g 0    Calcium Carbonate (CALCIUM 600 PO) Take by mouth daily        carbidopa-levodopa (Sinemet)  mg per tablet Take 1 tablet by mouth 3 (three) times a day 270 tablet 1    Cholecalciferol (VITAMIN D3 PO) Take 50 mcg by mouth daily        citalopram (CeleXA) 40 mg tablet take 1 tablet by mouth every evening 90 tablet 1    Diclofenac Sodium (VOLTAREN) 1 % Apply 2 g topically 4 (four) times a day 100 g 0    enalapril (VASOTEC) 5 mg tablet Take 1 tablet (5 mg total) by mouth daily 90 tablet 1    ezetimibe (ZETIA) 10 mg tablet take 1 tablet by mouth once daily 90 tablet 1    montelukast (SINGULAIR) 10 mg tablet take 1 tablet by mouth at bedtime 90 tablet 3    primidone (MYSOLINE) 50 mg tablet take 2 tablets by mouth every 8 hours 540 tablet 1    rosuvastatin (CRESTOR) 5 mg tablet Take 1 tablet (5 mg total) by mouth daily 90 tablet 3    traZODone (DESYREL) 100 mg tablet take 1 tablet by mouth at bedtime 90 tablet 1    ARIPiprazole (ABILIFY) 5 mg tablet take 1 tablet by mouth every morning (Patient not taking: No sig reported) 90 tablet 1    estradiol (ESTRACE) 0 1 mg/g vaginal cream Insert 2 g into the vagina 2 (two) times a week (Patient not taking: Reported on 8/4/2022) 42 5 g 3    furosemide (LASIX) 40 mg tablet Take 1 tablet by oral route daily as needed for worsening SOB or weight gain greater than 3 lbs in a day   (Patient not taking: Reported on 8/4/2022) 30 tablet 5    loratadine (CLARITIN) 10 mg tablet Take 1 tablet (10 mg total) by mouth daily (Patient not taking: Reported on 8/4/2022) 30 tablet 0    methocarbamol (ROBAXIN) 500 mg tablet Take 500 mg by mouth every 6 (six) hours as needed (Patient not taking: Reported on 8/4/2022)      nystatin (MYCOSTATIN) powder Apply topically 2 (two) times a day (Patient not taking: No sig reported) 60 g 1    omega-3-acid ethyl esters (LOVAZA) 1 g capsule Take 2 capsules (2 g total) by mouth 2 (two) times a day (Patient not taking: No sig reported) 180 capsule 0    predniSONE 10 mg tablet 4 pills for 2 days, then 3 pills for 2 days, then 2 pills for 2 days then 1 pill for 2 days  20 tablet 0    Tea Tree Oil (NO FUNGUS NAIL PROTECTANT EX) Apply topically      triamcinolone (KENALOG) 0 1 % cream Apply topically 2 (two) times a day as needed for irritation or rash (Patient not taking: No sig reported) 60 g 1     No current facility-administered medications on file prior to visit  Allergies   Allergen Reactions    Iodine - Food Allergy Hives     IV IODINE    Keflex [Cephalexin] Other (See Comments)     Mouth sores    Morphine And Related Hives     IV MORPINE    Penicillins Hives    Benadryl [Diphenhydramine] Itching and Swelling     IV benadryl in hospital    Ciprofloxacin Hives    Clindamycin Other (See Comments)     Pt unsure    Erythromycin Hives     Social History     Socioeconomic History    Marital status:      Spouse name: None    Number of children: None    Years of education: None    Highest education level: None   Occupational History    Occupation: retired   Tobacco Use    Smoking status: Never Smoker    Smokeless tobacco: Never Used   Vaping Use    Vaping Use: Never used   Substance and Sexual Activity    Alcohol use: Yes     Comment: special events    Drug use: No    Sexual activity: Yes     Partners: Male   Other Topics Concern    None   Social History Narrative    Getting   Two children    Lives with her daughter    On disability  Prior   Social Determinants of Health     Financial Resource Strain: Not on file   Food Insecurity: Not on file   Transportation Needs: Not on file   Physical Activity: Not on file   Stress: Not on file   Social Connections: Not on file   Intimate Partner Violence: Not on file   Housing Stability: Not on file        Review of Systems   All other systems reviewed and are negative  Vitals:  Vitals:    08/04/22 0907   BP: 130/86   BP Location: Right arm   Cuff Size: Large   Pulse: 68   Weight: 89 8 kg (198 lb)   Height: 5' (1 524 m)     BMI - Body mass index is 38 67 kg/m²    Wt Readings from Last 7 Encounters:   08/04/22 89 8 kg (198 lb)   07/29/22 90 9 kg (200 lb 6 4 oz)   03/18/22 94 kg (207 lb 3 2 oz)   02/18/22 93 3 kg (205 lb 9 6 oz)   02/17/22 93 4 kg (205 lb 12 8 oz)   01/07/22 94 1 kg (207 lb 6 oz)   01/04/22 93 4 kg (206 lb)       Physical Exam  Vitals and nursing note reviewed  Constitutional:       General: She is not in acute distress  Appearance: Normal appearance  She is well-developed  She is obese  She is not ill-appearing  HENT:      Head: Normocephalic and atraumatic  Nose: No congestion  Eyes:      General: No scleral icterus  Conjunctiva/sclera: Conjunctivae normal    Neck:      Vascular: No carotid bruit or JVD  Cardiovascular:      Rate and Rhythm: Normal rate and regular rhythm  Pulses: Normal pulses  Heart sounds: Murmur heard  Crescendo decrescendo systolic murmur is present with a grade of 3/6  No friction rub  No gallop  Pulmonary:      Effort: Pulmonary effort is normal  No respiratory distress  Breath sounds: Normal breath sounds  No rales  Abdominal:      General: There is no distension  Palpations: Abdomen is soft  Tenderness: There is no abdominal tenderness  Musculoskeletal:         General: No swelling or tenderness  Cervical back: Neck supple  Right lower leg: Edema present  Left lower leg: Edema present  Skin:     General: Skin is warm  Neurological:      General: No focal deficit present  Mental Status: She is alert and oriented to person, place, and time  Mental status is at baseline  Psychiatric:         Mood and Affect: Mood normal          Behavior: Behavior normal          Thought Content:  Thought content normal          Labs:  CBC:   Lab Results   Component Value Date    WBC 5 18 07/01/2022    RBC 4 46 07/01/2022    HGB 13 3 07/01/2022    HCT 40 2 07/01/2022    MCV 90 07/01/2022     07/01/2022    RDW 13 2 07/01/2022       CMP:   Lab Results   Component Value Date  2014    K 3 8 2022     2022    CO2 25 2022    ANIONGAP 8 2014    BUN 13 2022    CREATININE 1 01 2022    EGFR 57 2022    GLUCOSE 116 2014    CALCIUM 9 1 2022    AST 17 2022    ALT 11 2022    ALKPHOS 88 2022       Magnesium:  No results found for: MG    Lipid Profile:   Lab Results   Component Value Date    HDL 52 2021    TRIG 241 (H) 2021    LDLCALC 119 (H) 2021       Thyroid Studies:   Lab Results   Component Value Date    COZ0SWNTVCKX 2 930 2020       No components found for: [place] [facility]    Lab Results   Component Value Date    INR 1 02 2021   5    Imaging: X-ray Chest 1 View Portable    Result Date: 2021  Narrative: CHEST INDICATION:   chest pain  COMPARISON:  2015 EXAM PERFORMED/VIEWS:  XR CHEST PORTABLE  AP semierect FINDINGS: Cardiomediastinal silhouette appears unremarkable  The lungs are clear  No pneumothorax or pleural effusion  Osseous structures appear within normal limits for patient age  Impression: No significant interval change from previous examination  No acute abnormalities  Workstation performed: [id number]       Cardiac testing:   Results for orders placed during the hospital encounter of 21   Echo complete with contrast if indicated    Narrative [address]    Transthoracic Echocardiogram  2D, M-mode, Doppler, and Color Doppler    Study date:  01-Mar-2021    Patient: Clay Kaur  MR number: [id number]  Account number: [de-identified]  : 1955  Age: 76 years  Gender: Female  Status: Outpatient  [facility]   Height: 60 in  Weight: 216 5 lb  BP: 122/ 57 mmHg    Indications: Chest pain      Diagnoses: R07 9 - Chest pain, unspecified    Sonographer:  Jerilyn Borjas RDCS  Referring Physician:  Ibeth Negron MD  Group:  [facility]  Interpreting Physician:  Enrrique Guillory MD    SUMMARY    LEFT VENTRICLE:  Size was normal   Systolic function was normal  Ejection fraction was estimated to be 60 %  There were no regional wall motion abnormalities  Wall thickness was mildly increased  The changes were consistent with concentric remodeling (increased wall thickness with normal wall mass)  MITRAL VALVE:  There was mild annular calcification  There was trace regurgitation  HISTORY: Symptoms: chest pain  Lower extremity edema  PRIOR HISTORY: Risk factors: hypertension, hypercholesterolemia, and morbid obesity  PROCEDURE: The study was performed in the [facility]  This was a routine study  The transthoracic approach was used  The study included complete 2D imaging, M-mode, complete spectral Doppler, and color Doppler  The  heart rate was 72 bpm, at the start of the study  Images were obtained from the parasternal, apical, subcostal, and suprasternal notch acoustic windows  Echocardiographic views were limited due to decreased penetration and lung  interference  This was a technically difficult study  LEFT VENTRICLE: Size was normal  Systolic function was normal  Ejection fraction was estimated to be 60 %  There were no regional wall motion abnormalities  Wall thickness was mildly increased  The changes were consistent with concentric  remodeling (increased wall thickness with normal wall mass)  DOPPLER: Left ventricular diastolic function parameters were normal     RIGHT VENTRICLE: The size was normal  Systolic function was normal  Wall thickness was normal     LEFT ATRIUM: Size was normal     RIGHT ATRIUM: Size was normal     MITRAL VALVE: There was mild annular calcification  Valve structure was normal  There was normal leaflet separation  DOPPLER: The transmitral velocity was within the normal range  There was no evidence for stenosis  There was trace  regurgitation  AORTIC VALVE: The valve was trileaflet   Leaflets exhibited mildly increased thickness, mild calcification, and lower normal cuspal separation  DOPPLER: Transaortic velocity was minimally increased  There was no evidence for stenosis  There  was no significant regurgitation  TRICUSPID VALVE: The valve structure was normal  There was normal leaflet separation  DOPPLER: The transtricuspid velocity was within the normal range  There was no evidence for stenosis  There was no significant regurgitation  PULMONIC VALVE: Leaflets exhibited normal thickness, no calcification, and normal cuspal separation  DOPPLER: The transpulmonic velocity was within the normal range  There was trace regurgitation  PERICARDIUM: There was no pericardial effusion  The pericardium was normal in appearance  AORTA: The root exhibited normal size  SYSTEMIC VEINS: IVC: The inferior vena cava was not well visualized  The inferior vena cava was normal in size  SYSTEM MEASUREMENT TABLES    2D  %FS: 32 21 %  Ao Diam: 2 88 cm  Ao asc: 3 18 cm  EDV(Teich): 93 69 ml  EF(Teich): 60 54 %  ESV(Teich): 36 97 ml  IVSd: 1 33 cm  LA Area: 18 19 cm2  LA Diam: 3 78 cm  LVEDV MOD A4C: 99 62 ml  LVEF MOD A4C: 66 08 %  LVESV MOD A4C: 33 79 ml  LVIDd: 4 53 cm  LVIDs: 3 07 cm  LVLd A4C: 7 35 cm  LVLs A4C: 5 92 cm  LVOT Diam: 2 cm  LVPWd: 1 18 cm  RA Area: 9 49 cm2  RVIDd: 2 77 cm  SV MOD A4C: 65 83 ml  SV(Teich): 56 72 ml    CW  AV Env  Ti: 304 47 ms  AV VTI: 33 67 cm  AV Vmax: 1 59 m/s  AV Vmean: 1 11 m/s  AV maxPG: 10 08 mmHg  AV meanP 58 mmHg  PV Env  Ti: 304 47 ms  PV VTI: 22 11 cm  PV Vmax: 0 98 m/s  PV Vmean: 0 73 m/s  PV maxPG: 3 87 mmHg  PV meanP 34 mmHg    MM  TAPSE: 2 27 cm    PW  AIDAN (VTI): 1 93 cm2  AIDAN Vmax: 1 83 cm2  AVAI (VTI): 0 cm2/m2  AVAI Vmax: 0 cm2/m2  E' Sept: 0 09 m/s  E/E' Sept: 8 67  LVOT Env  Ti: 296 86 ms  LVOT VTI: 20 62 cm  LVOT Vmax: 0 92 m/s  LVOT Vmean: 0 69 m/s  LVOT maxPG: 3 4 mmHg  LVOT meanP 14 mmHg  LVSI Dopp: 33 67 ml/m2  LVSV Dopp: 64 99 ml  MV A Cristian: 0 92 m/s  MV Dec [place]: 3 17 m/s2  MV DecT: 236 25 ms  MV E Cristian: 0 75 m/s  MV E/A Ratio: 0 81  RVOT Env  Ti: 319 7 ms  RVOT VTI: 13 63 cm  RVOT Vmax: 0 68 m/s  RVOT Vmean: 0 43 m/s  RVOT maxP 83 mmHg  RVOT meanP 86 mmHg    [facility]    Prepared and electronically signed by    Cade Okeefe MD  Signed 01-Mar-2021 12:04:58       No results found for this or any previous visit  No results found for this or any previous visit  No results found for this or any previous visit

## 2022-08-05 ENCOUNTER — HOSPITAL ENCOUNTER (OUTPATIENT)
Dept: MAMMOGRAPHY | Facility: HOSPITAL | Age: 67
Discharge: HOME | End: 2022-08-05
Attending: INTERNAL MEDICINE
Payer: MEDICARE

## 2022-08-05 VITALS — BODY MASS INDEX: 38.87 KG/M2 | HEIGHT: 60 IN | WEIGHT: 198 LBS

## 2022-08-05 DIAGNOSIS — Z12.31 ENCOUNTER FOR SCREENING MAMMOGRAM FOR MALIGNANT NEOPLASM OF BREAST: ICD-10-CM

## 2022-08-05 PROCEDURE — 77067 SCR MAMMO BI INCL CAD: CPT

## 2022-08-05 PROCEDURE — 77063 BREAST TOMOSYNTHESIS BI: CPT

## 2022-08-10 ENCOUNTER — RA CDI HCC (OUTPATIENT)
Dept: OTHER | Facility: HOSPITAL | Age: 67
End: 2022-08-10

## 2022-08-10 NOTE — PROGRESS NOTES
[address]  coding opportunities       Chart reviewed, no opportunity found: CHART REVIEWED, NO OPPORTUNITY FOUND        Patients Insurance     Medicare Insurance: Medicare

## 2022-08-15 ENCOUNTER — APPOINTMENT (OUTPATIENT)
Dept: LAB | Facility: CLINIC | Age: 67
End: 2022-08-15
Payer: MEDICARE

## 2022-08-15 DIAGNOSIS — E78.2 MIXED HYPERLIPIDEMIA: ICD-10-CM

## 2022-08-15 LAB
CHOLEST SERPL-MCNC: 139 MG/DL
HDLC SERPL-MCNC: 54 MG/DL
LDLC SERPL CALC-MCNC: 65 MG/DL (ref 0–100)
TRIGL SERPL-MCNC: 98 MG/DL

## 2022-08-15 PROCEDURE — 36415 COLL VENOUS BLD VENIPUNCTURE: CPT

## 2022-08-15 PROCEDURE — 80061 LIPID PANEL: CPT

## 2022-08-16 DIAGNOSIS — G25.2 COARSE TREMORS: ICD-10-CM

## 2022-08-16 DIAGNOSIS — F32.A DEPRESSION, UNSPECIFIED DEPRESSION TYPE: ICD-10-CM

## 2022-08-16 RX ORDER — CITALOPRAM 40 MG/1
TABLET ORAL
Qty: 90 TABLET | Refills: 1 | Status: SHIPPED | OUTPATIENT
Start: 2022-08-16

## 2022-08-17 ENCOUNTER — OFFICE VISIT (OUTPATIENT)
Dept: INTERNAL MEDICINE CLINIC | Facility: CLINIC | Age: 67
End: 2022-08-17
Payer: MEDICARE

## 2022-08-17 ENCOUNTER — APPOINTMENT (OUTPATIENT)
Dept: LAB | Facility: CLINIC | Age: 67
End: 2022-08-17
Payer: MEDICARE

## 2022-08-17 VITALS
WEIGHT: 199 LBS | DIASTOLIC BLOOD PRESSURE: 70 MMHG | HEART RATE: 73 BPM | BODY MASS INDEX: 39.07 KG/M2 | OXYGEN SATURATION: 98 % | HEIGHT: 60 IN | TEMPERATURE: 97.5 F | SYSTOLIC BLOOD PRESSURE: 112 MMHG

## 2022-08-17 DIAGNOSIS — G89.4 CHRONIC PAIN SYNDROME: ICD-10-CM

## 2022-08-17 DIAGNOSIS — F33.9 DEPRESSION, RECURRENT (HCC): ICD-10-CM

## 2022-08-17 DIAGNOSIS — M15.9 PRIMARY OSTEOARTHRITIS INVOLVING MULTIPLE JOINTS: ICD-10-CM

## 2022-08-17 DIAGNOSIS — E66.01 MORBID OBESITY (HCC): ICD-10-CM

## 2022-08-17 DIAGNOSIS — N18.30 STAGE 3 CHRONIC KIDNEY DISEASE, UNSPECIFIED WHETHER STAGE 3A OR 3B CKD (HCC): ICD-10-CM

## 2022-08-17 DIAGNOSIS — Z13.31 NEGATIVE DEPRESSION SCREENING: ICD-10-CM

## 2022-08-17 DIAGNOSIS — E78.2 MIXED HYPERLIPIDEMIA: ICD-10-CM

## 2022-08-17 DIAGNOSIS — I10 PRIMARY HYPERTENSION: Primary | ICD-10-CM

## 2022-08-17 DIAGNOSIS — I10 PRIMARY HYPERTENSION: ICD-10-CM

## 2022-08-17 LAB
CREAT UR-MCNC: 99.5 MG/DL
MICROALBUMIN UR-MCNC: <5 MG/L (ref 0–20)
MICROALBUMIN/CREAT 24H UR: <5 MG/G CREATININE (ref 0–30)
PROLACTIN SERPL-MCNC: 5.1 NG/ML
TSH SERPL DL<=0.05 MIU/L-ACNC: 1.88 UIU/ML (ref 0.45–4.5)

## 2022-08-17 PROCEDURE — 84146 ASSAY OF PROLACTIN: CPT

## 2022-08-17 PROCEDURE — 36415 COLL VENOUS BLD VENIPUNCTURE: CPT

## 2022-08-17 PROCEDURE — 82570 ASSAY OF URINE CREATININE: CPT | Performed by: INTERNAL MEDICINE

## 2022-08-17 PROCEDURE — 99214 OFFICE O/P EST MOD 30 MIN: CPT | Performed by: INTERNAL MEDICINE

## 2022-08-17 PROCEDURE — 82043 UR ALBUMIN QUANTITATIVE: CPT | Performed by: INTERNAL MEDICINE

## 2022-08-17 PROCEDURE — 84443 ASSAY THYROID STIM HORMONE: CPT

## 2022-08-17 NOTE — PROGRESS NOTES
Assessment/Plan:  Problem List Items Addressed This Visit        Cardiovascular and Mediastinum    Hypertension - Primary    Relevant Orders    Microalbumin / creatinine urine ratio    TSH, 3rd generation with Free T4 reflex       Musculoskeletal and Integument    Primary osteoarthritis involving multiple joints       Genitourinary    Stage 3 chronic kidney disease, unspecified whether stage 3a or 3b CKD (HCC)       Other    Morbid obesity ([facility] [address] )    Mixed hyperlipidemia    Relevant Orders    TSH, 3rd generation with Free T4 reflex    Depression, recurrent (HCC)    Relevant Orders    Prolactin    Chronic pain syndrome      Other Visit Diagnoses     Negative depression screening               Diagnoses and all orders for this visit:    Primary hypertension  -     Microalbumin / creatinine urine ratio  -     TSH, 3rd generation with Free T4 reflex; Future    Primary osteoarthritis involving multiple joints    Mixed hyperlipidemia  -     TSH, 3rd generation with Free T4 reflex; Future    Depression, recurrent (HCC)  -     Prolactin; Future    Chronic pain syndrome    Morbid obesity (HCC)    Stage 3 chronic kidney disease, unspecified whether stage 3a or 3b CKD ([facility] )    Negative depression screening      No problem-specific Assessment & Plan notes found for this encounter  A/P: Doing well and discussed labs  Wants to stop cholesterol meds, but feel she still needs them  Due for additional labs  Continue current treatment and RTC four months for routine  Subjective:      Patient ID: Garrick Logan is a 78 y o  female  WF RTC for f/u HTN, hyperlipidemia, etc  Doing ok and no new issues  Remains active w/o difficulty and no falls  MDD is controlled despite stopping the abilify  Chronic pain is manageable  Recent labs ok         The following portions of the patient's history were reviewed and updated as appropriate:   She has a past medical history of Arthritis, Cataract, Depression, Fluid retention, Headache, acute, Heart murmur, Hyperlipidemia, Hypertension, Pedal edema, Pneumonia, PONV (postoperative nausea and vomiting), Rotator cuff tear, left, Wears glasses, and Wears partial dentures  ,  does not have any pertinent problems on file  ,   has a past surgical history that includes Ankle surgery (Right); Tonsillectomy; Tubal ligation; Hysterectomy; Ovarian cyst removal; Carpal tunnel release (Bilateral); Hand surgery (Right); Abdominal adhesion surgery; Knee arthroscopy (Left); Appendectomy; Colonoscopy; Hemorroidectomy; Repair rectocele; pr shldr arthroscop,surg,w/rotat cuff repr (Left, 2/19/2018); Carpal tunnel release (Left); Carpal tunnel release (Right); Ileostomy; Anal sphincteroplasty; Knee arthrocentesis; Tonsillectomy and adenoidectomy; Joint replacement; and Replacement total knee (Left, 09/28/2021)  ,  family history includes Alzheimer's disease in her sister and sister; Bone cancer in her family and mother; COPD in her mother; Colon cancer in her family and mother; Depression in her sister; Diabetes in her sister; Emphysema in her mother; ROBERT disease in her brother; Heart failure in her sister; Hypertension in her brother, family, mother, sister, and sister; Lead poisoning in her father; No Known Problems in her daughter, maternal aunt, maternal aunt, maternal aunt, maternal aunt, maternal aunt, maternal grandmother, paternal aunt, and paternal grandmother; Skin cancer in her mother; Throat cancer in her brother; Thyroid disease in her sister  ,   reports that she has never smoked  She has never used smokeless tobacco  She reports current alcohol use  She reports that she does not use drugs  ,  is allergic to iodine - food allergy, keflex [cephalexin], morphine and related, penicillins, benadryl [diphenhydramine], ciprofloxacin, clindamycin, and erythromycin     Current Outpatient Medications   Medication Sig Dispense Refill    acetaminophen (TYLENOL) 500 mg tablet Take 500-1,000 mg by mouth every 6 (six) hours as needed for mild pain        albuterol (ProAir HFA) 90 mcg/act inhaler Inhale 2 puffs every 4 (four) hours as needed for wheezing 18 g 0    Calcium Carbonate (CALCIUM 600 PO) Take by mouth daily        carbidopa-levodopa (SINEMET)  mg per tablet take 1 tablet by mouth three times a day 270 tablet 1    Cholecalciferol (VITAMIN D3 PO) Take 50 mcg by mouth daily        citalopram (CeleXA) 40 mg tablet take 1 tablet by mouth every evening 90 tablet 1    enalapril (VASOTEC) 5 mg tablet Take 1 tablet (5 mg total) by mouth daily 90 tablet 1    ezetimibe (ZETIA) 10 mg tablet take 1 tablet by mouth once daily 90 tablet 1    montelukast (SINGULAIR) 10 mg tablet take 1 tablet by mouth at bedtime 90 tablet 3    primidone (MYSOLINE) 50 mg tablet take 2 tablets by mouth every 8 hours 540 tablet 1    rosuvastatin (CRESTOR) 5 mg tablet Take 1 tablet (5 mg total) by mouth daily 90 tablet 3    traZODone (DESYREL) 100 mg tablet take 1 tablet by mouth at bedtime 90 tablet 1     No current facility-administered medications for this visit  Review of Systems   Constitutional: Negative for activity change, chills, diaphoresis, fatigue and fever  HENT: Negative  Eyes: Negative for visual disturbance  Respiratory: Negative for cough, chest tightness, shortness of breath and wheezing  Cardiovascular: Negative for chest pain, palpitations and leg swelling  Gastrointestinal: Negative for abdominal pain, constipation, diarrhea, nausea and vomiting  Endocrine: Negative for cold intolerance and heat intolerance  Genitourinary: Negative  Negative for difficulty urinating, dysuria and frequency  Musculoskeletal: Negative for arthralgias, gait problem and myalgias  Neurological: Negative for dizziness, seizures, syncope, weakness, light-headedness and headaches  Psychiatric/Behavioral: Negative for confusion, dysphoric mood and sleep disturbance  The patient is not nervous/anxious      All other systems reviewed and are negative  PHQ-2/9 Depression Screening          Objective:  Vitals:    08/17/22 0832   BP: 112/70   Pulse: 73   Temp: 97 5 °F (36 4 °C)   TempSrc: Tympanic   SpO2: 98%   Weight: 90 3 kg (199 lb)   Height: 5' (1 524 m)     Body mass index is 38 86 kg/m²  Physical Exam  Vitals and nursing note reviewed  Constitutional:       General: She is not in acute distress  Appearance: Normal appearance  She is not ill-appearing  HENT:      Head: Normocephalic and atraumatic  Mouth/Throat:      Mouth: Mucous membranes are moist    Eyes:      Extraocular Movements: Extraocular movements intact  Conjunctiva/sclera: Conjunctivae normal       Pupils: Pupils are equal, round, and reactive to light  Neck:      Vascular: No carotid bruit  Cardiovascular:      Rate and Rhythm: Normal rate and regular rhythm  Heart sounds: Normal heart sounds  Pulmonary:      Effort: Pulmonary effort is normal  No respiratory distress  Breath sounds: Normal breath sounds  No wheezing or rales  Abdominal:      General: Bowel sounds are normal  There is no distension  Palpations: Abdomen is soft  Tenderness: There is no abdominal tenderness  Musculoskeletal:      Cervical back: Neck supple  Right lower leg: No edema  Left lower leg: No edema  Neurological:      General: No focal deficit present  Mental Status: She is alert and oriented to person, place, and time  Mental status is at baseline  Psychiatric:         Mood and Affect: Mood normal          Behavior: Behavior normal          Thought Content:  Thought content normal          Judgment: Judgment normal

## 2022-08-17 NOTE — PATIENT INSTRUCTIONS
Chronic Hypertension   AMBULATORY CARE:   Hypertension  is high blood pressure  Your blood pressure is the force of your blood moving against the walls of your arteries  Hypertension causes your blood pressure to get so high that your heart has to work much harder than normal  This can damage your heart  Even if you have hypertension for years, lifestyle changes, medicines, or both can help bring your blood pressure to normal   Call your local emergency number (911 in the [address] [facility]) or have someone call if:   You have chest pain  You have any of the following signs of a heart attack:      Squeezing, pressure, or pain in your chest    You may  also have any of the following:     Discomfort or pain in your back, neck, jaw, stomach, or arm    Shortness of breath    Nausea or vomiting    Lightheadedness or a sudden cold sweat    You become confused or have difficulty speaking  You suddenly feel lightheaded or have trouble breathing  Seek care immediately if:   You have a severe headache or vision loss  You have weakness in an arm or leg  Call your doctor or cardiologist if:   You feel faint, dizzy, confused, or drowsy  You have been taking your blood pressure medicine but your pressure is higher than your provider says it should be  You have questions or concerns about your condition or care  Treatment for chronic hypertension  may include medicine to lower your blood pressure and cholesterol levels  A low cholesterol level helps prevent heart disease and makes it easier to control your blood pressure  Heart disease can make your blood pressure harder to control  You may also need to make lifestyle changes  What you need to know about the stages of hypertension:       Normal blood pressure is 119/79 or lower   Your healthcare provider may only check your blood pressure each year if it stays at a normal level  Elevated blood pressure is 120/79 to 129/79   This is sometimes called prehypertension  Your healthcare provider may suggest lifestyle changes to help lower your blood pressure to a normal level  He or she may then check it again in 3 to 6 months  Stage 1 hypertension is 130/80  to 139/89   Your provider may recommend lifestyle changes, medication, and checks every 3 to 6 months until your blood pressure is controlled  Stage 2 hypertension is 140/90 or higher   Your provider will recommend lifestyle changes and have you take 2 kinds of hypertension medicines  You will also need to have your blood pressure checked monthly until it is controlled  Manage chronic hypertension:   Check your blood pressure at home  Avoid smoking, caffeine, and exercise at least 30 minutes before checking your blood pressure  Sit and rest for 5 minutes before you take your blood pressure  Extend your arm and support it on a flat surface  Your arm should be at the same level as your heart  Follow the directions that came with your blood pressure monitor  Check your blood pressure 2 times, 1 minute apart, before you take your medicine in the morning  Also check your blood pressure before your evening meal  Keep a record of your readings and bring it to your follow-up visits  Ask your healthcare provider what your blood pressure should be  Manage any other health conditions you have  Health conditions such as diabetes can increase your risk for hypertension  Follow your healthcare provider's instructions and take all your medicines as directed  Talk to your healthcare provider about any new health conditions you have recently developed  Ask about all medicines  Certain medicines can increase your blood pressure  Examples include oral birth control pills, decongestants, herbal supplements, and NSAIDs, such as ibuprofen  Your healthcare provider can tell you which medicines are safe for you to take  This includes prescription and over-the-counter medicines      Lifestyle changes you can make to lower your blood pressure: Your provider may want you to make more lifestyle changes if you are having trouble controlling your blood pressure  This may feel difficult over time, especially if you think you are making good changes but your pressure is still high  It might help to focus on one new change at a time  For example, try to add 1 more day of exercise, or exercise for an extra 10 minutes on 2 days  Small changes can make a big difference  Your healthcare provider can also refer you to specialists such as a dietitian who can help you make small changes  Your family members may be included in helping you learn to create lifestyle changes, such as the following:     Limit sodium (salt) as directed  Too much sodium can affect your fluid balance  Check labels to find low-sodium or no-salt-added foods  Some low-sodium foods use potassium salts for flavor  Too much potassium can also cause health problems  Your healthcare provider will tell you how much sodium and potassium are safe for you to have in a day  He or she may recommend that you limit sodium to 2,300 mg a day  Follow the meal plan recommended by your healthcare provider  A dietitian or your provider can give you more information on low-sodium plans or the DASH (Dietary Approaches to Stop Hypertension) eating plan  The DASH plan is low in sodium, processed sugar, unhealthy fats, and total fat  It is high in potassium, calcium, and fiber  These can be found in vegetables, fruit, and whole-grain foods  Be physically active throughout the day  Physical activity, such as exercise, can help control your blood pressure and your weight  Be physically active for at least 30 minutes per day, on most days of the week  Include aerobic activity, such as walking or riding a bicycle  Also include strength training at least 2 times each week  Your healthcare providers can help you create a physical activity plan  Decrease stress    This may help lower your blood pressure  Learn ways to relax, such as deep breathing or listening to music  Limit alcohol as directed  Alcohol can increase your blood pressure  A drink of alcohol is 12 ounces of beer, 5 ounces of wine, or 1½ ounces of liquor  Do not smoke  Nicotine and other chemicals in cigarettes and cigars can increase your blood pressure and also cause lung damage  Ask your healthcare provider for information if you currently smoke and need help to quit  E-cigarettes or smokeless tobacco still contain nicotine  Talk to your healthcare provider before you use these products  Follow up with your doctor or cardiologist as directed: You will need to return to have your blood pressure checked and to have other lab tests done  Write down your questions so you remember to ask them during your visits  © Copyright [employer] 2022 Information is for End User's use only and may not be sold, redistributed or otherwise used for commercial purposes  All illustrations and images included in CareNotes® are the copyrighted property of [employer] M [employer] Inc  or [place] [facility]   The above information is an  only  It is not intended as medical advice for individual conditions or treatments  Talk to your doctor, nurse or pharmacist before following any medical regimen to see if it is safe and effective for you

## 2022-09-13 ENCOUNTER — OFFICE VISIT (OUTPATIENT)
Dept: URGENT CARE | Facility: CLINIC | Age: 67
End: 2022-09-13
Payer: MEDICARE

## 2022-09-13 VITALS
DIASTOLIC BLOOD PRESSURE: 63 MMHG | HEART RATE: 72 BPM | RESPIRATION RATE: 18 BRPM | SYSTOLIC BLOOD PRESSURE: 139 MMHG | TEMPERATURE: 97.2 F | WEIGHT: 192.4 LBS | OXYGEN SATURATION: 97 % | BODY MASS INDEX: 37.58 KG/M2

## 2022-09-13 DIAGNOSIS — I10 ESSENTIAL HYPERTENSION: ICD-10-CM

## 2022-09-13 DIAGNOSIS — R19.7 DIARRHEA, UNSPECIFIED TYPE: Primary | ICD-10-CM

## 2022-09-13 PROCEDURE — G0463 HOSPITAL OUTPT CLINIC VISIT: HCPCS | Performed by: PHYSICIAN ASSISTANT

## 2022-09-13 PROCEDURE — 99213 OFFICE O/P EST LOW 20 MIN: CPT | Performed by: PHYSICIAN ASSISTANT

## 2022-09-13 RX ORDER — ENALAPRIL MALEATE 5 MG/1
TABLET ORAL
Qty: 90 TABLET | Refills: 1 | Status: SHIPPED | OUTPATIENT
Start: 2022-09-13

## 2022-09-13 NOTE — PROGRESS NOTES
[address] [facility]      NAME: Chanel Graves is a 75 y o  female  : 1955    MRN: [id number]  DATE: 2022  TIME: 11:34 AM    Assessment and Plan   Diarrhea, unspecified type [R19 7]  1  Diarrhea, unspecified type         Patient Instructions   If the pain begins to localize especially in the RLQ or if the pain worsens, to present to the ER for further evaluation  Stay hydrated by taking small sips of water through out the day  Avoid large amounts of water at one time  Advance diet as tolerated starting with crackers, toast   Can use imodium for diarrhea  If you are unable to hold down fluids and feel dehydrated, go to the emergency room  Can take tylenol or ibuprofen as needed for headache, pain, fever  Probiotics which can be found over the counter in pill form or in yogurt, such as activia, would be beneficial to increase normal gut janusz and help with diarrhea  If symptoms worsen go to the emergency room  To present to the ER if symptoms worsen  Chief Complaint     Chief Complaint   Patient presents with    Diarrhea     Patient c/o diarrhea and LLQ pain that started this morning  History of Present Illness   Chanel Graves presents to the clinic c/o    Diarrhea   This is a new problem  The current episode started today (this AM)  The problem occurs 2 to 4 times per day  The problem has been unchanged  The stool consistency is described as watery  Associated symptoms include abdominal pain  Pertinent negatives include no chills, coughing, fever, headaches or vomiting  Nothing aggravates the symptoms  There are no known risk factors  She has tried nothing for the symptoms  The treatment provided no relief  There is no history of a recent abdominal surgery  Review of Systems   Review of Systems   Constitutional: Negative for chills, diaphoresis, fatigue and fever  HENT: Negative for congestion, ear discharge, ear pain and facial swelling      Eyes: Negative for photophobia, pain, discharge, redness, itching and visual disturbance  Respiratory: Negative for apnea, cough, chest tightness, shortness of breath and wheezing  Cardiovascular: Negative for chest pain and palpitations  Gastrointestinal: Positive for abdominal pain and diarrhea  Negative for blood in stool, nausea and vomiting  Genitourinary: Negative for dysuria, frequency and urgency  Skin: Negative for color change, rash and wound  Neurological: Negative for dizziness and headaches  Hematological: Negative for adenopathy           Current Medications     Long-Term Medications   Medication Sig Dispense Refill    carbidopa-levodopa (SINEMET)  mg per tablet take 1 tablet by mouth three times a day 270 tablet 1    citalopram (CeleXA) 40 mg tablet take 1 tablet by mouth every evening 90 tablet 1    enalapril (VASOTEC) 5 mg tablet take 1 tablet by mouth once daily 90 tablet 1    ezetimibe (ZETIA) 10 mg tablet take 1 tablet by mouth once daily 90 tablet 1    montelukast (SINGULAIR) 10 mg tablet take 1 tablet by mouth at bedtime 90 tablet 3    primidone (MYSOLINE) 50 mg tablet take 2 tablets by mouth every 8 hours 540 tablet 1    rosuvastatin (CRESTOR) 5 mg tablet Take 1 tablet (5 mg total) by mouth daily 90 tablet 3    traZODone (DESYREL) 100 mg tablet take 1 tablet by mouth at bedtime 90 tablet 1    [DISCONTINUED] enalapril (VASOTEC) 5 mg tablet Take 1 tablet (5 mg total) by mouth daily 90 tablet 1       Current Allergies     Allergies as of 09/13/2022 - Reviewed 09/13/2022   Allergen Reaction Noted    Iodine - food allergy Hives 02/13/2018    Keflex [cephalexin] Other (See Comments) 02/13/2018    Morphine and related Hives 02/13/2018    Penicillins Hives 02/13/2018    Benadryl [diphenhydramine] Itching and Swelling 04/15/2020    Ciprofloxacin Hives 02/28/2021    Clindamycin Other (See Comments) 02/13/2018    Erythromycin Hives 02/13/2018            The following portions of the patient's history were reviewed and updated as appropriate: allergies, current medications, past family history, past medical history, past social history, past surgical history and problem list   Past Medical History:   Diagnosis Date    Arthritis     Cataract     ashley    Depression     Fluid retention     Headache, acute     Heart murmur     Hyperlipidemia     Hypertension     Pedal edema     Pneumonia     PONV (postoperative nausea and vomiting)     Rotator cuff tear, left     Wears glasses     Wears partial dentures     upper     Past Surgical History:   Procedure Laterality Date    ABDOMINAL ADHESION SURGERY      ANAL SPHINCTEROPLASTY      ANKLE SURGERY Right     tendon tear    APPENDECTOMY      CARPAL TUNNEL RELEASE Bilateral     CARPAL TUNNEL RELEASE Left     CARPAL TUNNEL RELEASE Right     COLONOSCOPY      HAND SURGERY Right     ganglion cyst    HEMORROIDECTOMY      HYSTERECTOMY      ILEOSTOMY      JOINT REPLACEMENT      KNEE ARTHROCENTESIS      ganglion Cyst    KNEE ARTHROSCOPY Left     OVARIAN CYST REMOVAL      MN SHLDR ARTHROSCOP,SURG,W/ROTAT CUFF REPR Left 2/19/2018    Procedure: ARTHROSCOPIC REPAIR ROTATOR CUFF,  SAD, BICEP TENODESIS;  Surgeon: Jayy Jenkins MD;  Location: [facility];  Service: Orthopedics    REPAIR RECTOCELE      REPLACEMENT TOTAL KNEE Left 09/28/2021    TONSILLECTOMY      TONSILLECTOMY AND ADENOIDECTOMY      TUBAL LIGATION       Social History     Socioeconomic History    Marital status:      Spouse name: Not on file    Number of children: Not on file    Years of education: Not on file    Highest education level: Not on file   Occupational History    Occupation: retired   Tobacco Use    Smoking status: Never Smoker    Smokeless tobacco: Never Used   Vaping Use    Vaping Use: Never used   Substance and Sexual Activity    Alcohol use: Yes     Comment: special events    Drug use: No    Sexual activity: Yes     Partners: Male   Other Topics Concern    Not on file   Social History Narrative    Getting   Two children    Lives with her daughter    On disability  Prior   Social Determinants of Health     Financial Resource Strain: Not on file   Food Insecurity: Not on file   Transportation Needs: Not on file   Physical Activity: Not on file   Stress: Not on file   Social Connections: Not on file   Intimate Partner Violence: Not on file   Housing Stability: Not on file       Objective   /63   Pulse 72   Temp (!) 97 2 °F (36 2 °C) (Temporal)   Resp 18   Wt 87 3 kg (192 lb 6 4 oz)   SpO2 97%   BMI 37 58 kg/m²      Physical Exam     Physical Exam  Vitals and nursing note reviewed  Constitutional:       General: She is not in acute distress  Appearance: She is well-developed  She is not diaphoretic  HENT:      Head: Normocephalic and atraumatic  Right Ear: External ear normal       Left Ear: External ear normal       Nose: Nose normal       Mouth/Throat:      Mouth: Mucous membranes are moist       Pharynx: Oropharynx is clear  No oropharyngeal exudate or posterior oropharyngeal erythema  Eyes:      General: No scleral icterus  Right eye: No discharge  Left eye: No discharge  Conjunctiva/sclera: Conjunctivae normal    Cardiovascular:      Rate and Rhythm: Normal rate and regular rhythm  Heart sounds: Normal heart sounds  No murmur heard  No friction rub  No gallop  Pulmonary:      Effort: Pulmonary effort is normal  No respiratory distress  Breath sounds: Normal breath sounds  No decreased breath sounds, wheezing, rhonchi or rales  Abdominal:      General: Bowel sounds are normal  There is no distension  Palpations: Abdomen is soft  Tenderness: There is abdominal tenderness (mild) in the left upper quadrant  There is no right CVA tenderness, left CVA tenderness, guarding or rebound  Skin:     General: Skin is warm and dry  Coloration: Skin is not pale  Findings: No erythema or rash  Neurological:      Mental Status: She is alert and oriented to person, place, and time  Psychiatric:         Behavior: Behavior normal          Thought Content:  Thought content normal          Judgment: Judgment normal          Chung Gonsalez PA-C

## 2022-09-28 ENCOUNTER — HOSPITAL ENCOUNTER (EMERGENCY)
Facility: HOSPITAL | Age: 67
Discharge: HOME | End: 2022-09-28
Attending: EMERGENCY MEDICINE
Payer: MEDICARE

## 2022-09-28 ENCOUNTER — APPOINTMENT (EMERGENCY)
Dept: CT IMAGING | Facility: HOSPITAL | Age: 67
End: 2022-09-28
Payer: MEDICARE

## 2022-09-28 VITALS
DIASTOLIC BLOOD PRESSURE: 70 MMHG | SYSTOLIC BLOOD PRESSURE: 135 MMHG | HEART RATE: 86 BPM | TEMPERATURE: 98.3 F | OXYGEN SATURATION: 97 % | RESPIRATION RATE: 18 BRPM | WEIGHT: 192 LBS | BODY MASS INDEX: 37.5 KG/M2

## 2022-09-28 DIAGNOSIS — R10.32 LEFT GROIN PAIN: Primary | ICD-10-CM

## 2022-09-28 DIAGNOSIS — N39.0 UTI (URINARY TRACT INFECTION): ICD-10-CM

## 2022-09-28 LAB
ALBUMIN SERPL BCP-MCNC: 4.1 G/DL (ref 3.5–5)
ALP SERPL-CCNC: 75 U/L (ref 34–104)
ALT SERPL W P-5'-P-CCNC: 5 U/L (ref 7–52)
ANION GAP SERPL CALCULATED.3IONS-SCNC: 8 MMOL/L (ref 4–13)
AST SERPL W P-5'-P-CCNC: 11 U/L (ref 13–39)
BACTERIA UR QL AUTO: ABNORMAL /HPF
BASOPHILS # BLD AUTO: 0.04 THOUSANDS/ΜL (ref 0–0.1)
BASOPHILS NFR BLD AUTO: 1 % (ref 0–1)
BILIRUB SERPL-MCNC: 0.35 MG/DL (ref 0.2–1)
BILIRUB UR QL STRIP: NEGATIVE
BUN SERPL-MCNC: 10 MG/DL (ref 5–25)
CALCIUM SERPL-MCNC: 9.4 MG/DL (ref 8.4–10.2)
CHLORIDE SERPL-SCNC: 103 MMOL/L (ref 96–108)
CLARITY UR: ABNORMAL
CO2 SERPL-SCNC: 29 MMOL/L (ref 21–32)
COLOR UR: YELLOW
CREAT SERPL-MCNC: 0.86 MG/DL (ref 0.6–1.3)
EOSINOPHIL # BLD AUTO: 0.18 THOUSAND/ΜL (ref 0–0.61)
EOSINOPHIL NFR BLD AUTO: 3 % (ref 0–6)
ERYTHROCYTE [DISTWIDTH] IN BLOOD BY AUTOMATED COUNT: 13 % (ref 11.6–15.1)
GFR SERPL CREATININE-BSD FRML MDRD: 69 ML/MIN/1.73SQ M
GLUCOSE SERPL-MCNC: 143 MG/DL (ref 65–140)
GLUCOSE UR STRIP-MCNC: NEGATIVE MG/DL
HCT VFR BLD AUTO: 40.5 % (ref 34.8–46.1)
HGB BLD-MCNC: 13.7 G/DL (ref 11.5–15.4)
HGB UR QL STRIP.AUTO: NEGATIVE
IMM GRANULOCYTES # BLD AUTO: 0.02 THOUSAND/UL (ref 0–0.2)
IMM GRANULOCYTES NFR BLD AUTO: 0 % (ref 0–2)
KETONES UR STRIP-MCNC: NEGATIVE MG/DL
LEUKOCYTE ESTERASE UR QL STRIP: ABNORMAL
LYMPHOCYTES # BLD AUTO: 1.4 THOUSANDS/ΜL (ref 0.6–4.47)
LYMPHOCYTES NFR BLD AUTO: 23 % (ref 14–44)
MAGNESIUM SERPL-MCNC: 1.9 MG/DL (ref 1.9–2.7)
MCH RBC QN AUTO: 30.7 PG (ref 26.8–34.3)
MCHC RBC AUTO-ENTMCNC: 33.8 G/DL (ref 31.4–37.4)
MCV RBC AUTO: 91 FL (ref 82–98)
MONOCYTES # BLD AUTO: 0.45 THOUSAND/ΜL (ref 0.17–1.22)
MONOCYTES NFR BLD AUTO: 7 % (ref 4–12)
NEUTROPHILS # BLD AUTO: 4.12 THOUSANDS/ΜL (ref 1.85–7.62)
NEUTS SEG NFR BLD AUTO: 66 % (ref 43–75)
NITRITE UR QL STRIP: POSITIVE
NON-SQ EPI CELLS URNS QL MICRO: ABNORMAL /HPF
NRBC BLD AUTO-RTO: 0 /100 WBCS
OTHER STN SPEC: ABNORMAL
PH UR STRIP.AUTO: 7 [PH]
PLATELET # BLD AUTO: 244 THOUSANDS/UL (ref 149–390)
PMV BLD AUTO: 9.7 FL (ref 8.9–12.7)
POTASSIUM SERPL-SCNC: 3.9 MMOL/L (ref 3.5–5.3)
PROT SERPL-MCNC: 6.4 G/DL (ref 6.4–8.4)
PROT UR STRIP-MCNC: NEGATIVE MG/DL
RBC # BLD AUTO: 4.46 MILLION/UL (ref 3.81–5.12)
RBC #/AREA URNS AUTO: ABNORMAL /HPF
SODIUM SERPL-SCNC: 140 MMOL/L (ref 135–147)
SP GR UR STRIP.AUTO: 1.01 (ref 1–1.03)
UROBILINOGEN UR QL STRIP.AUTO: 0.2 E.U./DL
WBC # BLD AUTO: 6.21 THOUSAND/UL (ref 4.31–10.16)
WBC #/AREA URNS AUTO: ABNORMAL /HPF
WBC CASTS URNS QL MICRO: ABNORMAL /LPF

## 2022-09-28 PROCEDURE — 87077 CULTURE AEROBIC IDENTIFY: CPT

## 2022-09-28 PROCEDURE — G1004 CDSM NDSC: HCPCS

## 2022-09-28 PROCEDURE — 36415 COLL VENOUS BLD VENIPUNCTURE: CPT

## 2022-09-28 PROCEDURE — 96374 THER/PROPH/DIAG INJ IV PUSH: CPT

## 2022-09-28 PROCEDURE — 87186 SC STD MICRODIL/AGAR DIL: CPT

## 2022-09-28 PROCEDURE — 87086 URINE CULTURE/COLONY COUNT: CPT

## 2022-09-28 PROCEDURE — 99284 EMERGENCY DEPT VISIT MOD MDM: CPT

## 2022-09-28 PROCEDURE — 85025 COMPLETE CBC W/AUTO DIFF WBC: CPT

## 2022-09-28 PROCEDURE — 74176 CT ABD & PELVIS W/O CONTRAST: CPT

## 2022-09-28 PROCEDURE — 81001 URINALYSIS AUTO W/SCOPE: CPT

## 2022-09-28 PROCEDURE — 83735 ASSAY OF MAGNESIUM: CPT

## 2022-09-28 PROCEDURE — 80053 COMPREHEN METABOLIC PANEL: CPT

## 2022-09-28 RX ORDER — NITROFURANTOIN 25; 75 MG/1; MG/1
100 CAPSULE ORAL 2 TIMES DAILY
Qty: 10 CAPSULE | Refills: 0 | Status: SHIPPED | OUTPATIENT
Start: 2022-09-28

## 2022-09-28 RX ORDER — ACETAMINOPHEN 325 MG/1
650 TABLET ORAL ONCE
Status: COMPLETED | OUTPATIENT
Start: 2022-09-28 | End: 2022-09-28

## 2022-09-28 RX ORDER — KETOROLAC TROMETHAMINE 30 MG/ML
15 INJECTION, SOLUTION INTRAMUSCULAR; INTRAVENOUS ONCE
Status: COMPLETED | OUTPATIENT
Start: 2022-09-28 | End: 2022-09-28

## 2022-09-28 RX ORDER — NITROFURANTOIN 25; 75 MG/1; MG/1
100 CAPSULE ORAL 2 TIMES DAILY WITH MEALS
Status: DISCONTINUED | OUTPATIENT
Start: 2022-09-28 | End: 2022-09-28 | Stop reason: HOSPADM

## 2022-09-28 RX ADMIN — ACETAMINOPHEN 650 MG: 325 TABLET ORAL at 15:38

## 2022-09-28 RX ADMIN — NITROFURANTOIN (MONOHYDRATE/MACROCRYSTALS) 100 MG: 75; 25 CAPSULE ORAL at 15:38

## 2022-09-28 RX ADMIN — KETOROLAC TROMETHAMINE 15 MG: 30 INJECTION, SOLUTION INTRAMUSCULAR at 15:40

## 2022-09-28 NOTE — ED PROVIDER NOTES
History  Chief Complaint   Patient presents with    Pain     Pt reports pain to the left groin pain that radiates to her left hip and left flank since monday     The patient is a 28-year-old female who reports that on Monday she was standing from a toilet and felt a short pain in her left lower quadrant/groin that radiated across her abdomen  Patient states that pain then subsided  Patient reports the pain has been waxing waning but has been consistent over the past day  Patient denies any urinary symptoms, urinary incontinence, fecal incontinence, urinary or fecal retention  Patient reports that pain radiates now up into her left side and flank area  Patient denies any gross blood in her urine or stool  Patient denies occurring in the past   Patient denies any nausea vomiting diarrhea  Patient denies any constipation  Patient denies any chest pain or shortness of breath          Prior to Admission Medications   Prescriptions Last Dose Informant Patient Reported? Taking?    Calcium Carbonate (CALCIUM 600 PO)  Self Yes No   Sig: Take by mouth daily     Cholecalciferol (VITAMIN D3 PO)  Self Yes No   Sig: Take 50 mcg by mouth daily     acetaminophen (TYLENOL) 500 mg tablet  Self Yes No   Sig: Take 500-1,000 mg by mouth every 6 (six) hours as needed for mild pain     albuterol (ProAir HFA) 90 mcg/act inhaler  Self No No   Sig: Inhale 2 puffs every 4 (four) hours as needed for wheezing   carbidopa-levodopa (SINEMET)  mg per tablet   No No   Sig: take 1 tablet by mouth three times a day   citalopram (CeleXA) 40 mg tablet   No No   Sig: take 1 tablet by mouth every evening   enalapril (VASOTEC) 5 mg tablet   No No   Sig: take 1 tablet by mouth once daily   ezetimibe (ZETIA) 10 mg tablet  Self No No   Sig: take 1 tablet by mouth once daily   montelukast (SINGULAIR) 10 mg tablet  Self No No   Sig: take 1 tablet by mouth at bedtime   primidone (MYSOLINE) 50 mg tablet  Self No No   Sig: take 2 tablets by mouth every 8 hours   rosuvastatin (CRESTOR) 5 mg tablet  Self No No   Sig: Take 1 tablet (5 mg total) by mouth daily   traZODone (DESYREL) 100 mg tablet  Self No No   Sig: take 1 tablet by mouth at bedtime      Facility-Administered Medications: None       Past Medical History:   Diagnosis Date    Arthritis     Cataract     ashley    Depression     Fluid retention     Headache, acute     Heart murmur     Hyperlipidemia     Hypertension     Pedal edema     Pneumonia     PONV (postoperative nausea and vomiting)     Rotator cuff tear, left     Wears glasses     Wears partial dentures     upper       Past Surgical History:   Procedure Laterality Date    ABDOMINAL ADHESION SURGERY      ANAL SPHINCTEROPLASTY      ANKLE SURGERY Right     tendon tear    APPENDECTOMY      CARPAL TUNNEL RELEASE Bilateral     CARPAL TUNNEL RELEASE Left     CARPAL TUNNEL RELEASE Right     COLONOSCOPY      HAND SURGERY Right     ganglion cyst    HEMORROIDECTOMY      HYSTERECTOMY      ILEOSTOMY      JOINT REPLACEMENT      KNEE ARTHROCENTESIS      ganglion Cyst    KNEE ARTHROSCOPY Left     OVARIAN CYST REMOVAL      MA SHLDR ARTHROSCOP,SURG,W/ROTAT CUFF REPR Left 2/19/2018    Procedure: ARTHROSCOPIC REPAIR ROTATOR CUFF,  SAD, BICEP TENODESIS;  Surgeon: Jannet Richardson MD;  Location: [facility];  Service: Orthopedics    REPAIR RECTOCELE      REPLACEMENT TOTAL KNEE Left 09/28/2021    TONSILLECTOMY      TONSILLECTOMY AND ADENOIDECTOMY      TUBAL LIGATION         Family History   Problem Relation Age of Onset    Hypertension Mother     Colon cancer Mother     Bone cancer Mother     COPD Mother     Emphysema Mother     Skin cancer Mother     Lead poisoning Father         lead poisoning in lungs     Thyroid disease Sister     Depression Sister     Hypertension Sister     Alzheimer's disease Sister     ROBERT disease Brother     Throat cancer Brother     Hypertension Brother     Colon cancer Family     Bone cancer Family     Hypertension Family     Diabetes Sister     Hypertension Sister     Heart failure Sister     Alzheimer's disease Sister     No Known Problems Daughter     No Known Problems Maternal Grandmother     No Known Problems Paternal Grandmother     No Known Problems Maternal Aunt     No Known Problems Maternal Aunt     No Known Problems Maternal Aunt     No Known Problems Maternal Aunt     No Known Problems Maternal Aunt     No Known Problems Paternal Aunt      I have reviewed and agree with the history as documented  E-Cigarette/Vaping    E-Cigarette Use Never User      E-Cigarette/Vaping Substances    Nicotine No     THC No     CBD No     Flavoring No     Other No     Unknown No      Social History     Tobacco Use    Smoking status: Never Smoker    Smokeless tobacco: Never Used   Vaping Use    Vaping Use: Never used   Substance Use Topics    Alcohol use: Yes     Comment: special events    Drug use: No       Review of Systems   Constitutional: Negative  HENT: Negative  Eyes: Negative  Respiratory: Negative  Cardiovascular: Negative  Gastrointestinal: Positive for abdominal pain  Negative for diarrhea, nausea and vomiting  Endocrine: Negative  Genitourinary: Negative for dysuria and frequency  Musculoskeletal: Negative  Skin: Negative  Allergic/Immunologic: Negative  Neurological: Negative  Hematological: Negative  Psychiatric/Behavioral: Negative  All other systems reviewed and are negative  Physical Exam  Physical Exam  Vitals and nursing note reviewed  Constitutional:       Appearance: Normal appearance  She is normal weight  HENT:      Head: Normocephalic  Right Ear: External ear normal       Left Ear: External ear normal       Nose: Nose normal       Mouth/Throat:      Mouth: Mucous membranes are moist    Eyes:      Extraocular Movements: Extraocular movements intact        Pupils: Pupils are equal, round, and reactive to light  Cardiovascular:      Rate and Rhythm: Normal rate and regular rhythm  Pulses: Normal pulses  Radial pulses are 2+ on the right side and 2+ on the left side  Dorsalis pedis pulses are 2+ on the right side and 2+ on the left side  Pulmonary:      Effort: Pulmonary effort is normal       Breath sounds: Normal breath sounds  Abdominal:      General: Abdomen is flat  Bowel sounds are normal       Palpations: Abdomen is soft  Tenderness: There is abdominal tenderness  Musculoskeletal:         General: Normal range of motion  Cervical back: Normal range of motion  Skin:     General: Skin is warm  Capillary Refill: Capillary refill takes less than 2 seconds  Neurological:      General: No focal deficit present  Mental Status: She is alert  Mental status is at baseline  Psychiatric:         Mood and Affect: Mood normal          Vital Signs  ED Triage Vitals [09/28/22 1420]   Temperature Pulse Respirations Blood Pressure SpO2   98 3 °F (36 8 °C) 86 18 135/70 97 %      Temp Source Heart Rate Source Patient Position - Orthostatic VS BP Location FiO2 (%)   Oral -- -- Right arm --      Pain Score       7           Vitals:    09/28/22 1420   BP: 135/70   Pulse: 86         Visual Acuity      ED Medications  Medications   ketorolac (TORADOL) injection 15 mg (15 mg Intravenous Given 9/28/22 1540)   acetaminophen (TYLENOL) tablet 650 mg (650 mg Oral Given 9/28/22 1538)       Diagnostic Studies  Results Reviewed     Procedure Component Value Units Date/Time    Urine Microscopic [[id number]]  (Abnormal) Collected: 09/28/22 1459    Lab Status: Final result Specimen: Urine, Clean Catch Updated: 09/28/22 1526     RBC, UA None Seen /hpf      WBC, UA 20-30 /hpf      Epithelial Cells Occasional /hpf      Bacteria, UA Moderate /hpf      OTHER OBSERVATIONS WBCs Clumped     WBC Casts, UA 0-3 /lpf     Urine culture [[id number]] Collected: 09/28/22 1459    Lab Status:  In process Specimen: Urine, Clean Catch Updated: 09/28/22 1525    CMP [[id number]]  (Abnormal) Collected: 09/28/22 1452    Lab Status: Final result Specimen: Blood from Arm, Left Updated: 09/28/22 1521     Sodium 140 mmol/L      Potassium 3 9 mmol/L      Chloride 103 mmol/L      CO2 29 mmol/L      ANION GAP 8 mmol/L      BUN 10 mg/dL      Creatinine 0 86 mg/dL      Glucose 143 mg/dL      Calcium 9 4 mg/dL      AST 11 U/L      ALT 5 U/L      Alkaline Phosphatase 75 U/L      Total Protein 6 4 g/dL      Albumin 4 1 g/dL      Total Bilirubin 0 35 mg/dL      eGFR 69 ml/min/1 73sq m     Narrative:      [place] guidelines for Chronic Kidney Disease (CKD):     Stage 1 with normal or high GFR (GFR > 90 mL/min/1 73 square meters)    Stage 2 Mild CKD (GFR = 60-89 mL/min/1 73 square meters)    Stage 3A Moderate CKD (GFR = 45-59 mL/min/1 73 square meters)    Stage 3B Moderate CKD (GFR = 30-44 mL/min/1 73 square meters)    Stage 4 Severe CKD (GFR = 15-29 mL/min/1 73 square meters)    Stage 5 End Stage CKD (GFR <15 mL/min/1 73 square meters)  Note: GFR calculation is accurate only with a steady state creatinine    Magnesium [[id number]]  (Normal) Collected: 09/28/22 1452    Lab Status: Final result Specimen: Blood from Arm, Left Updated: 09/28/22 1521     Magnesium 1 9 mg/dL     UA w Reflex to Microscopic w Reflex to Culture [[id number]]  (Abnormal) Collected: 09/28/22 1459    Lab Status: Final result Specimen: Urine, Clean Catch Updated: 09/28/22 1508     Color, UA Yellow     Clarity, UA Cloudy     Specific Gravity, UA 1 015     pH, UA 7 0     Leukocytes, UA 2+     Nitrite, UA Positive     Protein, UA Negative mg/dl      Glucose, UA Negative mg/dl      Ketones, UA Negative mg/dl      Urobilinogen, UA 0 2 E U /dl      Bilirubin, UA Negative     Occult Blood, UA Negative    CBC and differential [[id number]] Collected: 09/28/22 1452    Lab Status: Final result Specimen: Blood from Arm, Left Updated: 09/28/22 1507 WBC 6 21 Thousand/uL      RBC 4 46 Million/uL      Hemoglobin 13 7 g/dL      Hematocrit 40 5 %      MCV 91 fL      MCH 30 7 pg      MCHC 33 8 g/dL      RDW 13 0 %      MPV 9 7 fL      Platelets 384 Thousands/uL      nRBC 0 /100 WBCs      Neutrophils Relative 66 %      Immat GRANS % 0 %      Lymphocytes Relative 23 %      Monocytes Relative 7 %      Eosinophils Relative 3 %      Basophils Relative 1 %      Neutrophils Absolute 4 12 Thousands/µL      Immature Grans Absolute 0 02 Thousand/uL      Lymphocytes Absolute 1 40 Thousands/µL      Monocytes Absolute 0 45 Thousand/µL      Eosinophils Absolute 0 18 Thousand/µL      Basophils Absolute 0 04 Thousands/µL                  CT abdomen pelvis without contrast   Final Result by Josué Richardson MD (09/28 6259)      1  No acute abnormality in the abdomen or pelvis  2   Diverticulosis coli  Workstation performed: [id number]                    Procedures  Procedures         ED Course                                             MDM  Number of Diagnoses or Management Options  Left groin pain  UTI (urinary tract infection)  Diagnosis management comments: DDx:  Kidney stone, diverticulitis musculoskeletal pain  Will check abdominal labs urine and CT abdomen pelvis without contrast as patient has a history a contrast dye allergy  Patient's symptoms have been worsening since Monday  Patient has no urinary symptoms  Patient is no acute abnormalities on her blood work today  Patient's UA possibly indicating a urinary tract infection  Will treat with Macrobid as patient has multiple other antibiotic allergies  Patient is aware that culture will be grown from urine and antibiotics are subject to change based on culture  Aware to follow-up with PCP for UTI  Patient has no acute findings on her CT abdomen pelvis  Patient is aware that she has diverticulosis, without diverticulitis    Discussed with patient however that if she is having left lower quadrant/groin pain that she should continue to monitor the symptoms, and be re-evaluated for any changes in symptoms including worsening pain, blood in stool, nausea, vomiting, diarrhea, fever  Patient reports feeling improved from her Toradol, and Tylenol  Patient aware to follow up with PCP for left groin pain  Discussed strict return precautions with patient  Amount and/or Complexity of Data Reviewed  Clinical lab tests: ordered and reviewed  Tests in the radiology section of CPT®: ordered and reviewed    Risk of Complications, Morbidity, and/or Mortality  General comments: Pt arrived for left lower quadrant/left groin pain  Treated for UTI with macrobid d/t allergies  Aware to follow up with PCP  Reviewed reasons to return to ed  Patient verbalized understanding of diagnosis and agreement with discharge plan of care as well as understanding of reasons to return to ed  Disposition  Final diagnoses:   Left groin pain   UTI (urinary tract infection)     Time reflects when diagnosis was documented in both MDM as applicable and the Disposition within this note     Time User Action Codes Description Comment    9/28/2022  3:46 PM Jerry Ross Add [R10 32] Left groin pain     9/28/2022  3:46 PM Jerry Ross Add [N39 0] UTI (urinary tract infection)       ED Disposition     ED Disposition   Discharge    Condition   Stable    Date/Time   Wed Sep 28, 2022  3:45 PM    [facility]  discharge to home/self care                 Follow-up Information     Follow up With Specialties Details Why Contact Info Additional Information    Yolis Begin, DO Internal Medicine Schedule an appointment as soon as possible for a visit in 2 days For further evaluation of symptoms [facility] Emergency Medicine Go to  For further evaluation of symptoms [address] Lori Fam [address] [place] [address] Emergency Department, [address] [place], [place], [facility]          Discharge Medication List as of 9/28/2022  3:48 PM      START taking these medications    Details   nitrofurantoin (MACROBID) 100 mg capsule Take 1 capsule (100 mg total) by mouth 2 (two) times a day, Starting Wed 9/28/2022, Normal         CONTINUE these medications which have NOT CHANGED    Details   acetaminophen (TYLENOL) 500 mg tablet Take 500-1,000 mg by mouth every 6 (six) hours as needed for mild pain  , Historical Med      albuterol (ProAir HFA) 90 mcg/act inhaler Inhale 2 puffs every 4 (four) hours as needed for wheezing, Starting Fri 7/1/2022, Normal      Calcium Carbonate (CALCIUM 600 PO) Take by mouth daily  , Historical Med      carbidopa-levodopa (SINEMET)  mg per tablet take 1 tablet by mouth three times a day, Normal      Cholecalciferol (VITAMIN D3 PO) Take 50 mcg by mouth daily  , Historical Med      citalopram (CeleXA) 40 mg tablet take 1 tablet by mouth every evening, Normal      enalapril (VASOTEC) 5 mg tablet take 1 tablet by mouth once daily, Normal      ezetimibe (ZETIA) 10 mg tablet take 1 tablet by mouth once daily, Normal      montelukast (SINGULAIR) 10 mg tablet take 1 tablet by mouth at bedtime, Normal      primidone (MYSOLINE) 50 mg tablet take 2 tablets by mouth every 8 hours, Normal      rosuvastatin (CRESTOR) 5 mg tablet Take 1 tablet (5 mg total) by mouth daily, Starting u 7/14/2022, Normal      traZODone (DESYREL) 100 mg tablet take 1 tablet by mouth at bedtime, Normal             No discharge procedures on file      PDMP Review     None          ED Provider  Electronically Signed by           IVAN Toribio  09/29/22 5843

## 2022-10-01 LAB — BACTERIA UR CULT: ABNORMAL

## 2022-10-04 ENCOUNTER — OFFICE VISIT (OUTPATIENT)
Dept: INTERNAL MEDICINE CLINIC | Facility: CLINIC | Age: 67
End: 2022-10-04
Payer: MEDICARE

## 2022-10-04 VITALS
BODY MASS INDEX: 38.18 KG/M2 | TEMPERATURE: 97.4 F | SYSTOLIC BLOOD PRESSURE: 128 MMHG | HEART RATE: 80 BPM | WEIGHT: 194.5 LBS | OXYGEN SATURATION: 99 % | HEIGHT: 60 IN | DIASTOLIC BLOOD PRESSURE: 72 MMHG

## 2022-10-04 DIAGNOSIS — K57.90 DIVERTICULOSIS: ICD-10-CM

## 2022-10-04 DIAGNOSIS — Z23 ENCOUNTER FOR VACCINATION: ICD-10-CM

## 2022-10-04 DIAGNOSIS — N39.0 URINARY TRACT INFECTION WITHOUT HEMATURIA, SITE UNSPECIFIED: Primary | ICD-10-CM

## 2022-10-04 DIAGNOSIS — R73.09 ELEVATED GLUCOSE: ICD-10-CM

## 2022-10-04 DIAGNOSIS — R10.32 LEFT INGUINAL PAIN: ICD-10-CM

## 2022-10-04 DIAGNOSIS — R73.03 PREDIABETES: ICD-10-CM

## 2022-10-04 LAB — SL AMB POCT HEMOGLOBIN AIC: 5.7 (ref ?–6.5)

## 2022-10-04 PROCEDURE — 90662 IIV NO PRSV INCREASED AG IM: CPT | Performed by: INTERNAL MEDICINE

## 2022-10-04 PROCEDURE — 83036 HEMOGLOBIN GLYCOSYLATED A1C: CPT | Performed by: INTERNAL MEDICINE

## 2022-10-04 PROCEDURE — G0008 ADMIN INFLUENZA VIRUS VAC: HCPCS | Performed by: INTERNAL MEDICINE

## 2022-10-04 PROCEDURE — 99213 OFFICE O/P EST LOW 20 MIN: CPT | Performed by: INTERNAL MEDICINE

## 2022-10-04 NOTE — PATIENT INSTRUCTIONS
Prediabetes   AMBULATORY CARE:   Prediabetes  is a blood glucose (sugar) level that is higher than normal  It is not high enough to be considered diabetes  Prediabetes increases your risk for type 2 diabetes and heart disease, especially if you have high blood pressure or high cholesterol  The following increase your risk for prediabetes:   Being overweight or obese, with a body mass index (BMI) of 25 or higher (23 or higher if you are  American)    Lack of physical activity    Older age    Family history of diabetes (parent or sibling)    A history of heart disease, gestational diabetes, or polycystic ovary syndrome (PCOS)    High blood pressure or cholesterol levels    Being [place] American, , , [place] American, or     In children, having a mother with diabetes or gestational diabetes mellitus (GDM) during the pregnancy    Signs and symptoms:  Prediabetes may not cause any symptoms  Call your doctor if:   You have more hunger or thirst than usual     You are urinating more often than usual     You are always exhausted  You have blurred vision  You have questions or concerns about your condition or care  Prevent or delay type 2 diabetes:  Healthy choices work best to delay or prevent type 2 diabetes  You may be given the following guidelines from your healthcare provider:  Get regular physical activity  Adults should get at least 150 minutes (2 5 hours) of moderate physical activity every week  Spread the amount of activity over at least 3 days a week  Do not skip more than 2 days in a row  Children should get at least 60 minutes of moderate physical activity on most days of the week  Examples of moderate physical activity include brisk walking, running, and swimming  Do not sit for longer than 30 minutes at a time  Work with your healthcare provider to create a plan for physical activity  Lose weight if you are overweight    A weight loss of 7% of your body weight can help  Your healthcare provider can tell you what weight is healthy for you  He or she can help you create a weight loss plan  Eat healthy foods  Eat a variety of fruits and vegetables  Eat whole-grain foods more often  Choose dairy foods, meat, and other protein foods that are low in fat  Eat fewer sweets, such as candy, cookies, regular soda, and sweetened drinks  You can also decrease calories by eating smaller portion sizes  Work with your healthcare provider or dietitian to develop a meal plan that is right for you  Take medicine as directed  Your healthcare provider may give diabetes medicine if you are at high risk for diabetes  You may also need medicines for high blood pressure and high cholesterol  Follow up with your healthcare provider as directed  You will need to return every year to get tested for diabetes  Do not smoke  Do not use e-cigarettes or smokeless tobacco in place of cigarettes or to help you quit  They still contain nicotine  Ask your healthcare provider for information if you currently smoke and need help quitting  Start with small goals and work your way up  You may need to start with 3 days of physical activity  You can add a day after 3 weeks or so of activity  Make a goal of losing 5 pounds at first  Swap a piece of fruit for dessert or sweets  Start with 2 fruits in a week and increase it weekly  These are suggestions  Talk with healthcare providers about making a plan that is right for you  Follow up with your doctor as directed: If you do have prediabetes, you will need to return every year to get tested for diabetes  Write down your questions so you remember to ask them during your visits  © Copyright [employer] 2022 Information is for End User's use only and may not be sold, redistributed or otherwise used for commercial purposes   All illustrations and images included in CareNotes® are the copyrighted property of [employer]  or [address]  The above information is an  only  It is not intended as medical advice for individual conditions or treatments  Talk to your doctor, nurse or pharmacist before following any medical regimen to see if it is safe and effective for you

## 2022-10-04 NOTE — PROGRESS NOTES
Assessment/Plan:  Problem List Items Addressed This Visit        Digestive    Diverticulosis       Other    Prediabetes      Other Visit Diagnoses     Urinary tract infection without hematuria, site unspecified    -  Primary    Left inguinal pain        Elevated glucose        Relevant Orders    POCT hemoglobin A1c (Completed)    Encounter for vaccination        Relevant Orders    influenza vaccine, high-dose, PF 0 5 mL           Diagnoses and all orders for this visit:    Urinary tract infection without hematuria, site unspecified    Left inguinal pain    Elevated glucose  -     POCT hemoglobin A1c    Diverticulosis    Encounter for vaccination  -     influenza vaccine, high-dose, PF 0 5 mL    Prediabetes      No problem-specific Assessment & Plan notes found for this encounter  A/P: Doing well and discussed labs and imaging  No s/s and will hold off on repeating the urine  Current pain more consistent with MS than renal or  origin  Will continue current treatment  In office HgA1c was 5 7  Discussed maintain hydration and use of cranberry products  If groin pain returns, ???hip related  C/s was not classic for UTI  Will up date her flu vaccine  Keep f/u as scheduled  Subjective:      Patient ID: Usha Molina is a 75 y o  female  WF presents for a six day f/u from the ER for left groin pain  W/u with labs and CT scan showed an elevated glucose, possible UTI, and diverticulosis w/o itis or kidney stones  Treated with ketoralac and macrobid  D/c'd to home  C/s grew out less than 100k kleb that was sensitive to the macrobid  Pt w/o any fever or chills  Reports doing better  NO burning or frequency with urination  Some LBP, but has a history of chronic pain  No more groin pain  No h/o diabetes          The following portions of the patient's history were reviewed and updated as appropriate:   She has a past medical history of Arthritis, Cataract, Depression, Fluid retention, Headache, acute, Heart murmur, Hyperlipidemia, Hypertension, Pedal edema, Pneumonia, PONV (postoperative nausea and vomiting), Rotator cuff tear, left, Wears glasses, and Wears partial dentures  ,  does not have any pertinent problems on file  ,   has a past surgical history that includes Ankle surgery (Right); Tonsillectomy; Tubal ligation; Hysterectomy; Ovarian cyst removal; Carpal tunnel release (Bilateral); Hand surgery (Right); Abdominal adhesion surgery; Knee arthroscopy (Left); Appendectomy; Colonoscopy; Hemorroidectomy; Repair rectocele; pr shldr arthroscop,surg,w/rotat cuff repr (Left, 2/19/2018); Carpal tunnel release (Left); Carpal tunnel release (Right); Ileostomy; Anal sphincteroplasty; Knee arthrocentesis; Tonsillectomy and adenoidectomy; Joint replacement; and Replacement total knee (Left, 09/28/2021)  ,  family history includes Alzheimer's disease in her sister and sister; Bone cancer in her family and mother; COPD in her mother; Colon cancer in her family and mother; Depression in her sister; Diabetes in her sister; Emphysema in her mother; ROBERT disease in her brother; Heart failure in her sister; Hypertension in her brother, family, mother, sister, and sister; Lead poisoning in her father; No Known Problems in her daughter, maternal aunt, maternal aunt, maternal aunt, maternal aunt, maternal aunt, maternal grandmother, paternal aunt, and paternal grandmother; Skin cancer in her mother; Throat cancer in her brother; Thyroid disease in her sister  ,   reports that she has never smoked  She has never used smokeless tobacco  She reports current alcohol use  She reports that she does not use drugs  ,  is allergic to iodine - food allergy, keflex [cephalexin], morphine and related, penicillins, benadryl [diphenhydramine], ciprofloxacin, clindamycin, and erythromycin     Current Outpatient Medications   Medication Sig Dispense Refill    acetaminophen (TYLENOL) 500 mg tablet Take 500-1,000 mg by mouth every 6 (six) hours as needed for mild pain        albuterol (ProAir HFA) 90 mcg/act inhaler Inhale 2 puffs every 4 (four) hours as needed for wheezing 18 g 0    Calcium Carbonate (CALCIUM 600 PO) Take by mouth daily        carbidopa-levodopa (SINEMET)  mg per tablet take 1 tablet by mouth three times a day 270 tablet 1    Cholecalciferol (VITAMIN D3 PO) Take 50 mcg by mouth daily        citalopram (CeleXA) 40 mg tablet take 1 tablet by mouth every evening 90 tablet 1    enalapril (VASOTEC) 5 mg tablet take 1 tablet by mouth once daily 90 tablet 1    ezetimibe (ZETIA) 10 mg tablet take 1 tablet by mouth once daily 90 tablet 1    montelukast (SINGULAIR) 10 mg tablet take 1 tablet by mouth at bedtime 90 tablet 3    primidone (MYSOLINE) 50 mg tablet take 2 tablets by mouth every 8 hours 540 tablet 1    rosuvastatin (CRESTOR) 5 mg tablet Take 1 tablet (5 mg total) by mouth daily 90 tablet 3    traZODone (DESYREL) 100 mg tablet take 1 tablet by mouth at bedtime 90 tablet 1    nitrofurantoin (MACROBID) 100 mg capsule Take 1 capsule (100 mg total) by mouth 2 (two) times a day (Patient not taking: Reported on 10/4/2022) 10 capsule 0     No current facility-administered medications for this visit  Review of Systems   Constitutional: Negative for activity change, chills, diaphoresis, fatigue and fever  Respiratory: Negative for cough, chest tightness, shortness of breath and wheezing  Cardiovascular: Negative for chest pain, palpitations and leg swelling  Gastrointestinal: Negative for abdominal pain, constipation, diarrhea, nausea and vomiting  Genitourinary: Negative for decreased urine volume, difficulty urinating, dysuria, flank pain, frequency, hematuria and urgency  Musculoskeletal: Positive for back pain  Negative for arthralgias, gait problem and myalgias  Neurological: Negative for speech difficulty, weakness, light-headedness and headaches  Psychiatric/Behavioral: Negative for confusion   The patient is not nervous/anxious  PHQ-2/9 Depression Screening          Objective:  Vitals:    10/04/22 1325   BP: 128/72   Pulse: 80   Temp: (!) 97 4 °F (36 3 °C)   SpO2: 99%   Weight: 88 2 kg (194 lb 8 oz)   Height: 5' (1 524 m)     Body mass index is 37 99 kg/m²  Physical Exam  Vitals and nursing note reviewed  Constitutional:       General: She is not in acute distress  Appearance: Normal appearance  She is not ill-appearing  HENT:      Head: Normocephalic and atraumatic  Mouth/Throat:      Mouth: Mucous membranes are moist    Eyes:      Extraocular Movements: Extraocular movements intact  Conjunctiva/sclera: Conjunctivae normal       Pupils: Pupils are equal, round, and reactive to light  Cardiovascular:      Rate and Rhythm: Regular rhythm  Heart sounds: Normal heart sounds  Pulmonary:      Effort: Pulmonary effort is normal  No respiratory distress  Breath sounds: Normal breath sounds  No wheezing, rhonchi or rales  Abdominal:      General: Bowel sounds are normal  There is no distension  Palpations: Abdomen is soft  There is no mass  Tenderness: There is no abdominal tenderness  There is no right CVA tenderness, left CVA tenderness, guarding or rebound  Hernia: No hernia is present  Neurological:      General: No focal deficit present  Mental Status: She is alert and oriented to person, place, and time  Mental status is at baseline  Psychiatric:         Mood and Affect: Mood normal          Behavior: Behavior normal          Thought Content:  Thought content normal          Judgment: Judgment normal

## 2022-10-21 DIAGNOSIS — G25.2 COARSE TREMORS: ICD-10-CM

## 2022-11-09 ENCOUNTER — OFFICE VISIT (OUTPATIENT)
Dept: URGENT CARE | Facility: CLINIC | Age: 67
End: 2022-11-09

## 2022-11-09 VITALS
HEIGHT: 60 IN | RESPIRATION RATE: 20 BRPM | WEIGHT: 192 LBS | SYSTOLIC BLOOD PRESSURE: 142 MMHG | HEART RATE: 74 BPM | TEMPERATURE: 98.3 F | DIASTOLIC BLOOD PRESSURE: 66 MMHG | OXYGEN SATURATION: 96 % | BODY MASS INDEX: 37.69 KG/M2

## 2022-11-09 DIAGNOSIS — R05.1 ACUTE COUGH: ICD-10-CM

## 2022-11-09 DIAGNOSIS — J11.1 INFLUENZA: Primary | ICD-10-CM

## 2022-11-09 RX ORDER — OSELTAMIVIR PHOSPHATE 75 MG/1
75 CAPSULE ORAL EVERY 12 HOURS SCHEDULED
Qty: 10 CAPSULE | Refills: 0 | Status: SHIPPED | OUTPATIENT
Start: 2022-11-09 | End: 2022-11-14

## 2022-11-09 NOTE — PATIENT INSTRUCTIONS
Influenza   AMBULATORY CARE:   Influenza  (the flu) is an infection caused by the influenza virus  The flu is easily spread when an infected person coughs, sneezes, or has close contact with others  You may be able to spread the flu to others for 1 week or longer after signs or symptoms appear  Common signs and symptoms include the following:   Fever and chills    Headaches, body aches, and muscle or joint pain    Cough, runny nose, and sore throat    Loss of appetite, nausea, vomiting, or diarrhea    Tiredness    Trouble breathing    Call your local emergency number (911 in the [address] [facility]) if:   You have trouble breathing, and your lips look purple or blue  You have a seizure  Seek care immediately if:   You are dizzy, or you are urinating less or not at all  You have a headache with a stiff neck, and you feel tired or confused  You have new pain or pressure in your chest     Your symptoms, such as shortness of breath, vomiting, or diarrhea, get worse  Your symptoms, such as fever and coughing, seem to get better, but then get worse  Call your doctor if:   You have new muscle pain or weakness  You have questions or concerns about your condition or care  Treatment for influenza  may include any of the following:  Acetaminophen  decreases pain and fever  It is available without a doctor's order  Ask how much to take and how often to take it  Follow directions  Read the labels of all other medicines you are using to see if they also contain acetaminophen, or ask your doctor or pharmacist  Acetaminophen can cause liver damage if not taken correctly  Do not use more than 4 grams (4,000 milligrams) total of acetaminophen in one day  NSAIDs , such as ibuprofen, help decrease swelling, pain, and fever  This medicine is available with or without a doctor's order  NSAIDs can cause stomach bleeding or kidney problems in certain people   If you take blood thinner medicine, always ask your healthcare provider if NSAIDs are safe for you  Always read the medicine label and follow directions  Antivirals  help fight a viral infection  Manage your symptoms:   Rest  as much as you can to help you recover  Drink liquids as directed  to help prevent dehydration  Ask how much liquid to drink each day and which liquids are best for you  Prevent the spread of germs:       Wash your hands often  Wash your hands several times each day  Wash after you use the bathroom, change a child's diaper, and before you prepare or eat food  Use soap and water every time  Rub your soapy hands together, lacing your fingers  Wash the front and back of your hands, and in between your fingers  Use the fingers of one hand to scrub under the fingernails of the other hand  Wash for at least 20 seconds  Rinse with warm, running water for several seconds  Then dry your hands with a clean towel or paper towel  Use hand  that contains alcohol if soap and water are not available  Do not touch your eyes, nose, or mouth without washing your hands first          Cover a sneeze or cough  Use a tissue that covers your mouth and nose  Throw the tissue away in a trash can right away  Use the bend of your arm if a tissue is not available  Wash your hands well with soap and water or use a hand   Stay away from others while you are sick  Avoid crowds as much as possible  Ask about vaccines you may need  Talk to your healthcare provider about your vaccine history  He or she will tell you which vaccines you need, and when to get them  Get the influenza (flu) vaccine as soon as it is available  Flu viruses change, so it is important to get a flu vaccine every year  Get the pneumonia vaccine if recommended  This vaccine is usually recommended every 5 years  Your provider will tell you when to get this vaccine, if needed      Follow up with your doctor as directed:  Write down your questions so you remember to ask them during your visits  © Copyright [employer] 2022 Information is for End User's use only and may not be sold, redistributed or otherwise used for commercial purposes  All illustrations and images included in CareNotes® are the copyrighted property of [employer]  or Jannet Pierre  The above information is an  only  It is not intended as medical advice for individual conditions or treatments  Talk to your doctor, nurse or pharmacist before following any medical regimen to see if it is safe and effective for you

## 2022-11-09 NOTE — PROGRESS NOTES
[address]        NAME: Maru Montano is a 75 y o  female  : 1955    MRN: [id number]  DATE: 2022  TIME: 7:50 PM      Assessment and Plan     Influenza [J11 1]  1  Influenza  oseltamivir (TAMIFLU) 75 mg capsule    Covid/Flu-Office Collect   2  Acute cough  oseltamivir (TAMIFLU) 75 mg capsule    Covid/Flu-Office Collect         Patient Instructions   Patient Instructions     Influenza   AMBULATORY CARE:   Influenza  (the flu) is an infection caused by the influenza virus  The flu is easily spread when an infected person coughs, sneezes, or has close contact with others  You may be able to spread the flu to others for 1 week or longer after signs or symptoms appear  Common signs and symptoms include the following:   · Fever and chills    · Headaches, body aches, and muscle or joint pain    · Cough, runny nose, and sore throat    · Loss of appetite, nausea, vomiting, or diarrhea    · Tiredness    · Trouble breathing    Call your local emergency number (911 in the [address] [place] [address],[address]) if:   · You have trouble breathing, and your lips look purple or blue  · You have a seizure  Seek care immediately if:   · You are dizzy, or you are urinating less or not at all  · You have a headache with a stiff neck, and you feel tired or confused  · You have new pain or pressure in your chest     · Your symptoms, such as shortness of breath, vomiting, or diarrhea, get worse  · Your symptoms, such as fever and coughing, seem to get better, but then get worse  Call your doctor if:   · You have new muscle pain or weakness  · You have questions or concerns about your condition or care  Treatment for influenza  may include any of the following:  · Acetaminophen  decreases pain and fever  It is available without a doctor's order  Ask how much to take and how often to take it  Follow directions   Read the labels of all other medicines you are using to see if they also contain acetaminophen, or ask your doctor or pharmacist  Acetaminophen can cause liver damage if not taken correctly  Do not use more than 4 grams (4,000 milligrams) total of acetaminophen in one day  · NSAIDs , such as ibuprofen, help decrease swelling, pain, and fever  This medicine is available with or without a doctor's order  NSAIDs can cause stomach bleeding or kidney problems in certain people  If you take blood thinner medicine, always ask your healthcare provider if NSAIDs are safe for you  Always read the medicine label and follow directions  · Antivirals  help fight a viral infection  Manage your symptoms:   · Rest  as much as you can to help you recover  · Drink liquids as directed  to help prevent dehydration  Ask how much liquid to drink each day and which liquids are best for you  Prevent the spread of germs:       1  Wash your hands often  Wash your hands several times each day  Wash after you use the bathroom, change a child's diaper, and before you prepare or eat food  Use soap and water every time  Rub your soapy hands together, lacing your fingers  Wash the front and back of your hands, and in between your fingers  Use the fingers of one hand to scrub under the fingernails of the other hand  Wash for at least 20 seconds  Rinse with warm, running water for several seconds  Then dry your hands with a clean towel or paper towel  Use hand  that contains alcohol if soap and water are not available  Do not touch your eyes, nose, or mouth without washing your hands first          2  Cover a sneeze or cough  Use a tissue that covers your mouth and nose  Throw the tissue away in a trash can right away  Use the bend of your arm if a tissue is not available  Wash your hands well with soap and water or use a hand   3  Stay away from others while you are sick  Avoid crowds as much as possible  4  Ask about vaccines you may need  Talk to your healthcare provider about your vaccine history   He or she will tell you which vaccines you need, and when to get them  ? Get the influenza (flu) vaccine as soon as it is available  Flu viruses change, so it is important to get a flu vaccine every year  ? Get the pneumonia vaccine if recommended  This vaccine is usually recommended every 5 years  Your provider will tell you when to get this vaccine, if needed  Follow up with your doctor as directed:  Write down your questions so you remember to ask them during your visits  © Copyright [employer] 2022 Information is for End User's use only and may not be sold, redistributed or otherwise used for commercial purposes  All illustrations and images included in CareNotes® are the copyrighted property of A D A M , Inc  or [facility] [employer]   The above information is an  only  It is not intended as medical advice for individual conditions or treatments  Talk to your doctor, nurse or pharmacist before following any medical regimen to see if it is safe and effective for you  Follow up with PCP in 3-5 days  Proceed to  ER if symptoms worsen  Chief Complaint     Chief Complaint   Patient presents with   • Sore Throat     Red and sore started this morning   • Earache     Right side ear pain started this morning   • Sinusitis     Stuffy nose started this morning         History of Present Illness     Patient presents w/ onset of s/s illness this morning  She has been around her daughter and grandkids often and they have all been ill over the last few days w/ flu-like symptoms (examined separately by me just before patient examined)  She does not live w/ them but she is around them regularly  She has had outside contacts, but no other sick contacts  Patient has had her flu shot  Review of Systems     Review of Systems   Constitutional: Positive for fatigue  Negative for chills and fever  HENT: Positive for congestion, ear pain, sinus pressure, sinus pain and sore throat      Respiratory: Positive for cough (occasional) and chest tightness (mild, starting--+ hx asthma and COPD  Has not needed rescue inhaler yet)  Negative for shortness of breath and wheezing  Gastrointestinal: Negative  Musculoskeletal: Positive for myalgias  Neurological: Positive for headaches  All other systems reviewed and are negative          Current Medications       Current Outpatient Medications:   •  acetaminophen (TYLENOL) 500 mg tablet, Take 500-1,000 mg by mouth every 6 (six) hours as needed for mild pain  , Disp: , Rfl:   •  Calcium Carbonate (CALCIUM 600 PO), Take by mouth daily  , Disp: , Rfl:   •  carbidopa-levodopa (SINEMET)  mg per tablet, take 1 tablet by mouth three times a day, Disp: 270 tablet, Rfl: 1  •  Cholecalciferol (VITAMIN D3 PO), Take 50 mcg by mouth daily  , Disp: , Rfl:   •  citalopram (CeleXA) 40 mg tablet, take 1 tablet by mouth every evening, Disp: 90 tablet, Rfl: 1  •  enalapril (VASOTEC) 5 mg tablet, take 1 tablet by mouth once daily, Disp: 90 tablet, Rfl: 1  •  ezetimibe (ZETIA) 10 mg tablet, take 1 tablet by mouth once daily, Disp: 90 tablet, Rfl: 1  •  montelukast (SINGULAIR) 10 mg tablet, take 1 tablet by mouth at bedtime, Disp: 90 tablet, Rfl: 3  •  oseltamivir (TAMIFLU) 75 mg capsule, Take 1 capsule (75 mg total) by mouth every 12 (twelve) hours for 5 days, Disp: 10 capsule, Rfl: 0  •  primidone (MYSOLINE) 50 mg tablet, take 2 tablets by mouth every 8 hours, Disp: 540 tablet, Rfl: 1  •  rosuvastatin (CRESTOR) 5 mg tablet, Take 1 tablet (5 mg total) by mouth daily, Disp: 90 tablet, Rfl: 3  •  traZODone (DESYREL) 100 mg tablet, take 1 tablet by mouth at bedtime, Disp: 90 tablet, Rfl: 1  •  albuterol (ProAir HFA) 90 mcg/act inhaler, Inhale 2 puffs every 4 (four) hours as needed for wheezing (Patient not taking: Reported on 11/9/2022), Disp: 18 g, Rfl: 0    Current Allergies     Allergies as of 11/09/2022 - Reviewed 11/09/2022   Allergen Reaction Noted   • Iodine - food allergy Hives 02/13/2018   • Keflex [cephalexin] Other (See Comments) 02/13/2018   • Morphine and related Hives 02/13/2018   • Penicillins Hives 02/13/2018   • Benadryl [diphenhydramine] Itching and Swelling 04/15/2020   • Ciprofloxacin Hives 02/28/2021   • Clindamycin Other (See Comments) 02/13/2018   • Erythromycin Hives 02/13/2018              The following portions of the patient's history were reviewed and updated as appropriate: allergies, current medications, past family history, past medical history, past social history, past surgical history and problem list      Past Medical History:   Diagnosis Date   • Arthritis    • Cataract     ashley   • Depression    • Fluid retention    • Headache, acute    • Heart murmur    • Hyperlipidemia    • Hypertension    • Pedal edema    • Pneumonia    • PONV (postoperative nausea and vomiting)    • Rotator cuff tear, left    • Wears glasses    • Wears partial dentures     upper       Past Surgical History:   Procedure Laterality Date   • ABDOMINAL ADHESION SURGERY     • ANAL SPHINCTEROPLASTY     • ANKLE SURGERY Right     tendon tear   • APPENDECTOMY     • CARPAL TUNNEL RELEASE Bilateral    • CARPAL TUNNEL RELEASE Left    • CARPAL TUNNEL RELEASE Right    • COLONOSCOPY     • HAND SURGERY Right     ganglion cyst   • HEMORROIDECTOMY     • HYSTERECTOMY     • ILEOSTOMY     • JOINT REPLACEMENT     • KNEE ARTHROCENTESIS      ganglion Cyst   • KNEE ARTHROSCOPY Left    • OVARIAN CYST REMOVAL     • FL SHLDR ARTHROSCOP,SURG,W/ROTAT CUFF REPR Left 2/19/2018    Procedure: ARTHROSCOPIC REPAIR ROTATOR CUFF,  SAD, BICEP TENODESIS;  Surgeon: Velvet Valdes MD;  Location: [facility] OR;  Service: Orthopedics   • REPAIR RECTOCELE     • REPLACEMENT TOTAL KNEE Left 09/28/2021   • TONSILLECTOMY     • TONSILLECTOMY AND ADENOIDECTOMY     • TUBAL LIGATION         Family History   Problem Relation Age of Onset   • Hypertension Mother    • Colon cancer Mother    • Bone cancer Mother    • COPD Mother    • Emphysema Mother    • Skin cancer Mother    • Lead poisoning Father         lead poisoning in lungs    • Thyroid disease Sister    • Depression Sister    • Hypertension Sister    • Alzheimer's disease Sister    • ROBERT disease Brother    • Throat cancer Brother    • Hypertension Brother    • Colon cancer Family    • Bone cancer Family    • Hypertension Family    • Diabetes Sister    • Hypertension Sister    • Heart failure Sister    • Alzheimer's disease Sister    • No Known Problems Daughter    • No Known Problems Maternal Grandmother    • No Known Problems Paternal Grandmother    • No Known Problems Maternal Aunt    • No Known Problems Maternal Aunt    • No Known Problems Maternal Aunt    • No Known Problems Maternal Aunt    • No Known Problems Maternal Aunt    • No Known Problems Paternal Aunt          Medications have been verified  Objective     /66   Pulse 74   Temp 98 3 °F (36 8 °C)   Resp 20   Ht 5' (1 524 m)   Wt 87 1 kg (192 lb)   SpO2 96%   BMI 37 50 kg/m²   No LMP recorded  Patient has had a hysterectomy  Physical Exam     Physical Exam  Vitals and nursing note reviewed  Constitutional:       General: She is not in acute distress  Appearance: Normal appearance  She is well-developed and well-groomed  She is ill-appearing  She is not toxic-appearing or diaphoretic  HENT:      Head: Normocephalic and atraumatic  Right Ear: Hearing, tympanic membrane, ear canal and external ear normal       Left Ear: Hearing, tympanic membrane, ear canal and external ear normal       Nose: Mucosal edema and congestion (mild-mod) present  Right Sinus: No maxillary sinus tenderness or frontal sinus tenderness  Left Sinus: No maxillary sinus tenderness or frontal sinus tenderness  Mouth/Throat:      Mouth: Mucous membranes are moist       Pharynx: Oropharynx is clear  Uvula midline  No oropharyngeal exudate or posterior oropharyngeal erythema        Tonsils: 0 on the right  0 on the left    Eyes:      Pupils: Pupils are equal, round, and reactive to light  Cardiovascular:      Rate and Rhythm: Normal rate and regular rhythm  No extrasystoles are present  Heart sounds: Normal heart sounds, S1 normal and S2 normal  No murmur heard  No friction rub  No gallop  Pulmonary:      Effort: Pulmonary effort is normal  No tachypnea, bradypnea, accessory muscle usage, prolonged expiration, respiratory distress or retractions  Breath sounds: Normal breath sounds and air entry  No stridor or decreased air movement  No decreased breath sounds, wheezing (clear at present time, good air movement), rhonchi or rales  Abdominal:      General: Bowel sounds are normal  There is no distension  Palpations: Abdomen is soft  Tenderness: There is no abdominal tenderness  There is no guarding or rebound  Musculoskeletal:         General: Normal range of motion  Cervical back: Normal range of motion and neck supple  Skin:     General: Skin is warm and dry  Capillary Refill: Capillary refill takes less than 2 seconds  Neurological:      General: No focal deficit present  Mental Status: She is alert and oriented to person, place, and time  Psychiatric:         Mood and Affect: Mood normal          Behavior: Behavior normal  Behavior is cooperative  Thought Content:  Thought content normal          Judgment: Judgment normal

## 2022-11-10 ENCOUNTER — CONSULT (OUTPATIENT)
Dept: NEUROLOGY | Facility: CLINIC | Age: 67
End: 2022-11-10

## 2022-11-10 VITALS
HEART RATE: 88 BPM | DIASTOLIC BLOOD PRESSURE: 56 MMHG | BODY MASS INDEX: 37.89 KG/M2 | SYSTOLIC BLOOD PRESSURE: 126 MMHG | WEIGHT: 194 LBS

## 2022-11-10 DIAGNOSIS — R25.9 MIXED ACTION AND RESTING TREMOR: ICD-10-CM

## 2022-11-10 DIAGNOSIS — M50.30 DDD (DEGENERATIVE DISC DISEASE), CERVICAL: Primary | ICD-10-CM

## 2022-11-10 LAB
FLUAV RNA RESP QL NAA+PROBE: NEGATIVE
FLUBV RNA RESP QL NAA+PROBE: NEGATIVE
SARS-COV-2 RNA RESP QL NAA+PROBE: NEGATIVE

## 2022-11-10 NOTE — ASSESSMENT & PLAN NOTE
Patient with mixed rest and action tremors present for which appears to be around 2 years  It appears it may have developed around the same time she was started on Abilify  Abilify has since been tapered off, and she continues to have tremors which she feels has worsened since earlier this year  Trials of primidone up to 300 mg daily and Sinemet have been ineffective in controlling tremor  On exam she has mild action tremors and rare moderate amplitude rest tremors only present with distraction  Time spent discussing makes a short tremor  She was reassured at this point in time on exam there is no signs of parkinsonism  It may be that her being on levodopa is masking symptoms  Given she has not had any improvement on levodopa, we will taper her off  She also wishes to discontinue primidone  Instructed to taper off primidone 50mg by reducing by 1 tablet weekly until off given there is no benefit  We will then reexamine off medications and determine where there are underlying parkinsonian symptoms, and if another medication for tremor could be started

## 2022-11-10 NOTE — PROGRESS NOTES
Patient ID: Marisol Lora is a 75 y o  female  Assessment/Plan:    Mixed action and resting tremor  Patient with mixed rest and action tremors present for which appears to be around 2 years  It appears it may have developed around the same time she was started on Abilify  Abilify has since been tapered off, and she continues to have tremors which she feels has worsened since earlier this year  Trials of primidone up to 300 mg daily and Sinemet have been ineffective in controlling tremor  On exam she has mild action tremors and rare moderate amplitude rest tremors only present with distraction  Time spent discussing makes a short tremor  She was reassured at this point in time on exam there is no signs of parkinsonism  It may be that her being on levodopa is masking symptoms  Given she has not had any improvement on levodopa, we will taper her off  She also wishes to discontinue primidone  Instructed to taper off primidone 50mg by reducing by 1 tablet weekly until off given there is no benefit  We will then reexamine off medications and determine where there are underlying parkinsonian symptoms, and if another medication for tremor could be started  DDD (degenerative disc disease), cervical  Neck pain with episodes of paresthesias radiating down both arms  On exam she has reduced sensation in a C6 distribution  Will obtain MRI of the cervical spine to evaluate for any underlying cervical cord compression as a cause for symptoms  Diagnoses and all orders for this visit:    DDD (degenerative disc disease), cervical  -     MRI cervical spine wo contrast; Future    Mixed action and resting tremor  -     Ambulatory Referral to Neurology  -     MRI cervical spine wo contrast; Future       Spent 60 minutes on patient visit, counseling , review of prior imaging and documentation       Subjective:    Singh Valencia is a 75year old RH woman with a history of depression and anxiety, who presents for movement disorder evaluation for tremors  Bilateral hand tremor was 1st noted in 2020  It may have worsened for the past year  Tremor is present with action  It can interfere with using utensils and drinking  She does not know if tremor improves with alcohol consumption as she rarely drinks  She has no other trouble with ADL's  There is no family history of tremor or parkinsonism  She was started on primidone 50mg titrated up to 2 tabs 3 times daily (300mg) with no improvement  Sinemet 25/100 1 tabs 3 times daily added with no improvement  She questions whether these medications are needed  She has episodes of intermittent numbness down both arms and legs  She can have pain along the right side of her neck  Episodes of numbness can last 10 -15 minutes  It can occur while seated and leaning back  She has also noted that while holding an object in her hand she can develop numbness followed by tremor  She has history of left rotator cuff injury status post surgery and left knee replacement  She denies any weakness  She currently reports having changes in gait secondary to sciatica  She is having episodic numbness radiating down the left leg                                                                                                                 She has been on citalopram for years  She was previously on Abilify in 2020 which appears to be around the time tremor was 1st mentioned  She is no longer on Abilify but tremor persists and if anything has gotten worse                                        Objective:    Blood pressure 126/56, pulse 88, weight 88 kg (194 lb), not currently breastfeeding  Physical Exam  Vitals reviewed  Eyes:      Extraocular Movements: Extraocular movements intact  Pupils: Pupils are equal, round, and reactive to light     Neurological:      Deep Tendon Reflexes: Strength normal       Reflex Scores:       Tricep reflexes are 2+ on the right side and 2+ on the left side  Bicep reflexes are 2+ on the right side and 2+ on the left side  Brachioradialis reflexes are 2+ on the right side and 2+ on the left side  Patellar reflexes are 2+ on the right side  Achilles reflexes are 2+ on the right side and 2+ on the left side  Psychiatric:         Speech: Speech normal          Neurological Exam  Mental Status   Oriented to person, place, time and situation  Orientation: Recall 2/5  Recent and remote memory are intact  Memory: Recalled 2/5  Speech is normal  Follows complex commands  Attention and concentration are normal   MOCA 25  Cranial Nerves  CN II: Right funduscopic exam: not visualized  Left funduscopic exam: not visualized  CN III, IV, VI: Extraocular movements intact bilaterally  Pupils equal round and reactive to light bilaterally  CN V: Facial sensation is normal   CN VII: Full and symmetric facial movement  CN VIII: Hearing is normal   CN IX, X: Palate elevates symmetrically  CN XI: Shoulder shrug strength is normal   CN XII: Tongue midline without atrophy or fasciculations  Motor  Normal muscle bulk throughout  Normal muscle tone  Strength is 5/5 throughout all four extremities  Discomfort of right neck, shoulder on exam      Sensory  Light touch is normal in upper and lower extremities  Reduced in the left C6 distribution in the left hand and forearm  Reduced in right pointer finger        Reflexes                                            Right                      Left  Brachioradialis                    2+                         2+  Biceps                                 2+                         2+  Triceps                                2+                         2+  Patellar                               2+                          Achilles                                2+                         2+    Coordination  Right: Finger-to-nose normal Left: Finger-to-nose normal   Inermittent moderate rest tremor of both hands only with distraction  Mild intentional tremor on FTN bilaterally  Moderate postural tremors  No bradykinesia on finger taps, rapid altered movements, or hand   Normal heel taps       Gait  Casual gait: Antalgic gait  Able to rise from chair without using arms  Good stride  Slight limp due to neuropathic pain down posterior left leg           ROS:    Review of Systems   Constitutional: Negative  Negative for appetite change and fever  HENT: Negative  Negative for hearing loss, tinnitus, trouble swallowing and voice change  Eyes: Negative  Negative for photophobia, pain and visual disturbance  Respiratory: Negative  Negative for shortness of breath  Cardiovascular: Negative  Negative for palpitations  Gastrointestinal: Negative  Negative for nausea and vomiting  Endocrine: Negative  Negative for cold intolerance  Genitourinary: Negative  Negative for dysuria, frequency and urgency  Musculoskeletal: Positive for back pain, gait problem (has tendency to trip a lot) and myalgias (legs which radiates from back)  Negative for neck pain  Balance Issues     Skin: Negative  Negative for rash  Allergic/Immunologic: Negative  Neurological: Positive for dizziness (getting up too quick), tremors (mainly hands / Arms and states she cannot sit still as her body keeps moving  ) and numbness (arms, legs, and feet)  Negative for seizures, syncope, facial asymmetry, speech difficulty, weakness, light-headedness and headaches  Hematological: Bruises/bleeds easily (Bruise)  Psychiatric/Behavioral: Positive for sleep disturbance  Negative for confusion and hallucinations  Forgetful     All other systems reviewed and are negative

## 2022-11-10 NOTE — PATIENT INSTRUCTIONS
Discontinue carbidopa/levodopa   Taper off primidone 50mg by reducing by 1 tablet weekly until off given there is no benefit  We will then reexamine off medications and determine where another medication for tremor could be started     In them meantime we will obtain MRI of the cervical spine to rule out an underlying cause

## 2022-11-10 NOTE — ASSESSMENT & PLAN NOTE
Neck pain with episodes of paresthesias radiating down both arms  On exam she has reduced sensation in a C6 distribution  Will obtain MRI of the cervical spine to evaluate for any underlying cervical cord compression as a cause for symptoms

## 2022-11-18 ENCOUNTER — HOSPITAL ENCOUNTER (OUTPATIENT)
Dept: MRI IMAGING | Facility: HOSPITAL | Age: 67
End: 2022-11-18

## 2022-11-18 DIAGNOSIS — R25.9 MIXED ACTION AND RESTING TREMOR: ICD-10-CM

## 2022-11-18 DIAGNOSIS — M50.30 DDD (DEGENERATIVE DISC DISEASE), CERVICAL: ICD-10-CM

## 2022-11-25 ENCOUNTER — TELEPHONE (OUTPATIENT)
Dept: NEUROLOGY | Facility: CLINIC | Age: 67
End: 2022-11-25

## 2022-11-25 NOTE — TELEPHONE ENCOUNTER
Manna Adame MD  P [facility] [place] [id number] Team 6  MRI reviewed  No clear compression or nerve impingement  Cervical spondylitic degenerative change with primarily left-sided facet hypertrophic change  Multilevel primarily left-sided foraminal narrowing most pronounced at the C4-5 level, moderate  Small central disc extrusion at C6-7 partially effacing the ventral CSF space  If still having neck pain would recommend PT

## 2022-11-25 NOTE — TELEPHONE ENCOUNTER
Called pt and advised regarding below,she stated that she still experience neck pain and is agreeable to physical therapy      Please place the order for PT

## 2022-11-29 DIAGNOSIS — M50.30 DDD (DEGENERATIVE DISC DISEASE), CERVICAL: Primary | ICD-10-CM

## 2022-11-29 NOTE — TELEPHONE ENCOUNTER
Margi Beavers MD  to Neurology [facility] Clinical Team 6      8:01 AM  Order placed in chart

## 2022-12-04 NOTE — PROGRESS NOTES
PT Evaluation     Today's date: 2022  Patient name: Jamir eSsay  : 1955  MRN: [id number]  Referring provider: Noah Tong MD  Dx:   Encounter Diagnosis     ICD-10-CM    1  DDD (degenerative disc disease), cervical  M50 30       2  Chronic neck pain  M54 2     G89 29                      Assessment  Assessment details: Jamir Sesay is a 75 y o  female with a history of arthritis, cataract, depression, H/A, hyperlipidemia, HTN, L RTC tear, CKD that presents for a high complexity physical therapy initial evaluation  The patient demonstrates signs and symptoms consistent with cervical DDD; myelopathy  During the examination the patient demonstrated decreased posture/UE strength, decreased cervical ROM, activity intolerance, (+) UMN signs, tremors, sensory changes, and neck pain  The patient's impairments are causing the following functional limitations: Difficulty lifting/carrying objects heavier than 10 pounds, difficulty reaching behind back, difficulty reaching above head, difficulty looking over head, difficulty turning is head to drive, difficulty with house work ie ) vacuuming, difficulty lying supine, and difficulty donning/doffing a shirt  The patient's clinical presentation is unstable due to a number of participation restrictions, significant medial history, and functional limitation (FOTO 43% function)  The patient will benefit from skilled PT services to address impairments, work towards goals, and restore PLOF          Impairments: abnormal or restricted ROM, activity intolerance, impaired balance, impaired physical strength, lacks appropriate home exercise program, pain with function and poor posture   Functional limitations: Difficulty lifting/carrying objects heavier than 10 pounds, difficulty reaching behind back, difficulty reaching above head, difficulty looking over head, difficulty turning is head to drive, difficulty with house work ie ) vacuuming, difficulty lying supine, and difficulty donning/doffing a shirt  Symptom irritability: highBarriers to therapy: Chronicity of condition/ UMN S/S / UE Tremors / High pain rating / High symptom irritability  Understanding of Dx/Px/POC: fair   Prognosis: fair  Prognosis details: Chronicity of condition/ UMN S/S / UE Tremors / High pain rating / High symptom irritability    Goals  STG: Achieve in 4-6 weeks  1  Patient's cervical/R scapula pain at worst is no greater than 4/10 to reduce improve cervical ROM with ADL's   2   Patient's cervical ROM improve to "minimal restriction" all motions tested to allow for function ROM with ADL's and driving  3   Patient's shoulder/cervical MMT improve by 1/2-1 muscle grade to allow for completion of over head activities without difficulty  LTG: Achieve in 6-12 weeks  1  Patient tolerate ADL's without neck/scapula pain or UE tremors pain to achieve their personal therapy goal   2   Patient's strength at cervical spine and shoulders improve to WNL to allow completion of leisure activities  3    Patient to be independent with home exercise plan at time of discharge  4   Patient's FOTO score improve to >  60% to indicate a return to normal function  Plan  Plan details: RE-ASSESS 1X/MONTH  Patient would benefit from: skilled physical therapy  Planned modality interventions: TENS, cryotherapy, thermotherapy: hydrocollator packs and traction  Other planned modality interventions: IASTM  Planned therapy interventions: joint mobilization, manual therapy, massage, neuromuscular re-education, patient education, postural training, self care, strengthening, stretching, therapeutic activities, therapeutic exercise, home exercise program and abdominal trunk stabilization  Frequency: 1-3x/wk    Duration in weeks: 12  Plan of Care beginning date: 12/6/2022  Plan of Care expiration date: 3/7/2023  Treatment plan discussed with: PTA and patient        Subjective Evaluation    History of Present Illness  Date of onset: 11/3/2022  Mechanism of injury: Javon Hopkins is a 75 y o  female that presents to outpatient physical therapy with complaints of chronic neck pain(>2 years)/ R scapula pain ( 1 month)/difficulty moving the neck   The patient reports onset of tremors at her B/L hands (>1 year) causing difficulty with carrying items, performing ADL's, and resting comfortably  The patient had onset of tremors greater than 1 year ago without known cause  The patient initially saw her PCP for the tremors who (prescribed Parkinson based medicine which did not improve her symptoms - they worsened  The patient recently F/U with neurology who discharged the Parkinson's medicine and referred the patient to PT because the neurologist believes the tremors and complaints may be related to DDD at the cervical spine  The patient is seeing pain management for her lumbar spine  The patient notes multiple falls over the past year without known cause  The patient's main goal for physical therapy is to stop the tremors and to stop the pain at the R shoulder blade  C-spine MRI: IMPRESSION:     Cervical spondylitic degenerative change with primarily left-sided facet hypertrophic change  There is no cord compression  Multilevel primarily left-sided foraminal narrowing most pronounced at the C4-5 level, moderate      Small central disc extrusion at C6-7 partially effacing the ventral CSF space    Pain  Current pain ratin  At best pain ratin  At worst pain rating: 10  Location: R scapula, B/L cervical pain  Quality: sharp and dull ache  Relieving factors: heat  Aggravating factors: sitting, standing, walking, stair climbing, overhead activity and lifting  Progression: no change    Social Support  Steps to enter house: no  Stairs in house: yes   13  Lives in: apartment  Lives with: alone    Employment status: not working  Hand dominance: ambidextrous    Treatments  Current treatment: physical therapy  Patient Goals  Patient goals for therapy: decreased pain, increased motion, increased strength, independence with ADLs/IADLs, return to sport/leisure activities and improved balance  Patient goal: stop tremors and decrease R scapula pain        Objective     Concurrent Complaints  Positive for disturbed sleep (with medicine) and tinnitus (chronic)  Negative for night pain, dizziness, faints, headaches, nausea/motion sickness, trouble swallowing, difficulty breathing, shortness of breath, respiratory pain, history of cancer, history of trauma and infection    Additional Special Questions  Patient notes sleep apnea    Postural Observations  Seated posture: poor  Standing posture: poor  Correction of posture: makes symptoms worse    Additional Postural Observation Details  Attempted towel roll correction - caused N/T B/L UE    Palpation     Additional Palpation Details  NO TTP B/L    Tenderness   Cervical Spine   Tenderness in the right scapula  Neurological Testing     Sensation   Cervical/Thoracic   Left   Intact: light touch  Diminished: light touch  Paresthesia: light touch    Right   Intact: light touch  Paresthesia: light touch    Comments   Left light touch: at neck diminshed; whole arm parasthesia  Right light touch: whole arm intermittent       Reflexes   Left   Biceps (C5/C6): brisk (3+)  Brachioradialis (C6): brisk (3+)  Floyd's reflex: positive    Right   Biceps (C5/C6): brisk (3+)  Brachioradialis (C6): brisk (3+)  Floyd's reflex: positive    Additional Neurological Details  Patient is a Falls risk    Active Range of Motion   Cervical/Thoracic Spine       Cervical  Subcranial protraction:   Restriction level: minimal  Subcranial retraction:  with pain   Restriction level: moderate  Flexion: 32 degrees   Extension: 27 degrees     with pain  Left lateral flexion: 15 degrees     with pain Restriction level: maximal  Right lateral flexion: 10 degrees     with pain Restriction level maximal  Left rotation: 25 degrees with pain Restriction level: maximal  Right rotation: 40 degrees    with pain Restriction level: moderate  Mechanical Assessment    Cervical    Seated retraction: repeated movements   Pain location: peripheralized  Seated Flexion:  repeated movements  Pain location: peripheralized  Seated Extension: repeated movements  Pain location: peripheralized  Seated Left Sidebend: repeated movements   Pain location:peripheralized  Seated right sidebend: repeated movements  Pain location: peripheralized    Thoracic      Lumbar      Strength/Myotome Testing   Cervical Spine   Neck extension: 4  Neck flexion: 3+    Left   Interossei strength (t1): 4  Neck lateral flexion (C3): 3+    Right   Interossei strength (t1): 4  Neck lateral flexion (C3): 3+    Left Shoulder     Planes of Motion   Flexion: 3+   Abduction: 3+   External rotation at 0°: 4   Internal rotation at 0°: 4-     Right Shoulder     Planes of Motion   Flexion: 4   Abduction: 4   External rotation at 0°: 4+   Internal rotation at 0°: 4     Left Elbow   Flexion: 4  Extension: 4-    Right Elbow   Flexion: 4  Extension: 4-    Left Wrist/Hand   Wrist extension: 4  Wrist flexion: 4  Thumb extension: 4    Right Wrist/Hand   Wrist extension: 4  Wrist flexion: 4  Thumb extension: 4    Tests   Cervical   Negative cervical distraction  Additional Tests Details  FOTO: 43% ( predicted 54%)  High symptom irritability  Difficult to special test   Neuro Exam:     Headaches   Patient reports headaches: No                 Precautions: NONE; FALLS; Hx Tremors ;  High SX irritability  RE: 1/4    Manual   12/6       Massage B/L upper trapezius         Manual C-spine Traction 3x:15 worse UE N/T       Sub Occipital Release        Seated chin tuck with clinician O P                    Therapeutic Exercise 12/6       UBE stand ALT  *      Nu-step   S=9                Bridges        Corner Pec stretch                        Cervical snag extension / rotation                Levator Scapulae stretch B/L    *      B/L Upper Trap stretch  *      Sit T-spine Extensions  *      Supine Pectoralis Minor stretch  *      Thoracic Open book rotations        Neuro Re-Ed        Abdominal Hollow  *      Multifidi  *      Kegels  *      Chin Tucks x6 p!p!  UE numbness       Chin tuck head lifts        Quadruped chin tuck + rotation        Tandem balance  *      Quad DLS        MTP/LTP/ antirotation        Towel roll  AD worse N/T UE       Chin tuck + cervical EXT x5 worse N/T UE       Posture correction        scap retracts x10 worse N/T UE       Prone cervical EXT        Therapeutic Activity        Crate lifts        Chair squats        Crate carry            Modalities        MHP/CP UT                            NO HEP GIVEN - PATIENT DID NOT TOLERATE ANY TREATMENTS THUS FAR

## 2022-12-06 ENCOUNTER — EVALUATION (OUTPATIENT)
Dept: PHYSICAL THERAPY | Facility: CLINIC | Age: 67
End: 2022-12-06

## 2022-12-06 DIAGNOSIS — M50.30 DDD (DEGENERATIVE DISC DISEASE), CERVICAL: Primary | ICD-10-CM

## 2022-12-06 DIAGNOSIS — G89.29 CHRONIC NECK PAIN: ICD-10-CM

## 2022-12-06 DIAGNOSIS — M54.2 CHRONIC NECK PAIN: ICD-10-CM

## 2022-12-09 ENCOUNTER — OFFICE VISIT (OUTPATIENT)
Dept: PHYSICAL THERAPY | Facility: CLINIC | Age: 67
End: 2022-12-09

## 2022-12-09 DIAGNOSIS — G89.29 CHRONIC NECK PAIN: ICD-10-CM

## 2022-12-09 DIAGNOSIS — M54.2 CHRONIC NECK PAIN: ICD-10-CM

## 2022-12-09 DIAGNOSIS — M50.30 DDD (DEGENERATIVE DISC DISEASE), CERVICAL: Primary | ICD-10-CM

## 2022-12-09 NOTE — PROGRESS NOTES
Daily Note     Today's date: 2022  Patient name: Hesham Galeano  : 1955  MRN: [id number]  Referring provider: Viki Hooker MD  Dx:   Encounter Diagnosis     ICD-10-CM    1  DDD (degenerative disc disease), cervical  M50 30       2  Chronic neck pain  M54 2     G89 29                      Subjective: The patient reports feeling pain at her cervical spine and her shoulder blades  The patient reports feeling increased pain after the initial evaluation  Objective: See treatment diary below      Assessment: The patient was very pleasant and agreeable during the session today  The patient tolerated the exercises fairly well today  There were complaints of some UE tingling but nothing that persisted  The patient needed VC to avoid valsalva breath holding and to perform the exercises with proper form  The patient reports no problems at the end of the treatment today  The patient will benefit from continued skilled physical therapy to progress towards achieving patient centered goals  Plan: Continue per plan of care  Progress treatment as tolerated  Precautions: NONE; FALLS; Hx Tremors ; High SX irritability  RE:   Access Code [email or web address]       Manual         Massage B/L upper trapezius         Manual C-spine Traction 3x:15 worse UE N/T       Sub Occipital Release        Seated chin tuck with clinician O P                    Therapeutic Exercise       UBE seated   *L1 x 6 mins      Nu-step   S=9                Bridges        Corner Pec stretch                        Cervical snag extension / rotation                Levator Scapulae stretch B/L    *5x:15 B/L      B/L Upper Trap stretch  5x:15 B/L      Sit T-spine Extensions  *x10      Supine Pectoralis Minor stretch  *5x:15 snow malick arms      Thoracic Open book rotations        Neuro Re-Ed        Abdominal Hollow  *10x:05       Multifidi  *x10 :05      Kegels  *x10 :05      Chin Tucks x6 p!p!  UE numbness Chin tuck head lifts        Quadruped chin tuck + rotation        Tandem balance  2x:30      Quad DLS        MTP/LTP/ antirotation        Towel roll  AD worse N/T UE       Chin tuck + cervical EXT x5 worse N/T UE       Posture correction        scap retracts x10 worse N/T UE       Prone cervical EXT        Therapeutic Activity        Crate lifts        Chair squats        Crate carry            Modalities        MHP/CP UT                            NO HEP GIVEN - PATIENT DID NOT TOLERATE ANY TREATMENTS THUS FAR

## 2022-12-12 ENCOUNTER — RA CDI HCC (OUTPATIENT)
Dept: OTHER | Facility: HOSPITAL | Age: 67
End: 2022-12-12

## 2022-12-13 ENCOUNTER — OFFICE VISIT (OUTPATIENT)
Dept: PHYSICAL THERAPY | Facility: CLINIC | Age: 67
End: 2022-12-13

## 2022-12-13 DIAGNOSIS — M54.2 CHRONIC NECK PAIN: ICD-10-CM

## 2022-12-13 DIAGNOSIS — G89.29 CHRONIC NECK PAIN: ICD-10-CM

## 2022-12-13 DIAGNOSIS — M50.30 DDD (DEGENERATIVE DISC DISEASE), CERVICAL: Primary | ICD-10-CM

## 2022-12-13 NOTE — PROGRESS NOTES
Daily Note     Today's date: 2022  Patient name: Chanel Graves  : 1955  MRN: [id number]  Referring provider: Marilu Parks MD  Dx:   Encounter Diagnosis     ICD-10-CM    1  DDD (degenerative disc disease), cervical  M50 30       2  Chronic neck pain  M54 2     G89 29           Start Time: 0800          Subjective: Patient states her pain is a 8/10 in the neck with tingling down both arms at beginning of session  This morning on toilet she had numbness down left hip and down leg that lasted a couple of minutes (PT informed)  She had to walk off the effects  Patient states she sees MD Monday and will let him know about all of her symptoms  Objective: See treatment diary below    Patient's home exercise program updated to include additional exercises  Handout issued and explained with demonstration  Patient accepted new exercises  Assessment: Tolerated treatment fair+  Patient would benefit from continued PT for stretching and strengthening  Patient was able to add exercises to her program with little difficulty and discomfort  Her arm tingling decreased with towel neck extensions consistently during session  Exercise added to home program  Patient was pleasantly surprised with the improvement during the new exercise  She felt her pain was less leaving department  Plan: Continue per plan of care  Progress treatment as tolerated  Precautions: NONE; FALLS; Hx Tremors ;  High SX irritability  RE:   Access Code [email or web address]       Manual        Massage B/L upper trapezius         Manual C-spine Traction 3x:15 worse UE N/T       Sub Occipital Release        Seated chin tuck with clinician O P                    Therapeutic Exercise      UBE seated   *L1 x 6 mins L1 x6 min     Nu-step   S=9                Bridges        Corner Pec stretch                        Cervical snag extension / rotation   Ext  2x10 less tingle ea time             Levator Scapulae stretch B/L    *5x:15 B/L :15 x5 B/L     B/L Upper Trap stretch  5x:15 B/L :15x5 B/L     Sit T-spine Extensions  *x10 x10     Supine Pectoralis Minor stretch  *5x:15 snow malick arms x2 min     Thoracic Open book rotations        Neuro Re-Ed        Abdominal Hollow  *10x:05  :05x10     Multifidi  *x10 :05 :05x10     Kegels  *x10 :05 :05x10     Chin Tucks x6 p!p! UE numbness       Chin tuck head lifts        Quadruped chin tuck + rotation        Tandem balance  2x:30 :30x2     Quad DLS        MTP/LTP/ antirotation        Towel roll  AD worse N/T UE       Chin tuck + cervical EXT x5 worse N/T UE       Posture correction        scap retracts x10 worse N/T UE       Prone cervical EXT        Therapeutic Activity        Crate lifts        Chair squats        Crate carry            Modalities        MHP/VIKTORIYA UT                            Access Code: [email or web address]  URL: [email or web address]  Date: 12/13/2022  Prepared by: Sherry Pierce    Exercises    • Cervical Extension AROM with Strap - 2 x daily - 7 x weekly - 1 sets - 10 reps

## 2022-12-16 ENCOUNTER — OFFICE VISIT (OUTPATIENT)
Dept: PHYSICAL THERAPY | Facility: CLINIC | Age: 67
End: 2022-12-16

## 2022-12-16 DIAGNOSIS — M54.2 CHRONIC NECK PAIN: ICD-10-CM

## 2022-12-16 DIAGNOSIS — G89.29 CHRONIC NECK PAIN: ICD-10-CM

## 2022-12-16 DIAGNOSIS — M50.30 DDD (DEGENERATIVE DISC DISEASE), CERVICAL: Primary | ICD-10-CM

## 2022-12-16 NOTE — PROGRESS NOTES
Daily Note     Today's date: 2022  Patient name: Treasure Smith  : 1955  MRN: [id number]  Referring provider: Cuba Bunn MD  Dx:   Encounter Diagnosis     ICD-10-CM    1  DDD (degenerative disc disease), cervical  M50 30       2  Chronic neck pain  M54 2     G89 29           Start Time: 0820  Stop Time: 0915  Total time in clinic (min): 55 minutes    Subjective: Patient states 5/10 pain today mainly in left c/s  Does think she has been getting a little better since the start of PT  Objective: See treatment diary below  Repetitive cervical:  Extension= peripheralized  Flexion= peripheralized  Retraction= peripheralized      Assessment: Tolerated treatment fair  Increased N+T with R LS stretch  Attempted repetitive movements as noted above  Increased N+T distally today with extension snag  Relief of cervical symptoms with manual cervical traction and SOR  May benefit from mechanical traction due to radicular symptoms not responsive to repetitive testing  Patient would benefit from continued PT      Plan: Continue per plan of care  Progress treatment as tolerated  Precautions: NONE; FALLS; Hx Tremors ; High SX irritability  RE:   Access Code [email or web address]       Manual       Massage B/L upper trapezius         Manual C-spine Traction 3x:15 worse UE N/T   5x1'    Sub Occipital Release        Seated chin tuck with clinician O P                    Therapeutic Exercise     UBE seated   *L1 x 6 mins L1 x6 min L1 x6 min    Nu-step   S=9                Bridges        Corner Pec stretch                        Cervical snag extension / rotation   Ext  2x10 less tingle ea time 10x            Levator Scapulae stretch B/L    *5x:15 B/L :15 x5 B/L :15 x5 B/L    B/L Upper Trap stretch  5x:15 B/L :15x5 B/L :15x5 B/L    Sit T-spine Extensions  *x10 x10 5x cervical p!     Supine Pectoralis Minor stretch  *5x:15 snow malick arms x2 min 2 min    Thoracic Open book rotations        Neuro Re-Ed        Abdominal Hollow  *10x:05  :05x10 :05x10    Multifidi  *x10 :05 :05x10 :05x10    Kegels  *x10 :05 :05x10 :05x10    Chin Tucks x6 p!p! UE numbness   Increased N+T    Chin tuck head lifts        Quadruped chin tuck + rotation        Tandem balance  2x:30 :30x2 :30x2 bilat    Quad DLS        MTP/LTP/ antirotation        Towel roll  AD worse N/T UE       Chin tuck + cervical EXT x5 worse N/T UE       Posture correction        scap retracts x10 worse N/T UE       Prone cervical EXT        Therapeutic Activity        Crate lifts        Chair squats        Crate carry            Modalities        MHP/CP UT                            Access Code: [email or web address]  URL: [email or web address]  Date: 12/13/2022  Prepared by: Larance Gottron Frohnheiser    Exercises    • Cervical Extension AROM with Strap - 2 x daily - 7 x weekly - 1 sets - 10 reps

## 2022-12-19 ENCOUNTER — OFFICE VISIT (OUTPATIENT)
Dept: INTERNAL MEDICINE CLINIC | Facility: CLINIC | Age: 67
End: 2022-12-19

## 2022-12-19 VITALS
OXYGEN SATURATION: 97 % | HEIGHT: 60 IN | SYSTOLIC BLOOD PRESSURE: 120 MMHG | TEMPERATURE: 97.1 F | WEIGHT: 188.25 LBS | BODY MASS INDEX: 36.96 KG/M2 | HEART RATE: 76 BPM | DIASTOLIC BLOOD PRESSURE: 60 MMHG

## 2022-12-19 DIAGNOSIS — R73.03 PREDIABETES: ICD-10-CM

## 2022-12-19 DIAGNOSIS — F33.9 DEPRESSION, RECURRENT (HCC): ICD-10-CM

## 2022-12-19 DIAGNOSIS — N18.30 STAGE 3 CHRONIC KIDNEY DISEASE, UNSPECIFIED WHETHER STAGE 3A OR 3B CKD (HCC): ICD-10-CM

## 2022-12-19 DIAGNOSIS — E78.2 MIXED HYPERLIPIDEMIA: ICD-10-CM

## 2022-12-19 DIAGNOSIS — M15.9 PRIMARY OSTEOARTHRITIS INVOLVING MULTIPLE JOINTS: ICD-10-CM

## 2022-12-19 DIAGNOSIS — E66.01 MORBID OBESITY (HCC): ICD-10-CM

## 2022-12-19 DIAGNOSIS — Z23 ENCOUNTER FOR VACCINATION: ICD-10-CM

## 2022-12-19 DIAGNOSIS — K76.0 FATTY LIVER: ICD-10-CM

## 2022-12-19 DIAGNOSIS — G89.4 CHRONIC PAIN SYNDROME: ICD-10-CM

## 2022-12-19 DIAGNOSIS — I10 PRIMARY HYPERTENSION: Primary | ICD-10-CM

## 2022-12-19 DIAGNOSIS — I10 ESSENTIAL HYPERTENSION: ICD-10-CM

## 2022-12-19 DIAGNOSIS — K59.00 CONSTIPATION, UNSPECIFIED CONSTIPATION TYPE: ICD-10-CM

## 2022-12-19 PROBLEM — R06.02 SHORTNESS OF BREATH: Status: RESOLVED | Noted: 2022-07-18 | Resolved: 2022-12-19

## 2022-12-19 RX ORDER — ENALAPRIL MALEATE 5 MG/1
5 TABLET ORAL DAILY
Qty: 90 TABLET | Refills: 1 | Status: SHIPPED | OUTPATIENT
Start: 2022-12-19

## 2022-12-19 NOTE — PATIENT INSTRUCTIONS
Chronic Hypertension   AMBULATORY CARE:   Hypertension  is high blood pressure  Your blood pressure is the force of your blood moving against the walls of your arteries  Hypertension causes your blood pressure to get so high that your heart has to work much harder than normal  This can damage your heart  Even if you have hypertension for years, lifestyle changes, medicines, or both can help bring your blood pressure to normal   Call your local emergency number (911 in the [address] [facility] [facility]) or have someone call if:   You have chest pain  You have any of the following signs of a heart attack:      Squeezing, pressure, or pain in your chest    You may  also have any of the following:     Discomfort or pain in your back, neck, jaw, stomach, or arm    Shortness of breath    Nausea or vomiting    Lightheadedness or a sudden cold sweat    You become confused or have difficulty speaking  You suddenly feel lightheaded or have trouble breathing  Seek care immediately if:   You have a severe headache or vision loss  You have weakness in an arm or leg  Call your doctor or cardiologist if:   You feel faint, dizzy, confused, or drowsy  You have been taking your blood pressure medicine but your pressure is higher than your provider says it should be  You have questions or concerns about your condition or care  Treatment for chronic hypertension  may include medicine to lower your blood pressure and cholesterol levels  A low cholesterol level helps prevent heart disease and makes it easier to control your blood pressure  Heart disease can make your blood pressure harder to control  You may also need to make lifestyle changes  What you need to know about the stages of hypertension:       Normal blood pressure is 119/79 or lower   Your healthcare provider may only check your blood pressure each year if it stays at a normal level  Elevated blood pressure is 120/79 to 129/79   This is sometimes called prehypertension  Your healthcare provider may suggest lifestyle changes to help lower your blood pressure to a normal level  He or she may then check it again in 3 to 6 months  Stage 1 hypertension is 130/80  to 139/89   Your provider may recommend lifestyle changes, medication, and checks every 3 to 6 months until your blood pressure is controlled  Stage 2 hypertension is 140/90 or higher   Your provider will recommend lifestyle changes and have you take 2 kinds of hypertension medicines  You will also need to have your blood pressure checked monthly until it is controlled  Manage chronic hypertension:   Check your blood pressure at home  Avoid smoking, caffeine, and exercise at least 30 minutes before checking your blood pressure  Sit and rest for 5 minutes before you take your blood pressure  Extend your arm and support it on a flat surface  Your arm should be at the same level as your heart  Follow the directions that came with your blood pressure monitor  Check your blood pressure 2 times, 1 minute apart, before you take your medicine in the morning  Also check your blood pressure before your evening meal  Keep a record of your readings and bring it to your follow-up visits  Ask your healthcare provider what your blood pressure should be  Manage any other health conditions you have  Health conditions such as diabetes can increase your risk for hypertension  Follow your healthcare provider's instructions and take all your medicines as directed  Talk to your healthcare provider about any new health conditions you have recently developed  Ask about all medicines  Certain medicines can increase your blood pressure  Examples include oral birth control pills, decongestants, herbal supplements, and NSAIDs, such as ibuprofen  Your healthcare provider can tell you which medicines are safe for you to take  This includes prescription and over-the-counter medicines      Lifestyle changes you can make to lower your blood pressure: Your provider may want you to make more lifestyle changes if you are having trouble controlling your blood pressure  This may feel difficult over time, especially if you think you are making good changes but your pressure is still high  It might help to focus on one new change at a time  For example, try to add 1 more day of exercise, or exercise for an extra 10 minutes on 2 days  Small changes can make a big difference  Your healthcare provider can also refer you to specialists such as a dietitian who can help you make small changes  Your family members may be included in helping you learn to create lifestyle changes, such as the following:     Limit sodium (salt) as directed  Too much sodium can affect your fluid balance  Check labels to find low-sodium or no-salt-added foods  Some low-sodium foods use potassium salts for flavor  Too much potassium can also cause health problems  Your healthcare provider will tell you how much sodium and potassium are safe for you to have in a day  He or she may recommend that you limit sodium to 2,300 mg a day  Follow the meal plan recommended by your healthcare provider  A dietitian or your provider can give you more information on low-sodium plans or the DASH (Dietary Approaches to Stop Hypertension) eating plan  The DASH plan is low in sodium, processed sugar, unhealthy fats, and total fat  It is high in potassium, calcium, and fiber  These can be found in vegetables, fruit, and whole-grain foods  Be physically active throughout the day  Physical activity, such as exercise, can help control your blood pressure and your weight  Be physically active for at least 30 minutes per day, on most days of the week  Include aerobic activity, such as walking or riding a bicycle  Also include strength training at least 2 times each week  Your healthcare providers can help you create a physical activity plan  Decrease stress    This may help lower your blood pressure  Learn ways to relax, such as deep breathing or listening to music  Limit alcohol as directed  Alcohol can increase your blood pressure  A drink of alcohol is 12 ounces of beer, 5 ounces of wine, or 1½ ounces of liquor  Do not smoke  Nicotine and other chemicals in cigarettes and cigars can increase your blood pressure and also cause lung damage  Ask your healthcare provider for information if you currently smoke and need help to quit  E-cigarettes or smokeless tobacco still contain nicotine  Talk to your healthcare provider before you use these products  Follow up with your doctor or cardiologist as directed: You will need to return to have your blood pressure checked and to have other lab tests done  Write down your questions so you remember to ask them during your visits  © Copyright [employer] 2022 Information is for End User's use only and may not be sold, redistributed or otherwise used for commercial purposes  All illustrations and images included in CareNotes® are the copyrighted property of A D A M , Inc  or [facility]   The above information is an  only  It is not intended as medical advice for individual conditions or treatments  Talk to your doctor, nurse or pharmacist before following any medical regimen to see if it is safe and effective for you

## 2022-12-19 NOTE — PROGRESS NOTES
Assessment/Plan:  Problem List Items Addressed This Visit        Digestive    Fatty liver       Cardiovascular and Mediastinum    Hypertension - Primary    Relevant Medications    enalapril (VASOTEC) 5 mg tablet       Musculoskeletal and Integument    Primary osteoarthritis involving multiple joints       Genitourinary    Stage 3 chronic kidney disease, unspecified whether stage 3a or 3b CKD (HCC)       Other    Prediabetes    Morbid obesity ([facility] )    Mixed hyperlipidemia    Depression, recurrent (HCC)    Constipation    Chronic pain syndrome   Other Visit Diagnoses     Encounter for vaccination        Essential hypertension        Relevant Medications    enalapril (VASOTEC) 5 mg tablet           Diagnoses and all orders for this visit:    Primary hypertension    Fatty liver    Primary osteoarthritis involving multiple joints    Stage 3 chronic kidney disease, unspecified whether stage 3a or 3b CKD (HCC)    Depression, recurrent ([facility] )    Mixed hyperlipidemia    Chronic pain syndrome    Prediabetes    Constipation, unspecified constipation type    Morbid obesity ([facility] )    Encounter for vaccination    Essential hypertension  -     enalapril (VASOTEC) 5 mg tablet; Take 1 tablet (5 mg total) by mouth daily        No problem-specific Assessment & Plan notes found for this encounter  A/P: Doing ok and labs are up to date  Appreciate pain management and neuro input  Continue with PT and injections  Discussed vaccines already had her flu vaccine    Continue current treatment and RTC four months for routine  Subjective:      Patient ID: Evaristo Chavis is a 75 y o  female  WF RTC for f/u HTN, Prediabetes, etc  Doing ok and no new issues  Continues to see pain management and neuro  Now off tremors meds and felt tremors due to neck issues and is in PT  Remains active w/o difficulty and no falls recently    Chronic pain and REKHA are manageable  MDD is controlled  Due for vaccines  Labs are up to date         The following portions of the patient's history were reviewed and updated as appropriate:   She has a past medical history of Arthritis, Cataract, Depression, Fluid retention, Headache, acute, Heart murmur, Hyperlipidemia, Hypertension, Pedal edema, Pneumonia, PONV (postoperative nausea and vomiting), Rotator cuff tear, left, Wears glasses, and Wears partial dentures  ,  does not have any pertinent problems on file  ,   has a past surgical history that includes Ankle surgery (Right); Tonsillectomy; Tubal ligation; Hysterectomy; Ovarian cyst removal; Carpal tunnel release (Bilateral); Hand surgery (Right); Abdominal adhesion surgery; Knee arthroscopy (Left); Appendectomy; Colonoscopy; Hemorroidectomy; Repair rectocele; pr surgical arthroscopy shoulder w/rotator cuff rpr (Left, 2/19/2018); Carpal tunnel release (Left); Carpal tunnel release (Right); Ileostomy; Anal sphincteroplasty; Knee arthrocentesis; Tonsillectomy and adenoidectomy; Joint replacement; and Replacement total knee (Left, 09/28/2021)  ,  family history includes Alzheimer's disease in her sister and sister; Bone cancer in her family and mother; COPD in her mother; Colon cancer in her family and mother; Depression in her sister; Diabetes in her sister; Emphysema in her mother; ROBERT disease in her brother; Heart failure in her sister; Hypertension in her brother, family, mother, sister, and sister; Lead poisoning in her father; No Known Problems in her daughter, maternal aunt, maternal aunt, maternal aunt, maternal aunt, maternal aunt, maternal grandmother, paternal aunt, and paternal grandmother; Skin cancer in her mother; Throat cancer in her brother; Thyroid disease in her sister  ,   reports that she has never smoked  She has never used smokeless tobacco  She reports current alcohol use  She reports that she does not use drugs  ,  is allergic to iodine - food allergy, keflex [cephalexin], morphine and related, penicillins, benadryl [diphenhydramine], ciprofloxacin, clindamycin, and erythromycin     Current Outpatient Medications   Medication Sig Dispense Refill   • acetaminophen (TYLENOL) 500 mg tablet Take 500-1,000 mg by mouth every 6 (six) hours as needed for mild pain       • albuterol (ProAir HFA) 90 mcg/act inhaler Inhale 2 puffs every 4 (four) hours as needed for wheezing 18 g 0   • Calcium Carbonate (CALCIUM 600 PO) Take by mouth daily       • Cholecalciferol (VITAMIN D3 PO) Take 50 mcg by mouth daily       • citalopram (CeleXA) 40 mg tablet take 1 tablet by mouth every evening 90 tablet 1   • enalapril (VASOTEC) 5 mg tablet Take 1 tablet (5 mg total) by mouth daily 90 tablet 1   • ezetimibe (ZETIA) 10 mg tablet take 1 tablet by mouth once daily 90 tablet 1   • montelukast (SINGULAIR) 10 mg tablet take 1 tablet by mouth at bedtime 90 tablet 3   • rosuvastatin (CRESTOR) 5 mg tablet Take 1 tablet (5 mg total) by mouth daily 90 tablet 3   • traZODone (DESYREL) 100 mg tablet take 1 tablet by mouth at bedtime 90 tablet 1     No current facility-administered medications for this visit  Review of Systems   Constitutional: Negative for activity change, chills, diaphoresis, fatigue and fever  HENT: Negative  Eyes: Negative for visual disturbance  Respiratory: Negative for cough, chest tightness, shortness of breath and wheezing  Cardiovascular: Negative for chest pain, palpitations and leg swelling  Gastrointestinal: Negative for abdominal pain, constipation, diarrhea, nausea and vomiting  Endocrine: Negative for cold intolerance and heat intolerance  Genitourinary: Negative for difficulty urinating, dysuria and frequency  Musculoskeletal: Negative for arthralgias, gait problem and myalgias  Neurological: Negative for dizziness, seizures, syncope, weakness, light-headedness and headaches  Psychiatric/Behavioral: Negative for confusion, dysphoric mood and sleep disturbance  The patient is not nervous/anxious          PHQ-2/9 Depression Screening    Little interest or pleasure in doing things: 0 - not at all  Feeling down, depressed, or hopeless: 1 - several days  Trouble falling or staying asleep, or sleeping too much: 0 - not at all  Feeling tired or having little energy: 0 - not at all  Poor appetite or overeatin - not at all  Feeling bad about yourself - or that you are a failure or have let yourself or your family down: 0 - not at all  Trouble concentrating on things, such as reading the newspaper or watching television: 0 - not at all  Moving or speaking so slowly that other people could have noticed  Or the opposite - being so fidgety or restless that you have been moving around a lot more than usual: 0 - not at all  Thoughts that you would be better off dead, or of hurting yourself in some way: 0 - not at all  PHQ-9 Score: 1   PHQ-9 Interpretation: No or Minimal depression         Objective:  Vitals:    22 0812   BP: 120/60   Pulse: 76   Temp: (!) 97 1 °F (36 2 °C)   SpO2: 97%   Weight: 85 4 kg (188 lb 4 oz)   Height: 5' (1 524 m)     Body mass index is 36 77 kg/m²  Physical Exam  Constitutional:       General: She is not in acute distress  Appearance: Normal appearance  She is not ill-appearing  HENT:      Head: Normocephalic and atraumatic  Mouth/Throat:      Mouth: Mucous membranes are moist    Eyes:      Extraocular Movements: Extraocular movements intact  Conjunctiva/sclera: Conjunctivae normal       Pupils: Pupils are equal, round, and reactive to light  Neck:      Vascular: No carotid bruit  Cardiovascular:      Rate and Rhythm: Normal rate and regular rhythm  Heart sounds: Normal heart sounds  Pulmonary:      Effort: Pulmonary effort is normal  No respiratory distress  Breath sounds: Normal breath sounds  No wheezing, rhonchi or rales  Abdominal:      General: Bowel sounds are normal  There is no distension  Palpations: Abdomen is soft  Tenderness:  There is no abdominal tenderness  Musculoskeletal:      Cervical back: Neck supple  Right lower leg: No edema  Left lower leg: No edema  Neurological:      General: No focal deficit present  Mental Status: She is alert and oriented to person, place, and time  Mental status is at baseline  Psychiatric:         Mood and Affect: Mood normal          Behavior: Behavior normal          Thought Content:  Thought content normal          Judgment: Judgment normal

## 2022-12-20 ENCOUNTER — OFFICE VISIT (OUTPATIENT)
Dept: PHYSICAL THERAPY | Facility: CLINIC | Age: 67
End: 2022-12-20

## 2022-12-20 DIAGNOSIS — G89.29 CHRONIC NECK PAIN: ICD-10-CM

## 2022-12-20 DIAGNOSIS — M54.2 CHRONIC NECK PAIN: ICD-10-CM

## 2022-12-20 DIAGNOSIS — M50.30 DDD (DEGENERATIVE DISC DISEASE), CERVICAL: Primary | ICD-10-CM

## 2022-12-20 NOTE — PROGRESS NOTES
Daily Note     Today's date: 2022  Patient name: Jaden Calix  : 1955  MRN: [id number]  Referring provider: Eugenio Hyatt MD  Dx:   Encounter Diagnosis     ICD-10-CM    1  DDD (degenerative disc disease), cervical  M50 30       2  Chronic neck pain  M54 2     G89 29                      Subjective: The patient reports feeling 5/10 pain at her L cervical spine this AM       Objective: See treatment diary below      Assessment: The patient was very pleasant and agreeable during the session today  The patient tolerated the exercises fairly well today  There were complaints of some UE tingling but nothing that persisted once we changed positions  The patient needed VC  to perform the exercises with proper form  The patient reports feeling less cervical pain/stiffness at the end of the treatment today  The patient will benefit from continued skilled physical therapy to progress towards achieving patient centered goals  Plan: Continue per plan of care  Progress treatment as tolerated  Precautions: NONE; FALLS; Hx Tremors ; High SX irritability  RE:   Access Code [email or web address]       Manual      Massage B/L upper trapezius         Manual C-spine Traction 3x:15 worse UE N/T   5x1' 8'x1   Sub Occipital Release        Seated chin tuck with clinician O P                    Therapeutic Exercise    UBE seated   *L1 x 6 mins L1 x6 min L1 x6 min L1 x 8 mins   Nu-step   S=9                Bridges        Corner Pec stretch                        Cervical snag extension / rotation   Ext  2x10 less tingle ea time 10x 2x10           Levator Scapulae stretch B/L    *5x:15 B/L :15 x5 B/L :15 x5 B/L :15x5 B/L need   B/L Upper Trap stretch  5x:15 B/L :15x5 B/L :15x5 B/L 3x:15 B/L (+) tingling - stopped this ex   Sit T-spine Extensions  *x10 x10 5x cervical p!     Supine Pectoralis Minor stretch  *5x:15 snow malick arms x2 min 2 min 4x:30   Thoracic Open book rotations        Neuro Re-Ed        Abdominal Hollow  *10x:05  :05x10 :05x10 x15   Multifidi  *x10 :05 :05x10 :05x10 x15   Kegels  *x10 :05 :05x10 :05x10 x15   Chin Tucks x6 p!p! UE numbness   Increased N+T Supine into pillow x10   Chin tuck head lifts     *10x:05   Quadruped chin tuck + rotation        Tandem balance  2x:30 :30x2 :30x2 bilat :30x2 B/L   Quad DLS        MTP/LTP/ antirotation        Towel roll  AD worse N/T UE       Chin tuck + cervical EXT x5 worse N/T UE       Posture correction     *? NV   scap retracts x10 worse N/T UE       Prone cervical EXT        Therapeutic Activity        Crate lifts        Chair squats        Crate carry            Modalities        MHP/CP UT                            Access Code: [email or web address]  URL: [email or web address]  Date: 12/13/2022  Prepared by: Jose Pierce    Exercises    • Cervical Extension AROM with Strap - 2 x daily - 7 x weekly - 1 sets - 10 reps

## 2022-12-23 ENCOUNTER — OFFICE VISIT (OUTPATIENT)
Dept: PHYSICAL THERAPY | Facility: CLINIC | Age: 67
End: 2022-12-23

## 2022-12-23 DIAGNOSIS — M54.2 CHRONIC NECK PAIN: ICD-10-CM

## 2022-12-23 DIAGNOSIS — M50.30 DDD (DEGENERATIVE DISC DISEASE), CERVICAL: Primary | ICD-10-CM

## 2022-12-23 DIAGNOSIS — G89.29 CHRONIC NECK PAIN: ICD-10-CM

## 2022-12-23 NOTE — PROGRESS NOTES
Daily Note     Today's date: 2022  Patient name: Aziza Ribeiro  : 1955  MRN: [id number]  Referring provider: Jatin Beatty MD  Dx:   Encounter Diagnosis     ICD-10-CM    1  DDD (degenerative disc disease), cervical  M50 30       2  Chronic neck pain  M54 2     G89 29                      Subjective: The patient is complaining of the damp weather today  The patient feels increased pain as a result - reports feeling 4/10 pain at her cervical spine  Objective: See treatment diary below      Assessment: The patient tolerated all activities well today  The patient felt decreased pain at her lumbar spine after performing the lumbar extension exercises  There were no complaints of increased pain or problems after the session today  The patient will benefit from continued skilled physical therapy to progress towards achieving patient centered goals  Plan: Continue per plan of care  Progress treatment as tolerated  Precautions: NONE; FALLS; Hx Tremors ; High SX irritability  RE:   Access Code [email or web address]       Manual      Massage B/L upper trapezius         Manual C-spine Traction 8'x1   15 second intervals   5x1' 8'x1   Sub Occipital Release        Seated chin tuck with clinician O P                    Therapeutic Exercise    UBE seated  L1 x 8 mins *L1 x 6 mins L1 x6 min L1 x6 min L1 x 8 mins   Nu-step   S=9                Bridges        Corner Pec stretch                        Cervical snag extension / rotation 2x10  Ext  2x10 less tingle ea time 10x 2x10           Levator Scapulae stretch B/L 5x:15 B/L *5x:15 B/L :15 x5 B/L :15 x5 B/L :15x5 B/L need   B/L Upper Trap stretch  5x:15 B/L :15x5 B/L :15x5 B/L 3x:15 B/L (+) tingling - stopped this ex   Sit T-spine Extensions  *x10 x10 5x cervical p!     Supine Pectoralis Minor stretch 4x:30 *5x:15 snow malick arms x2 min 2 min 4x:30   Thoracic Open book rotations        Neuro Re-Ed        Abdominal Hollow x15 *10x:05  :05x10 :05x10 x15   Multifidi x15 *x10 :05 :05x10 :05x10 x15   Kegels x15 *x10 :05 :05x10 :05x10 x15   Chin Tucks x10   Increased N+T Supine into pillow x10   Chin tuck head lifts x10    *10x:05   Standing hip sag lumbar EXT 2x10       Tandem balance Reviewed - perform NV 2x:30 :30x2 :30x2 bilat :30x2 B/L   Quad DLS        MTP/LTP/ antirotation        Towel roll  AD worse N/T UE       Chin tuck + cervical EXT x5 worse N/T UE       Posture correction x10    *? NV   scap retracts x10 worse N/T UE       Prone cervical EXT        Therapeutic Activity        Crate lifts        Chair squats        Crate carry            Modalities        MHP/CP UT                            Access Code: [email or web address]  URL: [email or web address]  Date: 12/13/2022  Prepared by: Vinay Pierce    Exercises    • Cervical Extension AROM with Strap - 2 x daily - 7 x weekly - 1 sets - 10 reps

## 2022-12-27 ENCOUNTER — OFFICE VISIT (OUTPATIENT)
Dept: PHYSICAL THERAPY | Facility: CLINIC | Age: 67
End: 2022-12-27

## 2022-12-27 DIAGNOSIS — G89.29 CHRONIC NECK PAIN: ICD-10-CM

## 2022-12-27 DIAGNOSIS — M50.30 DDD (DEGENERATIVE DISC DISEASE), CERVICAL: Primary | ICD-10-CM

## 2022-12-27 DIAGNOSIS — M54.2 CHRONIC NECK PAIN: ICD-10-CM

## 2022-12-27 NOTE — PROGRESS NOTES
Daily Note     Today's date: 2022  Patient name: Nasim Treviño  : 1955  MRN: [id number]  Referring provider: Stacey Echols MD  Dx:   Encounter Diagnosis     ICD-10-CM    1  DDD (degenerative disc disease), cervical  M50 30       2  Chronic neck pain  M54 2     G89 29                      Subjective: Patient states she has a little pain (no number given) in the neck today, but it  Is not as bad as it was  She still has difficulty/ pain when she turns her head to the left  Overall she feels like she is improving  Objective: See treatment diary below      Assessment: Tolerated treatment well  Patient would benefit from continued PT for stretching and strengthening  Patient was able to perform her exercises with verbal cues for proper performance  She felt good by the end of the session  She had no pain and very little tingling in the arms  Plan: Continue per plan of care  Progress treatment as tolerated  Precautions: NONE; FALLS; Hx Tremors ; High SX irritability  RE:   Access Code [email or web address]       Manual      Massage B/L upper trapezius         Manual C-spine Traction 8'x1   15 second intervals 8'x1   15 second intervals  5x1' 8'x1   Sub Occipital Release  1' x2      Seated chin tuck with clinician O P                    Therapeutic Exercise    UBE seated  L1 x 8 mins L1 x8 min  L1 x6 min L1 x 8 mins   Nu-step   S=9                Bridges        Corner Pec stretch                        Cervical snag extension / rotation 2x10 2x10  10x 2x10           Levator Scapulae stretch B/L 5x:15 B/L :05x5 B/L  :15 x5 B/L :15x5 B/L need   B/L Upper Trap stretch    :15x5 B/L 3x:15 B/L (+) tingling - stopped this ex   Sit T-spine Extensions    5x cervical p!     Supine Pectoralis Minor stretch 4x:30 :30x4  2 min 4x:30   Thoracic Open book rotations        Neuro Re-Ed        Abdominal Hollow x15 x15  :05x10 x15   Multifidi x15 x15  :05x10 x15 Kegels x15 x15  :05x10 x15   Chin Tucks x10 x10 sit w/ towel  x10 supine  Increased N+T Supine into pillow x10   Chin tuck head lifts x10 x10   *10x:05   Standing hip sag lumbar EXT 2x10 2x10      Tandem balance Reviewed - perform NV :30x2 B/L  :30x2 bilat :30x2 B/L   Quad DLS        MTP/LTP/ antirotation        Towel roll  AD worse N/T UE       Chin tuck + cervical EXT x5 worse N/T UE       Posture correction x10 x10   *? NV   scap retracts x10 worse N/T UE       Prone cervical EXT        Therapeutic Activity        Crate lifts        Chair squats        Crate carry            Modalities        MHP/CP UT                            Access Code: [email or web address]  URL: [email or web address]  Date: 12/13/2022  Prepared by: Alessia Pierce    Exercises    • Cervical Extension AROM with Strap - 2 x daily - 7 x weekly - 1 sets - 10 reps

## 2022-12-30 ENCOUNTER — OFFICE VISIT (OUTPATIENT)
Dept: PHYSICAL THERAPY | Facility: CLINIC | Age: 67
End: 2022-12-30

## 2022-12-30 DIAGNOSIS — M54.2 CHRONIC NECK PAIN: ICD-10-CM

## 2022-12-30 DIAGNOSIS — G89.29 CHRONIC NECK PAIN: ICD-10-CM

## 2022-12-30 DIAGNOSIS — M50.30 DDD (DEGENERATIVE DISC DISEASE), CERVICAL: Primary | ICD-10-CM

## 2022-12-30 NOTE — PROGRESS NOTES
Daily Note     Today's date: 2022  Patient name: Kim Nelson  : 1955  MRN: [id number]  Referring provider: Paz Boyle MD  Dx:   Encounter Diagnosis     ICD-10-CM    1  DDD (degenerative disc disease), cervical  M50 30       2  Chronic neck pain  M54 2     G89 29           Start Time: 825          Subjective: Patient states she has not been having tingling in the arms except 2 days ago when she fell asleep in the chair  Presently she has a 4/10 pain in the neck  It felt better earlier after her shower, but the pain returned  She still feels it is hard to play cards at       Objective: See treatment diary below      Assessment: Tolerated treatment well  Patient would benefit from continued PT for stretching and strengthening  Patient's program modified due to tingling in arms  Snag activities took tingle away  She felt better and looser by the end of the session  It took the pain away  Plan: Continue per plan of care  Progress treatment as tolerated  Precautions: NONE; FALLS; Hx Tremors ;  High SX irritability  RE:   Access Code [email or web address]       Manual      Massage B/L upper trapezius         Manual C-spine Traction 8'x1   15 second intervals 8'x1   15 second intervals 8'x1   15 second intervals  8'x1   Sub Occipital Release  1' x2 1'x2     Seated chin tuck with clinician O P                    Therapeutic Exercise    UBE seated  L1 x 8 mins L1 x8 min L1 x8 min  L1 x 8 mins   Nu-step   S=9                Bridges        Corner Pec stretch                        Cervical snag extension / rotation 2x10 2x10 Ext 2x10  Rotation x10  2x10           Levator Scapulae stretch B/L 5x:15 B/L :15x5 B/L :15x2 L tingle  :15x4 R tingle  :15x5 B/L need   B/L Upper Trap stretch     3x:15 B/L (+) tingling - stopped this ex   Sit T-spine Extensions   *x4 tingle     Supine Pectoralis Minor stretch 4x:30 :30x4 :30x4  4x:30   Thoracic Open book rotations        Neuro Re-Ed        Abdominal Hollow x15 x15 x15  x15   Multifidi x15 x15 x15  x15   Kegels x15 x15 x15  x15   Chin Tucks x10 x10 sit w/ towel  x10 supine x10 sit w/ towel  x10 supine  Supine into pillow x10   Chin tuck head lifts x10 x10 x10  *10x:05   Standing hip sag lumbar EXT 2x10 2x10 2x10     Tandem balance Reviewed - perform NV :30x2 B/L :30x2 B/L  :30x2 B/L   Quad DLS        MTP/LTP/ antirotation        Towel roll  AD worse N/T UE       Chin tuck + cervical EXT x5 worse N/T UE       Posture correction x10 x10 x10  *? NV   scap retracts x10 worse N/T UE       Prone cervical EXT        Therapeutic Activity        Crate lifts        Chair squats        Crate carry            Modalities        MHP/CP UT                            Access Code: [email or web address]  URL: [email or web address]  Date: 12/13/2022  Prepared by: Ren Pierce    Exercises    • Cervical Extension AROM with Strap - 2 x daily - 7 x weekly - 1 sets - 10 reps

## 2023-01-02 NOTE — PROGRESS NOTES
PT Re-Evaluation     Today's date: 1/3/2023  Patient name: Arnulfo Gottlieb  : 1955  MRN: [id number]  Referring provider: Manpreet Asher MD  Dx:   Encounter Diagnosis     ICD-10-CM    1  DDD (degenerative disc disease), cervical  M50 30       2  Chronic neck pain  M54 2     G89 29                      Assessment  Assessment details: Arnulfo Gottlieb is a 75 y o  female with a history of arthritis, cataract, depression, H/A, hyperlipidemia, HTN, L RTC tear, CKD that presents for a physical therapy RE evaluation  The patient has attended 9 sessions of skilled physical therapy  The patient demonstrates signs and symptoms consistent with cervical DDD; myelopathy  During the examination the patient demonstrated improved but impaired posture/UE strength, improved but impaired cervical ROM, improved activity tolerance, tremors, decreased sensory changes, and decreased neck pain  The patient has made functional gains since starting therapy  The patient is now able to perform ADL's with less UE tingling  The patient is more aware of her posture and is able to correct without cues  The patient continues to have difficulty with gripping items and holding items with her hands ie) cup of coffee  The patient has difficulty reaching up to grab items over head  The patient will benefit from continued skilled PT to address impairments, work towards goals, and restore patient PLOF            Impairments: abnormal or restricted ROM, activity intolerance, impaired balance, impaired physical strength, lacks appropriate home exercise program, pain with function and poor posture   Functional limitations: Difficulty lifting/carrying objects heavier than 10 pounds, difficulty reaching behind back, difficulty reaching above head, difficulty looking over head, difficulty turning is head to drive, difficulty with house work ie ) vacuuming, difficulty lying supine, and difficulty donning/doffing a shirt  Symptom irritability: moderateBarriers to therapy: Chronicity of condition/  / UE Tremors /  / High symptom irritability  Understanding of Dx/Px/POC: fair   Prognosis: fair  Prognosis details: Chronicity of condition/  / UE Tremors /  / High symptom irritability    Goals  STG: Achieve in 4-6 weeks  1  Patient's cervical/R scapula pain at worst is no greater than 4/10 to reduce improve cervical ROM with ADL's  PROGRESSING 1/3  2  Patient's cervical ROM improve to "minimal restriction" all motions tested to allow for function ROM with ADL's and driving  PROGRESSING 1/3  3  Patient's shoulder/cervical MMT improve by 1/2-1 muscle grade to allow for completion of over head activities without difficulty  PROGRESSING 1/3    LTG: Achieve in 6-12 weeks  1  Patient tolerate ADL's without neck/scapula pain or UE tremors pain to achieve their personal therapy goal PROGRESSING 1/3  2  Patient's strength at cervical spine and shoulders improve to WNL to allow completion of leisure activities  PROGRESSING 1/3  3  Patient to be independent with home exercise plan at time of discharge  PROGRESSING 1/3  4  Patient's FOTO score improve to >  60% to indicate a return to normal function  PROGRESSING 1/3        Plan  Plan details: RE-ASSESS 1X/MONTH  Patient would benefit from: skilled physical therapy  Planned modality interventions: TENS, cryotherapy, thermotherapy: hydrocollator packs and traction  Other planned modality interventions: IASTM  Planned therapy interventions: joint mobilization, manual therapy, massage, neuromuscular re-education, patient education, postural training, self care, strengthening, stretching, therapeutic activities, therapeutic exercise, home exercise program and abdominal trunk stabilization  Frequency: 1-3x/wk    Duration in weeks: 12  Plan of Care beginning date: 12/6/2022  Plan of Care expiration date: 3/7/2023  Treatment plan discussed with: PTA and patient        Subjective Evaluation    History of Present Illness  Date of onset: 11/3/2022  Mechanism of injury: SUBJECTIVE: 1/3/23: The patient reports an improvement in activity tolerance, UE strength, and cervical ROM since starting therapy  The patient is now able to function /perform her ADL's with less numbness/tingling  The patient is more aware of her posture and it's implications when assuming poor posture  The patient continues to have difficulty with gripping items and holding items with her hands ie) cup of coffee  The patient has difficulty reaching up to grab items over head  The patient will benefit from continued PT focused on achieving patient specific goals  INJURY HISTORY: Michelle Huerta is a 75 y o  female that presents to outpatient physical therapy with complaints of chronic neck pain(>2 years)/ R scapula pain ( 1 month)/difficulty moving the neck   The patient reports onset of tremors at her B/L hands (>1 year) causing difficulty with carrying items, performing ADL's, and resting comfortably  The patient had onset of tremors greater than 1 year ago without known cause  The patient initially saw her PCP for the tremors who (prescribed Parkinson based medicine which did not improve her symptoms - they worsened  The patient recently F/U with neurology who discharged the Parkinson's medicine and referred the patient to PT because the neurologist believes the tremors and complaints may be related to DDD at the cervical spine  The patient is seeing pain management for her lumbar spine  The patient notes multiple falls over the past year without known cause  The patient's main goal for physical therapy is to stop the tremors and to stop the pain at the R shoulder blade  C-spine MRI: IMPRESSION:     Cervical spondylitic degenerative change with primarily left-sided facet hypertrophic change  There is no cord compression    Multilevel primarily left-sided foraminal narrowing most pronounced at the C4-5 level, moderate      Small central disc extrusion at C6-7 partially effacing the ventral CSF space  Pain  Current pain ratin  At best pain ratin  At worst pain ratin  Location: L shoulder pain; neck pain  Quality: sharp and dull ache  Relieving factors: heat  Aggravating factors: sitting, standing, walking, stair climbing, overhead activity and lifting  Progression: improved    Social Support  Steps to enter house: no  Stairs in house: yes   13  Lives in: apartment  Lives with: alone    Employment status: not working  Hand dominance: ambidextrous    Treatments  Current treatment: physical therapy  Patient Goals  Patient goals for therapy: decreased pain, increased motion, increased strength, independence with ADLs/IADLs, return to sport/leisure activities and improved balance  Patient goal: stop tremors and decrease R scapula pain( progressing)        Objective     Concurrent Complaints  Positive for disturbed sleep (with medicine) and tinnitus (chronic)  Negative for night pain, dizziness, faints, headaches, nausea/motion sickness, trouble swallowing, difficulty breathing, shortness of breath, respiratory pain, history of cancer, history of trauma and infection    Additional Special Questions  Patient notes sleep apnea    Postural Observations  Seated posture: fair  Standing posture: fair  Correction of posture: makes symptoms better    Additional Postural Observation Details  IMPROVED    Palpation     Additional Palpation Details  NO TTP B/L    Tenderness   Cervical Spine   Tenderness in the right scapula  Neurological Testing     Sensation   Cervical/Thoracic   Left   Intact: light touch  Paresthesia: light touch    Right   Intact: light touch  Paresthesia: light touch    Comments   Left light touch:  elbow to hand parasthesia  Right light touch: elbow to hand       Reflexes   Left   Biceps (C5/C6): brisk (3+)  Brachioradialis (C6): brisk (3+)  Floyd's reflex: negative    Right   Biceps (C5/C6): brisk (3+)  Brachioradialis (C6): brisk (3+)  Floyd's reflex: negative    Additional Neurological Details  Patient is a Falls risk    Active Range of Motion   Cervical/Thoracic Spine       Cervical  Subcranial protraction:   Restriction level: minimal  Subcranial retraction: Active cervical subcranial retraction: L cervical paraspinals  with pain   Restriction level: moderate  Flexion: 32 degrees  Restriction level: moderate  Extension: 40 degrees     with pain Restriction level: moderate  Left lateral flexion: 20 degrees     with pain Restriction level: moderate  Right lateral flexion: 22 degrees     with pain Restriction level moderate  Left rotation: 40 degrees with pain Restriction level: moderate  Right rotation: 32 degrees    with pain Restriction level: moderate    Additional Active Range of Motion Details  IMPROVED  Mechanical Assessment    Cervical    Seated retraction: repeated movements   Pain location: no change    Thoracic      Lumbar      Strength/Myotome Testing   Cervical Spine   Neck extension: 4  Neck flexion: 4-    Left   Interossei strength (t1): 4  Neck lateral flexion (C3): 4-    Right   Interossei strength (t1): 4  Neck lateral flexion (C3): 4-    Left Shoulder     Planes of Motion   Flexion: 4-   Abduction: 4-   External rotation at 0°: 4   Internal rotation at 0°: 4     Right Shoulder     Planes of Motion   Flexion: 4   Abduction: 4   External rotation at 0°: 4+   Internal rotation at 0°: 4     Left Elbow   Flexion: 4  Extension: 4-    Right Elbow   Flexion: 4  Extension: 4-    Left Wrist/Hand   Wrist extension: 4+  Wrist flexion: 4+  Thumb extension: 4    Right Wrist/Hand   Wrist extension: 4+  Wrist flexion: 4+  Thumb extension: 4    Additional Strength Details  IMPROVED    Tests   Cervical   Negative cervical distraction       Additional Tests Details  FOTO: 63% ( predicted 54%)    Difficult to special test   Neuro Exam:     Headaches   Patient reports headaches: No                 Precautions: NONE; FALLS; Hx Tremors ; High SX irritability  RE: 1/31  Access Code [email or web address]       Manual   12/23 12/27 12/30/22 1/3/23    Massage B/L upper trapezius         Manual C-spine Traction 8'x1   15 second intervals 8'x1   15 second intervals 8'x1   15 second intervals 8'x1   15 second intervals    Sub Occipital Release  1' x2 1'x2 1x2'    Seated chin tuck with clinician O P                    Therapeutic Exercise 12/23 12/27 12/30/22 1/3/23    UBE seated  L1 x 8 mins L1 x8 min L1 x8 min L1 x 8 mins    Nu-step   S=9                Bridges        Corner Pec stretch    *? Cervical snag extension / rotation 2x10 2x10 Ext 2x10  Rotation x10 EXT 2x10              Levator Scapulae stretch B/L 5x:15 B/L :15x5 B/L :15x2 L tingle  :15x4 R tingle 5x:15 ea    B/L Upper Trap stretch        Sit T-spine Extensions   *x4 tingle     Supine Pectoralis Minor stretch 4x:30 :30x4 :30x4 :30x3    Thoracic Open book rotations        Neuro Re-Ed        Abdominal Hollow x15 x15 x15 x15    Multifidi x15 x15 x15 x15    Kegels x15 x15 x15 x15    Chin Tucks x10 x10 sit w/ towel  x10 supine x10 sit w/ towel  x10 supine x10 sit w/ towel    Chin tuck head lifts x10 x10 x10 x10    Standing hip sag lumbar EXT 2x10 2x10 2x10 2x10    Tandem balance Reviewed - perform NV :30x2 B/L :30x2 B/L :30x2B/L    Quad DLS    *? MTP/LTP/ antirotation    *?     Towel roll  AD worse N/T UE       Chin tuck + cervical EXT x5 worse N/T UE       Posture correction x10 x10 x10 x10    scap retracts x10 worse N/T UE       Prone cervical EXT        Therapeutic Activity        CONE cabinet    *    Putty exercises /     *    Chair squats        Crate carry            Modalities        MHP/CP UT

## 2023-01-03 ENCOUNTER — EVALUATION (OUTPATIENT)
Dept: PHYSICAL THERAPY | Facility: CLINIC | Age: 68
End: 2023-01-03

## 2023-01-03 DIAGNOSIS — M54.2 CHRONIC NECK PAIN: ICD-10-CM

## 2023-01-03 DIAGNOSIS — G89.29 CHRONIC NECK PAIN: ICD-10-CM

## 2023-01-03 DIAGNOSIS — M50.30 DDD (DEGENERATIVE DISC DISEASE), CERVICAL: Primary | ICD-10-CM

## 2023-01-05 ENCOUNTER — OFFICE VISIT (OUTPATIENT)
Dept: PHYSICAL THERAPY | Facility: CLINIC | Age: 68
End: 2023-01-05

## 2023-01-05 DIAGNOSIS — M54.2 CHRONIC NECK PAIN: ICD-10-CM

## 2023-01-05 DIAGNOSIS — G89.29 CHRONIC NECK PAIN: ICD-10-CM

## 2023-01-05 DIAGNOSIS — M50.30 DDD (DEGENERATIVE DISC DISEASE), CERVICAL: Primary | ICD-10-CM

## 2023-01-05 NOTE — PROGRESS NOTES
Daily Note     Today's date: 2023  Patient name: Tracey Galindo  : 1955  MRN: [id number]  Referring provider: Humza Nunes MD  Dx:   Encounter Diagnosis     ICD-10-CM    1  DDD (degenerative disc disease), cervical  M50 30       2  Chronic neck pain  M54 2     G89 29                      Subjective: Patient states her pain is not real bad today  She rates it a 2/10 in the left cervical area  Objective: See treatment diary below    Patient 's home exercise program updated to include additional exercises  Handout issued and explained with demonstration  Patient accepts new exercises  Assessment: Tolerated treatment well  Patient would benefit from continued PT for stretching and strengthening  Patient was able to add exercises to her program with little difficulty and discomfort  She seemed to understand all education during session  She felt good when she left department  Patient feels the manual part of her therapy has been helping her a lot  Plan: Continue per plan of care  Progress treatment as tolerated  Precautions: NONE; FALLS; Hx Tremors ;  High SX irritability  RE:   Access Code [email or web address]       Manual   12/23 12/27 12/30/22 1/3/23 1   Massage B/L upper trapezius         Manual C-spine Traction 8'x1   15 second intervals 8'x1   15 second intervals 8'x1   15 second intervals 8'x1   15 second intervals 8'x1   15 second intervals   Sub Occipital Release  1' x2 1'x2 1x2' 1x2'   Seated chin tuck with clinician O P                    Therapeutic Exercise 12/23 12/27 12/30/22 1/3/23 1/5   UBE seated  L1 x 8 mins L1 x8 min L1 x8 min L1 x 8 mins L1 x 8 mins   Nu-step   S=9                Bridges        Corner Pec stretch    *? *:15x5 doorway                   Cervical snag extension / rotation 2x10 2x10 Ext 2x10  Rotation x10 EXT 2x10   Ext 2x10           Levator Scapulae stretch B/L 5x:15 B/L :15x5 B/L :15x2 L tingle  :15x4 R tingle 5x:15 ea :15x5 ea   B/L Upper Trap stretch        Sit T-spine Extensions   *x4 tingle  x6 tingle   Supine Pectoralis Minor stretch 4x:30 :30x4 :30x4 :30x3 :30x3   Thoracic Open book rotations        Neuro Re-Ed        Abdominal Hollow x15 x15 x15 x15 Stand x15   Multifidi x15 x15 x15 x15 Stand x15   Kegels x15 x15 x15 x15 Stand x15   Chin Tucks x10 x10 sit w/ towel  x10 supine x10 sit w/ towel  x10 supine x10 sit w/ towel x10 sit w/ towel   Chin tuck head lifts x10 x10 x10 x10 x   Standing hip sag lumbar EXT 2x10 2x10 2x10 2x10 2x10   Tandem balance Reviewed - perform NV :30x2 B/L :30x2 B/L :30x2B/L :30x2 B/L   Quad DLS    *? MTP/LTP/ antirotation    *? Towel roll  AD worse N/T UE       Chin tuck + cervical EXT x5 worse N/T UE       Posture correction x10 x10 x10 x10 x10   scap retracts x10 worse N/T UE       Prone cervical EXT        Therapeutic Activity        CONE cabinet    * * 5 cones x3 B/L 1st shelf   Putty exercises /     * *B/L x5 squeeze,pinch, roll,pinch/pull, ext   Chair squats        Crate carry            Modalities        MHP/CP UT                            Access Code: [email or web address]  URL: [email or web address]  Date: 01/05/2023  Prepared by: Juan Pierce    Exercises  • Gentle Levator Scapulae Stretch - 2 x daily - 7 x weekly - 1 sets - 5 reps - 15 hold  • Seated Upper Trapezius Stretch - 2 x daily - 7 x weekly - 1 sets - 5 reps - 15 hold  • Seated Thoracic Extension Arms Overhead - 2 x daily - 7 x weekly - 1 sets - 10 reps  • Supine Transversus Abdominis Bracing - Hands on Stomach - 2 x daily - 7 x weekly - 2 sets - 10 reps - 5 hold  • Seated Pelvic Floor Contraction - 2 x daily - 7 x weekly - 1 sets - 10 reps - 10 hold  • Seated Multifidi Isometric - 2 x daily - 7 x weekly - 1 sets - 15 reps - 5 hold  • Tandem Stance with Chair Support - 2 x daily - 7 x weekly - 1 sets - 3 reps - 30 hold  • Supine Static Chest Stretch on Foam Roll - 1 x daily - 7 x weekly - 1 sets - 5 reps - 15 hold  • Cervical Extension AROM with Strap - 2 x daily - 7 x weekly - 1 sets - 10 reps  • Supine Chin Tuck - 2 x daily - 7 x weekly - 1 sets - 10 reps - 5 hold  • Supine Deep Neck Flexor Training - Repetitions - 2 x daily - 7 x weekly - 1 sets - 10 reps - 10 hold  • Putty Squeezes - 1 x daily - 7 x weekly - 1 sets - 10 reps  • Tip Pinch with Putty - 1 x daily - 7 x weekly - 1 sets - 10 reps  • Rolling Putty on Table - 1 x daily - 7 x weekly - 1 sets - 10 reps  • Finger Pinch and Pull with Putty - 1 x daily - 7 x weekly - 1 sets - 10 reps  • Seated Finger Extension with Putty - 1 x daily - 7 x weekly - 1 sets - 10 reps

## 2023-01-10 ENCOUNTER — OFFICE VISIT (OUTPATIENT)
Dept: PHYSICAL THERAPY | Facility: CLINIC | Age: 68
End: 2023-01-10

## 2023-01-10 DIAGNOSIS — M54.2 CHRONIC NECK PAIN: ICD-10-CM

## 2023-01-10 DIAGNOSIS — M50.30 DDD (DEGENERATIVE DISC DISEASE), CERVICAL: Primary | ICD-10-CM

## 2023-01-10 DIAGNOSIS — G89.29 CHRONIC NECK PAIN: ICD-10-CM

## 2023-01-10 NOTE — PROGRESS NOTES
Daily Note     Today's date: 1/10/2023  Patient name: Elida Jiang  : 1955  MRN: [id number]  Referring provider: Ashley Chadwick MD  Dx:   Encounter Diagnosis     ICD-10-CM    1  DDD (degenerative disc disease), cervical  M50 30       2  Chronic neck pain  M54 2     G89 29           Start Time: 0804          Subjective: Patient states she has a little discomfort in the neck, "but not bad"  She still feels the left arm is worse then the right with pain/ tingle  She only had a little tingling in the arms a couple of times over the weekend  Usually caused by her falling asleep in the chair  Over all she feels she is still improving  Her biggest problem continues to be reaching  Objective: See treatment diary below      Assessment: Tolerated treatment well  Patient would benefit from continued PT for stretching and strengthening  Patient was able to add exercises with little increase of tingle that subsided with other stretches  She felt "good" when she left department  Plan: Continue per plan of care  Progress treatment as tolerated  Precautions: NONE; FALLS; Hx Tremors ; High SX irritability  RE:   Access Code [email or web address]       Manual  1/10  12/30/22 1/3/23 15   Massage B/L upper trapezius        Manual C-spine Traction 8'x1   15 second intervals  8'x1   15 second intervals 8'x1   15 second intervals 8'x1   15 second intervals   Sub Occipital Release 1x2'  1'x2 1x2' 1x2'   Seated chin tuck with clinician O P                    Therapeutic Exercise 1/10  12/30/22 1/3/23 15   UBE seated  L1 x8 min  L1 x8 min L1 x 8 mins L1 x 8 mins   Nu-step   S=9                Bridges        Corner Pec stretch Doorway :15x5   *?  *:15x5 doorway                   Cervical snag extension / rotation Ext 3x10  Ext 2x10  Rotation x10 EXT 2x10   Ext 2x10           Levator Scapulae stretch B/L :15x5  :15x2 L tingle  :15x4 R tingle 5x:15 ea :15x5 ea   B/L Upper Trap stretch        Sit T-spine Extensions   *x4 tingle  x6 tingle   Supine Pectoralis Minor stretch :30x3  :30x4 :30x3 :30x3   Thoracic Open book rotations        Neuro Re-Ed        Abdominal Hollow Stand x15  x15 x15 Stand x15   Multifidi Stand x15  x15 x15 Stand x15   Kegels Stand x15  x15 x15 Stand x15   Chin Tucks x10 sit towel  x10 sit w/ towel  x10 supine x10 sit w/ towel x10 sit w/ towel   Chin tuck head lifts x10  x10 x10 x   Standing hip sag lumbar EXT 2x10  2x10 2x10 2x10   Tandem balance   :30x2 B/L :30x2B/L :30x2 B/L   Quad DLS    *? MTP/LTP/ antirotation MTP/LTP yellow x10 ea   *? Towel roll         Chin tuck + cervical EXT        Posture correction x10  x10 x10 x10   scap retracts        Prone cervical EXT        Therapeutic Activity        CONE cabinet 5 cones x3 B/L 1st shelf   * * 5 cones x3 B/L 1st shelf   Putty exercises /  HEP   * *B/L x5 squeeze,pinch, roll,pinch/pull, ext   Chair squats        Crate carry            Modalities        MHP/CP UT                            Access Code: [email or web address]  URL: [email or web address]  Date: 01/05/2023  Prepared by: Juan Pierce    Exercises  • Gentle Levator Scapulae Stretch - 2 x daily - 7 x weekly - 1 sets - 5 reps - 15 hold  • Seated Upper Trapezius Stretch - 2 x daily - 7 x weekly - 1 sets - 5 reps - 15 hold  • Seated Thoracic Extension Arms Overhead - 2 x daily - 7 x weekly - 1 sets - 10 reps  • Supine Transversus Abdominis Bracing - Hands on Stomach - 2 x daily - 7 x weekly - 2 sets - 10 reps - 5 hold  • Seated Pelvic Floor Contraction - 2 x daily - 7 x weekly - 1 sets - 10 reps - 10 hold  • Seated Multifidi Isometric - 2 x daily - 7 x weekly - 1 sets - 15 reps - 5 hold  • Tandem Stance with Chair Support - 2 x daily - 7 x weekly - 1 sets - 3 reps - 30 hold  • Supine Static Chest Stretch on Foam Roll - 1 x daily - 7 x weekly - 1 sets - 5 reps - 15 hold  • Cervical Extension AROM with Strap - 2 x daily - 7 x weekly - 1 sets - 10 reps  • Supine Chin Tuck - 2 x daily - 7 x weekly - 1 sets - 10 reps - 5 hold  • Supine Deep Neck Flexor Training - Repetitions - 2 x daily - 7 x weekly - 1 sets - 10 reps - 10 hold  • Putty Squeezes - 1 x daily - 7 x weekly - 1 sets - 10 reps  • Tip Pinch with Putty - 1 x daily - 7 x weekly - 1 sets - 10 reps  • Rolling Putty on Table - 1 x daily - 7 x weekly - 1 sets - 10 reps  • Finger Pinch and Pull with Putty - 1 x daily - 7 x weekly - 1 sets - 10 reps  • Seated Finger Extension with Putty - 1 x daily - 7 x weekly - 1 sets - 10 reps

## 2023-01-12 ENCOUNTER — OFFICE VISIT (OUTPATIENT)
Dept: PHYSICAL THERAPY | Facility: CLINIC | Age: 68
End: 2023-01-12

## 2023-01-12 DIAGNOSIS — G89.29 CHRONIC NECK PAIN: ICD-10-CM

## 2023-01-12 DIAGNOSIS — M54.2 CHRONIC NECK PAIN: ICD-10-CM

## 2023-01-12 DIAGNOSIS — M50.30 DDD (DEGENERATIVE DISC DISEASE), CERVICAL: Primary | ICD-10-CM

## 2023-01-12 NOTE — PROGRESS NOTES
Daily Note     Today's date: 2023  Patient name: Chanel Graves  : 1955  MRN: [id number]  Referring provider: Rachel Spangler,*  Dx:   Encounter Diagnosis     ICD-10-CM    1  DDD (degenerative disc disease), cervical  M50 30       2  Chronic neck pain  M54 2     G89 29                      Subjective: The patient reports feeling soreness at her scapula and midline to her cervical spine rated a 5/10 in intensity this morning  Objective: See treatment diary below      Assessment: The patient's cervical rotation ROM was not equal at the start of the session with her L rotation less than right  We attempted some L rotation snags which helped to restore her ROM to symmetry R vs L rotation  The patient tolerated the rest of the program fairly well - she had some R UE pain/tingling with lying supine along with R flank discomfort - this resolved after sitting up  The patient will benefit from continued PT to achieve her goals of therapy  Plan: Continue per plan of care  Progress treatment as tolerated  Precautions: NONE; FALLS; Hx Tremors ; High SX irritability  RE:   Access Code [email or web address]       Manual  1/10 1/12 12/30/22 1/3/23 1   Massage B/L upper trapezius        Manual C-spine Traction 8'x1   15 second intervals 8'x1 8'x1   15 second intervals 8'x1   15 second intervals 8'x1   15 second intervals   Sub Occipital Release 1x2' 1x2' 1'x2 1x2' 1x2'   Seated chin tuck with clinician O P                    Therapeutic Exercise 1/10 1/12 12/30/22 1/3/23 1   UBE seated  L1 x8 min L1 x 8 mins L1 x8 min L1 x 8 mins L1 x 8 mins   Nu-step   S=9                Bridges        Corner Pec stretch Doorway :15x5 Door way 5x:15  *?  *:15x5 doorway                   Cervical snag extension / rotation Ext 3x10 Snag Rotation to L x10 f/b  L AROM rotation x10  EXT 1x15   Ext 2x10  Rotation x10 EXT 2x10   Ext 2x10           Levator Scapulae stretch B/L :15x5 Asymmetric p! / rotation range  :15x2 L tingle  :15x4 R tingle 5x:15 ea :15x5 ea   B/L Upper Trap stretch        Sit T-spine Extensions   *x4 tingle  x6 tingle   Supine Pectoralis Minor stretch :30x3  :30x4 :30x3 :30x3   Thoracic Open book rotations        Neuro Re-Ed        Abdominal Hollow Stand x15  x15 x15 Stand x15   Multifidi Stand x15 Stand x20 x15 x15 Stand x15   Kegels Stand x15  x15 x15 Stand x15   Chin Tucks x10 sit towel x10 x10 sit w/ towel  x10 supine x10 sit w/ towel x10 sit w/ towel   Chin tuck head lifts x10 x10 x10 x10 x   Standing hip sag lumbar EXT 2x10 1x10 lean back f/b 1x10 hip sag 2x10 2x10 2x10   Tandem balance   :30x2 B/L :30x2B/L :30x2 B/L   Quad DLS    *? MTP/LTP/ antirotation MTP/LTP yellow x10 ea   *? Towel roll         Chin tuck + cervical EXT        Posture correction x10  x10 x10 x10   scap retracts        Prone cervical EXT        Therapeutic Activity        CONE cabinet 5 cones x3 B/L 1st shelf   * * 5 cones x3 B/L 1st shelf   Putty exercises /  HEP   * *B/L x5 squeeze,pinch, roll,pinch/pull, ext   Chair squats        Crate carry            Modalities        MHP/CP UT                            Access Code: [email or web address]  URL: [email or web address]  Date: 01/05/2023  Prepared by: Jony Pierce    Exercises  • Gentle Levator Scapulae Stretch - 2 x daily - 7 x weekly - 1 sets - 5 reps - 15 hold  • Seated Upper Trapezius Stretch - 2 x daily - 7 x weekly - 1 sets - 5 reps - 15 hold  • Seated Thoracic Extension Arms Overhead - 2 x daily - 7 x weekly - 1 sets - 10 reps  • Supine Transversus Abdominis Bracing - Hands on Stomach - 2 x daily - 7 x weekly - 2 sets - 10 reps - 5 hold  • Seated Pelvic Floor Contraction - 2 x daily - 7 x weekly - 1 sets - 10 reps - 10 hold  • Seated Multifidi Isometric - 2 x daily - 7 x weekly - 1 sets - 15 reps - 5 hold  • Tandem Stance with Chair Support - 2 x daily - 7 x weekly - 1 sets - 3 reps - 30 hold  • Supine Static Chest Stretch on Foam Roll - 1 x daily - 7 x weekly - 1 sets - 5 reps - 15 hold  • Cervical Extension AROM with Strap - 2 x daily - 7 x weekly - 1 sets - 10 reps  • Supine Chin Tuck - 2 x daily - 7 x weekly - 1 sets - 10 reps - 5 hold  • Supine Deep Neck Flexor Training - Repetitions - 2 x daily - 7 x weekly - 1 sets - 10 reps - 10 hold  • Putty Squeezes - 1 x daily - 7 x weekly - 1 sets - 10 reps  • Tip Pinch with Putty - 1 x daily - 7 x weekly - 1 sets - 10 reps  • Rolling Putty on Table - 1 x daily - 7 x weekly - 1 sets - 10 reps  • Finger Pinch and Pull with Putty - 1 x daily - 7 x weekly - 1 sets - 10 reps  • Seated Finger Extension with Putty - 1 x daily - 7 x weekly - 1 sets - 10 reps

## 2023-01-17 ENCOUNTER — OFFICE VISIT (OUTPATIENT)
Dept: PHYSICAL THERAPY | Facility: CLINIC | Age: 68
End: 2023-01-17

## 2023-01-17 DIAGNOSIS — M54.2 CHRONIC NECK PAIN: ICD-10-CM

## 2023-01-17 DIAGNOSIS — G89.29 CHRONIC NECK PAIN: ICD-10-CM

## 2023-01-17 DIAGNOSIS — M50.30 DDD (DEGENERATIVE DISC DISEASE), CERVICAL: Primary | ICD-10-CM

## 2023-01-17 NOTE — PROGRESS NOTES
Daily Note     Today's date: 2023  Patient name: Tameka Hagan  : 1955  MRN: [id number]  Referring provider: Chyna Harrison,*  Dx:   Encounter Diagnosis     ICD-10-CM    1  DDD (degenerative disc disease), cervical  M50 30       2  Chronic neck pain  M54 2     G89 29                      Subjective: The patient reports feeling 4/10 "ache" at the left side of her cervical spine  The patient felt numbness and tingling at her B/L UE upon waking this morning and noted "heaviness" when going into the shower  The patient thinks the warmth from the shower helped to reduce the numbness and tingling to "slight" L sided only  The patient was educated to avoid provoking those symptoms  Objective: See treatment diary below      Assessment: The patient was given education to attempt a cervical roll in her pillow to help avoid the numbness/tingling at her B/L UE  The patient tolerated the exercises well today with minimal complaints of L cervical discomfort - denies radicular symptoms  The patient notes feeling "ok" at the end of the treatment today  The patient will benefit from continued PT to achieve her goals of therapy  Plan: Continue per plan of care  Progress treatment as tolerated  Precautions: NONE; FALLS; Hx Tremors ; High SX irritability  RE:   Access Code [email or web address]       Manual  1/10 1/12 1/17 1/3/23 1/5   Massage B/L upper trapezius        Manual C-spine Traction 8'x1   15 second intervals 8'x1 8'x1 8'x1   15 second intervals 8'x1   15 second intervals   Sub Occipital Release 1x2' 1x2' 1x2' 1x2' 1x2'   Seated chin tuck with clinician O P                    Therapeutic Exercise 1/10 1/12 1/17 1/3/23 1/5   UBE seated  L1 x8 min L1 x 8 mins L1 x 8 mins L1 x 8 mins L1 x 8 mins   Nu-step   S=9                Bridges        Corner Pec stretch Doorway :15x5 Door way 5x:15 Doorway 5x:15 *?  *:15x5 doorway                   Cervical snag extension / rotation Ext 3x10 Snag Rotation to L x10 f/b  L AROM rotation x10  EXT 1x15   Snag rotation L x15 f/b L AROM rotation x10  EXT x10  EXT 2x10   Ext 2x10           Levator Scapulae stretch B/L :15x5 Asymmetric p! / rotation range   5x:15 ea :15x5 ea   B/L Upper Trap stretch        Sit T-spine Extensions     x6 tingle   Supine Pectoralis Minor stretch :30x3  :30x3 :30x3 :30x3   Thoracic Open book rotations        Neuro Re-Ed        Abdominal Hollow Stand x15  x10 x15 Stand x15   Multifidi Stand x15 Stand x20 Stand x20 x15 Stand x15   Kegels Stand x15   x15 Stand x15   Chin Tucks x10 sit towel x10 x10 x10 sit w/ towel x10 sit w/ towel   Chin tuck head lifts x10 x10 x10 x10 x   Standing hip sag lumbar EXT 2x10 1x10 lean back f/b 1x10 hip sag 1x10 lean back f/b 1x10 hip sag 2x10 2x10   Tandem balance    :30x2B/L :30x2 B/L   Quad DLS    *? MTP/LTP/ antirotation MTP/LTP yellow x10 ea   *? Towel roll         Chin tuck + cervical EXT        Posture correction x10  x10 x10 x10   scap retracts        Prone cervical EXT        Therapeutic Activity        CONE cabinet 5 cones x3 B/L 1st shelf   * * 5 cones x3 B/L 1st shelf   Putty exercises /  HEP   * *B/L x5 squeeze,pinch, roll,pinch/pull, ext   Chair squats        Crate carry            Modalities        MHP/CP UT                            Access Code: [email or web address]  URL: [email or web address]  Date: 01/05/2023  Prepared by: Fiona Pierce    Exercises  • Gentle Levator Scapulae Stretch - 2 x daily - 7 x weekly - 1 sets - 5 reps - 15 hold  • Seated Upper Trapezius Stretch - 2 x daily - 7 x weekly - 1 sets - 5 reps - 15 hold  • Seated Thoracic Extension Arms Overhead - 2 x daily - 7 x weekly - 1 sets - 10 reps  • Supine Transversus Abdominis Bracing - Hands on Stomach - 2 x daily - 7 x weekly - 2 sets - 10 reps - 5 hold  • Seated Pelvic Floor Contraction - 2 x daily - 7 x weekly - 1 sets - 10 reps - 10 hold  • Seated Multifidi Isometric - 2 x daily - 7 x weekly - 1 sets - 15 reps - 5 hold  • Tandem Stance with Chair Support - 2 x daily - 7 x weekly - 1 sets - 3 reps - 30 hold  • Supine Static Chest Stretch on Foam Roll - 1 x daily - 7 x weekly - 1 sets - 5 reps - 15 hold  • Cervical Extension AROM with Strap - 2 x daily - 7 x weekly - 1 sets - 10 reps  • Supine Chin Tuck - 2 x daily - 7 x weekly - 1 sets - 10 reps - 5 hold  • Supine Deep Neck Flexor Training - Repetitions - 2 x daily - 7 x weekly - 1 sets - 10 reps - 10 hold  • Putty Squeezes - 1 x daily - 7 x weekly - 1 sets - 10 reps  • Tip Pinch with Putty - 1 x daily - 7 x weekly - 1 sets - 10 reps  • Rolling Putty on Table - 1 x daily - 7 x weekly - 1 sets - 10 reps  • Finger Pinch and Pull with Putty - 1 x daily - 7 x weekly - 1 sets - 10 reps  • Seated Finger Extension with Putty - 1 x daily - 7 x weekly - 1 sets - 10 reps

## 2023-01-19 ENCOUNTER — OFFICE VISIT (OUTPATIENT)
Dept: PHYSICAL THERAPY | Facility: CLINIC | Age: 68
End: 2023-01-19

## 2023-01-19 DIAGNOSIS — F33.9 DEPRESSION, RECURRENT (HCC): ICD-10-CM

## 2023-01-19 DIAGNOSIS — E78.2 MIXED HYPERLIPIDEMIA: ICD-10-CM

## 2023-01-19 DIAGNOSIS — G89.29 CHRONIC NECK PAIN: ICD-10-CM

## 2023-01-19 DIAGNOSIS — M50.30 DDD (DEGENERATIVE DISC DISEASE), CERVICAL: Primary | ICD-10-CM

## 2023-01-19 DIAGNOSIS — M54.2 CHRONIC NECK PAIN: ICD-10-CM

## 2023-01-19 RX ORDER — TRAZODONE HYDROCHLORIDE 100 MG/1
TABLET ORAL
Qty: 90 TABLET | Refills: 1 | Status: SHIPPED | OUTPATIENT
Start: 2023-01-19

## 2023-01-19 RX ORDER — EZETIMIBE 10 MG/1
TABLET ORAL
Qty: 90 TABLET | Refills: 1 | Status: SHIPPED | OUTPATIENT
Start: 2023-01-19

## 2023-01-19 NOTE — PROGRESS NOTES
Daily Note     Today's date: 2023  Patient name: Nasim Treviño  : 1955  MRN: [id number]  Referring provider: Laura Crawley,*  Dx:   Encounter Diagnosis     ICD-10-CM    1  DDD (degenerative disc disease), cervical  M50 30       2  Chronic neck pain  M54 2     G89 29                      Subjective: The patient reports improvement with using a towel roll with her pillow  She is sleeping with less neck pain  The patient notes she is feeling pretty good to start the session      Objective: See treatment diary below      Assessment: The patient was able to clean her house yesterday including washing the floors and walls  She notes some lumbar discomfort but she was able to manage with doing her lumbar extension exercises  The patient tolerated the treatment well today with minimal complaints of discomfort at her cervical spine  The patient's ROM was better from the start of the session as opposed to being limited at the start of treatment and better by the end  The patient will benefit from continued PT to achieve her goals of therapy  Plan: Continue per plan of care  Progress treatment as tolerated  Precautions: NONE; FALLS; Hx Tremors ;  High SX irritability  RE:   Access Code [email or web address]       Manual  1/10 1/12 1/17 1/19 1/5   Massage B/L upper trapezius        Manual C-spine Traction 8'x1   15 second intervals 8'x1 8'x1 8'x1 8'x1   15 second intervals   Sub Occipital Release 1x2' 1x2' 1x2' 1x2' 1x2'   Seated chin tuck with clinician O P                    Therapeutic Exercise 1/10 1/12 1/17 1/19 1   UBE seated  L1 x8 min L1 x 8 mins L1 x 8 mins L1 x 8 mins L1 x 8 mins   Nu-step   S=9                Bridges        Corner Pec stretch Doorway :15x5 Door way 5x:15 Doorway 5x:15 Doorway 5x:15 *:15x5 doorway                   Cervical snag extension / rotation Ext 3x10 Snag Rotation to L x10 f/b  L AROM rotation x10  EXT 1x15   Snag rotation L x15 f/b L AROM rotation x10  EXT x10  ( patient's rotation symmetric - no snag)  AROM rotation x10  AROM EXT x10 Ext 2x10           Levator Scapulae stretch B/L :15x5 Asymmetric p! / rotation range    :15x5 ea   B/L Upper Trap stretch        Sit T-spine Extensions     x6 tingle   Supine Pectoralis Minor stretch :30x3  :30x3 :30x3 :30x3   Thoracic Open book rotations        Neuro Re-Ed        Abdominal Hollow Stand x15  x10 x10 UBE Stand x15   Multifidi Stand x15 Stand x20 Stand x20 Stand x20 Stand x15   Kegels Stand x15   x10 UBE Stand x15   Chin Tucks x10 sit towel x10 x10 x10 chair x10 sit w/ towel   Chin tuck head lifts x10 x10 x10 x10 x   Standing hip sag lumbar EXT 2x10 1x10 lean back f/b 1x10 hip sag 1x10 lean back f/b 1x10 hip sag 1x10 lean back f/b 1x10 hip sag 2x10   Tandem balance     :30x2 B/L   Quad DLS        MTP/LTP/ antirotation MTP/LTP yellow x10 ea                       Posture correction x10  x10 x10 x10   scap retracts        Prone cervical EXT        Therapeutic Activity        CONE cabinet 5 cones x3 B/L 1st shelf    * 5 cones x3 B/L 1st shelf   Putty exercises /  HEP    *B/L x5 squeeze,pinch, roll,pinch/pull, ext   Chair squats        Crate carry            Modalities        MHP/CP UT                            Access Code: [email or web address]  URL: [email or web address]  Date: 01/05/2023  Prepared by: Bernardo Pierce    Exercises  • Gentle Levator Scapulae Stretch - 2 x daily - 7 x weekly - 1 sets - 5 reps - 15 hold  • Seated Upper Trapezius Stretch - 2 x daily - 7 x weekly - 1 sets - 5 reps - 15 hold  • Seated Thoracic Extension Arms Overhead - 2 x daily - 7 x weekly - 1 sets - 10 reps  • Supine Transversus Abdominis Bracing - Hands on Stomach - 2 x daily - 7 x weekly - 2 sets - 10 reps - 5 hold  • Seated Pelvic Floor Contraction - 2 x daily - 7 x weekly - 1 sets - 10 reps - 10 hold  • Seated Multifidi Isometric - 2 x daily - 7 x weekly - 1 sets - 15 reps - 5 hold  • Tandem Stance with Chair Support - 2 x daily - 7 x weekly - 1 sets - 3 reps - 30 hold  • Supine Static Chest Stretch on Foam Roll - 1 x daily - 7 x weekly - 1 sets - 5 reps - 15 hold  • Cervical Extension AROM with Strap - 2 x daily - 7 x weekly - 1 sets - 10 reps  • Supine Chin Tuck - 2 x daily - 7 x weekly - 1 sets - 10 reps - 5 hold  • Supine Deep Neck Flexor Training - Repetitions - 2 x daily - 7 x weekly - 1 sets - 10 reps - 10 hold  • Putty Squeezes - 1 x daily - 7 x weekly - 1 sets - 10 reps  • Tip Pinch with Putty - 1 x daily - 7 x weekly - 1 sets - 10 reps  • Rolling Putty on Table - 1 x daily - 7 x weekly - 1 sets - 10 reps  • Finger Pinch and Pull with Putty - 1 x daily - 7 x weekly - 1 sets - 10 reps  • Seated Finger Extension with Putty - 1 x daily - 7 x weekly - 1 sets - 10 reps

## 2023-01-24 ENCOUNTER — OFFICE VISIT (OUTPATIENT)
Dept: PHYSICAL THERAPY | Facility: CLINIC | Age: 68
End: 2023-01-24

## 2023-01-24 DIAGNOSIS — M54.2 CHRONIC NECK PAIN: ICD-10-CM

## 2023-01-24 DIAGNOSIS — M50.30 DDD (DEGENERATIVE DISC DISEASE), CERVICAL: Primary | ICD-10-CM

## 2023-01-24 DIAGNOSIS — G89.29 CHRONIC NECK PAIN: ICD-10-CM

## 2023-01-24 NOTE — PROGRESS NOTES
Daily Note     Today's date: 2023  Patient name: Guanako Kahn  : 1955  MRN: [id number]  Referring provider: Josh Causey,*  Dx:   Encounter Diagnosis     ICD-10-CM    1  DDD (degenerative disc disease), cervical  M50 30       2  Chronic neck pain  M54 2     G89 29           Start Time: 0800          Subjective: Patient states her pain is a 2/10 in the neck, more to left side  She has not been having tingling anymore  The shakes do still occur  Objective: See treatment diary below      Assessment: Tolerated treatment well  Patient would benefit from continued PT for stretching and strengthening  Patient was able to perform her exercises with little difficulty and no pain  A few verbal cues were needed for proper performance and counting  She felt good and had no pain after her manual part of therapy  Plan: Continue per plan of care  Progress treatment as tolerated  Precautions: NONE; FALLS; Hx Tremors ;  High SX irritability  RE:   Access Code [email or web address]       Manual  1/10 1/12 1/17 1/19 1/24   Massage B/L upper trapezius        Manual C-spine Traction 8'x1   15 second intervals 8'x1 8'x1 8'x1 1' x8   Sub Occipital Release 1x2' 1x2' 1x2' 1x2' 1'x2   Seated chin tuck with clinician O P                    Therapeutic Exercise 1/10 1/12 1/17 1/19 1/24   UBE seated  L1 x8 min L1 x 8 mins L1 x 8 mins L1 x 8 mins L1 8 min   Nu-step   S=9                Bridges        Corner Pec stretch Doorway :15x5 Door way 5x:15 Doorway 5x:15 Doorway 5x:15 Doorway 5x:15                   Cervical snag extension / rotation Ext 3x10 Snag Rotation to L x10 f/b  L AROM rotation x10  EXT 1x15   Snag rotation L x15 f/b L AROM rotation x10  EXT x10  ( patient's rotation symmetric - no snag)  AROM rotation x10  AROM EXT x10 Snag Rotation to L x15 f/b  L AROM rotation x10  EXT 1x15           Levator Scapulae stretch B/L :15x5 Asymmetric p! / rotation range       B/L Upper Trap stretch        Sit T-spine Extensions        Supine Pectoralis Minor stretch :30x3  :30x3 :30x3 :30x3   Thoracic Open book rotations        Neuro Re-Ed        Abdominal Hollow Stand x15  x10 x10 UBE x10 UBE   Multifidi Stand x15 Stand x20 Stand x20 Stand x20 Stand x20   Kegels Stand x15   x10 UBE x10 UBE   Chin Tucks x10 sit towel x10 x10 x10 chair Chair x10   Chin tuck head lifts x10 x10 x10 x10 x10   Standing hip sag lumbar EXT 2x10 1x10 lean back f/b 1x10 hip sag 1x10 lean back f/b 1x10 hip sag 1x10 lean back f/b 1x10 hip sag 1x10 lean back f/b 1x10 hip sag   Tandem balance        Quad DLS        MTP/LTP/ antirotation MTP/LTP yellow x10 ea                       Posture correction x10  x10 x10 x10   scap retracts        Prone cervical EXT        Therapeutic Activity        CONE cabinet 5 cones x3 B/L 1st shelf       Putty exercises /  HEP       Chair squats        Crate carry            Modalities        MHP/CP UT                            Access Code: [email or web address]  URL: [email or web address]  Date: 01/05/2023  Prepared by: Josey Pierce    Exercises  • Gentle Levator Scapulae Stretch - 2 x daily - 7 x weekly - 1 sets - 5 reps - 15 hold  • Seated Upper Trapezius Stretch - 2 x daily - 7 x weekly - 1 sets - 5 reps - 15 hold  • Seated Thoracic Extension Arms Overhead - 2 x daily - 7 x weekly - 1 sets - 10 reps  • Supine Transversus Abdominis Bracing - Hands on Stomach - 2 x daily - 7 x weekly - 2 sets - 10 reps - 5 hold  • Seated Pelvic Floor Contraction - 2 x daily - 7 x weekly - 1 sets - 10 reps - 10 hold  • Seated Multifidi Isometric - 2 x daily - 7 x weekly - 1 sets - 15 reps - 5 hold  • Tandem Stance with Chair Support - 2 x daily - 7 x weekly - 1 sets - 3 reps - 30 hold  • Supine Static Chest Stretch on Foam Roll - 1 x daily - 7 x weekly - 1 sets - 5 reps - 15 hold  • Cervical Extension AROM with Strap - 2 x daily - 7 x weekly - 1 sets - 10 reps  • Supine Chin Tuck - 2 x daily - 7 x weekly - 1 sets - 10 reps - 5 hold  • Supine Deep Neck Flexor Training - Repetitions - 2 x daily - 7 x weekly - 1 sets - 10 reps - 10 hold  • Putty Squeezes - 1 x daily - 7 x weekly - 1 sets - 10 reps  • Tip Pinch with Putty - 1 x daily - 7 x weekly - 1 sets - 10 reps  • Rolling Putty on Table - 1 x daily - 7 x weekly - 1 sets - 10 reps  • Finger Pinch and Pull with Putty - 1 x daily - 7 x weekly - 1 sets - 10 reps  • Seated Finger Extension with Putty - 1 x daily - 7 x weekly - 1 sets - 10 reps

## 2023-01-26 ENCOUNTER — OFFICE VISIT (OUTPATIENT)
Dept: PHYSICAL THERAPY | Facility: CLINIC | Age: 68
End: 2023-01-26

## 2023-01-26 DIAGNOSIS — G89.29 CHRONIC NECK PAIN: ICD-10-CM

## 2023-01-26 DIAGNOSIS — M50.30 DDD (DEGENERATIVE DISC DISEASE), CERVICAL: Primary | ICD-10-CM

## 2023-01-26 DIAGNOSIS — M54.2 CHRONIC NECK PAIN: ICD-10-CM

## 2023-01-26 NOTE — PROGRESS NOTES
Daily Note     Today's date: 2023  Patient name: Leah Kulkarni  : 1955  MRN: [id number]  Referring provider: Marina Rodriguez,*  Dx:   Encounter Diagnosis     ICD-10-CM    1  DDD (degenerative disc disease), cervical  M50 30       2  Chronic neck pain  M54 2     G89 29                      Subjective: The patient reports feeling 5/10 "ache" at her L cervical spine and across her B/L shoulders  When asked about the cause the patient is unsure stating " I think it must be the weather "      Objective: See treatment diary below      Assessment: The patient was given cues and encouragement to correct her posture and perform her exercises with proper form  The patient reports feeling relief of pain at the end of the treatment  The patient notes feeling " the blood is flowing" which she specifies is a good feeling  There is an improvement noted with the patient's postural muscle endurance  The patient will be re-assessed next session to determine the need for additional therapy  Plan: Continue per plan of care  Progress treatment as tolerated  Precautions: NONE; FALLS; Hx Tremors ;  High SX irritability  RE:   Access Code [email or web address]       Manual     Massage B/L upper trapezius        Manual C-spine Traction 8'x1 8'x1 8'x1 8'x1 1' x8   Sub Occipital Release 1x2' 1x2' 1x2' 1x2' 1'x2   Seated chin tuck with clinician O P                    Therapeutic Exercise    UBE seated  L1 x 8 mins L1 x 8 mins L1 x 8 mins L1 x 8 mins L1 8 min   Nu-step   S=9                Bridges        Corner Pec stretch 5x:15 doorway Door way 5x:15 Doorway 5x:15 Doorway 5x:15 Doorway 5x:15                   Cervical snag extension / rotation Snag Rotation to L x15 f/b  L AROM rotation x10  EXT 1x15 Snag Rotation to L x10 f/b  L AROM rotation x10  EXT 1x15   Snag rotation L x15 f/b L AROM rotation x10  EXT x10  ( patient's rotation symmetric - no snag)  AROM rotation x10  AROM EXT x10 Snag Rotation to L x15 f/b  L AROM rotation x10  EXT 1x15           Levator Scapulae stretch B/L  Asymmetric p! / rotation range       B/L Upper Trap stretch        Sit T-spine Extensions x10 hands at head       Supine Pectoralis Minor stretch :30x3  :30x3 :30x3 :30x3   Thoracic Open book rotations        Neuro Re-Ed        Abdominal Hollow x10 UBE  x10 x10 UBE x10 UBE   Multifidi Stand x20 Stand x20 Stand x20 Stand x20 Stand x20   Kegels x10 UBE   x10 UBE x10 UBE   Chin Tucks x15 x10 x10 x10 chair Chair x10   Chin tuck head lifts x10 :10 x10 x10 x10 x10   Standing hip sag lumbar EXT 1x10 lean back f/b 1x10 hip sag 1x10 lean back f/b 1x10 hip sag 1x10 lean back f/b 1x10 hip sag 1x10 lean back f/b 1x10 hip sag 1x10 lean back f/b 1x10 hip sag   Tandem balance        Quad DLS        MTP/LTP/ antirotation                        Posture correction x15  x10 x10 x10   scap retracts        Prone cervical EXT        Therapeutic Activity        CONE cabinet        Putty exercises /         Chair squats        Crate carry            Modalities        MHP/CP UT                            Access Code: [email or web address]  URL: [email or web address]  Date: 01/05/2023  Prepared by: Darren Fallon Frohnheiser    Exercises  • Gentle Levator Scapulae Stretch - 2 x daily - 7 x weekly - 1 sets - 5 reps - 15 hold  • Seated Upper Trapezius Stretch - 2 x daily - 7 x weekly - 1 sets - 5 reps - 15 hold  • Seated Thoracic Extension Arms Overhead - 2 x daily - 7 x weekly - 1 sets - 10 reps  • Supine Transversus Abdominis Bracing - Hands on Stomach - 2 x daily - 7 x weekly - 2 sets - 10 reps - 5 hold  • Seated Pelvic Floor Contraction - 2 x daily - 7 x weekly - 1 sets - 10 reps - 10 hold  • Seated Multifidi Isometric - 2 x daily - 7 x weekly - 1 sets - 15 reps - 5 hold  • Tandem Stance with Chair Support - 2 x daily - 7 x weekly - 1 sets - 3 reps - 30 hold  • Supine Static Chest Stretch on Foam Roll - 1 x daily - 7 x weekly - 1 sets - 5 reps - 15 hold  • Cervical Extension AROM with Strap - 2 x daily - 7 x weekly - 1 sets - 10 reps  • Supine Chin Tuck - 2 x daily - 7 x weekly - 1 sets - 10 reps - 5 hold  • Supine Deep Neck Flexor Training - Repetitions - 2 x daily - 7 x weekly - 1 sets - 10 reps - 10 hold  • Putty Squeezes - 1 x daily - 7 x weekly - 1 sets - 10 reps  • Tip Pinch with Putty - 1 x daily - 7 x weekly - 1 sets - 10 reps  • Rolling Putty on Table - 1 x daily - 7 x weekly - 1 sets - 10 reps  • Finger Pinch and Pull with Putty - 1 x daily - 7 x weekly - 1 sets - 10 reps  • Seated Finger Extension with Putty - 1 x daily - 7 x weekly - 1 sets - 10 reps

## 2023-01-29 NOTE — PROGRESS NOTES
PT Re-Evaluation  and PT Discharge    Today's date: 2023  Patient name: Nancy Zepeda  : 1955  MRN: [id number]  Referring provider: Lachelle Jackson,*  Dx:   Encounter Diagnosis     ICD-10-CM    1  DDD (degenerative disc disease), cervical  M50 30       2  Chronic neck pain  M54 2     G89 29                      Assessment  Assessment details: Nancy Zepeda is a 75 y o  female with a history of arthritis, cataract, depression, H/A, hyperlipidemia, HTN, L RTC tear, CKD that presents for a physical therapy RE evaluation  The patient has attended 17 sessions of skilled physical therapy  The patient demonstrates signs and symptoms consistent with cervical DDD; myelopathy  During the examination the patient demonstrated improved but impaired posture/UE strength, improved but impaired cervical ROM, improved activity tolerance, unchanged tremors, persistent sensory changes, and decreased neck pain  The patient has made functional gains since starting therapy  The patient is now able to perform ADL's and drive with less ROM impairment at the cervical spine  The patient is more aware of her posture and is able to correct without cues  The patient continues to have difficulty with gripping items and holding items with her hands ie) cup of coffee due to tremors  The patient has continued difficulty reaching up to grab items over head  The patient is independent with her HEP at this time and would like to continue therapy with a home program           Symptom irritability: lowBarriers to therapy: Chronicity of condition/  / UE Tremors /  / High symptom irritability  Understanding of Dx/Px/POC: good   Prognosis: fair  Prognosis details: Chronicity of condition/  / UE Tremors /  / High symptom irritability    Goals  STG: Achieve in 4-6 weeks  1  Patient's cervical/R scapula pain at worst is no greater than 4/10 to reduce improve cervical ROM with ADL's  MET   2    Patient's cervical ROM improve to "minimal restriction" all motions tested to allow for function ROM with ADL's and driving  PARTIALLY MET 1/31  3  Patient's shoulder/cervical MMT improve by 1/2-1 muscle grade to allow for completion of over head activities without difficulty  MET 1/31    LTG: Achieve in 6-12 weeks  1  Patient tolerate ADL's without neck/scapula pain or UE tremors pain to achieve their personal therapy goal  NOT MET  2  Patient's strength at cervical spine and shoulders improve to WNL to allow completion of leisure activities  PARTIALLY MET 1/31  3  Patient to be independent with home exercise plan at time of discharge  MET 1/31  4  Patient's FOTO score improve to >  60% to indicate a return to normal function  MET 1/31        Plan  Plan details: The patient is independent with her HEP at this time and would like to continue therapy with a home program     Treatment plan discussed with: PTA and patient        Subjective Evaluation    History of Present Illness  Date of onset: 11/3/2022  Mechanism of injury: SUBJECTIVE: 1/31/23: The patient reports an improvement in activity tolerance, decreased stiffness and soreness at her cervical spine, and improved movement at her UE/cervical spine since starting therapy  The patient is now able to manage the stiffness/pain with her exercises  The patient continues to have difficulty with reaching up over head  The patient has not noticed a difference in tremors at her B/L hands  She continues to tremor when holding a coffee cup  The patient will be discharged from formal PT to an independent HEP  SUBJECTIVE: 1/3/23: The patient reports an improvement in activity tolerance, UE strength, and cervical ROM since starting therapy  The patient is now able to function /perform her ADL's with less numbness/tingling  The patient is more aware of her posture and it's implications when assuming poor posture    The patient continues to have difficulty with gripping items and holding items with her hands ie) cup of coffee  The patient has difficulty reaching up to grab items over head  The patient will benefit from continued PT focused on achieving patient specific goals  INJURY HISTORY: Shannan Ramos is a 75 y o  female that presents to outpatient physical therapy with complaints of chronic neck pain(>2 years)/ R scapula pain ( 1 month)/difficulty moving the neck   The patient reports onset of tremors at her B/L hands (>1 year) causing difficulty with carrying items, performing ADL's, and resting comfortably  The patient had onset of tremors greater than 1 year ago without known cause  The patient initially saw her PCP for the tremors who (prescribed Parkinson based medicine which did not improve her symptoms - they worsened  The patient recently F/U with neurology who discharged the Parkinson's medicine and referred the patient to PT because the neurologist believes the tremors and complaints may be related to DDD at the cervical spine  The patient is seeing pain management for her lumbar spine  The patient notes multiple falls over the past year without known cause  The patient's main goal for physical therapy is to stop the tremors and to stop the pain at the R shoulder blade  C-spine MRI: IMPRESSION:     Cervical spondylitic degenerative change with primarily left-sided facet hypertrophic change  There is no cord compression  Multilevel primarily left-sided foraminal narrowing most pronounced at the C4-5 level, moderate      Small central disc extrusion at C6-7 partially effacing the ventral CSF space    Pain  Current pain ratin  At best pain ratin  At worst pain ratin  Location: L shoulder pain; neck pain  Quality: sharp and dull ache  Relieving factors: heat  Aggravating factors: stair climbing, overhead activity and lifting  Progression: improved    Social Support  Steps to enter house: no  Stairs in house: yes   13  Lives in: apartment  Lives with: alone    Employment status: not working  Hand dominance: ambidextrous    Treatments  Previous treatment: physical therapy  Patient Goals  Patient goal: stop tremors and decrease R scapula pain( NOT MET)        Objective     Concurrent Complaints  Positive for tinnitus (chronic)  Negative for night pain, disturbed sleep (improved with towel roll use), dizziness, faints, headaches, nausea/motion sickness, trouble swallowing, difficulty breathing, shortness of breath, respiratory pain, history of cancer, history of trauma and infection    Additional Special Questions  Patient notes sleep apnea    Postural Observations  Seated posture: fair  Standing posture: fair  Correction of posture: makes symptoms better    Additional Postural Observation Details  IMPROVED    Palpation     Additional Palpation Details  NO TTP B/L    Tenderness   Cervical Spine   Tenderness in the right scapula  Neurological Testing     Sensation   Cervical/Thoracic   Left   Intact: light touch    Right   Intact: light touch  Diminished: light touch    Comments   Left light touch:  no complaints of parathesia  Right light touch: elbow to hand - glove  Reflexes   Left   Biceps (C5/C6): normal (2+)  Brachioradialis (C6): normal (2+)  Floyd's reflex: negative    Right   Biceps (C5/C6): normal (2+)  Brachioradialis (C6): normal (2+)  Floyd's reflex: negative    Additional Neurological Details  Patient is a Falls risk    Active Range of Motion   Cervical/Thoracic Spine       Cervical  Subcranial protraction:   Restriction level: minimal  Subcranial retraction: Active cervical subcranial retraction: L cervical paraspinals     Restriction level: moderate  Flexion: 45 degrees  Restriction level: minimal  Extension: 65 degrees     Restriction level: minimal  Left lateral flexion: 20 degrees     with pain Restriction level: moderate  Right lateral flexion: 30 degrees     Restriction level minimal  Left rotation: 47 degrees with pain Restriction level: moderate  Right rotation: 55 degrees    with pain Restriction level: moderate    Additional Active Range of Motion Details  IMPROVED  Mechanical Assessment    Cervical    Seated retraction: repeated movements   Pain location: no change    Thoracic      Lumbar      Strength/Myotome Testing   Cervical Spine   Neck extension: 4  Neck flexion: 4    Left   Interossei strength (t1): 4 and 4+  Neck lateral flexion (C3): 4    Right   Interossei strength (t1): 4 and 4+  Neck lateral flexion (C3): 4    Left Shoulder     Planes of Motion   Flexion: 4   Abduction: 4   External rotation at 0°: 4   Internal rotation at 0°: 4     Right Shoulder     Planes of Motion   Flexion: 4   Abduction: 4   External rotation at 0°: 4+   Internal rotation at 0°: 4     Left Elbow   Flexion: 4  Extension: 4    Right Elbow   Flexion: 4  Extension: 4    Left Wrist/Hand   Wrist extension: 4+  Wrist flexion: 4+  Thumb extension: 4    Right Wrist/Hand   Wrist extension: 4+  Wrist flexion: 4+  Thumb extension: 4    Additional Strength Details  IMPROVED    Tests   Cervical   Negative cervical distraction  Additional Tests Details  FOTO: 61% ( predicted 54%)    Difficult to special test   Neuro Exam:     Headaches   Patient reports headaches: No                 Precautions: NONE; FALLS; Hx Tremors ;  High SX irritability  RE: 1/31  Access Code [email or web address]       Manual  1/26 1/31 1/17 1/19 1/24   Massage B/L upper trapezius        Manual C-spine Traction 8'x1 8'x1 8'x1 8'x1 1' x8   Sub Occipital Release 1x2' 1x2' 1x2' 1x2' 1'x2   Seated chin tuck with clinician O P                    Therapeutic Exercise 1/26 1/31 1/17 1/19 1/24   UBE seated  L1 x 8 mins L1 x 8 mins L1 x 8 mins L1 x 8 mins L1 8 min   Nu-step   S=9                Bridges        Corner Pec stretch 5x:15 doorway reviewed Doorway 5x:15 Doorway 5x:15 Doorway 5x:15                   Cervical snag extension / rotation Snag Rotation to L x15 f/b  L AROM rotation x10  EXT 1x15 Snag Rotation to L x15 f/b  L AROM rotation x15  EXT 1x15   Snag rotation L x15 f/b L AROM rotation x10  EXT x10  ( patient's rotation symmetric - no snag)  AROM rotation x10  AROM EXT x10 Snag Rotation to L x15 f/b  L AROM rotation x10  EXT 1x15           Levator Scapulae stretch B/L         B/L Upper Trap stretch        Sit T-spine Extensions x10 hands at head reviewed      Supine Pectoralis Minor stretch :30x3 reviewed :30x3 :30x3 :30x3   Thoracic Open book rotations        Neuro Re-Ed        Abdominal Hollow x10 UBE x10 UBE x10 x10 UBE x10 UBE   Multifidi Stand x20  Stand x20 Stand x20 Stand x20   Kegels x10 UBE x10 UBE  x10 UBE x10 UBE   Chin Tucks x15  x10 x10 chair Chair x10   Chin tuck head lifts x10 :10  x10 x10 x10   Standing hip sag lumbar EXT 1x10 lean back f/b 1x10 hip sag 1x10 lean back f/b 1x10 hip sag 1x10 lean back f/b 1x10 hip sag 1x10 lean back f/b 1x10 hip sag 1x10 lean back f/b 1x10 hip sag   Tandem balance        Quad DLS        MTP/LTP/ antirotation                        Posture correction x15 reviewed x10 x10 x10   scap retracts        Prone cervical EXT        Therapeutic Activity        CONE cabinet        Putty exercises /         Chair squats        Crate carry            Modalities        MHP/CP UT

## 2023-01-31 ENCOUNTER — EVALUATION (OUTPATIENT)
Dept: PHYSICAL THERAPY | Facility: CLINIC | Age: 68
End: 2023-01-31

## 2023-01-31 DIAGNOSIS — G89.29 CHRONIC NECK PAIN: ICD-10-CM

## 2023-01-31 DIAGNOSIS — M54.2 CHRONIC NECK PAIN: ICD-10-CM

## 2023-01-31 DIAGNOSIS — M50.30 DDD (DEGENERATIVE DISC DISEASE), CERVICAL: Primary | ICD-10-CM

## 2023-02-20 ENCOUNTER — TELEPHONE (OUTPATIENT)
Dept: NEUROLOGY | Facility: CLINIC | Age: 68
End: 2023-02-20

## 2023-02-23 DIAGNOSIS — F32.A DEPRESSION, UNSPECIFIED DEPRESSION TYPE: ICD-10-CM

## 2023-02-23 RX ORDER — CITALOPRAM 40 MG/1
TABLET ORAL
Qty: 90 TABLET | Refills: 1 | Status: SHIPPED | OUTPATIENT
Start: 2023-02-23

## 2023-02-27 ENCOUNTER — OFFICE VISIT (OUTPATIENT)
Dept: NEUROLOGY | Facility: CLINIC | Age: 68
End: 2023-02-27

## 2023-02-27 VITALS
SYSTOLIC BLOOD PRESSURE: 121 MMHG | WEIGHT: 190.5 LBS | TEMPERATURE: 98 F | BODY MASS INDEX: 37.2 KG/M2 | HEART RATE: 79 BPM | DIASTOLIC BLOOD PRESSURE: 57 MMHG

## 2023-02-27 DIAGNOSIS — R20.2 PARESTHESIAS: ICD-10-CM

## 2023-02-27 DIAGNOSIS — R25.9 MIXED ACTION AND RESTING TREMOR: Primary | ICD-10-CM

## 2023-02-27 DIAGNOSIS — M54.12 CERVICAL RADICULOPATHY: ICD-10-CM

## 2023-02-27 NOTE — PATIENT INSTRUCTIONS
Restart carbidopa-levodopa 25/100 mg as follows:  Week 1: 1/2 tab 3x/day   Week 2: 1 tab 3x/day  Week 3: 1 1/2 tabs 3x/day  Week 4: 2 tabs 3x/day    STOP at lowest effective dose or if having side effects     Return in 6-8 weeks to reassess     Obtain MRI brain

## 2023-02-27 NOTE — PROGRESS NOTES
Patient ID: Guillermina Holliday is a 75 y o  female  Assessment/Plan:    Mixed action and resting tremor  Patient returns for follow-up regarding mixed action and resting tremor  She does not noticed any large difference in her tremor off of the Sinemet and primidone  She continues to be most bothered by action tremor  She has noticed some issues with dexterity of her hands off of medication  On exam, she continues with mixed resting, postural, and intentional tremors  She did have some hesitations noted with rapid alternating movements L>R  which was not previously noted on exam or by the patient  We did discuss that Sinemet may have been treating these symptoms which is why these were not previously noted by patient for on exam         Given this change we will plan to restart Sinemet and increase to higher doses to see if any improvement in tremor  Plan to restart carbidopa-levodopa 25/100 mg as follows:  Week 1: 1/2 tab 3x/day   Week 2: 1 tab 3x/day  Week 3: 1 1/2 tabs 3x/day  Week 4: 2 tabs 3x/day    Should stop at lowest effective dose or if having side effects  She would like to return in 6 to 8 weeks for reassessment on medication  If no improvement in tremor at that time we did discuss addition of other medications to treat versus DaTscan  Would recommend MRI brain at this time  Paresthesias  Patient continues with intermittent numbness and tingling that radiates down her arm to her fingertips  PT has been somewhat helping with her symptoms but not entirely  MRI cervical spine results reviewed, multilevel primarily left-sided foraminal narrowing most pronounced at the C4-C5 level  Given her ongoing symptoms, we did discuss considering an EMG to rule out any other focal neuropathy that may be contributing to her symptoms along with her known cervical radiculopathy which she was agreeable to obtain         Diagnoses and all orders for this visit:    Mixed action and resting tremor  -     MRI brain without contrast; Future  -     carbidopa-levodopa (Sinemet)  mg per tablet; Take 1/2 tab TID for 1 week then 1 tab TID for 1 week then 1 5 tabs TID for 1 week then 2 tabs TID    Paresthesias  -     EMG 2 Limb Upper Extremity; Future    Cervical radiculopathy  -     EMG 2 Limb Upper Extremity; Future           Subjective:    HPI  Patient is a 26-year-old female with history of anxiety depression who presents for follow-up for tremors  To review, she noted bilateral hand tremor starting in 2020 that been progressively worsening  Tremor mainly present with action and can interfere with utensils and drinking  She is no family history of tremor or parkinsonism  She has been trialed on primidone up to 300 mg and low-dose Sinemet with no improvement  Last office visit 11/2022 in which she was to stop all medication and obtain MRI cervical spine due to symptoms of intermittent numbness  Interval History:  She is now off sinemet and primidone and no large difference in tremors  She feels tremors are more frequent that you she notices them, this has been gradual   No abrupt change with weaning or coming off of medication  She continues to be most bothered by action tremor-hard time making a sandwich, holding a cup  She is tremulous when cutting her food but still able to do this task  Handwriting is shaky and sloppy, she has noticed her writing has also gotten smaller  She denies any head tremor, she has not noted a vocal tremor  Her daughter has said to her that she noted her lip was quivering a few times  She feels she doesn't walk straight can veer to the side, some mild imbalance  no shuffling or freezing  No recent falls  No loss of smell, has intermittent constipation for years  She has poor sleep due to "racing thoughts" anxiety  This is chronic  She is not aware of any dream re-enactment         She feels her hand dexterity and  strength has been getting worse over the past few months  Can be hard to hold a pen at times to write  She continues to have intermittent numbness and tingling that radiates down the arm into her finger tips  She has been doing PT for her neck issues-does home exercise program now  She feels this has been helpful for her neck pain but has not lessened the episodes of numbness and tingling  She tells she has had carpal tunnel surgery bilaterally 20-30 years ago  MRI cervical spine: Cervical spondylitic degenerative change with primarily left-sided facet hypertrophic change  There is no cord compression  Multilevel primarily left-sided foraminal narrowing most pronounced at the C4-5 level, moderate      Small central disc extrusion at C6-7 partially effacing the ventral CSF space     The following portions of the patient's history were reviewed and updated as appropriate: allergies, current medications, past family history, past medical history, past social history, past surgical history and problem list        Objective:    Blood pressure 121/57, pulse 79, temperature 98 °F (36 7 °C), temperature source Temporal, weight 86 4 kg (190 lb 8 oz), not currently breastfeeding  Physical Exam  Constitutional:       General: She is awake  Eyes:      General: Lids are normal       Extraocular Movements: Extraocular movements intact  Pupils: Pupils are equal, round, and reactive to light  Neurological:      Mental Status: She is alert  Motor: Motor strength is normal       Deep Tendon Reflexes:      Reflex Scores:       Bicep reflexes are 2+ on the right side and 2+ on the left side  Brachioradialis reflexes are 2+ on the right side and 2+ on the left side  Patellar reflexes are 2+ on the right side and 2+ on the left side  Achilles reflexes are 2+ on the right side and 2+ on the left side  Psychiatric:         Speech: Speech normal          Neurological Exam  Mental Status  Awake and alert   Oriented to person, place, time and situation  Memory: Recalled 2/5  Speech is normal  Language is fluent with no aphasia  Cranial Nerves  CN III, IV, VI: Extraocular movements intact bilaterally  Normal lids and orbits bilaterally  Pupils equal round and reactive to light bilaterally  CN V: Facial sensation is normal   CN VII: Full and symmetric facial movement  CN VIII: Hearing is normal   CN IX, X: Palate elevates symmetrically  CN XI: Shoulder shrug strength is normal   CN XII: Tongue midline without atrophy or fasciculations  Motor  Normal muscle bulk throughout  Normal muscle tone  Strength is 5/5 throughout all four extremities  Sensory  Light touch is normal in upper and lower extremities  Reflexes                                            Right                      Left  Brachioradialis                    2+                         2+  Biceps                                 2+                         2+  Patellar                                2+                         2+  Achilles                                2+                         2+    Coordination  Right: Finger-to-nose normal  Rapid alternating movement abnormality:Left: Finger-to-nose normal  Rapid alternating movement abnormality:  Inermittent moderate rest tremor of both hands only with distraction  Mild intentional tremor on FTN bilaterally  Moderate postural tremors  No bradykinesia but hesitations noted with FLO-finger taps, hand , heel and toe taps L>R  No rigidity or cogwheeling  Very rare head tremor noted    Gait   Able to rise from chair without using arms  Good stride and speed, normal arm swing  I have personally reviewed the ROS performed by the MA     ROS:    Review of Systems   Constitutional: Negative  Negative for appetite change and fever  HENT: Negative  Negative for hearing loss, tinnitus, trouble swallowing and voice change  Eyes: Positive for visual disturbance (slight blurry vision)   Negative for photophobia and pain  Respiratory: Negative  Negative for shortness of breath  Cardiovascular: Negative  Negative for palpitations  Gastrointestinal: Negative  Negative for nausea and vomiting  Endocrine: Negative  Negative for cold intolerance  Genitourinary: Negative  Negative for dysuria, frequency and urgency  Musculoskeletal: Positive for back pain (same as last visit), gait problem (off balance), neck pain (slightly improved) and neck stiffness  Negative for myalgias  Skin: Negative  Negative for rash  Allergic/Immunologic: Negative  Neurological: Positive for tremors (hands), weakness (arms and hands), numbness (arms and hands) and headaches  Negative for dizziness, seizures, syncope, facial asymmetry, speech difficulty and light-headedness  Hematological: Negative  Does not bruise/bleed easily  Psychiatric/Behavioral: Positive for sleep disturbance (hard time staying asleep)  Negative for confusion and hallucinations  All other systems reviewed and are negative

## 2023-02-28 NOTE — ASSESSMENT & PLAN NOTE
Patient returns for follow-up regarding mixed action and resting tremor  She does not noticed any large difference in her tremor off of the Sinemet and primidone  She continues to be most bothered by action tremor  She has noticed some issues with dexterity of her hands off of medication  On exam, she continues with mixed resting, postural, and intentional tremors  She did have some hesitations noted with rapid alternating movements L>R  which was not previously noted on exam or by the patient  We did discuss that Sinemet may have been treating these symptoms which is why these were not previously noted by patient for on exam         Given this change we will plan to restart Sinemet and increase to higher doses to see if any improvement in tremor  Plan to restart carbidopa-levodopa 25/100 mg as follows:  Week 1: 1/2 tab 3x/day   Week 2: 1 tab 3x/day  Week 3: 1 1/2 tabs 3x/day  Week 4: 2 tabs 3x/day    Should stop at lowest effective dose or if having side effects  She would like to return in 6 to 8 weeks for reassessment on medication  If no improvement in tremor at that time we did discuss addition of other medications to treat versus DaTscan  Would recommend MRI brain at this time

## 2023-02-28 NOTE — ASSESSMENT & PLAN NOTE
Patient continues with intermittent numbness and tingling that radiates down her arm to her fingertips  PT has been somewhat helping with her symptoms but not entirely  MRI cervical spine results reviewed, multilevel primarily left-sided foraminal narrowing most pronounced at the C4-C5 level  Given her ongoing symptoms, we did discuss considering an EMG to rule out any other focal neuropathy that may be contributing to her symptoms along with her known cervical radiculopathy which she was agreeable to obtain

## 2023-03-20 ENCOUNTER — HOSPITAL ENCOUNTER (OUTPATIENT)
Dept: MRI IMAGING | Facility: HOSPITAL | Age: 68
Discharge: HOME | End: 2023-03-20

## 2023-03-20 DIAGNOSIS — R25.9 MIXED ACTION AND RESTING TREMOR: ICD-10-CM

## 2023-03-24 ENCOUNTER — TELEPHONE (OUTPATIENT)
Dept: NEUROLOGY | Facility: CLINIC | Age: 68
End: 2023-03-24

## 2023-03-24 NOTE — TELEPHONE ENCOUNTER
She has a few options-  We could try an additional medication for parkinsonian tremor such as a dopamine agonist (ropinirole or Mirapex)  We could obtain a KG scan which would help us determine if she has a dopamine deficiency that is seen in parkinsonism which would just give us reassurance in which medications would work for her tremor  If she has the KG scan she will likely have to hold some of her medications for this (celexa, trazodone, sinemet)    Let me know what she decides and I can place the appropriate orders

## 2023-03-24 NOTE — TELEPHONE ENCOUNTER
----- Message from Chester Adan, [address] sent at 3/24/2023 11:54 AM EDT -----  The patient know MRI brain was normal other than a few white matter lesions that are likely consistent with chronic microangiopathy which comes from things like smoking, high blood pressure, diabetes, etc

## 2023-03-24 NOTE — TELEPHONE ENCOUNTER
Called and spoke with patient  Went over MRI Brain results  Patient is asking what the nest step is for her, being that she has had no change in her tremors  Patient is currently taking Sinemet  mg tabs taking 2 tabs TID currently  Patient states she has noticed no difference in tremors and has had no worsening symptoms  She does notice her tremors get worse when she is Nervous/Upset and when she is holding something for awhile  Her next f/u is scheduled for 5/1/23    Please advise  CB# [phone]

## 2023-03-27 ENCOUNTER — HOSPITAL ENCOUNTER (OUTPATIENT)
Dept: NON INVASIVE DIAGNOSTICS | Facility: CLINIC | Age: 68
Discharge: HOME | End: 2023-03-27

## 2023-03-27 VITALS
HEART RATE: 70 BPM | WEIGHT: 190 LBS | HEIGHT: 60 IN | BODY MASS INDEX: 37.3 KG/M2 | SYSTOLIC BLOOD PRESSURE: 121 MMHG | DIASTOLIC BLOOD PRESSURE: 57 MMHG

## 2023-03-27 DIAGNOSIS — G47.33 OSA (OBSTRUCTIVE SLEEP APNEA): ICD-10-CM

## 2023-03-27 DIAGNOSIS — E66.01 MORBID OBESITY (HCC): ICD-10-CM

## 2023-03-27 DIAGNOSIS — I10 ESSENTIAL HYPERTENSION: ICD-10-CM

## 2023-03-27 DIAGNOSIS — R01.1 CARDIAC MURMUR: ICD-10-CM

## 2023-03-27 LAB
AORTIC ROOT: 2.9 CM
AORTIC VALVE MEAN VELOCITY: 10.3 M/S
APICAL FOUR CHAMBER EJECTION FRACTION: 69 %
ASCENDING AORTA: 3 CM
AV AREA BY CONTINUOUS VTI: 2.4 CM2
AV AREA PEAK VELOCITY: 2.1 CM2
AV LVOT MEAN GRADIENT: 2 MMHG
AV LVOT PEAK GRADIENT: 4 MMHG
AV MEAN GRADIENT: 5 MMHG
AV PEAK GRADIENT: 9 MMHG
AV VALVE AREA: 2.41 CM2
AV VELOCITY RATIO: 0.66
DOP CALC AO PEAK VEL: 1.51 M/S
DOP CALC AO VTI: 34.56 CM
DOP CALC LVOT AREA: 3.14 CM2
DOP CALC LVOT DIAMETER: 2 CM
DOP CALC LVOT PEAK VEL VTI: 26.55 CM
DOP CALC LVOT PEAK VEL: 0.99 M/S
DOP CALC LVOT STROKE INDEX: 45.4 ML/M2
DOP CALC LVOT STROKE VOLUME: 83
E WAVE DECELERATION TIME: 250 MS
E/A RATIO: 0.88
FRACTIONAL SHORTENING: 38 (ref 28–44)
INTERVENTRICULAR SEPTUM IN DIASTOLE (PARASTERNAL SHORT AXIS VIEW): 1.2 CM
INTERVENTRICULAR SEPTUM: 1.2 CM (ref 0.6–1.1)
IVC: 18 MM
LAAS-AP2: 12.5 CM2
LAAS-AP4: 16.8 CM2
LEFT ATRIUM SIZE: 4.1 CM
LEFT ATRIUM VOLUME INDEX (MOD BIPLANE): 19.7
LEFT INTERNAL DIMENSION IN SYSTOLE: 3 CM (ref 2.1–4)
LEFT VENTRICULAR INTERNAL DIMENSION IN DIASTOLE: 4.8 CM (ref 3.5–6)
LEFT VENTRICULAR POSTERIOR WALL IN END DIASTOLE: 1.2 CM
LEFT VENTRICULAR STROKE VOLUME: 71 ML
LVSV (TEICH): 71 ML
MV E'TISSUE VEL-SEP: 11 CM/S
MV PEAK A VEL: 1.05 M/S
MV PEAK E VEL: 92 CM/S
MV STENOSIS PRESSURE HALF TIME: 72 MS
MV VALVE AREA P 1/2 METHOD: 3.1
RIGHT ATRIUM AREA SYSTOLE A4C: 9.4 CM2
RIGHT VENTRICLE ID DIMENSION: 2.7 CM
SL CV LEFT ATRIUM LENGTH A2C: 4.7 CM
SL CV LV EF: 65
SL CV PED ECHO LEFT VENTRICLE DIASTOLIC VOLUME (MOD BIPLANE) 2D: 108 ML
SL CV PED ECHO LEFT VENTRICLE SYSTOLIC VOLUME (MOD BIPLANE) 2D: 36 ML
TR MAX PG: 20 MMHG
TR PEAK VELOCITY: 2.2 M/S
TRICUSPID ANNULAR PLANE SYSTOLIC EXCURSION: 2.7 CM
TRICUSPID VALVE PEAK REGURGITATION VELOCITY: 2.22 M/S

## 2023-03-30 DIAGNOSIS — R25.9 MIXED ACTION AND RESTING TREMOR: Primary | ICD-10-CM

## 2023-04-03 ENCOUNTER — OFFICE VISIT (OUTPATIENT)
Dept: INTERNAL MEDICINE CLINIC | Facility: CLINIC | Age: 68
End: 2023-04-03

## 2023-04-03 VITALS
HEIGHT: 60 IN | OXYGEN SATURATION: 98 % | DIASTOLIC BLOOD PRESSURE: 56 MMHG | WEIGHT: 190 LBS | BODY MASS INDEX: 37.3 KG/M2 | TEMPERATURE: 96.2 F | SYSTOLIC BLOOD PRESSURE: 122 MMHG | HEART RATE: 67 BPM

## 2023-04-03 DIAGNOSIS — I67.89 CEREBRAL MICROVASCULAR DISEASE: Primary | ICD-10-CM

## 2023-04-03 DIAGNOSIS — N18.30 STAGE 3 CHRONIC KIDNEY DISEASE, UNSPECIFIED WHETHER STAGE 3A OR 3B CKD (HCC): ICD-10-CM

## 2023-04-03 DIAGNOSIS — F33.9 DEPRESSION, RECURRENT (HCC): ICD-10-CM

## 2023-04-03 DIAGNOSIS — R20.2 PARESTHESIAS: ICD-10-CM

## 2023-04-03 DIAGNOSIS — R25.1 TREMORS OF NERVOUS SYSTEM: ICD-10-CM

## 2023-04-03 RX ORDER — CITALOPRAM 10 MG/1
10 TABLET ORAL DAILY
Qty: 30 TABLET | Refills: 5 | Status: SHIPPED | OUTPATIENT
Start: 2023-04-03

## 2023-04-03 NOTE — PATIENT INSTRUCTIONS
Obesity   AMBULATORY CARE:   Obesity  means your body mass index (BMI) is greater than 30  Your healthcare provider will use your age, height, and weight to measure your BMI  The risks of obesity include  many health problems, including injuries or physical disability  • Diabetes (high blood sugar level)    • High blood pressure or high cholesterol    • Heart disease    • Stroke    • Gallbladder or liver disease    • Cancer of the colon, breast, prostate, liver, or kidney    • Sleep apnea    • Arthritis or gout    Screening  is done to check for health conditions before you have signs or symptoms  If you are 28to 79years old, your blood sugar level may be checked every 3 years for signs of prediabetes or diabetes  Your healthcare provider will check your blood pressure at each visit  High blood pressure can lead to a stroke or other problems  Your provider may check for signs of heart disease, cancer, or other health problems  Seek care immediately if:   • You have a severe headache, confusion, or difficulty speaking  • You have weakness on one side of your body  • You have chest pain, sweating, or shortness of breath  Call your doctor if:   • You have symptoms of gallbladder or liver disease, such as pain in your upper abdomen  • You have knee or hip pain and discomfort while walking  • You have symptoms of diabetes, such as intense hunger and thirst, and frequent urination  • You have symptoms of sleep apnea, such as snoring or daytime sleepiness  • You have questions or concerns about your condition or care  Treatment for obesity  focuses on helping you lose weight to improve your health  Even a small decrease in BMI can reduce the risk for many health problems  Your healthcare provider will help you set a weight-loss goal   • Lifestyle changes  are the first step in treating obesity  These include making healthy food choices and getting regular physical activity   Your healthcare provider may suggest a weight-loss program that involves coaching, education, and therapy  • Medicine  may help you lose weight when it is used with a healthy foods and physical activity  • Surgery  can help you lose weight if you have obesity along with other health problems  Several types of weight-loss surgery are available  Ask your healthcare provider for more information  Tips for safe weight loss:   • Set small, realistic goals  An example of a small goal is to walk for 20 minutes 5 days a week  Anther goal is to lose 5% of your body weight  • Ask for support  Tell friends, family members, and coworkers about your goals  Ask someone to lose weight with you  You may also want to join a weight-loss support group  • Identify foods or triggers that may cause you to overeat  Remove tempting high-calorie foods from your home and workplace  Place a bowl of fresh fruit on your kitchen counter  If stress causes you to eat, find other ways to cope with stress  A counselor or therapist may be able to help you  • Track your daily calories and activity  Write down what you eat and drink  Also write down how many minutes of physical activity you do each day  • Track your weekly weight  Weigh yourself in the morning, before you eat or drink anything but after you use the bathroom  Use the same scale, in the same place, and in similar clothing each time  Only weigh yourself 1 to 2 times each week, or as directed  You may become discouraged if you weigh yourself every day  Eating changes: You will need to eat 500 to 1,000 fewer calories each day than you currently eat to lose 1 to 2 pounds a week  The following changes will help you cut calories:  • Eat smaller portions  Use small plates, no larger than 9 inches in diameter  Fill your plate half full of fruits and vegetables  Measure your food using measuring cups until you know what a serving size looks like           • Eat 3 meals and 1 or 2 snacks each day  Plan your meals in advance  [employer] and eat at home most of the time  Eat slowly  Do not skip meals  Skipping meals can lead to overeating later in the day  This can make it harder for you to lose weight  Talk with a dietitian to help you make a meal plan and schedule that is right for you  • Eat fruits and vegetables at every meal   They are low in calories and high in fiber, which makes you feel full  Do not add butter, margarine, or cream sauce to vegetables  Use herbs to season steamed vegetables  • Eat less fat and fewer fried foods  Eat more baked or grilled chicken and fish  These protein sources are lower in calories and fat than red meat  Limit fast food  Dress your salads with olive oil and vinegar instead of bottled dressing  • Limit the amount of sugar you eat  Do not drink sugary beverages  Limit alcohol  Activity changes:  Physical activity is good for your body in many ways  It helps you burn calories and build strong muscles  It decreases stress and depression, and improves your mood  It can also help you sleep better  Talk to your healthcare provider before you begin an exercise program   • Exercise for at least 30 minutes 5 days a week  Start slowly  Set aside time each day for physical activity that you enjoy and that is convenient for you  It is best to do both weight training and an activity that increases your heart rate, such as walking, bicycling, or swimming  • Find ways to be more active  Do yard work and housecleaning  Walk up the stairs instead of using elevators  Spend your leisure time going to events that require walking, such as outdoor festivals or fairs  This extra physical activity can help you lose weight and keep it off  Follow up with your doctor as directed: You may need to meet with a dietitian  Write down your questions so you remember to ask them during your visits    © Copyright [employer] 2022 Information is for End User's use only and may not be sold, redistributed or otherwise used for commercial purposes  The above information is an  only  It is not intended as medical advice for individual conditions or treatments  Talk to your doctor, nurse or pharmacist before following any medical regimen to see if it is safe and effective for you  Low Fat Diet   AMBULATORY CARE:   A low-fat diet  is an eating plan that is low in total fat, unhealthy fat, and cholesterol  You may need to follow a low-fat diet if you have trouble digesting or absorbing fat  You may also need to follow this diet if you have high cholesterol  You can also lower your cholesterol by increasing the amount of fiber in your diet  Soluble fiber is a type of fiber that helps to decrease cholesterol levels  Different types of fat in food:   • Limit unhealthy fats  A diet that is high in cholesterol, saturated fat, and trans fat may cause unhealthy cholesterol levels  Unhealthy cholesterol levels increase your risk of heart disease  ? Cholesterol:  Limit intake of cholesterol to less than 200 mg per day  Cholesterol is found in meat, eggs, and dairy  ? Saturated fat:  Limit saturated fat to less than 7% of your total daily calories  Ask your dietitian how many calories you need each day  Saturated fat is found in butter, cheese, ice cream, whole milk, and palm oil  Saturated fat is also found in meat, such as beef, pork, chicken skin, and processed meats  Processed meats include sausage, hot dogs, and bologna  ? Trans fat:  Avoid trans fat as much as possible  Trans fat is used in fried and baked foods  Foods that say trans fat free on the label may still have up to 0 5 grams of trans fat per serving  • Include healthy fats  Replace foods that are high in saturated and trans fat with foods high in healthy fats  This may help to decrease high cholesterol levels  ? Monounsaturated fats:   These are found in avocados, nuts, and vegetable oils, such as olive, canola, and sunflower oil  ? Polyunsaturated fats: These can be found in vegetable oils, such as soybean or corn oil  Omega-3 fats can help to decrease the risk of heart disease  Omega-3 fats are found in fish, such as salmon, herring, trout, and tuna  Omega-3 fats can also be found in plant foods, such as walnuts, flaxseed, soybeans, and canola oil  Foods to limit or avoid:   • Grains:      ? Snacks that are made with partially hydrogenated oils, such as chips, regular crackers, and butter-flavored popcorn    ? High-fat baked goods, such as biscuits, croissants, doughnuts, pies, cookies, and pastries    • Dairy:      ? Whole milk, 2% milk, and yogurt and ice cream made with whole milk    ? Half and half creamer, heavy cream, and whipping cream    ? Cheese, cream cheese, and sour cream    • Meats and proteins:      ? High-fat cuts of meat (T-bone steak, regular hamburger, and ribs)    ? Fried meat, poultry (turkey and chicken), and fish    ? Poultry (chicken and turkey) with skin    ? Cold cuts (salami or bologna), hot dogs, lou, and sausage    ? Whole eggs and egg yolks    • Vegetables and fruits with added fat:      ? Fried vegetables or vegetables in butter or high-fat sauces, such as cream or cheese sauces    ? Fried fruit or fruit served with butter or cream    • Fats:      ? Butter, stick margarine, and shortening    ? Coconut, palm oil, and palm kernel oil    Foods to include:       • Grains:      ? Whole-grain breads, cereals, pasta, and brown rice    ? Low-fat crackers and pretzels    • Vegetables and fruits:      ? Fresh, frozen, or canned vegetables (no salt or low-sodium)    ? Fresh, frozen, dried, or canned fruit (canned in light syrup or fruit juice)    ? Avocado    • Low-fat dairy products:      ? Nonfat (skim) or 1% milk    ? Nonfat or low-fat cheese, yogurt, and cottage cheese    • Meats and proteins:      ? Chicken or turkey with no skin    ?  Baked or broiled fish    ? Lean beef and pork (loin, round, extra lean hamburger)    ? Beans and peas, unsalted nuts, soy products    ? Egg whites and substitutes    ? Seeds and nuts    • Fats:      ? Unsaturated oil, such as canola, olive, peanut, soybean, or sunflower oil    ? Soft or liquid margarine and vegetable oil spread    ? Low-fat salad dressing  Other ways to decrease fat:   • Read food labels before you buy foods  Choose foods that have less than 30% of calories from fat  Choose low-fat or fat-free dairy products  Remember that fat free does not mean calorie free  These foods still contain calories, and too many calories can lead to weight gain  • Trim fat from meat and avoid fried food  Trim all visible fat from meat before you cook it  Remove the skin from poultry  Do not salgado meat, fish, or poultry  Bake, roast, boil, or broil these foods instead  Avoid fried foods  Eat a baked potato instead of Western Suze fries  Steam vegetables instead of sautéing them in butter  • Add less fat to foods  Use imitation lou bits on salads and baked potatoes instead of regular lou bits  Use fat-free or low-fat salad dressings instead of regular dressings  Use low-fat or nonfat butter-flavored topping instead of regular butter or margarine on popcorn and other foods  Ways to decrease fat in recipes:  Replace high-fat ingredients with low-fat or nonfat ones  This may cause baked goods to be drier than usual  You may need to use nonfat cooking spray on pans to prevent food from sticking  You also may need to change the amount of other ingredients, such as water, in the recipe  Try the following:  • Use low-fat or light margarine instead of regular margarine or shortening  • Use lean ground turkey breast or chicken, or lean ground beef (less than 5% fat) instead of hamburger  • Add 1 teaspoon of canola oil to 8 ounces of skim milk instead of using cream or half and half       • Use grated zucchini, carrots, or apples in breads instead of coconut  • Use blenderized, low-fat cottage cheese, plain tofu, or low-fat ricotta cheese instead of cream cheese  • Use 1 egg white and 1 teaspoon of canola oil, or use ¼ cup (2 ounces) of fat-free egg substitute instead of a whole egg  • Replace half of the oil that is called for in a recipe with applesauce when you bake  Use 3 tablespoons of cocoa powder and 1 tablespoon of canola oil instead of a square of baking chocolate  How to increase fiber:  Eat enough high-fiber foods to get 20 to 30 grams of fiber every day  Slowly increase your fiber intake to avoid stomach cramps, gas, and other problems  • Eat 3 ounces of whole-grain foods each day  An ounce is about 1 slice of bread  Eat whole-grain breads, such as whole-wheat bread  Whole wheat, whole-wheat flour, or other whole grains should be listed as the first ingredient on the food label  Replace white flour with whole-grain flour or use half of each in recipes  Whole-grain flour is heavier than white flour, so you may have to add more yeast or baking powder  • Eat a high-fiber cereal for breakfast   Oatmeal is a good source of soluble fiber  Look for cereals that have bran or fiber in the name  Choose whole-grain products, such as brown rice, barley, and whole-wheat pasta  • Eat more beans, peas, and lentils  For example, add beans to soups or salads  Eat at least 5 cups of fruits and vegetables each day  Eat fruits and vegetables with the peel because the peel is high in fiber  © Copyright [employer] 2022 Information is for End User's use only and may not be sold, redistributed or otherwise used for commercial purposes  The above information is an  only  It is not intended as medical advice for individual conditions or treatments  Talk to your doctor, nurse or pharmacist before following any medical regimen to see if it is safe and effective for you      Heart Healthy Diet   AMBULATORY CARE:   A heart healthy diet  is an eating plan low in unhealthy fats and sodium (salt)  The plan is high in healthy fats and fiber  A heart healthy diet helps improve your cholesterol levels and lowers your risk for heart disease and stroke  A dietitian will teach you how to read and understand food labels  Heart healthy diet guidelines to follow:   • Choose foods that contain healthy fats:      ? Unsaturated fats  include monounsaturated and polyunsaturated fats  Unsaturated fat is found in foods such as soybean, canola, olive, corn, and safflower oils  It is also found in soft tub margarine that is made with liquid vegetable oil  ? Omega-3 fat  is found in certain fish, such as salmon, tuna, and trout, and in walnuts and flaxseed  Eat fish high in omega-3 fats at least 2 times a week  • Limit or do not have unhealthy fats:      ? Cholesterol  is found in animal foods, such as eggs and lobster, and in dairy products made from whole milk  Limit cholesterol to less than 200 mg each day  ? Saturated fat  is found in meats, such as lou and hamburger  It is also found in chicken or turkey skin, whole milk, and butter  Limit saturated fat to less than 7% of your total daily calories  ? Trans fat  is found in packaged foods, such as potato chips and cookies  It is also in hard margarine, some fried foods, and shortening  Do not eat foods that contain trans fats  • Get 20 to 30 grams of fiber each day  Fruits, vegetables, whole-grain foods, and legumes (cooked beans) are good sources of fiber  • Limit sodium as directed  You may be told to limit sodium, such as to 2,000 mg or less each day  Choose low-sodium or no-salt-added foods  Add little or no salt to food you prepare  Use herbs and spices in place of salt         Include the following in your heart healthy plan:  Ask your dietitian or healthcare provider how many servings to have each day from the following food groups:  • Grains: ? Whole-wheat breads, cereals, and pastas, and brown rice    ? Low-fat, low-sodium crackers and chips    • Vegetables:      ? Broccoli, green beans, green peas, and spinach    ? Collards, kale, and lima beans    ? Carrots, sweet potatoes, tomatoes, and peppers    ? Canned vegetables with no salt added    • Fruits:      ? Bananas, peaches, pears, and pineapple    ? Grapes, raisins, and dates    ? Oranges, tangerines, grapefruit, orange juice, and grapefruit juice    ? Apricots, mangoes, melons, and papaya    ? Raspberries and strawberries    ? Canned fruit with no added sugar    • Low-fat dairy:      ? Nonfat (skim) milk, 1% milk, and low-fat almond, cashew, or soy milks fortified with calcium    ? Low-fat cheese, regular or frozen yogurt, and cottage cheese    • Meats and proteins:      ? Lean cuts of beef and pork (loin, leg, round), skinless chicken and turkey    ? Legumes, soy products, egg whites, or nuts    Limit or do not include the following in your heart healthy plan:   • Foods and liquids that contain unhealthy fats and oils:      ? Whole or 2% milk, cream cheese, sour cream, or cheese    ? High-fat cuts of beef (T-bone steaks, ribs), chicken or turkey with skin, and organ meats such as liver    ? Butter, stick margarine, shortening, and cooking oils such as coconut or palm oil    • Foods and liquids high in sodium:      ? Packaged foods, such as frozen dinners, cookies, macaroni and cheese, and cereals with more than 300 mg of sodium per serving    ? Vegetables with added sodium, such as instant potatoes, vegetables with added sauces, or regular canned vegetables    ? Cured or smoked meats, such as hot dogs, lou, and sausage    ? High-sodium ketchup, barbecue sauce, salad dressing, pickles, olives, soy sauce, or miso    • Foods and liquids high in sugar:      ? Candy, cake, cookies, pies, or doughnuts    ? Soft drinks (soda), sports drinks, or sweetened tea    ?  Canned or dry mixes for cakes, soups, sauces, or gravies    Other healthy heart guidelines:   • Do not smoke  Nicotine and other chemicals in cigarettes and cigars can cause lung and heart damage  Ask your healthcare provider for information if you currently smoke and need help to quit  E-cigarettes or smokeless tobacco still contain nicotine  Talk to your provider before you use these products  • Limit or do not drink alcohol as directed  Alcohol can damage your heart and raise your blood pressure  Your healthcare provider may give you specific daily and weekly limits  The general recommended limit is 1 drink a day for women 21 or older and for men 72 or older  Do not have more than 3 drinks within 24 hours or 7 within a week  The recommended limit is 2 drinks a day for men 24to 59years of age  Do not have more than 4 drinks within 24 hours or 14 within a week  A drink of alcohol is 12 ounces of beer, 5 ounces of wine, or 1½ ounces of liquor  • Maintain a healthy weight  Extra body weight makes your heart work harder  Ask your provider what a healthy weight is for you  He or she can help you create a safe weight loss plan, if needed  • Exercise regularly  Exercise can help you maintain a healthy weight and improve your blood pressure and cholesterol levels  Regular exercise can also decrease your risk for heart problems  Ask your provider about the best exercise plan for you  Do not start an exercise program without asking your provider  Follow up with your doctor or cardiologist as directed:  Write down your questions so you remember to ask them during your visits  © Copyright [employer] 2022 Information is for End User's use only and may not be sold, redistributed or otherwise used for commercial purposes  The above information is an  only  It is not intended as medical advice for individual conditions or treatments   Talk to your doctor, nurse or pharmacist before following any medical regimen to see if it is safe and effective for you

## 2023-04-03 NOTE — PROGRESS NOTES
BMI Counseling: There is no height or weight on file to calculate BMI  The BMI is above normal  Nutrition recommendations include decreasing portion sizes and encouraging healthy choices of fruits and vegetables  Exercise recommendations include moderate physical activity 150 minutes/week  No pharmacotherapy was ordered  Rationale for BMI follow-up plan is due to patient being overweight or obese  Assessment/Plan:  Problem List Items Addressed This Visit        Genitourinary    Stage 3 chronic kidney disease, unspecified whether stage 3a or 3b CKD (HCC)       Other    Paresthesias    Depression, recurrent (HCC)    Relevant Medications    citalopram (CeleXA) 10 mg tablet   Other Visit Diagnoses     Cerebral microvascular disease    -  Primary    Tremors of nervous system               Diagnoses and all orders for this visit:    Cerebral microvascular disease    Tremors of nervous system    Paresthesias    Depression, recurrent (HCC)  -     citalopram (CeleXA) 10 mg tablet; Take 1 tablet (10 mg total) by mouth daily    Stage 3 chronic kidney disease, unspecified whether stage 3a or 3b CKD ([facility] [address] )      No problem-specific Assessment & Plan notes found for this encounter  A/P: I have spent a total time of 20 minutes on 04/03/23 in caring for this patient including Diagnostic results, Prognosis, Instructions for management, Patient and family education, Risk factor reductions, Impressions and Obtaining or reviewing history  Will do a fast wean on the celexa with pt going down to 20mg q day x3 and then 10mg q day x3 and then 5mg q day x2 and d/c  Pt to hold her trazodone five days prior  Appreciate neuor input  Await KG and EMG  Discussed modifying risks for the microvascular finding  Continue to control, BP, Sugars, cholesterol, etc  Continue current treatment otherwise and RTC one month for routine and to see if she still needs meds for sleep and MAGGIE/MDD  Keep f/u for tests and with neuro         Subjective: Patient ID: Shelly Herring is a 75 y o  female  WF presents wishing to discuss her recent MRI of the head ordered by neuro for ongoing tremors and parethesias of the the UE  MRI normal except for microvascular disease  Tremors continue and neuro ordered a KG scan for dopamine and is adjusting her meds  EMG also being considered  No new issues  Wants to go off the trazodone and celexa for the KG  Doing ok from a MDD standpoint and sleep is ok  The following portions of the patient's history were reviewed and updated as appropriate:   She has a past medical history of Arthritis, Cataract, Depression, Fluid retention, Headache, acute, Heart murmur, Hyperlipidemia, Hypertension, Memory loss, Pedal edema, Pneumonia, PONV (postoperative nausea and vomiting), Rotator cuff tear, left, Sleep apnea (2021), Wears glasses, and Wears partial dentures  ,  does not have any pertinent problems on file  ,   has a past surgical history that includes Ankle surgery (Right); Tonsillectomy; Tubal ligation; Hysterectomy; Ovarian cyst removal; Carpal tunnel release (Bilateral); Hand surgery (Right); Abdominal adhesion surgery; Knee arthroscopy (Left); Appendectomy; Colonoscopy; Hemorroidectomy; Repair rectocele; pr surgical arthroscopy shoulder w/rotator cuff rpr (Left, 2/19/2018); Carpal tunnel release (Left); Carpal tunnel release (Right); Ileostomy; Anal sphincteroplasty; Knee arthrocentesis; Tonsillectomy and adenoidectomy; Joint replacement; and Replacement total knee (Left, 09/28/2021)  ,  family history includes Alzheimer's disease in her sister and sister; Bone cancer in her family and mother; COPD in her mother; Colon cancer in her family and mother; Depression in her sister; Diabetes in her sister; Emphysema in her mother; ROBERT disease in her brother; Heart failure in her sister;  Hypertension in her brother, family, mother, sister, and sister; Lead poisoning in her father; No Known Problems in her daughter, maternal aunt, maternal aunt, maternal aunt, maternal aunt, maternal aunt, maternal grandmother, paternal aunt, and paternal grandmother; Skin cancer in her mother; Throat cancer in her brother; Thyroid disease in her sister  ,   reports that she has never smoked  She has never used smokeless tobacco  She reports current alcohol use  She reports that she does not use drugs  ,  is allergic to iodine - food allergy, keflex [cephalexin], morphine and related, penicillins, benadryl [diphenhydramine], ciprofloxacin, clindamycin, and erythromycin     Current Outpatient Medications   Medication Sig Dispense Refill   • acetaminophen (TYLENOL) 500 mg tablet Take 500-1,000 mg by mouth every 6 (six) hours as needed for mild pain       • albuterol (ProAir HFA) 90 mcg/act inhaler Inhale 2 puffs every 4 (four) hours as needed for wheezing 18 g 0   • Calcium Carbonate (CALCIUM 600 PO) Take by mouth daily       • carbidopa-levodopa (Sinemet)  mg per tablet Take 1/2 tab TID for 1 week then 1 tab TID for 1 week then 1 5 tabs TID for 1 week then 2 tabs TID (Patient taking differently: 2 tablets 3 (three) times a day Take 1/2 tab TID for 1 week then 1 tab TID for 1 week then 1 5 tabs TID for 1 week then 2 tabs TID) 540 tablet 2   • Cholecalciferol (VITAMIN D3 PO) Take 50 mcg by mouth daily       • citalopram (CeleXA) 10 mg tablet Take 1 tablet (10 mg total) by mouth daily 30 tablet 5   • enalapril (VASOTEC) 5 mg tablet Take 1 tablet (5 mg total) by mouth daily 90 tablet 1   • ezetimibe (ZETIA) 10 mg tablet take 1 tablet by mouth once daily 90 tablet 1   • montelukast (SINGULAIR) 10 mg tablet take 1 tablet by mouth at bedtime 90 tablet 3   • rosuvastatin (CRESTOR) 5 mg tablet Take 1 tablet (5 mg total) by mouth daily 90 tablet 3   • traZODone (DESYREL) 100 mg tablet take 1 tablet by mouth at bedtime 90 tablet 1     No current facility-administered medications for this visit         Review of Systems   Constitutional: Negative for activity change, chills, diaphoresis, fatigue and fever  Respiratory: Negative for cough, chest tightness, shortness of breath and wheezing  Cardiovascular: Negative for chest pain, palpitations and leg swelling  Gastrointestinal: Negative for abdominal pain, constipation, diarrhea, nausea and vomiting  Genitourinary: Negative for difficulty urinating, dysuria and frequency  Musculoskeletal: Negative for arthralgias, gait problem and myalgias  Neurological: Positive for tremors  Negative for dizziness, seizures, syncope, weakness, light-headedness and headaches  Psychiatric/Behavioral: Negative for confusion, dysphoric mood and sleep disturbance  The patient is not nervous/anxious  PHQ-2/9 Depression Screening          Objective:  Vitals:    04/03/23 0821   BP: 122/56   BP Location: Left arm   Patient Position: Sitting   Cuff Size: Standard   Pulse: 67   Temp: (!) 96 2 °F (35 7 °C)   SpO2: 98%   Weight: 86 2 kg (190 lb)   Height: 5' (1 524 m)     Body mass index is 37 11 kg/m²  Physical Exam  Vitals and nursing note reviewed  Constitutional:       General: She is not in acute distress  Appearance: Normal appearance  She is not ill-appearing  HENT:      Head: Normocephalic and atraumatic  Mouth/Throat:      Mouth: Mucous membranes are moist    Eyes:      Extraocular Movements: Extraocular movements intact  Conjunctiva/sclera: Conjunctivae normal       Pupils: Pupils are equal, round, and reactive to light  Cardiovascular:      Rate and Rhythm: Normal rate and regular rhythm  Heart sounds: Normal heart sounds  No murmur heard  Pulmonary:      Effort: Pulmonary effort is normal  No respiratory distress  Breath sounds: Normal breath sounds  No wheezing, rhonchi or rales  Neurological:      General: No focal deficit present  Mental Status: She is alert and oriented to person, place, and time  Mental status is at baseline  Comments: Tremors again noted      Psychiatric: Mood and Affect: Mood normal          Behavior: Behavior normal          Thought Content: Thought content normal          Judgment: Judgment normal          BMI Counseling: Body mass index is 37 11 kg/m²  The BMI is above normal  Nutrition recommendations include reducing portion sizes, decreasing overall calorie intake, reducing intake of saturated fat and trans fat and reducing intake of cholesterol  Exercise recommendations include moderate aerobic physical activity for 150 minutes/week

## 2023-04-24 ENCOUNTER — TELEPHONE (OUTPATIENT)
Dept: NEUROLOGY | Facility: CLINIC | Age: 68
End: 2023-04-24

## 2023-04-26 ENCOUNTER — RA CDI HCC (OUTPATIENT)
Dept: OTHER | Facility: HOSPITAL | Age: 68
End: 2023-04-26

## 2023-05-01 ENCOUNTER — OFFICE VISIT (OUTPATIENT)
Dept: NEUROLOGY | Facility: CLINIC | Age: 68
End: 2023-05-01

## 2023-05-01 VITALS
TEMPERATURE: 98.2 F | DIASTOLIC BLOOD PRESSURE: 91 MMHG | SYSTOLIC BLOOD PRESSURE: 143 MMHG | OXYGEN SATURATION: 96 % | HEART RATE: 86 BPM | HEIGHT: 60 IN | BODY MASS INDEX: 37.89 KG/M2 | WEIGHT: 193 LBS

## 2023-05-01 DIAGNOSIS — R25.9 MIXED ACTION AND RESTING TREMOR: Primary | ICD-10-CM

## 2023-05-01 NOTE — PROGRESS NOTES
Patient ID: Jessica Rea is a 75 y o  female  Assessment/Plan:    Mixed action and resting tremor  Patient returns for follow-up regarding mixed action and resting tremor  Since last visit she did try higher doses of Sinemet given some exam changes off of medication and has not noted any difference in her tremors or any other symptoms  Her exam is  grossly stable from previous other then perhaps slightly less resting tremor  MRI brain with no significant findings  She was interested in KG scan to aid with diagnosis and has scheduled later this week  We did discuss given no real change on Sinemet would consider tapering off medication and trial of medications for essential tremor such as propanolol (would need to discuss with cardiology) or topiramate given she had no improvement with higher doses of primidone  She does not wish to trial medication until she has more definitive answer with KG scan  We did again discuss her iodine allergy in which she states she does not feel is present anymore as she has tolerated shellfish  She is aware of risk of reaction with the test and will report any reaction type symptom immediately to staff  We did again discuss today's exam would more so indicate essential tremor and not parkinson's disease however she still wanted to proceed with KG scan, she verbalized understand of potential risks  Will hold off on any medication changes until resulted  Plan for follow-up in 4 months  I will contact her with results of testing in the interim  To contact the office sooner with any concerns or worsening symptoms         Diagnoses and all orders for this visit:    Mixed action and resting tremor         I have spent a total time of 35 minutes on 05/01/23 in caring for this patient including Prognosis, Risks and benefits of tx options, Patient and family education, Risk factor reductions, Documenting in the medical record, Reviewing / ordering tests, medicine, procedures "and Obtaining or reviewing history    Subjective:    HPI    Patient is a 54-year-old female with history of anxiety depression who presents for follow-up for tremors      To review, she noted bilateral hand tremor starting in 2020 that been progressively worsening  Tremor mainly present with action and can interfere with utensils and drinking  She is no family history of tremor or parkinsonism  She has been trialed on primidone up to 300 mg and low-dose Sinemet with no improvement  Off of sinemet she did note changes in her hand dexterity and hesitations noted on exam with rapid alternating movements      Last office visit 2/2022 in which she was to try higher doses of Sinemet due to exam changes off of medication  EMG was ordered due to ongoing paresthesias in the upper extremities  Prior medication trials:  Primidone-no improvement in tremor    Current Medications:  Sinemet 25/100 2 tabs TID    Interval History:  She did restart sinemet and she did really notice a difference in the tremors  She did not feel a difference in hand dexterity or walking  When she walks she will just feel off balance and will \"stagger\"     Tremor continues with action and rest  Affects handwriting, carrying and holding items  Her daughter had noted a rare lip tremor over the past few months  No head tremor  She does stutter more but no vocal tremor  She denies any softer speech  She just came off of enalapril due to hypotension  EMG scheduled for oct    KG scan scheduled this week  She is getting monitored for a longer period of time due to reaction with iodine-hives, this occurred when she was in her 25s  She is aware of risk  She takes benadryl typically and resolves   She does eat shellfish without issue  Prior work-up:  MRI brain: 1  No acute infarction, intracranial hemorrhage or mass effect  2   A few white matter lesions are most likely mild, chronic microangiopathy      MRI cervical spine: Cervical " spondylitic degenerative change with primarily left-sided facet hypertrophic change   There is no cord compression   Multilevel primarily left-sided foraminal narrowing most pronounced at the C4-5 level, moderate      Small central disc extrusion at C6-7 partially effacing the ventral CSF space     The following portions of the patient's history were reviewed and updated as appropriate: allergies, current medications, past family history, past medical history, past social history, past surgical history and problem list        Objective:    Blood pressure 143/91, pulse 86, temperature 98 2 °F (36 8 °C), temperature source Temporal, height 5' (1 524 m), weight 87 5 kg (193 lb), SpO2 96 %, not currently breastfeeding  Physical Exam  Constitutional:       General: She is awake  Eyes:      General: Lids are normal       Extraocular Movements: Extraocular movements intact  Pupils: Pupils are equal, round, and reactive to light  Neurological:      Mental Status: She is alert  Psychiatric:         Speech: Speech normal          Neurological Exam  Mental Status  Awake and alert  Oriented to person, place, time and situation  Memory: Recalled 2/5  Speech is normal  Language is fluent with no aphasia  Cranial Nerves  CN III, IV, VI: Extraocular movements intact bilaterally  Normal lids and orbits bilaterally  Pupils equal round and reactive to light bilaterally  CN V: Facial sensation is normal   CN VII: Full and symmetric facial movement  CN VIII: Hearing is normal   CN IX, X: Palate elevates symmetrically  CN XI: Shoulder shrug strength is normal   CN XII: Tongue midline without atrophy or fasciculations  Motor  Normal muscle bulk throughout  Normal muscle tone  Strength is 5/5 in all four extremities except as noted  4/5  strength b/l  Sensory  Light touch is normal in upper and lower extremities       Coordination  Right: Finger-to-nose normal  Rapid alternating movement abnormality:Left: Finger-to-nose normal  Rapid alternating movement abnormality:  Rare mild rest tremor of both hands only with distraction  Mild intentional tremor on FTN bilaterally  Moderate postural tremors  No bradykinesia but hesitations noted with heel and toe taps L>R  No rigidity or cogwheeling  Very rare head and lip tremor noted    Gait   Able to rise from chair without using arms  Good stride and speed, normal arm swing  I have personally reviewed the ROS performed by the MA     ROS:    Review of Systems   Constitutional: Negative  Negative for appetite change and fever  HENT: Negative  Negative for hearing loss, tinnitus, trouble swallowing and voice change  Eyes: Negative  Negative for photophobia, pain and visual disturbance  Blurry vision   Respiratory: Negative  Negative for shortness of breath  Cardiovascular: Negative  Negative for palpitations  Gastrointestinal: Negative  Negative for nausea and vomiting  Endocrine: Negative  Negative for cold intolerance  Genitourinary: Negative  Negative for dysuria, frequency and urgency  Musculoskeletal: Positive for back pain and gait problem  Negative for myalgias and neck pain  Skin: Negative  Negative for rash  Allergic/Immunologic: Negative  Neurological: Positive for tremors, weakness and numbness  Negative for dizziness, seizures, syncope, facial asymmetry, speech difficulty, light-headedness and headaches  Hematological: Negative  Does not bruise/bleed easily  Psychiatric/Behavioral: Negative  Negative for confusion, hallucinations and sleep disturbance  All other systems reviewed and are negative

## 2023-05-01 NOTE — PATIENT INSTRUCTIONS
Proceed with julio scan as you are aware of the risks with your allergy    Will recommend medications based on this

## 2023-05-01 NOTE — ASSESSMENT & PLAN NOTE
Patient returns for follow-up regarding mixed action and resting tremor  Since last visit she did try higher doses of Sinemet given some exam changes off of medication and has not noted any difference in her tremors or any other symptoms  Her exam is  grossly stable from previous other then perhaps slightly less resting tremor  MRI brain with no significant findings  She was interested in KG scan to aid with diagnosis and has scheduled later this week  We did discuss given no real change on Sinemet would consider tapering off medication and trial of medications for essential tremor such as propanolol (would need to discuss with cardiology) or topiramate given she had no improvement with higher doses of primidone  She does not wish to trial medication until she has more definitive answer with KG scan  We did again discuss her iodine allergy in which she states she does not feel is present anymore as she has tolerated shellfish  She is aware of risk of reaction with the test and will report any reaction type symptom immediately to staff  We did again discuss today's exam would more so indicate essential tremor and not parkinson's disease however she still wanted to proceed with KG scan, she verbalized understand of potential risks  Will hold off on any medication changes until resulted  Plan for follow-up in 4 months  I will contact her with results of testing in the interim  To contact the office sooner with any concerns or worsening symptoms

## 2023-05-02 ENCOUNTER — HOSPITAL ENCOUNTER (OUTPATIENT)
Dept: RADIOLOGY | Facility: HOSPITAL | Age: 68
Discharge: HOME | End: 2023-05-02

## 2023-05-02 DIAGNOSIS — R25.9 MIXED ACTION AND RESTING TREMOR: ICD-10-CM

## 2023-05-02 RX ADMIN — POTASSIUM IODIDE 130 MG: 1 SOLUTION ORAL at 08:45

## 2023-05-03 ENCOUNTER — OFFICE VISIT (OUTPATIENT)
Dept: INTERNAL MEDICINE CLINIC | Facility: CLINIC | Age: 68
End: 2023-05-03

## 2023-05-03 VITALS
BODY MASS INDEX: 37.99 KG/M2 | OXYGEN SATURATION: 97 % | HEIGHT: 60 IN | SYSTOLIC BLOOD PRESSURE: 134 MMHG | WEIGHT: 193.5 LBS | DIASTOLIC BLOOD PRESSURE: 78 MMHG | TEMPERATURE: 98.7 F | HEART RATE: 78 BPM

## 2023-05-03 DIAGNOSIS — N18.30 STAGE 3 CHRONIC KIDNEY DISEASE, UNSPECIFIED WHETHER STAGE 3A OR 3B CKD (HCC): ICD-10-CM

## 2023-05-03 DIAGNOSIS — R73.03 PREDIABETES: ICD-10-CM

## 2023-05-03 DIAGNOSIS — E78.2 MIXED HYPERLIPIDEMIA: ICD-10-CM

## 2023-05-03 DIAGNOSIS — Z00.00 MEDICARE ANNUAL WELLNESS VISIT, SUBSEQUENT: ICD-10-CM

## 2023-05-03 DIAGNOSIS — M15.9 PRIMARY OSTEOARTHRITIS INVOLVING MULTIPLE JOINTS: ICD-10-CM

## 2023-05-03 DIAGNOSIS — I10 PRIMARY HYPERTENSION: Primary | ICD-10-CM

## 2023-05-03 DIAGNOSIS — G89.4 CHRONIC PAIN SYNDROME: ICD-10-CM

## 2023-05-03 DIAGNOSIS — F33.9 DEPRESSION, RECURRENT (HCC): ICD-10-CM

## 2023-05-03 NOTE — PATIENT INSTRUCTIONS
Medicare Preventive Visit Patient Instructions  Thank you for completing your Welcome to Medicare Visit or Medicare Annual Wellness Visit today  Your next wellness visit will be due in one year (5/3/2024)  The screening/preventive services that you may require over the next 5-10 years are detailed below  Some tests may not apply to you based off risk factors and/or age  Screening tests ordered at today's visit but not completed yet may show as past due  Also, please note that scanned in results may not display below  Preventive Screenings:  Service Recommendations Previous Testing/Comments   Colorectal Cancer Screening  * Colonoscopy    * Fecal Occult Blood Test (FOBT)/Fecal Immunochemical Test (FIT)  * Fecal DNA/Cologuard Test  * Flexible Sigmoidoscopy Age: 39-70 years old   Colonoscopy: every 10 years (may be performed more frequently if at higher risk)  OR  FOBT/FIT: every 1 year  OR  Cologuard: every 3 years  OR  Sigmoidoscopy: every 5 years  Screening may be recommended earlier than age 39 if at higher risk for colorectal cancer  Also, an individualized decision between you and your healthcare provider will decide whether screening between the ages of 74-80 would be appropriate  Colonoscopy: 10/27/2020  FOBT/FIT: Not on file  Cologuard: Not on file  Sigmoidoscopy: Not on file    Screening Current     Breast Cancer Screening Age: 36 years old  Frequency: every 1-2 years  Not required if history of left and right mastectomy Mammogram: 08/05/2022    Screening Current   Cervical Cancer Screening Between the ages of 21-29, pap smear recommended once every 3 years  Between the ages of 33-67, can perform pap smear with HPV co-testing every 5 years     Recommendations may differ for women with a history of total hysterectomy, cervical cancer, or abnormal pap smears in past  Pap Smear: 04/23/2021    Screening Not Indicated   Hepatitis C Screening Once for adults born between 1945 and 1965  More frequently in patients at high risk for Hepatitis C Hep C Antibody: 04/21/2020    Screening Current   Diabetes Screening 1-2 times per year if you're at risk for diabetes or have pre-diabetes Fasting glucose: 108 mg/dL (3/30/2021)  A1C: 5 7 (10/4/2022)  Screening Current   Cholesterol Screening Once every 5 years if you don't have a lipid disorder  May order more often based on risk factors  Lipid panel: 08/15/2022    Screening Not Indicated  History Lipid Disorder     Other Preventive Screenings Covered by Medicare:  1  Abdominal Aortic Aneurysm (AAA) Screening: covered once if your at risk  You're considered to be at risk if you have a family history of AAA  2  Lung Cancer Screening: covers low dose CT scan once per year if you meet all of the following conditions: (1) Age 50-69; (2) No signs or symptoms of lung cancer; (3) Current smoker or have quit smoking within the last 15 years; (4) You have a tobacco smoking history of at least 20 pack years (packs per day multiplied by number of years you smoked); (5) You get a written order from a healthcare provider  3  Glaucoma Screening: covered annually if you're considered high risk: (1) You have diabetes OR (2) Family history of glaucoma OR (3)  aged 48 and older OR (3)  American aged 72 and older  3  Osteoporosis Screening: covered every 2 years if you meet one of the following conditions: (1) You're estrogen deficient and at risk for osteoporosis based off medical history and other findings; (2) Have a vertebral abnormality; (3) On glucocorticoid therapy for more than 3 months; (4) Have primary hyperparathyroidism; (5) On osteoporosis medications and need to assess response to drug therapy  · Last bone density test (DXA Scan): 06/09/2020   5  HIV Screening: covered annually if you're between the age of 15-65  Also covered annually if you are younger than 13 and older than 72 with risk factors for HIV infection   For pregnant patients, it is covered up to 3 times per pregnancy  Immunizations:  Immunization Recommendations   Influenza Vaccine Annual influenza vaccination during flu season is recommended for all persons aged >= 6 months who do not have contraindications   Pneumococcal Vaccine   * Pneumococcal conjugate vaccine = PCV13 (Prevnar 13), PCV15 (Vaxneuvance), PCV20 (Prevnar 20)  * Pneumococcal polysaccharide vaccine = PPSV23 (Pneumovax) Adults 25-60 years old: 1-3 doses may be recommended based on certain risk factors  Adults 72 years old: 1-2 doses may be recommended based off what pneumonia vaccine you previously received   Hepatitis B Vaccine 3 dose series if at intermediate or high risk (ex: diabetes, end stage renal disease, liver disease)   Tetanus (Td) Vaccine - COST NOT COVERED BY MEDICARE PART B Following completion of primary series, a booster dose should be given every 10 years to maintain immunity against tetanus  Td may also be given as tetanus wound prophylaxis  Tdap Vaccine - COST NOT COVERED BY MEDICARE PART B Recommended at least once for all adults  For pregnant patients, recommended with each pregnancy  Shingles Vaccine (Shingrix) - COST NOT COVERED BY MEDICARE PART B  2 shot series recommended in those aged 48 and above     Health Maintenance Due:      Topic Date Due    Breast Cancer Screening: Mammogram  08/05/2023    Colorectal Cancer Screening  10/27/2023    Hepatitis C Screening  Completed     Immunizations Due:      Topic Date Due    COVID-19 Vaccine (3 - Booster for Elfredia Pyo series) 06/24/2021     Advance Directives   What are advance directives? Advance directives are legal documents that state your wishes and plans for medical care  These plans are made ahead of time in case you lose your ability to make decisions for yourself  Advance directives can apply to any medical decision, such as the treatments you want, and if you want to donate organs  What are the types of advance directives?   There are many types of advance directives, and each state has rules about how to use them  You may choose a combination of any of the following:  · Living will: This is a written record of the treatment you want  You can also choose which treatments you do not want, which to limit, and which to stop at a certain time  This includes surgery, medicine, IV fluid, and tube feedings  · Durable power of  for healthcare [facility]): This is a written record that states who you want to make healthcare choices for you when you are unable to make them for yourself  This person, called a proxy, is usually a family member or a friend  You may choose more than 1 proxy  · Do not resuscitate (DNR) order:  A DNR order is used in case your heart stops beating or you stop breathing  It is a request not to have certain forms of treatment, such as CPR  A DNR order may be included in other types of advance directives  · Medical directive: This covers the care that you want if you are in a coma, near death, or unable to make decisions for yourself  You can list the treatments you want for each condition  Treatment may include pain medicine, surgery, blood transfusions, dialysis, IV or tube feedings, and a ventilator (breathing machine)  · Values history: This document has questions about your views, beliefs, and how you feel and think about life  This information can help others choose the care that you would choose  Why are advance directives important? An advance directive helps you control your care  Although spoken wishes may be used, it is better to have your wishes written down  Spoken wishes can be misunderstood, or not followed  Treatments may be given even if you do not want them  An advance directive may make it easier for your family to make difficult choices about your care  Urinary Incontinence   Urinary incontinence (UI)  is when you lose control of your bladder   UI develops because your bladder cannot store or empty urine properly  The 3 most common types of UI are stress incontinence, urge incontinence, or both  Medicines:   · May be given to help strengthen your bladder control  Report any side effects of medication to your healthcare provider  Do pelvic muscle exercises often:  Your pelvic muscles help you stop urinating  Squeeze these muscles tight for 5 seconds, then relax for 5 seconds  Gradually work up to squeezing for 10 seconds  Do 3 sets of 15 repetitions a day, or as directed  This will help strengthen your pelvic muscles and improve bladder control  Train your bladder:  Go to the bathroom at set times, such as every 2 hours, even if you do not feel the urge to go  You can also try to hold your urine when you feel the urge to go  For example, hold your urine for 5 minutes when you feel the urge to go  As that becomes easier, hold your urine for 10 minutes  Self-care:   · Keep a UI record  Write down how often you leak urine and how much you leak  Make a note of what you were doing when you leaked urine  · Drink liquids as directed  You may need to limit the amount of liquid you drink to help control your urine leakage  Do not drink any liquid right before you go to bed  Limit or do not have drinks that contain caffeine or alcohol  · Prevent constipation  Eat a variety of high-fiber foods  Good examples are high-fiber cereals, beans, vegetables, and whole-grain breads  Walking is the best way to trigger your intestines to have a bowel movement  · Exercise regularly and maintain a healthy weight  Weight loss and exercise will decrease pressure on your bladder and help you control your leakage  · Use a catheter as directed  to help empty your bladder  A catheter is a tiny, plastic tube that is put into your bladder to drain your urine  · Go to behavior therapy as directed  Behavior therapy may be used to help you learn to control your urge to urinate      Weight Management   Why it is important to manage your weight:  Being overweight increases your risk of health conditions such as heart disease, high blood pressure, type 2 diabetes, and certain types of cancer  It can also increase your risk for osteoarthritis, sleep apnea, and other respiratory problems  Aim for a slow, steady weight loss  Even a small amount of weight loss can lower your risk of health problems  How to lose weight safely:  A safe and healthy way to lose weight is to eat fewer calories and get regular exercise  You can lose up about 1 pound a week by decreasing the number of calories you eat by 500 calories each day  Healthy meal plan for weight management:  A healthy meal plan includes a variety of foods, contains fewer calories, and helps you stay healthy  A healthy meal plan includes the following:  · Eat whole-grain foods more often  A healthy meal plan should contain fiber  Fiber is the part of grains, fruits, and vegetables that is not broken down by your body  Whole-grain foods are healthy and provide extra fiber in your diet  Some examples of whole-grain foods are whole-wheat breads and pastas, oatmeal, brown rice, and bulgur  · Eat a variety of vegetables every day  Include dark, leafy greens such as spinach, kale, teri greens, and mustard greens  Eat yellow and orange vegetables such as carrots, sweet potatoes, and winter squash  · Eat a variety of fruits every day  Choose fresh or canned fruit (canned in its own juice or light syrup) instead of juice  Fruit juice has very little or no fiber  · Eat low-fat dairy foods  Drink fat-free (skim) milk or 1% milk  Eat fat-free yogurt and low-fat cottage cheese  Try low-fat cheeses such as mozzarella and other reduced-fat cheeses  · Choose meat and other protein foods that are low in fat  Choose beans or other legumes such as split peas or lentils  Choose fish, skinless poultry (chicken or turkey), or lean cuts of red meat (beef or pork)   Before you cook meat or poultry, cut off any visible fat  · Use less fat and oil  Try baking foods instead of frying them  Add less fat, such as margarine, sour cream, regular salad dressing and mayonnaise to foods  Eat fewer high-fat foods  Some examples of high-fat foods include french fries, doughnuts, ice cream, and cakes  · Eat fewer sweets  Limit foods and drinks that are high in sugar  This includes candy, cookies, regular soda, and sweetened drinks  Exercise:  Exercise at least 30 minutes per day on most days of the week  Some examples of exercise include walking, biking, dancing, and swimming  You can also fit in more physical activity by taking the stairs instead of the elevator or parking farther away from stores  Ask your healthcare provider about the best exercise plan for you  © Copyright [employer] 2018 Information is for End User's use only and may not be sold, redistributed or otherwise used for commercial purposes  All illustrations and images included in CareNotes® are the copyrighted property of [employer]  or [facility] Preventive Visit Patient Instructions  Thank you for completing your Welcome to Medicare Visit or Medicare Annual Wellness Visit today  Your next wellness visit will be due in one year (5/3/2024)  The screening/preventive services that you may require over the next 5-10 years are detailed below  Some tests may not apply to you based off risk factors and/or age  Screening tests ordered at today's visit but not completed yet may show as past due  Also, please note that scanned in results may not display below    Preventive Screenings:  Service Recommendations Previous Testing/Comments   Colorectal Cancer Screening  * Colonoscopy    * Fecal Occult Blood Test (FOBT)/Fecal Immunochemical Test (FIT)  * Fecal DNA/Cologuard Test  * Flexible Sigmoidoscopy Age: 39-70 years old   Colonoscopy: every 10 years (may be performed more frequently if at higher risk)  OR  FOBT/FIT: every 1 year OR  Cologuard: every 3 years  OR  Sigmoidoscopy: every 5 years  Screening may be recommended earlier than age 39 if at higher risk for colorectal cancer  Also, an individualized decision between you and your healthcare provider will decide whether screening between the ages of 74-80 would be appropriate  Colonoscopy: 10/27/2020  FOBT/FIT: Not on file  Cologuard: Not on file  Sigmoidoscopy: Not on file    Screening Current     Breast Cancer Screening Age: 36 years old  Frequency: every 1-2 years  Not required if history of left and right mastectomy Mammogram: 08/05/2022    Screening Current   Cervical Cancer Screening Between the ages of 21-29, pap smear recommended once every 3 years  Between the ages of 33-67, can perform pap smear with HPV co-testing every 5 years  Recommendations may differ for women with a history of total hysterectomy, cervical cancer, or abnormal pap smears in past  Pap Smear: 04/23/2021    Screening Not Indicated   Hepatitis C Screening Once for adults born between 1945 and 1965  More frequently in patients at high risk for Hepatitis C Hep C Antibody: 04/21/2020    Screening Current   Diabetes Screening 1-2 times per year if you're at risk for diabetes or have pre-diabetes Fasting glucose: 108 mg/dL (3/30/2021)  A1C: 5 7 (10/4/2022)  Screening Current   Cholesterol Screening Once every 5 years if you don't have a lipid disorder  May order more often based on risk factors  Lipid panel: 08/15/2022    Screening Not Indicated  History Lipid Disorder     Other Preventive Screenings Covered by Medicare:  6  Abdominal Aortic Aneurysm (AAA) Screening: covered once if your at risk  You're considered to be at risk if you have a family history of AAA    7  Lung Cancer Screening: covers low dose CT scan once per year if you meet all of the following conditions: (1) Age 50-69; (2) No signs or symptoms of lung cancer; (3) Current smoker or have quit smoking within the last 15 years; (4) You have a tobacco smoking history of at least 20 pack years (packs per day multiplied by number of years you smoked); (5) You get a written order from a healthcare provider  8  Glaucoma Screening: covered annually if you're considered high risk: (1) You have diabetes OR (2) Family history of glaucoma OR (3)  aged 48 and older OR (3)  American aged 72 and older  5  Osteoporosis Screening: covered every 2 years if you meet one of the following conditions: (1) You're estrogen deficient and at risk for osteoporosis based off medical history and other findings; (2) Have a vertebral abnormality; (3) On glucocorticoid therapy for more than 3 months; (4) Have primary hyperparathyroidism; (5) On osteoporosis medications and need to assess response to drug therapy  · Last bone density test (DXA Scan): 06/09/2020  10  HIV Screening: covered annually if you're between the age of 12-76  Also covered annually if you are younger than 13 and older than 72 with risk factors for HIV infection  For pregnant patients, it is covered up to 3 times per pregnancy      Immunizations:  Immunization Recommendations   Influenza Vaccine Annual influenza vaccination during flu season is recommended for all persons aged >= 6 months who do not have contraindications   Pneumococcal Vaccine   * Pneumococcal conjugate vaccine = PCV13 (Prevnar 13), PCV15 (Vaxneuvance), PCV20 (Prevnar 20)  * Pneumococcal polysaccharide vaccine = PPSV23 (Pneumovax) Adults 25-60 years old: 1-3 doses may be recommended based on certain risk factors  Adults 72 years old: 1-2 doses may be recommended based off what pneumonia vaccine you previously received   Hepatitis B Vaccine 3 dose series if at intermediate or high risk (ex: diabetes, end stage renal disease, liver disease)   Tetanus (Td) Vaccine - COST NOT COVERED BY MEDICARE PART B Following completion of primary series, a booster dose should be given every 10 years to maintain immunity against tetanus  Td may also be given as tetanus wound prophylaxis  Tdap Vaccine - COST NOT COVERED BY MEDICARE PART B Recommended at least once for all adults  For pregnant patients, recommended with each pregnancy  Shingles Vaccine (Shingrix) - COST NOT COVERED BY MEDICARE PART B  2 shot series recommended in those aged 48 and above     Health Maintenance Due:      Topic Date Due    Breast Cancer Screening: Mammogram  08/05/2023    Colorectal Cancer Screening  10/27/2023    Hepatitis C Screening  Completed     Immunizations Due:      Topic Date Due    COVID-19 Vaccine (3 - Booster for Sharad Chroman series) 06/24/2021     Advance Directives   What are advance directives? Advance directives are legal documents that state your wishes and plans for medical care  These plans are made ahead of time in case you lose your ability to make decisions for yourself  Advance directives can apply to any medical decision, such as the treatments you want, and if you want to donate organs  What are the types of advance directives? There are many types of advance directives, and each state has rules about how to use them  You may choose a combination of any of the following:  · Living will: This is a written record of the treatment you want  You can also choose which treatments you do not want, which to limit, and which to stop at a certain time  This includes surgery, medicine, IV fluid, and tube feedings  · Durable power of  for healthcare [facility]): This is a written record that states who you want to make healthcare choices for you when you are unable to make them for yourself  This person, called a proxy, is usually a family member or a friend  You may choose more than 1 proxy  · Do not resuscitate (DNR) order:  A DNR order is used in case your heart stops beating or you stop breathing  It is a request not to have certain forms of treatment, such as CPR   A DNR order may be included in other types of advance directives  · Medical directive: This covers the care that you want if you are in a coma, near death, or unable to make decisions for yourself  You can list the treatments you want for each condition  Treatment may include pain medicine, surgery, blood transfusions, dialysis, IV or tube feedings, and a ventilator (breathing machine)  · Values history: This document has questions about your views, beliefs, and how you feel and think about life  This information can help others choose the care that you would choose  Why are advance directives important? An advance directive helps you control your care  Although spoken wishes may be used, it is better to have your wishes written down  Spoken wishes can be misunderstood, or not followed  Treatments may be given even if you do not want them  An advance directive may make it easier for your family to make difficult choices about your care  Urinary Incontinence   Urinary incontinence (UI)  is when you lose control of your bladder  UI develops because your bladder cannot store or empty urine properly  The 3 most common types of UI are stress incontinence, urge incontinence, or both  Medicines:   · May be given to help strengthen your bladder control  Report any side effects of medication to your healthcare provider  Do pelvic muscle exercises often:  Your pelvic muscles help you stop urinating  Squeeze these muscles tight for 5 seconds, then relax for 5 seconds  Gradually work up to squeezing for 10 seconds  Do 3 sets of 15 repetitions a day, or as directed  This will help strengthen your pelvic muscles and improve bladder control  Train your bladder:  Go to the bathroom at set times, such as every 2 hours, even if you do not feel the urge to go  You can also try to hold your urine when you feel the urge to go  For example, hold your urine for 5 minutes when you feel the urge to go  As that becomes easier, hold your urine for 10 minutes     Self-care:   · Keep a UI record  Write down how often you leak urine and how much you leak  Make a note of what you were doing when you leaked urine  · Drink liquids as directed  You may need to limit the amount of liquid you drink to help control your urine leakage  Do not drink any liquid right before you go to bed  Limit or do not have drinks that contain caffeine or alcohol  · Prevent constipation  Eat a variety of high-fiber foods  Good examples are high-fiber cereals, beans, vegetables, and whole-grain breads  Walking is the best way to trigger your intestines to have a bowel movement  · Exercise regularly and maintain a healthy weight  Weight loss and exercise will decrease pressure on your bladder and help you control your leakage  · Use a catheter as directed  to help empty your bladder  A catheter is a tiny, plastic tube that is put into your bladder to drain your urine  · Go to behavior therapy as directed  Behavior therapy may be used to help you learn to control your urge to urinate  Weight Management   Why it is important to manage your weight:  Being overweight increases your risk of health conditions such as heart disease, high blood pressure, type 2 diabetes, and certain types of cancer  It can also increase your risk for osteoarthritis, sleep apnea, and other respiratory problems  Aim for a slow, steady weight loss  Even a small amount of weight loss can lower your risk of health problems  How to lose weight safely:  A safe and healthy way to lose weight is to eat fewer calories and get regular exercise  You can lose up about 1 pound a week by decreasing the number of calories you eat by 500 calories each day  Healthy meal plan for weight management:  A healthy meal plan includes a variety of foods, contains fewer calories, and helps you stay healthy  A healthy meal plan includes the following:  · Eat whole-grain foods more often  A healthy meal plan should contain fiber   Fiber is the part of grains, fruits, and vegetables that is not broken down by your body  Whole-grain foods are healthy and provide extra fiber in your diet  Some examples of whole-grain foods are whole-wheat breads and pastas, oatmeal, brown rice, and bulgur  · Eat a variety of vegetables every day  Include dark, leafy greens such as spinach, kale, teri greens, and mustard greens  Eat yellow and orange vegetables such as carrots, sweet potatoes, and winter squash  · Eat a variety of fruits every day  Choose fresh or canned fruit (canned in its own juice or light syrup) instead of juice  Fruit juice has very little or no fiber  · Eat low-fat dairy foods  Drink fat-free (skim) milk or 1% milk  Eat fat-free yogurt and low-fat cottage cheese  Try low-fat cheeses such as mozzarella and other reduced-fat cheeses  · Choose meat and other protein foods that are low in fat  Choose beans or other legumes such as split peas or lentils  Choose fish, skinless poultry (chicken or turkey), or lean cuts of red meat (beef or pork)  Before you cook meat or poultry, cut off any visible fat  · Use less fat and oil  Try baking foods instead of frying them  Add less fat, such as margarine, sour cream, regular salad dressing and mayonnaise to foods  Eat fewer high-fat foods  Some examples of high-fat foods include french fries, doughnuts, ice cream, and cakes  · Eat fewer sweets  Limit foods and drinks that are high in sugar  This includes candy, cookies, regular soda, and sweetened drinks  Exercise:  Exercise at least 30 minutes per day on most days of the week  Some examples of exercise include walking, biking, dancing, and swimming  You can also fit in more physical activity by taking the stairs instead of the elevator or parking farther away from stores  Ask your healthcare provider about the best exercise plan for you        © Copyright [employer] 2018 Information is for End User's use only and may not be sold, redistributed or otherwise used for commercial purposes   All illustrations and images included in CareNotes® are the copyrighted property of A D A M , Inc  or [facility] [place] Martín Pierre

## 2023-05-03 NOTE — PROGRESS NOTES
Assessment/Plan:  Problem List Items Addressed This Visit        Cardiovascular and Mediastinum    Hypertension - Primary    Relevant Orders    Comprehensive metabolic panel       Musculoskeletal and Integument    Primary osteoarthritis involving multiple joints       Genitourinary    Stage 3 chronic kidney disease, unspecified whether stage 3a or 3b CKD (HCC)       Other    Prediabetes    Mixed hyperlipidemia    Relevant Orders    Comprehensive metabolic panel    LDL cholesterol, direct    Triglycerides    Depression, recurrent (HCC)    Chronic pain syndrome   Other Visit Diagnoses     Medicare annual wellness visit, subsequent               Diagnoses and all orders for this visit:    Primary hypertension  -     Comprehensive metabolic panel; Future    Medicare annual wellness visit, subsequent    Primary osteoarthritis involving multiple joints    Stage 3 chronic kidney disease, unspecified whether stage 3a or 3b CKD (HCC)    Chronic pain syndrome    Depression, recurrent ([facility] [address] )    Mixed hyperlipidemia  -     Comprehensive metabolic panel; Future  -     LDL cholesterol, direct; Future  -     Triglycerides; Future    Prediabetes      No problem-specific Assessment & Plan notes found for this encounter  A/P: Doing ok and will obtain labs  Appreciate specialist input and appears the pt most likely has familial/essential tremor  Will see how she does off meds  Suspect wound isn't healing due to being too moist and repeated injury from the bandages pulling on the skin  No s/s of infection  Keep open to the air and stop the topicals    Will continue current treatment and RTC one week for f/u wound and labs  Subjective:      Patient ID: Mariel Sneed is a 75 y o  female  WF RTC for f/u HTN, HLD, etc  Doing ok, but reports non healing wounds after trauma on the left UE  Putting on topical abx and applying bandages  W/u for tremor was negative with negative KG and pt being weaned off sinemet     Remains active w/o difficulty and no falls  Chronic pain is manageable  MDD/MAGGIE are controlled  Due for labs  The following portions of the patient's history were reviewed and updated as appropriate:   She has a past medical history of Arthritis, Cataract, Depression, Fluid retention, Headache, acute, Heart murmur, Hyperlipidemia, Hypertension, Memory loss, Pedal edema, Pneumonia, PONV (postoperative nausea and vomiting), Rotator cuff tear, left, Sleep apnea (2021), Wears glasses, and Wears partial dentures  ,  does not have any pertinent problems on file  ,   has a past surgical history that includes Ankle surgery (Right); Tonsillectomy; Tubal ligation; Hysterectomy; Ovarian cyst removal; Carpal tunnel release (Bilateral); Hand surgery (Right); Abdominal adhesion surgery; Knee arthroscopy (Left); Appendectomy; Colonoscopy; Hemorroidectomy; Repair rectocele; pr surgical arthroscopy shoulder w/rotator cuff rpr (Left, 2/19/2018); Carpal tunnel release (Left); Carpal tunnel release (Right); Ileostomy; Anal sphincteroplasty; Knee arthrocentesis; Tonsillectomy and adenoidectomy; Joint replacement; and Replacement total knee (Left, 09/28/2021)  ,  family history includes Alzheimer's disease in her sister and sister; Bone cancer in her family and mother; COPD in her mother; Colon cancer in her family and mother; Depression in her sister; Diabetes in her sister; Emphysema in her mother; ROBERT disease in her brother; Heart failure in her sister; Hypertension in her brother, family, mother, sister, and sister; Lead poisoning in her father; No Known Problems in her daughter, maternal aunt, maternal aunt, maternal aunt, maternal aunt, maternal aunt, maternal grandmother, paternal aunt, and paternal grandmother; Skin cancer in her mother; Throat cancer in her brother; Thyroid disease in her sister  ,   reports that she has never smoked  She has never used smokeless tobacco  She reports current alcohol use   She reports that she does not use drugs  ,  is allergic to keflex [cephalexin], contrast [iodinated contrast media], morphine and related, penicillins, benadryl [diphenhydramine], ciprofloxacin, clindamycin, and erythromycin     Current Outpatient Medications   Medication Sig Dispense Refill    acetaminophen (TYLENOL) 500 mg tablet Take 500-1,000 mg by mouth every 6 (six) hours as needed for mild pain        albuterol (ProAir HFA) 90 mcg/act inhaler Inhale 2 puffs every 4 (four) hours as needed for wheezing 18 g 0    Calcium Carbonate (CALCIUM 600 PO) Take by mouth daily        carbidopa-levodopa (Sinemet)  mg per tablet Take 1/2 tab TID for 1 week then 1 tab TID for 1 week then 1 5 tabs TID for 1 week then 2 tabs TID (Patient taking differently: 2 tablets 3 (three) times a day Take 1/2 tab TID for 1 week then 1 tab TID for 1 week then 1 5 tabs TID for 1 week then 2 tabs TID) 540 tablet 2    Cholecalciferol (VITAMIN D3 PO) Take 50 mcg by mouth daily        montelukast (SINGULAIR) 10 mg tablet take 1 tablet by mouth at bedtime 90 tablet 3    rosuvastatin (CRESTOR) 10 MG tablet Take 1 tablet (10 mg total) by mouth daily 90 tablet 1    traZODone (DESYREL) 100 mg tablet take 1 tablet by mouth at bedtime 90 tablet 1     No current facility-administered medications for this visit  Review of Systems   Constitutional: Negative for activity change, chills, diaphoresis, fatigue and fever  HENT: Negative  Eyes: Negative for visual disturbance  Respiratory: Negative for cough, chest tightness, shortness of breath and wheezing  Cardiovascular: Negative for chest pain, palpitations and leg swelling  Gastrointestinal: Negative for abdominal pain, constipation, diarrhea, nausea and vomiting  Endocrine: Negative for cold intolerance and heat intolerance  Genitourinary: Negative for difficulty urinating, dysuria and frequency  Musculoskeletal: Negative for arthralgias, gait problem and myalgias  Skin: Positive for wound  Neurological: Positive for tremors  Negative for dizziness, seizures, syncope, weakness, light-headedness and headaches  Psychiatric/Behavioral: Negative for confusion, dysphoric mood and sleep disturbance  The patient is not nervous/anxious  PHQ-2/9 Depression Screening    Little interest or pleasure in doing things: 0 - not at all  Feeling down, depressed, or hopeless: 0 - not at all  Trouble falling or staying asleep, or sleeping too much: 3 - nearly every day  Feeling tired or having little energy: 0 - not at all  Poor appetite or overeatin - not at all  Feeling bad about yourself - or that you are a failure or have let yourself or your family down: 0 - not at all  Trouble concentrating on things, such as reading the newspaper or watching television: 0 - not at all  Moving or speaking so slowly that other people could have noticed  Or the opposite - being so fidgety or restless that you have been moving around a lot more than usual: 0 - not at all  Thoughts that you would be better off dead, or of hurting yourself in some way: 0 - not at all  PHQ-9 Score: 3   PHQ-9 Interpretation: No or Minimal depression         Objective:  Vitals:    23 1107   BP: 134/78   Pulse: 78   Temp: 98 7 °F (37 1 °C)   SpO2: 97%   Weight: 87 8 kg (193 lb 8 oz)   Height: 5' (1 524 m)     Body mass index is 37 79 kg/m²  Physical Exam    Gen: WF appearing WN, WD, and in NAD  Skin: Left forearm with several superficial abrasion  No d/c, No erythema  HEENT: PERRL, EOMI  Neg icterus  MM are moist    Neck: Soft/Supple w/o any masses, TM, or bruits  Lungs: CTA w/o and w/r/r  WOB wnl  Heart: RRR w/o m/g/r  Abd soft/NT/ND with BS wnl  No masses or HSM  EXT: No c/c/e  MS: wnl  Neuro: AAAOx3 with CNII-XII intact  No deficits  Hand tremors noted

## 2023-05-03 NOTE — PROGRESS NOTES
Assessment and Plan:     Problem List Items Addressed This Visit        Cardiovascular and Mediastinum    Hypertension - Primary    Relevant Orders    Comprehensive metabolic panel       Musculoskeletal and Integument    Primary osteoarthritis involving multiple joints       Genitourinary    Stage 3 chronic kidney disease, unspecified whether stage 3a or 3b CKD (HCC)       Other    Prediabetes    Mixed hyperlipidemia    Relevant Orders    Comprehensive metabolic panel    LDL cholesterol, direct    Triglycerides    Depression, recurrent (HCC)    Chronic pain syndrome   Other Visit Diagnoses     Medicare annual wellness visit, subsequent               Preventive health issues were discussed with patient, and age appropriate screening tests were ordered as noted in patient's After Visit Summary  Personalized health advice and appropriate referrals for health education or preventive services given if needed, as noted in patient's After Visit Summary       History of Present Illness:     Patient presents for a Medicare Wellness Visit    HPI   Patient Care Team:  Boubacar Horowitz DO as PCP - General (Internal Medicine)     Review of Systems:     Review of Systems     Problem List:     Patient Active Problem List   Diagnosis    Complete rotator cuff tear of left shoulder    Depression, recurrent ([address] )    Hypertension    Mixed hyperlipidemia    Primary osteoarthritis involving multiple joints    DDD (degenerative disc disease), cervical    Post-menopausal    Chronic pain syndrome    Fatty liver    Benign colon polyp    Diverticulosis    Localized edema    Gait abnormality    Morbid obesity ([address] )    Lumbosacral spondylosis without myelopathy    REKHA (obstructive sleep apnea)    Mixed action and resting tremor    Constipation    Stage 3 chronic kidney disease, unspecified whether stage 3a or 3b CKD ([address] )    Prediabetes    Paresthesias      Past Medical and Surgical History:     Past Medical History: Diagnosis Date    Arthritis     Cataract     ashley    Depression     Fluid retention     Headache, acute     Heart murmur     Hyperlipidemia     Hypertension     Memory loss     Pedal edema     Pneumonia     PONV (postoperative nausea and vomiting)     Rotator cuff tear, left     Sleep apnea 2021    Wears glasses     Wears partial dentures     upper     Past Surgical History:   Procedure Laterality Date    ABDOMINAL ADHESION SURGERY      ANAL SPHINCTEROPLASTY      ANKLE SURGERY Right     tendon tear    APPENDECTOMY      CARPAL TUNNEL RELEASE Bilateral     CARPAL TUNNEL RELEASE Left     CARPAL TUNNEL RELEASE Right     COLONOSCOPY      HAND SURGERY Right     ganglion cyst    HEMORROIDECTOMY      HYSTERECTOMY      ILEOSTOMY      JOINT REPLACEMENT      KNEE ARTHROCENTESIS      ganglion Cyst    KNEE ARTHROSCOPY Left     OVARIAN CYST REMOVAL      RI SURGICAL ARTHROSCOPY SHOULDER W/ROTATOR CUFF RPR Left 2/19/2018    Procedure: ARTHROSCOPIC REPAIR ROTATOR CUFF,  SAD, BICEP TENODESIS;  Surgeon: Risa Phoenix, MD;  Location: [facility];  Service: Orthopedics    REPAIR RECTOCELE      REPLACEMENT TOTAL KNEE Left 09/28/2021    TONSILLECTOMY      TONSILLECTOMY AND ADENOIDECTOMY      TUBAL LIGATION        Family History:     Family History   Problem Relation Age of Onset    Hypertension Mother     Colon cancer Mother     Bone cancer Mother    [facility] COPD Mother     Emphysema Mother     Skin cancer Mother     Lead poisoning Father         lead poisoning in lungs     Thyroid disease Sister     Depression Sister     Hypertension Sister     Alzheimer's disease Sister     ROBERT disease Brother     Throat cancer Brother     Hypertension Brother     Colon cancer Family     Bone cancer Family     Hypertension Family     Diabetes Sister     Hypertension Sister     Heart failure Sister     Alzheimer's disease Sister     No Known Problems Daughter     No Known Problems Maternal Grandmother     No Known Problems Paternal Grandmother     No Known Problems Maternal Aunt     No Known Problems Maternal Aunt     No Known Problems Maternal Aunt     No Known Problems Maternal Aunt     No Known Problems Maternal Aunt     No Known Problems Paternal Aunt       Social History:     Social History     Socioeconomic History    Marital status:      Spouse name: None    Number of children: None    Years of education: None    Highest education level: None   Occupational History    Occupation: retired   Tobacco Use    Smoking status: Never    Smokeless tobacco: Never   Vaping Use    Vaping Use: Never used   Substance and Sexual Activity    Alcohol use: Yes     Comment: special events    Drug use: No    Sexual activity: Yes     Partners: Male   Other Topics Concern    None   Social History Narrative    Getting   Two children    Lives with her daughter    On disability  Prior   Social Determinants of Health     Financial Resource Strain: Low Risk     Difficulty of Paying Living Expenses: Not hard at all   Food Insecurity: Not on file   Transportation Needs: No Transportation Needs    Lack of Transportation (Medical): No    Lack of Transportation (Non-Medical):  No   Physical Activity: Not on file   Stress: Not on file   Social Connections: Not on file   Intimate Partner Violence: Not on file   Housing Stability: Not on file      Medications and Allergies:     Current Outpatient Medications   Medication Sig Dispense Refill    acetaminophen (TYLENOL) 500 mg tablet Take 500-1,000 mg by mouth every 6 (six) hours as needed for mild pain        albuterol (ProAir HFA) 90 mcg/act inhaler Inhale 2 puffs every 4 (four) hours as needed for wheezing 18 g 0    Calcium Carbonate (CALCIUM 600 PO) Take by mouth daily        carbidopa-levodopa (Sinemet)  mg per tablet Take 1/2 tab TID for 1 week then 1 tab TID for 1 week then 1 5 tabs TID for 1 week then 2 tabs TID (Patient taking differently: 2 tablets 3 (three) times a day Take 1/2 tab TID for 1 week then 1 tab TID for 1 week then 1 5 tabs TID for 1 week then 2 tabs TID) 540 tablet 2    Cholecalciferol (VITAMIN D3 PO) Take 50 mcg by mouth daily        montelukast (SINGULAIR) 10 mg tablet take 1 tablet by mouth at bedtime 90 tablet 3    rosuvastatin (CRESTOR) 10 MG tablet Take 1 tablet (10 mg total) by mouth daily 90 tablet 1    traZODone (DESYREL) 100 mg tablet take 1 tablet by mouth at bedtime 90 tablet 1     No current facility-administered medications for this visit       Allergies   Allergen Reactions    Keflex [Cephalexin] Other (See Comments)     Mouth sores    Contrast [Iodinated Contrast Media] Hives     Occurred In the patient's 20's got Benedryl     Morphine And Related Hives     IV MORPINE    Penicillins Hives    Benadryl [Diphenhydramine] Itching and Swelling     IV benadryl in hospital    Ciprofloxacin Hives    Clindamycin Other (See Comments)     Pt unsure    Erythromycin Hives      Immunizations:     Immunization History   Administered Date(s) Administered    COVID-19 MODERNA VACC 0 5 ML IM 04/01/2021, 04/29/2021    INFLUENZA 09/16/2014, 10/19/2018, 10/04/2022    Influenza Injectable, MDCK, Preservative Free, Quadrivalent, 0 5 mL 10/19/2018    Influenza Split High Dose Preservative Free IM 10/18/2019    Influenza, high dose seasonal 0 7 mL 08/25/2020, 11/01/2021, 10/04/2022    Pneumococcal Conjugate 13-Valent 08/21/2020    Pneumococcal Polysaccharide PPV23 01/07/2022    Tdap 07/18/2014    Tuberculin Skin Test 10/07/2021      Health Maintenance:         Topic Date Due    Breast Cancer Screening: Mammogram  08/05/2023    Colorectal Cancer Screening  10/27/2023    Hepatitis C Screening  Completed         Topic Date Due    COVID-19 Vaccine (3 - Booster for [employer] series) 06/24/2021      Medicare Screening Tests and Risk Assessments:     Pierce Benítez is here for her Subsequent Wellness visit  Last Medicare Wellness visit information reviewed, patient interviewed and updates made to the record today  Health Risk Assessment:   Patient rates overall health as good  Patient feels that their physical health rating is same  Patient is satisfied with their life  Eyesight was rated as same  Hearing was rated as same  Patient feels that their emotional and mental health rating is same  Patients states they are never, rarely angry  Patient states they are never, rarely unusually tired/fatigued  Pain experienced in the last 7 days has been none  Patient states that she has experienced no weight loss or gain in last 6 months  Depression Screening:   PHQ-9 Score: 3      Fall Risk Screening: In the past year, patient has experienced: no history of falling in past year      Urinary Incontinence Screening:   Patient has not leaked urine accidently in the last six months  Home Safety:  Patient does not have trouble with stairs inside or outside of their home  Patient has working smoke alarms and has working carbon monoxide detector  Home safety hazards include: none  Nutrition:   Current diet is Regular  Medications:   Patient is not currently taking any over-the-counter supplements  Patient is able to manage medications  Activities of Daily Living (ADLs)/Instrumental Activities of Daily Living (IADLs):   Walk and transfer into and out of bed and chair?: Yes  Dress and groom yourself?: Yes    Bathe or shower yourself?: Yes    Feed yourself?  Yes  Do your laundry/housekeeping?: Yes  Manage your money, pay your bills and track your expenses?: Yes  Make your own meals?: Yes    Do your own shopping?: Yes    Previous Hospitalizations:   Any hospitalizations or ED visits within the last 12 months?: No      Advance Care Planning:   Living will: No    Durable POA for healthcare: No    Advanced directive: No    Advanced directive counseling given: Yes    Five wishes given: Yes    Patient declined ACP directive: No    End of Life Decisions reviewed with patient: Yes    Provider agrees with end of life decisions: Yes      Comments: Will discuss further once she reviews the info given today  Cognitive Screening:   Provider or family/friend/caregiver concerned regarding cognition?: No    PREVENTIVE SCREENINGS      Cardiovascular Screening:    General: Screening Not Indicated and History Lipid Disorder    Due for: Lipid Panel      Diabetes Screening:     General: Screening Current    Due for: Blood Glucose      Colorectal Cancer Screening:     General: Screening Current      Breast Cancer Screening:     General: Screening Current      Cervical Cancer Screening:    General: Screening Not Indicated      Osteoporosis Screening:    General: Screening Current      Abdominal Aortic Aneurysm (AAA) Screening:        General: Screening Not Indicated      Lung Cancer Screening:     General: Screening Not Indicated      Hepatitis C Screening:    General: Screening Current    Screening, Brief Intervention, and Referral to Treatment (SBIRT)    Screening  Typical number of drinks in a day: 0  Typical number of drinks in a week: 0  Interpretation: Low risk drinking behavior  Single Item Drug Screening:  How often have you used an illegal drug (including marijuana) or a prescription medication for non-medical reasons in the past year? never    Single Item Drug Screen Score: 0  Interpretation: Negative screen for possible drug use disorder    Other Counseling Topics:   Car/seat belt/driving safety, sunscreen and calcium and vitamin D intake and regular weightbearing exercise  No results found  Physical Exam:     /78   Pulse 78   Temp 98 7 °F (37 1 °C)   Ht 5' (1 524 m)   Wt 87 8 kg (193 lb 8 oz)   SpO2 97%   BMI 37 79 kg/m²     Physical Exam   A/P: Doing well and no falls  Denies depression and feels safe at home  Diverse diet  No problems operating a MV and uses seat belts  No living will or POA  Information give for pt to review  No DME or referrals needed today  RTC in one year for medicare wellness       Agustin Webber, DO

## 2023-05-09 DIAGNOSIS — R25.9 MIXED ACTION AND RESTING TREMOR: Primary | ICD-10-CM

## 2023-05-09 RX ORDER — PROPRANOLOL HYDROCHLORIDE 10 MG/1
TABLET ORAL
Qty: 120 TABLET | Refills: 5 | Status: SHIPPED | OUTPATIENT
Start: 2023-05-09

## 2023-05-12 ENCOUNTER — OFFICE VISIT (OUTPATIENT)
Dept: INTERNAL MEDICINE CLINIC | Facility: CLINIC | Age: 68
End: 2023-05-12

## 2023-05-12 VITALS
HEIGHT: 60 IN | HEART RATE: 72 BPM | WEIGHT: 194.13 LBS | DIASTOLIC BLOOD PRESSURE: 60 MMHG | SYSTOLIC BLOOD PRESSURE: 128 MMHG | TEMPERATURE: 99.4 F | OXYGEN SATURATION: 98 % | BODY MASS INDEX: 38.11 KG/M2

## 2023-05-12 DIAGNOSIS — S51.802D: Primary | ICD-10-CM

## 2023-05-12 DIAGNOSIS — S61.502D: Primary | ICD-10-CM

## 2023-05-12 DIAGNOSIS — S51.002D: Primary | ICD-10-CM

## 2023-05-12 DIAGNOSIS — Z51.89 ENCOUNTER FOR POST-TRAUMATIC WOUND CHECK: ICD-10-CM

## 2023-05-12 DIAGNOSIS — Z12.31 ENCOUNTER FOR SCREENING MAMMOGRAM FOR MALIGNANT NEOPLASM OF BREAST: ICD-10-CM

## 2023-05-12 NOTE — PROGRESS NOTES
Assessment/Plan:  Problem List Items Addressed This Visit    None  Visit Diagnoses     Encounter for post-traumatic wound check    -  Primary    Encounter for screening mammogram for malignant neoplasm of breast        Relevant Orders    Mammo screening bilateral w 3d & cad           Diagnoses and all orders for this visit:    Encounter for post-traumatic wound check    Encounter for screening mammogram for malignant neoplasm of breast  -     Mammo screening bilateral w 3d & cad; Future      No problem-specific Assessment & Plan notes found for this encounter  A/P: Doing a lot better and wounds almost healed  Continue to just keep clean  Recommend she get her labs done  RTC four months for routine  Subjective:      Patient ID: Berline Scheuermann is a 75 y o  female  WF RTC for f/u two wounds left forearm that weren't healing and labs  Pt has yet to get her labs done  Wound felt to be not healing due to continued topical use and keeping the wound too moist  Stopped the topicals and reports wounds are almost healed  No new issues  The following portions of the patient's history were reviewed and updated as appropriate:   She has a past medical history of Arthritis, Cataract, Depression, Fluid retention, Headache, acute, Heart murmur, Hyperlipidemia, Hypertension, Memory loss, Pedal edema, Pneumonia, PONV (postoperative nausea and vomiting), Rotator cuff tear, left, Sleep apnea (2021), Wears glasses, and Wears partial dentures  ,  does not have any pertinent problems on file  ,   has a past surgical history that includes Ankle surgery (Right); Tonsillectomy; Tubal ligation; Hysterectomy; Ovarian cyst removal; Carpal tunnel release (Bilateral); Hand surgery (Right); Abdominal adhesion surgery; Knee arthroscopy (Left); Appendectomy; Colonoscopy; Hemorroidectomy; Repair rectocele; pr surgical arthroscopy shoulder w/rotator cuff rpr (Left, 2/19/2018); Carpal tunnel release (Left);  Carpal tunnel release (Right); Ileostomy; Anal sphincteroplasty; Knee arthrocentesis; Tonsillectomy and adenoidectomy; Joint replacement; and Replacement total knee (Left, 09/28/2021)  ,  family history includes Alzheimer's disease in her sister and sister; Bone cancer in her family and mother; COPD in her mother; Colon cancer in her family and mother; Depression in her sister; Diabetes in her sister; Emphysema in her mother; ROBERT disease in her brother; Heart failure in her sister; Hypertension in her brother, family, mother, sister, and sister; Lead poisoning in her father; No Known Problems in her daughter, maternal aunt, maternal aunt, maternal aunt, maternal aunt, maternal aunt, maternal grandmother, paternal aunt, and paternal grandmother; Skin cancer in her mother; Throat cancer in her brother; Thyroid disease in her sister  ,   reports that she has never smoked  She has never used smokeless tobacco  She reports current alcohol use  She reports that she does not use drugs  ,  is allergic to keflex [cephalexin], contrast [iodinated contrast media], morphine and related, penicillins, benadryl [diphenhydramine], ciprofloxacin, clindamycin, and erythromycin     Current Outpatient Medications   Medication Sig Dispense Refill   • acetaminophen (TYLENOL) 500 mg tablet Take 500-1,000 mg by mouth every 6 (six) hours as needed for mild pain       • albuterol (ProAir HFA) 90 mcg/act inhaler Inhale 2 puffs every 4 (four) hours as needed for wheezing 18 g 0   • Calcium Carbonate (CALCIUM 600 PO) Take by mouth daily       • carbidopa-levodopa (Sinemet)  mg per tablet Take 1/2 tab TID for 1 week then 1 tab TID for 1 week then 1 5 tabs TID for 1 week then 2 tabs TID (Patient taking differently: 2 tablets 3 (three) times a day Take 1/2 tab TID for 1 week then 1 tab TID for 1 week then 1 5 tabs TID for 1 week then 2 tabs TID) 540 tablet 2   • Cholecalciferol (VITAMIN D3 PO) Take 50 mcg by mouth daily       • montelukast (SINGULAIR) 10 mg tablet take 1 tablet by mouth at bedtime 90 tablet 3   • propranolol (INDERAL) 10 mg tablet Take 1 tab Bid for 1 week, if no improvement then can increase to 2 tabs  tablet 5   • rosuvastatin (CRESTOR) 10 MG tablet Take 1 tablet (10 mg total) by mouth daily 90 tablet 1   • traZODone (DESYREL) 100 mg tablet take 1 tablet by mouth at bedtime 90 tablet 1     No current facility-administered medications for this visit  Review of Systems   Constitutional: Negative for activity change, chills, diaphoresis, fatigue and fever  Respiratory: Negative for cough, chest tightness, shortness of breath and wheezing  Cardiovascular: Negative for chest pain, palpitations and leg swelling  Gastrointestinal: Negative for abdominal pain, constipation, diarrhea, nausea and vomiting  Genitourinary: Negative for difficulty urinating, dysuria and frequency  Musculoskeletal: Negative for arthralgias, gait problem and myalgias  Skin: Positive for wound  Neurological: Negative for light-headedness and headaches  Psychiatric/Behavioral: Negative for confusion  The patient is not nervous/anxious  PHQ-2/9 Depression Screening          Objective:  Vitals:    05/12/23 1411   BP: 128/60   Pulse: 72   Temp: 99 4 °F (37 4 °C)   SpO2: 98%   Weight: 88 1 kg (194 lb 2 oz)   Height: 5' (1 524 m)     Body mass index is 37 91 kg/m²  Physical Exam  Vitals and nursing note reviewed  Constitutional:       General: She is not in acute distress  Appearance: Normal appearance  She is not ill-appearing  HENT:      Head: Normocephalic and atraumatic  Mouth/Throat:      Mouth: Mucous membranes are moist    Eyes:      Extraocular Movements: Extraocular movements intact  Conjunctiva/sclera: Conjunctivae normal       Pupils: Pupils are equal, round, and reactive to light  Abdominal:      General: There is no distension  Skin:     Comments: Left forearm wounds smaller w/o erythema, fluctuation, induration or erythema  Good scab formation  Neurological:      General: No focal deficit present  Mental Status: She is alert and oriented to person, place, and time  Mental status is at baseline  Psychiatric:         Mood and Affect: Mood normal          Behavior: Behavior normal          Thought Content:  Thought content normal          Judgment: Judgment normal

## 2023-05-28 ENCOUNTER — HOSPITAL ENCOUNTER (EMERGENCY)
Facility: HOSPITAL | Age: 68
Discharge: HOME | End: 2023-05-28
Attending: EMERGENCY MEDICINE | Admitting: EMERGENCY MEDICINE

## 2023-05-28 VITALS
WEIGHT: 194 LBS | RESPIRATION RATE: 16 BRPM | BODY MASS INDEX: 37.89 KG/M2 | DIASTOLIC BLOOD PRESSURE: 92 MMHG | SYSTOLIC BLOOD PRESSURE: 148 MMHG | HEART RATE: 85 BPM | OXYGEN SATURATION: 98 %

## 2023-05-28 DIAGNOSIS — N39.0 UTI (URINARY TRACT INFECTION): ICD-10-CM

## 2023-05-28 DIAGNOSIS — B37.89 CANDIDA RASH OF GROIN: Primary | ICD-10-CM

## 2023-05-28 DIAGNOSIS — L23.7 POISON IVY: ICD-10-CM

## 2023-05-28 LAB
BACTERIA UR QL AUTO: ABNORMAL /HPF
BILIRUB UR QL STRIP: NEGATIVE
CLARITY UR: ABNORMAL
COLOR UR: YELLOW
GLUCOSE UR STRIP-MCNC: NEGATIVE MG/DL
HGB UR QL STRIP.AUTO: NEGATIVE
KETONES UR STRIP-MCNC: ABNORMAL MG/DL
LEUKOCYTE ESTERASE UR QL STRIP: ABNORMAL
NITRITE UR QL STRIP: POSITIVE
NON-SQ EPI CELLS URNS QL MICRO: ABNORMAL /HPF
PH UR STRIP.AUTO: 6 [PH]
PROT UR STRIP-MCNC: NEGATIVE MG/DL
RBC #/AREA URNS AUTO: ABNORMAL /HPF
SP GR UR STRIP.AUTO: >=1.03
UROBILINOGEN UR QL STRIP.AUTO: 0.2 E.U./DL
WBC #/AREA URNS AUTO: ABNORMAL /HPF

## 2023-05-28 RX ORDER — NYSTATIN 100000 [USP'U]/G
POWDER TOPICAL 2 TIMES DAILY
Status: DISCONTINUED | OUTPATIENT
Start: 2023-05-28 | End: 2023-05-29 | Stop reason: HOSPADM

## 2023-05-28 RX ORDER — NYSTATIN 100000 [USP'U]/G
POWDER TOPICAL 3 TIMES DAILY
Qty: 15 G | Refills: 0 | Status: SHIPPED | OUTPATIENT
Start: 2023-05-28 | End: 2023-06-07 | Stop reason: SDUPTHER

## 2023-05-28 RX ORDER — FLUCONAZOLE 200 MG/1
200 TABLET ORAL DAILY
Qty: 1 TABLET | Refills: 0 | Status: SHIPPED | OUTPATIENT
Start: 2023-05-28 | End: 2023-05-29

## 2023-05-28 RX ORDER — PREDNISONE 20 MG/1
TABLET ORAL
Qty: 37 TABLET | Refills: 0 | Status: SHIPPED | OUTPATIENT
Start: 2023-05-28 | End: 2023-06-16

## 2023-05-28 RX ORDER — SULFAMETHOXAZOLE AND TRIMETHOPRIM 800; 160 MG/1; MG/1
1 TABLET ORAL 2 TIMES DAILY
Qty: 20 TABLET | Refills: 0 | Status: SHIPPED | OUTPATIENT
Start: 2023-05-28 | End: 2023-06-08

## 2023-05-28 RX ORDER — PREDNISONE 20 MG/1
40 TABLET ORAL ONCE
Status: COMPLETED | OUTPATIENT
Start: 2023-05-28 | End: 2023-05-28

## 2023-05-28 RX ORDER — SULFAMETHOXAZOLE AND TRIMETHOPRIM 800; 160 MG/1; MG/1
1 TABLET ORAL ONCE
Status: COMPLETED | OUTPATIENT
Start: 2023-05-28 | End: 2023-05-28

## 2023-05-28 RX ORDER — FLUCONAZOLE 100 MG/1
200 TABLET ORAL ONCE
Status: COMPLETED | OUTPATIENT
Start: 2023-05-28 | End: 2023-05-28

## 2023-05-28 RX ADMIN — NYSTATIN 1 APPLICATION.: 100000 POWDER TOPICAL at 22:52

## 2023-05-28 RX ADMIN — PREDNISONE 40 MG: 20 TABLET ORAL at 22:51

## 2023-05-28 RX ADMIN — SULFAMETHOXAZOLE AND TRIMETHOPRIM 1 TABLET: 800; 160 TABLET ORAL at 23:13

## 2023-05-28 RX ADMIN — FLUCONAZOLE 200 MG: 100 TABLET ORAL at 22:51

## 2023-05-29 NOTE — ED PROVIDER NOTES
"History  Chief Complaint   Patient presents with   • Rash      Patient reports rash , she reports having sex with a man that had poison Ivy,  report 8/10 pain , denies taking  anything to help, or putting any cream on      54-year-old female presenting to the ED for a rash in her vaginal and rectal area  States that the man she is seeing was just recently diagnosed with poison ivy and after having \"relations\" she noticed the rash which has been progressively worsening  States that it is painful and itchy at the same time  Denies any other symptoms or issues at this time  Denies any anal receptive sex  Does states she has small amounts of white vaginal discharge, denies bleeding or urinary symptoms  Prior to Admission Medications   Prescriptions Last Dose Informant Patient Reported? Taking?    Calcium Carbonate (CALCIUM 600 PO)  Self Yes No   Sig: Take by mouth daily     Cholecalciferol (VITAMIN D3 PO)  Self Yes No   Sig: Take 50 mcg by mouth daily     acetaminophen (TYLENOL) 500 mg tablet  Self Yes No   Sig: Take 500-1,000 mg by mouth every 6 (six) hours as needed for mild pain     albuterol (ProAir HFA) 90 mcg/act inhaler  Self No No   Sig: Inhale 2 puffs every 4 (four) hours as needed for wheezing   carbidopa-levodopa (Sinemet)  mg per tablet  Self No No   Sig: Take 1/2 tab TID for 1 week then 1 tab TID for 1 week then 1 5 tabs TID for 1 week then 2 tabs TID   Patient taking differently: 2 tablets 3 (three) times a day Take 1/2 tab TID for 1 week then 1 tab TID for 1 week then 1 5 tabs TID for 1 week then 2 tabs TID   montelukast (SINGULAIR) 10 mg tablet  Self No No   Sig: take 1 tablet by mouth at bedtime   propranolol (INDERAL) 10 mg tablet   No No   Sig: Take 1 tab Bid for 1 week, if no improvement then can increase to 2 tabs BID   rosuvastatin (CRESTOR) 10 MG tablet  Self No No   Sig: Take 1 tablet (10 mg total) by mouth daily   traZODone (DESYREL) 100 mg tablet  Self No No   Sig: take 1 " tablet by mouth at bedtime      Facility-Administered Medications: None       Past Medical History:   Diagnosis Date   • Arthritis    • Cataract     ashley   • Depression    • Fluid retention    • Headache, acute    • Heart murmur    • Hyperlipidemia    • Hypertension    • Memory loss    • Pedal edema    • Pneumonia    • PONV (postoperative nausea and vomiting)    • Rotator cuff tear, left    • Sleep apnea 2021   • Wears glasses    • Wears partial dentures     upper       Past Surgical History:   Procedure Laterality Date   • ABDOMINAL ADHESION SURGERY     • ANAL SPHINCTEROPLASTY     • ANKLE SURGERY Right     tendon tear   • APPENDECTOMY     • CARPAL TUNNEL RELEASE Bilateral    • CARPAL TUNNEL RELEASE Left    • CARPAL TUNNEL RELEASE Right    • COLONOSCOPY     • HAND SURGERY Right     ganglion cyst   • HEMORROIDECTOMY     • HYSTERECTOMY     • ILEOSTOMY     • JOINT REPLACEMENT     • KNEE ARTHROCENTESIS      ganglion Cyst   • KNEE ARTHROSCOPY Left    • OVARIAN CYST REMOVAL     • MT SURGICAL ARTHROSCOPY SHOULDER W/ROTATOR CUFF RPR Left 2/19/2018    Procedure: ARTHROSCOPIC REPAIR ROTATOR CUFF,  SAD, BICEP TENODESIS;  Surgeon: Frandy Santos MD;  Location: [facility];  Service: Orthopedics   • REPAIR RECTOCELE     • REPLACEMENT TOTAL KNEE Left 09/28/2021   • TONSILLECTOMY     • TONSILLECTOMY AND ADENOIDECTOMY     • TUBAL LIGATION         Family History   Problem Relation Age of Onset   • Hypertension Mother    • Colon cancer Mother    • Bone cancer Mother    • COPD Mother    • Emphysema Mother    • Skin cancer Mother    • Lead poisoning Father         lead poisoning in lungs    • Thyroid disease Sister    • Depression Sister    • Hypertension Sister    • Alzheimer's disease Sister    • ROBERT disease Brother    • Throat cancer Brother    • Hypertension Brother    • Colon cancer Family    • Bone cancer Family    • Hypertension Family    • Diabetes Sister    • Hypertension Sister    • Heart failure Sister    • Alzheimer's disease Sister    • No Known Problems Daughter    • No Known Problems Maternal Grandmother    • No Known Problems Paternal Grandmother    • No Known Problems Maternal Aunt    • No Known Problems Maternal Aunt    • No Known Problems Maternal Aunt    • No Known Problems Maternal Aunt    • No Known Problems Maternal Aunt    • No Known Problems Paternal Aunt      I have reviewed and agree with the history as documented  E-Cigarette/Vaping   • E-Cigarette Use Never User      E-Cigarette/Vaping Substances   • Nicotine No    • THC No    • CBD No    • Flavoring No    • Other No    • Unknown No      Social History     Tobacco Use   • Smoking status: Never   • Smokeless tobacco: Never   Vaping Use   • Vaping Use: Never used   Substance Use Topics   • Alcohol use: Yes     Comment: special events   • Drug use: No       Review of Systems   Genitourinary: Positive for pelvic pain, vaginal discharge and vaginal pain  Negative for hematuria and vaginal bleeding  All other systems reviewed and are negative  Physical Exam  Physical Exam  Vitals and nursing note reviewed  Exam conducted with a chaperone present Christiano Mckeon RN)  Constitutional:       General: She is not in acute distress  Appearance: She is well-developed  She is not diaphoretic  HENT:      Head: Normocephalic and atraumatic  Right Ear: External ear normal       Left Ear: External ear normal       Nose: Nose normal    Eyes:      General: No scleral icterus  Right eye: No discharge  Left eye: No discharge  Conjunctiva/sclera: Conjunctivae normal       Pupils: Pupils are equal, round, and reactive to light  Neck:      Vascular: No JVD  Trachea: No tracheal deviation  Cardiovascular:      Rate and Rhythm: Normal rate and regular rhythm  Heart sounds: Normal heart sounds  No murmur heard  No friction rub  No gallop  Pulmonary:      Effort: Pulmonary effort is normal  No respiratory distress        Breath sounds: Normal breath sounds  No stridor  No wheezing or rales  Abdominal:      General: Bowel sounds are normal  There is no distension  Palpations: Abdomen is soft  There is no mass  Tenderness: There is no abdominal tenderness  There is no guarding  Hernia: There is no hernia in the left inguinal area or right inguinal area  Genitourinary:     Pubic Area: Rash present  No pubic lice  Labia:         Right: Rash and tenderness present  No lesion or injury  Left: Rash and tenderness present  No lesion or injury  Vagina: No signs of injury and foreign body  Vaginal discharge and tenderness present  No erythema, bleeding, lesions or prolapsed vaginal walls  Cervix: Discharge present  No cervical motion tenderness, friability, lesion, erythema, cervical bleeding or eversion  Comments: Small amount of white vaginal discharge otherwise  Musculoskeletal:         General: No tenderness or deformity  Normal range of motion  Cervical back: Normal range of motion and neck supple  Lymphadenopathy:      Lower Body: No right inguinal adenopathy  No left inguinal adenopathy  Skin:     General: Skin is warm and dry  Coloration: Skin is not pale  Findings: No erythema or rash  Neurological:      Mental Status: She is alert and oriented to person, place, and time  Cranial Nerves: No cranial nerve deficit  Sensory: No sensory deficit  Motor: No abnormal muscle tone  Psychiatric:         Behavior: Behavior normal          Thought Content:  Thought content normal          Judgment: Judgment normal          Vital Signs  ED Triage Vitals [05/28/23 2138]   Temp Pulse Respirations Blood Pressure SpO2   -- 85 16 148/92 98 %      Temp src Heart Rate Source Patient Position - Orthostatic VS BP Location FiO2 (%)   -- -- -- -- --      Pain Score       --           Vitals:    05/28/23 2138   BP: 148/92   Pulse: 85         Visual Acuity      ED Medications  Medications   fluconazole (DIFLUCAN) tablet 200 mg (has no administration in time range)   nystatin (MYCOSTATIN) powder (has no administration in time range)   predniSONE tablet 40 mg (has no administration in time range)       Diagnostic Studies  Results Reviewed     Procedure Component Value Units Date/Time    Molecular Vaginal Panel [[id number]] Collected: 05/28/23 2213    Lab Status: In process Specimen: Genital from Vaginal Updated: 05/28/23 2213    Chlamydia/GC amplified DNA by PCR [[id number]]     Lab Status: No result     UA w Reflex to Microscopic w Reflex to Culture [[id number]]     Lab Status: No result Specimen: Urine                  No orders to display              Procedures  Procedures         ED Course  ED Course as of 05/29/23 0031   Sun May 28, 2023   2220 Discussed with patient and she declines blood testing at this time  Is agreeable to GC/Chlamydia and other vaginal testing  2312 Nitrite, UA(!): Positive  Looking at patient's prior urinary cultures mostly pansensitive  Patient however with allergies to fluoroquinolones, cephalosporins, penicillins  Will treat with Bactrim which prior culture shows bacteria was sensitive to  Medical Decision Making  Speculum exam performed with nurse in the room  Small mount of white vaginal discharge noted and sampled with swab  Urinalysis sent for gonorrhea chlamydia as well as urinalysis  Discussed with patient blood work however she states this is the only reason she is here and she does not require blood work at this time  On examination rash appears to be possibly either candidal or poison ivy in nature  No noted vesicles on examination  We will treat patient as both candidal and poison ivy rash  Will recommend prompt follow-up with primary care and OB/GYN for both monitoring of her glucose as she has been prediabetic in the past and for further evaluation of rash by OB/GYN      Patient also noted have a UTI and given Bactrim secondary to allergies and prior urine cultures  Amount and/or Complexity of Data Reviewed  Labs: ordered  Decision-making details documented in ED Course  Risk  Prescription drug management  Disposition  Final diagnoses:   Candida rash of groin   Poison ivy     Time reflects when diagnosis was documented in both MDM as applicable and the Disposition within this note     Time User Action Codes Description Comment    5/28/2023 10:13 PM Sanjuana Tracy Add [B37 89] Candida rash of groin     5/28/2023 10:13 PM Sanjuana Tracy Add [L23 7] Poison ivy       ED Disposition     None      Follow-up Information    None         Patient's Medications   Discharge Prescriptions    FLUCONAZOLE (DIFLUCAN) 200 MG TABLET    Take 1 tablet (200 mg total) by mouth daily for 1 dose Please take this pill on 6/1 in the morning       Start Date: 5/28/2023 End Date: 5/29/2023       Order Dose: 200 mg       Quantity: 1 tablet    Refills: 0    NYSTATIN (MYCOSTATIN) POWDER    Apply topically 3 (three) times a day for 14 days       Start Date: 5/28/2023 End Date: 6/11/2023       Order Dose: --       Quantity: 15 g    Refills: 0    PREDNISONE 20 MG TABLET    Take 3 tablets (60 mg total) by mouth daily for 3 days, THEN 2 5 tablets (50 mg total) daily for 4 days, THEN 2 tablets (40 mg total) daily for 4 days, THEN 1 5 tablets (30 mg total) daily for 4 days, THEN 1 tablet (20 mg total) daily for 4 days  Start Date: 5/28/2023 End Date: 6/16/2023       Order Dose: --       Quantity: 37 tablet    Refills: 0       No discharge procedures on file      PDMP Review       Value Time User    PDMP Reviewed  Yes 10/21/2022 11:47 AM Clementina Aguilera DO          ED Provider  Electronically Signed by           Ban Nichole DO  05/29/23 7238

## 2023-05-29 NOTE — DISCHARGE INSTRUCTIONS
Please follow-up with your OB/GYN for monitoring of your rash  Please also follow-up with your primary care doctor as you have been prediabetic in the past and steroids which you are being placed on for your rash can increase your blood sugars  Please make sure to keep the area dry as that is likely contributing to the rash  If you develop any new or worsening symptoms please immediately return to your nearest emergency department  Please refrain from sexual activity until follow-up with OB/GYN

## 2023-05-30 LAB
C GLABRATA DNA VAG QL NAA+PROBE: NEGATIVE
C KRUSEI DNA VAG QL NAA+PROBE: NEGATIVE
CANDIDA SP 6 PNL VAG NAA+PROBE: POSITIVE
HSV1 IGG SER IA-ACNC: >62.2 INDEX (ref 0–0.9)
HSV2 IGG SER IA-ACNC: <0.91 INDEX (ref 0–0.9)
T VAGINALIS DNA VAG QL NAA+PROBE: NEGATIVE
VAGINOSIS/ITIS DNA PNL VAG PROBE+SIG AMP: NEGATIVE

## 2023-05-31 LAB
BACTERIA UR CULT: ABNORMAL
BACTERIA UR CULT: ABNORMAL
C TRACH DNA SPEC QL NAA+PROBE: NEGATIVE
N GONORRHOEA DNA SPEC QL NAA+PROBE: NEGATIVE

## 2023-06-05 ENCOUNTER — APPOINTMENT (OUTPATIENT)
Dept: RADIOLOGY | Facility: CLINIC | Age: 68
End: 2023-06-05
Payer: MEDICARE

## 2023-06-05 ENCOUNTER — TELEPHONE (OUTPATIENT)
Dept: OBGYN CLINIC | Facility: OTHER | Age: 68
End: 2023-06-05

## 2023-06-05 ENCOUNTER — OFFICE VISIT (OUTPATIENT)
Dept: URGENT CARE | Facility: CLINIC | Age: 68
End: 2023-06-05
Payer: MEDICARE

## 2023-06-05 VITALS
OXYGEN SATURATION: 97 % | SYSTOLIC BLOOD PRESSURE: 130 MMHG | HEART RATE: 76 BPM | DIASTOLIC BLOOD PRESSURE: 62 MMHG | TEMPERATURE: 98.2 F | RESPIRATION RATE: 18 BRPM

## 2023-06-05 DIAGNOSIS — S69.91XA INJURY OF RIGHT WRIST, INITIAL ENCOUNTER: Primary | ICD-10-CM

## 2023-06-05 DIAGNOSIS — S69.91XA INJURY OF RIGHT WRIST, INITIAL ENCOUNTER: ICD-10-CM

## 2023-06-05 DIAGNOSIS — S62.001A CLOSED NONDISPLACED FRACTURE OF SCAPHOID OF RIGHT WRIST, UNSPECIFIED PORTION OF SCAPHOID, INITIAL ENCOUNTER: ICD-10-CM

## 2023-06-05 PROCEDURE — 99213 OFFICE O/P EST LOW 20 MIN: CPT

## 2023-06-05 PROCEDURE — 73110 X-RAY EXAM OF WRIST: CPT

## 2023-06-05 PROCEDURE — G0463 HOSPITAL OUTPT CLINIC VISIT: HCPCS

## 2023-06-05 RX ORDER — FLUCONAZOLE 200 MG/1
TABLET ORAL
COMMUNITY
Start: 2023-05-29 | End: 2023-06-08

## 2023-06-05 NOTE — TELEPHONE ENCOUNTER
Hello,    Please advise if the following patient can be forced onto the schedule:    Patient: Tashia Padgett     : 1955    MRN: [id number]    Call back #: [phone] (call disconnected, but this # is in chart)    Insurance: /[facility] [place]     Reason for appointment: Closed nondisplaced fracture of scaphoid of right wrist, unspecified portion of scaphoid    Previous history of broken right hand in the past as well as carpal tunnel surgery years ago  Requested doctor/location: [place] would be closest for patient  Thank you

## 2023-06-05 NOTE — PROGRESS NOTES
[facility] [employer] [facility]        NAME: Jody Lira is a 75 y o  female  : 1955    MRN: [id number]  DATE: 2023  TIME: 2:18 PM    Assessment and Plan   Injury of right wrist, initial encounter [place] [facility]  1  Injury of right wrist, initial encounter  XR wrist 3+ vw right    Ambulatory Referral to Orthopedic Surgery      2  Closed nondisplaced fracture of scaphoid of right wrist, unspecified portion of scaphoid, initial encounter              Patient Instructions     Keep the splint in place for support  Apply ice for 20 minutes every 1-2 hours while awake  Take tylenol or ibuprofen as needed for pain  Follow-up with the orthopedic  Follow up with PCP in 3-5 days  Proceed to  ER if symptoms worsen  Chief Complaint     Chief Complaint   Patient presents with   • Wrist Injury     Right wrist/arm/thumb injury as well as her nose, patient fell this morning          History of Present Illness       Patient is a [phone] presenting with right wrist pain after a fall this morning  Past medical history is significant for HTN, CKD, depression and chronic pain syndrome  She states she tripped over her shoe face and fell forward  She admits she did hit her nose but had her hand in front of her  She denies any LOC  She is not taking any blood thinners  Review of Systems   Review of Systems   Musculoskeletal: Positive for joint swelling  Skin: Positive for wound  Neurological: Negative for dizziness, syncope and headaches  All other systems reviewed and are negative          Current Medications       Current Outpatient Medications:   •  acetaminophen (TYLENOL) 500 mg tablet, Take 500-1,000 mg by mouth every 6 (six) hours as needed for mild pain  , Disp: , Rfl:   •  albuterol (ProAir HFA) 90 mcg/act inhaler, Inhale 2 puffs every 4 (four) hours as needed for wheezing, Disp: 18 g, Rfl: 0  •  Calcium Carbonate (CALCIUM 600 PO), Take by mouth daily  , Disp: , Rfl:   •  Cholecalciferol (VITAMIN D3 PO), Take 50 mcg by mouth daily  , Disp: , Rfl:   •  montelukast (SINGULAIR) 10 mg tablet, take 1 tablet by mouth at bedtime, Disp: 90 tablet, Rfl: 3  •  nystatin (MYCOSTATIN) powder, Apply topically 3 (three) times a day for 14 days, Disp: 15 g, Rfl: 0  •  predniSONE 20 mg tablet, Take 3 tablets (60 mg total) by mouth daily for 3 days, THEN 2 5 tablets (50 mg total) daily for 4 days, THEN 2 tablets (40 mg total) daily for 4 days, THEN 1 5 tablets (30 mg total) daily for 4 days, THEN 1 tablet (20 mg total) daily for 4 days  , Disp: 37 tablet, Rfl: 0  •  propranolol (INDERAL) 10 mg tablet, Take 1 tab Bid for 1 week, if no improvement then can increase to 2 tabs BID, Disp: 120 tablet, Rfl: 5  •  rosuvastatin (CRESTOR) 10 MG tablet, Take 1 tablet (10 mg total) by mouth daily, Disp: 90 tablet, Rfl: 1  •  sulfamethoxazole-trimethoprim (BACTRIM DS) 800-160 mg per tablet, Take 1 tablet by mouth 2 (two) times a day for 10 days smx-tmp DS (BACTRIM) 800-160 mg tabs (1tab q12 D10), Disp: 20 tablet, Rfl: 0  •  traZODone (DESYREL) 100 mg tablet, take 1 tablet by mouth at bedtime, Disp: 90 tablet, Rfl: 1  •  carbidopa-levodopa (Sinemet)  mg per tablet, Take 1/2 tab TID for 1 week then 1 tab TID for 1 week then 1 5 tabs TID for 1 week then 2 tabs TID (Patient taking differently: 2 tablets 3 (three) times a day Take 1/2 tab TID for 1 week then 1 tab TID for 1 week then 1 5 tabs TID for 1 week then 2 tabs TID), Disp: 540 tablet, Rfl: 2  •  fluconazole (DIFLUCAN) 200 mg tablet, take 1 tablet by mouth once daily for  DOSE-PLEASE TAKE THIS PILL ON 6/1 IN THE MORNING (Patient not taking: Reported on 6/5/2023), Disp: , Rfl:     Current Allergies     Allergies as of 06/05/2023 - Reviewed 06/05/2023   Allergen Reaction Noted   • Keflex [cephalexin] Other (See Comments) 02/13/2018   • Contrast [iodinated contrast media] Hives 05/02/2023   • Morphine and related Hives 02/13/2018   • Penicillins Hives 02/13/2018   • Benadryl [diphenhydramine] Itching and Swelling 04/15/2020   • Ciprofloxacin Hives 02/28/2021   • Clindamycin Other (See Comments) 02/13/2018   • Erythromycin Hives 02/13/2018            The following portions of the patient's history were reviewed and updated as appropriate: allergies, current medications, past family history, past medical history, past social history, past surgical history and problem list      Past Medical History:   Diagnosis Date   • Arthritis    • Cataract     ashley   • Depression    • Fluid retention    • Headache, acute    • Heart murmur    • Hyperlipidemia    • Hypertension    • Memory loss    • Pedal edema    • Pneumonia    • PONV (postoperative nausea and vomiting)    • Rotator cuff tear, left    • Sleep apnea 2021   • Wears glasses    • Wears partial dentures     upper       Past Surgical History:   Procedure Laterality Date   • ABDOMINAL ADHESION SURGERY     • ANAL SPHINCTEROPLASTY     • ANKLE SURGERY Right     tendon tear   • APPENDECTOMY     • CARPAL TUNNEL RELEASE Bilateral    • CARPAL TUNNEL RELEASE Left    • CARPAL TUNNEL RELEASE Right    • COLONOSCOPY     • HAND SURGERY Right     ganglion cyst   • HEMORROIDECTOMY     • HYSTERECTOMY     • ILEOSTOMY     • JOINT REPLACEMENT     • KNEE ARTHROCENTESIS      ganglion Cyst   • KNEE ARTHROSCOPY Left    • OVARIAN CYST REMOVAL     • CO SURGICAL ARTHROSCOPY SHOULDER W/ROTATOR CUFF RPR Left 2/19/2018    Procedure: ARTHROSCOPIC REPAIR ROTATOR CUFF,  SAD, BICEP TENODESIS;  Surgeon: Artur Bautista MD;  Location: [facility];  Service: Orthopedics   • REPAIR RECTOCELE     • REPLACEMENT TOTAL KNEE Left 09/28/2021   • TONSILLECTOMY     • TONSILLECTOMY AND ADENOIDECTOMY     • TUBAL LIGATION         Family History   Problem Relation Age of Onset   • Hypertension Mother    • Colon cancer Mother    • Bone cancer Mother    • COPD Mother    • Emphysema Mother    • Skin cancer Mother    • Lead poisoning Father         lead poisoning in lungs    • Thyroid disease Sister    • Depression Sister • Hypertension Sister    • Alzheimer's disease Sister    • ROBERT disease Brother    • Throat cancer Brother    • Hypertension Brother    • Colon cancer Family    • Bone cancer Family    • Hypertension Family    • Diabetes Sister    • Hypertension Sister    • Heart failure Sister    • Alzheimer's disease Sister    • No Known Problems Daughter    • No Known Problems Maternal Grandmother    • No Known Problems Paternal Grandmother    • No Known Problems Maternal Aunt    • No Known Problems Maternal Aunt    • No Known Problems Maternal Aunt    • No Known Problems Maternal Aunt    • No Known Problems Maternal Aunt    • No Known Problems Paternal Aunt          Medications have been verified  Objective   /62   Pulse 76   Temp 98 2 °F (36 8 °C)   Resp 18   SpO2 97%        Physical Exam     Physical Exam  Vitals and nursing note reviewed  Constitutional:       General: She is not in acute distress  Appearance: Normal appearance  She is normal weight  She is not ill-appearing or toxic-appearing  HENT:      Mouth/Throat:      Pharynx: Oropharynx is clear  Cardiovascular:      Rate and Rhythm: Normal rate  Pulmonary:      Effort: Pulmonary effort is normal    Musculoskeletal:      Right wrist: Swelling and tenderness (over the right scaphoid  ) present  No deformity or crepitus  Decreased range of motion  Normal pulse  Arms:    Skin:     General: Skin is warm and dry  Capillary Refill: Capillary refill takes less than 2 seconds  Neurological:      General: No focal deficit present  Mental Status: She is alert and oriented to person, place, and time

## 2023-06-05 NOTE — TELEPHONE ENCOUNTER
Patient is being referred to a orthopedics  Please schedule accordingly      [facility] [address]   [phone]

## 2023-06-05 NOTE — PATIENT INSTRUCTIONS
Keep the splint in place for support  Apply ice for 20 minutes every 1-2 hours while awake     Take tylenol or ibuprofen as needed for pain  Follow-up with the orthopedic

## 2023-06-05 NOTE — TELEPHONE ENCOUNTER
Caller: Patient    Doctor: Daily Key    Reason for call: Patient was dc'd from prev call request appt w/hand surgery   Advised message was sent and patient will be called back to schedule    Call back#: [phone]

## 2023-06-07 ENCOUNTER — OFFICE VISIT (OUTPATIENT)
Dept: OBGYN CLINIC | Facility: CLINIC | Age: 68
End: 2023-06-07
Payer: MEDICARE

## 2023-06-07 VITALS
BODY MASS INDEX: 37.73 KG/M2 | HEIGHT: 60 IN | WEIGHT: 192.2 LBS | SYSTOLIC BLOOD PRESSURE: 116 MMHG | DIASTOLIC BLOOD PRESSURE: 80 MMHG

## 2023-06-07 DIAGNOSIS — L90.0 LICHEN SCLEROSUS: Primary | ICD-10-CM

## 2023-06-07 DIAGNOSIS — B37.2 CUTANEOUS CANDIDIASIS: ICD-10-CM

## 2023-06-07 DIAGNOSIS — B37.89 CANDIDA RASH OF GROIN: ICD-10-CM

## 2023-06-07 LAB
BV WHIFF TEST VAG QL: NEGATIVE
CLUE CELLS SPEC QL WET PREP: NEGATIVE
T VAGINALIS VAG QL WET PREP: NEGATIVE
YEAST VAG QL WET PREP: NEGATIVE

## 2023-06-07 PROCEDURE — 99214 OFFICE O/P EST MOD 30 MIN: CPT | Performed by: OBSTETRICS & GYNECOLOGY

## 2023-06-07 PROCEDURE — 87210 SMEAR WET MOUNT SALINE/INK: CPT | Performed by: OBSTETRICS & GYNECOLOGY

## 2023-06-07 RX ORDER — CLOTRIMAZOLE AND BETAMETHASONE DIPROPIONATE 10; .64 MG/G; MG/G
CREAM TOPICAL 2 TIMES DAILY
Qty: 30 G | Refills: 2 | Status: SHIPPED | OUTPATIENT
Start: 2023-06-07

## 2023-06-07 RX ORDER — NYSTATIN 100000 [USP'U]/G
POWDER TOPICAL 3 TIMES DAILY PRN
Qty: 45 G | Refills: 3 | Status: SHIPPED | OUTPATIENT
Start: 2023-06-07

## 2023-06-07 NOTE — PROGRESS NOTES
Assessment:  75 y o  [id number] who presents with likely multifactorial vulvar rash  Yeast and UTI confirmed by testing  Acute dermatitis vs HSV outbreak  Without a hx of genital HSV and known exposure to poison ivy, dermatitis is more likely  Suspect all the above contributing to lichenoid progression  Plan:  Diagnoses and all orders for this visit:    Lichen sclerosus  -     clotrimazole-betamethasone (LOTRISONE) 1-0 05 % cream; Apply topically 2 (two) times a day for 2 weeks to abdomen rash and 6 weeks to vulvar rash  -     POCT wet mount  - Return in 2mo for reassessment (yearly if due)    Candida rash of groin  Cutaneous candidiasis  -     clotrimazole-betamethasone (LOTRISONE) 1-0 05 % cream; Apply topically 2 (two) times a day for 2 weeks to abdomen rash and 6 weeks to vulvar rash  -     nystatin (MYCOSTATIN) powder; Apply topically 3 (three) times a day as needed (rash)   Apply at least daily in summer to prevent rash  -     POCT wet mount          __________________________________________________________________    Subjective   Grier Dubin is a 75 y o  [id number] who presents as a follow up from the ER on 5/28/23 for a complaint of vulvar rash    ER notes reviewed  Treated for UTI and poison ivy  Bactrim, prednisone, nystatin prescribed  Patient notes symptoms started 4-5d prior as purple spots  Had a spot on the left under abdomen that got raw and had an odor  Tried to keep dry with a paper towel  2d prior to arrival, started with back pain by her kidney  The next day it was so severe she could barely walk and this was inciting factor that caused her to seek care  Rash had also progressed and was very itchy by that point  Did have 2x small bumps in the area  Reports was told unsure if skin breakdown from this or a separate lesion  Things have improved markedly since then  A little itch occasionally, but far from what it was  Also gets yeast under abd and uses nystatin powder for this   Wonders if it spread from there  Also had been recently removing fencing and was unaware there was poison ivy there  ashed her hands 3x before using restroom to try to avoid this  Discussed +HSV testing, which is very concerning for pt  She notes a hx of cold sores, but never had genital lesions prior  Has 3 sexual partners lately, none of which are new partners  Has not been sexually active since the onset of symptoms  The following portions of the patient's history were reviewed and updated as appropriate: allergies, current medications, past medical history, past social history, past surgical history and problem list     Review of Systems  Review of Systems   Constitutional: Negative for chills, fatigue and fever  Respiratory: Negative for shortness of breath  Cardiovascular: Negative for chest pain and palpitations  Gastrointestinal: Negative for abdominal distention, abdominal pain, nausea and vomiting  Genitourinary: Positive for genital sores and vaginal pain  Negative for dyspareunia, dysuria, flank pain, frequency, menstrual problem, pelvic pain, vaginal bleeding and vaginal discharge  Skin: Negative for rash and wound  Neurological: Negative for dizziness, light-headedness and headaches  Objective  /80   Ht 5' (1 524 m)   Wt 87 2 kg (192 lb 3 2 oz)   BMI 37 54 kg/m²      Physical Exam:  Physical Exam  Exam conducted with a chaperone present  Constitutional:       General: She is not in acute distress  Appearance: Normal appearance  She is not ill-appearing, toxic-appearing or diaphoretic  Eyes:      General: No scleral icterus  Right eye: No discharge  Left eye: No discharge  Conjunctiva/sclera: Conjunctivae normal    Cardiovascular:      Rate and Rhythm: Normal rate  Pulmonary:      Effort: Pulmonary effort is normal  No respiratory distress  Abdominal:      General: There is no distension  Palpations: There is no mass  Tenderness:  There is no abdominal tenderness  There is no guarding or rebound  Hernia: No hernia is present  Genitourinary:     Exam position: Lithotomy position  Labia:         Right: Rash (thin, pale epithelia of vulvar, perineal, buttocks/perirectal  ) present  No tenderness or lesion  Left: Rash and lesion (solitary 1-2mm dry vesicle on left buttocks near perineum) present  No tenderness  Musculoskeletal:         General: No swelling  Skin:     General: Skin is warm and dry  Coloration: Skin is not jaundiced or pale  Findings: Rash (patchy papullar, erythematous rash on lower abdomen in skin fold) present  No bruising or erythema  Neurological:      Mental Status: She is alert  Psychiatric:         Mood and Affect: Mood normal          Behavior: Behavior normal          Thought Content:  Thought content normal          Judgment: Judgment normal          Microscopy wnl    Reviewed  Molecular panel, UCx, UA, GC, HSV (5/28/23)

## 2023-06-08 ENCOUNTER — TELEPHONE (OUTPATIENT)
Dept: OBGYN CLINIC | Facility: OTHER | Age: 68
End: 2023-06-08

## 2023-06-08 ENCOUNTER — OFFICE VISIT (OUTPATIENT)
Dept: INTERNAL MEDICINE CLINIC | Facility: CLINIC | Age: 68
End: 2023-06-08
Payer: MEDICARE

## 2023-06-08 ENCOUNTER — OFFICE VISIT (OUTPATIENT)
Dept: OBGYN CLINIC | Facility: OTHER | Age: 68
End: 2023-06-08
Payer: MEDICARE

## 2023-06-08 VITALS
HEIGHT: 60 IN | HEART RATE: 73 BPM | BODY MASS INDEX: 36.91 KG/M2 | SYSTOLIC BLOOD PRESSURE: 122 MMHG | WEIGHT: 188 LBS | DIASTOLIC BLOOD PRESSURE: 68 MMHG

## 2023-06-08 VITALS
HEART RATE: 97 BPM | TEMPERATURE: 98 F | SYSTOLIC BLOOD PRESSURE: 140 MMHG | WEIGHT: 188 LBS | OXYGEN SATURATION: 98 % | HEIGHT: 60 IN | DIASTOLIC BLOOD PRESSURE: 90 MMHG | BODY MASS INDEX: 36.91 KG/M2

## 2023-06-08 DIAGNOSIS — R73.03 PREDIABETES: ICD-10-CM
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
## 2023-06-08 DIAGNOSIS — M25.531 RIGHT WRIST PAIN: ICD-10-CM

## 2023-06-08 DIAGNOSIS — S60.221A CONTUSION OF RIGHT HAND, INITIAL ENCOUNTER: ICD-10-CM

## 2023-06-08 DIAGNOSIS — R21 PERINEAL RASH IN FEMALE: Primary | ICD-10-CM

## 2023-06-08 DIAGNOSIS — Z00.00 MEDICARE ANNUAL WELLNESS VISIT, SUBSEQUENT: ICD-10-CM

## 2023-06-08 DIAGNOSIS — S69.91XA INJURY OF RIGHT WRIST, INITIAL ENCOUNTER: ICD-10-CM

## 2023-06-08 DIAGNOSIS — M25.531 RIGHT WRIST PAIN: Primary | ICD-10-CM

## 2023-06-08 DIAGNOSIS — Z12.31 ENCOUNTER FOR SCREENING MAMMOGRAM FOR MALIGNANT NEOPLASM OF BREAST: ICD-10-CM

## 2023-06-08 DIAGNOSIS — Z79.52 CURRENT USE OF STEROID MEDICATION: ICD-10-CM

## 2023-06-08 DIAGNOSIS — E28.39 MENOPAUSE OVARIAN FAILURE: ICD-10-CM
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
## 2023-06-08 LAB — SL AMB POCT HEMOGLOBIN AIC: 6.3 (ref ?–6.5)

## 2023-06-08 PROCEDURE — 83036 HEMOGLOBIN GLYCOSYLATED A1C: CPT | Performed by: INTERNAL MEDICINE

## 2023-06-08 PROCEDURE — 99213 OFFICE O/P EST LOW 20 MIN: CPT | Performed by: INTERNAL MEDICINE

## 2023-06-08 PROCEDURE — 99203 OFFICE O/P NEW LOW 30 MIN: CPT | Performed by: PHYSICIAN ASSISTANT

## 2023-06-08 NOTE — PATIENT INSTRUCTIONS
P  R I C E  Treatment   WHAT YOU NEED TO KNOW:   What is P R I C E  treatment? P R I C E  treatment is a 5-step process used to decrease swelling and pain caused by an injury  P R I C E  stands for protect, rest, ice, compress, and elevate  Start P R I C E  within 24 to 48 hours of an injury  How do I use P R I C E  treatment? Protect  your injury from more damage  Support the injured area with a brace or splint  Your healthcare provider will tell you how long to use the brace or splint  Rest  your injured area as directed  You may need to stop using, or keep weight off, the injury for 48 hours or longer  Your healthcare provider may recommend crutches or another device  Return to your usual activities as directed  Apply ice  on your injured area for 15 to 20 minutes every 4 hours or as directed  Use an ice pack, or put crushed ice in a plastic bag  Cover the bag with a towel before you apply it to your skin  Ice helps prevent tissue damage and decreases swelling and pain  Compress  (keep pressure on) the injured area  Compression will help decrease swelling and support the injured area  Use an elastic bandage, air stirrup, splint, or sling as directed  If you use an elastic bandage, make sure the bandage is not too tight  You should be able to slip 2 fingers between the bandage and your skin  Elevate  the injured area above the level of your heart as often as you can  This will help decrease swelling and pain  Prop the injured area on pillows or blankets to keep it elevated comfortably  When should I seek immediate care? Your pain is severe  You have severe swelling or deformity  You have numbness in the injured area  When should I call my doctor? Your pain and swelling do not go away after a few days  You have questions or concerns about your condition or care  CARE AGREEMENT:   You have the right to help plan your care   Learn about your health condition and how it may be treated  Discuss treatment options with your healthcare providers to decide what care you want to receive  You always have the right to refuse treatment  The above information is an  only  It is not intended as medical advice for individual conditions or treatments  Talk to your doctor, nurse or pharmacist before following any medical regimen to see if it is safe and effective for you  © Copyright [employer] 2022 Information is for End User's use only and may not be sold, redistributed or otherwise used for commercial purposes

## 2023-06-08 NOTE — PROGRESS NOTES
Orthopaedic Surgery - Office Note  Nasim Toro (42 y o  female)   : 1955   MRN: [id number]  Encounter Date: 2023    Chief Complaint   Patient presents with   • Right Wrist - Pain         Assessment/Plan  Diagnoses and all orders for this visit:    Right wrist pain  -     Durable Medical Equipment  -     Ambulatory Referral to Hand Surgery; Future    Injury of right wrist, initial encounter  -     Ambulatory Referral to Orthopedic Surgery  -     Durable Medical Equipment  -     Ambulatory Referral to Hand Surgery; Future    Fall, subsequent encounter  -     Durable Medical Equipment    Contusion of right hand, initial encounter  -     Durable Medical Equipment  -     Ambulatory Referral to Hand Surgery; Future    The diagnosis as well as treatment options were reviewed with the patient in the office today  She was advised based on physical exam I do not believe she has a scaphoid fracture  She has no anatomical proximal tenderness  Her exam did not x-rays are most consistently with a cannot  I would recommend starting gentle range of motion with occupational therapy  She will use a thumb brace for the next week  She may progress out of the brace as tolerated  If she has any degree of discomfort or pain remaining in 2 weeks she will follow-up with hand surgeon for repeat evaluation and possible repeat imaging  Return for Recheck in 2 weeks with hand surgeon  History of Present Illness  This is a new patient who fell on 2023 and injured her right wrist   She was seen in urgent care and had x-rays taken  There is some initial concern for fracture and she was placed in a cock-up wrist splint  Radiologist interpretation was negative for fracture  She reports mild pain on the palmar aspect of the right wrist in the location of a small abrasion  She denies any anatomical snuffbox tenderness  She denies any paresthesias  She is right-hand dominant    She reports she has fractured this wrist in the remote past   She reports her symptoms are improving  Patient has a history of chronic kidney disease and chronic pain syndrome  Review of Systems  Pertinent items are noted in HPI  All other systems were reviewed and are negative  Physical Exam  /68 (BP Location: Left arm, Patient Position: Sitting, Cuff Size: Standard)   Pulse 73   Ht 5' (1 524 m)   Wt 85 3 kg (188 lb)   BMI 36 72 kg/m²   Cons: Appears well  No apparent distress  Psych: Alert  Oriented x3  Mood and affect normal         Patient's right wrist and hand is without skin infection ecchymosis or soft tissue edema  There is a small abrasion on the palmar aspect of the right hand sided  She has no anatomical snuffbox tenderness  She has a painless range of motion of her right thumb  She has no pain with [facility] grind testing  She has a negative Finkelstein's test   She is nontender on the distal radius and ulna on the dorsal side  There is only tenderness to palpation on the palmar aspect of the right hand  She has a relatively well-maintained right wrist range of motion to flexion, extension, supination, pronation, ulnar deviation, radial deviation  Distal radial and ulnar pulses are +2           Studies Reviewed  Study Result    Narrative & Impression   RIGHT WRIST     INDICATION:   S69  91XA: Unspecified injury of right wrist, hand and finger(s), initial encounter      COMPARISON: 06/24/2022     VIEWS:  XR WRIST 3+ VW RIGHT  Images: 4     FINDINGS:     There is no acute fracture or dislocation      No there are some degenerative changes in the hand  No lytic or blastic osseous lesion      Soft tissues are unremarkable      IMPRESSION:     No acute osseous abnormality            Workstation performed: [id number]     X-ray images as well as report reviewed by myself in the office today  I agree with radiologist interpretation    Urgent care notes from 6/5/2023 were reviewed by myself in the office today           Medical, Surgical, Family, and Social History  The patient's medical history, family history, and social history, were reviewed and updated as appropriate      Past Medical History:   Diagnosis Date   • Arthritis    • Cataract     ashley   • Depression    • Fluid retention    • Headache, acute    • Heart murmur    • Hyperlipidemia    • Hypertension    • Memory loss    • Pedal edema    • Pneumonia    • PONV (postoperative nausea and vomiting)    • Rotator cuff tear, left    • Sleep apnea 2021   • Wears glasses    • Wears partial dentures     upper       Past Surgical History:   Procedure Laterality Date   • ABDOMINAL ADHESION SURGERY     • ANAL SPHINCTEROPLASTY     • ANKLE SURGERY Right     tendon tear   • APPENDECTOMY     • CARPAL TUNNEL RELEASE Bilateral    • CARPAL TUNNEL RELEASE Left    • CARPAL TUNNEL RELEASE Right    • COLONOSCOPY     • HAND SURGERY Right     ganglion cyst   • HEMORROIDECTOMY     • HYSTERECTOMY     • ILEOSTOMY     • JOINT REPLACEMENT     • KNEE ARTHROCENTESIS      ganglion Cyst   • KNEE ARTHROSCOPY Left    • OVARIAN CYST REMOVAL     • MA SURGICAL ARTHROSCOPY SHOULDER W/ROTATOR CUFF RPR Left 2/19/2018    Procedure: ARTHROSCOPIC REPAIR ROTATOR CUFF,  SAD, BICEP TENODESIS;  Surgeon: Matty Barker MD;  Location: [facility];  Service: Orthopedics   • REPAIR RECTOCELE     • REPLACEMENT TOTAL KNEE Left 09/28/2021   • TONSILLECTOMY     • TONSILLECTOMY AND ADENOIDECTOMY     • TUBAL LIGATION         Family History   Problem Relation Age of Onset   • Hypertension Mother    • Colon cancer Mother    • Bone cancer Mother    • COPD Mother    • Emphysema Mother    • Skin cancer Mother    • Lead poisoning Father         lead poisoning in lungs    • Thyroid disease Sister    • Depression Sister    • Hypertension Sister    • Alzheimer's disease Sister    • ROBERT disease Brother    • Throat cancer Brother    • Hypertension Brother    • Colon cancer Family    • Bone cancer Family    • Hypertension Family    • Diabetes Sister    • Hypertension Sister    • Heart failure Sister    • Alzheimer's disease Sister    • No Known Problems Daughter    • No Known Problems Maternal Grandmother    • No Known Problems Paternal Grandmother    • No Known Problems Maternal Aunt    • No Known Problems Maternal Aunt    • No Known Problems Maternal Aunt    • No Known Problems Maternal Aunt    • No Known Problems Maternal Aunt    • No Known Problems Paternal Aunt        Social History     Occupational History   • Occupation: retired   Tobacco Use   • Smoking status: Never     Passive exposure: Never   • Smokeless tobacco: Never   Vaping Use   • Vaping Use: Never used   Substance and Sexual Activity   • Alcohol use: Yes     Comment: special events   • Drug use: No   • Sexual activity: Yes     Partners: Male       Allergies   Allergen Reactions   • Keflex [Cephalexin] Other (See Comments)     Mouth sores   • Contrast [Iodinated Contrast Media] Hives     Occurred In the patient's 18's got Benedryl    • Morphine And Related Hives     IV MORPINE   • Penicillins Hives   • Benadryl [Diphenhydramine] Itching and Swelling     IV benadryl in hospital   • Ciprofloxacin Hives   • Clindamycin Other (See Comments)     Pt unsure   • Erythromycin Hives         Current Outpatient Medications:   •  acetaminophen (TYLENOL) 500 mg tablet, Take 500-1,000 mg by mouth every 6 (six) hours as needed for mild pain  , Disp: , Rfl:   •  albuterol (ProAir HFA) 90 mcg/act inhaler, Inhale 2 puffs every 4 (four) hours as needed for wheezing, Disp: 18 g, Rfl: 0  •  Calcium Carbonate (CALCIUM 600 PO), Take by mouth daily  , Disp: , Rfl:   •  Cholecalciferol (VITAMIN D3 PO), Take 50 mcg by mouth daily  , Disp: , Rfl:   •  clotrimazole-betamethasone (LOTRISONE) 1-0 05 % cream, Apply topically 2 (two) times a day for 2 weeks to abdomen rash and 6 weeks to vulvar rash , Disp: 30 g, Rfl: 2  •  montelukast (SINGULAIR) 10 mg tablet, take 1 tablet by mouth at bedtime, Disp: 90 tablet, Rfl: 3  •  nystatin (MYCOSTATIN) powder, Apply topically 3 (three) times a day as needed (rash)   Apply at least daily in summer to prevent rash , Disp: 45 g, Rfl: 3  •  predniSONE 20 mg tablet, Take 3 tablets (60 mg total) by mouth daily for 3 days, THEN 2 5 tablets (50 mg total) daily for 4 days, THEN 2 tablets (40 mg total) daily for 4 days, THEN 1 5 tablets (30 mg total) daily for 4 days, THEN 1 tablet (20 mg total) daily for 4 days  , Disp: 37 tablet, Rfl: 0  •  propranolol (INDERAL) 10 mg tablet, Take 1 tab Bid for 1 week, if no improvement then can increase to 2 tabs BID, Disp: 120 tablet, Rfl: 5  •  rosuvastatin (CRESTOR) 10 MG tablet, Take 1 tablet (10 mg total) by mouth daily, Disp: 90 tablet, Rfl: 1  •  sulfamethoxazole-trimethoprim (BACTRIM DS) 800-160 mg per tablet, Take 1 tablet by mouth 2 (two) times a day for 10 days smx-tmp DS (BACTRIM) 800-160 mg tabs (1tab q12 D10), Disp: 20 tablet, Rfl: 0  •  traZODone (DESYREL) 100 mg tablet, take 1 tablet by mouth at bedtime, Disp: 90 tablet, Rfl: 1      Aníbal Murphy PA-C

## 2023-06-08 NOTE — PATIENT INSTRUCTIONS
Prediabetes   AMBULATORY CARE:   Prediabetes  is a blood glucose (sugar) level that is higher than normal  It is not high enough to be considered diabetes  Prediabetes increases your risk for type 2 diabetes and heart disease  The risk is highest if you have high blood pressure or high cholesterol  The following increase your risk for prediabetes:   Being overweight or obese, with a body mass index (BMI) of 25 or higher (23 or higher if you are  American)    Lack of physical activity    Older age    Family history of diabetes (parent or sibling)    A history of heart disease, gestational diabetes, or polycystic ovary syndrome (PCOS)    High blood pressure or cholesterol levels    Being [place] American, , , [place] American, or     In children, having a mother with diabetes or gestational diabetes mellitus (GDM) during the pregnancy    Signs and symptoms:  Prediabetes may not cause any symptoms  Call your doctor if:   You have more hunger or thirst than usual     You are urinating more often than usual     You are always exhausted  You have blurred vision  You have questions or concerns about your condition or care  Prevent or delay type 2 diabetes:  Healthy choices work best to delay or prevent type 2 diabetes  You may be given the following guidelines from your healthcare provider:  Get regular physical activity  Adults should get at least 150 minutes (2 5 hours) of moderate physical activity every week  Spread the amount of activity over at least 3 days a week  Do not skip more than 2 days in a row  Children should get at least 60 minutes of moderate physical activity on most days of the week  Examples of moderate physical activity include brisk walking, running, and swimming  Do not sit for longer than 30 minutes at a time  Work with your healthcare provider to create a plan for physical activity  Lose weight if you are overweight    A weight loss of 7% of your body weight can help  Your healthcare provider can tell you what weight is healthy for you  He or she can help you create a weight loss plan  Eat healthy foods  Eat a variety of fruits and vegetables  Eat whole-grain foods more often  Choose dairy foods, meat, and other protein foods that are low in fat  Eat fewer sweets, such as candy, cookies, regular soda, and sweetened drinks  You can also decrease calories by eating smaller portion sizes  Work with your healthcare provider or dietitian to develop a meal plan that is right for you  Take medicine as directed  Your healthcare provider may give diabetes medicine if you are at high risk for diabetes  You may also need medicines for high blood pressure and high cholesterol  Follow up with your healthcare provider as directed  You will need to return every year to get tested for diabetes  Do not smoke  Do not use e-cigarettes or smokeless tobacco in place of cigarettes or to help you quit  They still contain nicotine  Ask your healthcare provider for information if you currently smoke and need help quitting  Start with small goals and work your way up  You may need to start with 3 days of physical activity  You can add a day after 3 weeks or so of activity  Make a goal of losing 5 pounds at first  Talk with healthcare providers about making a plan that is right for you  Follow up with your doctor as directed: If you do have prediabetes, you will need to return every year to get tested for diabetes  Write down your questions so you remember to ask them during your visits  © Copyright [employer] 2022 Information is for End User's use only and may not be sold, redistributed or otherwise used for commercial purposes  The above information is an  only  It is not intended as medical advice for individual conditions or treatments   Talk to your doctor, nurse or pharmacist before following any medical regimen to see if it is safe and effective for you

## 2023-06-08 NOTE — PROGRESS NOTES
Assessment/Plan:  Problem List Items Addressed This Visit        Other    Prediabetes    Relevant Orders    POCT hemoglobin A1c (Completed)   Other Visit Diagnoses     Perineal rash in female    -  Primary    Encounter for screening mammogram for malignant neoplasm of breast        Relevant Orders    Mammo screening bilateral w 3d & cad    Current use of steroid medication        Relevant Orders    POCT hemoglobin A1c (Completed)    Medicare annual wellness visit, subsequent        Menopause ovarian failure        Relevant Orders    DXA bone density spine hip and pelvis    Right wrist pain               Diagnoses and all orders for this visit:    Perineal rash in female    Encounter for screening mammogram for malignant neoplasm of breast  -     Mammo screening bilateral w 3d & cad; Future    Prediabetes  -     POCT hemoglobin A1c    Current use of steroid medication  -     POCT hemoglobin A1c    Medicare annual wellness visit, subsequent    Menopause ovarian failure  -     DXA bone density spine hip and pelvis; Future    Right wrist pain        No problem-specific Assessment & Plan notes found for this encounter  A/P: Doing ok and in office HgA1c was 6 3  Elevated form last time  Feel steroids may have contribute to slight elevation, but suspect HgA1c was progressing in spite of the steroids  Will have the pt finish the steroids and watch her diet and exercise  Will bring her back in two months for routine/repeat HgA1c  Appreciate gyn input and will defer further rast treatment to them  Pt to remain hydrated  BP borderline as well  Keep f/u with ortho today as well  Subjective:      Patient ID: Vandana Castro is a 75 y o  female  WF with h/o borderline Pre-Diabetes, presents at the request of the ER  Pt recently with relations with a partner with poison ivy  Pt reports developing a significant perineal rash  Seen in the ER and uncertain contact dermatitis, HSV, etc  Treated with oral steroids and abx  Seen by GYN and still uncertain of origin  Topical creams added and pt is finishing up her steroids  ER requested pt f/u due to PMH of pre diabetes  No s/s of uncontrolled diabetes like visual changes, polyuria, polydipsia, or polyuria  Reports rash is improving  Also, recent fall and injury to the right wrist  Has an appt with ortho later today for f/u  Maple Bernardo The following portions of the patient's history were reviewed and updated as appropriate:   She has a past medical history of Arthritis, Cataract, Depression, Fluid retention, Headache, acute, Heart murmur, Hyperlipidemia, Hypertension, Memory loss, Pedal edema, Pneumonia, PONV (postoperative nausea and vomiting), Rotator cuff tear, left, Sleep apnea (2021), Wears glasses, and Wears partial dentures  ,  does not have any pertinent problems on file  ,   has a past surgical history that includes Ankle surgery (Right); Tonsillectomy; Tubal ligation; Hysterectomy; Ovarian cyst removal; Carpal tunnel release (Bilateral); Hand surgery (Right); Abdominal adhesion surgery; Knee arthroscopy (Left); Appendectomy; Colonoscopy; Hemorroidectomy; Repair rectocele; pr surgical arthroscopy shoulder w/rotator cuff rpr (Left, 2/19/2018); Carpal tunnel release (Left); Carpal tunnel release (Right); Ileostomy; Anal sphincteroplasty; Knee arthrocentesis; Tonsillectomy and adenoidectomy; Joint replacement; and Replacement total knee (Left, 09/28/2021)  ,  family history includes Alzheimer's disease in her sister and sister; Bone cancer in her family and mother; COPD in her mother; Colon cancer in her family and mother; Depression in her sister; Diabetes in her sister; Emphysema in her mother; ROBERT disease in her brother; Heart failure in her sister;  Hypertension in her brother, family, mother, sister, and sister; Lead poisoning in her father; No Known Problems in her daughter, maternal aunt, maternal aunt, maternal aunt, maternal aunt, maternal aunt, maternal grandmother, paternal aunt, and paternal grandmother; Skin cancer in her mother; Throat cancer in her brother; Thyroid disease in her sister  ,   reports that she has never smoked  She has never been exposed to tobacco smoke  She has never used smokeless tobacco  She reports current alcohol use  She reports that she does not use drugs  ,  is allergic to keflex [cephalexin], contrast [iodinated contrast media], morphine and related, penicillins, benadryl [diphenhydramine], ciprofloxacin, clindamycin, and erythromycin     Current Outpatient Medications   Medication Sig Dispense Refill   • acetaminophen (TYLENOL) 500 mg tablet Take 500-1,000 mg by mouth every 6 (six) hours as needed for mild pain       • albuterol (ProAir HFA) 90 mcg/act inhaler Inhale 2 puffs every 4 (four) hours as needed for wheezing 18 g 0   • Calcium Carbonate (CALCIUM 600 PO) Take by mouth daily       • Cholecalciferol (VITAMIN D3 PO) Take 50 mcg by mouth daily       • clotrimazole-betamethasone (LOTRISONE) 1-0 05 % cream Apply topically 2 (two) times a day for 2 weeks to abdomen rash and 6 weeks to vulvar rash  30 g 2   • montelukast (SINGULAIR) 10 mg tablet take 1 tablet by mouth at bedtime 90 tablet 3   • nystatin (MYCOSTATIN) powder Apply topically 3 (three) times a day as needed (rash)   Apply at least daily in summer to prevent rash  45 g 3   • predniSONE 20 mg tablet Take 3 tablets (60 mg total) by mouth daily for 3 days, THEN 2 5 tablets (50 mg total) daily for 4 days, THEN 2 tablets (40 mg total) daily for 4 days, THEN 1 5 tablets (30 mg total) daily for 4 days, THEN 1 tablet (20 mg total) daily for 4 days   37 tablet 0   • propranolol (INDERAL) 10 mg tablet Take 1 tab Bid for 1 week, if no improvement then can increase to 2 tabs  tablet 5   • rosuvastatin (CRESTOR) 10 MG tablet Take 1 tablet (10 mg total) by mouth daily 90 tablet 1   • sulfamethoxazole-trimethoprim (BACTRIM DS) 800-160 mg per tablet Take 1 tablet by mouth 2 (two) times a day for 10 days smx-tmp DS (BACTRIM) 800-160 mg tabs (1tab q12 D10) 20 tablet 0   • traZODone (DESYREL) 100 mg tablet take 1 tablet by mouth at bedtime 90 tablet 1     No current facility-administered medications for this visit  Review of Systems   Constitutional: Negative for activity change, chills, diaphoresis, fatigue and fever  Respiratory: Negative for cough, chest tightness, shortness of breath and wheezing  Cardiovascular: Negative for chest pain, palpitations and leg swelling  Gastrointestinal: Negative for abdominal pain, constipation, diarrhea, nausea and vomiting  Genitourinary: Negative for difficulty urinating, dysuria and frequency  Musculoskeletal: Positive for arthralgias  Negative for gait problem, joint swelling and myalgias  Skin: Positive for rash  Neurological: Negative for dizziness, seizures, syncope, weakness, light-headedness and headaches  Psychiatric/Behavioral: Negative for confusion, dysphoric mood and sleep disturbance  The patient is not nervous/anxious  PHQ-2/9 Depression Screening          Objective:  Vitals:    06/08/23 0949   BP: 140/90   Pulse: 97   Temp: 98 °F (36 7 °C)   SpO2: 98%   Weight: 85 3 kg (188 lb)   Height: 5' (1 524 m)     Body mass index is 36 72 kg/m²  Physical Exam  Vitals and nursing note reviewed  Constitutional:       General: She is not in acute distress  Appearance: Normal appearance  She is not ill-appearing  HENT:      Head: Normocephalic and atraumatic  Mouth/Throat:      Mouth: Mucous membranes are moist    Eyes:      Extraocular Movements: Extraocular movements intact  Conjunctiva/sclera: Conjunctivae normal       Pupils: Pupils are equal, round, and reactive to light  Cardiovascular:      Rate and Rhythm: Normal rate and regular rhythm  Heart sounds: Normal heart sounds  No murmur heard  Pulmonary:      Effort: Pulmonary effort is normal  No respiratory distress  Breath sounds: Normal breath sounds   No wheezing, rhonchi or rales  Abdominal:      General: Bowel sounds are normal  There is no distension  Palpations: Abdomen is soft  Tenderness: There is no abdominal tenderness  Skin:     Findings: Rash present  Neurological:      General: No focal deficit present  Mental Status: She is alert and oriented to person, place, and time  Mental status is at baseline  Psychiatric:         Mood and Affect: Mood normal          Behavior: Behavior normal          Thought Content:  Thought content normal          Judgment: Judgment normal

## 2023-06-12 ENCOUNTER — EVALUATION (OUTPATIENT)
Dept: OCCUPATIONAL THERAPY | Facility: CLINIC | Age: 68
End: 2023-06-12
Payer: MEDICARE
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
## 2023-06-12 DIAGNOSIS — M25.531 RIGHT WRIST PAIN: ICD-10-CM

## 2023-06-12 DIAGNOSIS — S60.221A CONTUSION OF RIGHT HAND, INITIAL ENCOUNTER: ICD-10-CM

## 2023-06-12 DIAGNOSIS — S69.91XD INJURY OF RIGHT WRIST, SUBSEQUENT ENCOUNTER: Primary | ICD-10-CM

## 2023-06-12 DIAGNOSIS — S69.91XA INJURY OF RIGHT WRIST, INITIAL ENCOUNTER: ICD-10-CM

## 2023-06-12 PROCEDURE — 97166 OT EVAL MOD COMPLEX 45 MIN: CPT

## 2023-06-12 PROCEDURE — 97535 SELF CARE MNGMENT TRAINING: CPT

## 2023-06-12 PROCEDURE — 97035 APP MDLTY 1+ULTRASOUND EA 15: CPT

## 2023-06-12 PROCEDURE — 97018 PARAFFIN BATH THERAPY: CPT

## 2023-06-12 NOTE — PROGRESS NOTES
OT Evaluation     Today's date: 2023  Patient name: Masood Black  : 1955  MRN: [id number]  Referring provider: BALDO Dunbar*  Dx:   Encounter Diagnosis     ICD-10-CM    1  Injury of right wrist, subsequent encounter  S69 91XD       2  Right wrist pain  M25 [id number] Ambulatory Referral to Occupational Therapy      3  Injury of right wrist, initial encounter  S69 91XA Ambulatory Referral to Occupational Therapy      4  Fall, subsequent encounter  W19  XXXD Ambulatory Referral to Occupational Therapy      5  Contusion of right hand, initial encounter  A62 [id number] Ambulatory Referral to Occupational Therapy                     Assessment  Assessment details: Patient reports stepping off curb and falling on R hand/wrist on 2023  Patient reports dull-aching pain in R radial palmar aspect of hand  Patient reports going work for a few hours and then went to urgent care and got X-rays done with concern for scaphoid fracture  Patient initially was put in a wrist cock-up splint due to concern for scaphoid fx  Patient had follow-up with ortho on 2023  Ortho had no concerns of scaphoid fracture so  patient given thumb brace to wear for 2 weeks  Patient is right-hand dominant  Patient has follow-up with Ortho on 2023  Impairments: abnormal or restricted ROM, abnormal movement, activity intolerance, impaired physical strength and pain with function    Symptom irritability: moderateUnderstanding of Dx/Px/POC: good   Prognosis: good    Goals  STGs    Pt will increase  strength by 5-10#  - Not Met    Pt will increase hand and wrist strength by 1/2 grade  - Not Met    Pt will increase hand and wrist ROM by 50%  - Not Met     Pt will demonstrate decrease in edema by 25%   - Not Met    Independent with HEP  - Not Met    Pt will reports no more than half of current pain rate with activity  - Not Met    Pt will be compliant with wrist brace  - Not Met     Pt will increase fine motor coordination as evident by an improvement in 9-hole peg test by 5 seconds  - Not Met    LTGs     Pt will increase  strength by an additional 5-10#  - Not Met    Pt will increase hand and wrist strength by 1-2 grade  - Not Met    Pt will increase hand and wrist ROM to [facility]  - Not Met     Pt will demonstrate decrease in edema by 50%  - Not Met    Pt will increase pinch strength by 3-5#  - Not Met    Pt will report decrease in morning stiffness by 50%  - Not Met    Pt will report an increase in ADL/IADL participation  - Not Met    Pt will report a decrease in sensation deficits by 50%  - Not Met    Pt will report no more than a 0/10 pain with activity - Not Met    Pt will increase fine motor coordination as evident by an improvement in 9-hole peg test by 10 seconds  - Not Met      Plan  Plan details: Patient has presenting to OP OT services with a dx of R wrist pain  Patient demonstrating increased pain, decreased strength, decreased ROM and decreased activity  Pt would benefit from continued Occupational Therapy services one times per week for 4 weeks due to schedule to return to prior level of function and achieve all established goals  Thank you for the referral!    Patient tolerated session well  Patient and therapist discussed exercises as per initial HEP  Patient verbalized understanding of education and demonstrated proper technique  Therapist will make increases as tolerated   Continue with POC    Patient would benefit from: OT eval and skilled occupational therapy  Referral necessary: Yes  Planned modality interventions: manual electrical stimulation, thermotherapy: hydrocollator packs, thermotherapy: paraffin bath, ultrasound and cryotherapy  Planned therapy interventions: fine motor coordination training, therapeutic activities, therapeutic exercise, therapeutic training, functional ROM exercises, home exercise program, manual therapy, massage, joint mobilization, patient education, strengthening, stretching, graded activity and graded exercise  Frequency: 1x week  Duration in visits: 4  Duration in weeks: 4  Plan of Care beginning date: 2023  Plan of Care expiration date: 2023  Treatment plan discussed with: patient        Subjective Evaluation    History of Present Illness  Date of onset: 2023  Mechanism of injury: trauma  Mechanism of injury: S/p fall           Not a recurrent problem   Quality of life: good    Pain  Current pain ratin  At best pain ratin  At worst pain rating: 10  Location: R palmar aspect of thumb   Quality: dull ache  Relieving factors: ice and rest  Aggravating factors: lifting and overhead activity    Hand dominance: right      Diagnostic Tests  X-ray: normal (X-rays taken at urgent care with suspected scaphoid fx in R wrist  However per ortho note and interventional radiology, no fx suspected  )  Treatments  Previous treatment: immobilization  Current treatment: occupational therapy  Patient Goals  Patient goals for therapy: decreased edema, decreased pain, increased motion, independence with ADLs/IADLs and increased strength          Objective     Observations     Additional Observation Details  Hard composite fist noted in R hand    Active Range of Motion     Left Wrist   Wrist flexion: 70 degrees   Wrist extension: 63 degrees   Radial deviation: 17 degrees   Ulnar deviation: 42 degrees     Right Wrist   Wrist flexion: 55 degrees   Wrist extension: 50 degrees   Radial deviation: 10 degrees   Ulnar deviation: 30 degrees     Left Thumb   Flexion     CMC: 35 degrees    DIP: 70 degrees  Extension     CMC: 0 degrees    DIP: 0 degrees  Opposition: WNL    Right Thumb   Flexion     CMC: 20    DIP: 60  Extension     CMC: -10    DIP: 0  Opposition: WNL    Additional Active Range of Motion Details  L ROM within norms    R ROM decreased compared to L   Increased ROM in L thumb compared to R     Strength/Myotome Testing     Left Wrist/Hand   Normal wrist strength  Wrist extension: 4  Wrist flexion: 4  Radial deviation: 4  Ulnar deviation: 4     (2nd hand position)     Trial 1: 50    Thumb Strength  Key/Lateral Pinch     Trial 1: 7    Right Wrist/Hand   Wrist extension: 3-  Wrist flexion: 3-  Radial deviation: 3-  Ulnar deviation: 3-     (2nd hand position)     Trial 1: 15    Thumb Strength   Key/Lateral Pinch     Trial 1: 6    Additional Strength Details  Decreased /pinch strength in R compared to L     Decreased strength in R wrist compared to L         Swelling     Left Wrist/Hand   Circumference wrist: 7 cm    Right Wrist/Hand   Circumference wrist: 7 5 cm    Additional Swelling Details  Increased swelling in R compared to L   Neuro Exam:     Functional outcomes   Left 9 peg hole test: 20 53 (seconds)  Right 9 hole peg test: 27 02 (seconds)  Functional outcome comment: Increased time for 9-hole peg test in R compared to L       Initial Evaluation completed on: 06/12/2023  Re-Evaluation needs to be completed before: 07/12/2023  Insurance: Medicare  FOTO: 06/12/2023  Auth Visits: N/A     Precautions: R wrist pain            Manuals 06/12/2023       Right Wrist Stretches All Planes        Edema massage        IASTM                        Neuro Re-Ed  06/12/2023                                       Ther Ex 06/12/2023       Wrist Maze        Opposition        TGE        Digi-Flex        AROM R Wrist  Flex/Ext  RD/UD  Circles        Hand Power Pro        Isometrics R Wrist  Flexion  Extension  RD  UD        Flex Bar  Twists  Bends   Bottle caps         Putty program for R hand  Squeezes  Lateral key pinch   2-point pinch   3-point pinch     HEP  HEP  HEP  HEP                       Ther Activity 06/12/2023       Tension Pin w/ Pom Pom        Grooved peg board         Velcro board         Purdue peg board                         Modalities 06/12/2023       CP        paraffin 10' w/ MH       Ultrasound 10'   0 8 intensity   3MHz   50%        MH        E-STIM            Patient's session on this date was conducted by PAPI Jordan under the direct supervision of the clinical instructor, Poppy Ortiz MS OTR/L

## 2023-06-21 ENCOUNTER — OFFICE VISIT (OUTPATIENT)
Dept: OCCUPATIONAL THERAPY | Facility: CLINIC | Age: 68
End: 2023-06-21
Payer: MEDICARE

## 2023-06-21 ENCOUNTER — APPOINTMENT (OUTPATIENT)
Dept: LAB | Facility: CLINIC | Age: 68
End: 2023-06-21
Payer: MEDICARE

## 2023-06-21 DIAGNOSIS — S69.91XD INJURY OF RIGHT WRIST, SUBSEQUENT ENCOUNTER: Primary | ICD-10-CM

## 2023-06-21 DIAGNOSIS — I10 PRIMARY HYPERTENSION: ICD-10-CM

## 2023-06-21 DIAGNOSIS — E78.2 MIXED HYPERLIPIDEMIA: ICD-10-CM

## 2023-06-21 LAB
ALBUMIN SERPL BCP-MCNC: 3.7 G/DL (ref 3.5–5)
ALP SERPL-CCNC: 78 U/L (ref 46–116)
ALT SERPL W P-5'-P-CCNC: 20 U/L (ref 12–78)
ANION GAP SERPL CALCULATED.3IONS-SCNC: 0 MMOL/L
AST SERPL W P-5'-P-CCNC: 6 U/L (ref 5–45)
BILIRUB SERPL-MCNC: 0.49 MG/DL (ref 0.2–1)
BUN SERPL-MCNC: 16 MG/DL (ref 5–25)
CALCIUM SERPL-MCNC: 9.6 MG/DL (ref 8.3–10.1)
CHLORIDE SERPL-SCNC: 111 MMOL/L (ref 96–108)
CO2 SERPL-SCNC: 30 MMOL/L (ref 21–32)
CREAT SERPL-MCNC: 1.05 MG/DL (ref 0.6–1.3)
GFR SERPL CREATININE-BSD FRML MDRD: 54 ML/MIN/1.73SQ M
GLUCOSE P FAST SERPL-MCNC: 132 MG/DL (ref 65–99)
LDLC SERPL DIRECT ASSAY-MCNC: 78 MG/DL (ref 0–100)
POTASSIUM SERPL-SCNC: 4.3 MMOL/L (ref 3.5–5.3)
PROT SERPL-MCNC: 7 G/DL (ref 6.4–8.4)
SODIUM SERPL-SCNC: 141 MMOL/L (ref 135–147)
TRIGL SERPL-MCNC: 80 MG/DL

## 2023-06-21 PROCEDURE — 97530 THERAPEUTIC ACTIVITIES: CPT

## 2023-06-21 PROCEDURE — 97110 THERAPEUTIC EXERCISES: CPT

## 2023-06-21 PROCEDURE — 36415 COLL VENOUS BLD VENIPUNCTURE: CPT

## 2023-06-21 PROCEDURE — 84478 ASSAY OF TRIGLYCERIDES: CPT

## 2023-06-21 PROCEDURE — 80053 COMPREHEN METABOLIC PANEL: CPT

## 2023-06-21 PROCEDURE — 83721 ASSAY OF BLOOD LIPOPROTEIN: CPT

## 2023-06-21 PROCEDURE — 97140 MANUAL THERAPY 1/> REGIONS: CPT

## 2023-06-21 NOTE — PROGRESS NOTES
"Daily Note     Today's date: 2023  Patient name: Rocio Smith  : 1955  MRN: [id number]  Referring provider: BALDO Villafana*  Dx:   Encounter Diagnosis     ICD-10-CM    1  Injury of right wrist, subsequent encounter  S69 91XD                      Subjective: \"My wrist feels a lot better\"      Objective: See treatment diary below      Assessment: Tolerated treatment well  Patient exhibited good technique with therapeutic exercises and would benefit from continued OT  Patient reports an improvement in pain since completing HEP  Patient completed therapeutic activities/exercises with decreased rest breaks  Patient completed additional repetitions of therapeutic exercises independently  Patient has follow-up with ortho on 2023  Plan: Continue per plan of care  Progress treatment as tolerated           Initial Evaluation completed on: 2023  Re-Evaluation needs to be completed before: 2023  Insurance: Medicare  FOTO: 2023  Auth Visits: N/A     Precautions: R wrist pain    Manuals 2023      Right Wrist Stretches All Planes  30 sec x 3       Edema massage  5'      IASTM  10'   GT #3                      Neuro Re-Ed  2023                                      Ther Ex 2023      Wrist Maze  X 5      Opposition        TGE        Digi-Flex  Red x 20       AROM R Wrist  Flex/Ext  RD/UD  Circles  1#  X 20   X 20   X 20       Hand Power Pro  Blue RB x 20       Isometrics R Wrist  Flexion  Extension  RD  UD        Flex Bar  Twists  Bends   Bottle caps   Thin yellow   X 20  X 20         Putty program for R hand  Squeezes  Lateral key pinch   2-point pinch   3-point pinch     HEP  HEP  HEP  HEP                       Ther Activity 2023      Tension Pin w/ Pom Pom  Red TP      Grooved peg board   R: 2:25      Velcro board         Purdue peg board                         Modalities 2023      CP      " paraffin 10' w/ MH 10' w/ MH       Ultrasound 10'   0 8 intensity   3MHz   50%  10'   0 8 intensity   3MHz   50%      MH        E-STIM            Patient's session on this date was conducted by PAPI Lozoya under the direct supervision of the clinical instructor, John Zelaya MS OTR/L

## 2023-06-26 DIAGNOSIS — R35.0 URINE FREQUENCY: Primary | ICD-10-CM

## 2023-06-26 NOTE — PROGRESS NOTES
Pt requested urine culture order to be placed  She had a UTI in May and wanted to be sure its not back  She is experiecing urine frequency

## 2023-06-27 ENCOUNTER — APPOINTMENT (OUTPATIENT)
Dept: LAB | Facility: CLINIC | Age: 68
End: 2023-06-27
Payer: MEDICARE

## 2023-06-27 DIAGNOSIS — R35.0 URINE FREQUENCY: ICD-10-CM

## 2023-06-27 PROCEDURE — 87086 URINE CULTURE/COLONY COUNT: CPT

## 2023-06-28 ENCOUNTER — OFFICE VISIT (OUTPATIENT)
Dept: OCCUPATIONAL THERAPY | Facility: CLINIC | Age: 68
End: 2023-06-28
Payer: MEDICARE

## 2023-06-28 DIAGNOSIS — S69.91XD INJURY OF RIGHT WRIST, SUBSEQUENT ENCOUNTER: Primary | ICD-10-CM

## 2023-06-28 LAB — BACTERIA UR CULT: NORMAL

## 2023-06-28 PROCEDURE — 97110 THERAPEUTIC EXERCISES: CPT

## 2023-06-28 PROCEDURE — 97140 MANUAL THERAPY 1/> REGIONS: CPT

## 2023-06-28 NOTE — PROGRESS NOTES
"Daily Note     Today's date: 2023  Patient name: Rina Pagan  : 1955  MRN: [id number]  Referring provider: BALDO Fairchild*  Dx:   Encounter Diagnosis     ICD-10-CM    1  Injury of right wrist, subsequent encounter  S69 91XD                      Subjective: \"My wrist feels a lot better\"      Objective: See treatment diary below      Assessment: Tolerated treatment well  Patient exhibited good technique with therapeutic exercises and would benefit from continued OT  Patient increased therapeutic exercises by completing the digi-flex/ TP with green and using the thin red flex bar  Patient required decreased rest breaks to complete therapeutic exercises  Patient reports an improvement in wrist function and strength when picking up objects  Patient has follow-up with ortho on 2023  Plan: Continue per plan of care  Progress treatment as tolerated         Initial Evaluation completed on: 2023  Re-Evaluation needs to be completed before: 2023  Insurance: Medicare  FOTO: 2023  Auth Visits: N/A     Precautions: R wrist pain    Manuals 2023     Right Wrist Stretches All Planes  30 sec x 3  30 sec x 3      Edema massage  5' 5'     IASTM  10'   GT #3 10'   GT #3                     Neuro Re-Ed  2023                                     Ther Ex 2023     Wrist Maze  X 5 X 5      Opposition        TGE        Digi-Flex  Red x 20  Green x 20      AROM R Wrist  Flex/Ext  RD/UD  Circles  1#  X 20   X 20   X 20  2#   X 20   X 20   X 20      Hand Power Pro  Blue RB x 20  Green x 20     Isometrics R Wrist  Flexion  Extension  RD  UD        Flex Bar  Twists  Bends   Bottle caps   Thin yellow   X 20  X 20    Thin red   X 20   X 20      Putty program for R hand  Squeezes  Lateral key pinch   2-point pinch   3-point pinch     HEP  HEP  HEP  HEP                       Ther Activity 2023 " 06/28/2023     Tension Pin w/ Pom Pom  Red TP Green TP     Grooved peg board   R: 2:25 R: 2:03     Velcro board         Purdue peg board                         Modalities 06/12/2023 06/21/2023 06/28/2023     CP        paraffin 10' w/ MH 10' w/ MH  10' w paraffin      Ultrasound 10'   0 8 intensity   3MHz   50%  10'   0 8 intensity   3MHz   50%      MH        E-STIM            Patient's session on this date was conducted by PAPI Herman under the direct supervision of the clinical instructor, Nadine Grimaldo, MS OTR/L

## 2023-07-03 ENCOUNTER — OFFICE VISIT (OUTPATIENT)
Dept: OCCUPATIONAL THERAPY | Facility: CLINIC | Age: 68
End: 2023-07-03
Payer: MEDICARE

## 2023-07-03 DIAGNOSIS — M25.531 RIGHT WRIST PAIN: ICD-10-CM

## 2023-07-03 DIAGNOSIS — S69.91XA INJURY OF RIGHT WRIST, INITIAL ENCOUNTER: ICD-10-CM

## 2023-07-03 DIAGNOSIS — S60.221A CONTUSION OF RIGHT HAND, INITIAL ENCOUNTER: ICD-10-CM

## 2023-07-03 DIAGNOSIS — S69.91XD INJURY OF RIGHT WRIST, SUBSEQUENT ENCOUNTER: Primary | ICD-10-CM
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
## 2023-07-03 PROCEDURE — 97110 THERAPEUTIC EXERCISES: CPT

## 2023-07-03 PROCEDURE — 97140 MANUAL THERAPY 1/> REGIONS: CPT

## 2023-07-03 PROCEDURE — 97018 PARAFFIN BATH THERAPY: CPT

## 2023-07-03 NOTE — PROGRESS NOTES
Daily Note     Today's date: 7/3/2023  Patient name: Eleonora Zepeda  : 1955  MRN: [id number]  Referring provider: BALDO Dai*  Dx:   Encounter Diagnosis     ICD-10-CM    1. Injury of right wrist, subsequent encounter  S69.91XD       2. Right wrist pain  M25.531       3. Injury of right wrist, initial encounter  S69.91XA       4. Fall, subsequent encounter  W19. XXXD       5. Contusion of right hand, initial encounter  S60.221A                      Subjective: I havent worn the brace in about a week, I know I need to use it. Objective: See treatment diary below      Assessment: Tolerated treatment well. Patient exhibited good technique with therapeutic exercises and would benefit from continued OT. Pt presents this date with minimal pain and participated in skilled with focus to ROM, pain management, strengthening, and functional activity, for increased safety and engagement in ADL/IADL activity. Pt sees ortho on  and will follow up afterward for more apts if needed. Pt states increased ROM and decreased discomfort post treatment. Plan: Continue per plan of care. Progress treatment as tolerated.        Initial Evaluation completed on: 2023  Re-Evaluation needs to be completed before: 2023  Insurance: Medicare  FOTO: 2023  Auth Visits: N/A     Precautions: R wrist pain    Manuals 2023 2023 2023 7/3/2023    Right Wrist Stretches All Planes  30 sec x 3  30 sec x 3  30 sec x 3 F/E    Edema massage  5' 5' 5'    IASTM  10'   GT #3 10'   GT #3 5' #3                    Neuro Re-Ed  2023 2023 2023 7/3/2023                                    Ther Ex 2023 2023 2023 7/3/2023    Wrist Maze  X 5 X 5  X 6    Opposition        TGE        Digi-Flex  Red x 20  Green x 20  Green  X 20    AROM R Wrist  Flex/Ext  RD/UD  Circles  1#  X 20   X 20   X 20  2#   X 20   X 20   X 20  2#  X 20  X 20  X 10 2 way    Hand Power Pro  Blue RB x 20 Green x 20 Green  X 20    Isometrics R Wrist  Flexion  Extension  RD  UD        Flex Bar  Twists  Bends   Bottle caps   Thin yellow   X 20  X 20    Thin red   X 20   X 20  Thin Red  X 20 2 way  X 20 2 way    Putty program for R hand  Squeezes  Lateral key pinch   2-point pinch   3-point pinch     HEP  HEP  HEP  HEP                       Ther Activity 06/12/2023 06/21/2023 06/28/2023 7/3/2023    Tension Pin w/ Pom Pom  Red TP Green TP Green all pins    Grooved peg board   R: 2:25 R: 2:03     Velcro board         Purdue peg board                         Modalities 06/12/2023 06/21/2023 06/28/2023 7/3/2023    CP        paraffin 10' w/ MH 10' w/ MH  10' w paraffin  10' w/MH    Ultrasound 10'   0.8 intensity   3MHz   50%  10'   0.8 intensity   3MHz   50%      MH        E-STIM

## 2023-07-05 ENCOUNTER — OFFICE VISIT (OUTPATIENT)
Dept: OBGYN CLINIC | Facility: CLINIC | Age: 68
End: 2023-07-05
Payer: MEDICARE

## 2023-07-05 VITALS
BODY MASS INDEX: 36.91 KG/M2 | DIASTOLIC BLOOD PRESSURE: 76 MMHG | HEIGHT: 60 IN | WEIGHT: 188 LBS | SYSTOLIC BLOOD PRESSURE: 122 MMHG

## 2023-07-05 DIAGNOSIS — S69.91XA INJURY OF RIGHT WRIST, INITIAL ENCOUNTER: ICD-10-CM

## 2023-07-05 DIAGNOSIS — M25.531 RIGHT WRIST PAIN: ICD-10-CM

## 2023-07-05 DIAGNOSIS — S60.221A CONTUSION OF RIGHT HAND, INITIAL ENCOUNTER: ICD-10-CM

## 2023-07-05 PROCEDURE — 99213 OFFICE O/P EST LOW 20 MIN: CPT | Performed by: SURGERY

## 2023-07-05 NOTE — PROGRESS NOTES
Assessment:    Right hand contusion      Plan:    Symptoms resolved, may do activities as tolerated without specific restrictions. No further bracing or therapy is recommended. Follow-up on an as-needed basis with instructions to call the office with any questions or concerns. Problem List Items Addressed This Visit        Other    Right wrist pain    Contusion of right hand    Injury of right wrist                Subjective:     HPI    Patient ID:  Carol Flores is a right-hand-dominant 75 y.o. female senting for evaluation of the right hand. He had a fall onto her right volar hand/wrist about a month ago. She was seen in urgent care as well as immediate care. X-rays were negative for any fracture. She was placed in a splint for about 2 weeks and referred here for follow-up. Today, she states she basically has no pain. She has full range of motion of the wrist and fingers and is doing well. She has no numbness and tingling. She offers no other complaints at today's visit. The following portions of the patient's history were reviewed and updated as appropriate: allergies, current medications, past family history, past medical history, past social history, past surgical history, and problem list.    Review of Systems     Objective:    Imaging:  Right wrist x-rays 6/5/2023  VIEWS:  XR WRIST 3+ VW RIGHT  Images: 4     FINDINGS:     There is no acute fracture or dislocation.     No there are some degenerative changes in the hand. No lytic or blastic osseous lesion.     Soft tissues are unremarkable.     IMPRESSION:     No acute osseous abnormality. Physical Exam     Vitals:    07/05/23 0858   BP: 122/76       General appearance:  NAD   Cardiac:  Regular rate  Lungs:  Unlabored breathing  Abdomen:  Non-distended    Orthopedic Examination:  Right wrist     Inspection: No open wounds or erythema. There is resolving ecchymosis of the palmar surface of the right hand.   No swelling. Palpation: Prominences of the hand and wrist are nontender to palpation, specifically over the area of concern.     Range-of-motion: Normal wrist range of motion without pain, full composite fist    Strength: 5/5 wrist flexion extension,     Sensation: Intact to light touch median radial ulnar nerve distribution    Special Tests: Palpable radial pulse

## 2023-07-07 DIAGNOSIS — E78.2 MIXED HYPERLIPIDEMIA: ICD-10-CM

## 2023-07-07 RX ORDER — ROSUVASTATIN CALCIUM 10 MG/1
10 TABLET, COATED ORAL DAILY
Qty: 90 TABLET | Refills: 3 | Status: SHIPPED | OUTPATIENT
Start: 2023-07-07

## 2023-07-07 NOTE — TELEPHONE ENCOUNTER
[address] [facility]  Rosuvastatin 10 mg     [facility]

## 2023-07-09 DIAGNOSIS — R25.9 MIXED ACTION AND RESTING TREMOR: ICD-10-CM

## 2023-07-10 RX ORDER — PROPRANOLOL HYDROCHLORIDE 10 MG/1
TABLET ORAL
Qty: 360 TABLET | Refills: 5 | Status: SHIPPED | OUTPATIENT
Start: 2023-07-10

## 2023-07-16 ENCOUNTER — NURSE TRIAGE (OUTPATIENT)
Dept: OTHER | Facility: OTHER | Age: 68
End: 2023-07-16

## 2023-07-16 NOTE — TELEPHONE ENCOUNTER
Reason for Disposition  • SEVERE itching (i.e., interferes with sleep, normal activities or school)    Answer Assessment - Initial Assessment Questions  1. APPEARANCE of RASH: "Describe the rash." (e.g., spots, blisters, raised areas, skin peeling, scaly)    Spots and reddened area    2. SIZE: "How big are the spots?" (e.g., tip of pen, eraser, coin; inches, centimeters)    Tip of pen    3. LOCATION: "Where is the rash located?"      Both ankles and spreading up to legs      4. COLOR: "What color is the rash?" (Note: It is difficult to assess rash color in people with darker-colored skin. When this situation occurs, simply ask the caller to describe what they see.)  Red    5. ONSET: "When did the rash begin?"    7/15    6. FEVER: "Do you have a fever?" If Yes, ask: "What is your temperature, how was it measured, and when did it start?"  No    7. ITCHING: "Does the rash itch?" If Yes, ask: "How bad is the itch?" (Scale 1-10; or mild, moderate, severe)   yes severe    8. CAUSE: "What do you think is causing the rash?"   possible cellulitis     9. MEDICATION FACTORS: "Have you started any new medications within the last 2 weeks?" (e.g., antibiotics)   Denies    10. OTHER SYMPTOMS: "Do you have any other symptoms?" (e.g., dizziness, headache, sore throat, joint pain)  No      11.  PREGNANCY: "Is there any chance you are pregnant?" "When was your last menstrual period?"      No    Protocols used: RASH OR REDNESS - [facility]

## 2023-07-16 NOTE — TELEPHONE ENCOUNTER
Regarding: Red itchy rash around ankles and legs  ----- Message from Sharmon Brunner sent at 7/16/2023 12:50 PM EDT -----  "I have a rash around my ankles. It's itchy, it's really red, and it's spreading up my legs. "

## 2023-07-17 ENCOUNTER — OFFICE VISIT (OUTPATIENT)
Dept: INTERNAL MEDICINE CLINIC | Facility: CLINIC | Age: 68
End: 2023-07-17
Payer: MEDICARE

## 2023-07-17 VITALS
DIASTOLIC BLOOD PRESSURE: 68 MMHG | BODY MASS INDEX: 38.56 KG/M2 | OXYGEN SATURATION: 98 % | WEIGHT: 196.4 LBS | TEMPERATURE: 96.9 F | HEIGHT: 60 IN | HEART RATE: 62 BPM | SYSTOLIC BLOOD PRESSURE: 122 MMHG

## 2023-07-17 DIAGNOSIS — E28.39 MENOPAUSE OVARIAN FAILURE: ICD-10-CM

## 2023-07-17 DIAGNOSIS — F33.9 DEPRESSION, RECURRENT (HCC): ICD-10-CM

## 2023-07-17 DIAGNOSIS — L30.9 DERMATITIS: Primary | ICD-10-CM

## 2023-07-17 DIAGNOSIS — E78.2 MIXED HYPERLIPIDEMIA: ICD-10-CM

## 2023-07-17 DIAGNOSIS — J30.2 SEASONAL ALLERGIES: ICD-10-CM

## 2023-07-17 PROCEDURE — 99213 OFFICE O/P EST LOW 20 MIN: CPT | Performed by: INTERNAL MEDICINE

## 2023-07-17 PROCEDURE — 96372 THER/PROPH/DIAG INJ SC/IM: CPT | Performed by: INTERNAL MEDICINE

## 2023-07-17 RX ORDER — MONTELUKAST SODIUM 10 MG/1
TABLET ORAL
Qty: 90 TABLET | Refills: 3 | Status: SHIPPED | OUTPATIENT
Start: 2023-07-17

## 2023-07-17 RX ORDER — DEXAMETHASONE SODIUM PHOSPHATE 4 MG/ML
4 INJECTION, SOLUTION INTRA-ARTICULAR; INTRALESIONAL; INTRAMUSCULAR; INTRAVENOUS; SOFT TISSUE ONCE
Status: COMPLETED | OUTPATIENT
Start: 2023-07-17 | End: 2023-07-17

## 2023-07-17 RX ORDER — TRAZODONE HYDROCHLORIDE 100 MG/1
TABLET ORAL
Qty: 90 TABLET | Refills: 1 | Status: SHIPPED | OUTPATIENT
Start: 2023-07-17

## 2023-07-17 RX ORDER — ROSUVASTATIN CALCIUM 5 MG/1
TABLET, COATED ORAL
Qty: 90 TABLET | Refills: 3 | Status: SHIPPED | OUTPATIENT
Start: 2023-07-17

## 2023-07-17 RX ORDER — METHYLPREDNISOLONE 4 MG/1
TABLET ORAL
Qty: 21 EACH | Refills: 0 | Status: SHIPPED | OUTPATIENT
Start: 2023-07-17

## 2023-07-17 RX ADMIN — DEXAMETHASONE SODIUM PHOSPHATE 4 MG: 4 INJECTION, SOLUTION INTRA-ARTICULAR; INTRALESIONAL; INTRAMUSCULAR; INTRAVENOUS; SOFT TISSUE at 08:58

## 2023-07-17 NOTE — PROGRESS NOTES
Assessment/Plan:  Problem List Items Addressed This Visit    None  Visit Diagnoses     Dermatitis    -  Primary    Relevant Medications    dexamethasone (DECADRON) injection 4 mg (Start on 7/17/2023  9:00 AM)    methylPREDNISolone 4 MG tablet therapy pack    Menopause ovarian failure        Relevant Orders    DXA bone density spine hip and pelvis           Diagnoses and all orders for this visit:    Dermatitis  -     dexamethasone (DECADRON) injection 4 mg  -     methylPREDNISolone 4 MG tablet therapy pack; Use as directed on package    Menopause ovarian failure  -     DXA bone density spine hip and pelvis; Future        No problem-specific Assessment & Plan notes found for this encounter. A/P: ??cause of the rash. Looks more like a contact dermatitis. Will give a short steroid wean and topically 50/50 cream. RTC one week for f/u. Subjective:      Patient ID: Toya Ball is a 75 y.o. female. WF presents with a several day h/o bilat ankle rash. No new meds, dietary changes, or exposures. NO one else affected. Very itchy. Was recently babysitting a dog. No fever or chills. Recent lichen and candidiasis rash in the private area that resolved. The following portions of the patient's history were reviewed and updated as appropriate:   She has a past medical history of Arthritis, Cataract, Depression, Fluid retention, Headache, acute, Heart murmur, Hyperlipidemia, Hypertension, Memory loss, Pedal edema, Pneumonia, PONV (postoperative nausea and vomiting), Rotator cuff tear, left, Sleep apnea (2021), Wears glasses, and Wears partial dentures. ,  does not have any pertinent problems on file. ,   has a past surgical history that includes Ankle surgery (Right); Tonsillectomy; Tubal ligation; Hysterectomy; Ovarian cyst removal; Carpal tunnel release (Bilateral); Hand surgery (Right); Abdominal adhesion surgery; Knee arthroscopy (Left); Appendectomy; Colonoscopy;  Hemorroidectomy; Repair rectocele; pr surgical arthroscopy shoulder w/rotator cuff rpr (Left, 2/19/2018); Carpal tunnel release (Left); Carpal tunnel release (Right); Ileostomy; Anal sphincteroplasty; Knee arthrocentesis; Tonsillectomy and adenoidectomy; Joint replacement; and Replacement total knee (Left, 09/28/2021). ,  family history includes Alzheimer's disease in her sister and sister; Bone cancer in her family and mother; COPD in her mother; Colon cancer in her family and mother; Depression in her sister; Diabetes in her sister; Emphysema in her mother; ROBERT disease in her brother; Heart failure in her sister; Hypertension in her brother, family, mother, sister, and sister; Lead poisoning in her father; No Known Problems in her daughter, maternal aunt, maternal aunt, maternal aunt, maternal aunt, maternal aunt, maternal grandmother, paternal aunt, and paternal grandmother; Skin cancer in her mother; Throat cancer in her brother; Thyroid disease in her sister. ,   reports that she has never smoked. She has never been exposed to tobacco smoke. She has never used smokeless tobacco. She reports current alcohol use. She reports that she does not use drugs. ,  is allergic to keflex [cephalexin], contrast [iodinated contrast media], morphine and related, penicillins, benadryl [diphenhydramine], ciprofloxacin, clindamycin, and erythromycin. .  Current Outpatient Medications   Medication Sig Dispense Refill   • acetaminophen (TYLENOL) 500 mg tablet Take 500-1,000 mg by mouth every 6 (six) hours as needed for mild pain       • albuterol (ProAir HFA) 90 mcg/act inhaler Inhale 2 puffs every 4 (four) hours as needed for wheezing 18 g 0   • Calcium Carbonate (CALCIUM 600 PO) Take by mouth daily       • Cholecalciferol (VITAMIN D3 PO) Take 50 mcg by mouth daily       • clotrimazole-betamethasone (LOTRISONE) 1-0.05 % cream Apply topically 2 (two) times a day for 2 weeks to abdomen rash and 6 weeks to vulvar rash.  30 g 2   • methylPREDNISolone 4 MG tablet therapy pack Use as directed on package 21 each 0   • montelukast (SINGULAIR) 10 mg tablet take 1 tablet by mouth at bedtime 90 tablet 3   • nystatin (MYCOSTATIN) powder Apply topically 3 (three) times a day as needed (rash) . Apply at least daily in summer to prevent rash. 45 g 3   • propranolol (INDERAL) 10 mg tablet TAKE 1 TABLET BY MOUTH TWICE DAILY FOR 1 WEEK. IF NO IMPROVEMENT THEN CAN INCREASE TO 2 TABLETS TWICE DAILY 360 tablet 5   • rosuvastatin (CRESTOR) 10 MG tablet Take 1 tablet (10 mg total) by mouth daily 90 tablet 3   • traZODone (DESYREL) 100 mg tablet take 1 tablet by mouth at bedtime 90 tablet 1     Current Facility-Administered Medications   Medication Dose Route Frequency Provider Last Rate Last Admin   • dexamethasone (DECADRON) injection 4 mg  4 mg Intramuscular Once Sj Judge DO           Review of Systems   Constitutional: Positive for activity change. Negative for chills, diaphoresis, fatigue and fever. Respiratory: Negative for cough, chest tightness, shortness of breath and wheezing. Cardiovascular: Negative for chest pain, palpitations and leg swelling. Gastrointestinal: Negative for abdominal pain, constipation, diarrhea, nausea and vomiting. Genitourinary: Negative for difficulty urinating, dysuria and frequency. Musculoskeletal: Negative for arthralgias, gait problem and myalgias. Skin: Positive for rash. Neurological: Negative for light-headedness and headaches. Psychiatric/Behavioral: Negative for confusion. The patient is not nervous/anxious. PHQ-2/9 Depression Screening          Objective:  Vitals:    07/17/23 0839   BP: 122/68   BP Location: Left arm   Patient Position: Sitting   Cuff Size: Standard   Pulse: 62   Temp: (!) 96.9 °F (36.1 °C)   SpO2: 98%   Weight: 89.1 kg (196 lb 6.4 oz)   Height: 5' (1.524 m)     Body mass index is 38.36 kg/m². Physical Exam  Constitutional:       General: She is not in acute distress. Appearance: Normal appearance.  She is not ill-appearing. HENT:      Head: Normocephalic and atraumatic. Mouth/Throat:      Mouth: Mucous membranes are moist.   Eyes:      Extraocular Movements: Extraocular movements intact. Conjunctiva/sclera: Conjunctivae normal.      Pupils: Pupils are equal, round, and reactive to light. Cardiovascular:      Rate and Rhythm: Normal rate and regular rhythm. Heart sounds: Normal heart sounds. Pulmonary:      Effort: Pulmonary effort is normal. No respiratory distress. Breath sounds: Normal breath sounds. No wheezing, rhonchi or rales. Skin:     Findings: Rash (bialt ankles and achillles/heel areas with erythematous maculopapular confluence. No vesicle. Non blanching. ) present. Neurological:      General: No focal deficit present. Mental Status: She is alert and oriented to person, place, and time. Mental status is at baseline. Psychiatric:         Mood and Affect: Mood normal.         Behavior: Behavior normal.         Thought Content:  Thought content normal.         Judgment: Judgment normal.

## 2023-07-24 ENCOUNTER — OFFICE VISIT (OUTPATIENT)
Dept: INTERNAL MEDICINE CLINIC | Facility: CLINIC | Age: 68
End: 2023-07-24
Payer: MEDICARE

## 2023-07-24 VITALS
OXYGEN SATURATION: 99 % | SYSTOLIC BLOOD PRESSURE: 134 MMHG | HEART RATE: 91 BPM | HEIGHT: 60 IN | BODY MASS INDEX: 37.5 KG/M2 | TEMPERATURE: 98.3 F | WEIGHT: 191 LBS | DIASTOLIC BLOOD PRESSURE: 80 MMHG

## 2023-07-24 DIAGNOSIS — L30.9 DERMATITIS: Primary | ICD-10-CM

## 2023-07-24 DIAGNOSIS — B00.1 HERPES LABIALIS: ICD-10-CM

## 2023-07-24 PROCEDURE — 99213 OFFICE O/P EST LOW 20 MIN: CPT | Performed by: INTERNAL MEDICINE

## 2023-07-24 RX ORDER — VALACYCLOVIR HYDROCHLORIDE 1 G/1
1000 TABLET, FILM COATED ORAL 2 TIMES DAILY
Qty: 6 TABLET | Refills: 0 | Status: SHIPPED | OUTPATIENT
Start: 2023-07-24 | End: 2023-07-25

## 2023-07-24 NOTE — PROGRESS NOTES
Assessment/Plan:  Problem List Items Addressed This Visit    None  Visit Diagnoses     Dermatitis    -  Primary    Herpes labialis        Relevant Medications    valACYclovir (VALTREX) 1,000 mg tablet           Diagnoses and all orders for this visit:    Dermatitis    Herpes labialis  -     valACYclovir (VALTREX) 1,000 mg tablet; Take 1 tablet (1,000 mg total) by mouth 2 (two) times a day for 1 day        No problem-specific Assessment & Plan notes found for this encounter. A/P: Rash is improving and will see how she does off the oral steroids, but is to continue topical. ?cause. Now suspect pt is developing a HSV. Will start valtrex. Continue current treatment and RTC as scheduled. Subjective:      Patient ID: Toya Ball is a 75 y.o. female. WF RTC for a one week f/u ?dermatitis after starting a steroid wean. Reports bilat ankles with rash gone, but still some itching. Just took her last prednisone today. Now reports ?lip lesion the past several hours. Some burning. H/o cold sores. No new meds other than the prednisone. No new exposures. No fever or chills. NO other s/s. The following portions of the patient's history were reviewed and updated as appropriate:   She has a past medical history of Arthritis, Cataract, Depression, Fluid retention, Headache, acute, Heart murmur, Hyperlipidemia, Hypertension, Memory loss, Pedal edema, Pneumonia, PONV (postoperative nausea and vomiting), Rotator cuff tear, left, Sleep apnea (2021), Wears glasses, and Wears partial dentures. ,  does not have any pertinent problems on file. ,   has a past surgical history that includes Ankle surgery (Right); Tonsillectomy; Tubal ligation; Hysterectomy; Ovarian cyst removal; Carpal tunnel release (Bilateral); Hand surgery (Right); Abdominal adhesion surgery; Knee arthroscopy (Left); Appendectomy; Colonoscopy; Hemorroidectomy; Repair rectocele; pr surgical arthroscopy shoulder w/rotator cuff rpr (Left, 2/19/2018);  Carpal tunnel release (Left); Carpal tunnel release (Right); Ileostomy; Anal sphincteroplasty; Knee arthrocentesis; Tonsillectomy and adenoidectomy; Joint replacement; and Replacement total knee (Left, 09/28/2021). ,  family history includes Alzheimer's disease in her sister and sister; Bone cancer in her family and mother; COPD in her mother; Colon cancer in her family and mother; Depression in her sister; Diabetes in her sister; Emphysema in her mother; ROBERT disease in her brother; Heart failure in her sister; Hypertension in her brother, family, mother, sister, and sister; Lead poisoning in her father; No Known Problems in her daughter, maternal aunt, maternal aunt, maternal aunt, maternal aunt, maternal aunt, maternal grandmother, paternal aunt, and paternal grandmother; Skin cancer in her mother; Throat cancer in her brother; Thyroid disease in her sister. ,   reports that she has never smoked. She has never been exposed to tobacco smoke. She has never used smokeless tobacco. She reports current alcohol use. She reports that she does not use drugs. ,  is allergic to keflex [cephalexin], contrast [iodinated contrast media], morphine and related, penicillins, benadryl [diphenhydramine], ciprofloxacin, clindamycin, and erythromycin. .  Current Outpatient Medications   Medication Sig Dispense Refill   • valACYclovir (VALTREX) 1,000 mg tablet Take 1 tablet (1,000 mg total) by mouth 2 (two) times a day for 1 day 6 tablet 0   • acetaminophen (TYLENOL) 500 mg tablet Take 500-1,000 mg by mouth every 6 (six) hours as needed for mild pain       • albuterol (ProAir HFA) 90 mcg/act inhaler Inhale 2 puffs every 4 (four) hours as needed for wheezing 18 g 0   • Calcium Carbonate (CALCIUM 600 PO) Take by mouth daily       • Cholecalciferol (VITAMIN D3 PO) Take 50 mcg by mouth daily       • clotrimazole-betamethasone (LOTRISONE) 1-0.05 % cream Apply topically 2 (two) times a day for 2 weeks to abdomen rash and 6 weeks to vulvar rash.  30 g 2 • methylPREDNISolone 4 MG tablet therapy pack Use as directed on package 21 each 0   • montelukast (SINGULAIR) 10 mg tablet take 1 tablet by mouth at bedtime 90 tablet 3   • nystatin (MYCOSTATIN) powder Apply topically 3 (three) times a day as needed (rash) . Apply at least daily in summer to prevent rash. 45 g 3   • propranolol (INDERAL) 10 mg tablet TAKE 1 TABLET BY MOUTH TWICE DAILY FOR 1 WEEK. IF NO IMPROVEMENT THEN CAN INCREASE TO 2 TABLETS TWICE DAILY 360 tablet 5   • rosuvastatin (CRESTOR) 10 MG tablet Take 1 tablet (10 mg total) by mouth daily 90 tablet 3   • rosuvastatin (CRESTOR) 5 mg tablet take 1 tablet by mouth once daily 90 tablet 3   • traZODone (DESYREL) 100 mg tablet take 1 tablet by mouth at bedtime 90 tablet 1     No current facility-administered medications for this visit. Review of Systems   Constitutional: Negative for activity change, chills, diaphoresis, fatigue and fever. Respiratory: Negative for cough, chest tightness, shortness of breath and wheezing. Cardiovascular: Negative for chest pain, palpitations and leg swelling. Gastrointestinal: Negative for abdominal pain, constipation, diarrhea, nausea and vomiting. Genitourinary: Negative for difficulty urinating, dysuria and frequency. Musculoskeletal: Negative for arthralgias, gait problem and myalgias. Skin: Positive for rash. Neurological: Negative for light-headedness and headaches. Psychiatric/Behavioral: Negative for confusion. The patient is not nervous/anxious.         PHQ-2/9 Depression Screening    Little interest or pleasure in doing things: 0 - not at all  Feeling down, depressed, or hopeless: 0 - not at all  Trouble falling or staying asleep, or sleeping too much: 0 - not at all  Feeling tired or having little energy: 0 - not at all  Poor appetite or overeatin - not at all  Feeling bad about yourself - or that you are a failure or have let yourself or your family down: 0 - not at all  Trouble concentrating on things, such as reading the newspaper or watching television: 0 - not at all  Moving or speaking so slowly that other people could have noticed. Or the opposite - being so fidgety or restless that you have been moving around a lot more than usual: 0 - not at all  Thoughts that you would be better off dead, or of hurting yourself in some way: 0 - not at all  PHQ-9 Score: 0   PHQ-9 Interpretation: No or Minimal depression         Objective:  Vitals:    07/24/23 1027   BP: 134/80   Pulse: 91   Temp: 98.3 °F (36.8 °C)   SpO2: 99%   Weight: 86.6 kg (191 lb)   Height: 5' (1.524 m)     Body mass index is 37.3 kg/m². Physical Exam  Vitals and nursing note reviewed. Constitutional:       General: She is not in acute distress. Appearance: Normal appearance. She is not ill-appearing. HENT:      Head: Normocephalic and atraumatic. Mouth/Throat:      Mouth: Mucous membranes are moist.   Eyes:      Extraocular Movements: Extraocular movements intact. Conjunctiva/sclera: Conjunctivae normal.      Pupils: Pupils are equal, round, and reactive to light. Cardiovascular:      Rate and Rhythm: Normal rate and regular rhythm. Heart sounds: Normal heart sounds. No murmur heard. Pulmonary:      Effort: Pulmonary effort is normal. No respiratory distress. Breath sounds: Normal breath sounds. No wheezing, rhonchi or rales. Skin:     Findings: Lesion present. No rash. Comments: Several discrete erythematous maculovesicles at the border of the right upper lip. Neurological:      General: No focal deficit present. Mental Status: She is alert. Mental status is at baseline. Psychiatric:         Mood and Affect: Mood normal.         Behavior: Behavior normal.         Thought Content:  Thought content normal.         Judgment: Judgment normal.

## 2023-08-07 ENCOUNTER — HOSPITAL ENCOUNTER (OUTPATIENT)
Dept: BONE DENSITY | Facility: HOSPITAL | Age: 68
Discharge: HOME | End: 2023-08-07
Attending: INTERNAL MEDICINE
Payer: MEDICARE

## 2023-08-07 ENCOUNTER — HOSPITAL ENCOUNTER (OUTPATIENT)
Dept: MAMMOGRAPHY | Facility: HOSPITAL | Age: 68
Discharge: HOME | End: 2023-08-07
Attending: INTERNAL MEDICINE
Payer: MEDICARE

## 2023-08-07 VITALS — HEIGHT: 60 IN | BODY MASS INDEX: 37.22 KG/M2 | WEIGHT: 189.6 LBS

## 2023-08-07 VITALS — BODY MASS INDEX: 37.22 KG/M2 | HEIGHT: 60 IN | WEIGHT: 189.6 LBS

## 2023-08-07 DIAGNOSIS — E28.39 MENOPAUSE OVARIAN FAILURE: ICD-10-CM

## 2023-08-07 DIAGNOSIS — Z12.31 ENCOUNTER FOR SCREENING MAMMOGRAM FOR MALIGNANT NEOPLASM OF BREAST: ICD-10-CM

## 2023-08-07 PROCEDURE — 77063 BREAST TOMOSYNTHESIS BI: CPT

## 2023-08-07 PROCEDURE — 77080 DXA BONE DENSITY AXIAL: CPT

## 2023-08-07 PROCEDURE — 77067 SCR MAMMO BI INCL CAD: CPT

## 2023-08-08 ENCOUNTER — APPOINTMENT (OUTPATIENT)
Dept: RADIOLOGY | Facility: CLINIC | Age: 68
End: 2023-08-08
Payer: MEDICARE

## 2023-08-08 DIAGNOSIS — M89.9 BONE LESION: Primary | ICD-10-CM

## 2023-08-08 DIAGNOSIS — M89.9 BONE LESION: ICD-10-CM

## 2023-08-08 PROCEDURE — 73522 X-RAY EXAM HIPS BI 3-4 VIEWS: CPT

## 2023-08-09 ENCOUNTER — OFFICE VISIT (OUTPATIENT)
Dept: INTERNAL MEDICINE CLINIC | Facility: CLINIC | Age: 68
End: 2023-08-09
Payer: MEDICARE

## 2023-08-09 VITALS
WEIGHT: 192.25 LBS | HEIGHT: 60 IN | HEART RATE: 82 BPM | OXYGEN SATURATION: 98 % | DIASTOLIC BLOOD PRESSURE: 70 MMHG | SYSTOLIC BLOOD PRESSURE: 126 MMHG | BODY MASS INDEX: 37.74 KG/M2 | TEMPERATURE: 98.2 F

## 2023-08-09 DIAGNOSIS — K59.00 CONSTIPATION, UNSPECIFIED CONSTIPATION TYPE: ICD-10-CM

## 2023-08-09 DIAGNOSIS — G47.33 OSA (OBSTRUCTIVE SLEEP APNEA): ICD-10-CM

## 2023-08-09 DIAGNOSIS — E78.2 MIXED HYPERLIPIDEMIA: ICD-10-CM

## 2023-08-09 DIAGNOSIS — M15.9 PRIMARY OSTEOARTHRITIS INVOLVING MULTIPLE JOINTS: ICD-10-CM

## 2023-08-09 DIAGNOSIS — I10 PRIMARY HYPERTENSION: Primary | ICD-10-CM

## 2023-08-09 DIAGNOSIS — M67.40 GANGLION CYST: ICD-10-CM

## 2023-08-09 DIAGNOSIS — N18.30 STAGE 3 CHRONIC KIDNEY DISEASE, UNSPECIFIED WHETHER STAGE 3A OR 3B CKD (HCC): ICD-10-CM

## 2023-08-09 DIAGNOSIS — F33.9 DEPRESSION, RECURRENT (HCC): ICD-10-CM

## 2023-08-09 DIAGNOSIS — G89.4 CHRONIC PAIN SYNDROME: ICD-10-CM

## 2023-08-09 DIAGNOSIS — K76.0 FATTY LIVER: ICD-10-CM

## 2023-08-09 DIAGNOSIS — E66.01 MORBID OBESITY (HCC): ICD-10-CM

## 2023-08-09 DIAGNOSIS — R73.03 PREDIABETES: ICD-10-CM

## 2023-08-09 DIAGNOSIS — M85.89 OSTEOPENIA OF MULTIPLE SITES: ICD-10-CM

## 2023-08-09 PROBLEM — S60.221A CONTUSION OF RIGHT HAND: Status: RESOLVED | Noted: 2023-06-08 | Resolved: 2023-08-09
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
## 2023-08-09 PROBLEM — S69.91XA INJURY OF RIGHT WRIST: Status: RESOLVED | Noted: 2023-06-08 | Resolved: 2023-08-09

## 2023-08-09 PROBLEM — M25.531 RIGHT WRIST PAIN: Status: RESOLVED | Noted: 2023-06-08 | Resolved: 2023-08-09

## 2023-08-09 LAB
CREAT UR-MCNC: 58.8 MG/DL
MICROALBUMIN UR-MCNC: <5 MG/L (ref 0–20)
MICROALBUMIN/CREAT 24H UR: <9 MG/G CREATININE (ref 0–30)
SL AMB POCT HEMOGLOBIN AIC: 6 (ref ?–6.5)

## 2023-08-09 PROCEDURE — 83036 HEMOGLOBIN GLYCOSYLATED A1C: CPT | Performed by: INTERNAL MEDICINE

## 2023-08-09 PROCEDURE — 99214 OFFICE O/P EST MOD 30 MIN: CPT | Performed by: INTERNAL MEDICINE

## 2023-08-09 PROCEDURE — 82570 ASSAY OF URINE CREATININE: CPT | Performed by: INTERNAL MEDICINE

## 2023-08-09 PROCEDURE — 82043 UR ALBUMIN QUANTITATIVE: CPT | Performed by: INTERNAL MEDICINE

## 2023-08-09 NOTE — PATIENT INSTRUCTIONS
Chronic Hypertension   AMBULATORY CARE:   Hypertension is considered chronic  when it continues for 3 months or longer. Hypertension that continues causes your heart to work much harder than normal, which may lead to heart damage. Even if you have hypertension for years, lifestyle changes, medicines, or both may help lower your blood pressure. Call your local emergency number [phone] in the [address]) or have someone call if:   You have chest pain. You have any of the following signs of a heart attack:      Squeezing, pressure, or pain in your chest    You may  also have any of the following:     Discomfort or pain in your back, neck, jaw, stomach, or arm    Shortness of breath    Nausea or vomiting    Lightheadedness or a sudden cold sweat    You become confused or have difficulty speaking. You suddenly feel lightheaded or have trouble breathing. Seek care immediately if:   You have a severe headache or vision loss. You have weakness in an arm or leg. Call your doctor or cardiologist if:   You feel faint, dizzy, confused, or drowsy. You have been taking your blood pressure medicine but your pressure is higher than your provider says it should be. You have questions or concerns about your condition or care. Treatment for chronic hypertension  may include medicine to lower your blood pressure and cholesterol levels. A low cholesterol level helps prevent heart disease and makes it easier to control your blood pressure. Heart disease can make your blood pressure harder to control. You may also need to make lifestyle changes. What you need to know about the stages of hypertension:  Your healthcare provider will give you a blood pressure goal based on your age, health, and risk for cardiovascular disease. The following are general guidelines on the stages of hypertension:  Normal blood pressure is 119/79 or lower .  Your provider may only check your blood pressure each year if it stays at a normal level.    Elevated blood pressure is 120/79 to 129/79 . This is sometimes called prehypertension. Your provider may suggest lifestyle changes to help lower your blood pressure to a normal level. He or she may then check it again in 3 to 6 months. Stage 1 hypertension is 130/80  to 139/89 . Your provider may recommend lifestyle changes, medication, and checks every 3 to 6 months until your blood pressure is controlled. Stage 2 hypertension is 140/90 or higher . Your provider will recommend lifestyle changes and have you take 2 kinds of hypertension medicines. You will also need to have your blood pressure checked monthly until it is controlled. Manage chronic hypertension:   Check your blood pressure at home. Do not smoke, have caffeine, or exercise for at least 30 minutes before you check your blood pressure. Sit and rest for 5 minutes before you check your blood pressure. Extend your arm and support it on a flat surface. Your arm should be at the same level as your heart. Follow the directions that came with your blood pressure monitor. Check your blood pressure 2 times, 1 minute apart, before you take your medicine in the morning. Also check your blood pressure before your evening meal. Keep a record of your readings and bring it to your follow-up visits. Your healthcare provider may use the readings to make changes to your treatment plan. Manage any other health conditions you have. Health conditions such as diabetes can increase your risk for hypertension. Follow your provider's instructions and take all your medicines as directed. Talk to your provider about any new health conditions you have recently developed. Ask about all medicines. Certain medicines can increase your blood pressure. Examples include oral birth control pills, decongestants, herbal supplements, and NSAIDs, such as ibuprofen. Your provider can tell you which medicines are safe for you to take.  This includes prescription and over-the-counter medicines. Lifestyle changes you can make to lower your blood pressure: Your provider may want you to make more lifestyle changes if you are having trouble controlling your blood pressure. This may feel difficult over time, especially if you think you are making good changes but your pressure is still high. It might help to focus on one new change at a time. For example, try to add 1 more day of exercise, or exercise for an extra 10 minutes on 2 days. Small changes can make a big difference. Your healthcare provider can also refer you to specialists such as a dietitian who can help you make small changes. Your family members may be included in helping you learn to create lifestyle changes, such as the following:     Limit sodium (salt) as directed. Too much sodium can affect your fluid balance. Check labels to find low-sodium or no-salt-added foods. Some low-sodium foods use potassium salts for flavor. Too much potassium can also cause health problems. Your provider will tell you how much sodium and potassium are safe for you to have in a day. He or she may recommend that you limit sodium to 2,300 mg a day. Follow the meal plan recommended by your provider. A dietitian or your provider can give you more information on low-sodium plans or the DASH (Dietary Approaches to Stop Hypertension) eating plan. The DASH plan is low in sodium, processed sugar, unhealthy fats, and total fat. It is high in potassium, calcium, and fiber. These can be found in vegetables, fruit, and whole-grain foods. Be physically active throughout the day. Physical activity, such as exercise, can help control your blood pressure and your weight. Be physically active for at least 30 minutes per day, on most days of the week. Include aerobic activity, such as walking or riding a bicycle. Also include strength training at least 2 times each week.  Your provider can help you create a physical activity plan. Decrease stress. This may help lower your blood pressure. Learn ways to relax, such as deep breathing or listening to music. Limit alcohol as directed. Alcohol can increase your blood pressure. A drink of alcohol is 12 ounces of beer, 5 ounces of wine, or 1½ ounces of liquor. Your provider can help you set daily and weekly drink limits. He or she may recommend no alcohol if your blood pressure stays higher than goal even with medicine or other measures. Ask your provider for information if you need help to quit. Do not smoke. Nicotine and other chemicals in cigarettes and cigars can increase your blood pressure and also cause lung damage. Ask your provider for information if you currently smoke and need help to quit. E-cigarettes or smokeless tobacco still contain nicotine. Talk to your provider before you use these products. Follow up with your doctor or cardiologist as directed: You will need to return to have your blood pressure checked and to have other lab tests done. Write down your questions so you remember to ask them during your visits. © Copyright [employer] 2022 Information is for End User's use only and may not be sold, redistributed or otherwise used for commercial purposes. The above information is an  only. It is not intended as medical advice for individual conditions or treatments. Talk to your doctor, nurse or pharmacist before following any medical regimen to see if it is safe and effective for you.

## 2023-08-09 NOTE — PROGRESS NOTES
Assessment/Plan:  Problem List Items Addressed This Visit        Digestive    Fatty liver       Respiratory    REKHA (obstructive sleep apnea)       Cardiovascular and Mediastinum    Hypertension - Primary    Relevant Orders    Albumin / creatinine urine ratio       Musculoskeletal and Integument    Primary osteoarthritis involving multiple joints    Osteopenia of multiple sites       Genitourinary    Stage 3 chronic kidney disease, unspecified whether stage 3a or 3b CKD (HCC)       Other    Prediabetes    Relevant Orders    POCT hemoglobin A1c (Completed)    Morbid obesity (HCC)    Mixed hyperlipidemia    Depression, recurrent (HCC)    Constipation    Chronic pain syndrome   Other Visit Diagnoses     Ganglion cyst               Diagnoses and all orders for this visit:    Primary hypertension  -     Albumin / creatinine urine ratio    Fatty liver    REKHA (obstructive sleep apnea)    Primary osteoarthritis involving multiple joints    Stage 3 chronic kidney disease, unspecified whether stage 3a or 3b CKD (HCC)    Chronic pain syndrome    Constipation, unspecified constipation type    Depression, recurrent (HCC)    Mixed hyperlipidemia    Morbid obesity (HCC)    Prediabetes  -     POCT hemoglobin A1c    Osteopenia of multiple sites    Ganglion cyst        No problem-specific Assessment & Plan notes found for this encounter. A/P: Doing ok and will check urine. IN office HgA1c was acceptable at 6.0.. Discussed dexa and will get more consistent with her calcium and vit d. Will consider fosamax. . Will have radiology compare dexa findings with the xray. SKin lesion appears to be cyst and more than likely a ganglionic cysts. Discussed aspiration, but defers any treatment at this time. Continue current treatment and RTC four months for routine. Subjective:      Patient ID: Shahab Vazquez is a 75 y.o. female.     WF RTC for f/u HTN, HLD, etc. Doing ok and no new issues, but recent dexa showed osteopenia and ??bony deformity of the pelvis. F/u xray negative, but need radiology to compare. ROS with a mobile, nontender mass on the right hand. Remains active w/o difficulty and no new falls. Chronic pain is manageable. MDD/MAGGIE is controlled. Due for HgA1c and urine testing. The following portions of the patient's history were reviewed and updated as appropriate:   She has a past medical history of Arthritis, Cataract, Depression, Fluid retention, Headache, acute, Heart murmur, Hyperlipidemia, Hypertension, Memory loss, Pedal edema, Pneumonia, PONV (postoperative nausea and vomiting), Rotator cuff tear, left, Sleep apnea (2021), Wears glasses, and Wears partial dentures. ,  does not have any pertinent problems on file. ,   has a past surgical history that includes Ankle surgery (Right); Tonsillectomy; Tubal ligation; Hysterectomy; Ovarian cyst removal; Carpal tunnel release (Bilateral); Hand surgery (Right); Abdominal adhesion surgery; Knee arthroscopy (Left); Appendectomy; Colonoscopy; Hemorroidectomy; Repair rectocele; pr surgical arthroscopy shoulder w/rotator cuff rpr (Left, 2/19/2018); Carpal tunnel release (Left); Carpal tunnel release (Right); Ileostomy; Anal sphincteroplasty; Knee arthrocentesis; Tonsillectomy and adenoidectomy; Joint replacement; and Replacement total knee (Left, 09/28/2021). ,  family history includes Alzheimer's disease in her sister and sister; Bone cancer in her family and mother; COPD in her mother; Colon cancer in her family and mother; Depression in her sister; Diabetes in her sister; Emphysema in her mother; ROBERT disease in her brother; Heart failure in her sister; Hypertension in her brother, family, mother, sister, and sister; Lead poisoning in her father; No Known Problems in her daughter, maternal aunt, maternal aunt, maternal aunt, maternal aunt, maternal aunt, maternal grandmother, paternal aunt, and paternal grandmother; Skin cancer in her mother; Throat cancer in her brother;  Thyroid disease in her sister. ,   reports that she has never smoked. She has never been exposed to tobacco smoke. She has never used smokeless tobacco. She reports current alcohol use. She reports that she does not use drugs. ,  is allergic to keflex [cephalexin], contrast [iodinated contrast media], morphine and related, penicillins, benadryl [diphenhydramine], ciprofloxacin, clindamycin, and erythromycin. .  Current Outpatient Medications   Medication Sig Dispense Refill   • acetaminophen (TYLENOL) 500 mg tablet Take 500-1,000 mg by mouth every 6 (six) hours as needed for mild pain       • albuterol (ProAir HFA) 90 mcg/act inhaler Inhale 2 puffs every 4 (four) hours as needed for wheezing 18 g 0   • Calcium Carbonate (CALCIUM 600 PO) Take by mouth daily       • Cholecalciferol (VITAMIN D3 PO) Take 50 mcg by mouth daily       • montelukast (SINGULAIR) 10 mg tablet take 1 tablet by mouth at bedtime 90 tablet 3   • nystatin (MYCOSTATIN) powder Apply topically 3 (three) times a day as needed (rash) . Apply at least daily in summer to prevent rash. 45 g 3   • propranolol (INDERAL) 10 mg tablet TAKE 1 TABLET BY MOUTH TWICE DAILY FOR 1 WEEK. IF NO IMPROVEMENT THEN CAN INCREASE TO 2 TABLETS TWICE DAILY (Patient taking differently: Take 40 mg by mouth 2 (two) times a day) 360 tablet 5   • rosuvastatin (CRESTOR) 10 MG tablet Take 1 tablet (10 mg total) by mouth daily 90 tablet 3   • traZODone (DESYREL) 100 mg tablet take 1 tablet by mouth at bedtime 90 tablet 1   • clotrimazole-betamethasone (LOTRISONE) 1-0.05 % cream Apply topically 2 (two) times a day for 2 weeks to abdomen rash and 6 weeks to vulvar rash. 30 g 2     No current facility-administered medications for this visit. Review of Systems   Constitutional: Negative for activity change, chills, diaphoresis, fatigue and fever. HENT: Negative. Eyes: Negative for visual disturbance. Respiratory: Negative for cough, chest tightness, shortness of breath and wheezing. Cardiovascular: Negative for chest pain, palpitations and leg swelling. Gastrointestinal: Negative for abdominal pain, constipation, diarrhea, nausea and vomiting. Endocrine: Negative for cold intolerance and heat intolerance. Genitourinary: Negative for difficulty urinating, dysuria and frequency. Musculoskeletal: Negative for arthralgias, gait problem and myalgias. Skin:        Right hand lesion   Neurological: Negative for dizziness, seizures, syncope, weakness, light-headedness and headaches. Psychiatric/Behavioral: Negative for confusion, dysphoric mood and sleep disturbance. The patient is not nervous/anxious. PHQ-2/9 Depression Screening    Little interest or pleasure in doing things: 0 - not at all  Feeling down, depressed, or hopeless: 0 - not at all  Trouble falling or staying asleep, or sleeping too much: 0 - not at all  Feeling tired or having little energy: 0 - not at all  Poor appetite or overeatin - not at all  Feeling bad about yourself - or that you are a failure or have let yourself or your family down: 0 - not at all  Trouble concentrating on things, such as reading the newspaper or watching television: 0 - not at all  Moving or speaking so slowly that other people could have noticed. Or the opposite - being so fidgety or restless that you have been moving around a lot more than usual: 0 - not at all  Thoughts that you would be better off dead, or of hurting yourself in some way: 0 - not at all  PHQ-9 Score: 0   PHQ-9 Interpretation: No or Minimal depression         Objective:  Vitals:    23 0804   BP: 126/70   Pulse: 82   Temp: 98.2 °F (36.8 °C)   SpO2: 98%   Weight: 87.2 kg (192 lb 4 oz)   Height: 5' (1.524 m)     Body mass index is 37.55 kg/m². Physical Exam  Vitals and nursing note reviewed. Constitutional:       General: She is not in acute distress. Appearance: Normal appearance. She is not ill-appearing.    HENT:      Head: Normocephalic and atraumatic. Mouth/Throat:      Mouth: Mucous membranes are moist.   Eyes:      Extraocular Movements: Extraocular movements intact. Conjunctiva/sclera: Conjunctivae normal.      Pupils: Pupils are equal, round, and reactive to light. Neck:      Vascular: No carotid bruit. Cardiovascular:      Rate and Rhythm: Normal rate and regular rhythm. Heart sounds: Normal heart sounds. No murmur heard. Pulmonary:      Effort: Pulmonary effort is normal. No respiratory distress. Breath sounds: Normal breath sounds. No wheezing, rhonchi or rales. Abdominal:      General: Bowel sounds are normal. There is no distension. Palpations: Abdomen is soft. Tenderness: There is no abdominal tenderness. Musculoskeletal:      Cervical back: Neck supple. Right lower leg: No edema. Left lower leg: No edema. Skin:     Findings: Lesion (Right dorsal hand with and nontender, fluctuant and mobile lesion. ) present. Neurological:      General: No focal deficit present. Mental Status: She is alert and oriented to person, place, and time. Mental status is at baseline. Psychiatric:         Mood and Affect: Mood normal.         Behavior: Behavior normal.         Thought Content:  Thought content normal.         Judgment: Judgment normal.

## 2023-09-01 ENCOUNTER — OFFICE VISIT (OUTPATIENT)
Dept: NEUROLOGY | Facility: CLINIC | Age: 68
End: 2023-09-01
Payer: MEDICARE

## 2023-09-01 VITALS
HEART RATE: 63 BPM | BODY MASS INDEX: 37.99 KG/M2 | WEIGHT: 193.5 LBS | DIASTOLIC BLOOD PRESSURE: 80 MMHG | TEMPERATURE: 97.9 F | SYSTOLIC BLOOD PRESSURE: 126 MMHG | OXYGEN SATURATION: 96 % | HEIGHT: 60 IN

## 2023-09-01 DIAGNOSIS — R20.2 PARESTHESIAS: ICD-10-CM

## 2023-09-01 DIAGNOSIS — R25.9 MIXED ACTION AND RESTING TREMOR: Primary | ICD-10-CM

## 2023-09-01 PROCEDURE — 99214 OFFICE O/P EST MOD 30 MIN: CPT | Performed by: NURSE PRACTITIONER

## 2023-09-01 RX ORDER — PROPRANOLOL HYDROCHLORIDE 20 MG/1
20 TABLET ORAL EVERY 12 HOURS SCHEDULED
Qty: 180 TABLET | Refills: 2 | Status: SHIPPED | OUTPATIENT
Start: 2023-09-01

## 2023-09-01 NOTE — ASSESSMENT & PLAN NOTE
Patient returns for follow-up regarding mixed action and resting tremor. Since last visit she did wean off of Sinemet given no improvement and obtained a DaTscan which was normal.  Patient was reassured given her exam and negative DaTscan likely diagnosis would be atypical essential tremor. She was started on propanolol since last visit and has noted improvement in tremors. Given her improvement she will continue her current dose of propanolol 20 mg twice daily, updated prescription provided to decrease pill burden. In the future if her tremor does worsen we could consider an increase in propanolol if her heart rate and blood pressure tolerate or adding on topiramate. She noted no improvement with higher doses of primidone in the past.    Plan for follow-up in 6 months. To contact the office sooner with any concerns or worsening symptoms.

## 2023-09-01 NOTE — PROGRESS NOTES
Patient ID: Wagner Briggs is a 75 y.o. female. Assessment/Plan:    Paresthesias  Patient continues with intermittent numbness and tingling that radiates down her arm to her fingertips. She denies any weakness in the upper extremities. She did have degenerative changes and for minimal narrowing noted on MRI of her cervical spine which may be contributing. She does have an upcoming EMG scheduled next month in which she will contact her with results. Mixed action and resting tremor  Patient returns for follow-up regarding mixed action and resting tremor. Since last visit she did wean off of Sinemet given no improvement and obtained a DaTscan which was normal.  Patient was reassured given her exam and negative DaTscan likely diagnosis would be atypical essential tremor. She was started on propanolol since last visit and has noted improvement in tremors. Given her improvement she will continue her current dose of propanolol 20 mg twice daily, updated prescription provided to decrease pill burden. In the future if her tremor does worsen we could consider an increase in propanolol if her heart rate and blood pressure tolerate or adding on topiramate. She noted no improvement with higher doses of primidone in the past.    Plan for follow-up in 6 months. To contact the office sooner with any concerns or worsening symptoms. Diagnoses and all orders for this visit:    Mixed action and resting tremor  -     propranolol (INDERAL) 20 mg tablet; Take 1 tablet (20 mg total) by mouth every 12 (twelve) hours    Paresthesias           Subjective:    HPI    Patient is a 76-year-old female with history of anxiety depression who presents for follow-up for tremors. To review, she noted bilateral hand tremor starting in 2020 that been progressively worsening. Tremor mainly present with action and can interfere with utensils and drinking. She is no family history of tremor or parkinsonism.   She has been trialed on primidone up to 300 mg and low-dose Sinemet with no improvement. She did obtain KG scan which was negative. Last office visit 5/2023 in which she was to obtain KG scan. Prior medication trials:  Primidone-no improvement in tremor  Sinemet 25/100 2 tabs TID-no improvement in tremor     Current Medications:  propanolol 2 tabs mg BID     Interval History:  Given Joette Bamberger was negative she was started on propanolol which has improved tremor. Did have to increase to assist with tremors. She denies any side effects from medications. She has a much easier time eating and drinking. Handwriting is improved. She has no difficulty with ADLs due to tremor. She only finds if she is fatigued her tremor will be worse. Walking and balance are stable. No head or vocal tremor. She continues with numbness and tingling down the arm into the finger tips-this is unchanged. She denies any significant neck pain, does have left shoulder pain. She has upcoming EMG. She does feel like her right wrist is slightly weak. Although she did have a fall and injury of her right wrist and was in a brace for several weeks so feel this is due to that. No other weakness. Prior work-up:  MRI brain: 1. No acute infarction, intracranial hemorrhage or mass effect. 2.  A few white matter lesions are most likely mild, chronic microangiopathy. MRI cervical spine: Cervical spondylitic degenerative change with primarily left-sided facet hypertrophic change. There is no cord compression. Multilevel primarily left-sided foraminal narrowing most pronounced at the C4-5 level, moderate.      Small central disc extrusion at C6-7 partially effacing the ventral CSF space        The following portions of the patient's history were reviewed and updated as appropriate: allergies, current medications, past family history, past medical history, past social history, past surgical history and problem list.       Objective:    Blood pressure 126/80, pulse 63, temperature 97.9 °F (36.6 °C), temperature source Temporal, height 5' (1.524 m), weight 87.8 kg (193 lb 8 oz), SpO2 96 %, not currently breastfeeding. Physical Exam  Constitutional:       General: She is awake. Eyes:      General: Lids are normal.      Extraocular Movements: Extraocular movements intact. Pupils: Pupils are equal, round, and reactive to light. Neurological:      Mental Status: She is alert. Psychiatric:         Speech: Speech normal.         Neurological Exam  Mental Status  Awake and alert. Oriented to person, place, time and situation. Memory: Recalled 2/5. Speech is normal. Language is fluent with no aphasia. Cranial Nerves  CN III, IV, VI: Extraocular movements intact bilaterally. Normal lids and orbits bilaterally. Pupils equal round and reactive to light bilaterally. CN V: Facial sensation is normal.  CN VII: Full and symmetric facial movement. CN VIII: Hearing is normal.  CN IX, X: Palate elevates symmetrically  CN XI: Shoulder shrug strength is normal.  CN XII: Tongue midline without atrophy or fasciculations. Motor  Normal muscle bulk throughout. Normal muscle tone. Strength is 5/5 in all four extremities except as noted. 4/5  strength b/l. Sensory  Light touch is normal in upper and lower extremities. Coordination  Right: Finger-to-nose normal. Rapid alternating movement abnormality:Left: Finger-to-nose normal. Rapid alternating movement abnormality:  No resting tremor  Mild intentional tremor on FTN bilaterally. no postural tremors. No bradykinesia   No rigidity or cogwheeling. Very rare head tremor noted, no lip tremor noted today  . Gait   Able to rise from chair without using arms. Good stride and speed, normal arm swing, antalgic gait today. I have personally reviewed the ROS performed by the MA.    ROS:    Review of Systems   Constitutional: Positive for fatigue. Negative for appetite change and fever.    HENT: Negative. Negative for hearing loss, tinnitus, trouble swallowing and voice change. Eyes: Negative. Negative for photophobia, pain and visual disturbance. Respiratory: Negative. Negative for shortness of breath. Cardiovascular: Negative. Negative for palpitations. Gastrointestinal: Negative. Negative for nausea and vomiting. Endocrine: Negative. Negative for cold intolerance. Genitourinary: Negative. Negative for dysuria, frequency and urgency. Musculoskeletal: Negative for back pain, gait problem, myalgias and neck pain. Skin: Negative. Negative for rash. Allergic/Immunologic: Negative. Neurological: Positive for weakness and numbness. Negative for tremors, seizures, syncope, facial asymmetry, speech difficulty, light-headedness and headaches. Hematological: Negative. Does not bruise/bleed easily. Psychiatric/Behavioral: Negative. Negative for confusion, hallucinations and sleep disturbance. All other systems reviewed and are negative.      .Ash Michaels

## 2023-09-01 NOTE — PATIENT INSTRUCTIONS
Continue current dose of propanolol-I sent in a new script to decrease the amount of pills you take.   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

## 2023-09-01 NOTE — ASSESSMENT & PLAN NOTE
Patient continues with intermittent numbness and tingling that radiates down her arm to her fingertips. She denies any weakness in the upper extremities. She did have degenerative changes and for minimal narrowing noted on MRI of her cervical spine which may be contributing. She does have an upcoming EMG scheduled next month in which she will contact her with results.

## 2023-09-08 ENCOUNTER — ANNUAL EXAM (OUTPATIENT)
Dept: OBGYN CLINIC | Facility: CLINIC | Age: 68
End: 2023-09-08
Payer: MEDICARE

## 2023-09-08 VITALS
DIASTOLIC BLOOD PRESSURE: 94 MMHG | WEIGHT: 191.2 LBS | BODY MASS INDEX: 37.54 KG/M2 | SYSTOLIC BLOOD PRESSURE: 130 MMHG | HEIGHT: 60 IN

## 2023-09-08 DIAGNOSIS — Z12.11 COLON CANCER SCREENING: ICD-10-CM

## 2023-09-08 DIAGNOSIS — L90.0 LICHEN SCLEROSUS: ICD-10-CM

## 2023-09-08 DIAGNOSIS — Z01.419 WELL WOMAN EXAM WITH ROUTINE GYNECOLOGICAL EXAM: Primary | ICD-10-CM

## 2023-09-08 DIAGNOSIS — Z12.31 BREAST CANCER SCREENING BY MAMMOGRAM: ICD-10-CM

## 2023-09-08 DIAGNOSIS — N95.2 VAGINAL ATROPHY: ICD-10-CM

## 2023-09-08 PROCEDURE — G0101 CA SCREEN;PELVIC/BREAST EXAM: HCPCS | Performed by: OBSTETRICS & GYNECOLOGY

## 2023-09-08 RX ORDER — ESTRADIOL 0.1 MG/G
CREAM VAGINAL
Qty: 42.5 G | Refills: 3 | Status: SHIPPED | OUTPATIENT
Start: 2023-09-08

## 2023-09-08 NOTE — PROGRESS NOTES
Assessment   70 y.o. postmenopausal female presenting for annual exam.     Plan   Diagnoses and all orders for this visit:    Well woman exam with routine gynecological exam  - Pap up to date  - Mammo ordered  - DEXA current  - Colonoscopy due; requests referral to in network gastroenterologist  - Return in 1yr for yearly    Breast cancer screening by mammogram  -     Mammo screening bilateral w 3d & cad; Future    Colon cancer screening  -     Ambulatory Referral to Gastroenterology; Future    Lichen sclerosus  - Improved with steroid    Vaginal atrophy  -     estradiol (ESTRACE VAGINAL) 0.1 mg/g vaginal cream; Apply 1g into vagina nightly for 1 week, then apply every 3rd night  - Discussed mild dec in TVL, which is not uncommon post-hyst. Suspect majority of discomfort related to atrophy and can consider dilation if insufficient relief      __________________________________________________________________      Subjective     70 y.o. postmenopausal female who is sexually active and s/p hysterectomy (aub, psb endometriosis) presenting for annual exam and f/u lichen sclerosus. Reports itching has resolved and feels markedly better from lichen changes. However, sex is painful due to small vaginal caliper and wants to know if anything can help. Osteopenia noted. Taking ca/Vit D. Discussed fosamax with PCP. Discussed with pt inc fx risk given hip/femur involvement, proximity of level to osteoporosis, and consideration for treatment. Would favor mgmt. She will continue discussion via PCP. GYN  Complaints: as above  Denies genital discharge, genital ulcers, irregular/heavy menses, pelvic pain and vulvar/vaginal symptoms  Menses stopped with hysterectomy.  No bleeding since  Menopausal symptoms: hot flashes, vaginal dryness  Sexually active: Yes - single partner - male  Hx STI: denies   Hx Abnormal pap: denies  Last pap: pre-hyst    OB   ( x2)      Complaints: denies  Denies urinary frequency, hematuria, urinary incontinence and dysuria    BREAST  Complaints: denies  Denies: breast lump, breast tenderness, changed mole, dryness, nipple discharge, pruritus, rash, skin color change and skin lesion(s)  Last mammogram: 2023 - [facility]  Personal hx: denies  Family hx: denies fhx of breast, uterine, ovarian cancers  Mother with colon ca  Patient does do regular self-exams    GENERAL  PMH reviewed/updated and is as below. Patient does follow with a PCP. Denies domestic violence.   Exercise: active lifestyle  Diet: denies    SCREENING  Cervical Ca: pap not indicated; no cervix, no hx dysplasia  Breast Ca: mammo ordered  Colon Ca: 2020 - colonoscopy - repeat 3yr  Metabolic: 8073 - dexa - osteopenia, density identified and mgmt per pcp      Past Medical History:   Diagnosis Date   • Arthritis    • Cataract     ashley   • Depression    • Fluid retention    • Headache, acute    • Heart murmur    • Hyperlipidemia    • Hypertension    • Memory loss    • Pedal edema    • Pneumonia    • PONV (postoperative nausea and vomiting)    • Rotator cuff tear, left    • Sleep apnea 2021   • Wears glasses    • Wears partial dentures     upper       Past Surgical History:   Procedure Laterality Date   • ABDOMINAL ADHESION SURGERY     • ANAL SPHINCTEROPLASTY     • ANKLE SURGERY Right     tendon tear   • APPENDECTOMY     • CARPAL TUNNEL RELEASE Bilateral    • CARPAL TUNNEL RELEASE Left    • CARPAL TUNNEL RELEASE Right    • COLONOSCOPY     • HAND SURGERY Right     ganglion cyst   • HEMORROIDECTOMY     • HYSTERECTOMY     • ILEOSTOMY     • JOINT REPLACEMENT     • KNEE ARTHROCENTESIS      ganglion Cyst   • KNEE ARTHROSCOPY Left    • OVARIAN CYST REMOVAL     • [place] SURGICAL ARTHROSCOPY SHOULDER W/ROTATOR CUFF RPR Left 2/19/2018    Procedure: ARTHROSCOPIC REPAIR ROTATOR CUFF,  SAD, BICEP TENODESIS;  Surgeon: Meera Jimenez MD;  Location: [facility];  Service: Orthopedics   • REPAIR RECTOCELE     • REPLACEMENT TOTAL KNEE Left 09/28/2021   • TONSILLECTOMY     • TONSILLECTOMY AND ADENOIDECTOMY     • TUBAL LIGATION           Current Outpatient Medications:   •  acetaminophen (TYLENOL) 500 mg tablet, Take 500-1,000 mg by mouth if needed for mild pain, Disp: , Rfl:   •  albuterol (ProAir HFA) 90 mcg/act inhaler, Inhale 2 puffs every 4 (four) hours as needed for wheezing, Disp: 18 g, Rfl: 0  •  Calcium Carbonate (CALCIUM 600 PO), Take by mouth daily  , Disp: , Rfl:   •  Cholecalciferol (VITAMIN D3 PO), Take 50 mcg by mouth daily  , Disp: , Rfl:   •  clotrimazole-betamethasone (LOTRISONE) 1-0.05 % cream, Apply topically 2 (two) times a day for 2 weeks to abdomen rash and 6 weeks to vulvar rash. (Patient taking differently: Apply topically if needed for 2 weeks to abdomen rash and 6 weeks to vulvar rash.), Disp: 30 g, Rfl: 2  •  estradiol (ESTRACE VAGINAL) 0.1 mg/g vaginal cream, Apply 1g into vagina nightly for 1 week, then apply every 3rd night, Disp: 42.5 g, Rfl: 3  •  montelukast (SINGULAIR) 10 mg tablet, take 1 tablet by mouth at bedtime, Disp: 90 tablet, Rfl: 3  •  nystatin (MYCOSTATIN) powder, Apply topically 3 (three) times a day as needed (rash) .  Apply at least daily in summer to prevent rash., Disp: 45 g, Rfl: 3  •  propranolol (INDERAL) 20 mg tablet, Take 1 tablet (20 mg total) by mouth every 12 (twelve) hours, Disp: 180 tablet, Rfl: 2  •  rosuvastatin (CRESTOR) 10 MG tablet, Take 1 tablet (10 mg total) by mouth daily, Disp: 90 tablet, Rfl: 3  •  traZODone (DESYREL) 100 mg tablet, take 1 tablet by mouth at bedtime, Disp: 90 tablet, Rfl: 1    Allergies   Allergen Reactions   • Keflex [Cephalexin] Other (See Comments)     Mouth sores   • Contrast [Iodinated Contrast Media] Hives     Occurred In the patient's 20's got Benedryl    • Morphine And Related Hives     IV MORPINE   • Penicillins Hives   • Benadryl [Diphenhydramine] Itching and Swelling     IV benadryl in hospital   • Ciprofloxacin Hives   • Clindamycin Other (See Comments)     Pt unsure   • Erythromycin Hives       Social History     Tobacco Use   • Smoking status: Never     Passive exposure: Never   • Smokeless tobacco: Never   Vaping Use   • Vaping Use: Never used   Substance Use Topics   • Alcohol use: Yes     Comment: special events   • Drug use: No           Objective  /94   Ht 5' (1.524 m)   Wt 86.7 kg (191 lb 3.2 oz)   BMI 37.34 kg/m²      Physical Exam:  Physical Exam  Exam conducted with a chaperone present. Constitutional:       General: She is not in acute distress. Appearance: Normal appearance. She is well-developed. She is not ill-appearing, toxic-appearing or diaphoretic. HENT:      Head: Normocephalic and atraumatic. Eyes:      General: No scleral icterus. Right eye: No discharge. Left eye: No discharge. Conjunctiva/sclera: Conjunctivae normal.   Cardiovascular:      Rate and Rhythm: Normal rate. Pulmonary:      Effort: Pulmonary effort is normal. No accessory muscle usage or respiratory distress. Chest:   Breasts:     Breasts are symmetrical.      Right: No inverted nipple, mass, nipple discharge, skin change or tenderness. Left: No inverted nipple, mass, nipple discharge, skin change or tenderness. Abdominal:      General: There is no distension. Palpations: Abdomen is soft. There is no mass. Tenderness: There is no abdominal tenderness. There is no guarding or rebound. Genitourinary:     General: Normal vulva. Exam position: Lithotomy position. Labia:         Right: No rash, tenderness or lesion. Left: No rash, tenderness or lesion. Urethra: No prolapse, urethral swelling or urethral lesion. Vagina: No signs of injury. No vaginal discharge, erythema (thin, pale epithelia with loss of rugae, consistent with atrophic changes. mild shortened TVL with good caliper of introitus and vagina. ), tenderness, bleeding or lesions. Cervix: No cervical motion tenderness (surgically absent cervix. intact, normal appearing vaginal cuff). Uterus: Absent. Adnexa:         Right: No mass, tenderness or fullness. Left: No mass, tenderness or fullness. Rectum: No external hemorrhoid. Normal anal tone. Comments: Mild pallor in inguinal folds, otherwise markedly improved from prior exam  Lymphadenopathy:      Upper Body:      Right upper body: No axillary or pectoral adenopathy. Left upper body: No axillary or pectoral adenopathy. Skin:     General: Skin is warm and dry. Findings: No erythema or rash. Neurological:      Mental Status: She is alert. Psychiatric:         Mood and Affect: Mood normal.         Behavior: Behavior normal.         Thought Content:  Thought content normal.         Judgment: Judgment normal.

## 2023-09-13 ENCOUNTER — TELEPHONE (OUTPATIENT)
Age: 68
End: 2023-09-13

## 2023-09-13 ENCOUNTER — PREP FOR PROCEDURE (OUTPATIENT)
Age: 68
End: 2023-09-13

## 2023-09-13 DIAGNOSIS — Z12.11 SCREENING FOR COLON CANCER: Primary | ICD-10-CM

## 2023-09-13 DIAGNOSIS — Z86.010 HISTORY OF COLON POLYPS: ICD-10-CM

## 2023-09-13 NOTE — TELEPHONE ENCOUNTER
Scheduled date of colonoscopy (as of today): 10/30/23  Physician performing colonoscopy:    Location of colonoscopy: [facility]   Bowel prep reviewed with patient: Miralax Dulcolax prep instructions reviewed and sent via [employer]  Instructions reviewed with patient by: md  Clearances:

## 2023-10-09 ENCOUNTER — HOSPITAL ENCOUNTER (OUTPATIENT)
Dept: NEUROLOGY | Facility: CLINIC | Age: 68
Discharge: HOME | End: 2023-10-09
Payer: MEDICARE

## 2023-10-09 DIAGNOSIS — R20.2 PARESTHESIAS: ICD-10-CM

## 2023-10-09 DIAGNOSIS — M54.12 CERVICAL RADICULOPATHY: ICD-10-CM

## 2023-10-09 PROCEDURE — 95886 MUSC TEST DONE W/N TEST COMP: CPT | Performed by: PSYCHIATRY & NEUROLOGY

## 2023-10-09 PROCEDURE — 95912 NRV CNDJ TEST 11-12 STUDIES: CPT | Performed by: PSYCHIATRY & NEUROLOGY

## 2023-10-30 ENCOUNTER — HOSPITAL ENCOUNTER (OUTPATIENT)
Dept: GASTROENTEROLOGY | Facility: HOSPITAL | Age: 68
Setting detail: OUTPATIENT SURGERY
Discharge: HOME | End: 2023-10-30
Attending: STUDENT IN AN ORGANIZED HEALTH CARE EDUCATION/TRAINING PROGRAM
Payer: MEDICARE

## 2023-10-30 ENCOUNTER — ANESTHESIA (OUTPATIENT)
Dept: GASTROENTEROLOGY | Facility: HOSPITAL | Age: 68
End: 2023-10-30

## 2023-10-30 ENCOUNTER — ANESTHESIA EVENT (OUTPATIENT)
Dept: GASTROENTEROLOGY | Facility: HOSPITAL | Age: 68
End: 2023-10-30

## 2023-10-30 VITALS
RESPIRATION RATE: 18 BRPM | OXYGEN SATURATION: 96 % | HEART RATE: 56 BPM | SYSTOLIC BLOOD PRESSURE: 108 MMHG | WEIGHT: 185 LBS | TEMPERATURE: 97 F | DIASTOLIC BLOOD PRESSURE: 61 MMHG | BODY MASS INDEX: 36.32 KG/M2 | HEIGHT: 60 IN

## 2023-10-30 DIAGNOSIS — Z86.010 HISTORY OF COLON POLYPS: ICD-10-CM

## 2023-10-30 PROCEDURE — 45385 COLONOSCOPY W/LESION REMOVAL: CPT | Performed by: STUDENT IN AN ORGANIZED HEALTH CARE EDUCATION/TRAINING PROGRAM

## 2023-10-30 PROCEDURE — 45380 COLONOSCOPY AND BIOPSY: CPT | Performed by: STUDENT IN AN ORGANIZED HEALTH CARE EDUCATION/TRAINING PROGRAM

## 2023-10-30 PROCEDURE — 88305 TISSUE EXAM BY PATHOLOGIST: CPT | Performed by: PATHOLOGY

## 2023-10-30 RX ORDER — SODIUM CHLORIDE, SODIUM LACTATE, POTASSIUM CHLORIDE, CALCIUM CHLORIDE 600; 310; 30; 20 MG/100ML; MG/100ML; MG/100ML; MG/100ML
INJECTION, SOLUTION INTRAVENOUS CONTINUOUS PRN
Status: DISCONTINUED | OUTPATIENT
Start: 2023-10-30 | End: 2023-10-30

## 2023-10-30 RX ORDER — ONDANSETRON 2 MG/ML
INJECTION INTRAMUSCULAR; INTRAVENOUS AS NEEDED
Status: DISCONTINUED | OUTPATIENT
Start: 2023-10-30 | End: 2023-10-30

## 2023-10-30 RX ORDER — SODIUM CHLORIDE, SODIUM LACTATE, POTASSIUM CHLORIDE, CALCIUM CHLORIDE 600; 310; 30; 20 MG/100ML; MG/100ML; MG/100ML; MG/100ML
100 INJECTION, SOLUTION INTRAVENOUS CONTINUOUS
Status: DISCONTINUED | OUTPATIENT
Start: 2023-10-30 | End: 2023-11-03 | Stop reason: HOSPADM

## 2023-10-30 RX ORDER — PROPOFOL 10 MG/ML
INJECTION, EMULSION INTRAVENOUS CONTINUOUS PRN
Status: DISCONTINUED | OUTPATIENT
Start: 2023-10-30 | End: 2023-10-30

## 2023-10-30 RX ORDER — PHENYLEPHRINE HCL IN 0.9% NACL 1 MG/10 ML
SYRINGE (ML) INTRAVENOUS AS NEEDED
Status: DISCONTINUED | OUTPATIENT
Start: 2023-10-30 | End: 2023-10-30

## 2023-10-30 RX ORDER — SODIUM CHLORIDE, SODIUM LACTATE, POTASSIUM CHLORIDE, CALCIUM CHLORIDE 600; 310; 30; 20 MG/100ML; MG/100ML; MG/100ML; MG/100ML
20 INJECTION, SOLUTION INTRAVENOUS CONTINUOUS
Status: CANCELLED | OUTPATIENT
Start: 2023-10-30

## 2023-10-30 RX ORDER — PROPOFOL 10 MG/ML
INJECTION, EMULSION INTRAVENOUS AS NEEDED
Status: DISCONTINUED | OUTPATIENT
Start: 2023-10-30 | End: 2023-10-30

## 2023-10-30 RX ADMIN — Medication 100 MCG: at 11:16

## 2023-10-30 RX ADMIN — ONDANSETRON 4 MG: 2 INJECTION INTRAMUSCULAR; INTRAVENOUS at 10:57

## 2023-10-30 RX ADMIN — PROPOFOL 100 MCG/KG/MIN: 10 INJECTION, EMULSION INTRAVENOUS at 10:59

## 2023-10-30 RX ADMIN — SODIUM CHLORIDE, SODIUM LACTATE, POTASSIUM CHLORIDE, AND CALCIUM CHLORIDE: .6; .31; .03; .02 INJECTION, SOLUTION INTRAVENOUS at 10:55

## 2023-10-30 RX ADMIN — SODIUM CHLORIDE, SODIUM LACTATE, POTASSIUM CHLORIDE, AND CALCIUM CHLORIDE 100 ML/HR: .6; .31; .03; .02 INJECTION, SOLUTION INTRAVENOUS at 09:47

## 2023-10-30 RX ADMIN — PROPOFOL 100 MG: 10 INJECTION, EMULSION INTRAVENOUS at 10:59

## 2023-10-30 NOTE — ANESTHESIA PREPROCEDURE EVALUATION
Procedure:  COLONOSCOPY    Relevant Problems   CARDIO   (+) Hypertension   (+) Mixed hyperlipidemia      GI/HEPATIC   (+) Fatty liver      /RENAL   (+) Stage 3 chronic kidney disease, unspecified whether stage 3a or 3b CKD (HCC)      MUSCULOSKELETAL   (+) DDD (degenerative disc disease), cervical   (+) Lumbosacral spondylosis without myelopathy   (+) Primary osteoarthritis involving multiple joints      NEURO/PSYCH   (+) Chronic pain syndrome   (+) Depression, recurrent (HCC)   (+) Paresthesias      PULMONARY   (+) REKHA (obstructive sleep apnea)        Physical Exam    Airway    Mallampati score: II  TM Distance: >3 FB  Neck ROM: full     Dental       Cardiovascular      Pulmonary      Other Findings        Anesthesia Plan  ASA Score- 3     Anesthesia Type- IV sedation with anesthesia with ASA Monitors. Additional Monitors:     Airway Plan:            Plan Factors-Exercise tolerance (METS): >4 METS. Chart reviewed. Patient is not a current smoker. Patient did not smoke on day of surgery. Obstructive sleep apnea risk education given perioperatively. Induction- intravenous. Postoperative Plan-     Informed Consent- Anesthetic plan and risks discussed with patient. I personally reviewed this patient with the CRNA. Discussed and agreed on the Anesthesia Plan with the CRNA. .            NPO appropriate. Discussed benefits/risks of monitored anesthetic care and discussed providing a dynamic level of mild to deep sedation. Risks include awareness, airway obstruction, aspiration which may necessitate conversion to general anesthesia. All questions answered. Patient understands and wishes to proceed. Anesthesia plan and consent discussed with Rinku Escobar who expressed understanding and agreement. Risks/benefits and alternatives discussed with patient including possible PONV, sore throat, damage to teeth/lips/gums and possibility of rare anesthetic and surgical emergencies.

## 2023-10-30 NOTE — H&P
History and Physical -  [facility]  Shane Boogie 70 y.o. female MRN: [id number]    HPI: Shane Boogie is a 70y.o. year old female who presents for colonoscopy. Last colon in 2020 showed a cecal polyp. REVIEW OF SYSTEMS: Per the HPI, and otherwise unremarkable.     Historical Information   Past Medical History:   Diagnosis Date    Arthritis     Cataract     ashley    Colon polyp     Depression     Fluid retention     Headache, acute     Heart murmur     Hyperlipidemia     Hypertension     Memory loss     Pedal edema     Pneumonia     PONV (postoperative nausea and vomiting)     Rotator cuff tear, left     Sleep apnea 2021    Wears glasses     Wears partial dentures     upper     Past Surgical History:   Procedure Laterality Date    ABDOMINAL ADHESION SURGERY      ANAL SPHINCTEROPLASTY      ANKLE SURGERY Right     tendon tear    APPENDECTOMY      CARPAL TUNNEL RELEASE Bilateral     CARPAL TUNNEL RELEASE Left     CARPAL TUNNEL RELEASE Right     COLONOSCOPY      HAND SURGERY Right     ganglion cyst    HEMORROIDECTOMY      HYSTERECTOMY      ILEOSTOMY      JOINT REPLACEMENT      KNEE ARTHROCENTESIS      ganglion Cyst    KNEE ARTHROSCOPY Left     OVARIAN CYST REMOVAL      [place] SURGICAL ARTHROSCOPY SHOULDER W/ROTATOR CUFF RPR Left 2/19/2018    Procedure: ARTHROSCOPIC REPAIR ROTATOR CUFF,  SAD, BICEP TENODESIS;  Surgeon: Dangelo Fishman MD;  Location: [facility] OR;  Service: Orthopedics    REPAIR RECTOCELE      REPLACEMENT TOTAL KNEE Left 09/28/2021    TONSILLECTOMY      TONSILLECTOMY AND ADENOIDECTOMY      TUBAL LIGATION       Social History   Social History     Substance and Sexual Activity   Alcohol Use Yes    Comment: special events     Social History     Substance and Sexual Activity   Drug Use No     Social History     Tobacco Use   Smoking Status Never    Passive exposure: Never   Smokeless Tobacco Never     Family History   Problem Relation Age of Onset    Hypertension Mother     Colon cancer Mother Bone cancer Mother     COPD Mother     Emphysema Mother     Skin cancer Mother     Lead poisoning Father         lead poisoning in lungs     Thyroid disease Sister     Depression Sister     Hypertension Sister     Alzheimer's disease Sister     ROBERT disease Brother     Throat cancer Brother     Hypertension Brother     Colon cancer Family     Bone cancer Family     Hypertension Family     Diabetes Sister     Hypertension Sister     Heart failure Sister     Alzheimer's disease Sister     No Known Problems Daughter     No Known Problems Maternal Grandmother     No Known Problems Paternal Grandmother     No Known Problems Maternal Aunt     No Known Problems Maternal Aunt     No Known Problems Maternal Aunt     No Known Problems Maternal Aunt     No Known Problems Maternal Aunt     No Known Problems Paternal Aunt        Meds/Allergies       Current Outpatient Medications:     Calcium Carbonate (CALCIUM 600 PO)    Cholecalciferol (VITAMIN D3 PO)    estradiol (ESTRACE VAGINAL) 0.1 mg/g vaginal cream    montelukast (SINGULAIR) 10 mg tablet    propranolol (INDERAL) 20 mg tablet    rosuvastatin (CRESTOR) 10 MG tablet    traZODone (DESYREL) 100 mg tablet    acetaminophen (TYLENOL) 500 mg tablet    albuterol (ProAir HFA) 90 mcg/act inhaler    clotrimazole-betamethasone (LOTRISONE) 1-0.05 % cream    nystatin (MYCOSTATIN) powder    Current Facility-Administered Medications:     lactated ringers infusion, 100 mL/hr, Intravenous, Continuous, 100 mL/hr at 10/30/23 0947    Allergies   Allergen Reactions    Keflex [Cephalexin] Other (See Comments)     Mouth sores    Contrast [Iodinated Contrast Media] Hives     Occurred In the patient's 20's got Benedryl     Morphine And Related Hives     IV MORPINE    Penicillins Hives    Benadryl [Diphenhydramine] Itching and Swelling     IV benadryl in hospital    Ciprofloxacin Hives    Clindamycin Other (See Comments)     Pt unsure    Erythromycin Hives       Objective   /62   Pulse 71 Temp 97.5 °F (36.4 °C) (Temporal)   Resp 16   Ht 5' (1.524 m)   Wt 83.9 kg (185 lb)   SpO2 96%   BMI 36.13 kg/m²     PHYSICAL EXAM  Gen: NAD  Head: NCAT  CV: RRR  CHEST: CTAB  ABD: soft, NT/ND  EXT: no edema    ASSESSMENT/PLAN:  This is a 70y.o. year old female here for colonoscopy, and she is stable and optimized for her procedure.

## 2023-10-30 NOTE — ANESTHESIA POSTPROCEDURE EVALUATION
Post-Op Assessment Note    CV Status:  Stable  Pain Score: 0    Pain management: adequate     Mental Status:  Alert and awake   Hydration Status:  Euvolemic   PONV Controlled:  Controlled   Airway Patency:  Patent      Post Op Vitals Reviewed: Yes      Staff: CRNA         No notable events documented.     BP   105/56   Temp   97.2   Pulse 60   Resp 12   SpO2 98

## 2023-11-04 PROCEDURE — 88305 TISSUE EXAM BY PATHOLOGIST: CPT | Performed by: PATHOLOGY

## 2023-11-15 NOTE — PROGRESS NOTES
PT Evaluation     Today's date: 2023  Patient name: Alexia Iglesias  : 1955  MRN: [id number]  Referring provider: NAHOMI Rocha  Dx:   Encounter Diagnosis     ICD-10-CM    1. Left hip pain  M25.552       2. Low back pain, unspecified back pain laterality, unspecified chronicity, unspecified whether sciatica present  M54.50           Start Time: 07  Stop Time: 08  Total time in clinic (min): 39 minutes    Assessment  Assessment details: Alexia Iglesias is a 70 y.o. female with a history of arthritis, cataract, depression, H/A, hyperlipidemia, HTN, L RTC tear, memory loss, CKD, and BMI>30 that presents for a moderate complexity physical therapy initial evaluation. The patient demonstrates signs and symptoms consistent with L hip pain; low back pain. During the examination the patient demonstrated decreased posture / core strength, decreased lumbar ROM, activity intolerance, and L hip pain. The patient's impairments are causing the following functional limitations: difficulty with prolonged standing, prolonged walking, prolonged sitting, difficulty lifting/carrying objects, difficulty walking on unlevel surfaces, difficulty squatting/kneeling, difficulty stair-climbing, and difficulty transferring from low surfaces. The patient's clinical presentation is evolving due to a number of participation restrictions, significant medial history, and functional limitation (FOTO 46% function). The patient will benefit from skilled PT services to address impairments, work towards goals, and restore PLOF.             Impairments: abnormal or restricted ROM, activity intolerance, impaired balance, impaired physical strength, lacks appropriate home exercise program, pain with function and poor posture   Functional limitations: difficulty with prolonged standing, prolonged walking, prolonged sitting, difficulty lifting/carrying objects, difficulty walking on unlevel surfaces, difficulty squatting/kneeling, difficulty stair-climbing, and difficulty transferring from low surfaces. Symptom irritability: moderateBarriers to therapy: Memory loss  Understanding of Dx/Px/POC: good   Prognosis: good  Prognosis details: Positive prognostic indicators include: positive attitude toward recovery, good understanding of diagnosis/treatment plan, and absence of observed red flags. Negative prognostic indicators include: chronicity of symptoms, depression       Goals  STG: Achieve in 4-6 weeks  1. Patient's L LE / L/B pain at worst less than 3/10 to allow for proper gait. 2.  Patient's LUMBAR ROM improve to [facility] to improve squatting. 3.  L LE MMT improve to > 4+/5 and core with " good activation" all motions tested to improve ADL/recreational activities. LTG:  Achieve in 6-12 weeks  1. Patient's FOTO score improve to > 65% to indicate a return to normal functioning. 2.  Patient achieve personal goal of functioning with less L LE and low back pain. 3. Patient to achieve independence with home exercise plan. Plan  Plan details: RE-ASSESS 1X/MONTH  Patient would benefit from: skilled physical therapy  Planned modality interventions: cryotherapy, thermotherapy: hydrocollator packs and traction  Planned therapy interventions: joint mobilization, manual therapy, massage, neuromuscular re-education, patient education, postural training, self care, strengthening, stretching, therapeutic activities, therapeutic exercise, home exercise program, abdominal trunk stabilization, balance and IASTM  Frequency: 1-3x/wk. Duration in weeks: 12  Plan of Care beginning date: 11/17/2023  Plan of Care expiration date: 2/16/2024  Treatment plan discussed with: PTA and patient      Subjective Evaluation    History of Present Illness  Mechanism of injury: Tammi Islas is a 70 y.o. female that presents to outpatient physical therapy with complaints of severe chronic low back pain with L knee pain.   The patient saw orthopedics who felt the L knee pain and difficulty ambulating down steps was related to the low back issues. The patient does get numbness/tingling at her L LE to the toes. The patient reports receiving CSI on 10/12/23 to her lumbar spine facet joints by [facility]. The patient notes the shots did not improve her complaints at the L LE and in fact she felt notes the L LE wasn't present until after the injections. The patient was referred to outpatient PT by orthopedics. The patient notes episodes of the hips "slipping" with walking and she gets increased pain L LE afterwards. The patient's main goal for physical therapy is to try to get rid of the low back pain and L LE pain. Patient Goals  Patient goals for therapy: decreased pain, increased motion, increased strength, independence with ADLs/IADLs, return to sport/leisure activities and improved balance  Patient goal: decrease LBP and L LE pain  Pain  Current pain ratin  At best pain ratin  At worst pain ratin  Location: low back and L anterior lateral knee/lower leg  Quality: sharp and radiating (tingling)  Aggravating factors: walking, standing and stair climbing (quickly walking; car transfers)  Progression: worsening    Social Support  Steps to enter house: yes  Stairs in house: no   Lives in: apartment  Lives with: alone    Employment status: not working  Hand dominance: ambidextrous    Treatments  Current treatment: physical therapy      Objective     Concurrent Complaints  Positive for disturbed sleep.  Negative for night pain, bladder dysfunction, bowel dysfunction, saddle (S4) numbness, history of cancer, history of trauma and infection    Postural Observations  Seated posture: fair  Standing posture: fair  Correction of posture: has no consistent effect    Additional Postural Observation Details  Towel roll correction - worse LBP    Neurological Testing     Sensation     Lumbar   Left   Intact: light touch  Paresthesia: light touch    Right   Intact: light touch    Comments   Left light touch: L knee/thigh - L4? Reflexes   Left   Patellar (L4): normal (2+)  Achilles (S1): normal (2+)  Babinski sign: negative    Right   Patellar (L4): normal (2+)  Achilles (S1): normal (2+)  Babinski sign: negative    Active Range of Motion     Lumbar   Flexion:  WFL  Extension:  with pain Restriction level: moderate  Left lateral flexion:  Restriction level: moderate  Right lateral flexion:  Restriction level: moderate  Left rotation:  Restriction level: moderate  Right rotation:  Restriction level: moderate  Mechanical Assessment    Cervical      Thoracic      Lumbar    Standing extension: repeated movements  Pain location: no change  Lying extension: repeated movements  Pain location: no change    Strength/Myotome Testing     Left Hip   Planes of Motion   Flexion: 4+  Abduction: 4  Adduction: 4    Right Hip   Planes of Motion   Flexion: 3+  Abduction: 4  Adduction: 4    Left Knee   Flexion: 4-  Extension: 4-    Right Knee   Flexion: 4-  Extension: 4-    Left Ankle/Foot   Dorsiflexion: 5  Plantar flexion: 5  Great toe extension: 5    Right Ankle/Foot   Dorsiflexion: 5  Plantar flexion: 5  Great toe extension: 5    Muscle Activation     Additional Muscle Activation Details  Decreased TrA, multifidus, gluteal activation with transfers / position changes / dynamic movement      Tests     Lumbar     Left   Negative slump test.     Right   Positive slump test.     Left Pelvic Girdle/Sacrum   Positive: active SLR test.     Right Pelvic Girdle/Sacrum   Positive: active SLR test.     Left Hip   Negative FADIR. Right Hip   Negative FADIR.      Additional Tests Details  FOTO: 46% ( predicted 60%)             Re-evaluation:  12/15  PRECAUTIONS: 10/12/23 L L4-S1 facet injection  Access Code: [email or web address]     Specialty Daily Treatment Diary     Manual  11/17       STM lumbar paraspinals                Hamstring stretch B/L        Piriformis Stretch B/L        Prone Quad stretch Therapeutic Exercise        Treadmill Ambulation for endurance        UBE Stand ALT FWD/BACK for core/posture strength                                Bridges        Self 90-90 hamstring stretch B/L        Prone press ups 3x5       Standing back extensions 1x10               LTR cross legs                Hip EXT SLR                Neuro Re-ed        Core brace *       kegels *       Multifidi *       Brace with knee fallouts        Quad DLS UE  LE  UE+LE        Quad knee lifts        Clam shells        MTP/LTP/ Anti-rotations        Towel roll education, Posture correction; movement precautions DONE AD       Lift/chop T-band        SLB        Posture corrections seated                Sidestepping        Heel toe Amb                        Therapeutic Activity        Steps ups fwd/side        Crate carry        Crate lifts 3 levels        Lunges        Chair squats            Modalities        MHP/CP to L/B                                   The patient was given a new home exercise plan with instruction, pictures, and verbal feedback. The patient accepts and understands the new home activities.

## 2023-11-17 ENCOUNTER — TELEPHONE (OUTPATIENT)
Dept: PHYSICAL THERAPY | Facility: CLINIC | Age: 68
End: 2023-11-17

## 2023-11-17 ENCOUNTER — EVALUATION (OUTPATIENT)
Dept: PHYSICAL THERAPY | Facility: CLINIC | Age: 68
End: 2023-11-17
Payer: MEDICARE

## 2023-11-17 DIAGNOSIS — M54.50 LOW BACK PAIN, UNSPECIFIED BACK PAIN LATERALITY, UNSPECIFIED CHRONICITY, UNSPECIFIED WHETHER SCIATICA PRESENT: ICD-10-CM

## 2023-11-17 DIAGNOSIS — M25.552 LEFT HIP PAIN: Primary | ICD-10-CM

## 2023-11-17 PROCEDURE — 97162 PT EVAL MOD COMPLEX 30 MIN: CPT | Performed by: PHYSICAL THERAPIST

## 2023-11-17 PROCEDURE — 97110 THERAPEUTIC EXERCISES: CPT | Performed by: PHYSICAL THERAPIST

## 2023-11-17 NOTE — LETTER
2023    IVAN Johnson  [address] [address]    Patient: Sung Copeland   YOB: 1955   Date of Visit: 2023     Encounter Diagnosis     ICD-10-CM    1. Left hip pain  M25.552       2. Low back pain, unspecified back pain laterality, unspecified chronicity, unspecified whether sciatica present  M54.50           Dear Dr. Drake Lank: Thank you for your recent referral of Sung Copeland. Please review the attached evaluation summary from Sri's recent visit. Please verify that you agree with the plan of care by signing the attached order. If you have any questions or concerns, please do not hesitate to call. I sincerely appreciate the opportunity to share in the care of one of your patients and hope to have another opportunity to work with you in the near future. Sincerely,    Nemesio Cook, PT      Referring Provider:      I certify that I have read the below Plan of Care and certify the need for these services furnished under this plan of treatment while under my care. IVAN Johnson  [address] [address]  Via Fax: [phone]          PT Evaluation     Today's date: 2023  Patient name: Sung Copeland  : 1955  MRN: [id number]  Referring provider: NAHOMI Johnson  Dx:   Encounter Diagnosis     ICD-10-CM    1. Left hip pain  M25.552       2. Low back pain, unspecified back pain laterality, unspecified chronicity, unspecified whether sciatica present  M54.50           Start Time: 07  Stop Time: 822  Total time in clinic (min): 39 minutes    Assessment  Assessment details: Sung Copeland is a 70 y.o. female with a history of arthritis, cataract, depression, H/A, hyperlipidemia, HTN, L RTC tear, memory loss, CKD, and BMI>30 that presents for a moderate complexity physical therapy initial evaluation.   The patient demonstrates signs and symptoms consistent with L hip pain; low back pain. During the examination the patient demonstrated decreased posture / core strength, decreased lumbar ROM, activity intolerance, and L hip pain. The patient's impairments are causing the following functional limitations: difficulty with prolonged standing, prolonged walking, prolonged sitting, difficulty lifting/carrying objects, difficulty walking on unlevel surfaces, difficulty squatting/kneeling, difficulty stair-climbing, and difficulty transferring from low surfaces. The patient's clinical presentation is evolving due to a number of participation restrictions, significant medial history, and functional limitation (FOTO 46% function). The patient will benefit from skilled PT services to address impairments, work towards goals, and restore PLOF. Impairments: abnormal or restricted ROM, activity intolerance, impaired balance, impaired physical strength, lacks appropriate home exercise program, pain with function and poor posture   Functional limitations: difficulty with prolonged standing, prolonged walking, prolonged sitting, difficulty lifting/carrying objects, difficulty walking on unlevel surfaces, difficulty squatting/kneeling, difficulty stair-climbing, and difficulty transferring from low surfaces. Symptom irritability: moderateBarriers to therapy: Memory loss  Understanding of Dx/Px/POC: good   Prognosis: good  Prognosis details: Positive prognostic indicators include: positive attitude toward recovery, good understanding of diagnosis/treatment plan, and absence of observed red flags. Negative prognostic indicators include: chronicity of symptoms, depression       Goals  STG: Achieve in 4-6 weeks  1. Patient's L LE / L/B pain at worst less than 3/10 to allow for proper gait. 2.  Patient's LUMBAR ROM improve to [facility] to improve squatting.   3.  L LE MMT improve to > 4+/5 and core with " good activation" all motions tested to improve ADL/recreational activities. LTG:  Achieve in 6-12 weeks  1. Patient's FOTO score improve to > 65% to indicate a return to normal functioning. 2.  Patient achieve personal goal of functioning with less L LE and low back pain. 3. Patient to achieve independence with home exercise plan. Plan  Plan details: RE-ASSESS 1X/MONTH  Patient would benefit from: skilled physical therapy  Planned modality interventions: cryotherapy, thermotherapy: hydrocollator packs and traction  Planned therapy interventions: joint mobilization, manual therapy, massage, neuromuscular re-education, patient education, postural training, self care, strengthening, stretching, therapeutic activities, therapeutic exercise, home exercise program, abdominal trunk stabilization, balance and IASTM  Frequency: 1-3x/wk. Duration in weeks: 12  Plan of Care beginning date: 11/17/2023  Plan of Care expiration date: 2/16/2024  Treatment plan discussed with: PTA and patient      Subjective Evaluation    History of Present Illness  Mechanism of injury: Cortney Arriaga is a 70 y.o. female that presents to outpatient physical therapy with complaints of severe chronic low back pain with L knee pain. The patient saw orthopedics who felt the L knee pain and difficulty ambulating down steps was related to the low back issues. The patient does get numbness/tingling at her L LE to the toes. The patient reports receiving CSI on 10/12/23 to her lumbar spine facet joints by [facility]. The patient notes the shots did not improve her complaints at the L LE and in fact she felt notes the L LE wasn't present until after the injections. The patient was referred to outpatient PT by orthopedics. The patient notes episodes of the hips "slipping" with walking and she gets increased pain L LE afterwards. The patient's main goal for physical therapy is to try to get rid of the low back pain and L LE pain.    Patient Goals  Patient goals for therapy: decreased pain, increased motion, increased strength, independence with ADLs/IADLs, return to sport/leisure activities and improved balance  Patient goal: decrease LBP and L LE pain  Pain  Current pain ratin  At best pain ratin  At worst pain ratin  Location: low back and L anterior lateral knee/lower leg  Quality: sharp and radiating (tingling)  Aggravating factors: walking, standing and stair climbing (quickly walking; car transfers)  Progression: worsening    Social Support  Steps to enter house: yes  Stairs in house: no   Lives in: apartment  Lives with: alone    Employment status: not working  Hand dominance: ambidextrous    Treatments  Current treatment: physical therapy      Objective     Concurrent Complaints  Positive for disturbed sleep. Negative for night pain, bladder dysfunction, bowel dysfunction, saddle (S4) numbness, history of cancer, history of trauma and infection    Postural Observations  Seated posture: fair  Standing posture: fair  Correction of posture: has no consistent effect    Additional Postural Observation Details  Towel roll correction - worse LBP    Neurological Testing     Sensation     Lumbar   Left   Intact: light touch  Paresthesia: light touch    Right   Intact: light touch    Comments   Left light touch: L knee/thigh - L4?     Reflexes   Left   Patellar (L4): normal (2+)  Achilles (S1): normal (2+)  Babinski sign: negative    Right   Patellar (L4): normal (2+)  Achilles (S1): normal (2+)  Babinski sign: negative    Active Range of Motion     Lumbar   Flexion:  WFL  Extension:  with pain Restriction level: moderate  Left lateral flexion:  Restriction level: moderate  Right lateral flexion:  Restriction level: moderate  Left rotation:  Restriction level: moderate  Right rotation:  Restriction level: moderate  Mechanical Assessment    Cervical      Thoracic      Lumbar    Standing extension: repeated movements  Pain location: no change  Lying extension: repeated movements  Pain location: no change    Strength/Myotome Testing     Left Hip   Planes of Motion   Flexion: 4+  Abduction: 4  Adduction: 4    Right Hip   Planes of Motion   Flexion: 3+  Abduction: 4  Adduction: 4    Left Knee   Flexion: 4-  Extension: 4-    Right Knee   Flexion: 4-  Extension: 4-    Left Ankle/Foot   Dorsiflexion: 5  Plantar flexion: 5  Great toe extension: 5    Right Ankle/Foot   Dorsiflexion: 5  Plantar flexion: 5  Great toe extension: 5    Muscle Activation     Additional Muscle Activation Details  Decreased TrA, multifidus, gluteal activation with transfers / position changes / dynamic movement      Tests     Lumbar     Left   Negative slump test.     Right   Positive slump test.     Left Pelvic Girdle/Sacrum   Positive: active SLR test.     Right Pelvic Girdle/Sacrum   Positive: active SLR test.     Left Hip   Negative FADIR. Right Hip   Negative FADIR.      Additional Tests Details  FOTO: 46% ( predicted 60%)             Re-evaluation:  12/15  PRECAUTIONS: 10/12/23 L L4-S1 facet injection  Access Code: [email or web address]     Specialty Daily Treatment Diary     Manual  11/17       STM lumbar paraspinals                Hamstring stretch B/L        Piriformis Stretch B/L        Prone Quad stretch            Therapeutic Exercise        Treadmill Ambulation for endurance        UBE Stand ALT FWD/BACK for core/posture strength                                Bridges        Self 90-90 hamstring stretch B/L        Prone press ups 3x5       Standing back extensions 1x10               LTR cross legs                Hip EXT SLR                Neuro Re-ed        Core brace *       kegels *       Multifidi *       Brace with knee fallouts        Quad DLS UE  LE  UE+LE        Quad knee lifts        Clam shells        MTP/LTP/ Anti-rotations        Towel roll education, Posture correction; movement precautions DONE AD       Lift/chop T-band        SLB        Posture corrections seated                Sidestepping        Heel toe Amb Therapeutic Activity        Steps ups fwd/side        Crate carry        Crate lifts 3 levels        Lunges        Chair squats            Modalities        MHP/CP to L/B                                   The patient was given a new home exercise plan with instruction, pictures, and verbal feedback. The patient accepts and understands the new home activities.

## 2023-11-20 ENCOUNTER — OFFICE VISIT (OUTPATIENT)
Dept: PHYSICAL THERAPY | Facility: CLINIC | Age: 68
End: 2023-11-20
Payer: MEDICARE

## 2023-11-20 DIAGNOSIS — M25.552 LEFT HIP PAIN: Primary | ICD-10-CM

## 2023-11-20 DIAGNOSIS — M54.50 LOW BACK PAIN, UNSPECIFIED BACK PAIN LATERALITY, UNSPECIFIED CHRONICITY, UNSPECIFIED WHETHER SCIATICA PRESENT: ICD-10-CM

## 2023-11-20 PROCEDURE — 97140 MANUAL THERAPY 1/> REGIONS: CPT

## 2023-11-20 PROCEDURE — 97112 NEUROMUSCULAR REEDUCATION: CPT

## 2023-11-20 NOTE — PROGRESS NOTES
Daily Note     Today's date: 2023  Patient name: Justina Best  : 1955  MRN: [id number]  Referring provider: NAHOMI Barry  Dx:   Encounter Diagnosis     ICD-10-CM    1. Left hip pain  M25.552       2. Low back pain, unspecified back pain laterality, unspecified chronicity, unspecified whether sciatica present  M54.50           Start Time: 07  Stop Time: 825  Total time in clinic (min): 50 minutes    Subjective: Patient reports no new changes since IE. Patient states that she drove to [place] this weekend and had a challenge moving from sit to stand from car 2* to stiffness. She states that she has the most difficulty sitting for an extended period of time. Objective: See treatment diary below      Assessment: Good tolerance to to program with improve pain levels reported post visit. Mod tightness in hip flexor, adductor on L as evidence by groin pain with piriformis stretch. Improve hip flexor extensibility post manuals. Increased HS shortening L>R, but more glute tightness R>L. All consent received prior to initiation of manuals and prior to the use of MHP to LS. Skin checks performed prior and post MHP. Skin light pink and intact post MHP. Patient would benefit from continued PT to improve core strength and stability. Plan: Continue per plan of care.       Re-evaluation:  12/15  PRECAUTIONS: 10/12/23 L L4-S1 facet injection  Access Code: [email or web address]     Specialty Daily Treatment Diary     Manual        STM lumbar paraspinals  LQ              Hamstring stretch B/L  LQ      Piriformis Stretch B/L  LQ + adductor stretch+L adductor STM      Prone Quad stretch            Therapeutic Exercise        Treadmill Ambulation for endurance        UBE Stand ALT FWD/BACK for core/posture strength                                Bridges        Self 90-90 hamstring stretch B/L        Prone press ups 3x5 NV      Standing back extensions 1x10 NV              LTR cross legs                Hip EXT SLR                Neuro Re-ed        Core brace * 2x10, 5"      kegels * Sitting x10, 5"      Multifidi * X5 w/o FO, 3x5 w/ FO/ BL      Brace with knee fallouts        Quad DLS UE  LE  UE+LE        Quad knee lifts        Clam shells        MTP/LTP/ Anti-rotations        Towel roll education, Posture correction; movement precautions DONE AD DONE LQ      Lift/chop T-band        SLB        Posture corrections seated                Sidestepping        Heel toe Amb                          Therapeutic Activity          Steps ups fwd/side          Crate carry        Crate lifts 3 levels        Lunges        Chair squats            Modalities        MHP/CP to L/B  MHP 10 min L-S post , seated

## 2023-11-24 ENCOUNTER — OFFICE VISIT (OUTPATIENT)
Dept: PHYSICAL THERAPY | Facility: CLINIC | Age: 68
End: 2023-11-24
Payer: MEDICARE

## 2023-11-24 DIAGNOSIS — M54.50 LOW BACK PAIN, UNSPECIFIED BACK PAIN LATERALITY, UNSPECIFIED CHRONICITY, UNSPECIFIED WHETHER SCIATICA PRESENT: ICD-10-CM

## 2023-11-24 DIAGNOSIS — M25.552 LEFT HIP PAIN: Primary | ICD-10-CM

## 2023-11-24 PROCEDURE — 97112 NEUROMUSCULAR REEDUCATION: CPT

## 2023-11-24 PROCEDURE — 97110 THERAPEUTIC EXERCISES: CPT

## 2023-11-24 PROCEDURE — 97140 MANUAL THERAPY 1/> REGIONS: CPT

## 2023-11-24 NOTE — PROGRESS NOTES
After Visit Summary   10/19/2017    Norbert Monge    MRN: [id number]           Patient Information     Date Of Birth          1976        Visit Information        Provider Department      10/19/2017 7:40 AM Christina Dang MD [facility]        Today's Diagnoses     Routine general medical examination at a health care facility    -  1      Care Instructions      Preventive Health Recommendations  Male Ages 40 to 49    Yearly exam:             See your health care provider every year in order to  o   Review health changes.   o   Discuss preventive care.    o   Review your medicines if your doctor has prescribed any.    You should be tested each year for STDs (sexually transmitted diseases) if you re at risk.     Have a cholesterol test every 5 years.     Have a colonoscopy (test for colon cancer) if someone in your family has had colon cancer or polyps before age 50.     After age 45, have a diabetes test (fasting glucose). If you are at risk for diabetes, you should have this test every 3 years.      Talk with your health care provider about whether or not a prostate cancer screening test (PSA) is right for you.    Shots: Get a flu shot each year. Get a tetanus shot every 10 years.     Nutrition:    Eat at least 5 servings of fruits and vegetables daily.     Eat whole-grain bread, whole-wheat pasta and brown rice instead of white grains and rice.     Talk to your provider about Calcium and Vitamin D.     Lifestyle    Exercise for at least 150 minutes a week (30 minutes a day, 5 days a week). This will help you control your weight and prevent disease.     Limit alcohol to one drink per day.     No smoking.     Wear sunscreen to prevent skin cancer.     See your dentist every six months for an exam and cleaning.        [facility]   Discharged by : Linnea CASTILLO CMA ([facility])    Paper scripts provided to patient : none      If you have any questions regarding  Daily Note     Today's date: 2023  Patient name: Elliot Perez  : 1955  MRN: [id number]  Referring provider: NAHOMI Elizabeth  Dx:   Encounter Diagnosis     ICD-10-CM    1. Left hip pain  M25.552       2. Low back pain, unspecified back pain laterality, unspecified chronicity, unspecified whether sciatica present  M54.50                      Subjective: The patient notes having some sharp pain at her coccyx last Friday while driving but since then has not happened. The patient notes feeling 2-3/10 pain at her low back. Objective: See treatment diary below      Assessment: Tolerated treatment well. Patient would benefit from continued PT for stretching and strengthening. Patient was able to add exercises to her program with little difficulty and no discomfort. She had little discomfort when trying to transfer from sit to supine only. She seemed to understand all education on new exercises. Patient felt good by the end of the session and had no pain by the end of the session. Plan: Continue per plan of care. Progress treatment as tolerated.        Re-evaluation:  12/15  PRECAUTIONS: 10/12/23 L L4-S1 facet injection  Access Code: [email or web address]     Specialty Daily Treatment Diary     Manual       STM lumbar paraspinals  LQ DC             Hamstring stretch B/L  LQ :30x3 B/L     Piriformis Stretch B/L  LQ + adductor stretch+L adductor STM Pirf,add stretch :15x5ea B/L     Prone Quad stretch            Therapeutic Exercise        Treadmill Ambulation for endurance   0.7mph x 4 mins     UBE Stand ALT FWD/BACK for core/posture strength                                Bridges        Self 90-90 hamstring stretch B/L        Prone press ups 3x5 NV 3x5     Standing back extensions 1x10 NV 2x10             LTR cross legs                Hip EXT SLR                Neuro Re-ed        Core brace * 2x10, 5" :05 2x10     kegels * Sitting x10, 5" Supine :05x10     Multifidi * X5 w/o FO, 3x5 w/ FO/ BL Stand x10  Supine x5 B/L     Brace with knee fallouts   x10     Quad DLS UE  LE  UE+LE        Quad knee lifts        Clam shells        MTP/LTP/ Anti-rotations        Towel roll education, Posture correction; movement precautions DONE AD DONE LQ      Lift/chop T-band        SLB        Posture corrections seated   :03x10             Sidestepping   10ft x4     Heel toe Amb                          Therapeutic Activity          Steps ups fwd/side          Crate carry        Crate lifts 3 levels        Lunges        Chair squats            Modalities        MHP/CP to L/B  MHP 10 min L-S post , seated your visit please contact your care team:     [facility] Hours Telephone Number   Dr. Ellen Esquivel   7am-7pm Monday - Thursday   7am-5pm Fridays  [phone]   (Appointment scheduling available 24/7)   RN Line   [phone] option 2       For a Price Quote for your services, please call our Consumer Price Line at [phone].     What options do I have for visits at the clinic other than the traditional office visit?     To expand how we care for you, many of our providers are utilizing electronic visits (e-visits) and telephone visits, when medically appropriate, for interactions with their patients rather than a visit in the clinic. We also offer nurse visits for many medical concerns. Just like any other service, we will bill your insurance company for this type of visit based on time spent on the phone with your provider. Not all insurance companies cover these visits. Please check with your medical insurance if this type of visit is covered. You will be responsible for any charges that are not paid by your insurance.   E-visits via Rip van Wafelshart: generally incur a $35.00 fee.     Telephone visits:   Time spent on the phone: *charged based on time that is spent on the phone in increments of 10 minutes. Estimated cost:   5-10 mins $30.00   11-20 mins. $59.00   21-30 mins. $85.00     Use Rip van Wafelshart (secure email communication and access to your chart) to send your primary care provider a message or make an appointment. Ask someone on your Team how to sign up for InnoVital Systemst.     As always, Thank you for trusting us with your health care needs!      [facility]:    Scheduling Appointments  [facility] [place]  Call: [phone]    [facility]  Call: [phone]    [facility]  Call: [phone]    For Gastroenterology referrals   [facility]   Clinics  [facility], 4th Floor   [address] [place] [address]   Appointments: [phone]    WHERE TO GO FOR CARE?  Clinic    Make an appointment if you:       Are sick (cold, cough, flu, sore throat, earache or in pain).       Have a small injury (sprain, small cut, burn or broken bone).       Need a physical exam, Pap smear, vaccine or prescription refill.       Have questions about your health or medicines.    To reach us:      Call [phone] ([phone]). Open 24 hours every day. (For counseling services, call [phone].)    Log into logolineup at AtomShockwave. (Visit Trustribe to create an account.) Hospital emergency room    An emergency is a serious or life- threatening problem that must be treated right away.    Call [phone] or get to the hospital if you have:      Very bad or sudden:            - Chest pain or pressure         - Bleeding         - Head or belly pain         - Dizziness or trouble seeing, walking or                          Speaking      Problems breathing      Blood in your vomit or you are coughing up blood      A major injury (knocked out, loss of a finger or limb, rape, broken bone protruding from skin)    A mental health crisis. (Or call the Mental Health Crisis line at 1-243.731.6737 or Suicide Prevention Hotline at 1-786.712.5170.)    Open 24 hours every day. You don't need an appointment.     Urgent care    Visit urgent care for sickness or small injuries when the clinic is closed. You don't need an appointment. To check hours or find an urgent care near you, visit [email or web address]. Online care    Get online care from [facility] for more than 70 common problems, like colds, allergies and infections. Open 24 hours every day at:   [email or web address]   Need help deciding?    For advice about where to be seen, you may call your clinic and ask to speak with a nurse. We're here for you 24 hours every day.         If you are deaf or hard of hearing, please let  "us know. We provide many free services including sign language interpreters, oral interpreters, TTYs, telephone amplifiers, note takers and written materials.                   Follow-ups after your visit        Future tests that were ordered for you today     Open Future Orders        Priority Expected Expires Ordered    Lipid panel reflex to direct LDL Routine  10/19/2018 10/19/2017            Who to contact     If you have questions or need follow up information about today's clinic visit or your schedule please contact [facility] directly at [phone].  Normal or non-critical lab and imaging results will be communicated to you by BrightView Systemshart, letter or phone within 4 business days after the clinic has received the results. If you do not hear from us within 7 days, please contact the clinic through mySkint or phone. If you have a critical or abnormal lab result, we will notify you by phone as soon as possible.  Submit refill requests through Alkermes or call your pharmacy and they will forward the refill request to us. Please allow 3 business days for your refill to be completed.          Additional Information About Your Visit        BrightView SystemsharFlexuspine Information     Alkermes lets you send messages to your doctor, view your test results, renew your prescriptions, schedule appointments and more. To sign up, go to [email or web address] . Click on \"Log in\" on the left side of the screen, which will take you to the Welcome page. Then click on \"Sign up Now\" on the right side of the page.     You will be asked to enter the access code listed below, as well as some personal information. Please follow the directions to create your username and password.     Your access code is: [email or web address]  Expires: 2018  8:04 AM     Your access code will  in 90 days. If you need help or a new code, please call your [facility] or [phone].        Care EveryWhere ID     This is your Care EveryWhere ID. This " "could be used by other organizations to access your [facility] medical records  [id number]        Your Vitals Were     Pulse Temperature Height Pulse Oximetry BMI (Body Mass Index)       64 98.3  F (36.8  C) (Oral) 5' 11\" (1.803 m) 97% 28.89 kg/m2        Blood Pressure from Last 3 Encounters:   10/19/17 135/70   10/11/16 122/86   11/27/13 120/62    Weight from Last 3 Encounters:   10/19/17 207 lb 2 oz (94 kg)   10/11/16 206 lb (93.4 kg)   11/27/13 199 lb 9.6 oz (90.5 kg)              We Performed the Following     Glucose        Primary Care Provider Office Phone # Fax #    Jaime Uriostegui PA-C [phone]       [address] [facility] [address]        Equal Access to Services     [facility] : Hadii aad ku hadasho Soomaali, waaxda luqadaha, qaybta kaalmada adeegyada, waxay tyronin hayaan shannon wolfe . So [facility] [phone].    ATENCIÓN: Si habla español, tiene a crisostomo disposición servicios gratuitos de asistencia lingüística. Llaudra al [phone].    We comply with applicable federal civil rights laws and [place] laws. We do not discriminate on the basis of race, color, national origin, age, disability, sex, sexual orientation, or gender identity.            Thank you!     Thank you for choosing [facility]  for your care. Our goal is always to provide you with excellent care. Hearing back from our patients is one way we can continue to improve our services. Please take a few minutes to complete the written survey that you may receive in the mail after your visit with us. Thank you!             Your Updated Medication List - Protect others around you: Learn how to safely use, store and throw away your medicines at www.disposemymeds.org.          This list is accurate as of: 10/19/17  8:04 AM.  Always use your most recent med list.                   Brand Name Dispense Instructions for use Diagnosis    propranolol 20 MG tablet    INDERAL    30 tablet    1 to 2 " pills, 30 minutes before public event    Essential tremor

## 2023-11-25 ENCOUNTER — OFFICE VISIT (OUTPATIENT)
Dept: URGENT CARE | Facility: CLINIC | Age: 68
End: 2023-11-25
Payer: MEDICARE

## 2023-11-25 VITALS
HEART RATE: 72 BPM | SYSTOLIC BLOOD PRESSURE: 114 MMHG | WEIGHT: 190 LBS | BODY MASS INDEX: 37.11 KG/M2 | OXYGEN SATURATION: 98 % | DIASTOLIC BLOOD PRESSURE: 82 MMHG | TEMPERATURE: 97.9 F | RESPIRATION RATE: 20 BRPM

## 2023-11-25 DIAGNOSIS — R09.81 NASAL CONGESTION: Primary | ICD-10-CM

## 2023-11-25 PROCEDURE — 87636 SARSCOV2 & INF A&B AMP PRB: CPT | Performed by: PHYSICIAN ASSISTANT

## 2023-11-25 PROCEDURE — 99213 OFFICE O/P EST LOW 20 MIN: CPT | Performed by: FAMILY MEDICINE

## 2023-11-25 PROCEDURE — G0463 HOSPITAL OUTPT CLINIC VISIT: HCPCS | Performed by: FAMILY MEDICINE

## 2023-11-25 RX ORDER — ACETAMINOPHEN AND CHLORPHENIRAMINE MALEATE 325; 2 MG/1; MG/1
1 TABLET, FILM COATED ORAL 4 TIMES DAILY PRN
Qty: 56 TABLET | Refills: 0 | Status: SHIPPED | OUTPATIENT
Start: 2023-11-25

## 2023-11-25 NOTE — PATIENT INSTRUCTIONS
COVID/flu PCR performed, results to be in 24 to 48 hours, discussed this will populate on MyChart, to call with any questions or concerns. Can take Coricidin as instructed for cold symptoms. With any progression or worsening of symptoms return or be seen in ER.

## 2023-11-25 NOTE — PROGRESS NOTES
[facility]        NAME: Destinee Suazo is a 70 y.o. female  : 1955    MRN: [id number]  DATE: 2023  TIME: 1:24 PM    Assessment and Plan   Nasal congestion [R09.81]  1. Nasal congestion  Covid/Flu-Office Collect    Chlorpheniramine-APAP (CORICIDIN) 2-325 MG TABS            Patient Instructions     Patient Instructions   COVID/flu PCR performed, results to be in 24 to 48 hours, discussed this will populate on Pi-Cardia, to call with any questions or concerns. Can take Coricidin as instructed for cold symptoms. With any progression or worsening of symptoms return or be seen in ER. Follow up with PCP in 3-5 days. Proceed to  ER if symptoms worsen. Chief Complaint     Chief Complaint   Patient presents with    Nasal Congestion     Started yesterday. Was using inhaler on thanksgiving day because she was exposed to cigarette smoke. SOB, stuffy/runny nose, clear mucous. Sinus pressure/headaches. Unsure of fever. Some body aches and tired. Denies exposure to anyone with similar sx. History of Present Illness       Patient is a 78-year-old female presenting today with cold-like symptoms x 2 days. Patient notes over the last 2 days she has had persistent nasal congestion, runny nose and a slight cough, has not taken any medication alleviating factors for symptoms. Denies any known sick contacts but was around several individuals for the holidays this week. Denies fever, chills, chest tightness, SOB, wheezing. Review of Systems   Review of Systems   Constitutional:  Negative for chills, fatigue and fever. HENT:  Positive for congestion, postnasal drip and rhinorrhea. Negative for sore throat. Eyes:  Negative for redness and itching. Respiratory:  Positive for cough. Negative for chest tightness and shortness of breath. Cardiovascular:  Negative for chest pain. Gastrointestinal:  Negative for diarrhea, nausea and vomiting.    Musculoskeletal:  Negative for arthralgias and myalgias. Neurological:  Negative for light-headedness and headaches. Current Medications       Current Outpatient Medications:     acetaminophen (TYLENOL) 500 mg tablet, Take 500-1,000 mg by mouth if needed for mild pain, Disp: , Rfl:     albuterol (ProAir HFA) 90 mcg/act inhaler, Inhale 2 puffs every 4 (four) hours as needed for wheezing, Disp: 18 g, Rfl: 0    Calcium Carbonate (CALCIUM 600 PO), Take by mouth daily  , Disp: , Rfl:     Chlorpheniramine-APAP (CORICIDIN) 2-325 MG TABS, Take 1 tablet by mouth 4 (four) times a day as needed (cough), Disp: 56 tablet, Rfl: 0    Cholecalciferol (VITAMIN D3 PO), Take 50 mcg by mouth daily  , Disp: , Rfl:     clotrimazole-betamethasone (LOTRISONE) 1-0.05 % cream, Apply topically 2 (two) times a day for 2 weeks to abdomen rash and 6 weeks to vulvar rash. (Patient taking differently: Apply topically if needed for 2 weeks to abdomen rash and 6 weeks to vulvar rash.), Disp: 30 g, Rfl: 2    estradiol (ESTRACE VAGINAL) 0.1 mg/g vaginal cream, Apply 1g into vagina nightly for 1 week, then apply every 3rd night, Disp: 42.5 g, Rfl: 3    montelukast (SINGULAIR) 10 mg tablet, take 1 tablet by mouth at bedtime, Disp: 90 tablet, Rfl: 3    nystatin (MYCOSTATIN) powder, Apply topically 3 (three) times a day as needed (rash) .  Apply at least daily in summer to prevent rash., Disp: 45 g, Rfl: 3    propranolol (INDERAL) 20 mg tablet, Take 1 tablet (20 mg total) by mouth every 12 (twelve) hours, Disp: 180 tablet, Rfl: 2    rosuvastatin (CRESTOR) 10 MG tablet, Take 1 tablet (10 mg total) by mouth daily, Disp: 90 tablet, Rfl: 3    traZODone (DESYREL) 100 mg tablet, take 1 tablet by mouth at bedtime, Disp: 90 tablet, Rfl: 1    Current Allergies     Allergies as of 11/25/2023 - Reviewed 11/25/2023   Allergen Reaction Noted    Keflex [cephalexin] Other (See Comments) 02/13/2018    Contrast [iodinated contrast media] Hives 05/02/2023    Morphine and related Hives 02/13/2018    Penicillins Hives 02/13/2018    Benadryl [diphenhydramine] Itching and Swelling 04/15/2020    Ciprofloxacin Hives 02/28/2021    Clindamycin Other (See Comments) 02/13/2018    Erythromycin Hives 02/13/2018            The following portions of the patient's history were reviewed and updated as appropriate: allergies, current medications, past family history, past medical history, past social history, past surgical history and problem list.     Past Medical History:   Diagnosis Date    Arthritis     Cataract     ashley    Colon polyp     Depression     Fluid retention     Headache, acute     Heart murmur     Hyperlipidemia     Hypertension     Memory loss     Pedal edema     Pneumonia     PONV (postoperative nausea and vomiting)     Rotator cuff tear, left     Sleep apnea 2021    Wears glasses     Wears partial dentures     upper       Past Surgical History:   Procedure Laterality Date    ABDOMINAL ADHESION SURGERY      ANAL SPHINCTEROPLASTY      ANKLE SURGERY Right     tendon tear    APPENDECTOMY      CARPAL TUNNEL RELEASE Bilateral     CARPAL TUNNEL RELEASE Left     CARPAL TUNNEL RELEASE Right     COLONOSCOPY      HAND SURGERY Right     ganglion cyst    HEMORROIDECTOMY      HYSTERECTOMY      ILEOSTOMY      JOINT REPLACEMENT      KNEE ARTHROCENTESIS      ganglion Cyst    KNEE ARTHROSCOPY Left     OVARIAN CYST REMOVAL      HI SURGICAL ARTHROSCOPY SHOULDER W/ROTATOR CUFF RPR Left 2/19/2018    Procedure: ARTHROSCOPIC REPAIR ROTATOR CUFF,  SAD, BICEP TENODESIS;  Surgeon: Betty Dyson MD;  Location: [facility];  Service: Orthopedics    REPAIR RECTOCELE      REPLACEMENT TOTAL KNEE Left 09/28/2021    TONSILLECTOMY      TONSILLECTOMY AND ADENOIDECTOMY      TUBAL LIGATION         Family History   Problem Relation Age of Onset    Hypertension Mother     Colon cancer Mother     Bone cancer Mother     COPD Mother     Emphysema Mother     Skin cancer Mother     Lead poisoning Father         lead poisoning in lungs Thyroid disease Sister     Depression Sister     Hypertension Sister     Alzheimer's disease Sister     ROBERT disease Brother     Throat cancer Brother     Hypertension Brother     Colon cancer Family     Bone cancer Family     Hypertension Family     Diabetes Sister     Hypertension Sister     Heart failure Sister     Alzheimer's disease Sister     No Known Problems Daughter     No Known Problems Maternal Grandmother     No Known Problems Paternal Grandmother     No Known Problems Maternal Aunt     No Known Problems Maternal Aunt     No Known Problems Maternal Aunt     No Known Problems Maternal Aunt     No Known Problems Maternal Aunt     No Known Problems Paternal Aunt          Medications have been verified. Objective   /82   Pulse 72   Temp 97.9 °F (36.6 °C)   Resp 20   Wt 86.2 kg (190 lb)   SpO2 98%   BMI 37.11 kg/m²        Physical Exam     Physical Exam  Vitals and nursing note reviewed. Constitutional:       General: She is not in acute distress. HENT:      Head: Normocephalic. Right Ear: Tympanic membrane, ear canal and external ear normal.      Left Ear: Tympanic membrane, ear canal and external ear normal.      Nose: Congestion present. Mouth/Throat:      Mouth: Mucous membranes are moist.      Pharynx: Oropharynx is clear. No oropharyngeal exudate or posterior oropharyngeal erythema. Eyes:      Conjunctiva/sclera: Conjunctivae normal.   Cardiovascular:      Rate and Rhythm: Normal rate and regular rhythm. Pulses: Normal pulses. Heart sounds: Normal heart sounds. Pulmonary:      Effort: Pulmonary effort is normal.      Breath sounds: Normal breath sounds. Musculoskeletal:      Cervical back: Normal range of motion. Skin:     General: Skin is warm. Capillary Refill: Capillary refill takes less than 2 seconds. Neurological:      Mental Status: She is alert.

## 2023-11-26 ENCOUNTER — TELEPHONE (OUTPATIENT)
Dept: URGENT CARE | Facility: CLINIC | Age: 68
End: 2023-11-26

## 2023-11-26 NOTE — TELEPHONE ENCOUNTER
Spoke with patient and informed her of positive COVID results. Discussed isolation/quarantine requirements and continuing supportive care. Encourage follow-up with PCP.

## 2023-11-27 ENCOUNTER — APPOINTMENT (OUTPATIENT)
Dept: PHYSICAL THERAPY | Facility: CLINIC | Age: 68
End: 2023-11-27
Payer: MEDICARE

## 2023-11-29 ENCOUNTER — APPOINTMENT (OUTPATIENT)
Dept: PHYSICAL THERAPY | Facility: CLINIC | Age: 68
End: 2023-11-29
Payer: MEDICARE

## 2023-12-04 ENCOUNTER — OFFICE VISIT (OUTPATIENT)
Dept: PHYSICAL THERAPY | Facility: CLINIC | Age: 68
End: 2023-12-04
Payer: MEDICARE

## 2023-12-04 DIAGNOSIS — M54.50 LOW BACK PAIN, UNSPECIFIED BACK PAIN LATERALITY, UNSPECIFIED CHRONICITY, UNSPECIFIED WHETHER SCIATICA PRESENT: ICD-10-CM

## 2023-12-04 DIAGNOSIS — M25.552 LEFT HIP PAIN: Primary | ICD-10-CM

## 2023-12-04 PROCEDURE — 97112 NEUROMUSCULAR REEDUCATION: CPT | Performed by: PHYSICAL THERAPIST

## 2023-12-04 PROCEDURE — 97110 THERAPEUTIC EXERCISES: CPT | Performed by: PHYSICAL THERAPIST

## 2023-12-04 NOTE — PROGRESS NOTES
Daily Note     Today's date: 2023  Patient name: Megan Devi  : 1955  MRN: [id number]  Referring provider: NAHOMI Valenzuela  Dx:   Encounter Diagnosis     ICD-10-CM    1. Left hip pain  M25.552       2. Low back pain, unspecified back pain laterality, unspecified chronicity, unspecified whether sciatica present  M54.50                      Subjective: The patient has not been to therapy since 23 due to Covid-19 infection. The patient reports feeling 3/10 pain at her B/L lumbar spine paraspinals today. Objective: See treatment diary below      Assessment: The patient was very pleasant and agreeable during the session today. The patient tolerated the exercises fairly well today. There were complaints of lumbar discomfort but nothing that persisted. The patient needed cues to perform the exercises with proper form. The patient reports improvement at the end of the treatment today with no reports of pain at her low back. 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 The patient will benefit from continued skilled physical therapy to progress towards achieving patient centered goals. Plan: Continue per plan of care. Progress treatment as tolerated.        Re-evaluation:  12/15  PRECAUTIONS: 10/12/23 L L4-S1 facet injection  Access Code: [email or web address]     Specialty Daily Treatment Diary     Manual      STM lumbar paraspinals  LQ DC             Hamstring stretch B/L  LQ :30x3 B/L     Piriformis Stretch B/L  LQ + adductor stretch+L adductor STM Pirf,add stretch :15x5ea B/L     Prone Quad stretch            Therapeutic Exercise       Treadmill Ambulation for endurance   0.7mph x 4 mins 0.8 mph x 5 mins    UBE Stand ALT FWD/BACK for core/posture strength                        Hip flexor stretch edge of mat    5x:15 B/L    Bridges    *x10    Self 90-90 hamstring stretch B/L        Prone press ups 3x5 NV 3x5 x10    Standing back extensions 1x10 NV 2x10 1x10            LTR cross legs                Stand hip ABD+EXT    X10 B/L            Neuro Re-ed        Core brace * 2x10, 5" :05 2x10 2x10    kegels * Sitting x10, 5" Supine :05x10 Prone :05 x10    Multifidi * X5 w/o FO, 3x5 w/ FO/ BL Stand x10  Supine x5 B/L     Brace with knee fallouts   x10     Quad DLS UE  LE  UE+LE        Glute set    x15    Clam shells        MTP/LTP/ Anti-rotations        Towel roll education, Posture correction; movement precautions DONE AD DONE LQ  Reviewed AD    Lift/chop T-band        SLB        Posture corrections seated   :03x10 :03 x 10            Sidestepping   10ft x4 10ft x 4 // bar    Heel toe Amb    10ftx4 //bar                      Therapeutic Activity          Steps ups fwd/side          Crate carry        Crate lifts 3 levels        Lunges        Chair squats            Modalities        MHP/CP to L/B  MHP 10 min L-S post , seated

## 2023-12-05 ENCOUNTER — RA CDI HCC (OUTPATIENT)
Dept: OTHER | Facility: HOSPITAL | Age: 68
End: 2023-12-05

## 2023-12-06 ENCOUNTER — OFFICE VISIT (OUTPATIENT)
Dept: PHYSICAL THERAPY | Facility: CLINIC | Age: 68
End: 2023-12-06
Payer: MEDICARE

## 2023-12-06 DIAGNOSIS — M25.552 LEFT HIP PAIN: Primary | ICD-10-CM

## 2023-12-06 DIAGNOSIS — M54.50 LOW BACK PAIN, UNSPECIFIED BACK PAIN LATERALITY, UNSPECIFIED CHRONICITY, UNSPECIFIED WHETHER SCIATICA PRESENT: ICD-10-CM

## 2023-12-06 PROCEDURE — 97112 NEUROMUSCULAR REEDUCATION: CPT | Performed by: PHYSICAL THERAPIST

## 2023-12-06 PROCEDURE — 97110 THERAPEUTIC EXERCISES: CPT | Performed by: PHYSICAL THERAPIST

## 2023-12-06 NOTE — PROGRESS NOTES
Daily Note     Today's date: 2023  Patient name: Dax Mcclelland  : 1955  MRN: [id number]  Referring provider: NAHOMI Adorno  Dx:   Encounter Diagnosis     ICD-10-CM    1. Left hip pain  M25.552       2. Low back pain, unspecified back pain laterality, unspecified chronicity, unspecified whether sciatica present  M54.50                      Subjective: The patient notes she attempted the hip flexor stretch at home but couldn't get a stretch due to having low furniture. The patient notes " I'm surviving" when asked how she is doing today. Objective: See treatment diary below      Assessment: The patient tolerated all activities well today. It took a few position changes to get the patient feeling an effective hip flexor stretch. There were no complaints of increased pain or problems after the session today. The patient will benefit from continued skilled physical therapy to progress towards achieving patient centered goals. Plan: Continue per plan of care. Progress treatment as tolerated.        Re-evaluation:  12/15  PRECAUTIONS: 10/12/23 L L4-S1 facet injection  Access Code: [email or web address]     Specialty Daily Treatment Diary     Manual     STM lumbar paraspinals  LQ DC             Hamstring stretch B/L  LQ :30x3 B/L     Piriformis Stretch B/L  LQ + adductor stretch+L adductor STM Pirf,add stretch :15x5ea B/L     Prone Quad stretch            Therapeutic Exercise      Treadmill Ambulation for endurance   0.7mph x 4 mins 0.8 mph x 5 mins 1.0 mph x 6 mins   UBE Stand ALT FWD/BACK for core/posture strength                        Hip flexor stretch edge of mat    5x:15 B/L Knee on mat lean forward 5x:15 B/L        ( best)  Tried standing 3x:15 no stretch   Bridges    *x10 x15   Self 90-90 hamstring stretch B/L        Prone press ups 3x5 NV 3x5 x10 x10   Standing back extensions 1x10 NV 2x10 1x10 1x10           LTR cross legs                Stand hip ABD+EXT    X10 B/L X15 B/L           Neuro Re-ed        Core brace * 2x10, 5" :05 2x10 2x10 X10   kegels * Sitting x10, 5" Supine :05x10 Prone :05 x10    Multifidi * X5 w/o FO, 3x5 w/ FO/ BL Stand x10  Supine x5 B/L     Brace with knee fallouts   x10     Quad DLS UE  LE  UE+LE        Glute set    x15 X15   Clam shells     X10   MTP/LTP/ Anti-rotations        Towel roll education, Posture correction; movement precautions DONE AD DONE LQ  Reviewed AD    Lift/chop T-band        SLB        Posture corrections seated   :03x10 :03 x 10 X15 :03           Sidestepping   10ft x4 10ft x 4 // bar 10ft x 8 // bar   Heel toe Amb    10ftx4 //bar 10ft x 8 // bar                     Therapeutic Activity          Steps ups fwd/side          Crate carry        Crate lifts 3 levels        Lunges        Chair squats            Modalities        MHP/CP to L/B  MHP 10 min L-S post , seated

## 2023-12-11 ENCOUNTER — APPOINTMENT (OUTPATIENT)
Dept: PHYSICAL THERAPY | Facility: CLINIC | Age: 68
End: 2023-12-11
Payer: MEDICARE

## 2023-12-13 NOTE — PROGRESS NOTES
PT Re-Evaluation     Today's date: 2023  Patient name: Marek Shah  : 1955  MRN: [id number]  Referring provider: NAHOMI Mathews  Dx:   Encounter Diagnosis     ICD-10-CM    1. Left hip pain  M25.552       2. Low back pain, unspecified back pain laterality, unspecified chronicity, unspecified whether sciatica present  M54.50                      Assessment  Assessment details: Marek Shah is a 70 y.o. female with a history of arthritis, cataract, depression, H/A, hyperlipidemia, HTN, L RTC tear, memory loss, CKD, and BMI>30 that presents for a physical therapy RE evaluation. The patient demonstrates signs and symptoms consistent with L hip pain; low back pain. During the examination the patient demonstrated improved but impaired posture / core strength, improved but impaired lumbar ROM, improved activity tolerance, pelvic instability, and decreased L hip pain. The patient has made some functional gains since starting therapy. 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 The patient continues to have difficulty with walking down the steps due to L knee tightness. The patient notes persistent difficulty due to hip/lumbar pain with car transfers and walking quickly. The patient will benefit from continued skilled PT to address impairments, work towards goals, and restore patient PLOF. Impairments: abnormal or restricted ROM, activity intolerance, impaired balance, impaired physical strength, lacks appropriate home exercise program, pain with function and poor posture   Functional limitations: difficulty with prolonged standing, prolonged walking, prolonged sitting, difficulty lifting/carrying objects, difficulty walking on unlevel surfaces, difficulty squatting/kneeling, difficulty stair-climbing, and difficulty transferring from low surfaces.   Symptom irritability: moderateBarriers to therapy: Memory loss  Understanding of Dx/Px/POC: good   Prognosis: good  Prognosis details: Positive prognostic indicators include: positive attitude toward recovery, good understanding of diagnosis/treatment plan, and absence of observed red flags. Negative prognostic indicators include: chronicity of symptoms, depression       Goals  STG: Achieve in 4-6 weeks  1. Patient's L LE / L/B pain at worst less than 3/10 to allow for proper gait. PROGRESSING 12/14  2. Patient's LUMBAR ROM improve to [facility] [place] to improve squatting. PROGRESSING 12/14  3. L LE MMT improve to > 4+/5 and core with " good activation" all motions tested to improve ADL/recreational activities. PROGRESSING 12/14      LTG:  Achieve in 6-12 weeks  1. Patient's FOTO score improve to > 65% to indicate a return to normal functioning. PROGRESSING 12/14  2. Patient achieve personal goal of functioning with less L LE and low back pain. PROGRESSING 12/14  3. Patient to achieve independence with home exercise plan. PROGRESSING 12/14        Plan  Plan details: RE-ASSESS 1X/MONTH  PATIENT REQUESTING PT 1x/WEEK  Patient would benefit from: skilled physical therapy  Planned modality interventions: cryotherapy, thermotherapy: hydrocollator packs and traction  Planned therapy interventions: joint mobilization, manual therapy, massage, neuromuscular re-education, patient education, postural training, self care, strengthening, stretching, therapeutic activities, therapeutic exercise, home exercise program, abdominal trunk stabilization, balance and IASTM  Frequency: 1-3x/wk. Duration in weeks: 12  Plan of Care beginning date: 11/17/2023  Plan of Care expiration date: 2/16/2024  Treatment plan discussed with: PTA and patient        Subjective Evaluation    History of Present Illness  Mechanism of injury: SUBJECTIVE: 12/14/23: The patient reports improvement since starting therapy. The patient notes significantly less pain at her lumbar spine.   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 The patient continues to have difficulty with walking down the steps due to L knee tightness. The patient notes persistent difficulty due to hip/lumbar pain with car transfers and walking quickly. The patient will benefit from continued PT focused on achieving patient specific goals. INJURY HISTORY: Luda Esteban is a 70 y.o. female that presents to outpatient physical therapy with complaints of severe chronic low back pain with L knee pain. The patient saw orthopedics who felt the L knee pain and difficulty ambulating down steps was related to the low back issues. The patient does get numbness/tingling at her L LE to the toes. The patient reports receiving CSI on 10/12/23 to her lumbar spine facet joints by [facility]. The patient notes the shots did not improve her complaints at the L LE and in fact she felt notes the L LE wasn't present until after the injections. The patient was referred to outpatient PT by orthopedics. The patient notes episodes of the hips "slipping" with walking and she gets increased pain L LE afterwards. The patient's main goal for physical therapy is to try to get rid of the low back pain and L LE pain.    Patient Goals  Patient goals for therapy: decreased pain, increased motion, increased strength, independence with ADLs/IADLs, return to sport/leisure activities and improved balance  Patient goal: decrease LBP and L LE pain ( progressing)  Pain  Current pain rating: 3  At best pain ratin  At worst pain ratin  Location: low back and L anterior lateral knee/lower leg  Quality: tight and burning (tingling)  Aggravating factors: walking, standing and stair climbing (quickly walking; car transfers)  Progression: improved    Social Support  Steps to enter house: yes  Stairs in house: no   Lives in: apartment  Lives with: alone    Employment status: not working  Hand dominance: ambidextrous    Treatments  Current treatment: physical therapy        Objective     Concurrent Complaints  Positive for disturbed sleep.  Negative for night pain, bladder dysfunction, bowel dysfunction, saddle (S4) numbness, history of cancer, history of trauma and infection    Postural Observations  Seated posture: fair  Standing posture: fair  Correction of posture: has no consistent effect    Additional Postural Observation Details  Towel roll correction - worse LBP    Neurological Testing     Sensation     Lumbar   Left   Intact: light touch    Right   Intact: light touch    Reflexes   Left   Patellar (L4): normal (2+)  Achilles (S1): normal (2+)  Babinski sign: negative    Right   Patellar (L4): normal (2+)  Achilles (S1): normal (2+)  Babinski sign: negative    Active Range of Motion     Lumbar   Flexion:  WFL  Extension:  with pain Restriction level: maximal  Left lateral flexion:  Restriction level: moderate  Right lateral flexion:  Restriction level: moderate  Left rotation:  Restriction level: moderate  Right rotation:  Restriction level: moderate  Mechanical Assessment    Cervical      Thoracic      Lumbar    Standing extension: repeated movements  Pain location: no change  Lying extension: repeated movements  Pain location: no change    Strength/Myotome Testing     Left Hip   Planes of Motion   Flexion: 4+  Abduction: 4  Adduction: 4    Right Hip   Planes of Motion   Flexion: 4-  Abduction: 4  Adduction: 4    Left Knee   Flexion: 4  Extension: 4    Right Knee   Flexion: 4  Extension: 4    Left Ankle/Foot   Dorsiflexion: 5  Plantar flexion: 5  Great toe extension: 5    Right Ankle/Foot   Dorsiflexion: 5  Plantar flexion: 5  Great toe extension: 5    Additional Strength Details  IMPROVED    Muscle Activation     Additional Muscle Activation Details  Improved but Decreased TrA, multifidus, gluteal activation with transfers / position changes / dynamic movement      Tests     Lumbar     Left   Negative slump test.     Right   Positive slump test.     Left Pelvic Girdle/Sacrum   Positive: active SLR test.     Right Pelvic Girdle/Sacrum   Positive: active SLR test.     Left Hip   Negative FADIR. Right Hip   Negative FADIR.      Additional Tests Details  FOTO: 57% ( predicted 60%) ( improved 11%)               Re-evaluation:  1/11  PRECAUTIONS: 10/12/23 L L4-S1 facet injection  Access Code: [email or web address]     Specialty Daily Treatment Diary     Manual  12/14 11/20 11/24 12/4 12/6   STM lumbar paraspinals  LQ DC             Hamstring stretch B/L  LQ :30x3 B/L     Piriformis Stretch B/L  LQ + adductor stretch+L adductor STM Pirf,add stretch :15x5ea B/L     Prone Quad stretch            Therapeutic Exercise 12/14 11/24 12/4 12/6   Treadmill Ambulation for endurance 1.0 mph x 7 mins  0.7mph x 4 mins 0.8 mph x 5 mins 1.0 mph x 6 mins   UBE Stand ALT FWD/BACK for core/posture strength        Ball squeeze x15       T-band ABD Blue x15       Hip flexor stretch edge of mat    5x:15 B/L Knee on mat lean forward 5x:15 B/L        ( best)  Tried standing 3x:15 no stretch   Bridges X15 blue band   *x10 x15   Self 90-90 hamstring stretch B/L        Prone press ups  NV 3x5 x10 x10   Standing back extensions 1x10 NV 2x10 1x10 1x10           LTR cross legs                Stand hip ABD+EXT X15 B/L   X10 B/L X15 B/L           Neuro Re-ed        Core brace x10 2x10, 5" :05 2x10 2x10 X10   kegels x10 Sitting x10, 5" Supine :05x10 Prone :05 x10    Multifidi  X5 w/o FO, 3x5 w/ FO/ BL Stand x10  Supine x5 B/L     Brace with knee fallouts   x10     Quad DLS UE  LE  UE+LE        Glute set x15   x15 X15   Clam shells Blue x10 B/L    X10   MTP/LTP/ Anti-rotations        Towel roll education, Posture correction; movement precautions  DONE LQ  Reviewed AD    Lift/chop T-band        SLB        Posture corrections seated x10  :03x10 :03 x 10 X15 :03           Sidestepping   10ft x4 10ft x 4 // bar 10ft x 8 // bar   Heel toe Amb    10ftx4 //bar 10ft x 8 // bar                     Therapeutic Activity          Steps ups fwd/side          Crate carry        Crate lifts 3 levels        Lunges        Chair squats            Modalities        MHP/CP to L/B  MHP 10 min L-S post , seated                                   The patient was given a new home exercise plan with instruction, pictures, and verbal feedback. The patient accepts and understands the new home activities.

## 2023-12-14 ENCOUNTER — EVALUATION (OUTPATIENT)
Dept: PHYSICAL THERAPY | Facility: CLINIC | Age: 68
End: 2023-12-14
Payer: MEDICARE

## 2023-12-14 DIAGNOSIS — M25.552 LEFT HIP PAIN: Primary | ICD-10-CM

## 2023-12-14 DIAGNOSIS — M54.50 LOW BACK PAIN, UNSPECIFIED BACK PAIN LATERALITY, UNSPECIFIED CHRONICITY, UNSPECIFIED WHETHER SCIATICA PRESENT: ICD-10-CM

## 2023-12-14 PROCEDURE — 97112 NEUROMUSCULAR REEDUCATION: CPT | Performed by: PHYSICAL THERAPIST

## 2023-12-14 PROCEDURE — 97110 THERAPEUTIC EXERCISES: CPT | Performed by: PHYSICAL THERAPIST

## 2023-12-20 ENCOUNTER — OFFICE VISIT (OUTPATIENT)
Dept: PHYSICAL THERAPY | Facility: CLINIC | Age: 68
End: 2023-12-20
Payer: MEDICARE

## 2023-12-20 DIAGNOSIS — M25.552 LEFT HIP PAIN: Primary | ICD-10-CM

## 2023-12-20 DIAGNOSIS — M54.50 LOW BACK PAIN, UNSPECIFIED BACK PAIN LATERALITY, UNSPECIFIED CHRONICITY, UNSPECIFIED WHETHER SCIATICA PRESENT: ICD-10-CM

## 2023-12-20 PROCEDURE — 97110 THERAPEUTIC EXERCISES: CPT | Performed by: PHYSICAL THERAPIST

## 2023-12-20 PROCEDURE — 97112 NEUROMUSCULAR REEDUCATION: CPT | Performed by: PHYSICAL THERAPIST

## 2023-12-20 NOTE — PROGRESS NOTES
Daily Note     Today's date: 2023  Patient name: Sri Mcgrath  : 1955  MRN: [id number]  Referring provider: Speedy Cevallos C*  Dx:   Encounter Diagnosis     ICD-10-CM    1. Left hip pain  M25.552       2. Low back pain, unspecified back pain laterality, unspecified chronicity, unspecified whether sciatica present  M54.50                      Subjective: The patient reports feeling 5/10 pain at her R lumbar spine.  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  The patient noted difficulty with lifting her legs into the car after work.         Objective: See treatment diary below      Assessment: The patient tolerated the treatment well today.  We attempted to perform lumbar extension exercises after walking including sustained table stretching which helped to significantly reduce her pain and symptoms.  The patient was given education to perform her lumbar EXT exercises more often throughout the day.  The patient will benefit from continued PT to achieve her goals of therapy.       Plan: Continue per plan of care.  Progress treatment as tolerated.       Re-evaluation:    PRECAUTIONS: 10/12/23 L L4-S1 facet injection  Access Code: [email or web address]     Specialty Daily Treatment Diary     Manual     STM lumbar paraspinals   DC     Lumbar P/A        Hamstring stretch B/L   :30x3 B/L     Piriformis Stretch B/L   Pirf,add stretch :15x5ea B/L     Prone Quad stretch            Therapeutic Exercise    Treadmill Ambulation for endurance 1.0 mph x 7 mins 1.2 - 1.3mph x 7 min 0.7mph x 4 mins 0.8 mph x 5 mins 1.0 mph x 6 mins   UBE Stand ALT FWD/BACK for core/posture strength  On foam stand x2 min  100/70 ALT      Ball squeeze x15       T-band ABD Blue x15       Hip flexor stretch stand knee on low mat w/ chair for support  5x:15 B/L  5x:15 B/L Knee on mat lean forward 5x:15 B/L        ( best)  Tried  standing 3x:15 no stretch   Bridges X15 blue band   *x10 x15           Prone press ups   3x5 x10 x10   Standing back extensions 1x10 x20 2x10 1x10 1x10   Sustained table EXT stretch  3 levels up and 3 down progressive w/ 1-2 minute holds each( table angle 45 deg) total 15 mins      LTR cross legs                Stand hip ABD+EXT X15 B/L X15 B/L  X10 B/L X15 B/L           Neuro Re-ed        Core brace x10  :05 2x10 2x10 X10   kegels x10  Supine :05x10 Prone :05 x10    Multifidi   Stand x10  Supine x5 B/L     Brace with knee fallouts   x10     Quad DLS UE  LE  UE+LE        Glute set x15 2x10 prone  x15 X15   Clam shells Blue x10 B/L    X10   MTP/LTP/ Anti-rotations        Towel roll education, Posture correction; movement precautions    Reviewed AD    Lift/chop T-band        SLB        Posture corrections seated x10  :03x10 :03 x 10 X15 :03           Sidestepping   10ft x4 10ft x 4 // bar 10ft x 8 // bar   Heel toe Amb    10ftx4 //bar 10ft x 8 // bar                     Therapeutic Activity          Steps ups fwd/side          Crate carry        Crate lifts 3 levels        Lunges        Chair squats            Modalities        MHP/CP to L/B

## 2023-12-23 ENCOUNTER — OFFICE VISIT (OUTPATIENT)
Dept: URGENT CARE | Facility: CLINIC | Age: 68
End: 2023-12-23
Payer: MEDICARE

## 2023-12-23 VITALS
WEIGHT: 185 LBS | RESPIRATION RATE: 20 BRPM | BODY MASS INDEX: 36.32 KG/M2 | TEMPERATURE: 97.1 F | HEIGHT: 60 IN | OXYGEN SATURATION: 97 % | HEART RATE: 65 BPM

## 2023-12-23 DIAGNOSIS — R09.81 NASAL CONGESTION: Primary | ICD-10-CM

## 2023-12-23 LAB
SARS-COV-2 AG UPPER RESP QL IA: NEGATIVE
VALID CONTROL: NORMAL

## 2023-12-23 PROCEDURE — 87811 SARS-COV-2 COVID19 W/OPTIC: CPT | Performed by: PHYSICIAN ASSISTANT

## 2023-12-23 PROCEDURE — G0463 HOSPITAL OUTPT CLINIC VISIT: HCPCS | Performed by: FAMILY MEDICINE

## 2023-12-23 PROCEDURE — 99213 OFFICE O/P EST LOW 20 MIN: CPT | Performed by: FAMILY MEDICINE

## 2023-12-23 RX ORDER — FLUTICASONE PROPIONATE 50 MCG
1 SPRAY, SUSPENSION (ML) NASAL DAILY
Qty: 18.2 ML | Refills: 0 | Status: SHIPPED | OUTPATIENT
Start: 2023-12-23

## 2023-12-23 RX ORDER — AZELASTINE 1 MG/ML
1 SPRAY, METERED NASAL 2 TIMES DAILY
Qty: 30 ML | Refills: 0 | Status: SHIPPED | OUTPATIENT
Start: 2023-12-23

## 2023-12-23 NOTE — PATIENT INSTRUCTIONS
Rapid COVID test negative.  Will treat nasal congestion with Astelin and Flonase to be used as instructed.  Can continue over-the-counter cough and cold medication.  All patient's questions answered and is agreeable this plan.

## 2023-12-23 NOTE — PROGRESS NOTES
[facility]        NAME: Sri Mcgrath is a 68 y.o. female  : 1955    MRN: [id number]  DATE: 2023  TIME: 12:42 PM    Assessment and Plan   Nasal congestion [R09.81]  1. Nasal congestion  Poct Covid 19 Rapid Antigen Test    fluticasone (FLONASE) 50 mcg/act nasal spray    azelastine (ASTELIN) 0.1 % nasal spray            Patient Instructions     Patient Instructions   Rapid COVID test negative.  Will treat nasal congestion with Astelin and Flonase to be used as instructed.  Can continue over-the-counter cough and cold medication.  All patient's questions answered and is agreeable this plan.      Follow up with PCP in 3-5 days.  Proceed to  ER if symptoms worsen.    Chief Complaint     Chief Complaint   Patient presents with    Nasal Congestion     Since last visit in Nov. Family exposure +covid, wants retest         History of Present Illness       Patient is a 68-year-old female presenting today with nasal congestion x 3 to 4 weeks.  Patient notes since being diagnosed with COVID last month she has had persistent nasal congestion and runny nose, notes that she was exposed to COVID 2 days ago, wants to be tested again to rule this out.  Has been taking occasional over-the-counter Coricidin cold and flu but notes no change or improvement of her symptoms.  Denies fever, chills, chest tightness, SOB.        Review of Systems   Review of Systems   Constitutional:  Negative for chills, fatigue and fever.   HENT:  Positive for congestion and rhinorrhea. Negative for postnasal drip and sore throat.    Eyes:  Negative for redness and itching.   Respiratory:  Negative for cough, chest tightness and shortness of breath.    Cardiovascular:  Negative for chest pain.   Gastrointestinal:  Negative for diarrhea, nausea and vomiting.   Musculoskeletal:  Negative for arthralgias and myalgias.   Neurological:  Negative for light-headedness and headaches.         Current Medications       Current Outpatient  Medications:     azelastine (ASTELIN) 0.1 % nasal spray, 1 spray into each nostril 2 (two) times a day Use in each nostril as directed, Disp: 30 mL, Rfl: 0    Calcium Carbonate (CALCIUM 600 PO), Take by mouth daily  , Disp: , Rfl:     Chlorpheniramine-APAP (CORICIDIN) 2-325 MG TABS, Take 1 tablet by mouth 4 (four) times a day as needed (cough), Disp: 56 tablet, Rfl: 0    Cholecalciferol (VITAMIN D3 PO), Take 50 mcg by mouth daily  , Disp: , Rfl:     clotrimazole-betamethasone (LOTRISONE) 1-0.05 % cream, Apply topically 2 (two) times a day for 2 weeks to abdomen rash and 6 weeks to vulvar rash. (Patient taking differently: Apply topically if needed for 2 weeks to abdomen rash and 6 weeks to vulvar rash.), Disp: 30 g, Rfl: 2    estradiol (ESTRACE VAGINAL) 0.1 mg/g vaginal cream, Apply 1g into vagina nightly for 1 week, then apply every 3rd night, Disp: 42.5 g, Rfl: 3    fluticasone (FLONASE) 50 mcg/act nasal spray, 1 spray into each nostril daily, Disp: 18.2 mL, Rfl: 0    montelukast (SINGULAIR) 10 mg tablet, take 1 tablet by mouth at bedtime, Disp: 90 tablet, Rfl: 3    nystatin (MYCOSTATIN) powder, Apply topically 3 (three) times a day as needed (rash) . Apply at least daily in summer to prevent rash., Disp: 45 g, Rfl: 3    propranolol (INDERAL) 20 mg tablet, Take 1 tablet (20 mg total) by mouth every 12 (twelve) hours, Disp: 180 tablet, Rfl: 2    rosuvastatin (CRESTOR) 10 MG tablet, Take 1 tablet (10 mg total) by mouth daily, Disp: 90 tablet, Rfl: 3    traZODone (DESYREL) 100 mg tablet, take 1 tablet by mouth at bedtime, Disp: 90 tablet, Rfl: 1    acetaminophen (TYLENOL) 500 mg tablet, Take 500-1,000 mg by mouth if needed for mild pain (Patient not taking: Reported on 12/23/2023), Disp: , Rfl:     albuterol (ProAir HFA) 90 mcg/act inhaler, Inhale 2 puffs every 4 (four) hours as needed for wheezing (Patient not taking: Reported on 12/23/2023), Disp: 18 g, Rfl: 0    Current Allergies     Allergies as of 12/23/2023 -  Reviewed 12/23/2023   Allergen Reaction Noted    Keflex [cephalexin] Other (See Comments) 02/13/2018    Contrast [iodinated contrast media] Hives 05/02/2023    Morphine and related Hives 02/13/2018    Penicillins Hives 02/13/2018    Benadryl [diphenhydramine] Itching and Swelling 04/15/2020    Ciprofloxacin Hives 02/28/2021    Clindamycin Other (See Comments) 02/13/2018    Erythromycin Hives 02/13/2018            The following portions of the patient's history were reviewed and updated as appropriate: allergies, current medications, past family history, past medical history, past social history, past surgical history and problem list.     Past Medical History:   Diagnosis Date    Arthritis     Cataract     ashley    Colon polyp     Depression     Fluid retention     Headache, acute     Heart murmur     Hyperlipidemia     Hypertension     Memory loss     Pedal edema     Pneumonia     PONV (postoperative nausea and vomiting)     Rotator cuff tear, left     Sleep apnea 2021    Wears glasses     Wears partial dentures     upper       Past Surgical History:   Procedure Laterality Date    ABDOMINAL ADHESION SURGERY      ANAL SPHINCTEROPLASTY      ANKLE SURGERY Right     tendon tear    APPENDECTOMY      CARPAL TUNNEL RELEASE Bilateral     CARPAL TUNNEL RELEASE Left     CARPAL TUNNEL RELEASE Right     COLONOSCOPY      HAND SURGERY Right     ganglion cyst    HEMORROIDECTOMY      HYSTERECTOMY      ILEOSTOMY      JOINT REPLACEMENT      KNEE ARTHROCENTESIS      ganglion Cyst    KNEE ARTHROSCOPY Left     OVARIAN CYST REMOVAL      SC SURGICAL ARTHROSCOPY SHOULDER W/ROTATOR CUFF RPR Left 2/19/2018    Procedure: ARTHROSCOPIC REPAIR ROTATOR CUFF,  SAD, BICEP TENODESIS;  Surgeon: Garrett Coto MD;  Location: [facility];  Service: Orthopedics    REPAIR RECTOCELE      REPLACEMENT TOTAL KNEE Left 09/28/2021    TONSILLECTOMY      TONSILLECTOMY AND ADENOIDECTOMY      TUBAL LIGATION         Family History   Problem Relation Age of Onset     Hypertension Mother     Colon cancer Mother     Bone cancer Mother     COPD Mother     Emphysema Mother     Skin cancer Mother     Lead poisoning Father         lead poisoning in lungs     Thyroid disease Sister     Depression Sister     Hypertension Sister     Alzheimer's disease Sister     ROBERT disease Brother     Throat cancer Brother     Hypertension Brother     Colon cancer Family     Bone cancer Family     Hypertension Family     Diabetes Sister     Hypertension Sister     Heart failure Sister     Alzheimer's disease Sister     No Known Problems Daughter     No Known Problems Maternal Grandmother     No Known Problems Paternal Grandmother     No Known Problems Maternal Aunt     No Known Problems Maternal Aunt     No Known Problems Maternal Aunt     No Known Problems Maternal Aunt     No Known Problems Maternal Aunt     No Known Problems Paternal Aunt          Medications have been verified.        Objective   Pulse 65   Temp (!) 97.1 °F (36.2 °C)   Resp 20   Ht 5' (1.524 m)   Wt 83.9 kg (185 lb)   SpO2 97%   BMI 36.13 kg/m²        Physical Exam     Physical Exam  Vitals and nursing note reviewed.   Constitutional:       General: She is not in acute distress.     Appearance: Normal appearance. She is not ill-appearing.   HENT:      Head: Normocephalic.      Right Ear: Tympanic membrane, ear canal and external ear normal.      Left Ear: Tympanic membrane, ear canal and external ear normal.      Nose: Congestion present.      Mouth/Throat:      Mouth: Mucous membranes are moist.      Pharynx: Oropharynx is clear.   Eyes:      Conjunctiva/sclera: Conjunctivae normal.   Cardiovascular:      Rate and Rhythm: Normal rate and regular rhythm.      Pulses: Normal pulses.      Heart sounds: Normal heart sounds.   Pulmonary:      Effort: Pulmonary effort is normal.      Breath sounds: Normal breath sounds.   Skin:     General: Skin is warm.      Capillary Refill: Capillary refill takes less than 2 seconds.    Neurological:      Mental Status: She is alert.

## 2023-12-27 ENCOUNTER — OFFICE VISIT (OUTPATIENT)
Dept: PHYSICAL THERAPY | Facility: CLINIC | Age: 68
End: 2023-12-27
Payer: MEDICARE

## 2023-12-27 DIAGNOSIS — M54.50 LOW BACK PAIN, UNSPECIFIED BACK PAIN LATERALITY, UNSPECIFIED CHRONICITY, UNSPECIFIED WHETHER SCIATICA PRESENT: ICD-10-CM

## 2023-12-27 DIAGNOSIS — M25.552 LEFT HIP PAIN: Primary | ICD-10-CM

## 2023-12-27 PROCEDURE — 97110 THERAPEUTIC EXERCISES: CPT | Performed by: PHYSICAL THERAPIST

## 2023-12-27 PROCEDURE — 97112 NEUROMUSCULAR REEDUCATION: CPT | Performed by: PHYSICAL THERAPIST

## 2023-12-27 NOTE — PROGRESS NOTES
Daily Note     Today's date: 2023  Patient name: Sri Mcgrath  : 1955  MRN: [id number]  Referring provider: Speedy Cevallos C*  Dx:   Encounter Diagnosis     ICD-10-CM    1. Left hip pain  M25.552       2. Low back pain, unspecified back pain laterality, unspecified chronicity, unspecified whether sciatica present  M54.50                      Subjective: The patient notes feeling a dull ache at he base of her lumbar spine.      Objective: See treatment diary below      Assessment: The patient tolerated the treatment well today with minimal complaints of discomfort.  The patient started SLR to strengthen her hip flexors and abdominals with some complaints of fatigue and difficulty at the R LE.  The patient notes feeling good at the end of the session.  The patient will benefit from continued PT to achieve her goals of therapy.      Plan: Continue per plan of care.  Progress treatment as tolerated.       Re-evaluation:    PRECAUTIONS: 10/12/23 L L4-S1 facet injection  Access Code: [email or web address]     Specialty Daily Treatment Diary     Manual     STM lumbar paraspinals        Lumbar P/A        Hamstring stretch B/L        Piriformis Stretch B/L        Prone Quad stretch            Therapeutic Exercise    Treadmill Ambulation for endurance 1.0 mph x 7 mins 1.2 - 1.3mph x 7 min 1.4 mph x 8 min 0.8 mph x 5 mins 1.0 mph x 6 mins   UBE Stand ALT FWD/BACK for core/posture strength  On foam stand x2 min  100/70 ALT On foam stand x4 min ALT     Ball squeeze x15       T-band ABD Blue x15       Hip flexor stretch stand knee on low mat w/ chair for support  5x:15 B/L 5x:15 B/L 5x:15 B/L Knee on mat lean forward 5x:15 B/L        ( best)  Tried standing 3x:15 no stretch   Bridges X15 blue band  X15 GRN x15 *x10 x15           Prone press ups    x10 x10   Standing back extensions 1x10 x20 x10 1x10 1x10   Sustained table EXT stretch  3 levels up and 3 down  progressive w/ 1-2 minute holds each( table angle 45 deg) total 15 mins 3 levels up and 3 down progressive w/ 1-2 minute holds each( table angle 45 deg) total 15 mins     LTR cross legs        SLR flex   3x5 B/L     Stand hip ABD+EXT X15 B/L X15 B/L X20 B/L X10 B/L X15 B/L           Neuro Re-ed        Core brace x10  W/ UBE  2x10 X10   kegels x10  W/ UBE Prone :05 x10    Multifidi        Brace with knee fallouts        Quad DLS UE  LE  UE+LE        Glute set x15 2x10 prone X20 prone x15 X15   Clam shells Blue x10 B/L    X10   MTP/LTP/ Anti-rotations        Towel roll education, Posture correction; movement precautions    Reviewed AD    Lift/chop T-band        SLB        Posture corrections seated x10   :03 x 10 X15 :03           Sidestepping    10ft x 4 // bar 10ft x 8 // bar   Heel toe Amb    10ftx4 //bar 10ft x 8 // bar                     Therapeutic Activity        Steps ups fwd/side        Crate carry        Crate lifts 3 levels        Lunges        Chair squats            Modalities        MHP/CP to L/B

## 2023-12-28 DIAGNOSIS — N95.2 VAGINAL ATROPHY: ICD-10-CM

## 2023-12-28 RX ORDER — ESTRADIOL 0.1 MG/G
CREAM VAGINAL
Qty: 42.5 G | Refills: 3 | Status: SHIPPED | OUTPATIENT
Start: 2023-12-28

## 2023-12-29 ENCOUNTER — OFFICE VISIT (OUTPATIENT)
Dept: INTERNAL MEDICINE CLINIC | Facility: CLINIC | Age: 68
End: 2023-12-29
Payer: MEDICARE

## 2023-12-29 ENCOUNTER — APPOINTMENT (OUTPATIENT)
Dept: LAB | Facility: CLINIC | Age: 68
End: 2023-12-29
Payer: MEDICARE

## 2023-12-29 VITALS
SYSTOLIC BLOOD PRESSURE: 130 MMHG | TEMPERATURE: 98.2 F | WEIGHT: 187 LBS | HEART RATE: 87 BPM | OXYGEN SATURATION: 99 % | BODY MASS INDEX: 36.71 KG/M2 | HEIGHT: 60 IN | DIASTOLIC BLOOD PRESSURE: 80 MMHG

## 2023-12-29 DIAGNOSIS — Z23 ENCOUNTER FOR IMMUNIZATION: ICD-10-CM

## 2023-12-29 DIAGNOSIS — I10 PRIMARY HYPERTENSION: ICD-10-CM

## 2023-12-29 DIAGNOSIS — G89.4 CHRONIC PAIN SYNDROME: ICD-10-CM

## 2023-12-29 DIAGNOSIS — M85.89 OSTEOPENIA OF MULTIPLE SITES: ICD-10-CM

## 2023-12-29 DIAGNOSIS — N18.30 STAGE 3 CHRONIC KIDNEY DISEASE, UNSPECIFIED WHETHER STAGE 3A OR 3B CKD (HCC): ICD-10-CM

## 2023-12-29 DIAGNOSIS — Z13.29 SCREENING FOR THYROID DISORDER: ICD-10-CM

## 2023-12-29 DIAGNOSIS — F33.9 DEPRESSION, RECURRENT (HCC): ICD-10-CM

## 2023-12-29 DIAGNOSIS — E78.2 MIXED HYPERLIPIDEMIA: ICD-10-CM

## 2023-12-29 DIAGNOSIS — R73.03 PREDIABETES: ICD-10-CM

## 2023-12-29 DIAGNOSIS — M15.9 PRIMARY OSTEOARTHRITIS INVOLVING MULTIPLE JOINTS: ICD-10-CM

## 2023-12-29 DIAGNOSIS — I10 PRIMARY HYPERTENSION: Primary | ICD-10-CM

## 2023-12-29 DIAGNOSIS — Z23 ENCOUNTER FOR VACCINATION: ICD-10-CM

## 2023-12-29 LAB
ALBUMIN SERPL BCP-MCNC: 4.5 G/DL (ref 3.5–5)
ALP SERPL-CCNC: 69 U/L (ref 34–104)
ALT SERPL W P-5'-P-CCNC: 15 U/L (ref 7–52)
ANION GAP SERPL CALCULATED.3IONS-SCNC: 7 MMOL/L
AST SERPL W P-5'-P-CCNC: 14 U/L (ref 13–39)
BASOPHILS # BLD AUTO: 0.06 THOUSANDS/ÂΜL (ref 0–0.1)
BASOPHILS NFR BLD AUTO: 1 % (ref 0–1)
BILIRUB SERPL-MCNC: 0.37 MG/DL (ref 0.2–1)
BUN SERPL-MCNC: 25 MG/DL (ref 5–25)
CALCIUM SERPL-MCNC: 10.1 MG/DL (ref 8.4–10.2)
CHLORIDE SERPL-SCNC: 106 MMOL/L (ref 96–108)
CHOLEST SERPL-MCNC: 145 MG/DL
CO2 SERPL-SCNC: 29 MMOL/L (ref 21–32)
CREAT SERPL-MCNC: 0.75 MG/DL (ref 0.6–1.3)
EOSINOPHIL # BLD AUTO: 0.09 THOUSAND/ÂΜL (ref 0–0.61)
EOSINOPHIL NFR BLD AUTO: 1 % (ref 0–6)
ERYTHROCYTE [DISTWIDTH] IN BLOOD BY AUTOMATED COUNT: 13.7 % (ref 11.6–15.1)
GFR SERPL CREATININE-BSD FRML MDRD: 81 ML/MIN/1.73SQ M
GLUCOSE P FAST SERPL-MCNC: 107 MG/DL (ref 65–99)
HCT VFR BLD AUTO: 43.9 % (ref 34.8–46.1)
HDLC SERPL-MCNC: 59 MG/DL
HGB BLD-MCNC: 14.5 G/DL (ref 11.5–15.4)
IMM GRANULOCYTES # BLD AUTO: 0.04 THOUSAND/UL (ref 0–0.2)
IMM GRANULOCYTES NFR BLD AUTO: 0 % (ref 0–2)
LDLC SERPL CALC-MCNC: 70 MG/DL (ref 0–100)
LYMPHOCYTES # BLD AUTO: 1.95 THOUSANDS/ÂΜL (ref 0.6–4.47)
LYMPHOCYTES NFR BLD AUTO: 21 % (ref 14–44)
MCH RBC QN AUTO: 29.1 PG (ref 26.8–34.3)
MCHC RBC AUTO-ENTMCNC: 33 G/DL (ref 31.4–37.4)
MCV RBC AUTO: 88 FL (ref 82–98)
MONOCYTES # BLD AUTO: 0.71 THOUSAND/ÂΜL (ref 0.17–1.22)
MONOCYTES NFR BLD AUTO: 8 % (ref 4–12)
NEUTROPHILS # BLD AUTO: 6.43 THOUSANDS/ÂΜL (ref 1.85–7.62)
NEUTS SEG NFR BLD AUTO: 69 % (ref 43–75)
NRBC BLD AUTO-RTO: 0 /100 WBCS
PLATELET # BLD AUTO: 263 THOUSANDS/UL (ref 149–390)
PMV BLD AUTO: 10.2 FL (ref 8.9–12.7)
POTASSIUM SERPL-SCNC: 4 MMOL/L (ref 3.5–5.3)
PROT SERPL-MCNC: 7 G/DL (ref 6.4–8.4)
RBC # BLD AUTO: 4.99 MILLION/UL (ref 3.81–5.12)
SODIUM SERPL-SCNC: 142 MMOL/L (ref 135–147)
TRIGL SERPL-MCNC: 79 MG/DL
TSH SERPL DL<=0.05 MIU/L-ACNC: 1.56 UIU/ML (ref 0.45–4.5)
WBC # BLD AUTO: 9.28 THOUSAND/UL (ref 4.31–10.16)

## 2023-12-29 PROCEDURE — 90662 IIV NO PRSV INCREASED AG IM: CPT

## 2023-12-29 PROCEDURE — 99214 OFFICE O/P EST MOD 30 MIN: CPT | Performed by: INTERNAL MEDICINE

## 2023-12-29 PROCEDURE — 85025 COMPLETE CBC W/AUTO DIFF WBC: CPT

## 2023-12-29 PROCEDURE — 80053 COMPREHEN METABOLIC PANEL: CPT

## 2023-12-29 PROCEDURE — 36415 COLL VENOUS BLD VENIPUNCTURE: CPT

## 2023-12-29 PROCEDURE — G0008 ADMIN INFLUENZA VIRUS VAC: HCPCS

## 2023-12-29 PROCEDURE — 80061 LIPID PANEL: CPT

## 2023-12-29 PROCEDURE — 84443 ASSAY THYROID STIM HORMONE: CPT

## 2023-12-29 NOTE — PROGRESS NOTES
Assessment/Plan:  Problem List Items Addressed This Visit        Cardiovascular and Mediastinum    Hypertension - Primary    Relevant Orders    CBC and differential    Comprehensive metabolic panel    Lipid Panel with Direct LDL reflex    TSH, 3rd generation       Musculoskeletal and Integument    Primary osteoarthritis involving multiple joints    Osteopenia of multiple sites       Genitourinary    Stage 3 chronic kidney disease, unspecified whether stage 3a or 3b CKD (HCC)    Relevant Orders    CBC and differential    Comprehensive metabolic panel    TSH, 3rd generation       Other    Prediabetes    Mixed hyperlipidemia    Relevant Orders    Lipid Panel with Direct LDL reflex    Depression, recurrent (HCC)    Chronic pain syndrome    Relevant Orders    CBC and differential    Comprehensive metabolic panel   Other Visit Diagnoses     Encounter for immunization        Relevant Orders    influenza vaccine, high-dose, PF 0.7 mL (FLUZONE HIGH-DOSE)    Encounter for vaccination        Screening for thyroid disorder        Relevant Orders    TSH, 3rd generation           Diagnoses and all orders for this visit:    Primary hypertension  -     CBC and differential; Future  -     Comprehensive metabolic panel; Future  -     Lipid Panel with Direct LDL reflex; Future  -     TSH, 3rd generation; Future    Encounter for immunization  -     influenza vaccine, high-dose, PF 0.7 mL (FLUZONE HIGH-DOSE)    Primary osteoarthritis involving multiple joints    Osteopenia of multiple sites    Stage 3 chronic kidney disease, unspecified whether stage 3a or 3b CKD (HCC)  -     CBC and differential; Future  -     Comprehensive metabolic panel; Future  -     TSH, 3rd generation; Future    Chronic pain syndrome  -     CBC and differential; Future  -     Comprehensive metabolic panel; Future    Depression, recurrent (HCC)    Mixed hyperlipidemia  -     Lipid Panel with Direct LDL reflex; Future    Prediabetes    Encounter for  vaccination    Screening for thyroid disorder  -     TSH, 3rd generation; Future        No problem-specific Assessment & Plan notes found for this encounter.    A/P: Doing ok and will check labs. Will up date her flu vaccine. Discussed wt loss meds and pt to check with her insurance to see what is covered. Continue current treatment and RTC four months for routine, but to call with insurance info on wt management meds.     Subjective:      Patient ID: Sri Mcgrath is a 68 y.o. female.    WF RTC For f/u HTN, HLD, etc. Doing ok and no new issues. Remains active w/o difficulty and no falls. Chronic pain is manageable. MDD/MAGGIE are controlled. Due for labs and vaccines.         The following portions of the patient's history were reviewed and updated as appropriate:   She has a past medical history of Arthritis, Cataract, Colon polyp, Depression, Fluid retention, Headache, acute, Heart murmur, Hyperlipidemia, Hypertension, Memory loss, Pedal edema, Pneumonia, PONV (postoperative nausea and vomiting), Rotator cuff tear, left, Sleep apnea (2021), Wears glasses, and Wears partial dentures.,  does not have any pertinent problems on file.,   has a past surgical history that includes Ankle surgery (Right); Tonsillectomy; Tubal ligation; Hysterectomy; Ovarian cyst removal; Carpal tunnel release (Bilateral); Hand surgery (Right); Abdominal adhesion surgery; Knee arthroscopy (Left); Appendectomy; Colonoscopy; Hemorroidectomy; Repair rectocele; pr surgical arthroscopy shoulder w/rotator cuff rpr (Left, 2/19/2018); Carpal tunnel release (Left); Carpal tunnel release (Right); Ileostomy; Anal sphincteroplasty; Knee arthrocentesis; Tonsillectomy and adenoidectomy; Joint replacement; and Replacement total knee (Left, 09/28/2021).,  family history includes Alzheimer's disease in her sister and sister; Bone cancer in her family and mother; COPD in her mother; Colon cancer in her family and mother; Depression in her sister; Diabetes in  her sister; Emphysema in her mother; ROBERT disease in her brother; Heart failure in her sister; Hypertension in her brother, family, mother, sister, and sister; Lead poisoning in her father; No Known Problems in her daughter, maternal aunt, maternal aunt, maternal aunt, maternal aunt, maternal aunt, maternal grandmother, paternal aunt, and paternal grandmother; Skin cancer in her mother; Throat cancer in her brother; Thyroid disease in her sister.,   reports that she has never smoked. She has never been exposed to tobacco smoke. She has never used smokeless tobacco. She reports current alcohol use. She reports that she does not use drugs.,  is allergic to keflex [cephalexin], contrast [iodinated contrast media], morphine and related, penicillins, benadryl [diphenhydramine], ciprofloxacin, clindamycin, and erythromycin..  Current Outpatient Medications   Medication Sig Dispense Refill   • acetaminophen (TYLENOL) 500 mg tablet Take 500-1,000 mg by mouth if needed for mild pain (Patient not taking: Reported on 12/23/2023)     • albuterol (ProAir HFA) 90 mcg/act inhaler Inhale 2 puffs every 4 (four) hours as needed for wheezing (Patient not taking: Reported on 12/23/2023) 18 g 0   • azelastine (ASTELIN) 0.1 % nasal spray 1 spray into each nostril 2 (two) times a day Use in each nostril as directed 30 mL 0   • Calcium Carbonate (CALCIUM 600 PO) Take by mouth daily       • Chlorpheniramine-APAP (CORICIDIN) 2-325 MG TABS Take 1 tablet by mouth 4 (four) times a day as needed (cough) 56 tablet 0   • Cholecalciferol (VITAMIN D3 PO) Take 50 mcg by mouth daily       • clotrimazole-betamethasone (LOTRISONE) 1-0.05 % cream Apply topically 2 (two) times a day for 2 weeks to abdomen rash and 6 weeks to vulvar rash. (Patient taking differently: Apply topically if needed for 2 weeks to abdomen rash and 6 weeks to vulvar rash.) 30 g 2   • estradiol (ESTRACE) 0.1 mg/g vaginal cream APPLY 1 GM INTO VAGINA NIGHTLY FOR 1 WEEK, THEN APPLY  EVERY THIRD NIGHT 42.5 g 3   • fluticasone (FLONASE) 50 mcg/act nasal spray 1 spray into each nostril daily 18.2 mL 0   • montelukast (SINGULAIR) 10 mg tablet take 1 tablet by mouth at bedtime 90 tablet 3   • nystatin (MYCOSTATIN) powder Apply topically 3 (three) times a day as needed (rash) . Apply at least daily in summer to prevent rash. 45 g 3   • propranolol (INDERAL) 20 mg tablet Take 1 tablet (20 mg total) by mouth every 12 (twelve) hours 180 tablet 2   • rosuvastatin (CRESTOR) 10 MG tablet Take 1 tablet (10 mg total) by mouth daily 90 tablet 3   • traZODone (DESYREL) 100 mg tablet take 1 tablet by mouth at bedtime 90 tablet 1     No current facility-administered medications for this visit.       Review of Systems   Constitutional:  Negative for activity change, chills, diaphoresis, fatigue and fever.   HENT: Negative.     Eyes:  Negative for visual disturbance.   Respiratory:  Negative for cough, chest tightness, shortness of breath and wheezing.    Cardiovascular:  Negative for chest pain, palpitations and leg swelling.   Gastrointestinal:  Negative for abdominal pain, constipation, diarrhea, nausea and vomiting.   Endocrine: Negative for cold intolerance and heat intolerance.   Genitourinary:  Negative for difficulty urinating, dysuria and frequency.   Musculoskeletal:  Negative for arthralgias, gait problem and myalgias.   Neurological:  Negative for dizziness, seizures, syncope, weakness, light-headedness and headaches.   Psychiatric/Behavioral:  Negative for confusion, dysphoric mood and sleep disturbance. The patient is not nervous/anxious.        PHQ-2/9 Depression Screening    Little interest or pleasure in doing things: 0 - not at all  Feeling down, depressed, or hopeless: 0 - not at all  Trouble falling or staying asleep, or sleeping too much: 3 - nearly every day  Feeling tired or having little energy: 0 - not at all  Poor appetite or overeatin - not at all  Feeling bad about yourself - or  that you are a failure or have let yourself or your family down: 0 - not at all  Trouble concentrating on things, such as reading the newspaper or watching television: 0 - not at all  Moving or speaking so slowly that other people could have noticed. Or the opposite - being so fidgety or restless that you have been moving around a lot more than usual: 0 - not at all  Thoughts that you would be better off dead, or of hurting yourself in some way: 0 - not at all  PHQ-9 Score: 3   PHQ-9 Interpretation: No or Minimal depression         Objective:  Vitals:    12/29/23 0931   BP: 130/80   Pulse: 87   Temp: 98.2 °F (36.8 °C)   SpO2: 99%   Weight: 84.8 kg (187 lb)   Height: 5' (1.524 m)     Body mass index is 36.52 kg/m².     Physical Exam  Vitals and nursing note reviewed.   Constitutional:       General: She is not in acute distress.     Appearance: Normal appearance. She is obese. She is not ill-appearing.   HENT:      Head: Normocephalic and atraumatic.      Mouth/Throat:      Mouth: Mucous membranes are moist.   Eyes:      Extraocular Movements: Extraocular movements intact.      Conjunctiva/sclera: Conjunctivae normal.      Pupils: Pupils are equal, round, and reactive to light.   Neck:      Vascular: No carotid bruit.   Cardiovascular:      Rate and Rhythm: Normal rate and regular rhythm.      Heart sounds: Normal heart sounds. No murmur heard.  Pulmonary:      Effort: Pulmonary effort is normal. No respiratory distress.      Breath sounds: Normal breath sounds. No wheezing, rhonchi or rales.   Abdominal:      General: Bowel sounds are normal. There is no distension.      Palpations: Abdomen is soft.      Tenderness: There is no abdominal tenderness.   Musculoskeletal:      Cervical back: Neck supple.      Right lower leg: No edema.      Left lower leg: No edema.   Neurological:      General: No focal deficit present.      Mental Status: She is alert and oriented to person, place, and time. Mental status is at  baseline.   Psychiatric:         Mood and Affect: Mood normal.         Behavior: Behavior normal.         Thought Content: Thought content normal.         Judgment: Judgment normal.

## 2024-01-03 ENCOUNTER — OFFICE VISIT (OUTPATIENT)
Dept: PHYSICAL THERAPY | Facility: CLINIC | Age: 69
End: 2024-01-03
Payer: MEDICARE

## 2024-01-03 DIAGNOSIS — M25.552 LEFT HIP PAIN: Primary | ICD-10-CM

## 2024-01-03 DIAGNOSIS — M54.50 LOW BACK PAIN, UNSPECIFIED BACK PAIN LATERALITY, UNSPECIFIED CHRONICITY, UNSPECIFIED WHETHER SCIATICA PRESENT: ICD-10-CM

## 2024-01-03 PROCEDURE — 97112 NEUROMUSCULAR REEDUCATION: CPT | Performed by: PHYSICAL THERAPIST

## 2024-01-03 PROCEDURE — 97110 THERAPEUTIC EXERCISES: CPT | Performed by: PHYSICAL THERAPIST

## 2024-01-03 NOTE — PROGRESS NOTES
Daily Note     Today's date: 1/3/2024  Patient name: Sri Mcgrath  : 1955  MRN: [id number]  Referring provider: Speedy Cevallos C*  Dx:   Encounter Diagnosis     ICD-10-CM    1. Left hip pain  M25.552       2. Low back pain, unspecified back pain laterality, unspecified chronicity, unspecified whether sciatica present  M54.50                      Subjective: The patient reports no pain to start the session today.      Objective: See treatment diary below      Assessment: The patient had some numbness and tingling at her B/L UE during the session when having to bear weight.  The patient was encouraged to avoid aggravating these symptoms and offered altered positioning for the completion of her exercises.  The patient tolerated the treatment well with minimal complaints of L hip or lumbar pain.  The patient will benefit from continued PT to achieve her goals of therapy.      Plan: Continue per plan of care.  Progress treatment as tolerated.       Re-evaluation:    PRECAUTIONS: 10/12/23 L L4-S1 facet injection  Access Code: [email or web address]     Specialty Daily Treatment Diary     Manual  12/14 12/20 12/27 1/3 12/6   STM lumbar paraspinals        Lumbar P/A        Hamstring stretch B/L        Piriformis Stretch B/L        Prone Quad stretch            Therapeutic Exercise 12/14 12/20 12/27 1/3/24 12/6   Treadmill Ambulation for endurance 1.0 mph x 7 mins 1.2 - 1.3mph x 7 min 1.4 mph x 8 min 1.4 mph x 8 mins 1.0 mph x 6 mins   UBE Stand ALT FWD/BACK for core/posture strength  On foam stand x2 min  100/70 ALT On foam stand x4 min ALT On Foam x 4 mins ALT    Ball squeeze x15       T-band ABD Blue x15       Hip flexor stretch stand knee on low mat w/ chair for support  5x:15 B/L 5x:15 B/L 5x:15 B/L Knee on mat lean forward 5x:15 B/L        ( best)  Tried standing 3x:15 no stretch   Bridges X15 blue band  X15 GRN  X20 GRN x15           Prone press ups     x10   Standing back extensions 1x10 x20 x10 x20 1x10    Sustained table EXT stretch  3 levels up and 3 down progressive w/ 1-2 minute holds each( table angle 45 deg) total 15 mins 3 levels up and 3 down progressive w/ 1-2 minute holds each( table angle 45 deg) total 15 mins 3 LEVELS UP AND DOWN  Progressive 1-2 minute holds  Total 15 mins    LTR cross legs        SLR flex   3x5 B/L 3x5 B/L    Stand hip ABD+EXT X15 B/L X15 B/L X20 B/L X20 B/L X15 B/L           Neuro Re-ed        Core brace x10  W/ UBE  W/ UBE X10   kegels x10  W/ UBE W/ UBE    Multifidi        Brace with knee fallouts        Quad DLS UE  LE  UE+LE        Glute set x15 2x10 prone X20 prone 2x10 prone X15   Clam shells Blue x10 B/L    X10   MTP/LTP/ Anti-rotations        Towel roll education, Posture correction; movement precautions        Lift/chop T-band        SLB        Posture corrections seated x10    X15 :03           Sidestepping     10ft x 8 // bar   Heel toe Amb     10ft x 8 // bar                   Therapeutic Activity        Steps ups fwd/side        Crate carry        Crate lifts 3 levels        Lunges        Chair squats            Modalities        MHP/CP to L/B

## 2024-01-04 DIAGNOSIS — F33.9 DEPRESSION, RECURRENT (HCC): ICD-10-CM

## 2024-01-05 RX ORDER — TRAZODONE HYDROCHLORIDE 100 MG/1
TABLET ORAL
Qty: 90 TABLET | Refills: 1 | Status: SHIPPED | OUTPATIENT
Start: 2024-01-05

## 2024-01-09 NOTE — PROGRESS NOTES
"PT Re-Evaluation     Today's date: 1/10/2024  Patient name: Sri Mcgrath  : 1955  MRN: [id number]  Referring provider: Speedy Cevallos C*  Dx:   Encounter Diagnosis     ICD-10-CM    1. Left hip pain  M25.552       2. Low back pain, unspecified back pain laterality, unspecified chronicity, unspecified whether sciatica present  M54.50             Start Time: 0730  Stop Time: 0815  Total time in clinic (min): 45 minutes    Assessment  Assessment details: Sri Mcgrath is a 68 y.o. female with a history of arthritis, cataract, depression, H/A, hyperlipidemia, HTN, L RTC tear, memory loss, CKD, and BMI>30 that presents for a physical therapy RE evaluation after 9 PT sessions.  The patient demonstrates signs and symptoms consistent with L hip pain; low back pain.  Findings of examination today show improvement in lumbar range of motion, LE strength, and overall function. Patient does continue to have some pain in low back, but able to manage independently with use of HEP. At this time Sri has met the majority of goals set at initial evaluation and is able to be discharged from skilled care. Reviewed findings of examination today as well as home exercise program and will plan DC.                 Barriers to therapy: Memory loss  Understanding of Dx/Px/POC: good   Prognosis: good  Prognosis details: Positive prognostic indicators include: positive attitude toward recovery, good understanding of diagnosis/treatment plan, and absence of observed red flags.      Negative prognostic indicators include: chronicity of symptoms, depression       Goals  STG: Achieve in 4-6 weeks  1.  Patient's L LE / L/B pain at worst less than 3/10 to allow for proper gait. PROGRESSING 1/10  2.  Patient's LUMBAR ROM improve to WFL to improve squatting.PROGRESSING 1/10  3.  L LE MMT improve to > 4+/5 and core with \" good activation\" all motions tested to improve ADL/recreational activities.PROGRESSING 1/10      LTG:  Achieve in " "6-12 weeks  1.  Patient's FOTO score improve to > 65% to indicate a return to normal functioning. MET 1/10  2.  Patient achieve personal goal of functioning with less L LE and low back pain.- MET 1/10  3. Patient to achieve independence with home exercise plan. -MET 1/10        Plan  Plan details: DC TO HEP  Frequency: 1-3x/wk.  Duration in weeks: 12  Plan of Care beginning date: 11/17/2023  Plan of Care expiration date: 1/10/2024  Treatment plan discussed with: PTA and patient        Subjective Evaluation    History of Present Illness  Mechanism of injury: Subjective 1/10: Patient states she is \"back to normal\". Does continue to have some low back pain, c/o stiffness today thinks it is due to the weather. Able to complete all ADLs and utilized HEP after a lot of activity. Does have some difficulty descending stairs due to lumbar discomfort. Legs feel heavy when lifting them to get into car. No longer has pain at lateral knee or into low leg.     SUBJECTIVE: 12/14/23: The patient reports improvement since starting therapy.  The patient notes significantly less pain at her lumbar spine.  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  The patient continues to have difficulty with walking down the steps due to L knee tightness.  The patient notes persistent difficulty due to hip/lumbar pain with car transfers and walking quickly.  The patient will benefit from continued PT focused on achieving patient specific goals.            INJURY HISTORY: Sri Mcgrath is a 68 y.o. female that presents to outpatient physical therapy with complaints of severe chronic low back pain with L knee pain.  The patient saw orthopedics who felt the L knee pain and difficulty ambulating down steps was related to the low back issues.  The patient does get numbness/tingling at her L LE to the toes.  The patient reports receiving CSI on 10/12/23 to her lumbar spine facet joints by [facility].  " "The patient notes the shots did not improve her complaints at the L LE and in fact she felt notes the L LE wasn't present until after the injections.  The patient was referred to outpatient PT by orthopedics.  The patient notes episodes of the hips \"slipping\" with walking and she gets increased pain L LE afterwards.  The patient's main goal for physical therapy is to try to get rid of the low back pain and L LE pain.   Patient Goals  Patient goals for therapy: decreased pain, increased motion, increased strength, independence with ADLs/IADLs, return to sport/leisure activities and improved balance  Patient goal: decrease LBP and L LE pain ( progressing)  Pain  Current pain rating: 3  At best pain ratin  At worst pain ratin  Location: low back  Quality: tight and burning (tingling)  Aggravating factors: walking, standing and stair climbing (quickly walking; car transfers)  Progression: improved    Social Support  Steps to enter house: yes  Stairs in house: no   Lives in: apartment  Lives with: alone    Employment status: not working  Hand dominance: ambidextrous    Treatments  Current treatment: physical therapy        Objective     Concurrent Complaints  Positive for disturbed sleep. Negative for night pain, bladder dysfunction, bowel dysfunction, saddle (S4) numbness, history of cancer, history of trauma and infection    Postural Observations  Seated posture: fair  Standing posture: fair  Correction of posture: has no consistent effect    Additional Postural Observation Details  Towel roll correction - worse LBP    Neurological Testing     Sensation     Lumbar   Left   Intact: light touch    Right   Intact: light touch    Reflexes   Left   Patellar (L4): normal (2+)  Achilles (S1): normal (2+)  Babinski sign: negative    Right   Patellar (L4): normal (2+)  Achilles (S1): normal (2+)  Babinski sign: negative    Active Range of Motion     Lumbar   Flexion:  WFL  Extension:  Restriction level: minimal  Left " lateral flexion:  with pain Restriction level: moderate  Right lateral flexion:  with pain Restriction level: moderate  Left rotation:  with pain Restriction level: moderate  Right rotation:  with pain Restriction level: minimal    Additional Active Range of Motion Details  Improved lumbar extension  Mechanical Assessment    Cervical      Thoracic      Lumbar    Standing extension: repeated movements  Pain location: no change  Lying extension: repeated movements  Pain location: no change    Strength/Myotome Testing     Left Hip   Planes of Motion   Flexion: 4+  Extension: 3+  Abduction: 4  Adduction: 5    Right Hip   Planes of Motion   Flexion: 4  Extension: 3-  Abduction: 4-  Adduction: 5    Left Knee   Flexion: 5  Extension: 5    Right Knee   Flexion: 5  Extension: 5    Left Ankle/Foot   Dorsiflexion: 5  Plantar flexion: 5  Great toe extension: 5    Right Ankle/Foot   Dorsiflexion: 5  Plantar flexion: 5  Great toe extension: 5    Additional Strength Details  IMPROVED bilateral knee strength, hip strength    Muscle Activation     Additional Muscle Activation Details  Improved but Decreased TrA, multifidus, gluteal activation with transfers / position changes / dynamic movement      Tests     Lumbar     Left   Negative slump test.     Right   Negative slump test.     Left Pelvic Girdle/Sacrum   Negative: active SLR test.     Right Pelvic Girdle/Sacrum   Negative: active SLR test.     Left Hip   Negative FADIR.     Right Hip   Negative FADIR.     Additional Tests Details  Improved slump test on R  Improve active SLR bilaterally    FOTO: 57% ( predicted 60%) ( improved 11%)  1/10: 76%                   PRECAUTIONS: 10/12/23 L L4-S1 facet injection  Access Code: [email or web address]     Specialty Daily Treatment Diary     Manual  12/14 12/20 12/27 1/3 1/10   STM lumbar paraspinals        Lumbar P/A        Hamstring stretch B/L        Piriformis Stretch B/L        Prone Quad stretch            Therapeutic Exercise 12/14 12/20 12/27  1/3/24 1/10   Treadmill Ambulation for endurance 1.0 mph x 7 mins 1.2 - 1.3mph x 7 min 1.4 mph x 8 min 1.4 mph x 8 mins 1.4 mph x 8 mins   UBE Stand ALT FWD/BACK for core/posture strength  On foam stand x2 min  100/70 ALT On foam stand x4 min ALT On Foam x 4 mins ALT    Ball squeeze x15       T-band ABD Blue x15       Hip flexor stretch stand knee on low mat w/ chair for support  5x:15 B/L 5x:15 B/L 5x:15 B/L    Bridges X15 blue band  X15 GRN  X20 GRN reviewed           Prone press ups        Standing back extensions 1x10 x20 x10 x20 20x   Sustained table EXT stretch  3 levels up and 3 down progressive w/ 1-2 minute holds each( table angle 45 deg) total 15 mins 3 levels up and 3 down progressive w/ 1-2 minute holds each( table angle 45 deg) total 15 mins 3 LEVELS UP AND DOWN  Progressive 1-2 minute holds  Total 15 mins 3 LEVELS UP AND DOWN  Progressive 1-2 minute holds  Total 15 mins   LTR cross legs        SLR flex   3x5 B/L 3x5 B/L    Stand hip ABD+EXT X15 B/L X15 B/L X20 B/L X20 B/L            Neuro Re-ed        Core brace x10  W/ UBE  W/ UBE    kegels x10  W/ UBE W/ UBE    Multifidi        Brace with knee fallouts        Quad DLS UE  LE  UE+LE        Glute set x15 2x10 prone X20 prone 2x10 prone    Clam shells Blue x10 B/L       MTP/LTP/ Anti-rotations        Towel roll education, Posture correction; movement precautions        Lift/chop T-band        SLB        Posture corrections seated x10               Sidestepping        Heel toe Amb                        Therapeutic Activity        Steps ups fwd/side        Crate carry        Crate lifts 3 levels        Lunges        Chair squats            Modalities        MHP/CP to L/B                             The patient was given a new home exercise plan with instruction, pictures, and verbal feedback.  The patient accepts and understands the new home activities.

## 2024-01-10 ENCOUNTER — EVALUATION (OUTPATIENT)
Dept: PHYSICAL THERAPY | Facility: CLINIC | Age: 69
End: 2024-01-10
Payer: MEDICARE

## 2024-01-10 DIAGNOSIS — M54.50 LOW BACK PAIN, UNSPECIFIED BACK PAIN LATERALITY, UNSPECIFIED CHRONICITY, UNSPECIFIED WHETHER SCIATICA PRESENT: ICD-10-CM

## 2024-01-10 DIAGNOSIS — M25.552 LEFT HIP PAIN: Primary | ICD-10-CM

## 2024-01-10 PROCEDURE — 97110 THERAPEUTIC EXERCISES: CPT | Performed by: PHYSICAL THERAPIST

## 2024-01-19 ENCOUNTER — OFFICE VISIT (OUTPATIENT)
Dept: INTERNAL MEDICINE CLINIC | Facility: CLINIC | Age: 69
End: 2024-01-19
Payer: MEDICARE

## 2024-01-19 VITALS
TEMPERATURE: 97.5 F | DIASTOLIC BLOOD PRESSURE: 76 MMHG | WEIGHT: 186 LBS | OXYGEN SATURATION: 95 % | HEIGHT: 60 IN | SYSTOLIC BLOOD PRESSURE: 128 MMHG | HEART RATE: 64 BPM | BODY MASS INDEX: 36.52 KG/M2

## 2024-01-19 DIAGNOSIS — F41.1 GAD (GENERALIZED ANXIETY DISORDER): ICD-10-CM

## 2024-01-19 DIAGNOSIS — F32.0 MILD MAJOR DEPRESSION, SINGLE EPISODE (HCC): ICD-10-CM

## 2024-01-19 DIAGNOSIS — Z13.31 POSITIVE DEPRESSION SCREENING: ICD-10-CM

## 2024-01-19 DIAGNOSIS — R45.84 ANHEDONIA: ICD-10-CM

## 2024-01-19 DIAGNOSIS — G47.9 SLEEP DISTURBANCE: ICD-10-CM

## 2024-01-19 DIAGNOSIS — E66.01 MORBID OBESITY (HCC): ICD-10-CM

## 2024-01-19 DIAGNOSIS — F33.9 DEPRESSION, RECURRENT (HCC): Primary | ICD-10-CM

## 2024-01-19 DIAGNOSIS — F41.8 ANXIETY WITH DEPRESSION: ICD-10-CM

## 2024-01-19 DIAGNOSIS — F41.8 INSOMNIA SECONDARY TO DEPRESSION WITH ANXIETY: ICD-10-CM

## 2024-01-19 DIAGNOSIS — F33.8 SEASONAL AFFECTIVE DISORDER (HCC): ICD-10-CM

## 2024-01-19 DIAGNOSIS — F51.05 INSOMNIA SECONDARY TO DEPRESSION WITH ANXIETY: ICD-10-CM

## 2024-01-19 PROBLEM — F32.1 MODERATE MAJOR DEPRESSION, SINGLE EPISODE (HCC): Status: ACTIVE | Noted: 2024-01-19

## 2024-01-19 PROCEDURE — 99213 OFFICE O/P EST LOW 20 MIN: CPT | Performed by: INTERNAL MEDICINE

## 2024-01-19 NOTE — PROGRESS NOTES
Assessment/Plan:  Problem List Items Addressed This Visit        Other    Seasonal affective disorder (HCC)    Morbid obesity (HCC)    Depression, recurrent (HCC) - Primary    Relevant Orders    Ambulatory referral to Psych Services    Ambulatory referral to Psych Services   Other Visit Diagnoses     Sleep disturbance        Relevant Orders    Ambulatory referral to Psych Services    Ambulatory referral to Psych Services    MAGGIE (generalized anxiety disorder)        Relevant Orders    Ambulatory referral to Psych Services    Ambulatory referral to Psych Services    Anhedonia        Positive depression screening        Mild major depression, single episode (HCC)        Anxiety with depression        Insomnia secondary to depression with anxiety               Diagnoses and all orders for this visit:    Depression, recurrent (HCC)  -     Ambulatory referral to Psych Services; Future  -     Ambulatory referral to Psych Services; Future    Sleep disturbance  -     Ambulatory referral to Psych Services; Future  -     Ambulatory referral to Psych Services; Future    MAGGIE (generalized anxiety disorder)  -     Ambulatory referral to Psych Services; Future  -     Ambulatory referral to Psych Services; Future    Morbid obesity (HCC)    Anhedonia    Positive depression screening    Mild major depression, single episode (HCC)    Anxiety with depression    Insomnia secondary to depression with anxiety    Seasonal affective disorder (HCC)        No problem-specific Assessment & Plan notes found for this encounter.    A/P: stable and no suicidal or violent ideations. Discussed counseling and meds. Pt on meds in the past. Would like to try counseling first. Will refer. Continue current treatment and RTC as scheduled.     Subjective:      Patient ID: Sri Mcgrath is a 68 y.o. female.    WF with PMH of MDD, but not currently on any meds, presents for worsening MDD. No new meds or dietary changes. No suicidal thoughts or ideations.  Reports several months of feeling down and blah. No energy. Emotional swings with crying and sleep issues. Unable to relax. Reports relative recently passed and grandson OD as some major triggers. Not ETOH or drug use.         The following portions of the patient's history were reviewed and updated as appropriate:   She has a past medical history of Allergic, Arthritis, Cataract, Colon polyp, Depression, Fluid retention, Headache, acute, Heart murmur, Hyperlipidemia, Hypertension, Memory loss, Pedal edema, Pneumonia, PONV (postoperative nausea and vomiting), Rotator cuff tear, left, Sleep apnea (2021), Wears glasses, and Wears partial dentures.,  does not have any pertinent problems on file.,   has a past surgical history that includes Ankle surgery (Right); Tonsillectomy; Tubal ligation; Hysterectomy; Ovarian cyst removal; Carpal tunnel release (Bilateral); Hand surgery (Right); Abdominal adhesion surgery; Knee arthroscopy (Left); Appendectomy; Colonoscopy; Hemorroidectomy; Repair rectocele; pr surgical arthroscopy shoulder w/rotator cuff rpr (Left, 2/19/2018); Carpal tunnel release (Left); Carpal tunnel release (Right); Ileostomy; Anal sphincteroplasty; Knee arthrocentesis; Tonsillectomy and adenoidectomy; Joint replacement; and Replacement total knee (Left, 09/28/2021).,  family history includes Alzheimer's disease in her sister and sister; Arthritis in her brother, sister, and sister; Bone cancer in her family and mother; COPD in her mother; Colon cancer in her family and mother; Depression in her sister; Diabetes in her sister; Emphysema in her mother; ROBERT disease in her brother; Glaucoma in her mother; Heart failure in her sister; Hypertension in her brother, family, mother, sister, and sister; Lead poisoning in her father; No Known Problems in her daughter, maternal aunt, maternal aunt, maternal aunt, maternal aunt, maternal aunt, maternal grandmother, paternal aunt, and paternal grandmother; Skin cancer in  her mother; Throat cancer in her brother; Thyroid disease in her sister.,   reports that she has never smoked. She has never been exposed to tobacco smoke. She has never used smokeless tobacco. She reports current alcohol use. She reports that she does not use drugs.,  is allergic to keflex [cephalexin], contrast [iodinated contrast media], morphine and related, penicillins, benadryl [diphenhydramine], ciprofloxacin, clindamycin, and erythromycin..  Current Outpatient Medications   Medication Sig Dispense Refill   • azelastine (ASTELIN) 0.1 % nasal spray 1 spray into each nostril 2 (two) times a day Use in each nostril as directed 30 mL 0   • Calcium Carbonate (CALCIUM 600 PO) Take by mouth daily       • Chlorpheniramine-APAP (CORICIDIN) 2-325 MG TABS Take 1 tablet by mouth 4 (four) times a day as needed (cough) 56 tablet 0   • Cholecalciferol (VITAMIN D3 PO) Take 50 mcg by mouth daily       • clotrimazole-betamethasone (LOTRISONE) 1-0.05 % cream Apply topically 2 (two) times a day for 2 weeks to abdomen rash and 6 weeks to vulvar rash. (Patient taking differently: Apply topically if needed for 2 weeks to abdomen rash and 6 weeks to vulvar rash.) 30 g 2   • estradiol (ESTRACE) 0.1 mg/g vaginal cream APPLY 1 GM INTO VAGINA NIGHTLY FOR 1 WEEK, THEN APPLY EVERY THIRD NIGHT 42.5 g 3   • fluticasone (FLONASE) 50 mcg/act nasal spray 1 spray into each nostril daily 18.2 mL 0   • montelukast (SINGULAIR) 10 mg tablet take 1 tablet by mouth at bedtime 90 tablet 3   • nystatin (MYCOSTATIN) powder Apply topically 3 (three) times a day as needed (rash) . Apply at least daily in summer to prevent rash. 45 g 3   • propranolol (INDERAL) 20 mg tablet Take 1 tablet (20 mg total) by mouth every 12 (twelve) hours 180 tablet 2   • rosuvastatin (CRESTOR) 10 MG tablet Take 1 tablet (10 mg total) by mouth daily 90 tablet 3   • traZODone (DESYREL) 100 mg tablet take 1 tablet by mouth at bedtime 90 tablet 1     No current  facility-administered medications for this visit.       Review of Systems   Constitutional:  Positive for activity change and fatigue. Negative for chills, diaphoresis and fever.   Respiratory:  Negative for cough, chest tightness, shortness of breath and wheezing.    Cardiovascular:  Negative for chest pain, palpitations and leg swelling.   Gastrointestinal:  Negative for abdominal pain, constipation, diarrhea, nausea and vomiting.   Genitourinary:  Negative for difficulty urinating, dysuria and frequency.   Musculoskeletal:  Negative for arthralgias, gait problem and myalgias.   Neurological:  Negative for light-headedness and headaches.   Psychiatric/Behavioral:  Positive for dysphoric mood and sleep disturbance. Negative for confusion and suicidal ideas. The patient is nervous/anxious.        PHQ-2/9 Depression Screening    Little interest or pleasure in doing things: 0 - not at all  Feeling down, depressed, or hopeless: 1 - several days  Trouble falling or staying asleep, or sleeping too much: 1 - several days  Feeling tired or having little energy: 0 - not at all  Poor appetite or overeatin - several days  Feeling bad about yourself - or that you are a failure or have let yourself or your family down: 1 - several days  Trouble concentrating on things, such as reading the newspaper or watching television: 0 - not at all  Moving or speaking so slowly that other people could have noticed. Or the opposite - being so fidgety or restless that you have been moving around a lot more than usual: 0 - not at all  Thoughts that you would be better off dead, or of hurting yourself in some way: 0 - not at all  PHQ-9 Score: 4  PHQ-9 Interpretation: No or Minimal depression        Objective:  Vitals:    24 0736   BP: 128/76   Pulse: 64   Temp: 97.5 °F (36.4 °C)   SpO2: 95%   Weight: 84.4 kg (186 lb)   Height: 5' (1.524 m)     Body mass index is 36.33 kg/m².     Physical Exam  Vitals and nursing note reviewed.    Constitutional:       General: She is not in acute distress.     Appearance: Normal appearance. She is not ill-appearing.   HENT:      Head: Normocephalic and atraumatic.      Mouth/Throat:      Mouth: Mucous membranes are moist.   Eyes:      Extraocular Movements: Extraocular movements intact.      Conjunctiva/sclera: Conjunctivae normal.      Pupils: Pupils are equal, round, and reactive to light.   Cardiovascular:      Rate and Rhythm: Normal rate and regular rhythm.      Heart sounds: Normal heart sounds. No murmur heard.  Pulmonary:      Effort: Pulmonary effort is normal. No respiratory distress.      Breath sounds: No wheezing, rhonchi or rales.   Neurological:      General: No focal deficit present.      Mental Status: She is alert and oriented to person, place, and time. Mental status is at baseline.   Psychiatric:         Behavior: Behavior normal.         Thought Content: Thought content normal.         Judgment: Judgment normal.      Comments: Cries easily.        Depression Screening Follow-up Plan: Patient's depression screening was positive with a PHQ-2 score of . Their PHQ-9 score was 4. Patient assessed for underlying major depression. They have no active suicidal ideations. Brief counseling provided and recommend additional follow-up/re-evaluation next office visit.

## 2024-01-19 NOTE — PATIENT INSTRUCTIONS
Depression   AMBULATORY CARE:   Depression  is a mood disorder that causes feelings of sadness or hopelessness that do not go away. Depression may cause you to lose interest in things you used to enjoy. These feelings may interfere with your daily life.  Common signs and symptoms:   Appetite changes, or weight gain or loss    Trouble falling or staying asleep, or sleeping too much    Fatigue (being mentally and physically tired) or lack of energy    Feeling restless, irritable, or withdrawn    Feeling worthless, hopeless, discouraged, or guilty    Trouble concentrating, remembering things, doing daily tasks, or making decisions    Thoughts about hurting or killing yourself    Call your local emergency number (911 in the [place]) if:   You think about hurting yourself or someone else.    You have done something on purpose to hurt yourself.    Call your therapist or doctor if:   Your symptoms get worse or do not get better with treatment.    Your depression keeps you from doing your regular daily activities.    You have new symptoms since your last visit.    You have questions or concerns about your condition or care.    The following resources are available at any time to help you, if needed:   Contact a suicide prevention organization:        For the [employer] Suicide and Crisis Lifeline:     Call or text [employer]     Send a chat on https://Jianshu.org/chat     Call 0-531-021-6835 (1-800-273-TALK)    For the Suicide Hotline, call 5-607-185-8795 (2-641-MDZPBLY)    For a list of international numbers: https://save.org/find-help/international-resources/  Treatment for depression  depends on how severe your symptoms are. You may need any of the following:  Cognitive behavioral therapy (CBT)  teaches you how to identify and change negative thought patterns.    Antidepressant medicine  may be given to decrease or manage symptoms. You may need to take this medicine for several weeks before they start working.    Self-care:   Talk to  someone about your depression.  Your healthcare provider may suggest counseling. You might feel more comfortable talking with a friend or family member about your depression. Choose someone you know will be supportive and encouraging.    Get regular physical activity.  Physical activity can lower your stress, improve your mood, and help you sleep better. Work with your healthcare provider to develop a plan that you enjoy.         Create a regular sleep schedule.  A routine can help you relax before bed. Listen to music, read, or do yoga. Try to go to bed and wake up at the same time every day. Sleep is important for emotional health.    Eat a variety of healthy foods.  Healthy foods include fruits, vegetables, whole-grain breads, low-fat dairy products, lean meats, fish, and cooked beans. A healthy meal plan is low in fat, salt, and added sugar.         Do not use alcohol, drugs, or nicotine products.  These can worsen depression or make it hard to manage. Talk to your therapist or healthcare provider if you use any of these products and need help to quit.    Follow up with your therapist or doctor as directed:  Your healthcare provider will monitor your progress at follow-up visits. Your provider will also monitor your medicine if you take antidepressants and ask if the medicine is helping. Tell your provider about any side effects or problems you have with your medicine. The type or amount of medicine may need to be changed. Write down your questions so you remember to ask them during your visits.  For more information or support:   [employer]  [address]  [place] [address]  Phone: 1- 278 - 694-2633  Phone: 1- 362 - 991-8074  Web Address: http://www.kaleb.org  [address] Su[facility] and Crisis [facility]  [address]  [place] [address]  Phone: 3- 380 - 148  Web Address: http://www.suicidepreventionlifeline.org OR https://Lucidux.org/chat/    © Copyright [employer] 2023  Information is for End User's use only and may not be sold, redistributed or otherwise used for commercial purposes.  The above information is an  only. It is not intended as medical advice for individual conditions or treatments. Talk to your doctor, nurse or pharmacist before following any medical regimen to see if it is safe and effective for you.

## 2024-02-05 ENCOUNTER — TELEPHONE (OUTPATIENT)
Dept: PSYCHIATRY | Facility: CLINIC | Age: 69
End: 2024-02-05

## 2024-02-05 NOTE — TELEPHONE ENCOUNTER
Called pt in regards to referral rcvd, verify needs of services and inform of current wait list. Pt states already established services elsewhere. Writer informed Pt that referral is good for one year, from referral date. Pt verbalized understanding. Closing referral.

## 2024-03-01 NOTE — PROGRESS NOTES
Patient is a 67-year-old female with history of anxiety depression who presents for follow-up for tremors.     To review, she noted bilateral hand tremor starting in 2020 that been progressively worsening.  Tremor mainly present with action and can interfere with utensils and drinking.  She is no family history of tremor or parkinsonism.  She has been trialed on primidone up to 300 mg and low-dose Sinemet with no improvement. She did obtain KG scan which was negative.      Last office visit 9/2023 in which no changes were made.      Prior medication trials:  Primidone-no improvement in tremor  Sinemet 25/100 2 tabs TID-no improvement in tremor     Current Medications:  propanolol 20 mg 1 tabs mg BID     Interval History:  She has noted some progression of tremor since last visit. She can have difficulty holding a cup, she is ok drinking from a cup.   No difficulty cutting up food and eating.  She has some issues with dexterity such as turning pages of a book.   Handwriting is getting worse. She has good and bad days with this.  No difficulty with ADLs due to tremor other then with clasps.   No head or vocal tremor.   She can feel herself lean to the right at times. Feels imbalance. No recent falls.   No medication side effects.   No resting tremor.       She have felt numbness and tingling from the forearm into the hands that comes and goes, she also has some symptoms in the feet intermittently. She does have chronic low back pain, does have plans for rhizotomy last this month.     Prior work-up:  MRI brain: 1.  No acute infarction, intracranial hemorrhage or mass effect.  2.  A few white matter lesions are most likely mild, chronic microangiopathy.     MRI cervical spine: Cervical spondylitic degenerative change with primarily left-sided facet hypertrophic change.  There is no cord compression.  Multilevel primarily left-sided foraminal narrowing most pronounced at the C4-5 level, moderate.     Small central disc  extrusion at C6-7 partially effacing the ventral CSF space    EMG of UE 10/2023: normal

## 2024-03-04 ENCOUNTER — OFFICE VISIT (OUTPATIENT)
Dept: NEUROLOGY | Facility: CLINIC | Age: 69
End: 2024-03-04
Payer: MEDICARE

## 2024-03-04 VITALS
WEIGHT: 190 LBS | DIASTOLIC BLOOD PRESSURE: 70 MMHG | BODY MASS INDEX: 37.3 KG/M2 | SYSTOLIC BLOOD PRESSURE: 110 MMHG | HEART RATE: 63 BPM | HEIGHT: 60 IN

## 2024-03-04 DIAGNOSIS — G60.9 HEREDITARY AND IDIOPATHIC NEUROPATHY, UNSPECIFIED: ICD-10-CM

## 2024-03-04 DIAGNOSIS — R25.9 MIXED ACTION AND RESTING TREMOR: ICD-10-CM

## 2024-03-04 DIAGNOSIS — R20.2 PARESTHESIAS: Primary | ICD-10-CM

## 2024-03-04 PROCEDURE — 99214 OFFICE O/P EST MOD 30 MIN: CPT | Performed by: NURSE PRACTITIONER

## 2024-03-04 RX ORDER — PROPRANOLOL HYDROCHLORIDE 20 MG/1
20 TABLET ORAL EVERY 12 HOURS SCHEDULED
Qty: 180 TABLET | Refills: 2 | Status: SHIPPED | OUTPATIENT
Start: 2024-03-04

## 2024-03-04 RX ORDER — GABAPENTIN 100 MG/1
CAPSULE ORAL
Qty: 540 CAPSULE | Refills: 2 | Status: SHIPPED | OUTPATIENT
Start: 2024-03-04

## 2024-03-04 NOTE — PROGRESS NOTES
Patient ID: Sri Mcgrath is a 68 y.o. female.    Assessment/Plan:    Mixed action and resting tremor  Patient returns for follow-up regarding mixed action and resting tremor.  In the past she has tried primidone and Sinemet with no improvement in her tremor, she did obtain a DaTscan which was normal   Thus being treated as essential tremor.  She has noted benefit with propranolol since initiation, however since last visit has noted some worsening of action tremor.     On today's visit she does have low normal blood pressure and heart rate therefore would not recommend increasing her propranolol.  We did discuss considering the addition of topiramate or gabapentin.  Given she is noting symptoms of paresthesias and anxiety we opted to trial gabapentin.      Plan:  -Continue propranolol 20 mg twice daily  -Start gabapentin 100 mg 3 times daily, if no improvement in 1 to 2 weeks can increase to 200 mg 3 times daily.  If no improvement or side effects she will contact the office.    Plan for follow-up in 4 months. To contact the office sooner with any concerns or worsening symptoms.    Paresthesias  Patient continues with intermittent numbness and tingling that radiates down her arm to her fingertips along with intermittent symptoms in the feet.  She denies any weakness of the upper extremities, muscle strength testing is normal other than decreased  strength.  She did have an EMG of the upper extremities which was normal.  MRI cervical spine did show degenerative changes and for minimal narrowing.  She denies any significant neck pain currently.  Will obtain labs for complaints of paresthesias to assess for any reversible causes.  May benefit from gabapentin to assist with any symptoms/pain.        Diagnoses and all orders for this visit:    Paresthesias  -     Vitamin B6; Future  -     Vitamin B12; Future  -     Vitamin B1, whole blood; Future  -     Protein electrophoresis, serum; Future  -     Sedimentation  rate, automated; Future  -     Methylmalonic acid, serum; Future  -     Sjogren's Antibodies; Future  -     gabapentin (Neurontin) 100 mg capsule; Take 1 tab TID for 1 week then increase to 2 tabs TID    Mixed action and resting tremor  -     propranolol (INDERAL) 20 mg tablet; Take 1 tablet (20 mg total) by mouth every 12 (twelve) hours  -     gabapentin (Neurontin) 100 mg capsule; Take 1 tab TID for 1 week then increase to 2 tabs TID    Hereditary and idiopathic neuropathy, unspecified  -     Vitamin B6; Future           Subjective:    HPI    Patient is a 67-year-old female with history of anxiety depression who presents for follow-up for tremors.     To review, she noted bilateral hand tremor starting in 2020 that been progressively worsening.  Tremor mainly present with action and can interfere with utensils and drinking.  She is no family history of tremor or parkinsonism.  She has been trialed on primidone up to 300 mg and low-dose Sinemet with no improvement. She did obtain KG scan which was negative.      Last office visit 9/2023 in which no changes were made.      Prior medication trials:  Primidone-no improvement in tremor  Sinemet 25/100 2 tabs TID-no improvement in tremor     Current Medications:  propanolol 20 mg 1 tabs mg BID     Interval History:  She has noted some progression of tremor since last visit. She can have difficulty holding a cup, she is ok drinking from a cup.   No difficulty cutting up food and eating.  She has some issues with dexterity such as turning pages of a book.   Handwriting is getting worse. She has good and bad days with this.  No difficulty with ADLs due to tremor other then with clasps.   No head or vocal tremor.   She can feel herself lean to the right at times. Feels imbalance. No recent falls.   No medication side effects.   No resting tremor.       She have felt numbness and tingling from the forearm into the hands that comes and goes, she also has some symptoms in the  feet intermittently. She does have chronic low back pain, does have plans for rhizotomy last this month.      Prior work-up:  MRI brain: 1.  No acute infarction, intracranial hemorrhage or mass effect.  2.  A few white matter lesions are most likely mild, chronic microangiopathy.     MRI cervical spine: Cervical spondylitic degenerative change with primarily left-sided facet hypertrophic change.  There is no cord compression.  Multilevel primarily left-sided foraminal narrowing most pronounced at the C4-5 level, moderate.     Small central disc extrusion at C6-7 partially effacing the ventral CSF space     EMG of UE 10/2023: normal          The following portions of the patient's history were reviewed and updated as appropriate: allergies, current medications, past family history, past medical history, past social history, past surgical history and problem list.       Objective:    Blood pressure 110/70, pulse 63, height 5' (1.524 m), weight 86.2 kg (190 lb), not currently breastfeeding.    Physical Exam  Constitutional:       General: She is awake.   Eyes:      General: Lids are normal.      Extraocular Movements: Extraocular movements intact.      Pupils: Pupils are equal, round, and reactive to light.   Neurological:      Mental Status: She is alert.      Deep Tendon Reflexes:      Reflex Scores:       Bicep reflexes are 2+ on the right side and 2+ on the left side.       Brachioradialis reflexes are 2+ on the right side and 2+ on the left side.       Patellar reflexes are 2+ on the right side and 2+ on the left side.       Achilles reflexes are 0 on the right side and 0 on the left side.  Psychiatric:         Speech: Speech normal.         Neurological Exam  Mental Status  Awake and alert. Oriented to person, place, time and situation. Speech is normal. Language is fluent with no aphasia.    Cranial Nerves  CN III, IV, VI: Extraocular movements intact bilaterally. Normal lids and orbits bilaterally. Pupils equal  round and reactive to light bilaterally.  CN V: Facial sensation is normal.  CN VII: Full and symmetric facial movement.  CN VIII: Hearing is normal.  CN IX, X: Palate elevates symmetrically  CN XI: Shoulder shrug strength is normal.  CN XII: Tongue midline without atrophy or fasciculations.    Motor  Normal muscle bulk throughout. Normal muscle tone. Strength is 5/5 in all four extremities except as noted.  4/5  strength b/l R>L.    Sensory  Light touch is normal in upper and lower extremities. Pinprick is normal in upper and lower extremities. Temperature is normal in upper and lower extremities. Vibration is normal in upper and lower extremities.     Reflexes                                            Right                      Left  Brachioradialis                    2+                         2+  Biceps                                 2+                         2+  Patellar                                2+                         2+  Achilles                                0                         0    Coordination  Right: Finger-to-nose normal. Rapid alternating movement abnormality:Left: Finger-to-nose normal. Rapid alternating movement abnormality:  No resting tremor  Mild intentional tremor on FTN bilaterally.  no postural tremors.  No bradykinesia   No rigidity or cogwheeling.  Very rare head tremor noted, no lip tremor noted today  .    Gait   Able to rise from chair without using arms.  Good stride and speed, normal arm swing, .      I have personally reviewed the ROS performed by the MA.    ROS:    Review of Systems      Review of Systems   Constitutional:  Positive for fatigue. Negative for appetite change and fever.   HENT: Negative.  Negative for hearing loss, tinnitus, trouble swallowing and voice change.    Eyes: Negative.  Negative for photophobia, pain and visual disturbance.   Respiratory: Negative.  Negative for shortness of breath.    Cardiovascular: Negative.  Negative for palpitations.    Gastrointestinal: Negative.  Negative for nausea and vomiting.   Endocrine: Negative.  Negative for cold intolerance.   Genitourinary: Negative.  Negative for dysuria, frequency and urgency.   Musculoskeletal:  Positive for back pain and gait problem. Negative for myalgias, neck pain and neck stiffness.        Patient stated that her balance is off at times.    Skin: Negative.  Negative for rash.   Allergic/Immunologic: Negative.    Neurological:  Positive for tremors, weakness and numbness. Negative for dizziness, seizures, syncope, facial asymmetry, speech difficulty, light-headedness and headaches.        Patient stated that she has bilateral hand and feet numbness and weakness. Patient also stated that she has bilateral hand tremor.    Hematological: Negative.  Does not bruise/bleed easily.   Psychiatric/Behavioral: Negative.  Negative for confusion, hallucinations and sleep disturbance.

## 2024-03-04 NOTE — ASSESSMENT & PLAN NOTE
Patient returns for follow-up regarding mixed action and resting tremor.  In the past she has tried primidone and Sinemet with no improvement in her tremor, she did obtain a DaTscan which was normal   Thus being treated as essential tremor.  She has noted benefit with propranolol since initiation, however since last visit has noted some worsening of action tremor.     On today's visit she does have low normal blood pressure and heart rate therefore would not recommend increasing her propranolol.  We did discuss considering the addition of topiramate or gabapentin.  Given she is noting symptoms of paresthesias and anxiety we opted to trial gabapentin.      Plan:  -Continue propranolol 20 mg twice daily  -Start gabapentin 100 mg 3 times daily, if no improvement in 1 to 2 weeks can increase to 200 mg 3 times daily.  If no improvement or side effects she will contact the office.    Plan for follow-up in 4 months. To contact the office sooner with any concerns or worsening symptoms.

## 2024-03-04 NOTE — ASSESSMENT & PLAN NOTE
Patient continues with intermittent numbness and tingling that radiates down her arm to her fingertips along with intermittent symptoms in the feet.  She denies any weakness of the upper extremities, muscle strength testing is normal other than decreased  strength.  She did have an EMG of the upper extremities which was normal.  MRI cervical spine did show degenerative changes and for minimal narrowing.  She denies any significant neck pain currently.  Will obtain labs for complaints of paresthesias to assess for any reversible causes.  May benefit from gabapentin to assist with any symptoms/pain.

## 2024-03-04 NOTE — PROGRESS NOTES
Review of Systems   Constitutional:  Positive for fatigue. Negative for appetite change and fever.   HENT: Negative.  Negative for hearing loss, tinnitus, trouble swallowing and voice change.    Eyes: Negative.  Negative for photophobia, pain and visual disturbance.   Respiratory: Negative.  Negative for shortness of breath.    Cardiovascular: Negative.  Negative for palpitations.   Gastrointestinal: Negative.  Negative for nausea and vomiting.   Endocrine: Negative.  Negative for cold intolerance.   Genitourinary: Negative.  Negative for dysuria, frequency and urgency.   Musculoskeletal:  Positive for back pain and gait problem. Negative for myalgias, neck pain and neck stiffness.        Patient stated that her balance is off at times.    Skin: Negative.  Negative for rash.   Allergic/Immunologic: Negative.    Neurological:  Positive for tremors, weakness and numbness. Negative for dizziness, seizures, syncope, facial asymmetry, speech difficulty, light-headedness and headaches.        Patient stated that she has bilateral hand and feet numbness and weakness. Patient also stated that she has bilateral hand tremor.    Hematological: Negative.  Does not bruise/bleed easily.   Psychiatric/Behavioral: Negative.  Negative for confusion, hallucinations and sleep disturbance.

## 2024-03-04 NOTE — PATIENT INSTRUCTIONS
Obtain labs due to ongoing numbness and tingling       To assist with tremor along with other symptoms (numbness, tingling, arthritis, anxiety) start gabapentin 100 mg 1 tab 3x/day. If no improvement in 1-2 weeks can increase to 2 tabs 3x/day. If no improvement after 1-2 weeks please send my chart message for further instructions.

## 2024-03-06 ENCOUNTER — APPOINTMENT (OUTPATIENT)
Dept: LAB | Facility: CLINIC | Age: 69
End: 2024-03-06
Payer: MEDICARE

## 2024-03-06 DIAGNOSIS — R20.2 PARESTHESIAS: ICD-10-CM

## 2024-03-06 DIAGNOSIS — G60.9 HEREDITARY AND IDIOPATHIC NEUROPATHY, UNSPECIFIED: ICD-10-CM

## 2024-03-06 LAB
ERYTHROCYTE [SEDIMENTATION RATE] IN BLOOD: 12 MM/HOUR (ref 0–29)
VIT B12 SERPL-MCNC: 165 PG/ML (ref 180–914)

## 2024-03-06 PROCEDURE — 84165 PROTEIN E-PHORESIS SERUM: CPT

## 2024-03-06 PROCEDURE — 82607 VITAMIN B-12: CPT

## 2024-03-06 PROCEDURE — 36415 COLL VENOUS BLD VENIPUNCTURE: CPT

## 2024-03-06 PROCEDURE — 85652 RBC SED RATE AUTOMATED: CPT

## 2024-03-06 PROCEDURE — 84207 ASSAY OF VITAMIN B-6: CPT

## 2024-03-06 PROCEDURE — 84425 ASSAY OF VITAMIN B-1: CPT

## 2024-03-06 PROCEDURE — 86235 NUCLEAR ANTIGEN ANTIBODY: CPT

## 2024-03-06 PROCEDURE — 83918 ORGANIC ACIDS TOTAL QUANT: CPT

## 2024-03-07 LAB
ALBUMIN SERPL ELPH-MCNC: 3.67 G/DL (ref 3.2–5.1)
ALBUMIN SERPL ELPH-MCNC: 61.1 % (ref 48–70)
ALPHA1 GLOB SERPL ELPH-MCNC: 0.27 G/DL (ref 0.15–0.47)
ALPHA1 GLOB SERPL ELPH-MCNC: 4.5 % (ref 1.8–7)
ALPHA2 GLOB SERPL ELPH-MCNC: 0.64 G/DL (ref 0.42–1.04)
ALPHA2 GLOB SERPL ELPH-MCNC: 10.7 % (ref 5.9–14.9)
BETA GLOB ABNORMAL SERPL ELPH-MCNC: 0.4 G/DL (ref 0.31–0.57)
BETA1 GLOB SERPL ELPH-MCNC: 6.7 % (ref 4.7–7.7)
BETA2 GLOB SERPL ELPH-MCNC: 5.2 % (ref 3.1–7.9)
BETA2+GAMMA GLOB SERPL ELPH-MCNC: 0.31 G/DL (ref 0.2–0.58)
ENA SS-A AB SER-ACNC: <0.2 AI (ref 0–0.9)
ENA SS-B AB SER-ACNC: <0.2 AI (ref 0–0.9)
GAMMA GLOB ABNORMAL SERPL ELPH-MCNC: 0.71 G/DL (ref 0.4–1.66)
GAMMA GLOB SERPL ELPH-MCNC: 11.8 % (ref 6.9–22.3)
IGG/ALB SER: 1.57 {RATIO} (ref 1.1–1.8)
PROT PATTERN SERPL ELPH-IMP: ABNORMAL
PROT SERPL-MCNC: 6 G/DL (ref 6.4–8.2)

## 2024-03-07 PROCEDURE — 84165 PROTEIN E-PHORESIS SERUM: CPT | Performed by: STUDENT IN AN ORGANIZED HEALTH CARE EDUCATION/TRAINING PROGRAM

## 2024-03-10 LAB — VIT B6 SERPL-MCNC: 5.6 UG/L (ref 3.4–65.2)

## 2024-03-11 LAB — VIT B1 BLD-SCNC: 134 NMOL/L (ref 66.5–200)

## 2024-03-12 LAB — METHYLMALONATE SERPL-SCNC: 212 NMOL/L (ref 0–378)

## 2024-03-20 ENCOUNTER — TELEPHONE (OUTPATIENT)
Dept: NEUROLOGY | Facility: CLINIC | Age: 69
End: 2024-03-20

## 2024-03-20 DIAGNOSIS — E53.8 B12 DEFICIENCY: Primary | ICD-10-CM

## 2024-03-20 RX ORDER — CYANOCOBALAMIN 1000 UG/ML
1000 INJECTION, SOLUTION INTRAMUSCULAR; SUBCUTANEOUS WEEKLY
Status: SHIPPED | OUTPATIENT
Start: 2024-03-22 | End: 2024-05-10

## 2024-03-20 NOTE — TELEPHONE ENCOUNTER
Spoke with patient she would like to do b12 injections with PCP due to proximity-recommend b12 injection weekly for 8 weeks then can transition to oral b12 1000 mcg daily.   Patient will contact her PCP office to schedule.    Dr. Hadley-wanted to give you FYI and also do you have b12 injections in your office or would I need to send script to pharmacy for patient to bring to visit. Thanks!

## 2024-03-20 NOTE — TELEPHONE ENCOUNTER
"LM for patient regarding lab results-see below, will attempt to call back at later time to discuss b12 injections.       \"Please let patient know all labs were normal except her b12 was low would recommend she completed b12 injections weekly for 8 weeks then transition to oral b12 daily. She can do these either through our office or PCP. \"  "

## 2024-03-22 ENCOUNTER — CLINICAL SUPPORT (OUTPATIENT)
Dept: INTERNAL MEDICINE CLINIC | Facility: CLINIC | Age: 69
End: 2024-03-22
Payer: MEDICARE

## 2024-03-22 DIAGNOSIS — E53.8 B12 DEFICIENCY: Primary | ICD-10-CM

## 2024-03-22 PROCEDURE — 96372 THER/PROPH/DIAG INJ SC/IM: CPT

## 2024-03-22 RX ADMIN — CYANOCOBALAMIN 1000 MCG: 1000 INJECTION, SOLUTION INTRAMUSCULAR; SUBCUTANEOUS at 09:10

## 2024-03-29 ENCOUNTER — CLINICAL SUPPORT (OUTPATIENT)
Dept: INTERNAL MEDICINE CLINIC | Facility: CLINIC | Age: 69
End: 2024-03-29
Payer: MEDICARE

## 2024-03-29 DIAGNOSIS — E53.8 B12 DEFICIENCY: Primary | ICD-10-CM

## 2024-03-29 PROCEDURE — 96372 THER/PROPH/DIAG INJ SC/IM: CPT | Performed by: INTERNAL MEDICINE

## 2024-03-29 RX ADMIN — CYANOCOBALAMIN 1000 MCG: 1000 INJECTION, SOLUTION INTRAMUSCULAR; SUBCUTANEOUS at 08:38

## 2024-04-05 ENCOUNTER — CLINICAL SUPPORT (OUTPATIENT)
Dept: INTERNAL MEDICINE CLINIC | Facility: CLINIC | Age: 69
End: 2024-04-05
Payer: MEDICARE

## 2024-04-05 ENCOUNTER — OFFICE VISIT (OUTPATIENT)
Dept: CARDIOLOGY CLINIC | Facility: CLINIC | Age: 69
End: 2024-04-05
Payer: MEDICARE

## 2024-04-05 VITALS
RESPIRATION RATE: 18 BRPM | OXYGEN SATURATION: 98 % | HEIGHT: 60 IN | SYSTOLIC BLOOD PRESSURE: 122 MMHG | BODY MASS INDEX: 37.3 KG/M2 | HEART RATE: 64 BPM | DIASTOLIC BLOOD PRESSURE: 78 MMHG | WEIGHT: 190 LBS

## 2024-04-05 DIAGNOSIS — E78.2 MIXED HYPERLIPIDEMIA: ICD-10-CM

## 2024-04-05 DIAGNOSIS — G47.33 OSA (OBSTRUCTIVE SLEEP APNEA): ICD-10-CM

## 2024-04-05 DIAGNOSIS — I10 ESSENTIAL HYPERTENSION: ICD-10-CM

## 2024-04-05 DIAGNOSIS — N18.30 STAGE 3 CHRONIC KIDNEY DISEASE, UNSPECIFIED WHETHER STAGE 3A OR 3B CKD (HCC): ICD-10-CM

## 2024-04-05 DIAGNOSIS — Z15.89 GENETIC PREDISPOSITION TO CARDIOMYOPATHY: Primary | ICD-10-CM

## 2024-04-05 DIAGNOSIS — E66.09 CLASS 2 OBESITY DUE TO EXCESS CALORIES WITHOUT SERIOUS COMORBIDITY WITH BODY MASS INDEX (BMI) OF 36.0 TO 36.9 IN ADULT: ICD-10-CM

## 2024-04-05 DIAGNOSIS — E53.8 VITAMIN B 12 DEFICIENCY: Primary | ICD-10-CM

## 2024-04-05 PROCEDURE — 99214 OFFICE O/P EST MOD 30 MIN: CPT | Performed by: INTERNAL MEDICINE

## 2024-04-05 PROCEDURE — 96372 THER/PROPH/DIAG INJ SC/IM: CPT | Performed by: INTERNAL MEDICINE

## 2024-04-05 PROCEDURE — 93000 ELECTROCARDIOGRAM COMPLETE: CPT | Performed by: INTERNAL MEDICINE

## 2024-04-05 RX ORDER — TIZANIDINE 2 MG/1
2 TABLET ORAL EVERY 8 HOURS PRN
COMMUNITY

## 2024-04-05 RX ADMIN — CYANOCOBALAMIN 1000 MCG: 1000 INJECTION, SOLUTION INTRAMUSCULAR; SUBCUTANEOUS at 08:29

## 2024-04-05 NOTE — PROGRESS NOTES
[facility] [place] [address] [facility] [address] [phone]                                                Cardiology Office Follow up  Sri Mcgrath, 68 y.o. 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   Wt down from 217 lbs (2/2021) --> 181 lbs --> now up to 190 lbs    Cardiac testing  Stress echo - 3/2021 - did 6 min, no  significant ischemia, but did not have recurrence of chest pain with it  TTE - 3/27/23 - LVEF 65%, Grade 1 DD, no significant valvular abnormalities    Plan  Aortic sclerosis  Overview  3/2023: TTE - mild thickening and calcification of aortic valve, but no significant stenosis  4/2024: no acute symptoms, euvolemic  Plan  Monitor clinically for symptoms  Plan to follow-up on echocardiogram in 3 years    Positive genetic testing for cardiomyopathy  Overview  3/2021: She had apparently received an ad from [employer] regarding genetic testing, and underwent same ?through her PCP --> 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  3/2021 TTE - normal LVEF 60%, no evidence of dilated cardiomyopathy  4/2024: remains asymptomatic, euvolemic on exam  Impression  Unclear significance of genetic test result  asymptomatic  Plan  Monitor clinically for symptoms of heart failure  Follow-up on echocardiogram during follow-up testing for risk aortic sclerosis    Hypertension  Well-controlled, 122/78  Enalapril was previously discontinued due to concerns about low blood pressure and her blood pressure has remained well-controlled after that   Remains on propranolol for tremor  "--> defer to PCP    Hyperlipidemia, Obesity - Body mass index is 37.55 kg/m².     Cholesterol levels remain well-controlled despite discontinuation of ezetimibe (with uptitration of rosuvastatin to 10)  Tolerating rosuvastatin without any problems  Continue rosuvastatin 10 mg daily  Further weight loss recommended  Previously had participated in TOPS program - \"Take off pounds sensibly\" and lost 28 lbs in the last 2 years, but now the group is broken up    REKHA  Has moderate REKHA  Had CPAP titration study, but never had follow up after that and apparently was never started on CPAP  Referral to sleep medicine provided for the same    ECG today -  Results for orders placed or performed in visit on 04/05/24   POCT ECG    Impression    Sinus bradycardia with first-degree AV block (HR 57 bpm)  Otherwise normal ECG            Orders Placed This Encounter   Procedures   • Ambulatory Referral to Sleep Medicine   • POCT ECG       Return in about 1 year (around 4/5/2025), or if symptoms worsen or fail to improve.    History of Present Illness   68 y.o.  female comes in as a new patient for establishment of care after recent hospitalization for chest pain.    On 1st March, patient came in with to the hospital with sudden onset left-sided chest pressure which woke her up from sleep in the morning.  She received nitroglycerin without much relief.  Subsequently her blood pressure was noted to be elevated in the 200s and she received Ativan which improved her pain and blood pressure.  She has had a lot of social stressors recently.  She was ruled out with 3 negative troponins and discharged with outpatient stress.    Since discharge, she has not had any further major recurrences of chest pain or shortness of breath.  She ambulates with the help of a cane, due to pain, but feels she can exercise on treadmill for stress test.    Interval history - 8/5/2021  She comes back after completing stress testing.  She has been doing well over " the past few months, and her chest pain has otherwise resolved.  She is currently working with cleaning places, and occasionally gets short of breath with it.  She does report some orthopnea, and sleeps elevated. Also has edema    Additionally , she states that she recently underwent some genetic testing which had abnormal results and has questions regarding the same.  On further inquiry she states that she had previously but a weight loss supplement from [employer] about a tree, and had received some in epic testing at along with it, and so underwent same, and was found to have pathogenic mutation and as a result was asked to discuss it here today.    Interval history - 2/17/2022  She comes back for follow-up after about 6 months.  She has been doing well over the past few months and reports improvement in edema and is feeling much better.  No current complains of shortness of breath or orthopnea.  She continues to use Lasix about 1-2 times per week.  No complains of chest pain, palpitation.  She does report having some symptoms of head spinning sensation when she lays down or when she turns her head from side to side over the past 2 weeks, about 3 episodes, which have resolved on their own.    Interval history - 8/4/2022  He comes back for follow-up after about 6 months.  She has been doing well overall and has not had any major complains of shortness of breath, orthopnea.  Her pedal edema has improved as well.  She had reported vertigo during my prior visit, but reports complete resolution of the same.  She remains compliant with medications and has been working towards weight loss, and has lost about 10 lbs over the last 6 months.    Interval history - 4/13/2023  She comes back for follow-up after about 6 months.  She continues to do well and has lost more weight with following through with the TOPS program.  She had a bit of orthostatic dizziness today during vitals check but is otherwise doing well.  No other  complaints of dizziness, near-syncope or syncope. Her pedal edema is much improved/resolved and she has not needed any lasix    Interval history - 4/5/2024  She returns for follow-up after 1 year.  She continues to do well and remains free of any chest pain, shortness of breath, palpitations or dizziness.  She remains active and compliant with medical therapy and her blood pressure and cholesterol are both under control.      Historical Information   Past Medical History:   Diagnosis Date   • Allergic    • Arthritis    • Cataract     ashley   • Colon polyp    • Depression    • Fluid retention    • Headache, acute    • Heart murmur    • Hyperlipidemia    • Hypertension    • Memory loss    • Pedal edema    • Pneumonia    • PONV (postoperative nausea and vomiting)    • Rotator cuff tear, left    • Sleep apnea 2021   • Wears glasses    • Wears partial dentures     upper     Past Surgical History:   Procedure Laterality Date   • ABDOMINAL ADHESION SURGERY     • ANAL SPHINCTEROPLASTY     • ANKLE SURGERY Right     tendon tear   • APPENDECTOMY     • CARPAL TUNNEL RELEASE Bilateral    • CARPAL TUNNEL RELEASE Left    • CARPAL TUNNEL RELEASE Right    • COLONOSCOPY     • HAND SURGERY Right     ganglion cyst   • HEMORROIDECTOMY     • HYSTERECTOMY     • ILEOSTOMY     • JOINT REPLACEMENT     • KNEE ARTHROCENTESIS      ganglion Cyst   • KNEE ARTHROSCOPY Left    • OVARIAN CYST REMOVAL     • GA SURGICAL ARTHROSCOPY SHOULDER W/ROTATOR CUFF RPR Left 2/19/2018    Procedure: ARTHROSCOPIC REPAIR ROTATOR CUFF,  SAD, BICEP TENODESIS;  Surgeon: Garrett Coto MD;  Location: [facility];  Service: Orthopedics   • REPAIR RECTOCELE     • REPLACEMENT TOTAL KNEE Left 09/28/2021   • TONSILLECTOMY     • TONSILLECTOMY AND ADENOIDECTOMY     • TUBAL LIGATION       Family History   Problem Relation Age of Onset   • Hypertension Mother    • Colon cancer Mother    • Bone cancer Mother    • COPD Mother    • Emphysema Mother    • Skin cancer Mother    •  Glaucoma Mother    • Lead poisoning Father         lead poisoning in lungs    • Thyroid disease Sister    • Depression Sister    • Hypertension Sister    • Alzheimer's disease Sister    • Arthritis Sister    • ROBERT disease Brother    • Throat cancer Brother    • Hypertension Brother    • Arthritis Brother    • Colon cancer Family    • Bone cancer Family    • Hypertension Family    • Diabetes Sister    • Hypertension Sister    • Heart failure Sister    • Alzheimer's disease Sister    • Arthritis Sister    • No Known Problems Daughter    • No Known Problems Maternal Grandmother    • No Known Problems Paternal Grandmother    • No Known Problems Maternal Aunt    • No Known Problems Maternal Aunt    • No Known Problems Maternal Aunt    • No Known Problems Maternal Aunt    • No Known Problems Maternal Aunt    • No Known Problems Paternal Aunt      Current Outpatient Medications on File Prior to Visit   Medication Sig Dispense Refill   • azelastine (ASTELIN) 0.1 % nasal spray 1 spray into each nostril 2 (two) times a day Use in each nostril as directed 30 mL 0   • Calcium Carbonate (CALCIUM 600 PO) Take by mouth daily       • Chlorpheniramine-APAP (CORICIDIN) 2-325 MG TABS Take 1 tablet by mouth 4 (four) times a day as needed (cough) 56 tablet 0   • Cholecalciferol (VITAMIN D3 PO) Take 50 mcg by mouth daily       • clotrimazole-betamethasone (LOTRISONE) 1-0.05 % cream Apply topically 2 (two) times a day for 2 weeks to abdomen rash and 6 weeks to vulvar rash. (Patient taking differently: Apply topically if needed for 2 weeks to abdomen rash and 6 weeks to vulvar rash.) 30 g 2   • estradiol (ESTRACE) 0.1 mg/g vaginal cream APPLY 1 GM INTO VAGINA NIGHTLY FOR 1 WEEK, THEN APPLY EVERY THIRD NIGHT 42.5 g 3   • fluticasone (FLONASE) 50 mcg/act nasal spray 1 spray into each nostril daily 18.2 mL 0   • gabapentin (Neurontin) 100 mg capsule Take 1 tab TID for 1 week then increase to 2 tabs  capsule 2   • montelukast  (SINGULAIR) 10 mg tablet take 1 tablet by mouth at bedtime 90 tablet 3   • nystatin (MYCOSTATIN) powder Apply topically 3 (three) times a day as needed (rash) . Apply at least daily in summer to prevent rash. 45 g 3   • propranolol (INDERAL) 20 mg tablet Take 1 tablet (20 mg total) by mouth every 12 (twelve) hours 180 tablet 2   • rosuvastatin (CRESTOR) 10 MG tablet Take 1 tablet (10 mg total) by mouth daily 90 tablet 3   • tiZANidine (ZANAFLEX) 2 mg tablet Take 2 mg by mouth every 8 (eight) hours as needed for muscle spasms     • traZODone (DESYREL) 100 mg tablet take 1 tablet by mouth at bedtime 90 tablet 1     Current Facility-Administered Medications on File Prior to Visit   Medication Dose Route Frequency Provider Last Rate Last Admin   • cyanocobalamin injection 1,000 mcg  1,000 mcg Intramuscular Weekly Sea Hadley DO   1,000 mcg at 04/05/24 0829     Allergies   Allergen Reactions   • Keflex [Cephalexin] Other (See Comments)     Mouth sores   • Contrast [Iodinated Contrast Media] Hives     Occurred In the patient's 20's got Benedryl    • Morphine And Related Hives     IV MORPINE   • Penicillins Hives   • Benadryl [Diphenhydramine] Itching and Swelling     IV benadryl in hospital   • Ciprofloxacin Hives   • Clindamycin Other (See Comments)     Pt unsure   • Erythromycin Hives     Social History     Socioeconomic History   • Marital status:      Spouse name: None   • Number of children: None   • Years of education: None   • Highest education level: None   Occupational History   • Occupation: retired   Tobacco Use   • Smoking status: Never     Passive exposure: Never   • Smokeless tobacco: Never   Vaping Use   • Vaping status: Never Used   Substance and Sexual Activity   • Alcohol use: Yes     Comment: Occasionally/social   • Drug use: No   • Sexual activity: Yes     Partners: Male     Birth control/protection: Post-menopausal   Other Topics Concern   • None   Social History Narrative    Getting  ".    Two children    Lives with her daughter    On disability. Prior .      Social Determinants of Health     Financial Resource Strain: Low Risk  (5/3/2023)    Overall Financial Resource Strain (CARDIA)    • Difficulty of Paying Living Expenses: Not hard at all   Food Insecurity: Not on file   Transportation Needs: No Transportation Needs (5/3/2023)    PRAPARE - Transportation    • Lack of Transportation (Medical): No    • Lack of Transportation (Non-Medical): No   Physical Activity: Not on file   Stress: Not on file   Social Connections: Not on file   Intimate Partner Violence: Not on file   Housing Stability: Not on file        Review of Systems   All other systems reviewed and are negative.    Vitals:  Vitals:    04/05/24 0845   BP: 122/78   BP Location: Left arm   Patient Position: Sitting   Cuff Size: Large   Pulse: 64   Resp: 18   SpO2: 98%   Weight: 86.2 kg (190 lb)   Height: 4' 11.65\" (1.515 m)     BMI - Body mass index is 37.55 kg/m².  Wt Readings from Last 7 Encounters:   04/05/24 86.2 kg (190 lb)   03/04/24 86.2 kg (190 lb)   01/19/24 84.4 kg (186 lb)   12/29/23 84.8 kg (187 lb)   12/23/23 83.9 kg (185 lb)   11/25/23 86.2 kg (190 lb)   10/30/23 83.9 kg (185 lb)       Physical Exam  Vitals and nursing note reviewed.   Constitutional:       General: She is not in acute distress.     Appearance: Normal appearance. She is well-developed. She is obese. She is not ill-appearing.   HENT:      Head: Normocephalic and atraumatic.      Nose: No congestion.   Eyes:      General: No scleral icterus.     Conjunctiva/sclera: Conjunctivae normal.   Neck:      Vascular: No carotid bruit or JVD.   Cardiovascular:      Rate and Rhythm: Normal rate and regular rhythm.      Pulses: Normal pulses.      Heart sounds: Murmur heard.      Crescendo decrescendo systolic murmur is present with a grade of 3/6.      No friction rub. No gallop.   Pulmonary:      Effort: Pulmonary effort is normal. No respiratory " "distress.      Breath sounds: Normal breath sounds. No rales.   Abdominal:      General: There is no distension.      Palpations: Abdomen is soft.      Tenderness: There is no abdominal tenderness.   Musculoskeletal:         General: No swelling or tenderness.      Cervical back: Neck supple.      Right lower leg: Edema present.      Left lower leg: Edema present.   Skin:     General: Skin is warm.   Neurological:      General: No focal deficit present.      Mental Status: She is alert and oriented to person, place, and time. Mental status is at baseline.   Psychiatric:         Mood and Affect: Mood normal.         Behavior: Behavior normal.         Thought Content: Thought content normal.         Labs:  CBC:   Lab Results   Component Value Date    WBC 9.28 12/29/2023    RBC 4.99 12/29/2023    HGB 14.5 12/29/2023    HCT 43.9 12/29/2023    MCV 88 12/29/2023     12/29/2023    RDW 13.7 12/29/2023       CMP:   Lab Results   Component Value Date     09/24/2014    K 4.0 12/29/2023     12/29/2023    CO2 29 12/29/2023    ANIONGAP 8 09/24/2014    BUN 25 12/29/2023    CREATININE 0.75 12/29/2023    EGFR 81 12/29/2023    GLUCOSE 116 09/24/2014    CALCIUM 10.1 12/29/2023    AST 14 12/29/2023    ALT 15 12/29/2023    ALKPHOS 69 12/29/2023       Magnesium:  Lab Results   Component Value Date    MG 1.9 09/28/2022       Lipid Profile:   Lab Results   Component Value Date    HDL 59 12/29/2023    TRIG 79 12/29/2023    LDLCALC 70 12/29/2023       Thyroid Studies:   Lab Results   Component Value Date    LVD9VIUXZERZ 1.563 12/29/2023       No components found for: \"HGA1C\"    Lab Results   Component Value Date    INR 1.02 02/28/2021   5    Imaging: X-ray Chest 1 View Portable    Result Date: 2/28/2021  Narrative: CHEST INDICATION:   chest pain. COMPARISON:  12/11/2015 EXAM PERFORMED/VIEWS:  XR CHEST PORTABLE  AP semierect FINDINGS: Cardiomediastinal silhouette appears unremarkable. The lungs are clear.  No pneumothorax " or pleural effusion. Osseous structures appear within normal limits for patient age.     Impression: No significant interval change from previous examination. No acute abnormalities. Workstation performed: [id number]       Cardiac testing:   Results for orders placed during the hospital encounter of 21   Echo complete with contrast if indicated    Narrative [address] [address]    Transthoracic Echocardiogram  2D, M-mode, Doppler, and Color Doppler    Study date:  01-Mar-2021    Patient: REAL YA  MR number: [id number]  Account number: [id number]  : 1955  Age: 65 years  Gender: Female  Status: Outpatient  Location: [facility]  Height: 60 in  Weight: 216.5 lb  BP: 122/ 57 mmHg    Indications: Chest pain.    Diagnoses: R07.9 - Chest pain, unspecified    Sonographer:  Gilma Ferrara RDCS  Referring Physician:  Hernán Robert MD  Group:  [facility]  Interpreting Physician:  Marlo Cowart MD    SUMMARY    LEFT VENTRICLE:  Size was normal.  Systolic function was normal. Ejection fraction was estimated to be 60 %.  There were no regional wall motion abnormalities.  Wall thickness was mildly increased.  The changes were consistent with concentric remodeling (increased wall thickness with normal wall mass).    MITRAL VALVE:  There was mild annular calcification.  There was trace regurgitation.    HISTORY: Symptoms: chest pain. Lower extremity edema. PRIOR HISTORY: Risk factors: hypertension, hypercholesterolemia, and morbid obesity.    PROCEDURE: The study was performed in the [facility]. This was a routine study. The transthoracic approach was used. The study included complete 2D imaging, M-mode, complete spectral Doppler, and color Doppler. The  heart rate was 72 bpm, at the start of the study. Images were obtained from the parasternal, apical, subcostal, and suprasternal notch  acoustic windows. Echocardiographic views were limited due to decreased penetration and lung  interference. This was a technically difficult study.    LEFT VENTRICLE: Size was normal. Systolic function was normal. Ejection fraction was estimated to be 60 %. There were no regional wall motion abnormalities. Wall thickness was mildly increased. The changes were consistent with concentric  remodeling (increased wall thickness with normal wall mass). DOPPLER: Left ventricular diastolic function parameters were normal.    RIGHT VENTRICLE: The size was normal. Systolic function was normal. Wall thickness was normal.    LEFT ATRIUM: Size was normal.    RIGHT ATRIUM: Size was normal.    MITRAL VALVE: There was mild annular calcification. Valve structure was normal. There was normal leaflet separation. DOPPLER: The transmitral velocity was within the normal range. There was no evidence for stenosis. There was trace  regurgitation.    AORTIC VALVE: The valve was trileaflet. Leaflets exhibited mildly increased thickness, mild calcification, and lower normal cuspal separation. DOPPLER: Transaortic velocity was minimally increased. There was no evidence for stenosis. There  was no significant regurgitation.    TRICUSPID VALVE: The valve structure was normal. There was normal leaflet separation. DOPPLER: The transtricuspid velocity was within the normal range. There was no evidence for stenosis. There was no significant regurgitation.    PULMONIC VALVE: Leaflets exhibited normal thickness, no calcification, and normal cuspal separation. DOPPLER: The transpulmonic velocity was within the normal range. There was trace regurgitation.    PERICARDIUM: There was no pericardial effusion. The pericardium was normal in appearance.    AORTA: The root exhibited normal size.    SYSTEMIC VEINS: IVC: The inferior vena cava was not well visualized. The inferior vena cava was normal in size.    SYSTEM MEASUREMENT TABLES    2D  %FS: 32.21 %  Ao  Diam: 2.88 cm  Ao asc: 3.18 cm  EDV(Teich): 93.69 ml  EF(Teich): 60.54 %  ESV(Teich): 36.97 ml  IVSd: 1.33 cm  LA Area: 18.19 cm2  LA Diam: 3.78 cm  LVEDV MOD A4C: 99.62 ml  LVEF MOD A4C: 66.08 %  LVESV MOD A4C: 33.79 ml  LVIDd: 4.53 cm  LVIDs: 3.07 cm  LVLd A4C: 7.35 cm  LVLs A4C: 5.92 cm  LVOT Diam: 2 cm  LVPWd: 1.18 cm  RA Area: 9.49 cm2  RVIDd: 2.77 cm  SV MOD A4C: 65.83 ml  SV(Teich): 56.72 ml    CW  AV Env.Ti: 304.47 ms  AV VTI: 33.67 cm  AV Vmax: 1.59 m/s  AV Vmean: 1.11 m/s  AV maxPG: 10.08 mmHg  AV meanP.58 mmHg  PV Env.Ti: 304.47 ms  PV VTI: 22.11 cm  PV Vmax: 0.98 m/s  PV Vmean: 0.73 m/s  PV maxPG: 3.87 mmHg  PV meanP.34 mmHg    MM  TAPSE: 2.27 cm    PW  AIDAN (VTI): 1.93 cm2  AIDAN Vmax: 1.83 cm2  AVAI (VTI): 0 cm2/m2  AVAI Vmax: 0 cm2/m2  E' Sept: 0.09 m/s  E/E' Sept: 8.67  LVOT Env.Ti: 296.86 ms  LVOT VTI: 20.62 cm  LVOT Vmax: 0.92 m/s  LVOT Vmean: 0.69 m/s  LVOT maxPG: 3.4 mmHg  LVOT meanP.14 mmHg  LVSI Dopp: 33.67 ml/m2  LVSV Dopp: 64.99 ml  MV A Cristian: 0.92 m/s  MV Dec Mellette: 3.17 m/s2  MV DecT: 236.25 ms  MV E Cristian: 0.75 m/s  MV E/A Ratio: 0.81  RVOT Env.Ti: 319.7 ms  RVOT VTI: 13.63 cm  RVOT Vmax: 0.68 m/s  RVOT Vmean: 0.43 m/s  RVOT maxP.83 mmHg  RVOT meanP.86 mmHg    [facility]    Prepared and electronically signed by    Marlo Cowart MD  Signed 01-Mar-2021 12:04:58       No results found for this or any previous visit.  No results found for this or any previous visit.  No results found for this or any previous visit.

## 2024-04-09 DIAGNOSIS — N95.2 VAGINAL ATROPHY: ICD-10-CM

## 2024-04-10 RX ORDER — ESTRADIOL 0.1 MG/G
CREAM VAGINAL
Qty: 42.5 G | Refills: 5 | Status: SHIPPED | OUTPATIENT
Start: 2024-04-10

## 2024-04-15 ENCOUNTER — CLINICAL SUPPORT (OUTPATIENT)
Dept: INTERNAL MEDICINE CLINIC | Facility: CLINIC | Age: 69
End: 2024-04-15
Payer: MEDICARE

## 2024-04-15 DIAGNOSIS — E53.8 B12 DEFICIENCY: Primary | ICD-10-CM

## 2024-04-15 PROCEDURE — 96372 THER/PROPH/DIAG INJ SC/IM: CPT | Performed by: INTERNAL MEDICINE

## 2024-04-15 RX ADMIN — CYANOCOBALAMIN 1000 MCG: 1000 INJECTION, SOLUTION INTRAMUSCULAR; SUBCUTANEOUS at 08:46

## 2024-04-19 ENCOUNTER — CLINICAL SUPPORT (OUTPATIENT)
Dept: INTERNAL MEDICINE CLINIC | Facility: CLINIC | Age: 69
End: 2024-04-19
Payer: MEDICARE

## 2024-04-19 DIAGNOSIS — E53.8 B12 DEFICIENCY: Primary | ICD-10-CM

## 2024-04-19 RX ADMIN — CYANOCOBALAMIN 1000 MCG: 1000 INJECTION, SOLUTION INTRAMUSCULAR; SUBCUTANEOUS at 08:35

## 2024-04-25 ENCOUNTER — TELEPHONE (OUTPATIENT)
Age: 69
End: 2024-04-25

## 2024-04-25 NOTE — TELEPHONE ENCOUNTER
Patient returned call regarding 9/12 cancelled Yearly appt. Discussed r/s however pt has Medicare primary, no hx of cervical or breast cancer. Advised Medicare allows screening every 2 years. Pt verbalized understanding. However if another high risk reason exists & recommendation is pt be seen this year, please reach out to schedule.

## 2024-04-26 ENCOUNTER — CLINICAL SUPPORT (OUTPATIENT)
Dept: INTERNAL MEDICINE CLINIC | Facility: CLINIC | Age: 69
End: 2024-04-26
Payer: MEDICARE

## 2024-04-26 DIAGNOSIS — E53.8 B12 DEFICIENCY: Primary | ICD-10-CM

## 2024-04-26 PROCEDURE — 96372 THER/PROPH/DIAG INJ SC/IM: CPT | Performed by: INTERNAL MEDICINE

## 2024-04-26 RX ADMIN — CYANOCOBALAMIN 1000 MCG: 1000 INJECTION, SOLUTION INTRAMUSCULAR; SUBCUTANEOUS at 08:49

## 2024-04-29 ENCOUNTER — RA CDI HCC (OUTPATIENT)
Dept: OTHER | Facility: HOSPITAL | Age: 69
End: 2024-04-29

## 2024-05-03 ENCOUNTER — CLINICAL SUPPORT (OUTPATIENT)
Dept: INTERNAL MEDICINE CLINIC | Facility: CLINIC | Age: 69
End: 2024-05-03
Payer: MEDICARE

## 2024-05-03 DIAGNOSIS — E53.8 B12 DEFICIENCY: Primary | ICD-10-CM

## 2024-05-03 PROCEDURE — 96372 THER/PROPH/DIAG INJ SC/IM: CPT

## 2024-05-03 RX ADMIN — CYANOCOBALAMIN 1000 MCG: 1000 INJECTION, SOLUTION INTRAMUSCULAR; SUBCUTANEOUS at 08:33

## 2024-05-06 ENCOUNTER — OFFICE VISIT (OUTPATIENT)
Dept: INTERNAL MEDICINE CLINIC | Facility: CLINIC | Age: 69
End: 2024-05-06
Payer: MEDICARE

## 2024-05-06 VITALS
WEIGHT: 191 LBS | BODY MASS INDEX: 37.5 KG/M2 | DIASTOLIC BLOOD PRESSURE: 80 MMHG | HEART RATE: 68 BPM | OXYGEN SATURATION: 98 % | TEMPERATURE: 98.1 F | SYSTOLIC BLOOD PRESSURE: 130 MMHG | HEIGHT: 60 IN

## 2024-05-06 DIAGNOSIS — I10 PRIMARY HYPERTENSION: Primary | ICD-10-CM

## 2024-05-06 DIAGNOSIS — F32.1 MODERATE MAJOR DEPRESSION, SINGLE EPISODE (HCC): ICD-10-CM

## 2024-05-06 DIAGNOSIS — Z23 ENCOUNTER FOR IMMUNIZATION: ICD-10-CM

## 2024-05-06 DIAGNOSIS — M15.9 PRIMARY OSTEOARTHRITIS INVOLVING MULTIPLE JOINTS: ICD-10-CM

## 2024-05-06 DIAGNOSIS — Z00.00 MEDICARE ANNUAL WELLNESS VISIT, SUBSEQUENT: ICD-10-CM

## 2024-05-06 DIAGNOSIS — G89.4 CHRONIC PAIN SYNDROME: ICD-10-CM

## 2024-05-06 DIAGNOSIS — K76.0 FATTY LIVER: ICD-10-CM

## 2024-05-06 DIAGNOSIS — N18.30 STAGE 3 CHRONIC KIDNEY DISEASE, UNSPECIFIED WHETHER STAGE 3A OR 3B CKD (HCC): ICD-10-CM

## 2024-05-06 DIAGNOSIS — Z23 ENCOUNTER FOR VACCINATION: ICD-10-CM

## 2024-05-06 PROCEDURE — G0439 PPPS, SUBSEQ VISIT: HCPCS | Performed by: INTERNAL MEDICINE

## 2024-05-06 PROCEDURE — 99214 OFFICE O/P EST MOD 30 MIN: CPT | Performed by: INTERNAL MEDICINE

## 2024-05-06 RX ORDER — ZOSTER VACCINE RECOMBINANT, ADJUVANTED 50 MCG/0.5
0.5 KIT INTRAMUSCULAR ONCE
Qty: 1 EACH | Refills: 1 | Status: SHIPPED | OUTPATIENT
Start: 2024-05-06 | End: 2024-05-06

## 2024-05-06 NOTE — PROGRESS NOTES
Assessment/Plan:  Problem List Items Addressed This Visit          Cardiovascular and Mediastinum    Hypertension - Primary       Digestive    Fatty liver       Musculoskeletal and Integument    Primary osteoarthritis involving multiple joints       Genitourinary    Stage 3 chronic kidney disease, unspecified whether stage 3a or 3b CKD (HCC)       Behavioral Health    Moderate major depression, single episode (HCC)       Surgery/Wound/Pain    Chronic pain syndrome     Other Visit Diagnoses       Medicare annual wellness visit, subsequent        Encounter for immunization        Encounter for vaccination        Relevant Medications    Zoster Vac Recomb Adjuvanted (Shingrix) 50 MCG/0.5ML SUSR             Diagnoses and all orders for this visit:    Primary hypertension    Primary osteoarthritis involving multiple joints    Stage 3 chronic kidney disease, unspecified whether stage 3a or 3b CKD (HCC)    Moderate major depression, single episode (HCC)    Chronic pain syndrome    Fatty liver    Medicare annual wellness visit, subsequent    Encounter for immunization    Encounter for vaccination  -     Zoster Vac Recomb Adjuvanted (Shingrix) 50 MCG/0.5ML SUSR; Inject 0.5 mL into a muscle once for 1 dose Repeat dose in 2 to 6 months        No problem-specific Assessment & Plan notes found for this encounter.    A/P: Doing ok and labs are up to date.Refer to pharmacy for shingles vaccine. Continue current treatment and RTC four months for routine.     Subjective:      Patient ID: Sri Mcgrath is a 68 y.o. female.    WF RTC for f/u HTN, DJD, etc. Doing ok and no new issues. Remains active w/o difficulty and no falls. REKHA and chronic pain are manageable. MDD/SAD are controlled. Labs are up to date. Due for vaccines.         The following portions of the patient's history were reviewed and updated as appropriate:   She has a past medical history of Allergic, Arthritis, Cataract, Colon polyp, Depression, Fluid retention,  Headache, acute, Heart murmur, Hyperlipidemia, Hypertension, Memory loss, Pedal edema, Pneumonia, PONV (postoperative nausea and vomiting), Rotator cuff tear, left, Sleep apnea (2021), Wears glasses, and Wears partial dentures.,  does not have any pertinent problems on file.,   has a past surgical history that includes Ankle surgery (Right); Tonsillectomy; Tubal ligation; Hysterectomy; Ovarian cyst removal; Carpal tunnel release (Bilateral); Hand surgery (Right); Abdominal adhesion surgery; Knee arthroscopy (Left); Appendectomy; Colonoscopy; Hemorroidectomy; Repair rectocele; pr surgical arthroscopy shoulder w/rotator cuff rpr (Left, 2/19/2018); Carpal tunnel release (Left); Carpal tunnel release (Right); Ileostomy; Anal sphincteroplasty; Knee arthrocentesis; Tonsillectomy and adenoidectomy; Joint replacement; and Replacement total knee (Left, 09/28/2021).,  family history includes Alzheimer's disease in her sister and sister; Arthritis in her brother, sister, and sister; Bone cancer in her family and mother; COPD in her mother; Colon cancer in her family and mother; Depression in her sister; Diabetes in her sister; Emphysema in her mother; ROBERT disease in her brother; Glaucoma in her mother; Heart failure in her sister; Hypertension in her brother, family, mother, sister, and sister; Lead poisoning in her father; No Known Problems in her daughter, maternal aunt, maternal aunt, maternal aunt, maternal aunt, maternal aunt, maternal grandmother, paternal aunt, and paternal grandmother; Skin cancer in her mother; Throat cancer in her brother; Thyroid disease in her sister.,   reports that she has never smoked. She has never been exposed to tobacco smoke. She has never used smokeless tobacco. She reports current alcohol use. She reports that she does not use drugs.,  is allergic to keflex [cephalexin], contrast [iodinated contrast media], morphine and related, penicillins, benadryl [diphenhydramine], ciprofloxacin,  clindamycin, and erythromycin..  Current Outpatient Medications   Medication Sig Dispense Refill    azelastine (ASTELIN) 0.1 % nasal spray 1 spray into each nostril 2 (two) times a day Use in each nostril as directed 30 mL 0    Calcium Carbonate (CALCIUM 600 PO) Take by mouth daily        Chlorpheniramine-APAP (CORICIDIN) 2-325 MG TABS Take 1 tablet by mouth 4 (four) times a day as needed (cough) 56 tablet 0    Cholecalciferol (VITAMIN D3 PO) Take 50 mcg by mouth daily        clotrimazole-betamethasone (LOTRISONE) 1-0.05 % cream Apply topically 2 (two) times a day for 2 weeks to abdomen rash and 6 weeks to vulvar rash. (Patient taking differently: Apply topically if needed for 2 weeks to abdomen rash and 6 weeks to vulvar rash.) 30 g 2    estradiol (ESTRACE) 0.1 mg/g vaginal cream APPLY 1 GM INTO VAGINA NIGHTLY FOR 1 WEEK, THEN APPLY EVERY THIRD NIGHT 42.5 g 5    fluticasone (FLONASE) 50 mcg/act nasal spray 1 spray into each nostril daily 18.2 mL 0    gabapentin (Neurontin) 100 mg capsule Take 1 tab TID for 1 week then increase to 2 tabs  capsule 2    montelukast (SINGULAIR) 10 mg tablet take 1 tablet by mouth at bedtime 90 tablet 3    nystatin (MYCOSTATIN) powder Apply topically 3 (three) times a day as needed (rash) . Apply at least daily in summer to prevent rash. 45 g 3    propranolol (INDERAL) 20 mg tablet Take 1 tablet (20 mg total) by mouth every 12 (twelve) hours 180 tablet 2    rosuvastatin (CRESTOR) 10 MG tablet Take 1 tablet (10 mg total) by mouth daily 90 tablet 3    tiZANidine (ZANAFLEX) 2 mg tablet Take 2 mg by mouth every 8 (eight) hours as needed for muscle spasms      traZODone (DESYREL) 100 mg tablet take 1 tablet by mouth at bedtime 90 tablet 1    Zoster Vac Recomb Adjuvanted (Shingrix) 50 MCG/0.5ML SUSR Inject 0.5 mL into a muscle once for 1 dose Repeat dose in 2 to 6 months 1 each 1     No current facility-administered medications for this visit.       Review of Systems   Constitutional:   "Negative for activity change, chills, diaphoresis, fatigue and fever.   HENT: Negative.     Eyes:  Negative for visual disturbance.   Respiratory:  Negative for cough, chest tightness, shortness of breath and wheezing.    Cardiovascular:  Negative for chest pain, palpitations and leg swelling.   Gastrointestinal:  Negative for abdominal pain, constipation, diarrhea, nausea and vomiting.   Endocrine: Negative for cold intolerance and heat intolerance.   Genitourinary:  Negative for difficulty urinating, dysuria and frequency.   Musculoskeletal:  Negative for arthralgias, gait problem and myalgias.   Neurological:  Negative for dizziness, seizures, syncope, weakness, light-headedness and headaches.   Psychiatric/Behavioral:  Negative for confusion, dysphoric mood and sleep disturbance. The patient is not nervous/anxious.        PHQ-2/9 Depression Screening    Little interest or pleasure in doing things: 0 - not at all  Feeling down, depressed, or hopeless: 0 - not at all  Trouble falling or staying asleep, or sleeping too much: 2 - more than half the days  Feeling tired or having little energy: 1 - several days  Poor appetite or overeatin - more than half the days  Feeling bad about yourself - or that you are a failure or have let yourself or your family down: 0 - not at all  Trouble concentrating on things, such as reading the newspaper or watching television: 2 - more than half the days  Moving or speaking so slowly that other people could have noticed. Or the opposite - being so fidgety or restless that you have been moving around a lot more than usual: 1 - several days  Thoughts that you would be better off dead, or of hurting yourself in some way: 0 - not at all  PHQ-9 Score: 8  PHQ-9 Interpretation: Mild depression        Objective:  Vitals:    24 0839   BP: 130/80   Pulse: 68   Temp: 98.1 °F (36.7 °C)   TempSrc: Tympanic   SpO2: 98%   Weight: 86.6 kg (191 lb)   Height: 4' 11.65\" (1.515 m)     Body " mass index is 37.74 kg/m².     Physical Exam  Vitals and nursing note reviewed.   Constitutional:       General: She is not in acute distress.     Appearance: Normal appearance. She is not ill-appearing.   HENT:      Head: Normocephalic and atraumatic.      Mouth/Throat:      Mouth: Mucous membranes are moist.   Eyes:      Extraocular Movements: Extraocular movements intact.      Conjunctiva/sclera: Conjunctivae normal.      Pupils: Pupils are equal, round, and reactive to light.   Neck:      Vascular: No carotid bruit.   Cardiovascular:      Rate and Rhythm: Normal rate and regular rhythm.      Heart sounds: Normal heart sounds. No murmur heard.  Pulmonary:      Effort: Pulmonary effort is normal. No respiratory distress.      Breath sounds: Normal breath sounds. No wheezing, rhonchi or rales.   Abdominal:      General: Bowel sounds are normal. There is no distension.      Palpations: Abdomen is soft.      Tenderness: There is no abdominal tenderness.   Musculoskeletal:      Cervical back: Neck supple.      Right lower leg: No edema.      Left lower leg: No edema.   Neurological:      General: No focal deficit present.      Mental Status: She is alert and oriented to person, place, and time. Mental status is at baseline.   Psychiatric:         Mood and Affect: Mood normal.         Behavior: Behavior normal.         Thought Content: Thought content normal.         Judgment: Judgment normal.         Answers submitted by the patient for this visit:  Medicare Annual Wellness Visit (Submitted on 5/2/2024)  How would you rate your overall health?: good  Compared to last year, how is your physical health?: much better  In general, how satisfied are you with your life?: satisfied  Compared to last year, how is your eyesight?: slightly worse  Compared to last year, how is your hearing?: same  Compared to last year, how is your emotional/mental health?: much better  How often is anger a problem for you?: sometimes  How often  "do you feel unusually tired/fatigued?: often  In the past 7 days, how much pain have you experienced?: some  If you answered \"some\" or \"a lot\", please rate the severity of your pain on a scale of 1 to 10 (1 being the least severe pain and 10 being the most intense pain).: 4/10  In the past 6 months, have you lost or gained 10 pounds without trying?: No  One or more falls in the last year: Yes  In the past 6 months, have you accidentally leaked urine?: Yes  Do you have trouble with the stairs inside or outside your home?: No  Does your home have working smoke alarms?: Yes  Does your home have a carbon monoxide monitor?: Yes  Which safety hazards (if any) have you experienced in your home? Please select all that apply.: none  How would you describe your current diet? Please select all that apply.: Regular  In addition to prescription medications, are you taking any over-the-counter supplements?: Yes  If yes, what supplements are you taking?: Calcium, D3  Can you manage your medications?: Yes  Are you currently taking any opioid medications?: No  Can you walk and transfer into and out of your bed and chair?: Yes  Can you dress and groom yourself?: Yes  Can you bathe or shower yourself?: Yes  Can you feed yourself?: Yes  Can you do your laundry/ housekeeping?: Yes  Can you manage your money, pay your bills, and track your expenses?: Yes  Can you make your own meals?: Yes  Can you do your own shopping?: Yes  Within the last 12 months, have you had any hospitalizations or Emergency Department visits?: Yes  If yes, how many times have you been hospitalized within the past year?: 1-2  Do you have a living will?: No  Do you have a Durable POA (Power of ) for healthcare decisions?: No  Do you have an Advanced Directive for end of life decisions?: No  How often have you used an illegal drug (including marijuana) or a prescription medication for non-medical reasons in the past year?: never  What is the typical number of " drinks you consume in a day?: 0  What is the typical number of drinks you consume in a week?: 1  How often did you have a drink containing alcohol in the past year?: monthly or less  How many drinks did you have on a typical day  when you were drinking in the past year?: 1 to 2  How often did you have 6 or more drinks on one occasion in the past year?: never

## 2024-05-06 NOTE — PATIENT INSTRUCTIONS
Chronic Hypertension   AMBULATORY CARE:   Hypertension is considered chronic  when it continues for 3 months or longer. Hypertension that continues causes your heart to work much harder than normal, which may lead to heart damage. Even if you have hypertension for years, lifestyle changes, medicines, or both may help lower your blood pressure.  Call your local emergency number (911 in the [place]) or have someone call if:   You have chest pain.    You have any of the following signs of a heart attack:      Squeezing, pressure, or pain in your chest    You may  also have any of the following:     Discomfort or pain in your back, neck, jaw, stomach, or arm    Shortness of breath    Nausea or vomiting    Lightheadedness or a sudden cold sweat    You become confused or have difficulty speaking.    You suddenly feel lightheaded or have trouble breathing.    Seek care immediately if:   You have a severe headache or vision loss.    You have weakness in an arm or leg.    Call your doctor or cardiologist if:   You feel faint, dizzy, confused, or drowsy.    You have been taking your blood pressure medicine but your pressure is higher than your provider says it should be.    You have questions or concerns about your condition or care.    Treatment for chronic hypertension  may include medicine to lower your blood pressure and cholesterol levels. A low cholesterol level helps prevent heart disease and makes it easier to control your blood pressure. Heart disease can make your blood pressure harder to control. You may also need to make lifestyle changes.  What you need to know about the stages of hypertension:  Your healthcare provider will give you a blood pressure goal based on your age, health, and risk for cardiovascular disease. The following are general guidelines on the stages of hypertension:  Normal blood pressure is 119/79 or lower . Your provider may only check your blood pressure each year if it stays at a normal  level.    Elevated blood pressure is 120/79 to 129/79 . This is sometimes called prehypertension. Your provider may suggest lifestyle changes to help lower your blood pressure to a normal level. He or she may then check it again in 3 to 6 months.    Stage 1 hypertension is 130/80  to 139/89 . Your provider may recommend lifestyle changes, medication, and checks every 3 to 6 months until your blood pressure is controlled.    Stage 2 hypertension is 140/90 or higher . Your provider will recommend lifestyle changes and have you take 2 kinds of hypertension medicines. You will also need to have your blood pressure checked monthly until it is controlled.       Manage chronic hypertension:   Check your blood pressure at home.  Do not smoke, have caffeine, or exercise for at least 30 minutes before you check your blood pressure. Sit and rest for 5 minutes before you check your blood pressure. Extend your arm and support it on a flat surface. Your arm should be at the same level as your heart. Follow the directions that came with your blood pressure monitor. Check your blood pressure 2 times, 1 minute apart, before you take your medicine in the morning. Also check your blood pressure before your evening meal. Keep a record of your readings and bring it to your follow-up visits. Your healthcare provider may use the readings to make changes to your treatment plan.         Manage any other health conditions you have.  Health conditions such as diabetes can increase your risk for hypertension. Follow your provider's instructions and take all your medicines as directed. Talk to your provider about any new health conditions you have recently developed.    Ask about all medicines.  Certain medicines can increase your blood pressure. Examples include oral birth control pills, decongestants, herbal supplements, and NSAIDs, such as ibuprofen. Your provider can tell you which medicines are safe for you to take. This includes  prescription and over-the-counter medicines.    Lifestyle changes you can make to lower your blood pressure:  Your provider may want you to make more lifestyle changes if you are having trouble controlling your blood pressure. This may feel difficult over time, especially if you think you are making good changes but your pressure is still high. It might help to focus on one new change at a time. For example, try to add 1 more day of exercise, or exercise for an extra 10 minutes on 2 days. Small changes can make a big difference. Your healthcare provider can also refer you to specialists such as a dietitian who can help you make small changes. Your family members may be included in helping you learn to create lifestyle changes, such as the following:     Limit sodium (salt) as directed.  Too much sodium can affect your fluid balance. Check labels to find low-sodium or no-salt-added foods. Some low-sodium foods use potassium salts for flavor. Too much potassium can also cause health problems. Your provider will tell you how much sodium and potassium are safe for you to have in a day. He or she may recommend that you limit sodium to 2,300 mg a day.         Follow the meal plan recommended by your provider.  A dietitian or your provider can give you more information on low-sodium plans or the DASH (Dietary Approaches to Stop Hypertension) eating plan. The DASH plan is low in sodium, processed sugar, unhealthy fats, and total fat. It is high in potassium, calcium, and fiber. These can be found in vegetables, fruit, and whole-grain foods.         Be physically active throughout the day.  Physical activity, such as exercise, can help control your blood pressure and your weight. Be physically active for at least 30 minutes per day, on most days of the week. Include aerobic activity, such as walking or riding a bicycle. Also include strength training at least 2 times each week. Your provider can help you create a physical  activity plan.            Decrease stress.  This may help lower your blood pressure. Learn ways to relax, such as deep breathing or listening to music.    Limit alcohol as directed.  Alcohol can increase your blood pressure. A drink of alcohol is 12 ounces of beer, 5 ounces of wine, or 1½ ounces of liquor. Your provider can help you set daily and weekly drink limits. He or she may recommend no alcohol if your blood pressure stays higher than goal even with medicine or other measures. Ask your provider for information if you need help to quit.    Do not smoke.  Nicotine and other chemicals in cigarettes and cigars can increase your blood pressure and also cause lung damage. Ask your provider for information if you currently smoke and need help to quit. E-cigarettes or smokeless tobacco still contain nicotine. Talk to your provider before you use these products.       Follow up with your doctor or cardiologist as directed:  You will need to return to have your blood pressure checked and to have other lab tests done. Write down your questions so you remember to ask them during your visits.  © Copyright [employer] 2023 Information is for End User's use only and may not be sold, redistributed or otherwise used for commercial purposes.  The above information is an  only. It is not intended as medical advice for individual conditions or treatments. Talk to your doctor, nurse or pharmacist before following any medical regimen to see if it is safe and effective for you.    Medicare Preventive Visit Patient Instructions  Thank you for completing your Welcome to Medicare Visit or Medicare Annual Wellness Visit today. Your next wellness visit will be due in one year (5/7/2025).  The screening/preventive services that you may require over the next 5-10 years are detailed below. Some tests may not apply to you based off risk factors and/or age. Screening tests ordered at today's visit but not completed yet may show  as past due. Also, please note that scanned in results may not display below.  Preventive Screenings:  Service Recommendations Previous Testing/Comments   Colorectal Cancer Screening  * Colonoscopy    * Fecal Occult Blood Test (FOBT)/Fecal Immunochemical Test (FIT)  * Fecal DNA/Cologuard Test  * Flexible Sigmoidoscopy Age: 45-75 years old   Colonoscopy: every 10 years (may be performed more frequently if at higher risk)  OR  FOBT/FIT: every 1 year  OR  Cologuard: every 3 years  OR  Sigmoidoscopy: every 5 years  Screening may be recommended earlier than age 45 if at higher risk for colorectal cancer. Also, an individualized decision between you and your healthcare provider will decide whether screening between the ages of 76-85 would be appropriate. Colonoscopy: 10/30/2023  FOBT/FIT: Not on file  Cologuard: Not on file  Sigmoidoscopy: Not on file    Screening Current     Breast Cancer Screening Age: 40+ years old  Frequency: every 1-2 years  Not required if history of left and right mastectomy Mammogram: 08/07/2023    Screening Current   Cervical Cancer Screening Between the ages of 21-29, pap smear recommended once every 3 years.   Between the ages of 30-65, can perform pap smear with HPV co-testing every 5 years.   Recommendations may differ for women with a history of total hysterectomy, cervical cancer, or abnormal pap smears in past. Pap Smear: 09/08/2023    Screening Not Indicated   Hepatitis C Screening Once for adults born between 1945 and 1965  More frequently in patients at high risk for Hepatitis C Hep C Antibody: 04/21/2020    Screening Current   Diabetes Screening 1-2 times per year if you're at risk for diabetes or have pre-diabetes Fasting glucose: 107 mg/dL (12/29/2023)  A1C: 6.0 (8/9/2023)  Screening Current   Cholesterol Screening Once every 5 years if you don't have a lipid disorder. May order more often based on risk factors. Lipid panel: 12/29/2023    Screening Not Indicated  History Lipid  Disorder     Other Preventive Screenings Covered by Medicare:  Abdominal Aortic Aneurysm (AAA) Screening: covered once if your at risk. You're considered to be at risk if you have a family history of AAA.  Lung Cancer Screening: covers low dose CT scan once per year if you meet all of the following conditions: (1) Age 55-77; (2) No signs or symptoms of lung cancer; (3) Current smoker or have quit smoking within the last 15 years; (4) You have a tobacco smoking history of at least 20 pack years (packs per day multiplied by number of years you smoked); (5) You get a written order from a healthcare provider.  Glaucoma Screening: covered annually if you're considered high risk: (1) You have diabetes OR (2) Family history of glaucoma OR (3)  aged 50 and older OR (4)  American aged 65 and older  Osteoporosis Screening: covered every 2 years if you meet one of the following conditions: (1) You're estrogen deficient and at risk for osteoporosis based off medical history and other findings; (2) Have a vertebral abnormality; (3) On glucocorticoid therapy for more than 3 months; (4) Have primary hyperparathyroidism; (5) On osteoporosis medications and need to assess response to drug therapy.   Last bone density test (DXA Scan): 08/07/2023.  HIV Screening: covered annually if you're between the age of 15-65. Also covered annually if you are younger than 15 and older than 65 with risk factors for HIV infection. For pregnant patients, it is covered up to 3 times per pregnancy.    Immunizations:  Immunization Recommendations   Influenza Vaccine Annual influenza vaccination during flu season is recommended for all persons aged >= 6 months who do not have contraindications   Pneumococcal Vaccine   * Pneumococcal conjugate vaccine = PCV13 (Prevnar 13), PCV15 (Vaxneuvance), PCV20 (Prevnar 20)  * Pneumococcal polysaccharide vaccine = PPSV23 (Pneumovax) Adults 19-63 yo with certain risk factors or if 65+ yo  If  never received any pneumonia vaccine: recommend Prevnar 20 (PCV20)  Give PCV20 if previously received 1 dose of PCV13 or PPSV23   Hepatitis B Vaccine 3 dose series if at intermediate or high risk (ex: diabetes, end stage renal disease, liver disease)   Respiratory syncytial virus (RSV) Vaccine - COVERED BY MEDICARE PART D  * RSVPreF3 (Arexvy) CDC recommends that adults 60 years of age and older may receive a single dose of RSV vaccine using shared clinical decision-making (SCDM)   Tetanus (Td) Vaccine - COST NOT COVERED BY MEDICARE PART B Following completion of primary series, a booster dose should be given every 10 years to maintain immunity against tetanus. Td may also be given as tetanus wound prophylaxis.   Tdap Vaccine - COST NOT COVERED BY MEDICARE PART B Recommended at least once for all adults. For pregnant patients, recommended with each pregnancy.   Shingles Vaccine (Shingrix) - COST NOT COVERED BY MEDICARE PART B  2 shot series recommended in those 19 years and older who have or will have weakened immune systems or those 50 years and older     Health Maintenance Due:      Topic Date Due   • Breast Cancer Screening: Mammogram  08/07/2024   • Colorectal Cancer Screening  10/29/2026   • Hepatitis C Screening  Completed     Immunizations Due:      Topic Date Due   • COVID-19 Vaccine (3 - Moderna risk series) 05/27/2021     Advance Directives   What are advance directives?  Advance directives are legal documents that state your wishes and plans for medical care. These plans are made ahead of time in case you lose your ability to make decisions for yourself. Advance directives can apply to any medical decision, such as the treatments you want, and if you want to donate organs.   What are the types of advance directives?  There are many types of advance directives, and each state has rules about how to use them. You may choose a combination of any of the following:  Living will:  This is a written record of the  treatment you want. You can also choose which treatments you do not want, which to limit, and which to stop at a certain time. This includes surgery, medicine, IV fluid, and tube feedings.   Durable power of  for healthcare (DPAHC):  This is a written record that states who you want to make healthcare choices for you when you are unable to make them for yourself. This person, called a proxy, is usually a family member or a friend. You may choose more than 1 proxy.  Do not resuscitate (DNR) order:  A DNR order is used in case your heart stops beating or you stop breathing. It is a request not to have certain forms of treatment, such as CPR. A DNR order may be included in other types of advance directives.  Medical directive:  This covers the care that you want if you are in a coma, near death, or unable to make decisions for yourself. You can list the treatments you want for each condition. Treatment may include pain medicine, surgery, blood transfusions, dialysis, IV or tube feedings, and a ventilator (breathing machine).  Values history:  This document has questions about your views, beliefs, and how you feel and think about life. This information can help others choose the care that you would choose.  Why are advance directives important?  An advance directive helps you control your care. Although spoken wishes may be used, it is better to have your wishes written down. Spoken wishes can be misunderstood, or not followed. Treatments may be given even if you do not want them. An advance directive may make it easier for your family to make difficult choices about your care.   Fall Prevention    Fall prevention  includes ways to make your home and other areas safer. It also includes ways you can move more carefully to prevent a fall. Health conditions that cause changes in your blood pressure, vision, or muscle strength and coordination may increase your risk for falls. Medicines may also increase your risk  for falls if they make you dizzy, weak, or sleepy.   Fall prevention tips:   Stand or sit up slowly.    Use assistive devices as directed.    Wear shoes that fit well and have soles that .    Wear a personal alarm.    Stay active.    Manage your medical conditions.    Home Safety Tips:  Add items to prevent falls in the bathroom.    Keep paths clear.    Install bright lights in your home.    Keep items you use often on shelves within reach.    Paint or place reflective tape on the edges of your stairs.    Urinary Incontinence   Urinary incontinence (UI)  is when you lose control of your bladder. UI develops because your bladder cannot store or empty urine properly. The 3 most common types of UI are stress incontinence, urge incontinence, or both.  Medicines:   May be given to help strengthen your bladder control. Report any side effects of medication to your healthcare provider.  Do pelvic muscle exercises often:  Your pelvic muscles help you stop urinating. Squeeze these muscles tight for 5 seconds, then relax for 5 seconds. Gradually work up to squeezing for 10 seconds. Do 3 sets of 15 repetitions a day, or as directed. This will help strengthen your pelvic muscles and improve bladder control.  Train your bladder:  Go to the bathroom at set times, such as every 2 hours, even if you do not feel the urge to go. You can also try to hold your urine when you feel the urge to go. For example, hold your urine for 5 minutes when you feel the urge to go. As that becomes easier, hold your urine for 10 minutes.   Self-care:   Keep a UI record.  Write down how often you leak urine and how much you leak. Make a note of what you were doing when you leaked urine.  Drink liquids as directed. You may need to limit the amount of liquid you drink to help control your urine leakage. Do not drink any liquid right before you go to bed. Limit or do not have drinks that contain caffeine or alcohol.   Prevent constipation.  Eat a  variety of high-fiber foods. Good examples are high-fiber cereals, beans, vegetables, and whole-grain breads. Walking is the best way to trigger your intestines to have a bowel movement.  Exercise regularly and maintain a healthy weight.  Weight loss and exercise will decrease pressure on your bladder and help you control your leakage.   Use a catheter as directed  to help empty your bladder. A catheter is a tiny, plastic tube that is put into your bladder to drain your urine.   Go to behavior therapy as directed.  Behavior therapy may be used to help you learn to control your urge to urinate.    Weight Management   Why it is important to manage your weight:  Being overweight increases your risk of health conditions such as heart disease, high blood pressure, type 2 diabetes, and certain types of cancer. It can also increase your risk for osteoarthritis, sleep apnea, and other respiratory problems. Aim for a slow, steady weight loss. Even a small amount of weight loss can lower your risk of health problems.  How to lose weight safely:  A safe and healthy way to lose weight is to eat fewer calories and get regular exercise. You can lose up about 1 pound a week by decreasing the number of calories you eat by 500 calories each day.   Healthy meal plan for weight management:  A healthy meal plan includes a variety of foods, contains fewer calories, and helps you stay healthy. A healthy meal plan includes the following:  Eat whole-grain foods more often.  A healthy meal plan should contain fiber. Fiber is the part of grains, fruits, and vegetables that is not broken down by your body. Whole-grain foods are healthy and provide extra fiber in your diet. Some examples of whole-grain foods are whole-wheat breads and pastas, oatmeal, brown rice, and bulgur.  Eat a variety of vegetables every day.  Include dark, leafy greens such as spinach, kale, teri greens, and mustard greens. Eat yellow and orange vegetables such as  carrots, sweet potatoes, and winter squash.   Eat a variety of fruits every day.  Choose fresh or canned fruit (canned in its own juice or light syrup) instead of juice. Fruit juice has very little or no fiber.  Eat low-fat dairy foods.  Drink fat-free (skim) milk or 1% milk. Eat fat-free yogurt and low-fat cottage cheese. Try low-fat cheeses such as mozzarella and other reduced-fat cheeses.  Choose meat and other protein foods that are low in fat.  Choose beans or other legumes such as split peas or lentils. Choose fish, skinless poultry (chicken or turkey), or lean cuts of red meat (beef or pork). Before you cook meat or poultry, cut off any visible fat.   Use less fat and oil.  Try baking foods instead of frying them. Add less fat, such as margarine, sour cream, regular salad dressing and mayonnaise to foods. Eat fewer high-fat foods. Some examples of high-fat foods include french fries, doughnuts, ice cream, and cakes.  Eat fewer sweets.  Limit foods and drinks that are high in sugar. This includes candy, cookies, regular soda, and sweetened drinks.  Exercise:  Exercise at least 30 minutes per day on most days of the week. Some examples of exercise include walking, biking, dancing, and swimming. You can also fit in more physical activity by taking the stairs instead of the elevator or parking farther away from stores. Ask your healthcare provider about the best exercise plan for you.      © Copyright [employer] 2018 Information is for End User's use only and may not be sold, redistributed or otherwise used for commercial purposes. All illustrations and images included in CareNotes® are the copyrighted property of [employer] or [employer]      Medicare Preventive Visit Patient Instructions  Thank you for completing your Welcome to Medicare Visit or Medicare Annual Wellness Visit today. Your next wellness visit will be due in one year (5/7/2025).  The screening/preventive services that you may  require over the next 5-10 years are detailed below. Some tests may not apply to you based off risk factors and/or age. Screening tests ordered at today's visit but not completed yet may show as past due. Also, please note that scanned in results may not display below.  Preventive Screenings:  Service Recommendations Previous Testing/Comments   Colorectal Cancer Screening  * Colonoscopy    * Fecal Occult Blood Test (FOBT)/Fecal Immunochemical Test (FIT)  * Fecal DNA/Cologuard Test  * Flexible Sigmoidoscopy Age: 45-75 years old   Colonoscopy: every 10 years (may be performed more frequently if at higher risk)  OR  FOBT/FIT: every 1 year  OR  Cologuard: every 3 years  OR  Sigmoidoscopy: every 5 years  Screening may be recommended earlier than age 45 if at higher risk for colorectal cancer. Also, an individualized decision between you and your healthcare provider will decide whether screening between the ages of 76-85 would be appropriate. Colonoscopy: 10/30/2023  FOBT/FIT: Not on file  Cologuard: Not on file  Sigmoidoscopy: Not on file    Screening Current     Breast Cancer Screening Age: 40+ years old  Frequency: every 1-2 years  Not required if history of left and right mastectomy Mammogram: 08/07/2023    Screening Current   Cervical Cancer Screening Between the ages of 21-29, pap smear recommended once every 3 years.   Between the ages of 30-65, can perform pap smear with HPV co-testing every 5 years.   Recommendations may differ for women with a history of total hysterectomy, cervical cancer, or abnormal pap smears in past. Pap Smear: 09/08/2023    Screening Not Indicated   Hepatitis C Screening Once for adults born between 1945 and 1965  More frequently in patients at high risk for Hepatitis C Hep C Antibody: 04/21/2020    Screening Current   Diabetes Screening 1-2 times per year if you're at risk for diabetes or have pre-diabetes Fasting glucose: 107 mg/dL (12/29/2023)  A1C: 6.0 (8/9/2023)  Screening Current    Cholesterol Screening Once every 5 years if you don't have a lipid disorder. May order more often based on risk factors. Lipid panel: 12/29/2023    Screening Not Indicated  History Lipid Disorder     Other Preventive Screenings Covered by Medicare:  Abdominal Aortic Aneurysm (AAA) Screening: covered once if your at risk. You're considered to be at risk if you have a family history of AAA.  Lung Cancer Screening: covers low dose CT scan once per year if you meet all of the following conditions: (1) Age 55-77; (2) No signs or symptoms of lung cancer; (3) Current smoker or have quit smoking within the last 15 years; (4) You have a tobacco smoking history of at least 20 pack years (packs per day multiplied by number of years you smoked); (5) You get a written order from a healthcare provider.  Glaucoma Screening: covered annually if you're considered high risk: (1) You have diabetes OR (2) Family history of glaucoma OR (3)  aged 50 and older OR (4)  American aged 65 and older  Osteoporosis Screening: covered every 2 years if you meet one of the following conditions: (1) You're estrogen deficient and at risk for osteoporosis based off medical history and other findings; (2) Have a vertebral abnormality; (3) On glucocorticoid therapy for more than 3 months; (4) Have primary hyperparathyroidism; (5) On osteoporosis medications and need to assess response to drug therapy.   Last bone density test (DXA Scan): 08/07/2023.  HIV Screening: covered annually if you're between the age of 15-65. Also covered annually if you are younger than 15 and older than 65 with risk factors for HIV infection. For pregnant patients, it is covered up to 3 times per pregnancy.    Immunizations:  Immunization Recommendations   Influenza Vaccine Annual influenza vaccination during flu season is recommended for all persons aged >= 6 months who do not have contraindications   Pneumococcal Vaccine   * Pneumococcal conjugate  vaccine = PCV13 (Prevnar 13), PCV15 (Vaxneuvance), PCV20 (Prevnar 20)  * Pneumococcal polysaccharide vaccine = PPSV23 (Pneumovax) Adults 19-63 yo with certain risk factors or if 65+ yo  If never received any pneumonia vaccine: recommend Prevnar 20 (PCV20)  Give PCV20 if previously received 1 dose of PCV13 or PPSV23   Hepatitis B Vaccine 3 dose series if at intermediate or high risk (ex: diabetes, end stage renal disease, liver disease)   Respiratory syncytial virus (RSV) Vaccine - COVERED BY MEDICARE PART D  * RSVPreF3 (Arexvy) CDC recommends that adults 60 years of age and older may receive a single dose of RSV vaccine using shared clinical decision-making (SCDM)   Tetanus (Td) Vaccine - COST NOT COVERED BY MEDICARE PART B Following completion of primary series, a booster dose should be given every 10 years to maintain immunity against tetanus. Td may also be given as tetanus wound prophylaxis.   Tdap Vaccine - COST NOT COVERED BY MEDICARE PART B Recommended at least once for all adults. For pregnant patients, recommended with each pregnancy.   Shingles Vaccine (Shingrix) - COST NOT COVERED BY MEDICARE PART B  2 shot series recommended in those 19 years and older who have or will have weakened immune systems or those 50 years and older     Health Maintenance Due:      Topic Date Due   • Breast Cancer Screening: Mammogram  08/07/2024   • Colorectal Cancer Screening  10/29/2026   • Hepatitis C Screening  Completed     Immunizations Due:      Topic Date Due   • COVID-19 Vaccine (3 - Moderna risk series) 05/27/2021     Advance Directives   What are advance directives?  Advance directives are legal documents that state your wishes and plans for medical care. These plans are made ahead of time in case you lose your ability to make decisions for yourself. Advance directives can apply to any medical decision, such as the treatments you want, and if you want to donate organs.   What are the types of advance directives?   There are many types of advance directives, and each state has rules about how to use them. You may choose a combination of any of the following:  Living will:  This is a written record of the treatment you want. You can also choose which treatments you do not want, which to limit, and which to stop at a certain time. This includes surgery, medicine, IV fluid, and tube feedings.   Durable power of  for healthcare (DPAHC):  This is a written record that states who you want to make healthcare choices for you when you are unable to make them for yourself. This person, called a proxy, is usually a family member or a friend. You may choose more than 1 proxy.  Do not resuscitate (DNR) order:  A DNR order is used in case your heart stops beating or you stop breathing. It is a request not to have certain forms of treatment, such as CPR. A DNR order may be included in other types of advance directives.  Medical directive:  This covers the care that you want if you are in a coma, near death, or unable to make decisions for yourself. You can list the treatments you want for each condition. Treatment may include pain medicine, surgery, blood transfusions, dialysis, IV or tube feedings, and a ventilator (breathing machine).  Values history:  This document has questions about your views, beliefs, and how you feel and think about life. This information can help others choose the care that you would choose.  Why are advance directives important?  An advance directive helps you control your care. Although spoken wishes may be used, it is better to have your wishes written down. Spoken wishes can be misunderstood, or not followed. Treatments may be given even if you do not want them. An advance directive may make it easier for your family to make difficult choices about your care.   Fall Prevention    Fall prevention  includes ways to make your home and other areas safer. It also includes ways you can move more carefully to  prevent a fall. Health conditions that cause changes in your blood pressure, vision, or muscle strength and coordination may increase your risk for falls. Medicines may also increase your risk for falls if they make you dizzy, weak, or sleepy.   Fall prevention tips:   Stand or sit up slowly.    Use assistive devices as directed.    Wear shoes that fit well and have soles that .    Wear a personal alarm.    Stay active.    Manage your medical conditions.    Home Safety Tips:  Add items to prevent falls in the bathroom.    Keep paths clear.    Install bright lights in your home.    Keep items you use often on shelves within reach.    Paint or place reflective tape on the edges of your stairs.    Urinary Incontinence   Urinary incontinence (UI)  is when you lose control of your bladder. UI develops because your bladder cannot store or empty urine properly. The 3 most common types of UI are stress incontinence, urge incontinence, or both.  Medicines:   May be given to help strengthen your bladder control. Report any side effects of medication to your healthcare provider.  Do pelvic muscle exercises often:  Your pelvic muscles help you stop urinating. Squeeze these muscles tight for 5 seconds, then relax for 5 seconds. Gradually work up to squeezing for 10 seconds. Do 3 sets of 15 repetitions a day, or as directed. This will help strengthen your pelvic muscles and improve bladder control.  Train your bladder:  Go to the bathroom at set times, such as every 2 hours, even if you do not feel the urge to go. You can also try to hold your urine when you feel the urge to go. For example, hold your urine for 5 minutes when you feel the urge to go. As that becomes easier, hold your urine for 10 minutes.   Self-care:   Keep a UI record.  Write down how often you leak urine and how much you leak. Make a note of what you were doing when you leaked urine.  Drink liquids as directed. You may need to limit the amount of liquid  you drink to help control your urine leakage. Do not drink any liquid right before you go to bed. Limit or do not have drinks that contain caffeine or alcohol.   Prevent constipation.  Eat a variety of high-fiber foods. Good examples are high-fiber cereals, beans, vegetables, and whole-grain breads. Walking is the best way to trigger your intestines to have a bowel movement.  Exercise regularly and maintain a healthy weight.  Weight loss and exercise will decrease pressure on your bladder and help you control your leakage.   Use a catheter as directed  to help empty your bladder. A catheter is a tiny, plastic tube that is put into your bladder to drain your urine.   Go to behavior therapy as directed.  Behavior therapy may be used to help you learn to control your urge to urinate.    Weight Management   Why it is important to manage your weight:  Being overweight increases your risk of health conditions such as heart disease, high blood pressure, type 2 diabetes, and certain types of cancer. It can also increase your risk for osteoarthritis, sleep apnea, and other respiratory problems. Aim for a slow, steady weight loss. Even a small amount of weight loss can lower your risk of health problems.  How to lose weight safely:  A safe and healthy way to lose weight is to eat fewer calories and get regular exercise. You can lose up about 1 pound a week by decreasing the number of calories you eat by 500 calories each day.   Healthy meal plan for weight management:  A healthy meal plan includes a variety of foods, contains fewer calories, and helps you stay healthy. A healthy meal plan includes the following:  Eat whole-grain foods more often.  A healthy meal plan should contain fiber. Fiber is the part of grains, fruits, and vegetables that is not broken down by your body. Whole-grain foods are healthy and provide extra fiber in your diet. Some examples of whole-grain foods are whole-wheat breads and pastas, oatmeal,  brown rice, and bulgur.  Eat a variety of vegetables every day.  Include dark, leafy greens such as spinach, kale, teri greens, and mustard greens. Eat yellow and orange vegetables such as carrots, sweet potatoes, and winter squash.   Eat a variety of fruits every day.  Choose fresh or canned fruit (canned in its own juice or light syrup) instead of juice. Fruit juice has very little or no fiber.  Eat low-fat dairy foods.  Drink fat-free (skim) milk or 1% milk. Eat fat-free yogurt and low-fat cottage cheese. Try low-fat cheeses such as mozzarella and other reduced-fat cheeses.  Choose meat and other protein foods that are low in fat.  Choose beans or other legumes such as split peas or lentils. Choose fish, skinless poultry (chicken or turkey), or lean cuts of red meat (beef or pork). Before you cook meat or poultry, cut off any visible fat.   Use less fat and oil.  Try baking foods instead of frying them. Add less fat, such as margarine, sour cream, regular salad dressing and mayonnaise to foods. Eat fewer high-fat foods. Some examples of high-fat foods include french fries, doughnuts, ice cream, and cakes.  Eat fewer sweets.  Limit foods and drinks that are high in sugar. This includes candy, cookies, regular soda, and sweetened drinks.  Exercise:  Exercise at least 30 minutes per day on most days of the week. Some examples of exercise include walking, biking, dancing, and swimming. You can also fit in more physical activity by taking the stairs instead of the elevator or parking farther away from stores. Ask your healthcare provider about the best exercise plan for you.      © Copyright [employer] 2018 Information is for End User's use only and may not be sold, redistributed or otherwise used for commercial purposes. All illustrations and images included in CareNotes® are the copyrighted property of [employer] or [employer]

## 2024-05-06 NOTE — PROGRESS NOTES
Assessment and Plan:     Problem List Items Addressed This Visit          Cardiovascular and Mediastinum    Hypertension - Primary       Digestive    Fatty liver       Musculoskeletal and Integument    Primary osteoarthritis involving multiple joints       Genitourinary    Stage 3 chronic kidney disease, unspecified whether stage 3a or 3b CKD (HCC)       Behavioral Health    Moderate major depression, single episode (HCC)       Surgery/Wound/Pain    Chronic pain syndrome     Other Visit Diagnoses       Medicare annual wellness visit, subsequent        Encounter for immunization                 Preventive health issues were discussed with patient, and age appropriate screening tests were ordered as noted in patient's After Visit Summary.  Personalized health advice and appropriate referrals for health education or preventive services given if needed, as noted in patient's After Visit Summary.     History of Present Illness:     Patient presents for a Medicare Wellness Visit    HPI   Patient Care Team:  Sea Hadley DO as PCP - General (Internal Medicine)     Review of Systems:     Review of Systems     Problem List:     Patient Active Problem List   Diagnosis    Complete rotator cuff tear of left shoulder    Depression, recurrent (HCC)    Hypertension    Mixed hyperlipidemia    Primary osteoarthritis involving multiple joints    DDD (degenerative disc disease), cervical    Post-menopausal    Chronic pain syndrome    Fatty liver    Benign colon polyp    Diverticulosis    Localized edema    Gait abnormality    Morbid obesity (HCC)    Lumbosacral spondylosis without myelopathy    REKHA (obstructive sleep apnea)    Mixed action and resting tremor    Constipation    Stage 3 chronic kidney disease, unspecified whether stage 3a or 3b CKD (HCC)    Prediabetes    Paresthesias    Osteopenia of multiple sites    Cervical radiculopathy    Moderate major depression, single episode (HCC)    Seasonal affective disorder (HCC)       Past Medical and Surgical History:     Past Medical History:   Diagnosis Date    Allergic     Arthritis     Cataract     ashley    Colon polyp     Depression     Fluid retention     Headache, acute     Heart murmur     Hyperlipidemia     Hypertension     Memory loss     Pedal edema     Pneumonia     PONV (postoperative nausea and vomiting)     Rotator cuff tear, left     Sleep apnea 2021    Wears glasses     Wears partial dentures     upper     Past Surgical History:   Procedure Laterality Date    ABDOMINAL ADHESION SURGERY      ANAL SPHINCTEROPLASTY      ANKLE SURGERY Right     tendon tear    APPENDECTOMY      CARPAL TUNNEL RELEASE Bilateral     CARPAL TUNNEL RELEASE Left     CARPAL TUNNEL RELEASE Right     COLONOSCOPY      HAND SURGERY Right     ganglion cyst    HEMORROIDECTOMY      HYSTERECTOMY      ILEOSTOMY      JOINT REPLACEMENT      KNEE ARTHROCENTESIS      ganglion Cyst    KNEE ARTHROSCOPY Left     OVARIAN CYST REMOVAL      SC SURGICAL ARTHROSCOPY SHOULDER W/ROTATOR CUFF RPR Left 2/19/2018    Procedure: ARTHROSCOPIC REPAIR ROTATOR CUFF,  SAD, BICEP TENODESIS;  Surgeon: Garrett Coto MD;  Location: [facility];  Service: Orthopedics    REPAIR RECTOCELE      REPLACEMENT TOTAL KNEE Left 09/28/2021    TONSILLECTOMY      TONSILLECTOMY AND ADENOIDECTOMY      TUBAL LIGATION        Family History:     Family History   Problem Relation Age of Onset    Hypertension Mother     Colon cancer Mother     Bone cancer Mother     COPD Mother     Emphysema Mother     Skin cancer Mother     Glaucoma Mother     Lead poisoning Father         lead poisoning in lungs     Thyroid disease Sister     Depression Sister     Hypertension Sister     Alzheimer's disease Sister     Arthritis Sister     ROBERT disease Brother     Throat cancer Brother     Hypertension Brother     Arthritis Brother     Colon cancer Family     Bone cancer Family     Hypertension Family     Diabetes Sister     Hypertension Sister     Heart failure Sister      Alzheimer's disease Sister     Arthritis Sister     No Known Problems Daughter     No Known Problems Maternal Grandmother     No Known Problems Paternal Grandmother     No Known Problems Maternal Aunt     No Known Problems Maternal Aunt     No Known Problems Maternal Aunt     No Known Problems Maternal Aunt     No Known Problems Maternal Aunt     No Known Problems Paternal Aunt       Social History:     Social History     Socioeconomic History    Marital status:      Spouse name: None    Number of children: None    Years of education: None    Highest education level: None   Occupational History    Occupation: retired   Tobacco Use    Smoking status: Never     Passive exposure: Never    Smokeless tobacco: Never   Vaping Use    Vaping status: Never Used   Substance and Sexual Activity    Alcohol use: Yes     Comment: Occasionally/social    Drug use: No    Sexual activity: Yes     Partners: Male     Birth control/protection: Post-menopausal   Other Topics Concern    None   Social History Narrative    Getting .    Two children    Lives with her daughter    On disability. Prior .      Social Determinants of Health     Financial Resource Strain: Low Risk  (5/3/2023)    Overall Financial Resource Strain (CARDIA)     Difficulty of Paying Living Expenses: Not hard at all   Food Insecurity: Food Insecurity Present (5/2/2024)    Hunger Vital Sign     Worried About Running Out of Food in the Last Year: Sometimes true     Ran Out of Food in the Last Year: Never true   Transportation Needs: No Transportation Needs (5/2/2024)    PRAPARE - Transportation     Lack of Transportation (Medical): No     Lack of Transportation (Non-Medical): No   Physical Activity: Not on file   Stress: Not on file   Social Connections: Not on file   Intimate Partner Violence: Not on file   Housing Stability: Low Risk  (5/2/2024)    Housing Stability Vital Sign     Unable to Pay for Housing in the Last Year: No     Number of  Places Lived in the Last Year: 1     Unstable Housing in the Last Year: No      Medications and Allergies:     Current Outpatient Medications   Medication Sig Dispense Refill    azelastine (ASTELIN) 0.1 % nasal spray 1 spray into each nostril 2 (two) times a day Use in each nostril as directed 30 mL 0    Calcium Carbonate (CALCIUM 600 PO) Take by mouth daily        Chlorpheniramine-APAP (CORICIDIN) 2-325 MG TABS Take 1 tablet by mouth 4 (four) times a day as needed (cough) 56 tablet 0    Cholecalciferol (VITAMIN D3 PO) Take 50 mcg by mouth daily        clotrimazole-betamethasone (LOTRISONE) 1-0.05 % cream Apply topically 2 (two) times a day for 2 weeks to abdomen rash and 6 weeks to vulvar rash. (Patient taking differently: Apply topically if needed for 2 weeks to abdomen rash and 6 weeks to vulvar rash.) 30 g 2    estradiol (ESTRACE) 0.1 mg/g vaginal cream APPLY 1 GM INTO VAGINA NIGHTLY FOR 1 WEEK, THEN APPLY EVERY THIRD NIGHT 42.5 g 5    fluticasone (FLONASE) 50 mcg/act nasal spray 1 spray into each nostril daily 18.2 mL 0    gabapentin (Neurontin) 100 mg capsule Take 1 tab TID for 1 week then increase to 2 tabs  capsule 2    montelukast (SINGULAIR) 10 mg tablet take 1 tablet by mouth at bedtime 90 tablet 3    nystatin (MYCOSTATIN) powder Apply topically 3 (three) times a day as needed (rash) . Apply at least daily in summer to prevent rash. 45 g 3    propranolol (INDERAL) 20 mg tablet Take 1 tablet (20 mg total) by mouth every 12 (twelve) hours 180 tablet 2    rosuvastatin (CRESTOR) 10 MG tablet Take 1 tablet (10 mg total) by mouth daily 90 tablet 3    tiZANidine (ZANAFLEX) 2 mg tablet Take 2 mg by mouth every 8 (eight) hours as needed for muscle spasms      traZODone (DESYREL) 100 mg tablet take 1 tablet by mouth at bedtime 90 tablet 1     No current facility-administered medications for this visit.     Allergies   Allergen Reactions    Keflex [Cephalexin] Other (See Comments)     Mouth sores    Contrast  [Iodinated Contrast Media] Hives     Occurred In the patient's 20's got Benedryl     Morphine And Related Hives     IV MORPINE    Penicillins Hives    Benadryl [Diphenhydramine] Itching and Swelling     IV benadryl in hospital    Ciprofloxacin Hives    Clindamycin Other (See Comments)     Pt unsure    Erythromycin Hives      Immunizations:     Immunization History   Administered Date(s) Administered    COVID-19 MODERNA VACC 0.5 ML IM 04/01/2021, 04/29/2021    INFLUENZA 09/16/2014, 10/19/2018, 11/01/2021, 10/04/2022    Influenza Injectable, MDCK, Preservative Free, Quadrivalent, 0.5 mL 10/19/2018    Influenza Split High Dose Preservative Free IM 10/18/2019    Influenza, high dose seasonal 0.7 mL 08/25/2020, 11/01/2021, 10/04/2022, 12/29/2023    Pneumococcal Conjugate 13-Valent 08/21/2020    Pneumococcal Polysaccharide PPV23 01/07/2022    Respiratory Syncytial Virus Vaccine (Recombinant) 03/22/2024    Tdap 07/18/2014    Tuberculin Skin Test 10/07/2021      Health Maintenance:         Topic Date Due    Breast Cancer Screening: Mammogram  08/07/2024    Colorectal Cancer Screening  10/29/2026    Hepatitis C Screening  Completed         Topic Date Due    COVID-19 Vaccine (3 - Moderna risk series) 05/27/2021      Medicare Screening Tests and Risk Assessments:     Sri is here for her Subsequent Wellness visit. Last Medicare Wellness visit information reviewed, patient interviewed and updates made to the record today.      Health Risk Assessment:   Patient rates overall health as good. Patient feels that their physical health rating is much better. Patient is satisfied with their life. Eyesight was rated as slightly worse. Hearing was rated as same. Patient feels that their emotional and mental health rating is much better. Patients states they are sometimes angry. Patient states they are often unusually tired/fatigued. Pain experienced in the last 7 days has been some. Patient's pain rating has been 4/10. Patient states  that she has experienced no weight loss or gain in last 6 months.     Depression Screening:   PHQ-9 Score: 8      Fall Risk Screening:   In the past year, patient has experienced: history of falling in past year      Urinary Incontinence Screening:   Patient has leaked urine accidently in the last six months.     Home Safety:  Patient does not have trouble with stairs inside or outside of their home. Patient has working smoke alarms and has working carbon monoxide detector. Home safety hazards include: none.     Nutrition:   Current diet is Regular.     Medications:   Patient is currently taking over-the-counter supplements. OTC medications include: see medication list. Patient is able to manage medications.     Activities of Daily Living (ADLs)/Instrumental Activities of Daily Living (IADLs):   Walk and transfer into and out of bed and chair?: Yes  Dress and groom yourself?: Yes    Bathe or shower yourself?: Yes    Feed yourself? Yes  Do your laundry/housekeeping?: Yes  Manage your money, pay your bills and track your expenses?: Yes  Make your own meals?: Yes    Do your own shopping?: Yes    Previous Hospitalizations:   Any hospitalizations or ED visits within the last 12 months?: Yes    How many hospitalizations have you had in the last year?: 1-2    Advance Care Planning:   Living will: No    Durable POA for healthcare: No    Advanced directive: No    Advanced directive counseling given: Yes    ACP document given: Yes    Patient declined ACP directive: No    End of Life Decisions reviewed with patient: Yes    Provider agrees with end of life decisions: Yes      Comments: Will review further once pt reviews info given today.     Cognitive Screening:   Provider or family/friend/caregiver concerned regarding cognition?: No    PREVENTIVE SCREENINGS      Cardiovascular Screening:    General: History Lipid Disorder and Screening Current      Diabetes Screening:     General: Screening Current      Colorectal Cancer  "Screening:     General: Screening Current      Breast Cancer Screening:     General: Screening Current      Cervical Cancer Screening:    General: Screening Not Indicated      Osteoporosis Screening:    General: Screening Not Indicated      Abdominal Aortic Aneurysm (AAA) Screening:        General: Screening Not Indicated      Lung Cancer Screening:     General: Screening Not Indicated      Hepatitis C Screening:    General: Screening Current    Screening, Brief Intervention, and Referral to Treatment (SBIRT)    Screening  Typical number of drinks in a day: 0  Typical number of drinks in a week: 1  Interpretation: Low risk drinking behavior.    AUDIT-C Screenin) How often did you have a drink containing alcohol in the past year? monthly or less  2) How many drinks did you have on a typical day when you were drinking in the past year? 1 to 2  3) How often did you have 6 or more drinks on one occasion in the past year? never    AUDIT-C Score: 1  Interpretation: Score 0-2 (female): Negative screen for alcohol misuse    Single Item Drug Screening:  How often have you used an illegal drug (including marijuana) or a prescription medication for non-medical reasons in the past year? never    Single Item Drug Screen Score: 0  Interpretation: Negative screen for possible drug use disorder    SDOH Risk Assessment  Social determinants of health (SDOH) risk assesment tool was completed. The tool at a minimum covered housing stability, food insecurity, transportation needs, and utility difficulty. Patient had at risk responses for the following SDOH domains: food insecurity.     Other Counseling Topics:   Car/seat belt/driving safety, sunscreen and regular weightbearing exercise and calcium and vitamin D intake.     No results found.     Physical Exam:     /80   Pulse 68   Temp 98.1 °F (36.7 °C) (Tympanic)   Ht 4' 11.65\" (1.515 m)   Wt 86.6 kg (191 lb)   SpO2 98%   BMI 37.74 kg/m²     Physical Exam   A/P: Doing " well and no falls. Denies depression and feels safe at home. Diverse diet. No problems operating a MV and uses seat belts. No living will or POA. Information give for pt to review. No DME or referrals needed today. RTC in one year for medicare wellness.     Sea Hadley, DO

## 2024-05-08 ENCOUNTER — TRANSCRIBE ORDERS (OUTPATIENT)
Dept: URGENT CARE | Facility: CLINIC | Age: 69
End: 2024-05-08

## 2024-05-08 ENCOUNTER — APPOINTMENT (OUTPATIENT)
Dept: URGENT CARE | Facility: CLINIC | Age: 69
End: 2024-05-08

## 2024-05-08 ENCOUNTER — APPOINTMENT (OUTPATIENT)
Dept: LAB | Facility: CLINIC | Age: 69
End: 2024-05-08

## 2024-05-08 DIAGNOSIS — Z02.1 PRE-EMPLOYMENT EXAMINATION: ICD-10-CM

## 2024-05-08 DIAGNOSIS — Z02.1 PRE-EMPLOYMENT EXAMINATION: Primary | ICD-10-CM

## 2024-05-08 LAB — RUBV IGG SERPL IA-ACNC: 21.6 IU/ML

## 2024-05-08 PROCEDURE — 86762 RUBELLA ANTIBODY: CPT

## 2024-05-08 PROCEDURE — 86735 MUMPS ANTIBODY: CPT

## 2024-05-08 PROCEDURE — 36415 COLL VENOUS BLD VENIPUNCTURE: CPT

## 2024-05-08 PROCEDURE — 86765 RUBEOLA ANTIBODY: CPT

## 2024-05-08 PROCEDURE — 86480 TB TEST CELL IMMUN MEASURE: CPT

## 2024-05-08 PROCEDURE — 86787 VARICELLA-ZOSTER ANTIBODY: CPT

## 2024-05-09 LAB
GAMMA INTERFERON BACKGROUND BLD IA-ACNC: 0.09 IU/ML
M TB IFN-G BLD-IMP: NEGATIVE
M TB IFN-G CD4+ BCKGRND COR BLD-ACNC: 0.01 IU/ML
M TB IFN-G CD4+ BCKGRND COR BLD-ACNC: 0.02 IU/ML
MEV IGG SER QL IA: NORMAL
MITOGEN IGNF BCKGRD COR BLD-ACNC: 9.91 IU/ML
MUV IGG SER QL IA: NORMAL
VZV IGG SER QL IA: NORMAL

## 2024-05-10 ENCOUNTER — CLINICAL SUPPORT (OUTPATIENT)
Dept: INTERNAL MEDICINE CLINIC | Facility: CLINIC | Age: 69
End: 2024-05-10
Payer: MEDICARE

## 2024-05-10 DIAGNOSIS — E53.8 B12 DEFICIENCY: Primary | ICD-10-CM

## 2024-05-10 PROCEDURE — 96372 THER/PROPH/DIAG INJ SC/IM: CPT

## 2024-05-10 RX ORDER — CYANOCOBALAMIN 1000 UG/ML
1000 INJECTION, SOLUTION INTRAMUSCULAR; SUBCUTANEOUS
Status: SHIPPED | OUTPATIENT
Start: 2024-05-10

## 2024-05-10 RX ADMIN — CYANOCOBALAMIN 1000 MCG: 1000 INJECTION, SOLUTION INTRAMUSCULAR; SUBCUTANEOUS at 09:00

## 2024-05-17 ENCOUNTER — TELEPHONE (OUTPATIENT)
Dept: NEUROLOGY | Facility: CLINIC | Age: 69
End: 2024-05-17

## 2024-05-17 DIAGNOSIS — E53.8 B12 DEFICIENCY: Primary | ICD-10-CM

## 2024-05-17 NOTE — TELEPHONE ENCOUNTER
5/17/24, 0853    Pt left a vm and states that she finished her b12 inj last Friday. Does she need bw before B12 is ordered in pill form. Pt is asking if you will order the b12 tabs or should her PCP take over?  [phone]

## 2024-05-17 NOTE — TELEPHONE ENCOUNTER
We can repeat bloodwork in a few months, would expect her b12 to be high after completing injections. The b12 is over the counter so does not require prescription dosing recommended is 1000 mcg daily   oral

## 2024-06-21 DIAGNOSIS — E78.2 MIXED HYPERLIPIDEMIA: ICD-10-CM

## 2024-06-21 RX ORDER — ROSUVASTATIN CALCIUM 10 MG/1
10 TABLET, COATED ORAL DAILY
Qty: 90 TABLET | Refills: 1 | Status: SHIPPED | OUTPATIENT
Start: 2024-06-21

## 2024-06-24 ENCOUNTER — HOSPITAL ENCOUNTER (EMERGENCY)
Facility: HOSPITAL | Age: 69
Discharge: HOME | End: 2024-06-25
Attending: EMERGENCY MEDICINE
Payer: MEDICARE

## 2024-06-24 VITALS
WEIGHT: 189 LBS | HEIGHT: 60 IN | BODY MASS INDEX: 37.11 KG/M2 | SYSTOLIC BLOOD PRESSURE: 166 MMHG | HEART RATE: 75 BPM | OXYGEN SATURATION: 97 % | DIASTOLIC BLOOD PRESSURE: 80 MMHG | RESPIRATION RATE: 19 BRPM

## 2024-06-24 DIAGNOSIS — R21 RASH: ICD-10-CM

## 2024-06-24 DIAGNOSIS — R60.0 PERIPHERAL EDEMA: Primary | ICD-10-CM

## 2024-06-24 LAB
ALBUMIN SERPL BCG-MCNC: 4.3 G/DL (ref 3.5–5)
ALP SERPL-CCNC: 73 U/L (ref 34–104)
ALT SERPL W P-5'-P-CCNC: 14 U/L (ref 7–52)
ANION GAP SERPL CALCULATED.3IONS-SCNC: 7 MMOL/L (ref 4–13)
AST SERPL W P-5'-P-CCNC: 14 U/L (ref 13–39)
BASOPHILS # BLD AUTO: 0.04 THOUSANDS/ÂΜL (ref 0–0.1)
BASOPHILS NFR BLD AUTO: 1 % (ref 0–1)
BILIRUB SERPL-MCNC: 0.39 MG/DL (ref 0.2–1)
BNP SERPL-MCNC: 49 PG/ML (ref 0–100)
BUN SERPL-MCNC: 16 MG/DL (ref 5–25)
CALCIUM SERPL-MCNC: 9.5 MG/DL (ref 8.4–10.2)
CHLORIDE SERPL-SCNC: 105 MMOL/L (ref 96–108)
CO2 SERPL-SCNC: 28 MMOL/L (ref 21–32)
CREAT SERPL-MCNC: 0.92 MG/DL (ref 0.6–1.3)
EOSINOPHIL # BLD AUTO: 0.2 THOUSAND/ÂΜL (ref 0–0.61)
EOSINOPHIL NFR BLD AUTO: 3 % (ref 0–6)
ERYTHROCYTE [DISTWIDTH] IN BLOOD BY AUTOMATED COUNT: 12.9 % (ref 11.6–15.1)
GFR SERPL CREATININE-BSD FRML MDRD: 63 ML/MIN/1.73SQ M
GLUCOSE SERPL-MCNC: 108 MG/DL (ref 65–140)
HCT VFR BLD AUTO: 42.3 % (ref 34.8–46.1)
HGB BLD-MCNC: 13.9 G/DL (ref 11.5–15.4)
IMM GRANULOCYTES # BLD AUTO: 0.01 THOUSAND/UL (ref 0–0.2)
IMM GRANULOCYTES NFR BLD AUTO: 0 % (ref 0–2)
LYMPHOCYTES # BLD AUTO: 1.81 THOUSANDS/ÂΜL (ref 0.6–4.47)
LYMPHOCYTES NFR BLD AUTO: 27 % (ref 14–44)
MCH RBC QN AUTO: 29.5 PG (ref 26.8–34.3)
MCHC RBC AUTO-ENTMCNC: 32.9 G/DL (ref 31.4–37.4)
MCV RBC AUTO: 90 FL (ref 82–98)
MONOCYTES # BLD AUTO: 0.57 THOUSAND/ÂΜL (ref 0.17–1.22)
MONOCYTES NFR BLD AUTO: 8 % (ref 4–12)
NEUTROPHILS # BLD AUTO: 4.14 THOUSANDS/ÂΜL (ref 1.85–7.62)
NEUTS SEG NFR BLD AUTO: 61 % (ref 43–75)
NRBC BLD AUTO-RTO: 0 /100 WBCS
PLATELET # BLD AUTO: 223 THOUSANDS/UL (ref 149–390)
PMV BLD AUTO: 9.3 FL (ref 8.9–12.7)
POTASSIUM SERPL-SCNC: 3.7 MMOL/L (ref 3.5–5.3)
PROT SERPL-MCNC: 7 G/DL (ref 6.4–8.4)
RBC # BLD AUTO: 4.71 MILLION/UL (ref 3.81–5.12)
SODIUM SERPL-SCNC: 140 MMOL/L (ref 135–147)
WBC # BLD AUTO: 6.77 THOUSAND/UL (ref 4.31–10.16)

## 2024-06-24 PROCEDURE — 96374 THER/PROPH/DIAG INJ IV PUSH: CPT

## 2024-06-24 PROCEDURE — 99284 EMERGENCY DEPT VISIT MOD MDM: CPT | Performed by: EMERGENCY MEDICINE

## 2024-06-24 PROCEDURE — 80053 COMPREHEN METABOLIC PANEL: CPT | Performed by: EMERGENCY MEDICINE

## 2024-06-24 PROCEDURE — 99284 EMERGENCY DEPT VISIT MOD MDM: CPT

## 2024-06-24 PROCEDURE — 36415 COLL VENOUS BLD VENIPUNCTURE: CPT | Performed by: EMERGENCY MEDICINE

## 2024-06-24 PROCEDURE — 83880 ASSAY OF NATRIURETIC PEPTIDE: CPT | Performed by: EMERGENCY MEDICINE

## 2024-06-24 PROCEDURE — 85025 COMPLETE CBC W/AUTO DIFF WBC: CPT | Performed by: EMERGENCY MEDICINE

## 2024-06-24 RX ORDER — TRIAMCINOLONE ACETONIDE 5 MG/G
CREAM TOPICAL 2 TIMES DAILY
Status: DISCONTINUED | OUTPATIENT
Start: 2024-06-25 | End: 2024-06-24

## 2024-06-24 RX ORDER — TRIAMCINOLONE ACETONIDE 1 MG/G
CREAM TOPICAL 2 TIMES DAILY
Status: DISCONTINUED | OUTPATIENT
Start: 2024-06-25 | End: 2024-06-25 | Stop reason: HOSPADM

## 2024-06-24 RX ORDER — TRIAMCINOLONE ACETONIDE 5 MG/G
CREAM TOPICAL 2 TIMES DAILY
Status: DISCONTINUED | OUTPATIENT
Start: 2024-06-25 | End: 2024-06-24 | Stop reason: SDUPTHER

## 2024-06-24 RX ORDER — FUROSEMIDE 10 MG/ML
20 INJECTION INTRAMUSCULAR; INTRAVENOUS ONCE
Status: COMPLETED | OUTPATIENT
Start: 2024-06-25 | End: 2024-06-24

## 2024-06-24 RX ORDER — FUROSEMIDE 20 MG/1
20 TABLET ORAL DAILY
Qty: 2 TABLET | Refills: 0 | Status: SHIPPED | OUTPATIENT
Start: 2024-06-24 | End: 2024-06-25

## 2024-06-24 RX ADMIN — FUROSEMIDE 20 MG: 10 INJECTION, SOLUTION INTRAMUSCULAR; INTRAVENOUS at 23:52

## 2024-06-25 ENCOUNTER — RA CDI HCC (OUTPATIENT)
Dept: OTHER | Facility: HOSPITAL | Age: 69
End: 2024-06-25

## 2024-06-25 ENCOUNTER — APPOINTMENT (OUTPATIENT)
Dept: LAB | Facility: CLINIC | Age: 69
End: 2024-06-25
Payer: MEDICARE

## 2024-06-25 ENCOUNTER — OFFICE VISIT (OUTPATIENT)
Dept: INTERNAL MEDICINE CLINIC | Facility: CLINIC | Age: 69
End: 2024-06-25
Payer: MEDICARE

## 2024-06-25 VITALS
HEIGHT: 60 IN | DIASTOLIC BLOOD PRESSURE: 64 MMHG | WEIGHT: 189.5 LBS | TEMPERATURE: 97.8 F | HEART RATE: 72 BPM | BODY MASS INDEX: 37.21 KG/M2 | OXYGEN SATURATION: 97 % | SYSTOLIC BLOOD PRESSURE: 122 MMHG

## 2024-06-25 DIAGNOSIS — E53.8 B12 DEFICIENCY: ICD-10-CM

## 2024-06-25 DIAGNOSIS — R60.0 BILATERAL LOWER EXTREMITY EDEMA: ICD-10-CM

## 2024-06-25 DIAGNOSIS — R60.0 BILATERAL LOWER EXTREMITY EDEMA: Primary | ICD-10-CM

## 2024-06-25 DIAGNOSIS — E55.9 VITAMIN D DEFICIENCY: Chronic | ICD-10-CM

## 2024-06-25 DIAGNOSIS — L03.119 CELLULITIS OF LOWER EXTREMITY, UNSPECIFIED LATERALITY: Chronic | ICD-10-CM

## 2024-06-25 DIAGNOSIS — E53.8 B12 DEFICIENCY: Chronic | ICD-10-CM

## 2024-06-25 LAB
25(OH)D3 SERPL-MCNC: 46 NG/ML (ref 30–100)
ALBUMIN SERPL BCG-MCNC: 4.2 G/DL (ref 3.5–5)
ALP SERPL-CCNC: 87 U/L (ref 34–104)
ALT SERPL W P-5'-P-CCNC: 15 U/L (ref 7–52)
ANION GAP SERPL CALCULATED.3IONS-SCNC: 9 MMOL/L (ref 4–13)
AST SERPL W P-5'-P-CCNC: 15 U/L (ref 13–39)
BASOPHILS # BLD AUTO: 0.05 THOUSANDS/ÂΜL (ref 0–0.1)
BASOPHILS NFR BLD AUTO: 1 % (ref 0–1)
BILIRUB SERPL-MCNC: 0.41 MG/DL (ref 0.2–1)
BUN SERPL-MCNC: 15 MG/DL (ref 5–25)
CALCIUM SERPL-MCNC: 9.5 MG/DL (ref 8.4–10.2)
CHLORIDE SERPL-SCNC: 103 MMOL/L (ref 96–108)
CO2 SERPL-SCNC: 27 MMOL/L (ref 21–32)
CREAT SERPL-MCNC: 0.83 MG/DL (ref 0.6–1.3)
EOSINOPHIL # BLD AUTO: 0.17 THOUSAND/ÂΜL (ref 0–0.61)
EOSINOPHIL NFR BLD AUTO: 3 % (ref 0–6)
ERYTHROCYTE [DISTWIDTH] IN BLOOD BY AUTOMATED COUNT: 13.1 % (ref 11.6–15.1)
GFR SERPL CREATININE-BSD FRML MDRD: 72 ML/MIN/1.73SQ M
GLUCOSE SERPL-MCNC: 146 MG/DL (ref 65–140)
HCT VFR BLD AUTO: 40.1 % (ref 34.8–46.1)
HGB BLD-MCNC: 13.8 G/DL (ref 11.5–15.4)
IMM GRANULOCYTES # BLD AUTO: 0.01 THOUSAND/UL (ref 0–0.2)
IMM GRANULOCYTES NFR BLD AUTO: 0 % (ref 0–2)
INR PPP: 1.04 (ref 0.84–1.19)
LYMPHOCYTES # BLD AUTO: 1.3 THOUSANDS/ÂΜL (ref 0.6–4.47)
LYMPHOCYTES NFR BLD AUTO: 22 % (ref 14–44)
MCH RBC QN AUTO: 30.6 PG (ref 26.8–34.3)
MCHC RBC AUTO-ENTMCNC: 34.4 G/DL (ref 31.4–37.4)
MCV RBC AUTO: 89 FL (ref 82–98)
MONOCYTES # BLD AUTO: 0.58 THOUSAND/ÂΜL (ref 0.17–1.22)
MONOCYTES NFR BLD AUTO: 10 % (ref 4–12)
NEUTROPHILS # BLD AUTO: 3.73 THOUSANDS/ÂΜL (ref 1.85–7.62)
NEUTS SEG NFR BLD AUTO: 64 % (ref 43–75)
NRBC BLD AUTO-RTO: 0 /100 WBCS
PLATELET # BLD AUTO: 270 THOUSANDS/UL (ref 149–390)
PMV BLD AUTO: 10.4 FL (ref 8.9–12.7)
POTASSIUM SERPL-SCNC: 3.7 MMOL/L (ref 3.5–5.3)
PROT SERPL-MCNC: 6.8 G/DL (ref 6.4–8.4)
PROTHROMBIN TIME: 13.5 SECONDS (ref 11.6–14.5)
RBC # BLD AUTO: 4.51 MILLION/UL (ref 3.81–5.12)
SODIUM SERPL-SCNC: 139 MMOL/L (ref 135–147)
VIT B12 SERPL-MCNC: 338 PG/ML (ref 180–914)
WBC # BLD AUTO: 5.84 THOUSAND/UL (ref 4.31–10.16)

## 2024-06-25 PROCEDURE — 80053 COMPREHEN METABOLIC PANEL: CPT

## 2024-06-25 PROCEDURE — 82306 VITAMIN D 25 HYDROXY: CPT

## 2024-06-25 PROCEDURE — 96372 THER/PROPH/DIAG INJ SC/IM: CPT

## 2024-06-25 PROCEDURE — 85610 PROTHROMBIN TIME: CPT

## 2024-06-25 PROCEDURE — 82607 VITAMIN B-12: CPT

## 2024-06-25 PROCEDURE — 36415 COLL VENOUS BLD VENIPUNCTURE: CPT

## 2024-06-25 PROCEDURE — 85025 COMPLETE CBC W/AUTO DIFF WBC: CPT

## 2024-06-25 PROCEDURE — 99215 OFFICE O/P EST HI 40 MIN: CPT | Performed by: NURSE PRACTITIONER

## 2024-06-25 RX ORDER — FLUCONAZOLE 200 MG/1
200 TABLET ORAL DAILY
Qty: 5 TABLET | Refills: 0 | Status: SHIPPED | OUTPATIENT
Start: 2024-06-25 | End: 2024-06-30

## 2024-06-25 RX ORDER — PREDNISONE 10 MG/1
TABLET ORAL
Qty: 50 TABLET | Refills: 0 | Status: SHIPPED | OUTPATIENT
Start: 2024-06-25 | End: 2024-07-15

## 2024-06-25 RX ORDER — KETOROLAC TROMETHAMINE 30 MG/ML
30 INJECTION, SOLUTION INTRAMUSCULAR; INTRAVENOUS ONCE
Status: COMPLETED | OUTPATIENT
Start: 2024-06-25 | End: 2024-06-25

## 2024-06-25 RX ORDER — DOXYCYCLINE HYCLATE 100 MG/1
100 CAPSULE ORAL EVERY 12 HOURS SCHEDULED
Qty: 28 CAPSULE | Refills: 0 | Status: SHIPPED | OUTPATIENT
Start: 2024-06-25 | End: 2024-07-02

## 2024-06-25 RX ORDER — POTASSIUM CHLORIDE 20 MEQ/1
20 TABLET, EXTENDED RELEASE ORAL DAILY
Qty: 30 TABLET | Refills: 1 | Status: SHIPPED | OUTPATIENT
Start: 2024-06-25

## 2024-06-25 RX ORDER — TRIAMCINOLONE ACETONIDE 1 MG/G
CREAM TOPICAL
COMMUNITY
Start: 2024-06-24

## 2024-06-25 RX ORDER — TRIAMCINOLONE ACETONIDE 40 MG/ML
40 INJECTION, SUSPENSION INTRA-ARTICULAR; INTRAMUSCULAR ONCE
Status: COMPLETED | OUTPATIENT
Start: 2024-06-25 | End: 2024-06-25

## 2024-06-25 RX ORDER — FUROSEMIDE 40 MG/1
40 TABLET ORAL DAILY
Qty: 30 TABLET | Refills: 1 | Status: SHIPPED | OUTPATIENT
Start: 2024-06-25

## 2024-06-25 RX ORDER — CHOLECALCIFEROL (VITAMIN D3) 25 MCG
TABLET ORAL
COMMUNITY

## 2024-06-25 RX ADMIN — TRIAMCINOLONE ACETONIDE: 1 CREAM TOPICAL at 00:15

## 2024-06-25 RX ADMIN — TRIAMCINOLONE ACETONIDE 40 MG: 40 INJECTION, SUSPENSION INTRA-ARTICULAR; INTRAMUSCULAR at 13:39

## 2024-06-25 RX ADMIN — KETOROLAC TROMETHAMINE 60 MG: 30 INJECTION, SOLUTION INTRAMUSCULAR; INTRAVENOUS at 13:41

## 2024-06-25 NOTE — PROGRESS NOTES
Ambulatory Visit  Name: Sri Mcgrath      : 1955      MRN: [id number]  Encounter Provider: IVAN Jacobo  Encounter Date: 2024   Encounter department: [facility]    Assessment & Plan   1. Bilateral lower extremity edema  Assessment & Plan:  2024 to the ER   Leg Swelling       Bilateral leg swelling    Rash       Itchy red rash to bilateral lower legs   Given:  Triamcinolone cream  Furosemide 20mg IV    2024  + 3 on her right  + 2 on her left  Positive redness, warmth, tenderness  Continue compression stockings   Will start her on prednisone taper to help decrease the edema  Will start her on lasix 40mg daily   Will start her on potassium 20 mEq daily   Will start her on doxycycline for 14 days   Will start her on diflucan   Will give her a kenalog injection  Will give her a toradol injection       Orders:  -     doxycycline hyclate (VIBRAMYCIN) 100 mg capsule; Take 1 capsule (100 mg total) by mouth every 12 (twelve) hours for 14 days  -     triamcinolone acetonide (KENALOG-40) 40 mg/mL injection 40 mg  -     Protime-INR; Future  -     ketorolac (TORADOL) 60 mg/2 mL IM injection 30 mg  -     fluconazole (DIFLUCAN) 200 mg tablet; Take 1 tablet (200 mg total) by mouth daily for 5 days  -     predniSONE 10 mg tablet; Take 4 tablets (40 mg total) by mouth daily for 5 days, THEN 3 tablets (30 mg total) daily for 5 days, THEN 2 tablets (20 mg total) daily for 5 days, THEN 1 tablet (10 mg total) daily for 5 days.  -     furosemide (LASIX) 40 mg tablet; Take 1 tablet (40 mg total) by mouth daily  -     Comprehensive metabolic panel; Future  -     CBC and differential; Future  -     potassium chloride (Klor-Con M20) 20 mEq tablet; Take 1 tablet (20 mEq total) by mouth daily  2. B12 deficiency  Assessment & Plan:  Will check her lab  May need to start her on oral supplement  Orders:  -     Vitamin B12; Future  3. Vitamin D deficiency  Assessment & Plan:  Will check  lab  May benefit from both Vitamin D3 and Vitamin D2  Orders:  -     Vitamin D 25 hydroxy; Future; Expected date: 09/25/2024  4. Cellulitis of lower extremity, unspecified laterality  Assessment & Plan:  6/24/2024 to the ER   Leg Swelling       Bilateral leg swelling    Rash       Itchy red rash to bilateral lower legs   Given:  Triamcinolone cream  Furosemide 20mg IV    6/25/2024  + 3 on her right  + 2 on her left  Positive redness, warmth, tenderness  Continue compression stockings   Will start her on prednisone taper to help decrease the edema  Will start her on lasix 40mg daily   Will start her on potassium 20 mEq daily   Will start her on doxycycline for 14 days   Will start her on diflucan   Will give her a kenalog injection  Will give her a toradol injection   Orders:  -     doxycycline hyclate (VIBRAMYCIN) 100 mg capsule; Take 1 capsule (100 mg total) by mouth every 12 (twelve) hours for 14 days  -     triamcinolone acetonide (KENALOG-40) 40 mg/mL injection 40 mg  -     Protime-INR; Future  -     ketorolac (TORADOL) 60 mg/2 mL IM injection 30 mg  -     fluconazole (DIFLUCAN) 200 mg tablet; Take 1 tablet (200 mg total) by mouth daily for 5 days  -     predniSONE 10 mg tablet; Take 4 tablets (40 mg total) by mouth daily for 5 days, THEN 3 tablets (30 mg total) daily for 5 days, THEN 2 tablets (20 mg total) daily for 5 days, THEN 1 tablet (10 mg total) daily for 5 days.  -     furosemide (LASIX) 40 mg tablet; Take 1 tablet (40 mg total) by mouth daily  -     Comprehensive metabolic panel; Future  -     CBC and differential; Future  -     potassium chloride (Klor-Con M20) 20 mEq tablet; Take 1 tablet (20 mEq total) by mouth daily         History of Present Illness     The patient is here today to discuss her bilateral leg pain. Please continue to the PORCH section of the note for details of today's visit.        Review of Systems   Constitutional:  Negative for activity change, chills, fatigue and fever.   HENT:   Negative for rhinorrhea and sore throat.    Eyes:  Negative for pain.   Respiratory:  Negative for cough and shortness of breath.    Cardiovascular:  Positive for leg swelling. Negative for chest pain and palpitations.   Gastrointestinal:  Negative for abdominal pain, constipation, diarrhea, nausea and vomiting.   Genitourinary:  Negative for difficulty urinating, flank pain, frequency and urgency.   Musculoskeletal:  Positive for arthralgias, gait problem and myalgias. Negative for joint swelling.   Skin:  Negative for color change.   Neurological:  Negative for dizziness, weakness, light-headedness and headaches.   Psychiatric/Behavioral:  Negative for sleep disturbance. The patient is not nervous/anxious.    All other systems reviewed and are negative.    Past Medical History:   Diagnosis Date    Allergic     Arthritis     Cataract     ashley    Colon polyp     Depression     Fluid retention     Headache, acute     Heart murmur     Hyperlipidemia     Hypertension     Memory loss     Pedal edema     Pneumonia     PONV (postoperative nausea and vomiting)     Rotator cuff tear, left     Sleep apnea 2021    Wears glasses     Wears partial dentures     upper     Past Surgical History:   Procedure Laterality Date    ABDOMINAL ADHESION SURGERY      ANAL SPHINCTEROPLASTY      ANKLE SURGERY Right     tendon tear    APPENDECTOMY      CARPAL TUNNEL RELEASE Bilateral     CARPAL TUNNEL RELEASE Left     CARPAL TUNNEL RELEASE Right     COLONOSCOPY      HAND SURGERY Right     ganglion cyst    HEMORROIDECTOMY      HYSTERECTOMY      ILEOSTOMY      JOINT REPLACEMENT      KNEE ARTHROCENTESIS      ganglion Cyst    KNEE ARTHROSCOPY Left     OVARIAN CYST REMOVAL      [place] SURGICAL ARTHROSCOPY SHOULDER W/ROTATOR CUFF RPR Left 2/19/2018    Procedure: ARTHROSCOPIC REPAIR ROTATOR CUFF,  SAD, BICEP TENODESIS;  Surgeon: Garrett Coto MD;  Location: [facility] OR;  Service: Orthopedics    REPAIR RECTOCELE      REPLACEMENT TOTAL KNEE Left  09/28/2021    TONSILLECTOMY      TONSILLECTOMY AND ADENOIDECTOMY      TUBAL LIGATION       Family History   Problem Relation Age of Onset    Hypertension Mother     Colon cancer Mother     Bone cancer Mother     COPD Mother     Emphysema Mother     Skin cancer Mother     Glaucoma Mother     Lead poisoning Father         lead poisoning in lungs     Thyroid disease Sister     Depression Sister     Hypertension Sister     Alzheimer's disease Sister     Arthritis Sister     ROBERT disease Brother     Throat cancer Brother     Hypertension Brother     Arthritis Brother     Colon cancer Family     Bone cancer Family     Hypertension Family     Diabetes Sister     Hypertension Sister     Heart failure Sister     Alzheimer's disease Sister     Arthritis Sister     No Known Problems Daughter     No Known Problems Maternal Grandmother     No Known Problems Paternal Grandmother     No Known Problems Maternal Aunt     No Known Problems Maternal Aunt     No Known Problems Maternal Aunt     No Known Problems Maternal Aunt     No Known Problems Maternal Aunt     No Known Problems Paternal Aunt      Social History     Tobacco Use    Smoking status: Never     Passive exposure: Never    Smokeless tobacco: Never   Vaping Use    Vaping status: Never Used   Substance and Sexual Activity    Alcohol use: Yes     Comment: Occasionally/social    Drug use: No    Sexual activity: Yes     Partners: Male     Birth control/protection: Post-menopausal     Current Outpatient Medications on File Prior to Visit   Medication Sig    azelastine (ASTELIN) 0.1 % nasal spray 1 spray into each nostril 2 (two) times a day Use in each nostril as directed    Calcium Carbonate (CALCIUM 600 PO) Take by mouth daily      CALCIUM PO     Chlorpheniramine-APAP (CORICIDIN) 2-325 MG TABS Take 1 tablet by mouth 4 (four) times a day as needed (cough)    Cholecalciferol (VITAMIN D3 PO) Take 50 mcg by mouth daily      Cholecalciferol (Vitamin D3) 25 MCG TABS 2000mcg     clotrimazole-betamethasone (LOTRISONE) 1-0.05 % cream Apply topically 2 (two) times a day for 2 weeks to abdomen rash and 6 weeks to vulvar rash. (Patient taking differently: Apply topically if needed for 2 weeks to abdomen rash and 6 weeks to vulvar rash.)    estradiol (ESTRACE) 0.1 mg/g vaginal cream APPLY 1 GM INTO VAGINA NIGHTLY FOR 1 WEEK, THEN APPLY EVERY THIRD NIGHT    fluticasone (FLONASE) 50 mcg/act nasal spray 1 spray into each nostril daily    gabapentin (Neurontin) 100 mg capsule Take 1 tab TID for 1 week then increase to 2 tabs TID    montelukast (SINGULAIR) 10 mg tablet take 1 tablet by mouth at bedtime    nystatin (MYCOSTATIN) powder Apply topically 3 (three) times a day as needed (rash) . Apply at least daily in summer to prevent rash.    propranolol (INDERAL) 20 mg tablet Take 1 tablet (20 mg total) by mouth every 12 (twelve) hours    rosuvastatin (CRESTOR) 10 MG tablet take 1 tablet by mouth once daily    tiZANidine (ZANAFLEX) 2 mg tablet Take 2 mg by mouth every 8 (eight) hours as needed for muscle spasms    traZODone (DESYREL) 100 mg tablet take 1 tablet by mouth at bedtime    triamcinolone (KENALOG) 0.1 % cream     [DISCONTINUED] furosemide (LASIX) 20 mg tablet Take 1 tablet (20 mg total) by mouth daily     Allergies   Allergen Reactions    Keflex [Cephalexin] Other (See Comments)     Mouth sores    Contrast [Iodinated Contrast Media] Hives     Occurred In the patient's 20's got Benedryl     Morphine And Codeine Hives     IV MORPINE    Penicillins Hives    Benadryl [Diphenhydramine] Itching and Swelling     IV benadryl in hospital    Ciprofloxacin Hives    Clindamycin Other (See Comments)     Pt unsure    Erythromycin Hives     Immunization History   Administered Date(s) Administered    COVID-19 MODERNA VACC 0.5 ML IM 04/01/2021, 04/29/2021    INFLUENZA 09/16/2014, 10/19/2018, 11/01/2021, 10/04/2022    Influenza Injectable, MDCK, Preservative Free, Quadrivalent, 0.5 mL 10/19/2018    Influenza  Split High Dose Preservative Free IM 10/18/2019    Influenza, high dose seasonal 0.7 mL 08/25/2020, 11/01/2021, 10/04/2022, 12/29/2023    Pneumococcal Conjugate 13-Valent 08/21/2020    Pneumococcal Polysaccharide PPV23 01/07/2022    Respiratory Syncytial Virus Vaccine (Recombinant) 03/22/2024    Tdap 07/18/2014    Tuberculin Skin Test 10/07/2021    Zoster Vaccine Recombinant 05/06/2024     Objective     /64 (BP Location: Left arm, Patient Position: Sitting)   Pulse 72   Temp 97.8 °F (36.6 °C) (Tympanic)   Ht 5' (1.524 m)   Wt 86 kg (189 lb 8 oz)   SpO2 97%   BMI 37.01 kg/m²     Physical Exam  Vitals and nursing note reviewed.   Constitutional:       General: She is awake. She is not in acute distress.     Appearance: Normal appearance. She is well-developed.   HENT:      Head: Normocephalic and atraumatic.      Nose: Nose normal.      Mouth/Throat:      Mouth: Mucous membranes are moist.   Eyes:      Conjunctiva/sclera: Conjunctivae normal.   Cardiovascular:      Rate and Rhythm: Normal rate and regular rhythm.      Pulses: Normal pulses.      Heart sounds: Normal heart sounds. No murmur heard.  Pulmonary:      Effort: Pulmonary effort is normal. No respiratory distress.      Breath sounds: Normal breath sounds.   Abdominal:      General: Bowel sounds are normal.      Palpations: Abdomen is soft.      Tenderness: There is no abdominal tenderness.   Musculoskeletal:      Cervical back: Neck supple.      Right lower leg: No edema.      Left lower leg: No edema.      Comments: Bilateral lower extremity redness and edema with pain - see media tab    Skin:     General: Skin is warm and dry.   Neurological:      Mental Status: She is alert and oriented to person, place, and time.   Psychiatric:         Attention and Perception: Attention normal.         Mood and Affect: Mood normal.         Speech: Speech normal.         Behavior: Behavior normal. Behavior is cooperative.         Thought Content: Thought  content normal.         Cognition and Memory: Cognition normal.         Judgment: Judgment normal.       Administrative Statements   I have spent a total time of 40 minutes on 06/25/24 In caring for this patient including Diagnostic results, Prognosis, Risks and benefits of tx options, Instructions for management, Patient and family education, Importance of tx compliance, Risk factor reductions, Impressions, Counseling / Coordination of care, Documenting in the medical record, Reviewing / ordering tests, medicine, procedures  , and Obtaining or reviewing history  .

## 2024-06-25 NOTE — ED PROVIDER NOTES
History  Chief Complaint   Patient presents with    Leg Swelling     Bilateral leg swelling    Rash     Itchy red rash to bilateral lower legs     Patient had previously been on diuretics.  She no longer takes them.  States swelling is really worsened last few days and the rash started after the swelling got worse.  No other recent changes to medications.  Legs are painful to palpation.  Marginally better with elevation.        Prior to Admission Medications   Prescriptions Last Dose Informant Patient Reported? Taking?   Calcium Carbonate (CALCIUM 600 PO)  Self Yes No   Sig: Take by mouth daily     Chlorpheniramine-APAP (CORICIDIN) 2-325 MG TABS   No No   Sig: Take 1 tablet by mouth 4 (four) times a day as needed (cough)   Cholecalciferol (VITAMIN D3 PO)  Self Yes No   Sig: Take 50 mcg by mouth daily     azelastine (ASTELIN) 0.1 % nasal spray   No No   Si spray into each nostril 2 (two) times a day Use in each nostril as directed   clotrimazole-betamethasone (LOTRISONE) 1-0.05 % cream  Self No No   Sig: Apply topically 2 (two) times a day for 2 weeks to abdomen rash and 6 weeks to vulvar rash.   Patient taking differently: Apply topically if needed for 2 weeks to abdomen rash and 6 weeks to vulvar rash.   estradiol (ESTRACE) 0.1 mg/g vaginal cream   No No   Sig: APPLY 1 GM INTO VAGINA NIGHTLY FOR 1 WEEK, THEN APPLY EVERY THIRD NIGHT   fluticasone (FLONASE) 50 mcg/act nasal spray   No No   Si spray into each nostril daily   gabapentin (Neurontin) 100 mg capsule   No No   Sig: Take 1 tab TID for 1 week then increase to 2 tabs TID   montelukast (SINGULAIR) 10 mg tablet  Self No No   Sig: take 1 tablet by mouth at bedtime   nystatin (MYCOSTATIN) powder  Self No No   Sig: Apply topically 3 (three) times a day as needed (rash) . Apply at least daily in summer to prevent rash.   propranolol (INDERAL) 20 mg tablet   No No   Sig: Take 1 tablet (20 mg total) by mouth every 12 (twelve) hours   rosuvastatin (CRESTOR) 10  MG tablet   No No   Sig: take 1 tablet by mouth once daily   tiZANidine (ZANAFLEX) 2 mg tablet   Yes No   Sig: Take 2 mg by mouth every 8 (eight) hours as needed for muscle spasms   traZODone (DESYREL) 100 mg tablet   No No   Sig: take 1 tablet by mouth at bedtime      Facility-Administered Medications Last Administration Doses Remaining   cyanocobalamin injection 1,000 mcg 5/10/2024  9:00 AM           Past Medical History:   Diagnosis Date    Allergic     Arthritis     Cataract     ashley    Colon polyp     Depression     Fluid retention     Headache, acute     Heart murmur     Hyperlipidemia     Hypertension     Memory loss     Pedal edema     Pneumonia     PONV (postoperative nausea and vomiting)     Rotator cuff tear, left     Sleep apnea 2021    Wears glasses     Wears partial dentures     upper       Past Surgical History:   Procedure Laterality Date    ABDOMINAL ADHESION SURGERY      ANAL SPHINCTEROPLASTY      ANKLE SURGERY Right     tendon tear    APPENDECTOMY      CARPAL TUNNEL RELEASE Bilateral     CARPAL TUNNEL RELEASE Left     CARPAL TUNNEL RELEASE Right     COLONOSCOPY      HAND SURGERY Right     ganglion cyst    HEMORROIDECTOMY      HYSTERECTOMY      ILEOSTOMY      JOINT REPLACEMENT      KNEE ARTHROCENTESIS      ganglion Cyst    KNEE ARTHROSCOPY Left     OVARIAN CYST REMOVAL      ND SURGICAL ARTHROSCOPY SHOULDER W/ROTATOR CUFF RPR Left 2/19/2018    Procedure: ARTHROSCOPIC REPAIR ROTATOR CUFF,  SAD, BICEP TENODESIS;  Surgeon: Garrett Coto MD;  Location: [facility] OR;  Service: Orthopedics    REPAIR RECTOCELE      REPLACEMENT TOTAL KNEE Left 09/28/2021    TONSILLECTOMY      TONSILLECTOMY AND ADENOIDECTOMY      TUBAL LIGATION         Family History   Problem Relation Age of Onset    Hypertension Mother     Colon cancer Mother     Bone cancer Mother     COPD Mother     Emphysema Mother     Skin cancer Mother     Glaucoma Mother     Lead poisoning Father         lead poisoning in lungs     Thyroid disease  Sister     Depression Sister     Hypertension Sister     Alzheimer's disease Sister     Arthritis Sister     ROBERT disease Brother     Throat cancer Brother     Hypertension Brother     Arthritis Brother     Colon cancer Family     Bone cancer Family     Hypertension Family     Diabetes Sister     Hypertension Sister     Heart failure Sister     Alzheimer's disease Sister     Arthritis Sister     No Known Problems Daughter     No Known Problems Maternal Grandmother     No Known Problems Paternal Grandmother     No Known Problems Maternal Aunt     No Known Problems Maternal Aunt     No Known Problems Maternal Aunt     No Known Problems Maternal Aunt     No Known Problems Maternal Aunt     No Known Problems Paternal Aunt      I have reviewed and agree with the history as documented.    E-Cigarette/Vaping    E-Cigarette Use Never User      E-Cigarette/Vaping Substances    Nicotine No     THC No     CBD No     Flavoring No     Other No     Unknown No      Social History     Tobacco Use    Smoking status: Never     Passive exposure: Never    Smokeless tobacco: Never   Vaping Use    Vaping status: Never Used   Substance Use Topics    Alcohol use: Yes     Comment: Occasionally/social    Drug use: No       Review of Systems   Constitutional:  Negative for activity change, chills, fatigue and fever.   HENT:  Negative for congestion.    Eyes:  Negative for visual disturbance.   Respiratory:  Negative for cough, chest tightness and shortness of breath.    Cardiovascular:  Negative for chest pain.   Gastrointestinal:  Negative for abdominal pain, diarrhea and vomiting.   Genitourinary:  Negative for dysuria.   Skin:  Negative for rash.   Neurological:  Negative for dizziness, weakness and numbness.       Physical Exam  Physical Exam  Constitutional:       General: She is not in acute distress.     Appearance: She is well-developed. She is not ill-appearing, toxic-appearing or diaphoretic.   HENT:      Head: Normocephalic and  atraumatic.      Right Ear: External ear normal.      Left Ear: External ear normal.      Nose: Nose normal.      Mouth/Throat:      Mouth: Mucous membranes are moist.      Pharynx: Oropharynx is clear.   Eyes:      Conjunctiva/sclera: Conjunctivae normal.      Pupils: Pupils are equal, round, and reactive to light.   Cardiovascular:      Rate and Rhythm: Normal rate and regular rhythm.      Heart sounds: Normal heart sounds.   Pulmonary:      Effort: Pulmonary effort is normal. No respiratory distress.      Breath sounds: Normal breath sounds.   Abdominal:      General: Bowel sounds are normal.      Palpations: Abdomen is soft.   Musculoskeletal:         General: Tenderness present. Normal range of motion.      Cervical back: Normal range of motion and neck supple.      Right lower leg: Edema present.      Left lower leg: Edema present.   Skin:     General: Skin is warm.      Capillary Refill: Capillary refill takes less than 2 seconds.      Comments: Some fluid leakage from edematous legs.  Appears to be some venous stasis changes on the anterior aspect of the bilateral lower extremities   Neurological:      Mental Status: She is alert and oriented to person, place, and time.   Psychiatric:         Behavior: Behavior normal.         Vital Signs  ED Triage Vitals [06/24/24 2253]   Temp Pulse Respirations Blood Pressure SpO2   -- 75 19 166/80 97 %      Temp src Heart Rate Source Patient Position - Orthostatic VS BP Location FiO2 (%)   -- Monitor -- Left arm --      Pain Score       --           Vitals:    06/24/24 2253   BP: 166/80   Pulse: 75         Visual Acuity      ED Medications  Medications   triamcinolone (KENALOG) 0.1 % cream ( Topical Given 6/25/24 0015)   furosemide (LASIX) injection 20 mg (20 mg Intravenous Given 6/24/24 0131)       Diagnostic Studies  Results Reviewed       Procedure Component Value Units Date/Time    B-Type Natriuretic Peptide(BNP) [[id number]]  (Normal) Collected: 06/24/24 2317    Lab  Status: Final result Specimen: Blood from Arm, Right Updated: 06/24/24 2344     BNP 49 pg/mL     Comprehensive metabolic panel [[id number]] Collected: 06/24/24 2317    Lab Status: Final result Specimen: Blood from Arm, Right Updated: 06/24/24 2340     Sodium 140 mmol/L      Potassium 3.7 mmol/L      Chloride 105 mmol/L      CO2 28 mmol/L      ANION GAP 7 mmol/L      BUN 16 mg/dL      Creatinine 0.92 mg/dL      Glucose 108 mg/dL      Calcium 9.5 mg/dL      AST 14 U/L      ALT 14 U/L      Alkaline Phosphatase 73 U/L      Total Protein 7.0 g/dL      Albumin 4.3 g/dL      Total Bilirubin 0.39 mg/dL      eGFR 63 ml/min/1.73sq m     Narrative:      [employer] guidelines for Chronic Kidney Disease (CKD):     Stage 1 with normal or high GFR (GFR > 90 mL/min/1.73 square meters)    Stage 2 Mild CKD (GFR = 60-89 mL/min/1.73 square meters)    Stage 3A Moderate CKD (GFR = 45-59 mL/min/1.73 square meters)    Stage 3B Moderate CKD (GFR = 30-44 mL/min/1.73 square meters)    Stage 4 Severe CKD (GFR = 15-29 mL/min/1.73 square meters)    Stage 5 End Stage CKD (GFR <15 mL/min/1.73 square meters)  Note: GFR calculation is accurate only with a steady state creatinine    CBC and differential [[id number]] Collected: 06/24/24 2317    Lab Status: Final result Specimen: Blood from Arm, Right Updated: 06/24/24 2321     WBC 6.77 Thousand/uL      RBC 4.71 Million/uL      Hemoglobin 13.9 g/dL      Hematocrit 42.3 %      MCV 90 fL      MCH 29.5 pg      MCHC 32.9 g/dL      RDW 12.9 %      MPV 9.3 fL      Platelets 223 Thousands/uL      nRBC 0 /100 WBCs      Segmented % 61 %      Immature Grans % 0 %      Lymphocytes % 27 %      Monocytes % 8 %      Eosinophils Relative 3 %      Basophils Relative 1 %      Absolute Neutrophils 4.14 Thousands/µL      Absolute Immature Grans 0.01 Thousand/uL      Absolute Lymphocytes 1.81 Thousands/µL      Absolute Monocytes 0.57 Thousand/µL      Eosinophils Absolute 0.20 Thousand/µL       Basophils Absolute 0.04 Thousands/µL                    No orders to display              Procedures  Procedures         ED Course                                             Medical Decision Making  Patient noted to have significant peripheral edema.  BNP and renal function labs indicate no new significant heart failure or LARRY.  Started on low-dose Lasix for few days.  Discussed low-sodium diet and compression stockings.  There may be a little bit of venous stasis superimposed or possibly just some general mild dermatitis.  Patient given topical steroid.  There are no signs of infection currently either on examination, review of systems, or blood work. Discussed warning signs and symptoms with the patient as well as when to return to the emergency department versus follow up with [facility]. Patient states understanding and agreement with the plan.  This note was completed using dictation software.       Problems Addressed:  Peripheral edema: acute illness or injury  Rash: acute illness or injury    Amount and/or Complexity of Data Reviewed  External Data Reviewed: notes.  Labs: ordered.    Risk  OTC drugs.  Prescription drug management.             Disposition  Final diagnoses:   Peripheral edema   Rash     Time reflects when diagnosis was documented in both MDM as applicable and the Disposition within this note       Time User Action Codes Description Comment    6/24/2024 11:49 PM Garrett Vergara Add [R60.0] Peripheral edema     6/24/2024 11:49 PM Garrett Vergara Add [R21] Rash           ED Disposition       ED Disposition   Discharge    Condition   Stable    Date/Time   Mon Jun 24, 2024 11:49 PM    Comment   Sri Mcgrath discharge to home/self care.                   Follow-up Information       Follow up With Specialties Details Why Contact Sharri Hadley, DO Internal Medicine In 1 day  [address] [place] [id number]  [phone]              Discharge Medication List as of 6/24/2024 11:50  PM        START taking these medications    Details   furosemide (LASIX) 20 mg tablet Take 1 tablet (20 mg total) by mouth daily, Starting Mon 6/24/2024, Normal           CONTINUE these medications which have NOT CHANGED    Details   azelastine (ASTELIN) 0.1 % nasal spray 1 spray into each nostril 2 (two) times a day Use in each nostril as directed, Starting Sat 12/23/2023, Normal      Calcium Carbonate (CALCIUM 600 PO) Take by mouth daily  , Historical Med      Chlorpheniramine-APAP (CORICIDIN) 2-325 MG TABS Take 1 tablet by mouth 4 (four) times a day as needed (cough), Starting Sat 11/25/2023, Normal      Cholecalciferol (VITAMIN D3 PO) Take 50 mcg by mouth daily  , Historical Med      clotrimazole-betamethasone (LOTRISONE) 1-0.05 % cream Apply topically 2 (two) times a day for 2 weeks to abdomen rash and 6 weeks to vulvar rash., Starting Wed 6/7/2023, Normal      estradiol (ESTRACE) 0.1 mg/g vaginal cream APPLY 1 GM INTO VAGINA NIGHTLY FOR 1 WEEK, THEN APPLY EVERY THIRD NIGHT, Normal      fluticasone (FLONASE) 50 mcg/act nasal spray 1 spray into each nostril daily, Starting Sat 12/23/2023, Normal      gabapentin (Neurontin) 100 mg capsule Take 1 tab TID for 1 week then increase to 2 tabs TID, Normal      montelukast (SINGULAIR) 10 mg tablet take 1 tablet by mouth at bedtime, Normal      nystatin (MYCOSTATIN) powder Apply topically 3 (three) times a day as needed (rash) . Apply at least daily in summer to prevent rash., Starting Wed 6/7/2023, Normal      propranolol (INDERAL) 20 mg tablet Take 1 tablet (20 mg total) by mouth every 12 (twelve) hours, Starting Mon 3/4/2024, Normal      rosuvastatin (CRESTOR) 10 MG tablet take 1 tablet by mouth once daily, Starting Fri 6/21/2024, Normal      tiZANidine (ZANAFLEX) 2 mg tablet Take 2 mg by mouth every 8 (eight) hours as needed for muscle spasms, Historical Med      traZODone (DESYREL) 100 mg tablet take 1 tablet by mouth at bedtime, Normal             No discharge  procedures on file.    PDMP Review         Value Time User    PDMP Reviewed  Yes 10/21/2022 11:47 AM Sea Hadley DO            ED Provider  Electronically Signed by             Garrett Vergara MD  06/26/24 0132

## 2024-06-25 NOTE — PATIENT INSTRUCTIONS
Problem List Items Addressed This Visit          Surgery/Wound/Pain    Bilateral lower extremity edema - Primary (Chronic)     6/24/2024 to the ER   Leg Swelling       Bilateral leg swelling    Rash       Itchy red rash to bilateral lower legs   Given:  Triamcinolone cream  Furosemide 20mg IV    6/25/2024  + 3 on her right  + 2 on her left  Positive redness, warmth, tenderness  Continue compression stockings   Will start her on prednisone taper to help decrease the edema  Will start her on lasix 40mg daily   Will start her on potassium 20 mEq daily   Will start her on doxycycline for 14 days   Will start her on diflucan   Will give her a kenalog injection  Will give her a toradol injection              Relevant Medications    doxycycline hyclate (VIBRAMYCIN) 100 mg capsule    triamcinolone acetonide (KENALOG-40) 40 mg/mL injection 40 mg    ketorolac (TORADOL) 60 mg/2 mL IM injection 30 mg    fluconazole (DIFLUCAN) 200 mg tablet    predniSONE 10 mg tablet    furosemide (LASIX) 40 mg tablet    potassium chloride (Klor-Con M20) 20 mEq tablet    Other Relevant Orders    Protime-INR    Comprehensive metabolic panel    CBC and differential       Other    Vitamin D deficiency (Chronic)     Will check lab  May benefit from both Vitamin D3 and Vitamin D2         Relevant Orders    Vitamin D 25 hydroxy    B12 deficiency (Chronic)     Will check her lab  May need to start her on oral supplement         Relevant Orders    Vitamin B12    Cellulitis of lower extremity (Chronic)     6/24/2024 to the ER   Leg Swelling       Bilateral leg swelling    Rash       Itchy red rash to bilateral lower legs   Given:  Triamcinolone cream  Furosemide 20mg IV    6/25/2024  + 3 on her right  + 2 on her left  Positive redness, warmth, tenderness  Continue compression stockings   Will start her on prednisone taper to help decrease the edema  Will start her on lasix 40mg daily   Will start her on potassium 20 mEq daily   Will start her on  doxycycline for 14 days   Will start her on diflucan   Will give her a kenalog injection  Will give her a toradol injection          Relevant Medications    doxycycline hyclate (VIBRAMYCIN) 100 mg capsule    triamcinolone acetonide (KENALOG-40) 40 mg/mL injection 40 mg    ketorolac (TORADOL) 60 mg/2 mL IM injection 30 mg    fluconazole (DIFLUCAN) 200 mg tablet    predniSONE 10 mg tablet    furosemide (LASIX) 40 mg tablet    potassium chloride (Klor-Con M20) 20 mEq tablet    Other Relevant Orders    Protime-INR    Comprehensive metabolic panel    CBC and differential

## 2024-06-25 NOTE — ASSESSMENT & PLAN NOTE
6/24/2024 to the ER   Leg Swelling       Bilateral leg swelling    Rash       Itchy red rash to bilateral lower legs   Given:  Triamcinolone cream  Furosemide 20mg IV    6/25/2024  + 3 on her right  + 2 on her left  Positive redness, warmth, tenderness  Continue compression stockings   Will start her on prednisone taper to help decrease the edema  Will start her on lasix 40mg daily   Will start her on potassium 20 mEq daily   Will start her on doxycycline for 14 days   Will start her on diflucan   Will give her a kenalog injection  Will give her a toradol injection   
6/24/2024 to the ER   Leg Swelling       Bilateral leg swelling    Rash       Itchy red rash to bilateral lower legs   Given:  Triamcinolone cream  Furosemide 20mg IV    6/25/2024  + 3 on her right  + 2 on her left  Positive redness, warmth, tenderness  Continue compression stockings   Will start her on prednisone taper to help decrease the edema  Will start her on lasix 40mg daily   Will start her on potassium 20 mEq daily   Will start her on doxycycline for 14 days   Will start her on diflucan   Will give her a kenalog injection  Will give her a toradol injection       
Will check her lab  May need to start her on oral supplement  
Will check lab  May benefit from both Vitamin D3 and Vitamin D2  
medication therapy

## 2024-07-02 ENCOUNTER — OFFICE VISIT (OUTPATIENT)
Dept: INTERNAL MEDICINE CLINIC | Facility: CLINIC | Age: 69
End: 2024-07-02
Payer: MEDICARE

## 2024-07-02 VITALS
HEART RATE: 64 BPM | WEIGHT: 183.25 LBS | HEIGHT: 60 IN | BODY MASS INDEX: 35.98 KG/M2 | DIASTOLIC BLOOD PRESSURE: 80 MMHG | TEMPERATURE: 97.5 F | OXYGEN SATURATION: 96 % | SYSTOLIC BLOOD PRESSURE: 110 MMHG

## 2024-07-02 DIAGNOSIS — T50.905A ADVERSE EFFECT OF DRUG, INITIAL ENCOUNTER: ICD-10-CM

## 2024-07-02 DIAGNOSIS — L03.119 CELLULITIS OF LOWER EXTREMITY, UNSPECIFIED LATERALITY: Chronic | ICD-10-CM

## 2024-07-02 DIAGNOSIS — L56.8 PHOTOSENSITIVITY DERMATITIS: Primary | ICD-10-CM

## 2024-07-02 DIAGNOSIS — R23.3 EASY BRUISING: ICD-10-CM

## 2024-07-02 DIAGNOSIS — R60.0 BILATERAL LOWER EXTREMITY EDEMA: Chronic | ICD-10-CM

## 2024-07-02 PROCEDURE — G2211 COMPLEX E/M VISIT ADD ON: HCPCS | Performed by: INTERNAL MEDICINE

## 2024-07-02 PROCEDURE — 99213 OFFICE O/P EST LOW 20 MIN: CPT | Performed by: INTERNAL MEDICINE

## 2024-07-02 NOTE — PROGRESS NOTES
Assessment/Plan:  Problem List Items Addressed This Visit          Surgery/Wound/Pain    RESOLVED: Bilateral lower extremity edema (Chronic)       Other    RESOLVED: Cellulitis of lower extremity (Chronic)     Other Visit Diagnoses       Photosensitivity dermatitis    -  Primary    Easy bruising        Adverse effect of drug, initial encounter                 Diagnoses and all orders for this visit:    Photosensitivity dermatitis    Bilateral lower extremity edema    Cellulitis of lower extremity, unspecified laterality    Easy bruising    Adverse effect of drug, initial encounter        No problem-specific Assessment & Plan notes found for this encounter.    A/P: Stable and suspect her bruising and rash are due to the steroids and doxy with sun exposure. Labs in the ER ok with platelets and INR ok. Cellulitis and edema resolved. Stop doxy and quickly wean off the steroids. Monitor and RTC two weeks for f/u.     Subjective:      Patient ID: Sri Mcgrath is a 68 y.o. female.    WF presents for f/u bilat LE cellulitis and edema. Pt placed on doxy and steroids. LE signs/symptoms improved, but notes easy bruising and severe redness and tingling in the UE. No fever or chills. NO other new meds. No blood in the urine or stools. No nose bleeds. Labs in the ER ok.         The following portions of the patient's history were reviewed and updated as appropriate:   She has a past medical history of Allergic, Arthritis, Cataract, Colon polyp, Depression, Fluid retention, Headache, acute, Heart murmur, Hyperlipidemia, Hypertension, Memory loss, Pedal edema, Pneumonia, PONV (postoperative nausea and vomiting), Rotator cuff tear, left, Sleep apnea (2021), Wears glasses, and Wears partial dentures.,  does not have any pertinent problems on file.,   has a past surgical history that includes Ankle surgery (Right); Tonsillectomy; Tubal ligation; Hysterectomy; Ovarian cyst removal; Carpal tunnel release (Bilateral); Hand surgery  (Right); Abdominal adhesion surgery; Knee arthroscopy (Left); Appendectomy; Colonoscopy; Hemorroidectomy; Repair rectocele; pr surgical arthroscopy shoulder w/rotator cuff rpr (Left, 2/19/2018); Carpal tunnel release (Left); Carpal tunnel release (Right); Ileostomy; Anal sphincteroplasty; Knee arthrocentesis; Tonsillectomy and adenoidectomy; Joint replacement; and Replacement total knee (Left, 09/28/2021).,  family history includes Alzheimer's disease in her sister and sister; Arthritis in her brother, sister, and sister; Bone cancer in her family and mother; COPD in her mother; Colon cancer in her family and mother; Depression in her sister; Diabetes in her sister; Emphysema in her mother; ROBERT disease in her brother; Glaucoma in her mother; Heart failure in her sister; Hypertension in her brother, family, mother, sister, and sister; Lead poisoning in her father; No Known Problems in her daughter, maternal aunt, maternal aunt, maternal aunt, maternal aunt, maternal aunt, maternal grandmother, paternal aunt, and paternal grandmother; Skin cancer in her mother; Throat cancer in her brother; Thyroid disease in her sister.,   reports that she has never smoked. She has never been exposed to tobacco smoke. She has never used smokeless tobacco. She reports current alcohol use. She reports that she does not use drugs.,  is allergic to keflex [cephalexin], contrast [iodinated contrast media], morphine and codeine, penicillins, benadryl [diphenhydramine], ciprofloxacin, clindamycin, doxycycline, and erythromycin..  Current Outpatient Medications   Medication Sig Dispense Refill    azelastine (ASTELIN) 0.1 % nasal spray 1 spray into each nostril 2 (two) times a day Use in each nostril as directed 30 mL 0    Calcium Carbonate (CALCIUM 600 PO) Take by mouth daily        CALCIUM PO       Chlorpheniramine-APAP (CORICIDIN) 2-325 MG TABS Take 1 tablet by mouth 4 (four) times a day as needed (cough) 56 tablet 0    Cholecalciferol  (VITAMIN D3 PO) Take 50 mcg by mouth daily        Cholecalciferol (Vitamin D3) 25 MCG TABS 2000mcg      clotrimazole-betamethasone (LOTRISONE) 1-0.05 % cream Apply topically 2 (two) times a day for 2 weeks to abdomen rash and 6 weeks to vulvar rash. (Patient taking differently: Apply topically if needed for 2 weeks to abdomen rash and 6 weeks to vulvar rash.) 30 g 2    estradiol (ESTRACE) 0.1 mg/g vaginal cream APPLY 1 GM INTO VAGINA NIGHTLY FOR 1 WEEK, THEN APPLY EVERY THIRD NIGHT 42.5 g 5    fluticasone (FLONASE) 50 mcg/act nasal spray 1 spray into each nostril daily 18.2 mL 0    furosemide (LASIX) 40 mg tablet Take 1 tablet (40 mg total) by mouth daily 30 tablet 1    gabapentin (Neurontin) 100 mg capsule Take 1 tab TID for 1 week then increase to 2 tabs  capsule 2    montelukast (SINGULAIR) 10 mg tablet take 1 tablet by mouth at bedtime 90 tablet 3    nystatin (MYCOSTATIN) powder Apply topically 3 (three) times a day as needed (rash) . Apply at least daily in summer to prevent rash. 45 g 3    potassium chloride (Klor-Con M20) 20 mEq tablet Take 1 tablet (20 mEq total) by mouth daily 30 tablet 1    predniSONE 10 mg tablet Take 4 tablets (40 mg total) by mouth daily for 5 days, THEN 3 tablets (30 mg total) daily for 5 days, THEN 2 tablets (20 mg total) daily for 5 days, THEN 1 tablet (10 mg total) daily for 5 days. 50 tablet 0    propranolol (INDERAL) 20 mg tablet Take 1 tablet (20 mg total) by mouth every 12 (twelve) hours 180 tablet 2    rosuvastatin (CRESTOR) 10 MG tablet take 1 tablet by mouth once daily 90 tablet 1    tiZANidine (ZANAFLEX) 2 mg tablet Take 2 mg by mouth every 8 (eight) hours as needed for muscle spasms      traZODone (DESYREL) 100 mg tablet take 1 tablet by mouth at bedtime 90 tablet 1    triamcinolone (KENALOG) 0.1 % cream        Current Facility-Administered Medications   Medication Dose Route Frequency Provider Last Rate Last Admin    cyanocobalamin injection 1,000 mcg  1,000 mcg  Intramuscular Q30 Days    1,000 mcg at 05/10/24 0900       Review of Systems   Constitutional:  Negative for activity change, chills, diaphoresis, fatigue and fever.   Respiratory:  Negative for cough, chest tightness, shortness of breath and wheezing.    Cardiovascular:  Negative for chest pain, palpitations and leg swelling.   Gastrointestinal:  Negative for abdominal pain, constipation, diarrhea, nausea and vomiting.   Genitourinary:  Negative for difficulty urinating, dysuria and frequency.   Musculoskeletal:  Negative for arthralgias, gait problem and myalgias.   Skin:  Positive for rash.   Neurological:  Negative for light-headedness and headaches.   Psychiatric/Behavioral:  Negative for confusion. The patient is not nervous/anxious.        PHQ-2/9 Depression Screening            Objective:  Vitals:    07/02/24 0819   BP: 110/80   BP Location: Right arm   Patient Position: Sitting   Pulse: 64   Temp: 97.5 °F (36.4 °C)   TempSrc: Tympanic   SpO2: 96%   Weight: 83.1 kg (183 lb 4 oz)   Height: 5' (1.524 m)     Body mass index is 35.79 kg/m².     Physical Exam  Vitals and nursing note reviewed.   Constitutional:       General: She is not in acute distress.     Appearance: Normal appearance. She is not ill-appearing.   HENT:      Head: Normocephalic and atraumatic.      Mouth/Throat:      Mouth: Mucous membranes are moist.   Eyes:      Extraocular Movements: Extraocular movements intact.      Conjunctiva/sclera: Conjunctivae normal.      Pupils: Pupils are equal, round, and reactive to light.   Cardiovascular:      Rate and Rhythm: Normal rate and regular rhythm.      Heart sounds: Normal heart sounds. No murmur heard.  Pulmonary:      Effort: Pulmonary effort is normal. No respiratory distress.      Breath sounds: Normal breath sounds. No wheezing, rhonchi or rales.   Skin:     Findings: Bruising and rash present.      Comments: Bilat UE with erythematous macular confluence of the arms. No vesicles. Multiple  bruises on the UE as well.    Neurological:      General: No focal deficit present.      Mental Status: She is alert and oriented to person, place, and time. Mental status is at baseline.   Psychiatric:         Mood and Affect: Mood normal.         Behavior: Behavior normal.         Thought Content: Thought content normal.         Judgment: Judgment normal.

## 2024-07-05 ENCOUNTER — OFFICE VISIT (OUTPATIENT)
Dept: NEUROLOGY | Facility: CLINIC | Age: 69
End: 2024-07-05
Payer: MEDICARE

## 2024-07-05 VITALS — BODY MASS INDEX: 35.82 KG/M2 | WEIGHT: 183.4 LBS | DIASTOLIC BLOOD PRESSURE: 79 MMHG | SYSTOLIC BLOOD PRESSURE: 130 MMHG

## 2024-07-05 DIAGNOSIS — R25.9 MIXED ACTION AND RESTING TREMOR: ICD-10-CM

## 2024-07-05 DIAGNOSIS — F33.9 DEPRESSION, RECURRENT (HCC): ICD-10-CM

## 2024-07-05 DIAGNOSIS — R20.2 PARESTHESIAS: ICD-10-CM

## 2024-07-05 DIAGNOSIS — E53.8 B12 DEFICIENCY: Primary | ICD-10-CM

## 2024-07-05 PROCEDURE — 99214 OFFICE O/P EST MOD 30 MIN: CPT | Performed by: NURSE PRACTITIONER

## 2024-07-05 RX ORDER — TOPIRAMATE 25 MG/1
TABLET ORAL
Qty: 60 TABLET | Refills: 5 | Status: SHIPPED | OUTPATIENT
Start: 2024-07-05

## 2024-07-05 RX ORDER — MAGNESIUM 200 MG
1000 TABLET ORAL DAILY
Qty: 90 TABLET | Refills: 2 | Status: SHIPPED | OUTPATIENT
Start: 2024-07-05

## 2024-07-05 RX ORDER — TRAZODONE HYDROCHLORIDE 100 MG/1
TABLET ORAL
Qty: 90 TABLET | Refills: 1 | Status: SHIPPED | OUTPATIENT
Start: 2024-07-05

## 2024-07-05 NOTE — PROGRESS NOTES
DATE OF PROCEDURE:  01/03/2019    SURGEON:  Kuldeep Burr M.D.    PREOPERATIVE DIAGNOSIS:  Pooja colostomy.    POSTOPERATIVE DIAGNOSIS:  Pooja colostomy with the appendix stuck down in  the previous adhesions.     PROCEDURES PERFORMED:    1. Reversal of Pooja colostomy.  2. Incidental appendectomy.    ASSISTANT:  Rajwinder Merrill PA-C.  Note that there were no residents available  to cover the case.     ANESTHESIA:  General endotracheal.    ESTIMATED BLOOD LOSS:  50 mL.    COMPLICATION:  None.    SPECIMENS:  Anastomotic rings as well as appendix.    DISPOSITION:  To Recovery in stable condition.    INDICATION FOR PROCEDURE:  The patient is a 31-year-old male, who in the past I  did a Caal's procedure for severe perforated diverticulitis.  He recovered  slowly from that operation, but eventually once he was ready to have the  colostomy reversed, he presented back to my office.  We discussed reversal of  Pooja colostomy.  He had a colonoscopy less than 5 years prior that showed  nothing other than some diverticulitis.  We discussed the option of having  another colonoscopy prior to reversal of colostomy, but he declined.  Possible complications of  surgery including, but not limited to, bleeding, pain, infection, injury to  adjacent structure, need for additional surgery, anastomotic leak, anastomotic  stricture, wound complications, as well as the possibility of a postoperative  ileus and prolonged hospital stay.  All discussed with the patient.  He  consented to surgery.     DESCRIPTION OF PROCEDURE:  The patient was brought to the operating room.  After having a bowel prep the day before, he was brought to the operating room  on 01/03/2019, placed on the operating table in supine fashion.  Anesthetized  and intubated without difficulty.  Placed in lithotomy with his legs in  Yellofin stirrups.  A Rodríguez catheter inserted sterilely.  Abdomen and perineum  prepped and draped in sterile fashion.  At  Patient ID: Sri Mcgrath is a 68 y.o. female.    Assessment/Plan:    Mixed action and resting tremor  Patient returns for follow-up regarding mixed action and resting tremor.  In the past she has tried primidone and Sinemet with no improvement in her tremor, she did obtain a DaTscan which was normal   Thus being treated as essential tremor.  She has noted a benefit with propranolol however further increases have been avoided due to borderline low heart rate.  Last visit she was started on gabapentin however has not noted any improvement in tremors and has been experiencing increased leg swelling which may be a medication side effect.  Due to this we will plan to taper her off gabapentin.  We did discuss other options medication for tremor as she continues to find her tremor bothersome.  Medication considerations include topiramate, zonisamide and mirtazapine.  After discussion opted to trial topiramate.    Plan:  -Wean off gabapentin, take 100 mg 3 times daily for 1 week then stop.  If swelling persist contact PCP.  -After this start topiramate 25 mg 1 tab at bedtime for 1 week then 1 tab twice daily.  Contact the office for any side effects or no improvement.  If no improvement will consider further increases.  -Continue propranolol 20 mg twice daily.      Plan for follow-up in 4 months. To contact the office sooner with any concerns or worsening symptoms.    B12 deficiency  Low B12 noted on lab workup for paresthesias.  She is completed B12 injections, she did not start oral supplement.  B12 remains low normal.  She was advised to start oral B12 supplement daily.    Paresthesias  Patient with intermittent numbness and tingling that radiates down her arm to her fingertips along with intermittent symptoms in the feet.  She did have an EMG of the upper extremities which was normal.  She has noted some improvement in her symptoms with replacement of B12.  Other lab workup for reversible causes of neuropathy were  that point, his previous scar in the  midline was excised.  We carried that down to the fascia.  We opened the fascia  sharply with scalpel.  Peritoneal cavity was entered.  There were few adhesions  to the anterior abdominal wall.  These were taken down, but really the  adhesions in the abdomen were not that bad. Once this was done, we could bring  the small bowel out of the pelvis.  We could see our rectal stump with our  prolene sutures marking it.  At that point, we turned our attention back to the  ostomy.  We had a wound protector device in the midline wound to cover the skin  and subcutaneous tissue.  We made an elliptical incision around the ostomy  site.  We dissected down and freed the ostomy from the anterior abdominal wall.  We then cut off the end of it and placed a 28 EEA anvil in place with after  placing a 2-0 Prolene pursestring suture there.  We made sure we had enough  length for our descending colon to reach our rectal stump without tension and  we did.  At that point, we placed our EEA stapler up through the anus to the  rectal stump.  We opened our stapler, attached to the anvil, fired the stapler,  and we had 2 intact anastomotic rings.  There was no tension on our  anastomosis.  We did an air insufflation test with our proximal colon clamped  off and our anastomosis under water.  There were no bubbles and we could  distend our anastomosis with air and at that point, we then irrigated the  abdomen copiously.  We could see that the appendix was stuck down in this area  as well and I was concerned about the manipulation causing appendicitis and  needing to go back into this patient's abdomen in the future.  Therefore, we  elected to do an incidental appendectomy.  We transected the mesoappendix with  LigaSure.  We transected the appendix at the cecal wall with a TA30 stapler and  over sewed that with silk.  At that point, we closed our fascia at the ostomy  site in 2 layers with #1 PDS.  Both  unrevealing.       Diagnoses and all orders for this visit:    B12 deficiency  -     Cyanocobalamin (B-12) 1000 MCG SUBL; Place 1 tablet (1,000 mcg total) under the tongue in the morning    Mixed action and resting tremor  -     topiramate (Topamax) 25 mg tablet; Take 1 tab at bedtime for 1 week then increase to 1 tab BID    Paresthesias           Subjective:    HPI    Patient is a 68-year-old female with history of anxiety depression who presents for follow-up for tremors.     To review, she noted bilateral hand tremor starting in 2020 that been progressively worsening.  Tremor mainly present with action and can interfere with utensils and drinking.  She is no family history of tremor or parkinsonism.  She has been trialed on primidone up to 300 mg and low-dose Sinemet with no improvement. She did obtain KG scan which was negative.      Last office visit 3/2024 in which she was started on gabapentin for tremor and paresthesias. was to obtain labs.      Prior medication trials:  Primidone-no improvement in tremor  Sinemet 25/100 2 tabs TID-no improvement in tremor     Current Medications:  propanolol 20 mg 1 tabs mg BID  gabapentin 200 mg TID     Interval History:  She has not felt gabapentin has been helpful.  She continues have difficulty with certain tasks due tremor. Today she had difficulty opening string cheese due to her tremor.   She can spill and drop food/drink at times due to tremor.   Handwriting is sloppy, able to sign her name and write checks.   No issue with Adls due to tremor.  No head or vocal tremor.       She is not on any b12 supplement, finished injections.   She has noted less numbness and tingling in the arms.   She has felt some increased tingling in to her right toe since last visit. She did recently have cellulitis in that area.     Has noted some leg swelling.      Prior work-up:  labs   6/2024 b12 338  3/2024: sjogrens, MMA, sed rate, spep, b1, b6 normal/neg, b12 165  MRI brain: 1.  No  acute infarction, intracranial hemorrhage or mass effect.  2.  A few white matter lesions are most likely mild, chronic microangiopathy.     MRI cervical spine: Cervical spondylitic degenerative change with primarily left-sided facet hypertrophic change.  There is no cord compression.  Multilevel primarily left-sided foraminal narrowing most pronounced at the C4-5 level, moderate.     Small central disc extrusion at C6-7 partially effacing the ventral CSF space     EMG of UE 10/2023: normal    The following portions of the patient's history were reviewed and updated as appropriate: allergies, current medications, past family history, past medical history, past social history, past surgical history and problem list.          Objective:    Blood pressure 130/79, weight 83.2 kg (183 lb 6.4 oz), not currently breastfeeding.    Physical Exam  Constitutional:       General: She is awake.   Eyes:      General: Lids are normal.      Extraocular Movements: Extraocular movements intact.      Pupils: Pupils are equal, round, and reactive to light.   Neurological:      Mental Status: She is alert.      Deep Tendon Reflexes:      Reflex Scores:       Bicep reflexes are 2+ on the right side and 2+ on the left side.       Brachioradialis reflexes are 2+ on the right side and 2+ on the left side.       Patellar reflexes are 2+ on the right side and 2+ on the left side.       Achilles reflexes are 0 on the right side and 0 on the left side.  Psychiatric:         Speech: Speech normal.         Neurological Exam  Mental Status  Awake and alert. Oriented to person, place, time and situation. Speech is normal. Language is fluent with no aphasia.    Cranial Nerves  CN III, IV, VI: Extraocular movements intact bilaterally. Normal lids and orbits bilaterally. Pupils equal round and reactive to light bilaterally.  CN V: Facial sensation is normal.  CN VII: Full and symmetric facial movement.  CN VIII: Hearing is normal.  CN IX, X: Palate  from intraabdominally as well as through  the wound.  We irrigated the subcutaneous tissue there copiously, closed the  fascia in the midline wound with #1 PDS.  After we confirmed nasogastric tube  in the stomach, we irrigated subcutaneous tissue there copiously, closed the  skin with staples.  The patient tolerated the procedure well.         DATE AND TIME Kuldeep Burr M.D.      LINDEN/GUSTAVO-#[id number]  DD:  01/03/2019 09:18:11      DT:  01/03/2019 09:37:50        [facility]                      MRN#: [id number]  ROOM: [facility]  ACCT#: [id number]  REPORT OF OPERATION   elevates symmetrically  CN XI: Shoulder shrug strength is normal.  CN XII: Tongue midline without atrophy or fasciculations.    Motor  Normal muscle bulk throughout. Normal muscle tone. Strength is 5/5 in all four extremities except as noted.  4/5  strength b/l R>L.    Sensory  Light touch is normal in upper and lower extremities. Pinprick is normal in upper and lower extremities. Temperature is normal in upper and lower extremities. Vibration is normal in upper and lower extremities.     Reflexes                                            Right                      Left  Brachioradialis                    2+                         2+  Biceps                                 2+                         2+  Patellar                                2+                         2+  Achilles                                0                         0    Coordination  Right: Finger-to-nose normal. Rapid alternating movement abnormality:Left: Finger-to-nose normal. Rapid alternating movement abnormality:  No resting tremor  Mild intentional tremor on FTN bilaterally L 2, R 1  no postural tremors.  No bradykinesia   No rigidity or cogwheeling.  Very rare head tremor noted, no lip tremor noted today  .    Gait   Able to rise from chair without using arms.  Good stride and speed, normal arm swing, .      I have personally reviewed the ROS performed by the MA.    ROS:    Review of Systems   Constitutional:  Positive for fatigue. Negative for appetite change and fever.   HENT: Negative.  Negative for hearing loss, tinnitus, trouble swallowing and voice change.    Eyes: Negative.  Negative for photophobia, pain and visual disturbance.   Respiratory: Negative.  Negative for shortness of breath.    Cardiovascular: Negative.  Negative for palpitations.   Gastrointestinal: Negative.  Negative for nausea and vomiting.   Endocrine: Negative.  Negative for cold intolerance.   Genitourinary: Negative.  Negative for dysuria, frequency  and urgency.   Musculoskeletal:  Positive for neck pain (States off and on) and neck stiffness (States off and on). Negative for back pain, gait problem and myalgias.        Balance Issues, has gotten a little worse     Skin: Negative.  Negative for rash.   Allergic/Immunologic: Negative.    Neurological:  Positive for dizziness (Increased), tremors (Both Arms, Left Side has gotten worse), light-headedness (Increased) and numbness (Down her Right Leg and into the toes). Negative for seizures, syncope, facial asymmetry, speech difficulty, weakness and headaches.   Hematological:  Bruises/bleeds easily.   Psychiatric/Behavioral:  Positive for sleep disturbance (States she doesnt sleep very well , even with meds). Negative for confusion and hallucinations.    All other systems reviewed and are negative.

## 2024-07-05 NOTE — ASSESSMENT & PLAN NOTE
Patient with intermittent numbness and tingling that radiates down her arm to her fingertips along with intermittent symptoms in the feet.  She did have an EMG of the upper extremities which was normal.  She has noted some improvement in her symptoms with replacement of B12.  Other lab workup for reversible causes of neuropathy were unrevealing.

## 2024-07-05 NOTE — PATIENT INSTRUCTIONS
Take over the counter b12 1000 mcg if not covered by insurance     Wean off gabapentin take 100 mg 3x/day for 1 week then stop. If leg swelling persists discuss with PCP     Next week start topiramate 25 mg take 1 tab at bedtime for 1 week then 1 tab twice daily. If no improvement in tremor or side effect call office.

## 2024-07-05 NOTE — ASSESSMENT & PLAN NOTE
Patient returns for follow-up regarding mixed action and resting tremor.  In the past she has tried primidone and Sinemet with no improvement in her tremor, she did obtain a DaTscan which was normal   Thus being treated as essential tremor.  She has noted a benefit with propranolol however further increases have been avoided due to borderline low heart rate.  Last visit she was started on gabapentin however has not noted any improvement in tremors and has been experiencing increased leg swelling which may be a medication side effect.  Due to this we will plan to taper her off gabapentin.  We did discuss other options medication for tremor as she continues to find her tremor bothersome.  Medication considerations include topiramate, zonisamide and mirtazapine.  After discussion opted to trial topiramate.    Plan:  -Wean off gabapentin, take 100 mg 3 times daily for 1 week then stop.  If swelling persist contact PCP.  -After this start topiramate 25 mg 1 tab at bedtime for 1 week then 1 tab twice daily.  Contact the office for any side effects or no improvement.  If no improvement will consider further increases.  -Continue propranolol 20 mg twice daily.      Plan for follow-up in 4 months. To contact the office sooner with any concerns or worsening symptoms.

## 2024-07-05 NOTE — ASSESSMENT & PLAN NOTE
Low B12 noted on lab workup for paresthesias.  She is completed B12 injections, she did not start oral supplement.  B12 remains low normal.  She was advised to start oral B12 supplement daily.

## 2024-07-09 ENCOUNTER — TELEPHONE (OUTPATIENT)
Dept: INTERNAL MEDICINE CLINIC | Facility: CLINIC | Age: 69
End: 2024-07-09

## 2024-07-09 NOTE — TELEPHONE ENCOUNTER
----- Message from [place] DO sent at 7/2/2024  8:51 AM EDT -----  Call pt in one week and get update on her rash and bruising

## 2024-07-10 NOTE — DISCHARGE INSTRUCTIONS
Imelda Bartlett is a 24 year old female presenting to the walk-in clinic today alone for STI, UA SCREENING.  Onset WEEK.    Treatment tried prior to visit: NONE    Swabs/Specimens collected during rooming process:  None    Work, School or  note needed: No    New vs Established: Established    Patient would like communication of their results via:    [employer]    Cell Phone:   Telephone Information:   Mobile [phone]     Okay to leave a message containing results? Yes     Please continue to monitor your symptoms  Return for worsening pain  Return for nausea vomiting diarrhea fever  Continue to take Tylenol as necessary for pain  Writer chaperoned PELVIC exam     Adequate

## 2024-07-17 ENCOUNTER — OFFICE VISIT (OUTPATIENT)
Dept: INTERNAL MEDICINE CLINIC | Facility: CLINIC | Age: 69
End: 2024-07-17
Payer: MEDICARE

## 2024-07-17 VITALS
OXYGEN SATURATION: 97 % | BODY MASS INDEX: 36.32 KG/M2 | HEIGHT: 60 IN | HEART RATE: 59 BPM | SYSTOLIC BLOOD PRESSURE: 118 MMHG | TEMPERATURE: 97 F | DIASTOLIC BLOOD PRESSURE: 64 MMHG | WEIGHT: 185 LBS

## 2024-07-17 DIAGNOSIS — R23.3 EASY BRUISING: ICD-10-CM

## 2024-07-17 DIAGNOSIS — L56.8 PHOTOSENSITIVITY DERMATITIS: Primary | ICD-10-CM

## 2024-07-17 DIAGNOSIS — L03.119 CELLULITIS OF LOWER EXTREMITY, UNSPECIFIED LATERALITY: ICD-10-CM

## 2024-07-17 PROCEDURE — 99212 OFFICE O/P EST SF 10 MIN: CPT | Performed by: INTERNAL MEDICINE

## 2024-07-17 PROCEDURE — G2211 COMPLEX E/M VISIT ADD ON: HCPCS | Performed by: INTERNAL MEDICINE

## 2024-07-17 NOTE — PROGRESS NOTES
Ambulatory Visit  Name: Sri Mcgrath      : 1955      MRN: [id number]  Encounter Provider: Sea Hadley DO  Encounter Date: 2024   Encounter department: [facility]    Assessment & Plan   1. Photosensitivity dermatitis  2. Easy bruising  3. Cellulitis of lower extremity, unspecified laterality  A/P: Resolving and improving. Suspect all related to drug exposure and sun exposure. Will monitor. RTC as scheduled.      History of Present Illness     WF RTC for f/u acute onset of easy bruising and rash after being placed on abx and steroids with sun exposure. Pt felt to have photosensitivity reaction and bruising due to the meds. Labs were acceptable. No other bleeding. Pt wean off the abx and steroids. Reports slow, but steady improvement will rash resolving and no more bruising. .        Review of Systems   Constitutional:  Negative for activity change, chills, diaphoresis, fatigue and fever.   HENT: Negative.  Negative for nosebleeds.    Eyes:  Negative for visual disturbance.   Respiratory:  Negative for cough, chest tightness, shortness of breath and wheezing.    Cardiovascular:  Negative for chest pain, palpitations and leg swelling.   Gastrointestinal:  Negative for abdominal pain, anal bleeding, blood in stool, constipation, diarrhea, nausea and vomiting.   Genitourinary:  Negative for difficulty urinating, dysuria, frequency, hematuria and vaginal bleeding.   Musculoskeletal:  Negative for arthralgias, gait problem and myalgias.   Neurological:  Negative for dizziness, seizures, syncope, weakness, light-headedness and headaches.   Hematological:  Does not bruise/bleed easily.   Psychiatric/Behavioral:  Negative for confusion. The patient is not nervous/anxious.        Objective     /64   Pulse 59   Temp (!) 97 °F (36.1 °C)   Ht 5' (1.524 m)   Wt 83.9 kg (185 lb)   SpO2 97%   BMI 36.13 kg/m²     Physical Exam  Vitals and nursing note reviewed.   Constitutional:        General: She is not in acute distress.     Appearance: Normal appearance. She is not ill-appearing.   HENT:      Head: Normocephalic and atraumatic.      Mouth/Throat:      Mouth: Mucous membranes are moist.   Eyes:      Extraocular Movements: Extraocular movements intact.      Conjunctiva/sclera: Conjunctivae normal.      Pupils: Pupils are equal, round, and reactive to light.   Cardiovascular:      Rate and Rhythm: Normal rate and regular rhythm.      Heart sounds: Normal heart sounds. No murmur heard.  Pulmonary:      Effort: Pulmonary effort is normal. No respiratory distress.      Breath sounds: Normal breath sounds. No wheezing, rhonchi or rales.   Skin:     Findings: Rash (Bilat forearms, nape of the neck and cheeks with less intense erythematous macular non blanching rash.) present. No bruising.   Neurological:      General: No focal deficit present.      Mental Status: She is alert and oriented to person, place, and time. Mental status is at baseline.   Psychiatric:         Mood and Affect: Mood normal.         Behavior: Behavior normal.         Thought Content: Thought content normal.         Judgment: Judgment normal.       Administrative Statements

## 2024-07-18 DIAGNOSIS — J30.2 SEASONAL ALLERGIES: ICD-10-CM

## 2024-07-18 RX ORDER — MONTELUKAST SODIUM 10 MG/1
TABLET ORAL
Qty: 100 TABLET | Refills: 1 | Status: SHIPPED | OUTPATIENT
Start: 2024-07-18

## 2024-08-08 ENCOUNTER — HOSPITAL ENCOUNTER (OUTPATIENT)
Dept: MAMMOGRAPHY | Facility: HOSPITAL | Age: 69
End: 2024-08-08
Attending: OBSTETRICS & GYNECOLOGY
Payer: MEDICARE

## 2024-08-08 DIAGNOSIS — Z12.31 BREAST CANCER SCREENING BY MAMMOGRAM: ICD-10-CM

## 2024-08-08 PROCEDURE — 77063 BREAST TOMOSYNTHESIS BI: CPT

## 2024-08-08 PROCEDURE — 77067 SCR MAMMO BI INCL CAD: CPT

## 2024-08-27 ENCOUNTER — APPOINTMENT (OUTPATIENT)
Dept: LAB | Facility: MEDICAL CENTER | Age: 69
End: 2024-08-27
Payer: MEDICARE

## 2024-08-27 ENCOUNTER — TELEPHONE (OUTPATIENT)
Dept: GASTROENTEROLOGY | Facility: MEDICAL CENTER | Age: 69
End: 2024-08-27

## 2024-08-27 ENCOUNTER — OFFICE VISIT (OUTPATIENT)
Dept: GASTROENTEROLOGY | Facility: MEDICAL CENTER | Age: 69
End: 2024-08-27
Payer: MEDICARE

## 2024-08-27 VITALS
TEMPERATURE: 97.1 F | HEART RATE: 72 BPM | SYSTOLIC BLOOD PRESSURE: 122 MMHG | OXYGEN SATURATION: 97 % | DIASTOLIC BLOOD PRESSURE: 74 MMHG | HEIGHT: 60 IN | WEIGHT: 185 LBS | BODY MASS INDEX: 36.32 KG/M2

## 2024-08-27 DIAGNOSIS — E53.8 VITAMIN B 12 DEFICIENCY: ICD-10-CM

## 2024-08-27 DIAGNOSIS — R14.0 BLOATING: ICD-10-CM

## 2024-08-27 DIAGNOSIS — K59.04 CHRONIC IDIOPATHIC CONSTIPATION: ICD-10-CM

## 2024-08-27 DIAGNOSIS — A04.8 H. PYLORI INFECTION: ICD-10-CM

## 2024-08-27 DIAGNOSIS — R14.0 BLOATING: Primary | ICD-10-CM

## 2024-08-27 LAB — IGA SERPL-MCNC: 147 MG/DL (ref 66–433)

## 2024-08-27 PROCEDURE — 99214 OFFICE O/P EST MOD 30 MIN: CPT | Performed by: NURSE PRACTITIONER

## 2024-08-27 PROCEDURE — 36415 COLL VENOUS BLD VENIPUNCTURE: CPT

## 2024-08-27 PROCEDURE — 86364 TISS TRNSGLTMNASE EA IG CLAS: CPT

## 2024-08-27 PROCEDURE — 82784 ASSAY IGA/IGD/IGG/IGM EACH: CPT

## 2024-08-27 NOTE — PROGRESS NOTES
[facility] - Outpatient Follow-up Note  Sri Mcgrath 68 y.o. female MRN: [id number]  Encounter: [id number]          ASSESSMENT AND PLAN:      Bloating  Vitamin B12 deficiency  Constipation  History of H. pylori infection    History of chronic constipation.  Reports she has been on several meds over the years with some help but cannot remember the names of the medications.  She has tried MiraLAX daily to twice daily which caused more abdominal bloating.  She gets generalized abdominal bloating most days.  No weight loss.  Bloating can occur with or without eating.  Her last GYN exam was approximately 1 year ago.  BMs are brown and formed/occasionally hard daily but may have days with no BMs.  Denies any melena hematochezia.  Recent colonoscopy 10/23 noted 6 polyps-tubular adenomas and hyperplastic polyp.  Recall colonoscopy 3 years with GoLytely prep as patient was not completely cleaned out.  She also was diagnosed with B12 deficiency.  Reports before starting B12 she would like an evaluation.  She does have a history of peptic ulcer disease in the past and believes that she had H. pylori many years ago.  Occasional heartburn/reflux.  Denies any nausea, vomiting or dysphagia.  Due to chronic bloating will recommend a CT abdomen pelvis.  Patient did have a reaction in her 20s to contrast.  If this CT cannot be done with contrast then we will consider an ultrasound of abdomen/pelvis.  Will rule out PUD, gastritis/esophagitis and H. pylori as well as celiac disease.  Bloating could be from chronic constipation.    -Celiac panel, stool for H. Pylori  -CT abdomen pelvis with contrast.  If patient unable to take contrast will consider ultrasound of the abdomen and pelvis  -EGD  -Fiber supplement daily  -Follow-up in office after testing    I reviewed with patient/family potential risks of endoscopic evaluation including possible infection, bleeding or perforation.  Patient/family verbalized  understanding of potential risks and agreed to procedure(s).    ______________________________________________________________________    SUBJECTIVE: 68-year-old female here for follow-up.  Her last visit was for colonoscopy 10/23 which noted 6 subcentimeter polyps and pancolonic diverticulosis.  Biopsies revealed tubular adenomas and 1 hyperplastic polyp.  Repeat colonoscopy in 3 years recommended with GoLytely prep.    She has history of chronic constipation.  Reports she has been on several meds over the years with some help but cannot remember the names of the medications.  She has tried MiraLAX daily to twice daily which caused more abdominal bloating.  She gets generalized abdominal bloating most days.  No weight loss.  Bloating can occur with or without eating.  Her last GYN exam was approximately 1 year ago.  BMs are brown and formed/occasionally hard daily but may have days with no BMs.  Denies any melena hematochezia.  Recent colonoscopy 10/23 noted 6 polyps-tubular adenomas and hyperplastic polyp.  Recall colonoscopy 3 years with GoLytely prep as patient was not completely cleaned out.  She also was diagnosed with B12 deficiency.  Reports before starting B12 she would like an evaluation.  She does have a history of peptic ulcer disease in the past and believes that she had H. pylori many years ago.  Occasional heartburn/reflux.  Denies any nausea, vomiting or dysphagia.      No recent abdominal imaging to review.    Lab 6/24-CMP normal other than glucose 146, vitamin D 46, vitamin B12 338, CBC normal.    Prior EGD/colonoscopy     Colonoscopy 10/23-6 subcentimeter polyps removed, pan colon diverticulosis.  Repeat colonoscopy in 3 years with GoLytely prep.  Biopsies revealed tubular adenomas and 1 hyperplastic polyp.    Colonoscopy 10/20-1 polyp.  Biopsies revealed tubular adenoma.    Colonoscopy 10/16-pandiverticulosis.    REVIEW OF SYSTEMS IS OTHERWISE NEGATIVE.  10 point review of systems negative other  than per HPI      Historical Information   Past Medical History:   Diagnosis Date   • Allergic    • Arthritis    • Cataract     ashley   • Colon polyp    • Depression    • Fluid retention    • Headache, acute    • Heart murmur    • Hyperlipidemia    • Hypertension    • Memory loss    • Pedal edema    • Pneumonia    • PONV (postoperative nausea and vomiting)    • Rotator cuff tear, left    • Sleep apnea 2021   • Wears glasses    • Wears partial dentures     upper     Past Surgical History:   Procedure Laterality Date   • ABDOMINAL ADHESION SURGERY     • ANAL SPHINCTEROPLASTY     • ANKLE SURGERY Right     tendon tear   • APPENDECTOMY     • CARPAL TUNNEL RELEASE Bilateral    • CARPAL TUNNEL RELEASE Left    • CARPAL TUNNEL RELEASE Right    • COLONOSCOPY     • HAND SURGERY Right     ganglion cyst   • HEMORROIDECTOMY     • HYSTERECTOMY     • ILEOSTOMY     • JOINT REPLACEMENT     • KNEE ARTHROCENTESIS      ganglion Cyst   • KNEE ARTHROSCOPY Left    • OVARIAN CYST REMOVAL     • [facility] SURGICAL ARTHROSCOPY SHOULDER W/ROTATOR CUFF RPR Left 2/19/2018    Procedure: ARTHROSCOPIC REPAIR ROTATOR CUFF,  SAD, BICEP TENODESIS;  Surgeon: Garrett Coto MD;  Location: [facility];  Service: Orthopedics   • REPAIR RECTOCELE     • REPLACEMENT TOTAL KNEE Left 09/28/2021   • TONSILLECTOMY     • TONSILLECTOMY AND ADENOIDECTOMY     • TUBAL LIGATION       Social History   Social History     Substance and Sexual Activity   Alcohol Use Yes    Comment: Occasionally/social     Social History     Substance and Sexual Activity   Drug Use No     Social History     Tobacco Use   Smoking Status Never   • Passive exposure: Never   Smokeless Tobacco Never     Family History   Problem Relation Age of Onset   • Hypertension Mother    • Colon cancer Mother    • Bone cancer Mother    • COPD Mother    • Emphysema Mother    • Skin cancer Mother    • Glaucoma Mother    • Lead poisoning Father         lead poisoning in lungs    • Thyroid disease Sister    • Depression  Sister    • Hypertension Sister    • Alzheimer's disease Sister    • Arthritis Sister    • ROBERT disease Brother    • Throat cancer Brother    • Hypertension Brother    • Arthritis Brother    • Colon cancer Family    • Bone cancer Family    • Hypertension Family    • Diabetes Sister    • Hypertension Sister    • Heart failure Sister    • Alzheimer's disease Sister    • Arthritis Sister    • No Known Problems Daughter    • No Known Problems Maternal Grandmother    • No Known Problems Paternal Grandmother    • No Known Problems Maternal Aunt    • No Known Problems Maternal Aunt    • No Known Problems Maternal Aunt    • No Known Problems Maternal Aunt    • No Known Problems Maternal Aunt    • No Known Problems Paternal Aunt        Meds/Allergies       Current Outpatient Medications:   •  azelastine (ASTELIN) 0.1 % nasal spray  •  Calcium Carbonate (CALCIUM 600 PO)  •  CALCIUM PO  •  Chlorpheniramine-APAP (CORICIDIN) 2-325 MG TABS  •  Cholecalciferol (VITAMIN D3 PO)  •  Cholecalciferol (Vitamin D3) 25 MCG TABS  •  clotrimazole-betamethasone (LOTRISONE) 1-0.05 % cream  •  Cyanocobalamin (B-12) 1000 MCG SUBL  •  estradiol (ESTRACE) 0.1 mg/g vaginal cream  •  fluticasone (FLONASE) 50 mcg/act nasal spray  •  montelukast (SINGULAIR) 10 mg tablet  •  nystatin (MYCOSTATIN) powder  •  propranolol (INDERAL) 20 mg tablet  •  rosuvastatin (CRESTOR) 10 MG tablet  •  tiZANidine (ZANAFLEX) 2 mg tablet  •  topiramate (Topamax) 25 mg tablet  •  triamcinolone (KENALOG) 0.1 % cream  •  furosemide (LASIX) 40 mg tablet  •  potassium chloride (Klor-Con M20) 20 mEq tablet  •  traZODone (DESYREL) 100 mg tablet    Current Facility-Administered Medications:   •  cyanocobalamin injection 1,000 mcg, 1,000 mcg, Intramuscular, Q30 Days, 1,000 mcg at 05/10/24 0900    Allergies   Allergen Reactions   • Keflex [Cephalexin] Other (See Comments)     Mouth sores   • Contrast [Iodinated Contrast Media] Hives     Occurred In the patient's 20's got Benedryl     • Morphine And Codeine Hives     IV MORPINE   • Penicillins Hives   • Benadryl [Diphenhydramine] Itching and Swelling     IV benadryl in hospital   • Ciprofloxacin Hives   • Clindamycin Other (See Comments)     Pt unsure   • Doxycycline Photosensitivity and Dermatitis   • Erythromycin Hives           Objective     Blood pressure 122/74, pulse 72, temperature (!) 97.1 °F (36.2 °C), height 5' (1.524 m), weight 83.9 kg (185 lb), SpO2 97%, not currently breastfeeding. Body mass index is 36.13 kg/m².      PHYSICAL EXAM:      General Appearance:   Alert, cooperative, no distress   HEENT:   Normocephalic, atraumatic, anicteric.     Neck:  Supple, symmetrical, trachea midline   Lungs:   Clear to auscultation bilaterally; no rales, rhonchi or wheezing; respirations unlabored    Heart::   Regular rate and rhythm; no murmur, rub, or gallop.   Abdomen:   Soft, non-tender, non-distended; normal bowel sounds; no masses, no organomegaly    Genitalia:   Deferred    Rectal:   Deferred    Extremities:  No cyanosis, clubbing or edema    Pulses:  2+ and symmetric    Skin:  No jaundice, rashes, or lesions    Lymph nodes:  No palpable cervical lymphadenopathy        Lab Results:   No visits with results within 1 Day(s) from this visit.   Latest known visit with results is:   Appointment on 06/25/2024   Component Date Value   • Vitamin B-12 06/25/2024 338    • Protime 06/25/2024 13.5    • INR 06/25/2024 1.04    • Sodium 06/25/2024 139    • Potassium 06/25/2024 3.7    • Chloride 06/25/2024 103    • CO2 06/25/2024 27    • ANION GAP 06/25/2024 9    • BUN 06/25/2024 15    • Creatinine 06/25/2024 0.83    • Glucose 06/25/2024 146 (H)    • Calcium 06/25/2024 9.5    • AST 06/25/2024 15    • ALT 06/25/2024 15    • Alkaline Phosphatase 06/25/2024 87    • Total Protein 06/25/2024 6.8    • Albumin 06/25/2024 4.2    • Total Bilirubin 06/25/2024 0.41    • eGFR 06/25/2024 72    • WBC 06/25/2024 5.84    • RBC 06/25/2024 4.51    • Hemoglobin 06/25/2024  13.8    • Hematocrit 06/25/2024 40.1    • MCV 06/25/2024 89    • MCH 06/25/2024 30.6    • MCHC 06/25/2024 34.4    • RDW 06/25/2024 13.1    • MPV 06/25/2024 10.4    • Platelets 06/25/2024 270    • nRBC 06/25/2024 0    • Segmented % 06/25/2024 64    • Immature Grans % 06/25/2024 0    • Lymphocytes % 06/25/2024 22    • Monocytes % 06/25/2024 10    • Eosinophils Relative 06/25/2024 3    • Basophils Relative 06/25/2024 1    • Absolute Neutrophils 06/25/2024 3.73    • Absolute Immature Grans 06/25/2024 0.01    • Absolute Lymphocytes 06/25/2024 1.30    • Absolute Monocytes 06/25/2024 0.58    • Eosinophils Absolute 06/25/2024 0.17    • Basophils Absolute 06/25/2024 0.05    • Vit D, 25-Hydroxy 06/25/2024 46.0          Radiology Results:   Mammo screening bilateral w 3d & cad    Result Date: 8/8/2024  Narrative: DIAGNOSIS: Breast cancer screening by mammogram TECHNIQUE: Digital screening mammography was performed. Computer Aided Detection (CAD) analyzed all applicable images. COMPARISONS: Prior breast imaging dated: 08/07/2023, 08/05/2022, 06/09/2021, 06/09/2020, 02/06/2012, 02/04/2011, 02/03/2010, 02/02/2009, and 01/30/2008 RELEVANT HISTORY: Family Breast Cancer History: No known family history of breast cancer. Family Medical History: Family medical history includes colon cancer in 2 relatives (family, mother). Personal History: Hormone history includes other. Surgical history includes hysterectomy. No known relevant medical history. The patient is scheduled in a reminder system for screening mammography. 8-10% of cancers will be missed on mammography. Management of a palpable abnormality must be based on clinical grounds.  Patients will be notified of their results via letter from our facility. Accredited by [employer] and FDA. RISK ASSESSMENT: 5 Year Tyrer-Cuzick: 0.71% 10 Year Tyrer-Cuzick: 1.51% Lifetime Tyrer-Cuzick: 2.59% TISSUE DENSITY: There are scattered areas of fibroglandular density.  INDICATION: Sri Mcgrath is a 68 y.o. female presenting for screening mammography. FINDINGS: There are no suspicious masses, grouped microcalcifications or areas of architectural distortion. The skin and nipple areolar complex are unremarkable.     Impression: No mammographic evidence of malignancy. ASSESSMENT/BI-RADS CATEGORY: Left: 1 - Negative Right: 1 - Negative Overall: 1 - Negative RECOMMENDATION:      - Routine screening mammogram in 1 year for both breasts. Workstation ID: [id number]

## 2024-08-27 NOTE — TELEPHONE ENCOUNTER
Procedure: EGD  Date: 09/09/2024  Physician performing: Dr. Thacker  Location of procedure:  [facility]  Instructions given to patient: Yes  Diabetic: No  Clearances: N/A

## 2024-08-27 NOTE — H&P (VIEW-ONLY)
[facility] - Outpatient Follow-up Note  Sri Mcgrath 68 y.o. female MRN: [id number]  Encounter: [id number]          ASSESSMENT AND PLAN:      Bloating  Vitamin B12 deficiency  Constipation  History of H. pylori infection    History of chronic constipation.  Reports she has been on several meds over the years with some help but cannot remember the names of the medications.  She has tried MiraLAX daily to twice daily which caused more abdominal bloating.  She gets generalized abdominal bloating most days.  No weight loss.  Bloating can occur with or without eating.  Her last GYN exam was approximately 1 year ago.  BMs are brown and formed/occasionally hard daily but may have days with no BMs.  Denies any melena hematochezia.  Recent colonoscopy 10/23 noted 6 polyps-tubular adenomas and hyperplastic polyp.  Recall colonoscopy 3 years with GoLytely prep as patient was not completely cleaned out.  She also was diagnosed with B12 deficiency.  Reports before starting B12 she would like an evaluation.  She does have a history of peptic ulcer disease in the past and believes that she had H. pylori many years ago.  Occasional heartburn/reflux.  Denies any nausea, vomiting or dysphagia.  Due to chronic bloating will recommend a CT abdomen pelvis.  Patient did have a reaction in her 20s to contrast.  If this CT cannot be done with contrast then we will consider an ultrasound of abdomen/pelvis.  Will rule out PUD, gastritis/esophagitis and H. pylori as well as celiac disease.  Bloating could be from chronic constipation.    -Celiac panel, stool for H. Pylori  -CT abdomen pelvis with contrast.  If patient unable to take contrast will consider ultrasound of the abdomen and pelvis  -EGD  -Fiber supplement daily  -Follow-up in office after testing    I reviewed with patient/family potential risks of endoscopic evaluation including possible infection, bleeding or perforation.  Patient/family verbalized  understanding of potential risks and agreed to procedure(s).    ______________________________________________________________________    SUBJECTIVE: 68-year-old female here for follow-up.  Her last visit was for colonoscopy 10/23 which noted 6 subcentimeter polyps and pancolonic diverticulosis.  Biopsies revealed tubular adenomas and 1 hyperplastic polyp.  Repeat colonoscopy in 3 years recommended with GoLytely prep.    She has history of chronic constipation.  Reports she has been on several meds over the years with some help but cannot remember the names of the medications.  She has tried MiraLAX daily to twice daily which caused more abdominal bloating.  She gets generalized abdominal bloating most days.  No weight loss.  Bloating can occur with or without eating.  Her last GYN exam was approximately 1 year ago.  BMs are brown and formed/occasionally hard daily but may have days with no BMs.  Denies any melena hematochezia.  Recent colonoscopy 10/23 noted 6 polyps-tubular adenomas and hyperplastic polyp.  Recall colonoscopy 3 years with GoLytely prep as patient was not completely cleaned out.  She also was diagnosed with B12 deficiency.  Reports before starting B12 she would like an evaluation.  She does have a history of peptic ulcer disease in the past and believes that she had H. pylori many years ago.  Occasional heartburn/reflux.  Denies any nausea, vomiting or dysphagia.      No recent abdominal imaging to review.    Lab 6/24-CMP normal other than glucose 146, vitamin D 46, vitamin B12 338, CBC normal.    Prior EGD/colonoscopy     Colonoscopy 10/23-6 subcentimeter polyps removed, pan colon diverticulosis.  Repeat colonoscopy in 3 years with GoLytely prep.  Biopsies revealed tubular adenomas and 1 hyperplastic polyp.    Colonoscopy 10/20-1 polyp.  Biopsies revealed tubular adenoma.    Colonoscopy 10/16-pandiverticulosis.    REVIEW OF SYSTEMS IS OTHERWISE NEGATIVE.  10 point review of systems negative other  than per HPI      Historical Information   Past Medical History:   Diagnosis Date   • Allergic    • Arthritis    • Cataract     ashley   • Colon polyp    • Depression    • Fluid retention    • Headache, acute    • Heart murmur    • Hyperlipidemia    • Hypertension    • Memory loss    • Pedal edema    • Pneumonia    • PONV (postoperative nausea and vomiting)    • Rotator cuff tear, left    • Sleep apnea 2021   • Wears glasses    • Wears partial dentures     upper     Past Surgical History:   Procedure Laterality Date   • ABDOMINAL ADHESION SURGERY     • ANAL SPHINCTEROPLASTY     • ANKLE SURGERY Right     tendon tear   • APPENDECTOMY     • CARPAL TUNNEL RELEASE Bilateral    • CARPAL TUNNEL RELEASE Left    • CARPAL TUNNEL RELEASE Right    • COLONOSCOPY     • HAND SURGERY Right     ganglion cyst   • HEMORROIDECTOMY     • HYSTERECTOMY     • ILEOSTOMY     • JOINT REPLACEMENT     • KNEE ARTHROCENTESIS      ganglion Cyst   • KNEE ARTHROSCOPY Left    • OVARIAN CYST REMOVAL     • NM SURGICAL ARTHROSCOPY SHOULDER W/ROTATOR CUFF RPR Left 2/19/2018    Procedure: ARTHROSCOPIC REPAIR ROTATOR CUFF,  SAD, BICEP TENODESIS;  Surgeon: Garrett Coto MD;  Location: [facility];  Service: Orthopedics   • REPAIR RECTOCELE     • REPLACEMENT TOTAL KNEE Left 09/28/2021   • TONSILLECTOMY     • TONSILLECTOMY AND ADENOIDECTOMY     • TUBAL LIGATION       Social History   Social History     Substance and Sexual Activity   Alcohol Use Yes    Comment: Occasionally/social     Social History     Substance and Sexual Activity   Drug Use No     Social History     Tobacco Use   Smoking Status Never   • Passive exposure: Never   Smokeless Tobacco Never     Family History   Problem Relation Age of Onset   • Hypertension Mother    • Colon cancer Mother    • Bone cancer Mother    • COPD Mother    • Emphysema Mother    • Skin cancer Mother    • Glaucoma Mother    • Lead poisoning Father         lead poisoning in lungs    • Thyroid disease Sister    • Depression  Sister    • Hypertension Sister    • Alzheimer's disease Sister    • Arthritis Sister    • ROBERT disease Brother    • Throat cancer Brother    • Hypertension Brother    • Arthritis Brother    • Colon cancer Family    • Bone cancer Family    • Hypertension Family    • Diabetes Sister    • Hypertension Sister    • Heart failure Sister    • Alzheimer's disease Sister    • Arthritis Sister    • No Known Problems Daughter    • No Known Problems Maternal Grandmother    • No Known Problems Paternal Grandmother    • No Known Problems Maternal Aunt    • No Known Problems Maternal Aunt    • No Known Problems Maternal Aunt    • No Known Problems Maternal Aunt    • No Known Problems Maternal Aunt    • No Known Problems Paternal Aunt        Meds/Allergies       Current Outpatient Medications:   •  azelastine (ASTELIN) 0.1 % nasal spray  •  Calcium Carbonate (CALCIUM 600 PO)  •  CALCIUM PO  •  Chlorpheniramine-APAP (CORICIDIN) 2-325 MG TABS  •  Cholecalciferol (VITAMIN D3 PO)  •  Cholecalciferol (Vitamin D3) 25 MCG TABS  •  clotrimazole-betamethasone (LOTRISONE) 1-0.05 % cream  •  Cyanocobalamin (B-12) 1000 MCG SUBL  •  estradiol (ESTRACE) 0.1 mg/g vaginal cream  •  fluticasone (FLONASE) 50 mcg/act nasal spray  •  montelukast (SINGULAIR) 10 mg tablet  •  nystatin (MYCOSTATIN) powder  •  propranolol (INDERAL) 20 mg tablet  •  rosuvastatin (CRESTOR) 10 MG tablet  •  tiZANidine (ZANAFLEX) 2 mg tablet  •  topiramate (Topamax) 25 mg tablet  •  triamcinolone (KENALOG) 0.1 % cream  •  furosemide (LASIX) 40 mg tablet  •  potassium chloride (Klor-Con M20) 20 mEq tablet  •  traZODone (DESYREL) 100 mg tablet    Current Facility-Administered Medications:   •  cyanocobalamin injection 1,000 mcg, 1,000 mcg, Intramuscular, Q30 Days, 1,000 mcg at 05/10/24 0900    Allergies   Allergen Reactions   • Keflex [Cephalexin] Other (See Comments)     Mouth sores   • Contrast [Iodinated Contrast Media] Hives     Occurred In the patient's 20's got Benedryl     • Morphine And Codeine Hives     IV MORPINE   • Penicillins Hives   • Benadryl [Diphenhydramine] Itching and Swelling     IV benadryl in hospital   • Ciprofloxacin Hives   • Clindamycin Other (See Comments)     Pt unsure   • Doxycycline Photosensitivity and Dermatitis   • Erythromycin Hives           Objective     Blood pressure 122/74, pulse 72, temperature (!) 97.1 °F (36.2 °C), height 5' (1.524 m), weight 83.9 kg (185 lb), SpO2 97%, not currently breastfeeding. Body mass index is 36.13 kg/m².      PHYSICAL EXAM:      General Appearance:   Alert, cooperative, no distress   HEENT:   Normocephalic, atraumatic, anicteric.     Neck:  Supple, symmetrical, trachea midline   Lungs:   Clear to auscultation bilaterally; no rales, rhonchi or wheezing; respirations unlabored    Heart::   Regular rate and rhythm; no murmur, rub, or gallop.   Abdomen:   Soft, non-tender, non-distended; normal bowel sounds; no masses, no organomegaly    Genitalia:   Deferred    Rectal:   Deferred    Extremities:  No cyanosis, clubbing or edema    Pulses:  2+ and symmetric    Skin:  No jaundice, rashes, or lesions    Lymph nodes:  No palpable cervical lymphadenopathy        Lab Results:   No visits with results within 1 Day(s) from this visit.   Latest known visit with results is:   Appointment on 06/25/2024   Component Date Value   • Vitamin B-12 06/25/2024 338    • Protime 06/25/2024 13.5    • INR 06/25/2024 1.04    • Sodium 06/25/2024 139    • Potassium 06/25/2024 3.7    • Chloride 06/25/2024 103    • CO2 06/25/2024 27    • ANION GAP 06/25/2024 9    • BUN 06/25/2024 15    • Creatinine 06/25/2024 0.83    • Glucose 06/25/2024 146 (H)    • Calcium 06/25/2024 9.5    • AST 06/25/2024 15    • ALT 06/25/2024 15    • Alkaline Phosphatase 06/25/2024 87    • Total Protein 06/25/2024 6.8    • Albumin 06/25/2024 4.2    • Total Bilirubin 06/25/2024 0.41    • eGFR 06/25/2024 72    • WBC 06/25/2024 5.84    • RBC 06/25/2024 4.51    • Hemoglobin 06/25/2024  13.8    • Hematocrit 06/25/2024 40.1    • MCV 06/25/2024 89    • MCH 06/25/2024 30.6    • MCHC 06/25/2024 34.4    • RDW 06/25/2024 13.1    • MPV 06/25/2024 10.4    • Platelets 06/25/2024 270    • nRBC 06/25/2024 0    • Segmented % 06/25/2024 64    • Immature Grans % 06/25/2024 0    • Lymphocytes % 06/25/2024 22    • Monocytes % 06/25/2024 10    • Eosinophils Relative 06/25/2024 3    • Basophils Relative 06/25/2024 1    • Absolute Neutrophils 06/25/2024 3.73    • Absolute Immature Grans 06/25/2024 0.01    • Absolute Lymphocytes 06/25/2024 1.30    • Absolute Monocytes 06/25/2024 0.58    • Eosinophils Absolute 06/25/2024 0.17    • Basophils Absolute 06/25/2024 0.05    • Vit D, 25-Hydroxy 06/25/2024 46.0          Radiology Results:   Mammo screening bilateral w 3d & cad    Result Date: 8/8/2024  Narrative: DIAGNOSIS: Breast cancer screening by mammogram TECHNIQUE: Digital screening mammography was performed. Computer Aided Detection (CAD) analyzed all applicable images. COMPARISONS: Prior breast imaging dated: 08/07/2023, 08/05/2022, 06/09/2021, 06/09/2020, 02/06/2012, 02/04/2011, 02/03/2010, 02/02/2009, and 01/30/2008 RELEVANT HISTORY: Family Breast Cancer History: No known family history of breast cancer. Family Medical History: Family medical history includes colon cancer in 2 relatives (family, mother). Personal History: Hormone history includes other. Surgical history includes hysterectomy. No known relevant medical history. The patient is scheduled in a reminder system for screening mammography. 8-10% of cancers will be missed on mammography. Management of a palpable abnormality must be based on clinical grounds.  Patients will be notified of their results via letter from our facility. Accredited by [employer] and FDA. RISK ASSESSMENT: 5 Year Tyrer-Cuzick: 0.71% 10 Year Tyrer-Cuzick: 1.51% Lifetime Tyrer-Cuzick: 2.59% TISSUE DENSITY: There are scattered areas of fibroglandular density.  INDICATION: Sri Mcgrath is a 68 y.o. female presenting for screening mammography. FINDINGS: There are no suspicious masses, grouped microcalcifications or areas of architectural distortion. The skin and nipple areolar complex are unremarkable.     Impression: No mammographic evidence of malignancy. ASSESSMENT/BI-RADS CATEGORY: Left: 1 - Negative Right: 1 - Negative Overall: 1 - Negative RECOMMENDATION:      - Routine screening mammogram in 1 year for both breasts. Workstation ID: [id number]

## 2024-08-28 DIAGNOSIS — R14.0 BLOATING: Primary | ICD-10-CM

## 2024-08-28 LAB — TTG IGA SER-ACNC: <2 U/ML (ref 0–3)

## 2024-08-29 ENCOUNTER — RA CDI HCC (OUTPATIENT)
Dept: OTHER | Facility: HOSPITAL | Age: 69
End: 2024-08-29

## 2024-08-29 ENCOUNTER — ANESTHESIA EVENT (OUTPATIENT)
Dept: ANESTHESIOLOGY | Facility: HOSPITAL | Age: 69
End: 2024-08-29

## 2024-08-29 ENCOUNTER — ANESTHESIA (OUTPATIENT)
Dept: ANESTHESIOLOGY | Facility: HOSPITAL | Age: 69
End: 2024-08-29

## 2024-08-29 PROBLEM — I12.9 HYPERTENSIVE KIDNEY DISEASE WITH CHRONIC KIDNEY DISEASE STAGE III (HCC): Status: ACTIVE | Noted: 2024-08-29

## 2024-08-29 PROBLEM — N18.30 HYPERTENSIVE KIDNEY DISEASE WITH CHRONIC KIDNEY DISEASE STAGE III (HCC): Status: ACTIVE | Noted: 2024-08-29

## 2024-08-30 ENCOUNTER — APPOINTMENT (OUTPATIENT)
Dept: LAB | Facility: CLINIC | Age: 69
End: 2024-08-30
Payer: MEDICARE

## 2024-08-30 DIAGNOSIS — A04.8 H. PYLORI INFECTION: ICD-10-CM

## 2024-08-30 DIAGNOSIS — R14.0 BLOATING: ICD-10-CM

## 2024-08-30 PROCEDURE — 87338 HPYLORI STOOL AG IA: CPT

## 2024-09-03 ENCOUNTER — HOSPITAL ENCOUNTER (OUTPATIENT)
Dept: CT IMAGING | Facility: HOSPITAL | Age: 69
Discharge: HOME | End: 2024-09-03

## 2024-09-03 LAB — H PYLORI AG STL QL IA: NEGATIVE

## 2024-09-04 ENCOUNTER — HOSPITAL ENCOUNTER (OUTPATIENT)
Dept: ULTRASOUND IMAGING | Facility: HOSPITAL | Age: 69
Discharge: HOME | End: 2024-09-04
Payer: MEDICARE

## 2024-09-04 DIAGNOSIS — R14.0 BLOATING: ICD-10-CM

## 2024-09-04 PROCEDURE — 76700 US EXAM ABDOM COMPLETE: CPT

## 2024-09-04 PROCEDURE — 76856 US EXAM PELVIC COMPLETE: CPT

## 2024-09-04 PROCEDURE — 76830 TRANSVAGINAL US NON-OB: CPT

## 2024-09-06 ENCOUNTER — OFFICE VISIT (OUTPATIENT)
Dept: INTERNAL MEDICINE CLINIC | Facility: CLINIC | Age: 69
End: 2024-09-06
Payer: MEDICARE

## 2024-09-06 VITALS
WEIGHT: 184 LBS | TEMPERATURE: 97.9 F | BODY MASS INDEX: 36.12 KG/M2 | OXYGEN SATURATION: 97 % | HEIGHT: 60 IN | DIASTOLIC BLOOD PRESSURE: 74 MMHG | SYSTOLIC BLOOD PRESSURE: 122 MMHG | HEART RATE: 68 BPM

## 2024-09-06 DIAGNOSIS — Z23 NEED FOR COVID-19 VACCINE: ICD-10-CM

## 2024-09-06 DIAGNOSIS — E66.01 MORBID OBESITY (HCC): ICD-10-CM

## 2024-09-06 DIAGNOSIS — E53.8 B12 DEFICIENCY: Chronic | ICD-10-CM

## 2024-09-06 DIAGNOSIS — G47.33 OSA (OBSTRUCTIVE SLEEP APNEA): ICD-10-CM

## 2024-09-06 DIAGNOSIS — E55.9 VITAMIN D DEFICIENCY: Chronic | ICD-10-CM

## 2024-09-06 DIAGNOSIS — R73.03 PREDIABETES: ICD-10-CM

## 2024-09-06 DIAGNOSIS — E78.2 MIXED HYPERLIPIDEMIA: ICD-10-CM

## 2024-09-06 DIAGNOSIS — I10 PRIMARY HYPERTENSION: Primary | ICD-10-CM

## 2024-09-06 DIAGNOSIS — N18.30 STAGE 3 CHRONIC KIDNEY DISEASE, UNSPECIFIED WHETHER STAGE 3A OR 3B CKD (HCC): ICD-10-CM

## 2024-09-06 DIAGNOSIS — M85.89 OSTEOPENIA OF MULTIPLE SITES: ICD-10-CM

## 2024-09-06 DIAGNOSIS — G89.4 CHRONIC PAIN SYNDROME: ICD-10-CM

## 2024-09-06 DIAGNOSIS — Z23 ENCOUNTER FOR VACCINATION: ICD-10-CM

## 2024-09-06 DIAGNOSIS — K76.0 FATTY LIVER: ICD-10-CM

## 2024-09-06 PROCEDURE — 90480 ADMN SARSCOV2 VAC 1/ONLY CMP: CPT

## 2024-09-06 PROCEDURE — 91320 SARSCV2 VAC 30MCG TRS-SUC IM: CPT

## 2024-09-06 PROCEDURE — 99214 OFFICE O/P EST MOD 30 MIN: CPT | Performed by: INTERNAL MEDICINE

## 2024-09-06 NOTE — PROGRESS NOTES
Ambulatory Visit  Name: Sri Mcgrath      : 1955      MRN: [id number]  Encounter Provider: Sea Hadley DO  Encounter Date: 2024   Encounter department: [facility]    Assessment & Plan   1. Primary hypertension  -     Albumin / creatinine urine ratio; Future  2. Fatty liver  3. REKHA (obstructive sleep apnea)  4. Osteopenia of multiple sites  5. Stage 3 chronic kidney disease, unspecified whether stage 3a or 3b CKD (HCC)  6. Chronic pain syndrome  7. B12 deficiency  8. Vitamin D deficiency  9. Mixed hyperlipidemia  -     LDL cholesterol, direct; Future  -     Triglycerides; Future  10. Prediabetes  11. Morbid obesity (HCC)  12. Encounter for vaccination  13. Need for COVID-19 vaccine  -     COVID-19 Pfizer mRNA vaccine 12 yr and older (Comirnaty pre-filled syringe)      Falls Plan of Care: balance, strength, and gait training instructions were provided.     A/P: Doing ok and will check labs.Will up date her covid vaccine.  Appreciate GI input. Continue current treatment and RTC four months for routine.      History of Present Illness     WF RTC for f/u HTN, CKD3, etc. Doing ok and no new issues. Remains active w/o difficulty and no falls recently. Seeing GI for B12 issues. MDD/MAGGIE are controlled. Chronic pain is manageable. Due for labs.         Review of Systems   Constitutional:  Negative for activity change, chills, diaphoresis, fatigue and fever.   HENT: Negative.     Eyes:  Negative for visual disturbance.   Respiratory:  Negative for cough, chest tightness, shortness of breath and wheezing.    Cardiovascular:  Negative for chest pain, palpitations and leg swelling.   Gastrointestinal:  Negative for abdominal pain, constipation, diarrhea, nausea and vomiting.   Endocrine: Negative for cold intolerance and heat intolerance.   Genitourinary:  Negative for difficulty urinating, dysuria and frequency.   Musculoskeletal:  Negative for arthralgias, gait problem and myalgias.    Neurological:  Negative for dizziness, seizures, syncope, weakness, light-headedness and headaches.   Psychiatric/Behavioral:  Negative for confusion, dysphoric mood and sleep disturbance. The patient is not nervous/anxious.        Objective     /74 (BP Location: Left arm, Patient Position: Sitting, Cuff Size: Adult)   Pulse 68   Temp 97.9 °F (36.6 °C) (Tympanic)   Ht 5' (1.524 m)   Wt 83.5 kg (184 lb)   SpO2 97%   BMI 35.94 kg/m²     Physical Exam  Vitals and nursing note reviewed.   Constitutional:       General: She is not in acute distress.     Appearance: Normal appearance. She is obese. She is not ill-appearing.   HENT:      Head: Normocephalic and atraumatic.      Mouth/Throat:      Mouth: Mucous membranes are moist.   Eyes:      Extraocular Movements: Extraocular movements intact.      Conjunctiva/sclera: Conjunctivae normal.      Pupils: Pupils are equal, round, and reactive to light.   Neck:      Vascular: No carotid bruit.   Cardiovascular:      Rate and Rhythm: Normal rate and regular rhythm.      Heart sounds: Normal heart sounds. No murmur heard.  Pulmonary:      Effort: Pulmonary effort is normal. No respiratory distress.      Breath sounds: Normal breath sounds. No wheezing, rhonchi or rales.   Abdominal:      General: Bowel sounds are normal. There is no distension.      Palpations: Abdomen is soft.      Tenderness: There is no abdominal tenderness.   Musculoskeletal:      Cervical back: Neck supple.      Right lower leg: No edema.      Left lower leg: No edema.   Neurological:      General: No focal deficit present.      Mental Status: She is alert and oriented to person, place, and time. Mental status is at baseline.   Psychiatric:         Mood and Affect: Mood normal.         Behavior: Behavior normal.         Thought Content: Thought content normal.         Judgment: Judgment normal.       Administrative Statements

## 2024-09-08 RX ORDER — SODIUM CHLORIDE 9 MG/ML
125 INJECTION, SOLUTION INTRAVENOUS CONTINUOUS
Status: CANCELLED | OUTPATIENT
Start: 2024-09-08

## 2024-09-09 ENCOUNTER — HOSPITAL ENCOUNTER (OUTPATIENT)
Dept: GASTROENTEROLOGY | Facility: MEDICAL CENTER | Age: 69
Setting detail: OUTPATIENT SURGERY
Discharge: HOME | End: 2024-09-09
Payer: MEDICARE

## 2024-09-09 ENCOUNTER — ANESTHESIA (OUTPATIENT)
Dept: GASTROENTEROLOGY | Facility: MEDICAL CENTER | Age: 69
End: 2024-09-09
Payer: MEDICARE

## 2024-09-09 ENCOUNTER — ANESTHESIA EVENT (OUTPATIENT)
Dept: GASTROENTEROLOGY | Facility: MEDICAL CENTER | Age: 69
End: 2024-09-09
Payer: MEDICARE

## 2024-09-09 VITALS
TEMPERATURE: 97.4 F | SYSTOLIC BLOOD PRESSURE: 100 MMHG | BODY MASS INDEX: 35.73 KG/M2 | HEIGHT: 60 IN | WEIGHT: 182 LBS | HEART RATE: 52 BPM | OXYGEN SATURATION: 97 % | RESPIRATION RATE: 16 BRPM | DIASTOLIC BLOOD PRESSURE: 89 MMHG

## 2024-09-09 DIAGNOSIS — R14.0 BLOATING: ICD-10-CM

## 2024-09-09 DIAGNOSIS — E53.8 VITAMIN B 12 DEFICIENCY: ICD-10-CM

## 2024-09-09 PROCEDURE — 43239 EGD BIOPSY SINGLE/MULTIPLE: CPT | Performed by: INTERNAL MEDICINE

## 2024-09-09 PROCEDURE — 88305 TISSUE EXAM BY PATHOLOGIST: CPT | Performed by: PATHOLOGY

## 2024-09-09 RX ORDER — PROPOFOL 10 MG/ML
INJECTION, EMULSION INTRAVENOUS AS NEEDED
Status: DISCONTINUED | OUTPATIENT
Start: 2024-09-09 | End: 2024-09-09

## 2024-09-09 RX ORDER — SODIUM CHLORIDE 9 MG/ML
125 INJECTION, SOLUTION INTRAVENOUS CONTINUOUS
Status: DISCONTINUED | OUTPATIENT
Start: 2024-09-09 | End: 2024-09-13 | Stop reason: HOSPADM

## 2024-09-09 RX ORDER — LIDOCAINE HYDROCHLORIDE 20 MG/ML
INJECTION, SOLUTION EPIDURAL; INFILTRATION; INTRACAUDAL; PERINEURAL AS NEEDED
Status: DISCONTINUED | OUTPATIENT
Start: 2024-09-09 | End: 2024-09-09

## 2024-09-09 RX ADMIN — PROPOFOL 50 MG: 10 INJECTION, EMULSION INTRAVENOUS at 08:36

## 2024-09-09 RX ADMIN — PROPOFOL 100 MG: 10 INJECTION, EMULSION INTRAVENOUS at 08:32

## 2024-09-09 RX ADMIN — LIDOCAINE HYDROCHLORIDE 60 MG: 20 INJECTION, SOLUTION EPIDURAL; INFILTRATION; INTRACAUDAL at 08:32

## 2024-09-09 RX ADMIN — PROPOFOL 50 MG: 10 INJECTION, EMULSION INTRAVENOUS at 08:33

## 2024-09-09 RX ADMIN — SODIUM CHLORIDE 125 ML/HR: 0.9 INJECTION, SOLUTION INTRAVENOUS at 07:42

## 2024-09-09 NOTE — ANESTHESIA PREPROCEDURE EVALUATION
Procedure:  EGD    Relevant Problems   CARDIO   (+) Hypertension   (+) Mixed hyperlipidemia      GI/HEPATIC   (+) Fatty liver      /RENAL   (+) Hypertensive kidney disease with chronic kidney disease stage III (HCC)   (+) Stage 3 chronic kidney disease, unspecified whether stage 3a or 3b CKD (HCC)      MUSCULOSKELETAL   (+) DDD (degenerative disc disease), cervical   (+) Lumbosacral spondylosis without myelopathy   (+) Primary osteoarthritis involving multiple joints      NEURO/PSYCH   (+) Chronic pain syndrome   (+) Moderate major depression, single episode (HCC)   (+) Paresthesias   (+) Seasonal affective disorder (HCC)      PULMONARY   (+) REKHA (obstructive sleep apnea)             Anesthesia Plan  ASA Score- 3     Anesthesia Type- IV sedation with anesthesia with ASA Monitors.         Additional Monitors:     Airway Plan:            Plan Factors-    Chart reviewed.                      Induction- intravenous.    Postoperative Plan-         Informed Consent- Anesthetic plan and risks discussed with patient.  I personally reviewed this patient with the CRNA. Discussed and agreed on the Anesthesia Plan with the CRNA..

## 2024-09-09 NOTE — ANESTHESIA POSTPROCEDURE EVALUATION
Post-Op Assessment Note    CV Status:  Stable  Pain Score: 0    Pain management: adequate       Mental Status:  Sleepy and arousable   Hydration Status:  Euvolemic   PONV Controlled:  Controlled   Airway Patency:  Patent     Post Op Vitals Reviewed: Yes    No anethesia notable event occurred.    Staff: Anesthesiologist, CRNA               /55 (09/09/24 0844)    Temp      Pulse 57 (09/09/24 0844)   Resp 17 (09/09/24 0844)    SpO2 98 % (09/09/24 0844)

## 2024-09-11 PROCEDURE — 88305 TISSUE EXAM BY PATHOLOGIST: CPT | Performed by: PATHOLOGY

## 2024-09-18 DIAGNOSIS — N95.2 VAGINAL ATROPHY: ICD-10-CM

## 2024-09-19 RX ORDER — ESTRADIOL 0.1 MG/G
CREAM VAGINAL
Qty: 42.5 G | Refills: 0 | Status: SHIPPED | OUTPATIENT
Start: 2024-09-19

## 2024-09-23 DIAGNOSIS — R25.9 MIXED ACTION AND RESTING TREMOR: ICD-10-CM

## 2024-09-24 RX ORDER — TOPIRAMATE 25 MG/1
TABLET, FILM COATED ORAL
Qty: 180 TABLET | Refills: 1 | Status: SHIPPED | OUTPATIENT
Start: 2024-09-24

## 2024-10-01 ENCOUNTER — OFFICE VISIT (OUTPATIENT)
Dept: OBGYN CLINIC | Facility: MEDICAL CENTER | Age: 69
End: 2024-10-01
Payer: MEDICARE

## 2024-10-01 VITALS
WEIGHT: 180.7 LBS | BODY MASS INDEX: 35.47 KG/M2 | DIASTOLIC BLOOD PRESSURE: 80 MMHG | HEIGHT: 60 IN | SYSTOLIC BLOOD PRESSURE: 104 MMHG

## 2024-10-01 DIAGNOSIS — N94.89 PELVIC CYST IN FEMALE: Primary | ICD-10-CM

## 2024-10-01 PROCEDURE — 99213 OFFICE O/P EST LOW 20 MIN: CPT | Performed by: OBSTETRICS & GYNECOLOGY

## 2024-10-01 NOTE — PROGRESS NOTES
Assessment:  69 y.o.  who presents to review imaging results. Small vaginal cuff cysts noted; this is not abnormal in setting of hyst and unlikely to be contributing to current symptoms.     Plan:  Diagnoses and all orders for this visit:    Pelvic cyst in female    - Reviewed imaging, common nature of finding, etiology in setting of hyst  - Expectant mgmt, consider repeating imaging if pelvic pain or new discharge/bleeding  - Generally finding does not require surgical excision   - Continue f/u with GI for abd pain    __________________________________________________________________    Subjective   Sri Mcgrath is a 69 y.o.  who presents with imaging showing pelvic cyst.     Patient reports recent hx notable for stomach cramping, constipation, initially evaluated by her PCP for this and findings identified on eval for this. Stomach cramping all over, not focused on pelvis. Follows with neuro for B12 deficiency. Was advised to increase dose, but because it doesn't seem to be as effective despite supplementation she was inquiring why she wasn't absorbing as she should be. Referred to GI for further testing. No pain with sex unless falls behind on estrogen creams.  No new discharges, vaginal bleeding, or pelvic concerns generally.        The following portions of the patient's history were reviewed and updated as appropriate: allergies, current medications, past family history, past medical history, past social history, past surgical history, and problem list.    Review of Systems  Review of Systems   Constitutional:  Negative for chills and fever.   Respiratory:  Negative for shortness of breath.    Cardiovascular:  Negative for chest pain and palpitations.   Gastrointestinal:  Positive for abdominal pain and constipation. Negative for abdominal distention, diarrhea, nausea and vomiting.   Genitourinary:  Negative for dyspareunia, dysuria, flank pain, pelvic pain, vaginal bleeding, vaginal discharge  and vaginal pain.   Skin:  Negative for rash and wound.   Neurological:  Positive for numbness (neuropathy, follows with neuro). Negative for dizziness and headaches.            Objective  /80   Ht 5' (1.524 m)   Wt 82 kg (180 lb 11.2 oz)   BMI 35.29 kg/m²      Physical Exam:  Physical Exam  Constitutional:       General: She is not in acute distress.     Appearance: Normal appearance. She is well-developed. She is not ill-appearing, toxic-appearing or diaphoretic.   Eyes:      General: No scleral icterus.  Pulmonary:      Effort: Pulmonary effort is normal. No respiratory distress.   Skin:     General: Skin is warm and dry.      Coloration: Skin is not jaundiced or pale.      Findings: No bruising or rash.   Neurological:      Mental Status: She is alert.   Psychiatric:         Mood and Affect: Mood and affect normal.         Behavior: Behavior normal.         Thought Content: Thought content normal.         Judgment: Judgment normal.           Lab Review  Pelvic US  ABD US

## 2024-10-11 ENCOUNTER — APPOINTMENT (OUTPATIENT)
Dept: LAB | Facility: CLINIC | Age: 69
End: 2024-10-11
Payer: MEDICARE

## 2024-10-11 DIAGNOSIS — E78.2 MIXED HYPERLIPIDEMIA: ICD-10-CM

## 2024-10-11 DIAGNOSIS — I10 PRIMARY HYPERTENSION: ICD-10-CM

## 2024-10-11 LAB
CREAT UR-MCNC: 74.4 MG/DL
LDLC SERPL DIRECT ASSAY-MCNC: 75 MG/DL (ref 0–100)
MICROALBUMIN UR-MCNC: <7 MG/L
TRIGL SERPL-MCNC: 74 MG/DL

## 2024-10-11 PROCEDURE — 36415 COLL VENOUS BLD VENIPUNCTURE: CPT

## 2024-10-11 PROCEDURE — 82043 UR ALBUMIN QUANTITATIVE: CPT

## 2024-10-11 PROCEDURE — 83721 ASSAY OF BLOOD LIPOPROTEIN: CPT

## 2024-10-11 PROCEDURE — 82570 ASSAY OF URINE CREATININE: CPT

## 2024-10-11 PROCEDURE — 84478 ASSAY OF TRIGLYCERIDES: CPT

## 2024-11-11 ENCOUNTER — OFFICE VISIT (OUTPATIENT)
Dept: URGENT CARE | Facility: CLINIC | Age: 69
End: 2024-11-11
Payer: MEDICARE

## 2024-11-11 VITALS
DIASTOLIC BLOOD PRESSURE: 70 MMHG | HEART RATE: 69 BPM | HEIGHT: 60 IN | BODY MASS INDEX: 35.34 KG/M2 | RESPIRATION RATE: 18 BRPM | TEMPERATURE: 98 F | SYSTOLIC BLOOD PRESSURE: 130 MMHG | WEIGHT: 180 LBS | OXYGEN SATURATION: 97 %

## 2024-11-11 DIAGNOSIS — L30.9 DERMATITIS: Primary | ICD-10-CM

## 2024-11-11 PROCEDURE — 99213 OFFICE O/P EST LOW 20 MIN: CPT | Performed by: ORTHOPAEDIC SURGERY

## 2024-11-11 PROCEDURE — G0463 HOSPITAL OUTPT CLINIC VISIT: HCPCS | Performed by: ORTHOPAEDIC SURGERY

## 2024-11-11 PROCEDURE — 96372 THER/PROPH/DIAG INJ SC/IM: CPT | Performed by: ORTHOPAEDIC SURGERY

## 2024-11-11 RX ORDER — TRIAMCINOLONE ACETONIDE 1 MG/G
CREAM TOPICAL 2 TIMES DAILY
Qty: 80 G | Refills: 0 | Status: SHIPPED | OUTPATIENT
Start: 2024-11-11

## 2024-11-11 RX ORDER — PREDNISONE 10 MG/1
TABLET ORAL
Qty: 18 TABLET | Refills: 0 | Status: SHIPPED | OUTPATIENT
Start: 2024-11-11 | End: 2024-11-18

## 2024-11-11 RX ORDER — METHYLPREDNISOLONE SODIUM SUCCINATE 40 MG/ML
40 INJECTION, POWDER, LYOPHILIZED, FOR SOLUTION INTRAMUSCULAR; INTRAVENOUS ONCE
Status: COMPLETED | OUTPATIENT
Start: 2024-11-11 | End: 2024-11-11

## 2024-11-11 RX ADMIN — METHYLPREDNISOLONE SODIUM SUCCINATE 40 MG: 40 INJECTION, POWDER, LYOPHILIZED, FOR SOLUTION INTRAMUSCULAR; INTRAVENOUS at 16:17

## 2024-11-11 NOTE — PATIENT INSTRUCTIONS
Prescribed medication:  Take oral prednisone as prescribed  STARTING TOMORROW  If you have a history of diabetes, please monitor your blood sugar closely as steroids can cause a sudden elevation of blood sugar  Apply topical steroids if prescribed  Wash hands following administration of any topical ointments  OTC symptom management options:  Topical benadryl cream or calamine lotion during the day  Oral anti-histamines such as Claritin, Allegra, or Zyrtec and Pepcid over the counter  Oral benadryl for itching as needed at night  Benadryl is also an anti-histamine. Only use Benadryl if not using other anti-histamines such as those listed above.  Oatmeal baths  Cool compresses  Other:  Scrub areas exposed to poison plants/irritants with washcloth and Samira soap after initial contact  Wear deet or picaridin spray to avoid bug bites while outside  Wear protective clothing such as long sleeves or gloves  Avoid scratching area as this can cause open wounds and introduction of bacteria, causing infection  Keep area clean, dry, and covered  Watch for signs of infection such as:  Increasing redness, swelling, lesions, drainage, warmth  Fever/chills  Follow up with PCP in 3-5 days  Proceed to ER if symptoms worsen

## 2024-11-11 NOTE — PROGRESS NOTES
[facility]        NAME: Sri Mcgrath is a 69 y.o. female  : 1955    MRN: [id number]  DATE: 2024  TIME: 4:22 PM    Assessment and Plan   Dermatitis [L30.9]  1. Dermatitis  predniSONE 10 mg tablet    triamcinolone (KENALOG) 0.1 % cream    methylPREDNISolone sodium succinate (Solu-MEDROL) injection 40 mg        History and exam consistent with dermatitis versus eczema. Low suspicion for any infectious process. Patient requested injection of steroid today in clinic, which I am agreeable to. She has no known history of diabetes.     Patient Instructions     Prescribed medication:  Take oral prednisone as prescribed  STARTING TOMORROW  If you have a history of diabetes, please monitor your blood sugar closely as steroids can cause a sudden elevation of blood sugar  Apply topical steroids if prescribed  Wash hands following administration of any topical ointments  OTC symptom management options:  Topical benadryl cream or calamine lotion during the day  Oral anti-histamines such as Claritin, Allegra, or Zyrtec and Pepcid over the counter  Oral benadryl for itching as needed at night  Benadryl is also an anti-histamine. Only use Benadryl if not using other anti-histamines such as those listed above.  Oatmeal baths  Cool compresses  Other:  Scrub areas exposed to poison plants/irritants with washcloth and Samira soap after initial contact  Wear deet or picaridin spray to avoid bug bites while outside  Wear protective clothing such as long sleeves or gloves  Avoid scratching area as this can cause open wounds and introduction of bacteria, causing infection  Keep area clean, dry, and covered  Watch for signs of infection such as:  Increasing redness, swelling, lesions, drainage, warmth  Fever/chills  Follow up with PCP in 3-5 days  Proceed to ER if symptoms worsen     If tests are performed, our office will contact you with results only if changes need to made to the care plan discussed with you  at the visit. [facility]    Chief Complaint     Chief Complaint   Patient presents with    Rash     Started this morning with rash on neck, chest, ear and back.          History of Present Illness       69-year-old female presents to urgent care for evaluation of a rash along her chest, arms and neck.  Symptoms started this morning.  She complains that the rash is very itchy.  She denies any new or changes in medication, clothing, detergents, soaps.  She denies any fever, chills, or recent illness.  The patient does have her shingles vaccination.  No known history of eczema or other skin conditions.        Review of Systems   Review of Systems   Constitutional:  Negative for chills and fever.   HENT:  Negative for ear pain and sore throat.    Eyes:  Negative for pain and visual disturbance.   Respiratory:  Negative for cough and shortness of breath.    Cardiovascular:  Negative for chest pain and palpitations.   Gastrointestinal:  Negative for abdominal pain and vomiting.   Genitourinary:  Negative for dysuria and hematuria.   Musculoskeletal:  Negative for arthralgias and back pain.   Skin:  Positive for rash. Negative for color change.   Neurological:  Negative for seizures and syncope.   All other systems reviewed and are negative.        Current Medications       Current Outpatient Medications:     Calcium Carbonate (CALCIUM 600 PO), Take by mouth daily  , Disp: , Rfl:     Cholecalciferol (VITAMIN D3 PO), Take 50 mcg by mouth daily  , Disp: , Rfl:     Cyanocobalamin (B-12) 1000 MCG SUBL, Place 1 tablet (1,000 mcg total) under the tongue in the morning, Disp: 90 tablet, Rfl: 2    estradiol (ESTRACE) 0.1 mg/g vaginal cream, APPLY 1 GM INTO VAGINA NIGHTLY FOR 1 WEEK, THEN APPLY EVERY THIRD NIGHT, Disp: 42.5 g, Rfl: 0    montelukast (SINGULAIR) 10 mg tablet, take 1 tablet by mouth at bedtime, Disp: 100 tablet, Rfl: 1    predniSONE 10 mg tablet, 4 x 3 days, 3 x 1, 2 x 1, 1  x 1, Disp: 18 tablet, Rfl: 0    propranolol (INDERAL) 20 mg tablet, Take 1 tablet (20 mg total) by mouth every 12 (twelve) hours, Disp: 180 tablet, Rfl: 2    rosuvastatin (CRESTOR) 10 MG tablet, take 1 tablet by mouth once daily, Disp: 90 tablet, Rfl: 1    topiramate (TOPAMAX) 25 mg tablet, take 1 tablet by mouth at bedtime for 1 week then INCREASE to 1 tablet by mouth twice a day, Disp: 180 tablet, Rfl: 1    triamcinolone (KENALOG) 0.1 % cream, Apply topically 2 (two) times a day, Disp: 80 g, Rfl: 0    azelastine (ASTELIN) 0.1 % nasal spray, 1 spray into each nostril 2 (two) times a day Use in each nostril as directed (Patient not taking: Reported on 11/11/2024), Disp: 30 mL, Rfl: 0    CALCIUM PO, , Disp: , Rfl:     Chlorpheniramine-APAP (CORICIDIN) 2-325 MG TABS, Take 1 tablet by mouth 4 (four) times a day as needed (cough) (Patient not taking: Reported on 11/11/2024), Disp: 56 tablet, Rfl: 0    Cholecalciferol (Vitamin D3) 25 MCG TABS, 2000mcg (Patient not taking: Reported on 11/11/2024), Disp: , Rfl:     clotrimazole-betamethasone (LOTRISONE) 1-0.05 % cream, Apply topically 2 (two) times a day for 2 weeks to abdomen rash and 6 weeks to vulvar rash. (Patient not taking: Reported on 11/11/2024), Disp: 30 g, Rfl: 2    fluticasone (FLONASE) 50 mcg/act nasal spray, 1 spray into each nostril daily (Patient not taking: Reported on 11/11/2024), Disp: 18.2 mL, Rfl: 0    furosemide (LASIX) 40 mg tablet, Take 1 tablet (40 mg total) by mouth daily (Patient not taking: Reported on 7/5/2024), Disp: 30 tablet, Rfl: 1    nystatin (MYCOSTATIN) powder, Apply topically 3 (three) times a day as needed (rash) . Apply at least daily in summer to prevent rash. (Patient not taking: Reported on 11/11/2024), Disp: 45 g, Rfl: 3    tiZANidine (ZANAFLEX) 2 mg tablet, Take 2 mg by mouth every 8 (eight) hours as needed for muscle spasms (Patient not taking: Reported on 11/11/2024), Disp: , Rfl:     triamcinolone (KENALOG) 0.1 % cream, , Disp:  , Rfl:     Current Facility-Administered Medications:     cyanocobalamin injection 1,000 mcg, 1,000 mcg, Intramuscular, Q30 Days, , 1,000 mcg at 05/10/24 0900    Current Allergies     Allergies as of 11/11/2024 - Reviewed 11/11/2024   Allergen Reaction Noted    Keflex [cephalexin] Other (See Comments) 02/13/2018    Contrast [iodinated contrast media] Hives 05/02/2023    Morphine and codeine Hives 02/13/2018    Penicillins Hives 02/13/2018    Benadryl [diphenhydramine] Itching and Swelling 04/15/2020    Ciprofloxacin Hives 02/28/2021    Clindamycin Other (See Comments) 02/13/2018    Doxycycline Photosensitivity and Dermatitis 07/02/2024    Erythromycin Hives 02/13/2018            The following portions of the patient's history were reviewed and updated as appropriate: allergies, current medications, past family history, past medical history, past social history, past surgical history and problem list.     Past Medical History:   Diagnosis Date    Allergic     Arthritis     Cataract     ashley    Colon polyp     Depression     Fluid retention     Headache, acute     Heart murmur     Hyperlipidemia     Hypertension     Memory loss     Pedal edema     Pneumonia     PONV (postoperative nausea and vomiting)     Rotator cuff tear, left     Sleep apnea 2021    Wears glasses     Wears partial dentures     upper       Past Surgical History:   Procedure Laterality Date    ABDOMINAL ADHESION SURGERY      ANAL SPHINCTEROPLASTY      ANKLE SURGERY Right     tendon tear    APPENDECTOMY      CARPAL TUNNEL RELEASE Bilateral     COLONOSCOPY      HAND SURGERY Right     ganglion cyst    HEMORROIDECTOMY      HYSTERECTOMY      ILEOSTOMY      KNEE ARTHROCENTESIS      ganglion Cyst    KNEE ARTHROSCOPY Left     OVARIAN CYST REMOVAL      IL SURGICAL ARTHROSCOPY SHOULDER W/ROTATOR CUFF RPR Left 02/19/2018    Procedure: ARTHROSCOPIC REPAIR ROTATOR CUFF,  SAD, BICEP TENODESIS;  Surgeon: Garrett Coto MD;  Location: [facility];  Service:  Orthopedics    REPAIR RECTOCELE      REPLACEMENT TOTAL KNEE Left 09/28/2021    TONSILLECTOMY AND ADENOIDECTOMY      TUBAL LIGATION         Family History   Problem Relation Age of Onset    Hypertension Mother     Colon cancer Mother     Bone cancer Mother     COPD Mother     Emphysema Mother     Skin cancer Mother     Glaucoma Mother     Lead poisoning Father         lead poisoning in lungs     Thyroid disease Sister     Depression Sister     Hypertension Sister     Alzheimer's disease Sister     Arthritis Sister     ROBERT disease Brother     Throat cancer Brother     Hypertension Brother     Arthritis Brother     Colon cancer Family     Bone cancer Family     Hypertension Family     Diabetes Sister     Hypertension Sister     Heart failure Sister     Alzheimer's disease Sister     Arthritis Sister     No Known Problems Daughter     No Known Problems Maternal Grandmother     No Known Problems Paternal Grandmother     No Known Problems Maternal Aunt     No Known Problems Maternal Aunt     No Known Problems Maternal Aunt     No Known Problems Maternal Aunt     No Known Problems Maternal Aunt     No Known Problems Paternal Aunt          Medications have been verified.        Objective   /70   Pulse 69   Temp 98 °F (36.7 °C)   Resp 18   Ht 5' (1.524 m)   Wt 81.6 kg (180 lb)   SpO2 97%   BMI 35.15 kg/m²        Physical Exam     Physical Exam  Vitals and nursing note reviewed.   Constitutional:       General: She is not in acute distress.     Appearance: Normal appearance. She is not ill-appearing.   HENT:      Head: Normocephalic and atraumatic.      Nose: Nose normal.      Mouth/Throat:      Mouth: Mucous membranes are moist.      Pharynx: Oropharynx is clear.   Eyes:      Extraocular Movements: Extraocular movements intact.      Pupils: Pupils are equal, round, and reactive to light.   Cardiovascular:      Rate and Rhythm: Normal rate and regular rhythm.      Pulses: Normal pulses.   Pulmonary:      Effort:  Pulmonary effort is normal. No respiratory distress.   Musculoskeletal:         General: Normal range of motion.      Cervical back: Normal range of motion.   Skin:     General: Skin is warm and dry.      Capillary Refill: Capillary refill takes less than 2 seconds.      Comments: Along the anterior chest, spreading up both sides of the neck, along the right earlobe and left upper arm there is a dry, slightly erythematous, maculopapular rash.  A few lesions do appear to be scabbed from scratching.  No drainage or bleeding.    Otoscopic examination of the right ear canal shows no abnormalities.   Neurological:      General: No focal deficit present.      Mental Status: She is alert and oriented to person, place, and time.   Psychiatric:         Mood and Affect: Mood normal.         Behavior: Behavior normal.

## 2024-11-15 DIAGNOSIS — N95.2 VAGINAL ATROPHY: ICD-10-CM

## 2024-11-15 RX ORDER — ESTRADIOL 0.1 MG/G
CREAM VAGINAL
Qty: 42.5 G | Refills: 5 | Status: SHIPPED | OUTPATIENT
Start: 2024-11-15

## 2024-11-18 ENCOUNTER — OFFICE VISIT (OUTPATIENT)
Dept: INTERNAL MEDICINE CLINIC | Facility: CLINIC | Age: 69
End: 2024-11-18
Payer: MEDICARE

## 2024-11-18 VITALS
HEART RATE: 69 BPM | TEMPERATURE: 97.6 F | SYSTOLIC BLOOD PRESSURE: 138 MMHG | HEIGHT: 60 IN | WEIGHT: 180.2 LBS | OXYGEN SATURATION: 98 % | DIASTOLIC BLOOD PRESSURE: 78 MMHG | BODY MASS INDEX: 35.38 KG/M2

## 2024-11-18 DIAGNOSIS — R20.2 PARESTHESIAS: ICD-10-CM

## 2024-11-18 DIAGNOSIS — R25.9 MIXED ACTION AND RESTING TREMOR: ICD-10-CM

## 2024-11-18 DIAGNOSIS — L30.9 DERMATITIS: Primary | ICD-10-CM

## 2024-11-18 PROCEDURE — 99213 OFFICE O/P EST LOW 20 MIN: CPT | Performed by: INTERNAL MEDICINE

## 2024-11-18 PROCEDURE — 96372 THER/PROPH/DIAG INJ SC/IM: CPT | Performed by: INTERNAL MEDICINE

## 2024-11-18 RX ORDER — LORATADINE 10 MG/1
10 TABLET ORAL DAILY
Start: 2024-11-18

## 2024-11-18 RX ORDER — PREDNISONE 10 MG/1
TABLET ORAL
Qty: 42 TABLET | Refills: 0 | Status: SHIPPED | OUTPATIENT
Start: 2024-11-18

## 2024-11-18 RX ORDER — HYDROXYZINE HYDROCHLORIDE 50 MG/1
50 TABLET, FILM COATED ORAL 3 TIMES DAILY PRN
Qty: 30 TABLET | Refills: 0 | Status: SHIPPED | OUTPATIENT
Start: 2024-11-18

## 2024-11-18 RX ORDER — DEXAMETHASONE SODIUM PHOSPHATE 4 MG/ML
4 INJECTION, SOLUTION INTRA-ARTICULAR; INTRALESIONAL; INTRAMUSCULAR; INTRAVENOUS; SOFT TISSUE ONCE
Status: COMPLETED | OUTPATIENT
Start: 2024-11-18 | End: 2024-11-18

## 2024-11-18 RX ORDER — CIMETIDINE 400 MG
400 TABLET ORAL 2 TIMES DAILY
Qty: 60 TABLET | Refills: 0 | Status: SHIPPED | OUTPATIENT
Start: 2024-11-18

## 2024-11-18 RX ADMIN — DEXAMETHASONE SODIUM PHOSPHATE 4 MG: 4 INJECTION, SOLUTION INTRA-ARTICULAR; INTRALESIONAL; INTRAMUSCULAR; INTRAVENOUS; SOFT TISSUE at 12:07

## 2024-11-18 NOTE — PROGRESS NOTES
Name: Sri Mcgrath      : 1955      MRN: [id number]  Encounter Provider: Sea Hadley DO  Encounter Date: 2024   Encounter department: [facility]  :  Assessment & Plan  Mixed action and resting tremor         Paresthesias         Dermatitis    Orders:    hydrOXYzine HCL (ATARAX) 50 mg tablet; Take 1 tablet (50 mg total) by mouth 3 (three) times a day as needed for itching    cimetidine (TAGAMET) 400 mg tablet; Take 1 tablet (400 mg total) by mouth 2 (two) times a day    predniSONE 10 mg tablet; 60mg po q day x2, then 50mg x 2, then 40mg x 2, then 30mg x2, then 20mg x 2, and then 10mg x 2, then d/c.    dexamethasone (DECADRON) injection 4 mg    Ambulatory referral to Dermatology; Future    loratadine (CLARITIN) 10 mg tablet; Take 1 tablet (10 mg total) by mouth daily    A/P: Stable, but very uncomfortable. ???cause. ?nummular eczema. ?drug eruption but no recent changes or new refills. Will refer to derm. ?need for bx to r/o vasculitis. Will retry a steroid wean, but longer and stronger. Continue claritin. Add atarax and H2 blocker. Will stop all meds. RTC two weeks for f/u.        History of Present Illness     [facility] RTC for f/u UC visit for rash. Dx with eczema and given a short steroid wean and topical. Reports no improvement. Very itchy and uncomfortable. . No h/o new meds or exposures. No travel. Tolerating the meds. Multiple med allergies in the past. No recent illnesses/infection, vaccines, etc. TAking claritin. No wheezing or light headedness. Always present. Acute onset.       Review of Systems   Constitutional:  Negative for activity change, chills, diaphoresis, fatigue and fever.   Respiratory:  Negative for cough, chest tightness, shortness of breath and wheezing.    Cardiovascular:  Negative for chest pain, palpitations and leg swelling.   Gastrointestinal:  Negative for abdominal pain, constipation, diarrhea, nausea and vomiting.   Genitourinary:  Negative for  difficulty urinating, dysuria and frequency.   Musculoskeletal:  Negative for arthralgias, gait problem and myalgias.   Skin:  Positive for rash.   Neurological:  Negative for light-headedness and headaches.   Psychiatric/Behavioral:  Negative for confusion. The patient is not nervous/anxious.           Objective   /78   Pulse 69   Temp 97.6 °F (36.4 °C)   Ht 5' (1.524 m)   Wt 81.7 kg (180 lb 3.2 oz)   SpO2 98%   BMI 35.19 kg/m²      Physical Exam  Vitals and nursing note reviewed.   Constitutional:       General: She is in acute distress (very uncomfortable.).      Appearance: Normal appearance. She is not ill-appearing.   HENT:      Head: Normocephalic and atraumatic.      Mouth/Throat:      Mouth: Mucous membranes are moist.   Eyes:      Extraocular Movements: Extraocular movements intact.      Conjunctiva/sclera: Conjunctivae normal.      Pupils: Pupils are equal, round, and reactive to light.   Cardiovascular:      Rate and Rhythm: Normal rate and regular rhythm.      Heart sounds: Normal heart sounds. No murmur heard.  Pulmonary:      Effort: Pulmonary effort is normal. No respiratory distress.      Breath sounds: Normal breath sounds. No wheezing, rhonchi or rales.   Skin:     Findings: Rash (diffuse erythematous based, maculopapular lesions. Non blanching. Dry bases. Involves upper torso and extremities, but spares the face.) present.   Neurological:      General: No focal deficit present.      Mental Status: She is alert and oriented to person, place, and time. Mental status is at baseline.   Psychiatric:         Mood and Affect: Mood normal.         Behavior: Behavior normal.         Thought Content: Thought content normal.         Judgment: Judgment normal.

## 2024-11-20 ENCOUNTER — APPOINTMENT (OUTPATIENT)
Dept: LAB | Facility: CLINIC | Age: 69
End: 2024-11-20
Payer: MEDICARE

## 2024-11-20 ENCOUNTER — TELEPHONE (OUTPATIENT)
Age: 69
End: 2024-11-20

## 2024-11-20 DIAGNOSIS — M25.50 ARTHRALGIA, UNSPECIFIED JOINT: Primary | ICD-10-CM

## 2024-11-20 DIAGNOSIS — M25.50 ARTHRALGIA, UNSPECIFIED JOINT: ICD-10-CM

## 2024-11-20 LAB — B BURGDOR IGG+IGM SER QL IA: NEGATIVE

## 2024-11-20 PROCEDURE — 86618 LYME DISEASE ANTIBODY: CPT

## 2024-11-20 PROCEDURE — 36415 COLL VENOUS BLD VENIPUNCTURE: CPT

## 2024-11-20 NOTE — TELEPHONE ENCOUNTER
Pt called and asked if there is a possibility that a lyme test could be ordered? She had it suggested to her that that could be her problem even if she doesn't remember ever having being bite by a tick, please advise.

## 2024-11-21 ENCOUNTER — TELEPHONE (OUTPATIENT)
Age: 69
End: 2024-11-21

## 2024-11-21 ENCOUNTER — RESULTS FOLLOW-UP (OUTPATIENT)
Dept: INTERNAL MEDICINE CLINIC | Facility: CLINIC | Age: 69
End: 2024-11-21

## 2024-11-21 NOTE — TELEPHONE ENCOUNTER
[facility] called to confirm the fax sent over this morning was received for a prior auth. Representative made aware that the fax has not been received yet and it may take 24 hours to receive it.      Asking for providers signature when faxed is received along with last ov notes and for it to be faxed back.    Fax number [phone]

## 2024-11-27 ENCOUNTER — RA CDI HCC (OUTPATIENT)
Dept: OTHER | Facility: HOSPITAL | Age: 69
End: 2024-11-27

## 2024-12-04 ENCOUNTER — OFFICE VISIT (OUTPATIENT)
Dept: INTERNAL MEDICINE CLINIC | Facility: CLINIC | Age: 69
End: 2024-12-04
Payer: MEDICARE

## 2024-12-04 VITALS
TEMPERATURE: 97.7 F | OXYGEN SATURATION: 98 % | DIASTOLIC BLOOD PRESSURE: 82 MMHG | HEART RATE: 88 BPM | HEIGHT: 60 IN | SYSTOLIC BLOOD PRESSURE: 130 MMHG | WEIGHT: 180 LBS | BODY MASS INDEX: 35.34 KG/M2

## 2024-12-04 DIAGNOSIS — Z23 ENCOUNTER FOR IMMUNIZATION: Primary | ICD-10-CM

## 2024-12-04 DIAGNOSIS — L24.0 IRRITANT CONTACT DERMATITIS DUE TO DETERGENT: ICD-10-CM

## 2024-12-04 PROCEDURE — 99212 OFFICE O/P EST SF 10 MIN: CPT | Performed by: INTERNAL MEDICINE

## 2024-12-04 PROCEDURE — 90471 IMMUNIZATION ADMIN: CPT

## 2024-12-04 PROCEDURE — 90662 IIV NO PRSV INCREASED AG IM: CPT

## 2024-12-04 RX ORDER — MOMETASONE FUROATE 1 MG/G
CREAM TOPICAL DAILY
Qty: 45 G | Refills: 1 | Status: SHIPPED | OUTPATIENT
Start: 2024-12-04

## 2024-12-04 NOTE — PROGRESS NOTES
Name: Sri Mcgrath      : 1955      MRN: [id number]  Encounter Provider: Sea Hadley DO  Encounter Date: 2024   Encounter department: [facility]  :  Assessment & Plan  Encounter for immunization    Orders:    influenza vaccine, high-dose, PF 0.5 mL (Fluzone High Dose)    mometasone (ELOCON) 0.1 % cream; Apply topically daily    Irritant contact dermatitis due to detergent           A/P: Discussed the contact irritant. Will stop the offending agent and start topical steroid and OTC bendaryl cream. RTC as scheduled.      History of Present Illness     [facility] RTC for f/u ?dermatitis after starting steroids. Slightly better, but done with the meds. Pt now knows what cause the problem. Was spraying Frebreeze on her clothes.       Review of Systems   Constitutional:  Negative for activity change, chills, diaphoresis, fatigue and fever.   Respiratory:  Negative for cough, chest tightness, shortness of breath and wheezing.    Cardiovascular:  Negative for chest pain, palpitations and leg swelling.   Gastrointestinal:  Negative for abdominal pain, constipation, diarrhea, nausea and vomiting.   Genitourinary:  Negative for difficulty urinating, dysuria and frequency.   Musculoskeletal:  Negative for arthralgias, gait problem and myalgias.   Skin:  Positive for rash.   Neurological:  Negative for light-headedness and headaches.   Psychiatric/Behavioral:  Negative for confusion. The patient is not nervous/anxious.           Objective   /82   Pulse 88   Temp 97.7 °F (36.5 °C) (Tympanic)   Ht 5' (1.524 m)   Wt 81.6 kg (180 lb)   SpO2 98%   BMI 35.15 kg/m²      Physical Exam  Vitals and nursing note reviewed.   Constitutional:       General: She is not in acute distress.     Appearance: Normal appearance. She is not ill-appearing.   HENT:      Head: Normocephalic and atraumatic.      Mouth/Throat:      Mouth: Mucous membranes are moist.   Eyes:      Extraocular Movements: Extraocular  movements intact.      Conjunctiva/sclera: Conjunctivae normal.      Pupils: Pupils are equal, round, and reactive to light.   Skin:     Findings: Rash present.   Neurological:      General: No focal deficit present.      Mental Status: She is alert and oriented to person, place, and time. Mental status is at baseline.   Psychiatric:         Mood and Affect: Mood normal.         Behavior: Behavior normal.         Thought Content: Thought content normal.         Judgment: Judgment normal.

## 2024-12-09 ENCOUNTER — OFFICE VISIT (OUTPATIENT)
Dept: URGENT CARE | Facility: CLINIC | Age: 69
End: 2024-12-09
Payer: MEDICARE

## 2024-12-09 VITALS
DIASTOLIC BLOOD PRESSURE: 72 MMHG | RESPIRATION RATE: 16 BRPM | TEMPERATURE: 98.3 F | HEART RATE: 114 BPM | OXYGEN SATURATION: 99 % | WEIGHT: 180 LBS | BODY MASS INDEX: 35.34 KG/M2 | SYSTOLIC BLOOD PRESSURE: 136 MMHG | HEIGHT: 60 IN

## 2024-12-09 DIAGNOSIS — J04.0 ACUTE LARYNGITIS: Primary | ICD-10-CM

## 2024-12-09 PROCEDURE — 99213 OFFICE O/P EST LOW 20 MIN: CPT | Performed by: ORTHOPAEDIC SURGERY

## 2024-12-09 PROCEDURE — G0463 HOSPITAL OUTPT CLINIC VISIT: HCPCS | Performed by: ORTHOPAEDIC SURGERY

## 2024-12-09 NOTE — PROGRESS NOTES
[facility]        NAME: Sri Mcgrath is a 69 y.o. female  : 1955    MRN: [id number]  DATE: 2024  TIME: 4:44 PM    Assessment and Plan   Acute laryngitis [J04.0]  1. Acute laryngitis              Patient Instructions       Follow up with PCP in 3-5 days.  Proceed to  ER if symptoms worsen.    If tests are performed, our office will contact you with results only if changes need to made to the care plan discussed with you at the visit. [facility]    Chief Complaint     Chief Complaint   Patient presents with    Laryngitis     Patient reports loss of voice that started this morning.  Patient reports slight cough but no other cold like symptoms.          History of Present Illness       69-year-old female presents to the urgent care for evaluation of loss of voice.  Patient states symptoms began yesterday.  She denies any fever or chills.  She initially denies any postnasal drip, but does admit that she has to clear her throat often with mucus and has a slight cough.  The patient does have a prescription for montelukast, but states that she has not been taking it.  She does have a history of asthma, COPD due to secondhand smoke exposure.  The patient has not had any medications for her symptoms.  She denies any reflux or indigestion.        Review of Systems   Review of Systems   Constitutional:  Negative for chills and fever.   HENT:  Positive for voice change. Negative for congestion, ear pain and sore throat.    Eyes:  Negative for pain and visual disturbance.   Respiratory:  Negative for cough, chest tightness and shortness of breath.    Cardiovascular:  Negative for chest pain and palpitations.   Gastrointestinal:  Negative for abdominal pain and vomiting.   Genitourinary:  Negative for dysuria and hematuria.   Musculoskeletal:  Negative for arthralgias and back pain.   Skin:  Negative for color change and rash.   Neurological:  Negative for  dizziness, seizures, syncope, light-headedness and headaches.   All other systems reviewed and are negative.        Current Medications       Current Outpatient Medications:     Calcium Carbonate (CALCIUM 600 PO), Take by mouth daily  , Disp: , Rfl:     Cholecalciferol (VITAMIN D3 PO), Take 50 mcg by mouth daily  , Disp: , Rfl:     cimetidine (TAGAMET) 400 mg tablet, Take 1 tablet (400 mg total) by mouth 2 (two) times a day, Disp: 60 tablet, Rfl: 0    estradiol (ESTRACE) 0.1 mg/g vaginal cream, insert 1 gram vaginally nightly for 1 week then EVERY 3RD NIGHT, Disp: 42.5 g, Rfl: 5    fluticasone (FLONASE) 50 mcg/act nasal spray, 1 spray into each nostril daily, Disp: 18.2 mL, Rfl: 0    hydrOXYzine HCL (ATARAX) 50 mg tablet, Take 1 tablet (50 mg total) by mouth 3 (three) times a day as needed for itching, Disp: 30 tablet, Rfl: 0    loratadine (CLARITIN) 10 mg tablet, Take 1 tablet (10 mg total) by mouth daily, Disp: , Rfl:     mometasone (ELOCON) 0.1 % cream, Apply topically daily, Disp: 45 g, Rfl: 1    montelukast (SINGULAIR) 10 mg tablet, take 1 tablet by mouth at bedtime, Disp: 100 tablet, Rfl: 1    predniSONE 10 mg tablet, 60mg po q day x2, then 50mg x 2, then 40mg x 2, then 30mg x2, then 20mg x 2, and then 10mg x 2, then d/c., Disp: 42 tablet, Rfl: 0    propranolol (INDERAL) 20 mg tablet, Take 1 tablet (20 mg total) by mouth every 12 (twelve) hours, Disp: 180 tablet, Rfl: 2    rosuvastatin (CRESTOR) 10 MG tablet, take 1 tablet by mouth once daily, Disp: 90 tablet, Rfl: 1    topiramate (TOPAMAX) 25 mg tablet, take 1 tablet by mouth at bedtime for 1 week then INCREASE to 1 tablet by mouth twice a day, Disp: 180 tablet, Rfl: 1    Current Facility-Administered Medications:     cyanocobalamin injection 1,000 mcg, 1,000 mcg, Intramuscular, Q30 Days, , 1,000 mcg at 05/10/24 0900    Current Allergies     Allergies as of 12/09/2024 - Reviewed 12/09/2024   Allergen Reaction Noted    Keflex [cephalexin] Other (See Comments)  02/13/2018    Contrast [iodinated contrast media] Hives 05/02/2023    Morphine and codeine Hives 02/13/2018    Penicillins Hives 02/13/2018    Benadryl [diphenhydramine] Itching and Swelling 04/15/2020    Ciprofloxacin Hives 02/28/2021    Clindamycin Other (See Comments) 02/13/2018    Doxycycline Photosensitivity and Dermatitis 07/02/2024    Erythromycin Hives 02/13/2018            The following portions of the patient's history were reviewed and updated as appropriate: allergies, current medications, past family history, past medical history, past social history, past surgical history and problem list.     Past Medical History:   Diagnosis Date    Allergic     Arthritis     Cataract     ashley    Colon polyp     Depression     Fluid retention     Headache, acute     Heart murmur     Hyperlipidemia     Hypertension     Memory loss     Pedal edema     Pneumonia     PONV (postoperative nausea and vomiting)     Rotator cuff tear, left     Sleep apnea 2021    Wears glasses     Wears partial dentures     upper       Past Surgical History:   Procedure Laterality Date    ABDOMINAL ADHESION SURGERY      ANAL SPHINCTEROPLASTY      ANKLE SURGERY Right     tendon tear    APPENDECTOMY      CARPAL TUNNEL RELEASE Bilateral     COLONOSCOPY      HAND SURGERY Right     ganglion cyst    HEMORROIDECTOMY      HYSTERECTOMY      ILEOSTOMY      KNEE ARTHROCENTESIS      ganglion Cyst    KNEE ARTHROSCOPY Left     OVARIAN CYST REMOVAL      TN SURGICAL ARTHROSCOPY SHOULDER W/ROTATOR CUFF RPR Left 02/19/2018    Procedure: ARTHROSCOPIC REPAIR ROTATOR CUFF,  SAD, BICEP TENODESIS;  Surgeon: Garrett Coto MD;  Location: [facility];  Service: Orthopedics    REPAIR RECTOCELE      REPLACEMENT TOTAL KNEE Left 09/28/2021    TONSILLECTOMY AND ADENOIDECTOMY      TUBAL LIGATION         Family History   Problem Relation Age of Onset    Hypertension Mother     Colon cancer Mother     Bone cancer Mother     COPD Mother     Emphysema Mother     Skin cancer  Mother     Glaucoma Mother     Lead poisoning Father         lead poisoning in lungs     Thyroid disease Sister     Depression Sister     Hypertension Sister     Alzheimer's disease Sister     Arthritis Sister     ROBERT disease Brother     Throat cancer Brother     Hypertension Brother     Arthritis Brother     Colon cancer Family     Bone cancer Family     Hypertension Family     Diabetes Sister     Hypertension Sister     Heart failure Sister     Alzheimer's disease Sister     Arthritis Sister     No Known Problems Daughter     No Known Problems Maternal Grandmother     No Known Problems Paternal Grandmother     No Known Problems Maternal Aunt     No Known Problems Maternal Aunt     No Known Problems Maternal Aunt     No Known Problems Maternal Aunt     No Known Problems Maternal Aunt     No Known Problems Paternal Aunt          Medications have been verified.        Objective   /72   Pulse (!) 114   Temp 98.3 °F (36.8 °C) (Temporal)   Resp 16   Ht 5' (1.524 m)   Wt 81.6 kg (180 lb)   SpO2 99%   BMI 35.15 kg/m²        Physical Exam     Physical Exam  Vitals and nursing note reviewed.   Constitutional:       General: She is not in acute distress.     Appearance: Normal appearance. She is not ill-appearing.   HENT:      Head: Normocephalic and atraumatic.      Right Ear: Tympanic membrane normal.      Left Ear: Tympanic membrane normal.      Nose: Nose normal.      Mouth/Throat:      Mouth: Mucous membranes are moist.      Pharynx: Oropharynx is clear. No oropharyngeal exudate or posterior oropharyngeal erythema.   Eyes:      Extraocular Movements: Extraocular movements intact.      Pupils: Pupils are equal, round, and reactive to light.   Cardiovascular:      Rate and Rhythm: Normal rate and regular rhythm.      Pulses: Normal pulses.      Heart sounds: Normal heart sounds. No murmur heard.  Pulmonary:      Effort: Pulmonary effort is normal. No respiratory distress.      Breath sounds: Normal breath  sounds. No wheezing or rhonchi.   Abdominal:      Palpations: Abdomen is soft.      Tenderness: There is no abdominal tenderness.   Musculoskeletal:         General: Normal range of motion.      Cervical back: Normal range of motion.   Lymphadenopathy:      Cervical: No cervical adenopathy.   Skin:     General: Skin is warm and dry.      Capillary Refill: Capillary refill takes less than 2 seconds.   Neurological:      General: No focal deficit present.      Mental Status: She is alert and oriented to person, place, and time.   Psychiatric:         Mood and Affect: Mood normal.         Behavior: Behavior normal.

## 2024-12-11 DIAGNOSIS — L30.9 DERMATITIS: ICD-10-CM

## 2024-12-11 RX ORDER — CIMETIDINE 400 MG
400 TABLET ORAL 2 TIMES DAILY
Qty: 60 TABLET | Refills: 5 | Status: SHIPPED | OUTPATIENT
Start: 2024-12-11

## 2024-12-23 ENCOUNTER — OFFICE VISIT (OUTPATIENT)
Dept: SLEEP CENTER | Facility: CLINIC | Age: 69
End: 2024-12-23
Payer: MEDICARE

## 2024-12-23 VITALS
DIASTOLIC BLOOD PRESSURE: 78 MMHG | HEART RATE: 81 BPM | HEIGHT: 60 IN | BODY MASS INDEX: 36.24 KG/M2 | TEMPERATURE: 97.5 F | OXYGEN SATURATION: 99 % | SYSTOLIC BLOOD PRESSURE: 134 MMHG | WEIGHT: 184.6 LBS

## 2024-12-23 DIAGNOSIS — I10 PRIMARY HYPERTENSION: ICD-10-CM

## 2024-12-23 DIAGNOSIS — G47.33 OSA (OBSTRUCTIVE SLEEP APNEA): Primary | ICD-10-CM

## 2024-12-23 DIAGNOSIS — E66.01 CLASS 2 SEVERE OBESITY DUE TO EXCESS CALORIES WITH SERIOUS COMORBIDITY AND BODY MASS INDEX (BMI) OF 36.0 TO 36.9 IN ADULT (HCC): ICD-10-CM

## 2024-12-23 DIAGNOSIS — E66.812 CLASS 2 SEVERE OBESITY DUE TO EXCESS CALORIES WITH SERIOUS COMORBIDITY AND BODY MASS INDEX (BMI) OF 36.0 TO 36.9 IN ADULT (HCC): ICD-10-CM

## 2024-12-23 PROCEDURE — 99204 OFFICE O/P NEW MOD 45 MIN: CPT | Performed by: NURSE PRACTITIONER

## 2024-12-23 NOTE — ASSESSMENT & PLAN NOTE
69 y.o. y/o who comes in for re-evaluation of obstructive sleep apnea  1.  Snoring/Excessive daytime sleepiness/Witnessed apneas -   Body mass index is 36.05 kg/m²., Neck Circumference: 16.  The patient is at risk for obstructive sleep apnea based on history and assessment noted.       -  Check a baseline sleep study to assess for obstructive sleep apnea. This testing will be done as a split night study, since moderate REKHA was confirmed in 2021.       -  I discussed in depth the types of diagnostic studies and treatment options involved with obstructive sleep apnea      -  I also discussed in depth the risk of leaving sleep apnea untreated, including hypertension, heart failure, arrhythmia, MI and stroke.  She has a strong family history of REKHA with complications among family members and would like to prevent this for herself.      -  The patient is agreeable to undergo testing and treatment of sleep apnea.  The patient understands the pitfalls they may encounter along the way and is willing to attempt treatment with PAP equipment.  The patient would likely do best to start with a full face mask, if PAP therapy is indicated.      Orders:    Ambulatory Referral to Sleep Medicine    Split Study; Future

## 2024-12-23 NOTE — PATIENT INSTRUCTIONS
Patient Instructions:     Schedule split night sleep study  The nurse will call with results and plan of treatment.    Thank you for trusting me with your care!    I know we often  cover a lot of information at the visit, so if you have follow-up questions, are unclear about the plan, or feel there were important items that we did not discuss or you did not receive clarity on, please don't hesitate to reach out to me.     [facility] messages are preferred for routine matters.  Please make sure to call for urgent matters as there can be a delay in responding to questions over [facility].    IMPORTANT- Prior to a sleep study (at home or in the sleep lab), I strongly recommend contacting your medical insurance first to understand your benefits (including deductible if applicable), coverage for this test, and out of pocket costs.  Even if the test is approved by medical insurance, the cost to you is determined by your medical benefits.   If you have concerns about your out of pocket costs for sleep testing, please contact me/the office before you complete the test and we can discuss if there are alternate options.     I recommend following this advice in general before any lab test, imaging test, doctor visit, surgery, or ordering CPAP supplies as it is best to understand your coverage to avoid unexpected bills after the fact.       Nursing Support:  When: Monday through Friday 7:30A-4:30PM except holidays  Where: Our direct line is [phone]  *3  *1.      If you are having a true emergency please call 911.  In the event that the line is busy or it is after hours please leave a voice message and we will return your call.  Please speak clearly, leaving your full name, birth date, best number to reach you and the reason for your call.   Medication refills: We will need the name of the medication, the dosage, the ordering provider, whether you get a 30 or 90 day refill, and the pharmacy name and address.  Medications will be  ordered by the provider only.  Nurses cannot call in prescriptions.  Please allow 7 days for medication refills.  Physician requested updates: If your provider requested that you call with an update after starting medication, please be ready to provide us the medication and dosage, what time you take your medication, the time you attempt to fall asleep, time you fall asleep, when you wake up, and what time you get out of bed.  Sleep Study Results: We will contact you with sleep study results and/or next steps after the physician has reviewed your testing.

## 2024-12-23 NOTE — PROGRESS NOTES
Name: Sri Mcgrath      : 1955      MRN: [id number]  Encounter Provider: IVAN Orozco  Encounter Date: 2024   Encounter department: [facility] SLEEP MEDICINE [place]  :  Assessment & Plan  REKHA (obstructive sleep apnea)    69 y.o. y/o who comes in for re-evaluation of obstructive sleep apnea  1.  Snoring/Excessive daytime sleepiness/Witnessed apneas -   Body mass index is 36.05 kg/m²., Neck Circumference: 16.  The patient is at risk for obstructive sleep apnea based on history and assessment noted.       -  Check a baseline sleep study to assess for obstructive sleep apnea. This testing will be done as a split night study, since moderate REKHA was confirmed in .       -  I discussed in depth the types of diagnostic studies and treatment options involved with obstructive sleep apnea      -  I also discussed in depth the risk of leaving sleep apnea untreated, including hypertension, heart failure, arrhythmia, MI and stroke.  She has a strong family history of REKHA with complications among family members and would like to prevent this for herself.      -  The patient is agreeable to undergo testing and treatment of sleep apnea.  The patient understands the pitfalls they may encounter along the way and is willing to attempt treatment with PAP equipment.  The patient would likely do best to start with a full face mask, if PAP therapy is indicated.      Orders:    Ambulatory Referral to Sleep Medicine    Split Study; Future    Primary hypertension  Hypertension - We reviewed the association between untreated obstructive sleep apnea and the increased risk for hypertension. She is not currently taking any medication for HTN.  She plans to continue follow up with PCP for continuity of care.     Orders:    Split Study; Future    Class 2 severe obesity due to excess calories with serious comorbidity and body mass index (BMI) of 36.0 to 36.9 in adult (HCC)  Obesity - We reviewed the association between  obesity and obstructive sleep apnea and sleep disordered breathing. Encouraged patient to follow healthy diet, regular exercise regimen, and pursue weight loss to manage symptoms.              History of Present Illness   Sri Mcgrath is a 69 year old female, referred by Dr. Cowart for a consultation regarding obstructive sleep apnea.  She has a PMHx of obesity, HTN, CKD stage 3, depression, chronic pain, vitamin D deficiency and vitamin B12 deficiency.  She completed a diagnostic sleep study in August 2021.  Weight at the time of the study was 208 pounds.  AHI was 20.8 worsening to 53 in REM sleep.  Oxygen jethro was 79% with 17.3 minutes spent at SpO2 less than 90%.  She then completed a CPAP titration study with best setting noted at close to 19.  Recommendation for settings was APAP 10 to 20 cm of water pressure.  Unfortunately, she did not follow up and treatment was never started.      Patient accompanied at this vist by : unaccompanied        Sitting and reading: High chance of dozing  Watching TV: High chance of dozing  Sitting, inactive in a public place (e.g. a theatre or a meeting): Slight chance of dozing  As a passenger in a car for an hour without a break: Slight chance of dozing  Lying down to rest in the afternoon when circumstances permit: Moderate chance of dozing  Sitting and talking to someone: Slight chance of dozing  Sitting quietly after a lunch without alcohol: Would never doze  In a car, while stopped for a few minutes in traffic: Would never doze  Total score: 11     Review of Systems   Cardiovascular:  Positive for chest pain.        She reports that she gets pain in her left chest when she has anxiety.   Neurological:  Positive for tremors.   Psychiatric/Behavioral:  The patient is nervous/anxious.      Pertinent positives/negatives included in HPI and also as noted:     Current Outpatient Medications on File Prior to Visit   Medication Sig Dispense Refill    Calcium Carbonate (CALCIUM  600 PO) Take by mouth daily   (Patient not taking: Reported on 12/23/2024)      Cholecalciferol (VITAMIN D3 PO) Take 50 mcg by mouth daily   (Patient not taking: Reported on 12/23/2024)      cimetidine (TAGAMET) 400 mg tablet take 1 tablet by mouth twice a day (Patient not taking: Reported on 12/23/2024) 60 tablet 5    estradiol (ESTRACE) 0.1 mg/g vaginal cream insert 1 gram vaginally nightly for 1 week then EVERY 3RD NIGHT (Patient not taking: Reported on 12/23/2024) 42.5 g 5    fluticasone (FLONASE) 50 mcg/act nasal spray 1 spray into each nostril daily (Patient not taking: Reported on 12/23/2024) 18.2 mL 0    hydrOXYzine HCL (ATARAX) 50 mg tablet Take 1 tablet (50 mg total) by mouth 3 (three) times a day as needed for itching (Patient not taking: Reported on 12/23/2024) 30 tablet 0    loratadine (CLARITIN) 10 mg tablet Take 1 tablet (10 mg total) by mouth daily (Patient not taking: Reported on 12/23/2024)      mometasone (ELOCON) 0.1 % cream Apply topically daily (Patient not taking: Reported on 12/23/2024) 45 g 1    montelukast (SINGULAIR) 10 mg tablet take 1 tablet by mouth at bedtime (Patient not taking: Reported on 12/23/2024) 100 tablet 1    predniSONE 10 mg tablet 60mg po q day x2, then 50mg x 2, then 40mg x 2, then 30mg x2, then 20mg x 2, and then 10mg x 2, then d/c. (Patient not taking: Reported on 12/23/2024) 42 tablet 0    propranolol (INDERAL) 20 mg tablet Take 1 tablet (20 mg total) by mouth every 12 (twelve) hours (Patient not taking: Reported on 12/23/2024) 180 tablet 2    rosuvastatin (CRESTOR) 10 MG tablet take 1 tablet by mouth once daily (Patient not taking: Reported on 12/23/2024) 90 tablet 1    topiramate (TOPAMAX) 25 mg tablet take 1 tablet by mouth at bedtime for 1 week then INCREASE to 1 tablet by mouth twice a day (Patient not taking: Reported on 12/23/2024) 180 tablet 1     Current Facility-Administered Medications on File Prior to Visit   Medication Dose Route Frequency Provider Last  Rate Last Admin    cyanocobalamin injection 1,000 mcg  1,000 mcg Intramuscular Q30 Days    1,000 mcg at 05/10/24 0900          Sleep-Related History: History of moderate REKHA, diagnosed in 2021  snoring, excessive daytime sleepiness, hypertension, a history of obstructive sleep apnea, disrupted sleep, nonrestorative sleep , insomnia, headaches, BMI > 35, impaired concentration, and memory loss   She has not slept in her bed since she became .  She doesn't feel comfortable sleeping alone in a bed.  She plans to continue to sleep in her recliner.      Sleep-Wake Schedule:  She is self-described as a night person.  Bedtime: she doesn't keep a set time, falls asleep while watching TV in her recliner  Wake Time: between 5:30am-9:30am  Difficulty Falling Asleep: Yes, she has too much on her mind  Avg Number of Awakenings: 15-20 times per night  Cause of Awakenings: unknown causes  Weekend Sleep Schedule: same  Naps: she may doze while watching TV    If She does take a nap, naps are refreshing in quality    Avg TST per 24 hours: 3-4 hours    Sleep-Related Details:  Preferred Sleep Position: sleeps in a recliner  SDB Symptoms: snoring, multiple awakenings, morning headaches, dry mouth/nose, and waking unrefreshed  Bruxism: Yes, does not use an oral appliance  Nocturnal GERD: No  Nocturnal Palpitations: No  Nocturnal Anxiety or Rumination: Yes, this causes difficulty falling asleep or returning to sleep  Sleep-Related Hallucinations: Yes, this has happened, but not often    Sleep Paralysis: No   No symptoms consistent with restless legs syndrome      She walked in her sleep as a child with no episodes in adulthood.  She talks in her sleep at times.    Wake-Related Details  Daytime Sleepiness: Yes, [place] 11/24    Drowsy Driving: No  Employment:  currently retired.  She worked daytime hours in the past.  CDL license:  No    Caffeine:  drinks up to a pot of coffee or more daily with last serving as late as midnight.  She doesn't feel it affects her sleep.    Tobacco:   reports that she has never smoked. She has never been exposed to tobacco smoke. She has never used smokeless tobacco.  E-cig/Vaping:    E-Cigarette/Vaping    E-Cigarette Use Never User       E-Cigarette/Vaping Substances    Nicotine No     THC No     CBD No     Flavoring No     Other No     Unknown No       Alcohol:   reports current alcohol use. Very occasional   Drugs:   reports no history of drug use.      Weight Change:   intentional weight loss of 24 lbs over the past 3 years    She does have difficulty with memory and concentration.         Family History of Sleep Disorders: mother, sisters and brother have sleep apnea      Objective   /78 (BP Location: Left arm, Patient Position: Sitting, Cuff Size: Large)   Pulse 81   Temp 97.5 °F (36.4 °C) (Temporal)   Ht 5' (1.524 m)   Wt 83.7 kg (184 lb 9.6 oz)   SpO2 99%   BMI 36.05 kg/m²     Neck Circumference: 16  Physical Exam    Constitutional: Alert, cooperative, no distress, appears stated age, obese  Skin: Warm, dry, no rashes noted  Eyes: Conjunctiva/corneas clear  ENT: Nasal congestion absent, nares normal, mucosa normal  Posterior Airspace:   Banks Tongue Position: 3  Retrognathia: present  Overbite: absent  High Arched Palate: absent  Tongue Scalloping/Ridging: absent  Tonsils: absent   Uvula: normal  Lungs: Clear to auscultation bilaterally, respirations unlabored  Heart: normal rate and regular rhythm, S1/2 normal, no murmur noted, no rub or gallop  Extremities: Normal, no digital clubbing, no pedal edema  Neuro: No focal deficits noted    Data  Lab Results   Component Value Date    HGB 13.8 06/25/2024    HCT 40.1 06/25/2024    MCV 89 06/25/2024      Lab Results   Component Value Date    GLUCOSE 116 09/24/2014    CALCIUM 9.5 06/25/2024     09/24/2014    K 3.7 06/25/2024    CO2 27 06/25/2024     06/25/2024    BUN 15 06/25/2024    CREATININE 0.83 06/25/2024     No results found  "for: \"IRON\", \"TIBC\", \"FERRITIN\"  Lab Results   Component Value Date    AST 15 06/25/2024    ALT 15 06/25/2024     Patient Instructions:     Schedule split night sleep study  The nurse will call with results and plan of treatment.    Thank you for trusting me with your care!    I know we often  cover a lot of information at the visit, so if you have follow-up questions, are unclear about the plan, or feel there were important items that we did not discuss or you did not receive clarity on, please don't hesitate to reach out to me.     Mine messages are preferred for routine matters.  Please make sure to call for urgent matters as there can be a delay in responding to questions over Mine.    IMPORTANT- Prior to a sleep study (at home or in the sleep lab), I strongly recommend contacting your medical insurance first to understand your benefits (including deductible if applicable), coverage for this test, and out of pocket costs.  Even if the test is approved by medical insurance, the cost to you is determined by your medical benefits.   If you have concerns about your out of pocket costs for sleep testing, please contact me/the office before you complete the test and we can discuss if there are alternate options.     I recommend following this advice in general before any lab test, imaging test, doctor visit, surgery, or ordering CPAP supplies as it is best to understand your coverage to avoid unexpected bills after the fact.       Nursing Support:  When: Monday through Friday 7:30A-4:30PM except holidays  Where: Our direct line is [phone]  *3  *1.      If you are having a true emergency please call 911.  In the event that the line is busy or it is after hours please leave a voice message and we will return your call.  Please speak clearly, leaving your full name, birth date, best number to reach you and the reason for your call.   Medication refills: We will need the name of the medication, the dosage, the " ordering provider, whether you get a 30 or 90 day refill, and the pharmacy name and address.  Medications will be ordered by the provider only.  Nurses cannot call in prescriptions.  Please allow 7 days for medication refills.  Physician requested updates: If your provider requested that you call with an update after starting medication, please be ready to provide us the medication and dosage, what time you take your medication, the time you attempt to fall asleep, time you fall asleep, when you wake up, and what time you get out of bed.  Sleep Study Results: We will contact you with sleep study results and/or next steps after the physician has reviewed your testing.      Administrative Statements   I have spent a total time of 55 minutes in caring for this patient on the day of the visit/encounter including Risks and benefits of tx options, Instructions for management, Patient and family education, Importance of tx compliance, Risk factor reductions, Impressions, Counseling / Coordination of care, Documenting in the medical record, Reviewing / ordering tests, medicine, procedures  , and Obtaining or reviewing history  .

## 2024-12-23 NOTE — ASSESSMENT & PLAN NOTE
Hypertension - We reviewed the association between untreated obstructive sleep apnea and the increased risk for hypertension. She is not currently taking any medication for HTN.  She plans to continue follow up with PCP for continuity of care.     Orders:    Split Study; Future

## 2024-12-23 NOTE — ASSESSMENT & PLAN NOTE
Obesity - We reviewed the association between obesity and obstructive sleep apnea and sleep disordered breathing. Encouraged patient to follow healthy diet, regular exercise regimen, and pursue weight loss to manage symptoms.

## 2024-12-26 DIAGNOSIS — E78.2 MIXED HYPERLIPIDEMIA: ICD-10-CM

## 2024-12-26 RX ORDER — ROSUVASTATIN CALCIUM 10 MG/1
10 TABLET, COATED ORAL DAILY
Qty: 90 TABLET | Refills: 0 | Status: SHIPPED | OUTPATIENT
Start: 2024-12-26

## 2024-12-27 ENCOUNTER — HOSPITAL ENCOUNTER (OUTPATIENT)
Dept: SLEEP CENTER | Facility: CLINIC | Age: 69
Discharge: HOME | End: 2024-12-27
Payer: MEDICARE

## 2024-12-27 DIAGNOSIS — I10 PRIMARY HYPERTENSION: ICD-10-CM

## 2024-12-27 DIAGNOSIS — G47.33 OSA (OBSTRUCTIVE SLEEP APNEA): ICD-10-CM

## 2024-12-27 PROCEDURE — 95811 POLYSOM 6/>YRS CPAP 4/> PARM: CPT | Performed by: PSYCHIATRY & NEUROLOGY

## 2024-12-27 PROCEDURE — 95811 POLYSOM 6/>YRS CPAP 4/> PARM: CPT

## 2024-12-28 NOTE — PROGRESS NOTES
Sleep Study Documentation    Pre-Sleep Study       Sleep testing procedure explained to patient:YES    Patient napped prior to study:NO    Caffeine:Dayshift worker after 12PM.  Caffeine use:YES- coffee  6 to 18 ounces    Alcohol:Dayshift workers after 5PM: Alcohol use:NO    Typical day for patient:YES       Study Documentation    Sleep Study Indications: Loud/Habitual Snoring, Witnessed apneas, Excessive/Inappropriate/Irrepressible daytime sleepiness that interferes with ADLs. (Not just fatigue)    Sleep Study: Split Optimal PAP pressure: 15 cmH2O  Leak:Small  Snore:Eliminated  REM Obtained:yes  Supplemental O2: no    Minimum SaO2 72%  Baseline SaO2 97%  PAP mask tried (list all) small Resmed Airfit F20 full face  PAP mask choice (final) small Resmed Airfit F20 full face  PAP mask type:full face  PAP pressure at which snoring was eliminated 15 cmH2O  Minimum SaO2 at final PAP pressure 90%  Mode of Therapy:CPAP  ETCO2:No  CPAP changed to BiPAP:No    EKG abnormalities: no     EEG abnormalities: no    Were abnormal behaviors in sleep observed:NO    Is Total Sleep Study Recording Time < 2 hours: N/A    Is Total Sleep Study Recording Time > 2 hours but study is incomplete: N/A    Is Total Sleep Study Recording Time 6 hours or more but sleep was not obtained: NO    Patient classification: retired       Post-Sleep Study    Medication used at bedtime or during sleep study:NO    Patient reports time it took to fall asleep:30 to 60 minutes    Patient reports waking up during study:3 or more times.  Patient reports returning to sleep in 10 to 30 minutes.    Patient reports sleeping 4 to 6 hours without dreaming.    Does the Patient feel this is a typical night of sleep:better than usual    Patient rated sleepiness: Somewhat sleepy or tired    PAP treatment:yes: Post PAP treatment patient reports feeling better and  would wear PAP mask at home.

## 2024-12-30 ENCOUNTER — TELEPHONE (OUTPATIENT)
Dept: NEUROLOGY | Facility: CLINIC | Age: 69
End: 2024-12-30

## 2024-12-30 NOTE — TELEPHONE ENCOUNTER
Spoke to pt to confirm pt's appt w/ Anastacia in [facility] on 1/6/25. Advised pt to arrive 15 minutes prior.

## 2025-01-01 ENCOUNTER — RESULTS FOLLOW-UP (OUTPATIENT)
Dept: SLEEP CENTER | Facility: CLINIC | Age: 70
End: 2025-01-01

## 2025-01-01 DIAGNOSIS — G47.33 OSA (OBSTRUCTIVE SLEEP APNEA): Primary | ICD-10-CM

## 2025-01-01 DIAGNOSIS — I10 PRIMARY HYPERTENSION: ICD-10-CM

## 2025-01-02 ENCOUNTER — TELEPHONE (OUTPATIENT)
Dept: SLEEP CENTER | Facility: CLINIC | Age: 70
End: 2025-01-02

## 2025-01-02 NOTE — TELEPHONE ENCOUNTER
Split sleep study resulted and shows severe sleep apnea with AHI 61.0. Respiratory events worse in supine and REM sleep. Titration portion of study, CPAP was ineffective. BiPAP ordered.     Results read by patient    Call to patient who states she has a stomach bug and can't deal with this right now.  Offered to send patient a Free[employer]Alerts message with all the information for how to get her equipment in it and she can call us back once she reads the message and is feeling better.     Need:  DME - send order and expedite, severe REKHA  Compliance appt. -[place] office.

## 2025-01-06 ENCOUNTER — TELEPHONE (OUTPATIENT)
Age: 70
End: 2025-01-06

## 2025-01-06 ENCOUNTER — OFFICE VISIT (OUTPATIENT)
Dept: INTERNAL MEDICINE CLINIC | Facility: CLINIC | Age: 70
End: 2025-01-06
Payer: MEDICARE

## 2025-01-06 ENCOUNTER — APPOINTMENT (OUTPATIENT)
Dept: LAB | Facility: CLINIC | Age: 70
End: 2025-01-06
Payer: MEDICARE

## 2025-01-06 VITALS
DIASTOLIC BLOOD PRESSURE: 70 MMHG | WEIGHT: 178.6 LBS | HEART RATE: 86 BPM | HEIGHT: 60 IN | SYSTOLIC BLOOD PRESSURE: 154 MMHG | OXYGEN SATURATION: 98 % | TEMPERATURE: 97.9 F | BODY MASS INDEX: 35.06 KG/M2

## 2025-01-06 DIAGNOSIS — M15.0 PRIMARY OSTEOARTHRITIS INVOLVING MULTIPLE JOINTS: ICD-10-CM

## 2025-01-06 DIAGNOSIS — I10 PRIMARY HYPERTENSION: Primary | ICD-10-CM

## 2025-01-06 DIAGNOSIS — E55.9 VITAMIN D DEFICIENCY: Chronic | ICD-10-CM

## 2025-01-06 DIAGNOSIS — F32.1 MODERATE MAJOR DEPRESSION, SINGLE EPISODE (HCC): ICD-10-CM

## 2025-01-06 DIAGNOSIS — G47.33 OSA (OBSTRUCTIVE SLEEP APNEA): ICD-10-CM

## 2025-01-06 DIAGNOSIS — E66.01 CLASS 2 SEVERE OBESITY DUE TO EXCESS CALORIES WITH SERIOUS COMORBIDITY AND BODY MASS INDEX (BMI) OF 36.0 TO 36.9 IN ADULT (HCC): ICD-10-CM

## 2025-01-06 DIAGNOSIS — Z13.1 SCREENING FOR DIABETES MELLITUS: ICD-10-CM

## 2025-01-06 DIAGNOSIS — R25.9 MIXED ACTION AND RESTING TREMOR: ICD-10-CM

## 2025-01-06 DIAGNOSIS — K76.0 FATTY LIVER: ICD-10-CM

## 2025-01-06 DIAGNOSIS — E66.812 CLASS 2 SEVERE OBESITY DUE TO EXCESS CALORIES WITH SERIOUS COMORBIDITY AND BODY MASS INDEX (BMI) OF 36.0 TO 36.9 IN ADULT (HCC): ICD-10-CM

## 2025-01-06 DIAGNOSIS — N18.30 STAGE 3 CHRONIC KIDNEY DISEASE, UNSPECIFIED WHETHER STAGE 3A OR 3B CKD (HCC): ICD-10-CM

## 2025-01-06 DIAGNOSIS — G89.4 CHRONIC PAIN SYNDROME: ICD-10-CM

## 2025-01-06 DIAGNOSIS — M85.89 OSTEOPENIA OF MULTIPLE SITES: ICD-10-CM

## 2025-01-06 DIAGNOSIS — Z13.220 SCREENING FOR HYPERLIPIDEMIA: ICD-10-CM

## 2025-01-06 DIAGNOSIS — E78.2 MIXED HYPERLIPIDEMIA: ICD-10-CM

## 2025-01-06 DIAGNOSIS — J30.2 SEASONAL ALLERGIES: ICD-10-CM

## 2025-01-06 DIAGNOSIS — Z13.29 SCREENING FOR THYROID DISORDER: ICD-10-CM

## 2025-01-06 DIAGNOSIS — R73.03 PREDIABETES: ICD-10-CM

## 2025-01-06 DIAGNOSIS — E53.8 B12 DEFICIENCY: Chronic | ICD-10-CM

## 2025-01-06 DIAGNOSIS — L30.9 DERMATITIS: ICD-10-CM

## 2025-01-06 DIAGNOSIS — I10 PRIMARY HYPERTENSION: ICD-10-CM

## 2025-01-06 DIAGNOSIS — Z13.0 SCREENING FOR DEFICIENCY ANEMIA: ICD-10-CM

## 2025-01-06 LAB
25(OH)D3 SERPL-MCNC: 33.8 NG/ML (ref 30–100)
ALBUMIN SERPL BCG-MCNC: 4.3 G/DL (ref 3.5–5)
ALP SERPL-CCNC: 78 U/L (ref 34–104)
ALT SERPL W P-5'-P-CCNC: 14 U/L (ref 7–52)
ANION GAP SERPL CALCULATED.3IONS-SCNC: 9 MMOL/L (ref 4–13)
AST SERPL W P-5'-P-CCNC: 14 U/L (ref 13–39)
BASOPHILS # BLD AUTO: 0.05 THOUSANDS/ΜL (ref 0–0.1)
BASOPHILS NFR BLD AUTO: 1 % (ref 0–1)
BILIRUB SERPL-MCNC: 0.67 MG/DL (ref 0.2–1)
BUN SERPL-MCNC: 10 MG/DL (ref 5–25)
CALCIUM SERPL-MCNC: 9.8 MG/DL (ref 8.4–10.2)
CHLORIDE SERPL-SCNC: 103 MMOL/L (ref 96–108)
CHOLEST SERPL-MCNC: 196 MG/DL (ref ?–200)
CO2 SERPL-SCNC: 29 MMOL/L (ref 21–32)
CREAT SERPL-MCNC: 0.84 MG/DL (ref 0.6–1.3)
CREAT UR-MCNC: 65.6 MG/DL
EOSINOPHIL # BLD AUTO: 0.19 THOUSAND/ΜL (ref 0–0.61)
EOSINOPHIL NFR BLD AUTO: 4 % (ref 0–6)
ERYTHROCYTE [DISTWIDTH] IN BLOOD BY AUTOMATED COUNT: 12.8 % (ref 11.6–15.1)
EST. AVERAGE GLUCOSE BLD GHB EST-MCNC: 123 MG/DL
FERRITIN SERPL-MCNC: 77 NG/ML (ref 11–307)
FOLATE SERPL-MCNC: 13.9 NG/ML
GFR SERPL CREATININE-BSD FRML MDRD: 70 ML/MIN/1.73SQ M
GLUCOSE P FAST SERPL-MCNC: 103 MG/DL (ref 65–99)
HBA1C MFR BLD: 5.9 %
HCT VFR BLD AUTO: 45.8 % (ref 34.8–46.1)
HDLC SERPL-MCNC: 43 MG/DL
HGB BLD-MCNC: 15.4 G/DL (ref 11.5–15.4)
IMM GRANULOCYTES # BLD AUTO: 0.01 THOUSAND/UL (ref 0–0.2)
IMM GRANULOCYTES NFR BLD AUTO: 0 % (ref 0–2)
IRON SATN MFR SERPL: 28 % (ref 15–50)
IRON SERPL-MCNC: 107 UG/DL (ref 50–212)
LDLC SERPL CALC-MCNC: 129 MG/DL (ref 0–100)
LYMPHOCYTES # BLD AUTO: 1.39 THOUSANDS/ΜL (ref 0.6–4.47)
LYMPHOCYTES NFR BLD AUTO: 29 % (ref 14–44)
MCH RBC QN AUTO: 29.1 PG (ref 26.8–34.3)
MCHC RBC AUTO-ENTMCNC: 33.6 G/DL (ref 31.4–37.4)
MCV RBC AUTO: 86 FL (ref 82–98)
MICROALBUMIN UR-MCNC: <7 MG/L
MONOCYTES # BLD AUTO: 0.28 THOUSAND/ΜL (ref 0.17–1.22)
MONOCYTES NFR BLD AUTO: 6 % (ref 4–12)
NEUTROPHILS # BLD AUTO: 2.94 THOUSANDS/ΜL (ref 1.85–7.62)
NEUTS SEG NFR BLD AUTO: 60 % (ref 43–75)
NRBC BLD AUTO-RTO: 0 /100 WBCS
PLATELET # BLD AUTO: 263 THOUSANDS/UL (ref 149–390)
PMV BLD AUTO: 10.1 FL (ref 8.9–12.7)
POTASSIUM SERPL-SCNC: 4 MMOL/L (ref 3.5–5.3)
PROT SERPL-MCNC: 7.1 G/DL (ref 6.4–8.4)
RBC # BLD AUTO: 5.3 MILLION/UL (ref 3.81–5.12)
SODIUM SERPL-SCNC: 141 MMOL/L (ref 135–147)
TIBC SERPL-MCNC: 383.6 UG/DL (ref 250–450)
TRANSFERRIN SERPL-MCNC: 274 MG/DL (ref 203–362)
TRIGL SERPL-MCNC: 119 MG/DL (ref ?–150)
TSH SERPL DL<=0.05 MIU/L-ACNC: 2.37 UIU/ML (ref 0.45–4.5)
UIBC SERPL-MCNC: 277 UG/DL (ref 155–355)
VIT B12 SERPL-MCNC: 390 PG/ML (ref 180–914)
WBC # BLD AUTO: 4.86 THOUSAND/UL (ref 4.31–10.16)

## 2025-01-06 PROCEDURE — 82746 ASSAY OF FOLIC ACID SERUM: CPT

## 2025-01-06 PROCEDURE — 82043 UR ALBUMIN QUANTITATIVE: CPT

## 2025-01-06 PROCEDURE — 85025 COMPLETE CBC W/AUTO DIFF WBC: CPT

## 2025-01-06 PROCEDURE — G2211 COMPLEX E/M VISIT ADD ON: HCPCS | Performed by: INTERNAL MEDICINE

## 2025-01-06 PROCEDURE — 99214 OFFICE O/P EST MOD 30 MIN: CPT | Performed by: INTERNAL MEDICINE

## 2025-01-06 PROCEDURE — 82570 ASSAY OF URINE CREATININE: CPT

## 2025-01-06 PROCEDURE — 82607 VITAMIN B-12: CPT

## 2025-01-06 PROCEDURE — 82306 VITAMIN D 25 HYDROXY: CPT

## 2025-01-06 PROCEDURE — 36415 COLL VENOUS BLD VENIPUNCTURE: CPT

## 2025-01-06 PROCEDURE — 84443 ASSAY THYROID STIM HORMONE: CPT

## 2025-01-06 PROCEDURE — 83036 HEMOGLOBIN GLYCOSYLATED A1C: CPT

## 2025-01-06 PROCEDURE — 83550 IRON BINDING TEST: CPT

## 2025-01-06 PROCEDURE — 83540 ASSAY OF IRON: CPT

## 2025-01-06 PROCEDURE — 80061 LIPID PANEL: CPT

## 2025-01-06 PROCEDURE — 82728 ASSAY OF FERRITIN: CPT

## 2025-01-06 PROCEDURE — 80053 COMPREHEN METABOLIC PANEL: CPT

## 2025-01-06 RX ORDER — TOPIRAMATE 25 MG/1
TABLET, FILM COATED ORAL
Start: 2025-01-06

## 2025-01-06 RX ORDER — ROSUVASTATIN CALCIUM 10 MG/1
10 TABLET, COATED ORAL DAILY
Start: 2025-01-06

## 2025-01-06 RX ORDER — PROPRANOLOL HCL 20 MG
20 TABLET ORAL EVERY 12 HOURS SCHEDULED
Start: 2025-01-06

## 2025-01-06 RX ORDER — MONTELUKAST SODIUM 10 MG/1
10 TABLET ORAL
Start: 2025-01-06

## 2025-01-06 NOTE — PROGRESS NOTES
Name: Sri Mcgrath      : 1955      MRN: [id number]  Encounter Provider: Sea Hadley DO  Encounter Date: 2025   Encounter department: [facility]  :  Assessment & Plan  Primary hypertension    Orders:    Albumin / creatinine urine ratio; Future    CBC and differential; Future    Comprehensive metabolic panel; Future    TSH, 3rd generation; Future    REKHA (obstructive sleep apnea)         Fatty liver    Orders:    CBC and differential; Future    Comprehensive metabolic panel; Future    Hemoglobin A1C; Future    Osteopenia of multiple sites    Orders:    Vitamin D 25 hydroxy; Future    Primary osteoarthritis involving multiple joints    Orders:    CBC and differential; Future    Comprehensive metabolic panel; Future    Stage 3 chronic kidney disease, unspecified whether stage 3a or 3b CKD (HCC)  Lab Results   Component Value Date    EGFR 72 2024    EGFR 63 2024    EGFR 81 2023    CREATININE 0.83 2024    CREATININE 0.92 2024    CREATININE 0.75 2023       Orders:    Albumin / creatinine urine ratio; Future    CBC and differential; Future    Comprehensive metabolic panel; Future    Hemoglobin A1C; Future    Iron Panel (Includes Ferritin, Iron Sat%, Iron, and TIBC); Future    Lipid Panel with Direct LDL reflex; Future    TSH, 3rd generation; Future    Moderate major depression, single episode (HCC)           Chronic pain syndrome         Mixed action and resting tremor    Orders:    topiramate (TOPAMAX) 25 mg tablet; take 1 tablet by mouth at bedtime for 1 week then INCREASE to 1 tablet by mouth twice a day    propranolol (INDERAL) 20 mg tablet; Take 1 tablet (20 mg total) by mouth every 12 (twelve) hours    B12 deficiency    Orders:    Vitamin B12; Future    Folate; Future    Mixed hyperlipidemia    Orders:    Comprehensive metabolic panel; Future    Lipid Panel with Direct LDL reflex; Future    rosuvastatin (CRESTOR) 10 MG tablet; Take 1 tablet (10 mg  total) by mouth daily    Prediabetes    Orders:    Hemoglobin A1C; Future    Vitamin D deficiency    Orders:    Vitamin D 25 hydroxy; Future    Screening for deficiency anemia         Screening for diabetes mellitus         Screening for thyroid disorder         Screening for hyperlipidemia         Class 2 severe obesity due to excess calories with serious comorbidity and body mass index (BMI) of 36.0 to 36.9 in adult (HCC)           Dermatitis         Seasonal allergies    Orders:    montelukast (SINGULAIR) 10 mg tablet; Take 1 tablet (10 mg total) by mouth daily at bedtime    A/P: doing ok and will check labs. BP is borderline and usually good. ?due to getting over the GI issues and is still off her meds due to a rash of ?etiology. Will restart her meds. . Will monitor and check as an OP. If persists, adjust med. Discussed vaccines and is up to date. Continue current treatment and RTC four months for routine.       Depression Screening and Follow-up Plan:   Patient's depression screening was negative with an Agenda  Depression Scale score of  .     History of Present Illness     WF RTC for f/u HTN, HLD, etc. Doing ok and no new issues,but getting over the GI bug. . Remains active w/o difficulty and no falls. Chronic pain and REKHA are manageable. MDD/MAGGIE are controlled. Due for labs and vaccines.       Review of Systems   Constitutional:  Negative for activity change, chills, diaphoresis, fatigue and fever.   HENT: Negative.     Eyes:  Negative for visual disturbance.   Respiratory:  Negative for cough, chest tightness, shortness of breath and wheezing.    Cardiovascular:  Negative for chest pain, palpitations and leg swelling.   Gastrointestinal:  Negative for abdominal pain, constipation, diarrhea, nausea and vomiting.   Endocrine: Negative for cold intolerance and heat intolerance.   Genitourinary:  Negative for difficulty urinating, dysuria and frequency.   Musculoskeletal:  Negative for arthralgias,  gait problem and myalgias.   Neurological:  Negative for dizziness, seizures, syncope, weakness, light-headedness and headaches.   Psychiatric/Behavioral:  Negative for confusion, dysphoric mood and sleep disturbance. The patient is not nervous/anxious.        Objective   /70   Pulse 86   Temp 97.9 °F (36.6 °C) (Tympanic)   Ht 5' (1.524 m)   Wt 81 kg (178 lb 9.6 oz)   SpO2 98%   BMI 34.88 kg/m²      Physical Exam  Vitals and nursing note reviewed.   Constitutional:       General: She is not in acute distress.     Appearance: Normal appearance. She is obese. She is not ill-appearing.   HENT:      Head: Normocephalic and atraumatic.      Mouth/Throat:      Mouth: Mucous membranes are moist.   Eyes:      Extraocular Movements: Extraocular movements intact.      Conjunctiva/sclera: Conjunctivae normal.      Pupils: Pupils are equal, round, and reactive to light.   Neck:      Vascular: No carotid bruit.   Cardiovascular:      Rate and Rhythm: Normal rate and regular rhythm.      Heart sounds: Normal heart sounds. No murmur heard.  Pulmonary:      Effort: Pulmonary effort is normal. No respiratory distress.      Breath sounds: Normal breath sounds. No wheezing, rhonchi or rales.   Abdominal:      General: Bowel sounds are normal. There is no distension.      Palpations: Abdomen is soft.      Tenderness: There is no abdominal tenderness.   Musculoskeletal:      Cervical back: Neck supple.      Right lower leg: No edema.      Left lower leg: No edema.   Neurological:      General: No focal deficit present.      Mental Status: She is alert and oriented to person, place, and time. Mental status is at baseline.   Psychiatric:         Mood and Affect: Mood normal.         Behavior: Behavior normal.         Thought Content: Thought content normal.         Judgment: Judgment normal.

## 2025-01-06 NOTE — TELEPHONE ENCOUNTER
Patient showed up for 11am appt (11:50am), said her scout told her to arrive by 11:45am. Offered her a 60 min appt with Damaso @ 2:30pm patient could not wait. Rescheduled her to 1/20 @ 10am.

## 2025-01-06 NOTE — ASSESSMENT & PLAN NOTE
Lab Results   Component Value Date    EGFR 72 06/25/2024    EGFR 63 06/24/2024    EGFR 81 12/29/2023    CREATININE 0.83 06/25/2024    CREATININE 0.92 06/24/2024    CREATININE 0.75 12/29/2023       Orders:    Albumin / creatinine urine ratio; Future    CBC and differential; Future    Comprehensive metabolic panel; Future    Hemoglobin A1C; Future    Iron Panel (Includes Ferritin, Iron Sat%, Iron, and TIBC); Future    Lipid Panel with Direct LDL reflex; Future    TSH, 3rd generation; Future

## 2025-01-06 NOTE — ASSESSMENT & PLAN NOTE
Orders:    topiramate (TOPAMAX) 25 mg tablet; take 1 tablet by mouth at bedtime for 1 week then INCREASE to 1 tablet by mouth twice a day    propranolol (INDERAL) 20 mg tablet; Take 1 tablet (20 mg total) by mouth every 12 (twelve) hours

## 2025-01-06 NOTE — TELEPHONE ENCOUNTER
Pt called in at 1057am and stated she was on her way to her appt. I mentioned to patient that's okay as long she is no more than 15-20 minutes late. Pt expressed an understanding.

## 2025-01-06 NOTE — PATIENT INSTRUCTIONS
"Patient Education     High blood pressure in adults   The Basics   Written by the doctors and editors at [facility]   What is high blood pressure? -- High blood pressure is a condition that puts you at risk for heart attack, stroke, and kidney disease. It does not usually cause symptoms. But it can be serious.  When your doctor or nurse tells you your blood pressure, they say 2 numbers. For instance, your doctor or nurse might say that your blood pressure is \"130 over 80.\" The top number is the pressure inside your arteries when your heart is gasper. The bottom number is the pressure inside your arteries when your heart is relaxed.  \"Elevated blood pressure\" is a term doctors or nurses use as a warning. People with elevated blood pressure do not yet have high blood pressure. But their blood pressure is not as low as it should be for good health.  Many experts define high, elevated, and normal blood pressure as follows:   High - Top number of 130 or above and/or bottom number of 80 or above.   Elevated - Top number between 120 and 129 and bottom number of 79 or below.   Normal - Top number of 119 or below and bottom number of 79 or below.  This information is also in the table (table 1).  How can I lower my blood pressure? -- If your doctor or nurse prescribed blood pressure medicine, the most important thing you can do is to take it. If it causes side effects, do not just stop taking it. Instead, talk to your doctor or nurse about the problems it causes. They might be able to lower your dose or switch you to another medicine. If cost is a problem, mention that, too. They might be able to put you on a less expensive medicine. Taking your blood pressure medicine can keep you from having a heart attack or stroke, and it can save your life!  Can I do anything on my own? -- You have a lot of control over your blood pressure. To lower it:   Lose weight (if you are overweight).   Choose a diet low in fat and rich in " "fruits, vegetables, and low-fat dairy products.   Eat less salt.   Do something active for at least 30 minutes a day on most days of the week.   Drink less alcohol (if you drink more than 2 alcoholic drinks per day).  It's also a good idea to get a home blood pressure meter. People who check their own blood pressure at home do better at keeping it low and can sometimes even reduce the amount of medicine they take.  All topics are updated as new evidence becomes available and our peer review process is complete.  This topic retrieved from [employer] on: Feb 26, 2024.  Topic 81837 Version 23.0  Release: 32.2.4 - C32.56  © 2024 UpToDate, Inc. and/or its affiliates. All rights reserved.  table 1: Definition of normal and high blood pressure  Level  Top number  Bottom number    High 130 or above 80 or above   Elevated 120 to 129 79 or below   Normal 119 or below 79 or below   These definitions are from the American College of Cardiology/American Heart Association. Other expert groups might use slightly different definitions.  \"Elevated blood pressure\" is a term doctor or nurses use as a warning. It means you do not yet have high blood pressure, but your blood pressure is not as low as it should be for good health.  Graphic 63186 Version 6.0  Consumer Information Use and Disclaimer   Disclaimer: This generalized information is a limited summary of diagnosis, treatment, and/or medication information. It is not meant to be comprehensive and should be used as a tool to help the user understand and/or assess potential diagnostic and treatment options. It does NOT include all information about conditions, treatments, medications, side effects, or risks that may apply to a specific patient. It is not intended to be medical advice or a substitute for the medical advice, diagnosis, or treatment of a health care provider based on the health care provider's examination and assessment of a patient's specific and unique circumstances. " Patients must speak with a health care provider for complete information about their health, medical questions, and treatment options, including any risks or benefits regarding use of medications. This information does not endorse any treatments or medications as safe, effective, or approved for treating a specific patient. UpToDate, Inc. and its affiliates disclaim any warranty or liability relating to this information or the use thereof.The use of this information is governed by the Terms of Use, available at https://www.woltersNeokineticsuwer.com/en/know/clinical-effectiveness-terms. 2024© UpToDate, Inc. and its affiliates and/or licensors. All rights reserved.  Copyright   © 2024 UpToDate, Inc. and/or its affiliates. All rights reserved.

## 2025-01-06 NOTE — ASSESSMENT & PLAN NOTE
Orders:    Comprehensive metabolic panel; Future    Lipid Panel with Direct LDL reflex; Future    rosuvastatin (CRESTOR) 10 MG tablet; Take 1 tablet (10 mg total) by mouth daily

## 2025-01-07 ENCOUNTER — RESULTS FOLLOW-UP (OUTPATIENT)
Dept: INTERNAL MEDICINE CLINIC | Facility: CLINIC | Age: 70
End: 2025-01-07

## 2025-01-20 ENCOUNTER — OFFICE VISIT (OUTPATIENT)
Dept: NEUROLOGY | Facility: CLINIC | Age: 70
End: 2025-01-20
Payer: MEDICARE

## 2025-01-20 VITALS
SYSTOLIC BLOOD PRESSURE: 148 MMHG | HEIGHT: 60 IN | BODY MASS INDEX: 34.95 KG/M2 | DIASTOLIC BLOOD PRESSURE: 88 MMHG | OXYGEN SATURATION: 98 % | HEART RATE: 77 BPM | TEMPERATURE: 98 F | WEIGHT: 178 LBS

## 2025-01-20 DIAGNOSIS — R25.9 MIXED ACTION AND RESTING TREMOR: Primary | ICD-10-CM

## 2025-01-20 DIAGNOSIS — R20.2 PARESTHESIAS: ICD-10-CM

## 2025-01-20 PROCEDURE — 99213 OFFICE O/P EST LOW 20 MIN: CPT | Performed by: NURSE PRACTITIONER

## 2025-01-20 RX ORDER — PROPRANOLOL HYDROCHLORIDE 60 MG/1
60 CAPSULE, EXTENDED RELEASE ORAL DAILY
Qty: 30 CAPSULE | Refills: 5 | Status: SHIPPED | OUTPATIENT
Start: 2025-01-20

## 2025-01-20 NOTE — ASSESSMENT & PLAN NOTE
Patient returns for follow-up regarding mixed action and resting tremor.  In the past she has tried primidone and Sinemet with no improvement in her tremor, she did obtain a DaTscan which was normal.  Since last visit she did have to stop all of her medications due to an allergic reaction to a spray that she was utilizing.  With stopping all of her medications she did not note any significant worsening of her tremor.  She has restarted propranolol only with no real change in her tremor.  She mainly notes some difficulty with certain fine motor tasks otherwise is functioning well.  Her blood pressure has been elevated the last several readings and therefore discussed possible increase in her propranolol to assist with tremor.    She will will switch to Inderal LA and increase to 60 mg daily.  She should contact the office if no improvement in her tremor she experienced side effects, also instructed to monitor heart rate and blood pressure with medication change.  At this time would like for her to hold off on restarting topiramate, could consider in the future if she notes no improvement with further increases in her propranolol.    Plan for follow-up in 6 months. To contact the office sooner with any concerns or worsening symptoms.    Orders:    propranolol (INDERAL LA) 60 mg 24 hr capsule; Take 1 capsule (60 mg total) by mouth daily

## 2025-01-20 NOTE — ASSESSMENT & PLAN NOTE
Patient with previous intermittent numbness and tingling in the upper extremities and feet.  EMG of the upper extremities was normal.  She has noted resolution of symptoms with replacement of her B12.  Most recent reading was borderline and therefore advised to restart oral B12 supplement.

## 2025-01-20 NOTE — PATIENT INSTRUCTIONS
Hold off on restarting topiramate for now.    Will change propanolol to once a day and increase the dose to 60 mg once daily. Monitor HR and BP with the change, call if no improvement in tremor after several weeks or if you have any side effects such as dizziness, low BP (<90/60) or low HR (<55).

## 2025-01-20 NOTE — PROGRESS NOTES
Name: Sri Mcgrath      : 1955      MRN: [id number]  Encounter Provider: IVAN Wilburn  Encounter Date: 2025   Encounter department: [facility] [place]  :  Assessment & Plan  Mixed action and resting tremor  Patient returns for follow-up regarding mixed action and resting tremor.  In the past she has tried primidone and Sinemet with no improvement in her tremor, she did obtain a DaTscan which was normal.  Since last visit she did have to stop all of her medications due to an allergic reaction to a spray that she was utilizing.  With stopping all of her medications she did not note any significant worsening of her tremor.  She has restarted propranolol only with no real change in her tremor.  She mainly notes some difficulty with certain fine motor tasks otherwise is functioning well.  Her blood pressure has been elevated the last several readings and therefore discussed possible increase in her propranolol to assist with tremor.    She will will switch to Inderal LA and increase to 60 mg daily.  She should contact the office if no improvement in her tremor she experienced side effects, also instructed to monitor heart rate and blood pressure with medication change.  At this time would like for her to hold off on restarting topiramate, could consider in the future if she notes no improvement with further increases in her propranolol.    Plan for follow-up in 6 months. To contact the office sooner with any concerns or worsening symptoms.    Orders:    propranolol (INDERAL LA) 60 mg 24 hr capsule; Take 1 capsule (60 mg total) by mouth daily    Paresthesias  Patient with previous intermittent numbness and tingling in the upper extremities and feet.  EMG of the upper extremities was normal.  She has noted resolution of symptoms with replacement of her B12.  Most recent reading was borderline and therefore advised to restart oral B12 supplement.               History of Present  Illness   HPI  Patient is a 69-year-old female with history of anxiety depression who presents for follow-up for tremors.     To review, she noted bilateral hand tremor starting in 2020 that been progressively worsening.  Tremor mainly present with action and can interfere with utensils and drinking.  She is no family history of tremor or parkinsonism.  She has been trialed on primidone up to 300 mg and low-dose Sinemet with no improvement. She did obtain KG scan which was negative.      Last office visit 7/2024 in which she was weaned of gabapentin and started on topiramate.      Prior medication trials:  Primidone-no improvement in tremor  Sinemet 25/100 2 tabs TID-no improvement in tremor     Current Medications:  propanolol 20 mg 1 tabs mg BID  Topiramate -on hold, will be restarting     Prior meds:  gabapentin 200 mg TID-leg swelling, no improvement at this dose      Interval History:  She had an allergic reaction and had stopped all of her medication. It was found this was more so due to a spray that she used.     She just restarted the propanolol a few weeks ago, is going to restart topiramate tonight.   She is not sure if it was helpful for her tremor or not since has been off her medication for about 2 months.     She didn't feel much change in her tremor coming off meds. Hasn't noted much difference with restarting her propanolol.   She continues to have difficulty with fine motor tasks due to tremor.  No large difficulty with eating/drinking.   Handwriting is ok, able to write check and sign name.   No difficulty with ADLs.  No head tremor.     B12 390    Prior work-up:  labs   6/2024 b12 338  3/2024: sjogrens, MMA, sed rate, spep, b1, b6 normal/neg, b12 165  MRI brain: 1.  No acute infarction, intracranial hemorrhage or mass effect.  2.  A few white matter lesions are most likely mild, chronic microangiopathy.     MRI cervical spine: Cervical spondylitic degenerative change with primarily left-sided  facet hypertrophic change.  There is no cord compression.  Multilevel primarily left-sided foraminal narrowing most pronounced at the C4-5 level, moderate.     Small central disc extrusion at C6-7 partially effacing the ventral CSF space     EMG of UE 10/2023: normal      Review of Systems   Constitutional:  Negative for appetite change, fatigue and fever.   HENT: Negative.  Negative for hearing loss, tinnitus, trouble swallowing and voice change.    Eyes: Negative.  Negative for photophobia, pain and visual disturbance.   Respiratory: Negative.  Negative for shortness of breath.    Cardiovascular: Negative.  Negative for palpitations.   Gastrointestinal: Negative.  Negative for nausea and vomiting.   Endocrine: Negative.  Negative for cold intolerance.   Genitourinary: Negative.  Negative for dysuria, frequency and urgency.   Musculoskeletal:  Negative for back pain, gait problem, myalgias, neck pain and neck stiffness.   Skin: Negative.  Negative for rash.   Allergic/Immunologic: Negative.    Neurological: Negative.  Negative for dizziness, tremors, seizures, syncope, facial asymmetry, speech difficulty, weakness, light-headedness, numbness and headaches.   Hematological: Negative.  Does not bruise/bleed easily.   Psychiatric/Behavioral: Negative.  Negative for confusion, hallucinations and sleep disturbance.      I have personally reviewed the MA's review of systems and made changes as necessary.         Objective   /88 (BP Location: Left arm, Patient Position: Sitting, Cuff Size: Adult)   Pulse 77   Temp 98 °F (36.7 °C) (Temporal)   Ht 5' (1.524 m)   Wt 80.7 kg (178 lb)   SpO2 98%   BMI 34.76 kg/m²     Physical Exam  Constitutional:       General: She is awake.   Eyes:      General: Lids are normal.      Extraocular Movements: Extraocular movements intact.      Pupils: Pupils are equal, round, and reactive to light.   Neurological:      Mental Status: She is alert.   Psychiatric:         Speech:  Speech normal.       Neurological Exam  Mental Status  Awake and alert. Oriented to person, place, time and situation. Speech is normal. Language is fluent with no aphasia.    Cranial Nerves  CN III, IV, VI: Extraocular movements intact bilaterally. Normal lids and orbits bilaterally. Pupils equal round and reactive to light bilaterally.  CN V: Facial sensation is normal.  CN VII: Full and symmetric facial movement.  CN VIII: Hearing is normal.  CN IX, X: Palate elevates symmetrically  CN XI: Shoulder shrug strength is normal.  CN XII: Tongue midline without atrophy or fasciculations.    Motor  Normal muscle bulk throughout. Normal muscle tone. Strength is 5/5 in all four extremities except as noted.  4/5  strength b/l R>L.    Sensory  Light touch is normal in upper and lower extremities.     Coordination  Right: Finger-to-nose normal. Rapid alternating movement abnormality:Left: Finger-to-nose normal. Rapid alternating movement abnormality:  No resting tremor  Mild intentional tremor on FTN bilaterally L 2, R 1  no postural tremors.  No bradykinesia   No rigidity or cogwheeling.  No head tremor, no lip tremor noted today  .    Gait   Able to rise from chair without using arms.  Good stride and speed, normal arm swing, .

## 2025-01-24 ENCOUNTER — OFFICE VISIT (OUTPATIENT)
Dept: GASTROENTEROLOGY | Facility: MEDICAL CENTER | Age: 70
End: 2025-01-24
Payer: MEDICARE

## 2025-01-24 VITALS
WEIGHT: 178 LBS | SYSTOLIC BLOOD PRESSURE: 143 MMHG | DIASTOLIC BLOOD PRESSURE: 78 MMHG | TEMPERATURE: 97.6 F | HEART RATE: 85 BPM | BODY MASS INDEX: 34.95 KG/M2 | HEIGHT: 60 IN | OXYGEN SATURATION: 98 %

## 2025-01-24 DIAGNOSIS — K59.00 CONSTIPATION, UNSPECIFIED CONSTIPATION TYPE: ICD-10-CM

## 2025-01-24 DIAGNOSIS — K86.2 PANCREATIC CYST: Primary | ICD-10-CM

## 2025-01-24 DIAGNOSIS — K29.70 GASTRITIS WITHOUT BLEEDING, UNSPECIFIED CHRONICITY, UNSPECIFIED GASTRITIS TYPE: ICD-10-CM

## 2025-01-24 PROCEDURE — 99213 OFFICE O/P EST LOW 20 MIN: CPT | Performed by: NURSE PRACTITIONER

## 2025-01-24 NOTE — PROGRESS NOTES
Name: Sri Mcgrath      : 1955      MRN: [id number]  Encounter Provider: IVAN Infante  Encounter Date: 2025   Encounter department: [facility] [place]  :  Assessment & Plan  Pancreatic cyst  Recent ultrasound the abdomen noted 1.3 cm pancreatic body cyst repeat imaging will be done in 1 year.  Patient unable to tolerate CT contrast.  Will recommend MRI  as patient is unable to tolerate CT contrast.    Orders:    MRI abdomen w wo contrast and mrcp; Future    Constipation, unspecified constipation type   BMs are brown and formed daily to every other day.  Reports MiraLAX caused abdominal cramping.  Reports she does not need anything to help her have a BM.  Usually can go on her own.  No melena or medic easy.  Due for repeat colonoscopy 10/26.    -High-fiber diet  -Fiber supplement daily  -Water intake only 64 ounces per day  -Recall: 10/26       Gastritis without bleeding, unspecified chronicity, unspecified gastritis type  Recent EGD  noted mild atrophic gastritis.  Few fundic land polyps present. Biopsies were new for H. pylori.  Also noted mild chronic inactive gastritis.  She is feeling well overall.  Reports that she is supposed to take oral B12 but it is too expensive over-the-counter.  This was ordered by her neurologist.  BMs are brown and formed daily to every other day.  Reports MiraLAX caused abdominal cramping.  Reports she does not need anything to help her have a BM.  Usually can go on her own.  No melena or medic easy.    -Antireflux diet           History of Present Illness   HPI  Sri Mcgrath is a 69 y.o. female who presents for follow-up.  She was last seen by myself  for history of bloating, vitamin D deficiency, constipation and history of H. pylori infection.    History of chronic constipation.  Reports she has been on several meds over the years with some help but cannot remember the names of the medications.  She has  tried MiraLAX daily to twice daily which caused more abdominal bloating.  She gets generalized abdominal bloating most days.  No weight loss.  Bloating can occur with or without eating.  Her last GYN exam was approximately 1 year ago.  BMs are brown and formed/occasionally hard daily but may have days with no BMs.  Denies any melena hematochezia.  Recent colonoscopy 10/23 noted 6 polyps-tubular adenomas and hyperplastic polyp.  Recall colonoscopy 3 years with GoLytely prep as patient was not completely cleaned out.  She also was diagnosed with B12 deficiency.  Reports before starting B12 she would like an evaluation.  She does have a history of peptic ulcer disease in the past and believes that she had H. pylori many years ago.  Occasional heartburn/reflux.  Denies any nausea, vomiting or dysphagia.     Interval history: Recent EGD 9/24 noted mild atrophic gastritis.  Few fundic land polyps present. Biopsies were new for H. pylori.  Also noted mild chronic inactive gastritis.  She is feeling well overall.  Reports that she is supposed to take oral B12 but it is too expensive over-the-counter.  This was ordered by her neurologist.  BMs are brown and formed daily to every other day.  Reports MiraLAX caused abdominal cramping.  Reports she does not need anything to help her have a BM.  Usually can go on her own.  No melena or medic easy.  Recent ultrasound the abdomen noted 1.3 cm pancreatic body cyst repeat imaging will be done in 1 year.  Patient unable to tolerate CT contrast.    Labs 1/25-CMP normal other than glucose fasting 103, iron studies normal, vitamin D 33.8, vitamin B12 390 CBC normal other than RBCs 5.30 globin A1c 5.9, TSH normal.    Ultrasound abdomen pelvis 9/24 no acute findings.  Partially contracted gallbladder without stones. 1.3 cm pancreatic body cyst. For simple cyst(s) less than 1.5 cm, recommend follow-up every 2 years for 5 times or to age 79, whichever comes first. Recommend next follow-up  in 2 years. Preferred imaging modality: abdomen MRI and MRCP with and without   IV contrast, or triple phase abdomen CT with IV contrast, or abdomen MRI and MRCP without IV contrast.    Prior EGD/colonoscopy     EGD 9/24-Mild atrophic, erythematous mucosa in the antrum; performed cold forceps biopsy  A few fundic gland polyps present.  Otherwise normal exam.   Biopsies negative for H. pylori.  Biopsies noted mild chronic inactive gastritis.      Colonoscopy 10/23-6 subcentimeter polyps removed, pan colon diverticulosis.  Repeat colonoscopy in 3 years with GoLytely prep.  Biopsies revealed tubular adenomas and 1 hyperplastic polyp.     Colonoscopy 10/20-1 polyp.  Biopsies revealed tubular adenoma.     Colonoscopy 10/16-pandiverticulosis.    History obtained from: patient    Review of Systems   Gastrointestinal: Negative.    All other systems reviewed and are negative.      Medical History Reviewed by provider this encounter:  Tobacco  Allergies  Meds  Problems  Med Hx  Surg Hx  Fam Hx     .  Current Outpatient Medications on File Prior to Visit   Medication Sig Dispense Refill    montelukast (SINGULAIR) 10 mg tablet Take 1 tablet (10 mg total) by mouth daily at bedtime      propranolol (INDERAL LA) 60 mg 24 hr capsule Take 1 capsule (60 mg total) by mouth daily 30 capsule 5    rosuvastatin (CRESTOR) 10 MG tablet Take 1 tablet (10 mg total) by mouth daily      topiramate (TOPAMAX) 25 mg tablet take 1 tablet by mouth at bedtime for 1 week then INCREASE to 1 tablet by mouth twice a day      fluticasone (FLONASE) 50 mcg/act nasal spray 1 spray into each nostril daily (Patient not taking: Reported on 1/6/2025) 18.2 mL 0     No current facility-administered medications on file prior to visit.      Social History     Tobacco Use    Smoking status: Never     Passive exposure: Never    Smokeless tobacco: Never   Vaping Use    Vaping status: Never Used   Substance and Sexual Activity    Alcohol use: Not Currently      Comment: Occasionally/social    Drug use: No    Sexual activity: Yes     Partners: Male     Birth control/protection: Post-menopausal        Objective   /78 (BP Location: Left arm)   Pulse 85   Temp 97.6 °F (36.4 °C)   Ht 5' (1.524 m)   Wt 80.7 kg (178 lb)   SpO2 98%   BMI 34.76 kg/m²      Physical Exam  Abdominal:      General: Bowel sounds are normal.      Palpations: Abdomen is soft.      Tenderness: There is no abdominal tenderness.   Skin:     General: Skin is warm and dry.   Neurological:      Mental Status: She is alert and oriented to person, place, and time.

## 2025-01-24 NOTE — ASSESSMENT & PLAN NOTE
BMs are brown and formed daily to every other day.  Reports MiraLAX caused abdominal cramping.  Reports she does not need anything to help her have a BM.  Usually can go on her own.  No melena or medic easy.  Due for repeat colonoscopy 10/26.    -High-fiber diet  -Fiber supplement daily  -Water intake only 64 ounces per day  -Recall: 10/26

## 2025-02-07 ENCOUNTER — OFFICE VISIT (OUTPATIENT)
Dept: URGENT CARE | Facility: CLINIC | Age: 70
End: 2025-02-07
Payer: MEDICARE

## 2025-02-07 VITALS
OXYGEN SATURATION: 98 % | BODY MASS INDEX: 35.78 KG/M2 | RESPIRATION RATE: 16 BRPM | HEART RATE: 72 BPM | WEIGHT: 183.2 LBS | TEMPERATURE: 98 F

## 2025-02-07 DIAGNOSIS — R68.84 PAIN IN LOWER JAW: ICD-10-CM

## 2025-02-07 DIAGNOSIS — J02.9 SORE THROAT: ICD-10-CM

## 2025-02-07 DIAGNOSIS — H92.01 EAR PAIN, RIGHT: Primary | ICD-10-CM

## 2025-02-07 LAB — S PYO AG THROAT QL: NEGATIVE

## 2025-02-07 PROCEDURE — 87070 CULTURE OTHR SPECIMN AEROBIC: CPT | Performed by: FAMILY MEDICINE

## 2025-02-07 PROCEDURE — 99214 OFFICE O/P EST MOD 30 MIN: CPT | Performed by: FAMILY MEDICINE

## 2025-02-07 PROCEDURE — 87147 CULTURE TYPE IMMUNOLOGIC: CPT | Performed by: FAMILY MEDICINE

## 2025-02-07 PROCEDURE — 87880 STREP A ASSAY W/OPTIC: CPT | Performed by: FAMILY MEDICINE

## 2025-02-07 PROCEDURE — G0463 HOSPITAL OUTPT CLINIC VISIT: HCPCS | Performed by: FAMILY MEDICINE

## 2025-02-07 RX ORDER — PREDNISONE 10 MG/1
TABLET ORAL
Qty: 21 TABLET | Refills: 0 | Status: SHIPPED | OUTPATIENT
Start: 2025-02-07

## 2025-02-07 NOTE — PATIENT INSTRUCTIONS
Right ear pain and left lower jaw pain.   No evidence of infection based on exam today.  Rapid strep test was negative but we will send out a culture just in case.  If culture is positive we will call you and call in an antibiotic if needed.  For now trial of low-dose steroid anti-inflammatory prednisone 10 mg - 1 tablet by mouth 3 times daily for 5 to 7 days.  May take Tylenol on as-needed basis for pain along with prednisone.  Follow-up if symptoms persist or worsen despite above treatment, especially if you have any new fevers, chills, worsening swelling, redness, throat pain or dizziness.

## 2025-02-07 NOTE — PROGRESS NOTES
[facility]        NAME: Sri Mcgrath is a 69 y.o. female  : 1955    MRN: [id number]  DATE: 2025  TIME: 3:35 PM    Assessment and Plan   Ear pain, right [H92.01]  1. Ear pain, right  predniSONE 10 mg tablet      2. Pain in lower jaw  predniSONE 10 mg tablet      3. Sore throat  POCT rapid ANTIGEN strepA    Throat culture            Patient Instructions   Right ear pain and left lower jaw pain.   No evidence of infection based on exam today.  Rapid strep test was negative but we will send out a culture just in case.  If culture is positive we will call you and call in an antibiotic if needed.  For now trial of low-dose steroid anti-inflammatory prednisone 10 mg - 1 tablet by mouth 3 times daily for 5 to 7 days.  May take Tylenol on as-needed basis for pain along with prednisone.  Follow-up if symptoms persist or worsen despite above treatment, especially if you have any new fevers, chills, worsening swelling, redness, throat pain or dizziness.    Follow up with PCP in 3-5 days.  Proceed to  ER if symptoms worsen.    If tests have been performed at [facility], our office will contact you with results if changes need to be made to the care plan discussed with you at the visit.  You can review your full results on [facility].    Chief Complaint     Chief Complaint   Patient presents with    Earache     B/L earache, left side jaw pain with swelling started 3 days ago ,Gums are swollen          History of Present Illness       Patient presents with 3-day history of right ear pain which is sharp and stabbing and worse with head and neck movement.  No fevers or chills, no drainage from the right ear.  No tinnitus.  Patient also complains of left-sided neck and jaw pain, sensation of swelling and tenderness.  Patient has mild runny nose and some discomfort in her gums and throat but no overt throat pain or dysphagia.  No cough or shortness of breath.  No chest pain.  No reported sinus pain  or overt congestion but there is intermittent mild runny nose.    Earache   Associated symptoms include a sore throat (minimal). Pertinent negatives include no coughing.       Review of Systems   Review of Systems   Constitutional:  Negative for chills, fatigue and fever.   HENT:  Positive for dental problem, ear pain, facial swelling (left side) and sore throat (minimal). Negative for mouth sores, sinus pressure, sinus pain and trouble swallowing.    Respiratory:  Negative for cough and shortness of breath.    Cardiovascular:  Negative for chest pain.         Current Medications       Current Outpatient Medications:     montelukast (SINGULAIR) 10 mg tablet, Take 1 tablet (10 mg total) by mouth daily at bedtime, Disp: , Rfl:     predniSONE 10 mg tablet, 1 tab by mouth 3 times a day for 7 days, Disp: 21 tablet, Rfl: 0    propranolol (INDERAL LA) 60 mg 24 hr capsule, Take 1 capsule (60 mg total) by mouth daily, Disp: 30 capsule, Rfl: 5    rosuvastatin (CRESTOR) 10 MG tablet, Take 1 tablet (10 mg total) by mouth daily, Disp: , Rfl:     topiramate (TOPAMAX) 25 mg tablet, take 1 tablet by mouth at bedtime for 1 week then INCREASE to 1 tablet by mouth twice a day, Disp: , Rfl:     fluticasone (FLONASE) 50 mcg/act nasal spray, 1 spray into each nostril daily (Patient not taking: Reported on 1/6/2025), Disp: 18.2 mL, Rfl: 0    Current Allergies     Allergies as of 02/07/2025 - Reviewed 02/07/2025   Allergen Reaction Noted    Keflex [cephalexin] Other (See Comments) 02/13/2018    Contrast [iodinated contrast media] Hives 05/02/2023    Morphine and codeine Hives 02/13/2018    Penicillins Hives 02/13/2018    Benadryl [diphenhydramine] Itching and Swelling 04/15/2020    Ciprofloxacin Hives 02/28/2021    Clindamycin Other (See Comments) 02/13/2018    Doxycycline Photosensitivity and Dermatitis 07/02/2024    Erythromycin Hives 02/13/2018    Other Hives and Rash 01/24/2025            The following portions of the patient's history  were reviewed and updated as appropriate: allergies, current medications, past family history, past medical history, past social history, past surgical history and problem list.     Past Medical History:   Diagnosis Date    Allergic     Arthritis     Cataract     ashley    Colon polyp     Depression     Fluid retention     Headache, acute     Heart murmur     Hyperlipidemia     Hypertension     Memory loss     Pedal edema     Pneumonia     PONV (postoperative nausea and vomiting)     Rotator cuff tear, left     Sleep apnea 2021    Wears glasses     Wears partial dentures     upper       Past Surgical History:   Procedure Laterality Date    ABDOMINAL ADHESION SURGERY      ANAL SPHINCTEROPLASTY      ANKLE SURGERY Right     tendon tear    APPENDECTOMY      CARPAL TUNNEL RELEASE Bilateral     COLONOSCOPY      HAND SURGERY Right     ganglion cyst    HEMORROIDECTOMY      HYSTERECTOMY      ILEOSTOMY      KNEE ARTHROCENTESIS      ganglion Cyst    KNEE ARTHROSCOPY Left     OVARIAN CYST REMOVAL      PA SURGICAL ARTHROSCOPY SHOULDER W/ROTATOR CUFF RPR Left 02/19/2018    Procedure: ARTHROSCOPIC REPAIR ROTATOR CUFF,  SAD, BICEP TENODESIS;  Surgeon: Garrett Coto MD;  Location: [facility] OR;  Service: Orthopedics    REPAIR RECTOCELE      REPLACEMENT TOTAL KNEE Left 09/28/2021    TONSILLECTOMY AND ADENOIDECTOMY      TUBAL LIGATION         Family History   Problem Relation Age of Onset    Hypertension Mother     Colon cancer Mother     Bone cancer Mother     COPD Mother     Emphysema Mother     Skin cancer Mother     Glaucoma Mother     Lead poisoning Father         lead poisoning in lungs     Thyroid disease Sister     Depression Sister     Hypertension Sister     Alzheimer's disease Sister     Arthritis Sister     ROBERT disease Brother     Throat cancer Brother     Hypertension Brother     Arthritis Brother     Colon cancer Family     Bone cancer Family     Hypertension Family     Diabetes Sister     Hypertension Sister     Heart  failure Sister     Alzheimer's disease Sister     Arthritis Sister     No Known Problems Daughter     No Known Problems Maternal Grandmother     No Known Problems Paternal Grandmother     No Known Problems Maternal Aunt     No Known Problems Maternal Aunt     No Known Problems Maternal Aunt     No Known Problems Maternal Aunt     No Known Problems Maternal Aunt     No Known Problems Paternal Aunt          Medications have been verified.        Objective   Pulse 72   Temp 98 °F (36.7 °C)   Resp 16   Wt 83.1 kg (183 lb 3.2 oz)   SpO2 98%   BMI 35.78 kg/m²   No LMP recorded. Patient has had a hysterectomy.       Physical Exam     Physical Exam  Constitutional:       Appearance: Normal appearance. She is not ill-appearing or toxic-appearing.   HENT:      Right Ear: Tympanic membrane, ear canal and external ear normal.      Left Ear: Tympanic membrane, ear canal and external ear normal.      Ears:      Comments: Dry cerumen right ear, but portion of TM visible - no erythema, no pus, no bulging.     Nose: No congestion or rhinorrhea.      Mouth/Throat:      Mouth: Mucous membranes are moist.      Pharynx: Posterior oropharyngeal erythema (minimal) present.      Comments: No oral lesions.  No sialadenitis/visible stones.  Eyes:      General:         Right eye: No discharge.         Left eye: No discharge.   Neck:      Comments: No thyromegaly or thyroid gland tenderness/nodules.  Cardiovascular:      Rate and Rhythm: Normal rate and regular rhythm.      Heart sounds: Normal heart sounds.   Pulmonary:      Effort: Pulmonary effort is normal.      Breath sounds: Normal breath sounds.   Musculoskeletal:      Cervical back: Normal range of motion and neck supple. Tenderness (left submandibular lymph node tender, mildly enlarged) present. No rigidity.   Skin:     General: Skin is warm and dry.      Findings: No erythema or rash.   Neurological:      Mental Status: She is alert.       Rapid strep negative

## 2025-02-09 LAB — BACTERIA THROAT CULT: NORMAL

## 2025-02-10 ENCOUNTER — RESULTS FOLLOW-UP (OUTPATIENT)
Dept: URGENT CARE | Facility: CLINIC | Age: 70
End: 2025-02-10

## 2025-02-10 ENCOUNTER — TELEPHONE (OUTPATIENT)
Dept: SLEEP CENTER | Facility: CLINIC | Age: 70
End: 2025-02-10

## 2025-02-10 LAB — BACTERIA THROAT CULT: ABNORMAL

## 2025-02-10 NOTE — TELEPHONE ENCOUNTER
Spoke with patient.  No longer has sore throat or any symptoms.  Is feeling better.  Culture positive for not group A strep.  Advised patient that she does not need to be treated with an antibiotic since she is feeling better.

## 2025-02-11 DIAGNOSIS — R25.9 MIXED ACTION AND RESTING TREMOR: ICD-10-CM

## 2025-02-12 RX ORDER — PROPRANOLOL HYDROCHLORIDE 60 MG/1
60 CAPSULE, EXTENDED RELEASE ORAL DAILY
Qty: 100 CAPSULE | Refills: 5 | Status: SHIPPED | OUTPATIENT
Start: 2025-02-12

## 2025-02-14 LAB
DME PARACHUTE DELIVERY DATE EXPECTED: NORMAL
DME PARACHUTE DELIVERY DATE REQUESTED: NORMAL
DME PARACHUTE ITEM DESCRIPTION: NORMAL
DME PARACHUTE ORDER STATUS: NORMAL
DME PARACHUTE SUPPLIER NAME: NORMAL
DME PARACHUTE SUPPLIER PHONE: NORMAL

## 2025-02-24 ENCOUNTER — TELEPHONE (OUTPATIENT)
Age: 70
End: 2025-02-24

## 2025-02-24 LAB

## 2025-02-24 NOTE — TELEPHONE ENCOUNTER
I gave a ResMed S11 bi pap set at 24/9 cm ps4   and gave an AirFit F40 (M) mask. I turn on pressure with mask fit. We discussed cleaning, resupply and compliance. s/n # [id number]  d/n# [id number]

## 2025-03-06 DIAGNOSIS — R25.9 MIXED ACTION AND RESTING TREMOR: ICD-10-CM

## 2025-03-06 RX ORDER — PROPRANOLOL HCL 20 MG
20 TABLET ORAL EVERY 12 HOURS
Qty: 180 TABLET | Refills: 2 | OUTPATIENT
Start: 2025-03-06

## 2025-03-30 DIAGNOSIS — E78.2 MIXED HYPERLIPIDEMIA: ICD-10-CM

## 2025-03-31 RX ORDER — ROSUVASTATIN CALCIUM 10 MG/1
10 TABLET, COATED ORAL DAILY
Qty: 90 TABLET | Refills: 1 | Status: SHIPPED | OUTPATIENT
Start: 2025-03-31

## 2025-04-07 ENCOUNTER — OFFICE VISIT (OUTPATIENT)
Dept: CARDIOLOGY CLINIC | Facility: CLINIC | Age: 70
End: 2025-04-07
Payer: MEDICARE

## 2025-04-07 VITALS
OXYGEN SATURATION: 97 % | DIASTOLIC BLOOD PRESSURE: 78 MMHG | HEIGHT: 59 IN | WEIGHT: 190 LBS | RESPIRATION RATE: 16 BRPM | SYSTOLIC BLOOD PRESSURE: 142 MMHG | BODY MASS INDEX: 38.3 KG/M2 | HEART RATE: 72 BPM

## 2025-04-07 DIAGNOSIS — E78.2 MIXED HYPERLIPIDEMIA: Primary | ICD-10-CM

## 2025-04-07 DIAGNOSIS — I10 ESSENTIAL HYPERTENSION: ICD-10-CM

## 2025-04-07 DIAGNOSIS — E66.812 CLASS 2 OBESITY DUE TO EXCESS CALORIES WITHOUT SERIOUS COMORBIDITY WITH BODY MASS INDEX (BMI) OF 36.0 TO 36.9 IN ADULT: ICD-10-CM

## 2025-04-07 DIAGNOSIS — E66.09 CLASS 2 OBESITY DUE TO EXCESS CALORIES WITHOUT SERIOUS COMORBIDITY WITH BODY MASS INDEX (BMI) OF 36.0 TO 36.9 IN ADULT: ICD-10-CM

## 2025-04-07 DIAGNOSIS — Z15.89 GENETIC PREDISPOSITION TO CARDIOMYOPATHY: ICD-10-CM

## 2025-04-07 DIAGNOSIS — I35.8 AORTIC VALVE SCLEROSIS: ICD-10-CM

## 2025-04-07 PROCEDURE — 93000 ELECTROCARDIOGRAM COMPLETE: CPT | Performed by: INTERNAL MEDICINE

## 2025-04-07 PROCEDURE — 99214 OFFICE O/P EST MOD 30 MIN: CPT | Performed by: INTERNAL MEDICINE

## 2025-04-07 NOTE — PROGRESS NOTES
[facility] [place] [address] [facility] [address] [phone]                                                Cardiology Office Follow up  Sri Mcgrath, 69 y.o. 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   Wt down from 217 lbs (2/2021) --> 181 lbs --> now up to 190 lbs    Cardiac testing  Stress echo - 3/2021 - did 6 min, no  significant ischemia, but did not have recurrence of chest pain with it  TTE - 3/27/23 - LVEF 65%, Grade 1 DD, no significant valvular abnormalities    Plan  Aortic sclerosis  Overview  3/2023: TTE - mild thickening and calcification of aortic valve, but no significant stenosis  4/2024: no acute symptoms, euvolemic  4/2025: no shortness of breath  Impression  Stable, asymptomatic, mild aortic sclerosis  Plan  Monitor for exertional symptoms  Will check follow up echocardiogram at 3 yrs = 3/2026 / prior to next office visit    Positive genetic testing for cardiomyopathy  Overview  3/2021: She had apparently received an ad from [employer] regarding genetic testing, and underwent same ?through her PCP --> 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  3/2021 TTE - normal LVEF 60%, no evidence of dilated cardiomyopathy  4/2024: remains asymptomatic, euvolemic on exam  4/2025: euvolemic  Impression  Unclear significance of genetic test result  asymptomatic  Plan  Will follow up on echocardiogram  Monitor clinically for symptoms of heart failure    Hypertension  Overview  Enalapril was previously discontinued due to concerns about low blood pressure and her blood  "pressure has remained well-controlled after that   4/2024: started on propranolol 20 mg bid  1/2025: propranolol changed to ER 60 mg daily for tremor  Tremor is much improved since switching to this dose  4/2025: BP well controlled, 142/78  Plan  Continue Propranolol ER 60 mg daily --> will defer to PCP on prescription or adjustments    Hyperlipidemia, Obesity - Body mass index is 37.8 kg/m².     Cholesterol levels remain well-controlled despite discontinuation of ezetimibe (with uptitration of rosuvastatin to 10)  4/2025: she admits to not taking rosuvastatin consistently - forgets it a lot, but otherwise has not problems with it --> Cholesterol level is now borderline  Plan  Continue rosuvastatin 10 mg daily  Counseled regarding need for compliance  Dietary modifications, weight loss recommmended  Previously had participated in [employer] program - \"Take off pounds sensibly\" and lost 28 lbs in the last 2 years, but now the group is broken up      ECG today -  Results for orders placed or performed in visit on 04/07/25   POCT ECG    Impression    Normal sinus rhythm with first-degree AV block  Otherwise normal ECG            Orders Placed This Encounter   Procedures   • POCT ECG   • Echo complete w/ contrast if indicated       Return in about 1 year (around 4/7/2026), or if symptoms worsen or fail to improve.    History of Present Illness   69 y.o.  female comes in as a new patient for establishment of care after recent hospitalization for chest pain.    On 1st March, patient came in with to the hospital with sudden onset left-sided chest pressure which woke her up from sleep in the morning.  She received nitroglycerin without much relief.  Subsequently her blood pressure was noted to be elevated in the 200s and she received Ativan which improved her pain and blood pressure.  She has had a lot of social stressors recently.  She was ruled out with 3 negative troponins and discharged with outpatient stress.    Since " discharge, she has not had any further major recurrences of chest pain or shortness of breath.  She ambulates with the help of a cane, due to pain, but feels she can exercise on treadmill for stress test.    Interval history - 8/5/2021  She comes back after completing stress testing.  She has been doing well over the past few months, and her chest pain has otherwise resolved.  She is currently working with cleaning places, and occasionally gets short of breath with it.  She does report some orthopnea, and sleeps elevated. Also has edema    Additionally , she states that she recently underwent some genetic testing which had abnormal results and has questions regarding the same.  On further inquiry she states that she had previously but a weight loss supplement from [employer] about a tree, and had received some in epic testing at along with it, and so underwent same, and was found to have pathogenic mutation and as a result was asked to discuss it here today.    Interval history - 2/17/2022  She comes back for follow-up after about 6 months.  She has been doing well over the past few months and reports improvement in edema and is feeling much better.  No current complains of shortness of breath or orthopnea.  She continues to use Lasix about 1-2 times per week.  No complains of chest pain, palpitation.  She does report having some symptoms of head spinning sensation when she lays down or when she turns her head from side to side over the past 2 weeks, about 3 episodes, which have resolved on their own.    Interval history - 8/4/2022  He comes back for follow-up after about 6 months.  She has been doing well overall and has not had any major complains of shortness of breath, orthopnea.  Her pedal edema has improved as well.  She had reported vertigo during my prior visit, but reports complete resolution of the same.  She remains compliant with medications and has been working towards weight loss, and has lost about 10 lbs  over the last 6 months.    Interval history - 4/13/2023  She comes back for follow-up after about 6 months.  She continues to do well and has lost more weight with following through with the [employer] program.  She had a bit of orthostatic dizziness today during vitals check but is otherwise doing well.  No other complaints of dizziness, near-syncope or syncope. Her pedal edema is much improved/resolved and she has not needed any lasix    Interval history - 4/5/2024  She returns for follow-up after 1 year.  She continues to do well and remains free of any chest pain, shortness of breath, palpitations or dizziness.  She remains active and compliant with medical therapy and her blood pressure and cholesterol are both under control.    Interval history - 4/7/2025  She returns for 1 year follow up . She remains free of chest pain, shortness of breath, palpitations or dizziness.      Historical Information   Past Medical History:   Diagnosis Date   • Allergic    • Arthritis    • Cataract     ashley   • Colon polyp    • Depression    • Fluid retention    • Headache, acute    • Heart murmur    • Hyperlipidemia    • Hypertension    • Memory loss    • Pedal edema    • Pneumonia    • PONV (postoperative nausea and vomiting)    • Rotator cuff tear, left    • Sleep apnea 2021   • Wears glasses    • Wears partial dentures     upper     Past Surgical History:   Procedure Laterality Date   • ABDOMINAL ADHESION SURGERY     • ANAL SPHINCTEROPLASTY     • ANKLE SURGERY Right     tendon tear   • APPENDECTOMY     • CARPAL TUNNEL RELEASE Bilateral    • COLONOSCOPY     • HAND SURGERY Right     ganglion cyst   • HEMORROIDECTOMY     • HYSTERECTOMY     • ILEOSTOMY     • KNEE ARTHROCENTESIS      ganglion Cyst   • KNEE ARTHROSCOPY Left    • OVARIAN CYST REMOVAL     • AR SURGICAL ARTHROSCOPY SHOULDER W/ROTATOR CUFF RPR Left 02/19/2018    Procedure: ARTHROSCOPIC REPAIR ROTATOR CUFF,  SAD, BICEP TENODESIS;  Surgeon: Garrett Coto MD;  Location: [facility]  OR;  Service: Orthopedics   • REPAIR RECTOCELE     • REPLACEMENT TOTAL KNEE Left 09/28/2021   • TONSILLECTOMY AND ADENOIDECTOMY     • TUBAL LIGATION       Family History   Problem Relation Age of Onset   • Hypertension Mother    • Colon cancer Mother    • Bone cancer Mother    • COPD Mother    • Emphysema Mother    • Skin cancer Mother    • Glaucoma Mother    • Lead poisoning Father         lead poisoning in lungs    • Thyroid disease Sister    • Depression Sister    • Hypertension Sister    • Alzheimer's disease Sister    • Arthritis Sister    • ROBERT disease Brother    • Throat cancer Brother    • Hypertension Brother    • Arthritis Brother    • Colon cancer Family    • Bone cancer Family    • Hypertension Family    • Diabetes Sister    • Hypertension Sister    • Heart failure Sister    • Alzheimer's disease Sister    • Arthritis Sister    • No Known Problems Daughter    • No Known Problems Maternal Grandmother    • No Known Problems Paternal Grandmother    • No Known Problems Maternal Aunt    • No Known Problems Maternal Aunt    • No Known Problems Maternal Aunt    • No Known Problems Maternal Aunt    • No Known Problems Maternal Aunt    • No Known Problems Paternal Aunt      Current Outpatient Medications on File Prior to Visit   Medication Sig Dispense Refill   • fluticasone (FLONASE) 50 mcg/act nasal spray 1 spray into each nostril daily (Patient taking differently: 1 spray into each nostril if needed for rhinitis or allergies) 18.2 mL 0   • montelukast (SINGULAIR) 10 mg tablet Take 1 tablet (10 mg total) by mouth daily at bedtime     • propranolol (INDERAL LA) 60 mg 24 hr capsule take 1 capsule by mouth once daily 100 capsule 5   • rosuvastatin (CRESTOR) 10 MG tablet take 1 tablet by mouth once daily 90 tablet 1   • predniSONE 10 mg tablet 1 tab by mouth 3 times a day for 7 days 21 tablet 0   • topiramate (TOPAMAX) 25 mg tablet take 1 tablet by mouth at bedtime for 1 week then INCREASE to 1 tablet by mouth  twice a day (Patient not taking: Reported on 4/7/2025)       No current facility-administered medications on file prior to visit.     Allergies   Allergen Reactions   • Keflex [Cephalexin] Other (See Comments)     Mouth sores   • Contrast [Iodinated Contrast Media] Hives     Occurred In the patient's 20's got Benedryl    • Morphine And Codeine Hives     IV MORPINE   • Penicillins Hives   • Benadryl [Diphenhydramine] Itching and Swelling     IV benadryl in hospital   • Ciprofloxacin Hives   • Clindamycin Other (See Comments)     Pt unsure   • Doxycycline Photosensitivity and Dermatitis   • Erythromycin Hives   • Other Hives and Rash     Fabreeze Add durning PT consult      Social History     Socioeconomic History   • Marital status:      Spouse name: None   • Number of children: None   • Years of education: None   • Highest education level: None   Occupational History   • Occupation: retired   Tobacco Use   • Smoking status: Never     Passive exposure: Never   • Smokeless tobacco: Never   Vaping Use   • Vaping status: Never Used   Substance and Sexual Activity   • Alcohol use: Not Currently     Comment: Occasionally/social   • Drug use: No   • Sexual activity: Yes     Partners: Male     Birth control/protection: Post-menopausal   Other Topics Concern   • None   Social History Narrative    Getting .    Two children    Lives with her daughter    On disability. Prior .      Social Drivers of Health     Financial Resource Strain: Low Risk  (5/3/2023)    Overall Financial Resource Strain (CARDIA)    • Difficulty of Paying Living Expenses: Not hard at all   Food Insecurity: Food Insecurity Present (5/2/2024)    Nursing - Inadequate Food Risk Classification    • Worried About Running Out of Food in the Last Year: Sometimes true    • Ran Out of Food in the Last Year: Never true    • Ran Out of Food in the Last Year: Not on file   Transportation Needs: No Transportation Needs (5/2/2024)    FRANK  "- Transportation    • Lack of Transportation (Medical): No    • Lack of Transportation (Non-Medical): No   Physical Activity: Not on file   Stress: Not on file   Social Connections: Not on file   Intimate Partner Violence: Not on file   Housing Stability: Low Risk  (5/2/2024)    Housing Stability Vital Sign    • Unable to Pay for Housing in the Last Year: No    • Number of Times Moved in the Last Year: 1    • Homeless in the Last Year: No        Review of Systems   All other systems reviewed and are negative.    Vitals:  Vitals:    04/07/25 1535   BP: 142/78   BP Location: Left arm   Patient Position: Sitting   Cuff Size: Large   Pulse: 72   Resp: 16   SpO2: 97%   Weight: 86.2 kg (190 lb)   Height: 4' 11.45\" (1.51 m)     BMI - Body mass index is 37.8 kg/m².  Wt Readings from Last 7 Encounters:   04/07/25 86.2 kg (190 lb)   02/07/25 83.1 kg (183 lb 3.2 oz)   01/24/25 80.7 kg (178 lb)   01/20/25 80.7 kg (178 lb)   01/06/25 81 kg (178 lb 9.6 oz)   12/23/24 83.7 kg (184 lb 9.6 oz)   12/09/24 81.6 kg (180 lb)       Physical Exam  Vitals and nursing note reviewed.   Constitutional:       General: She is not in acute distress.     Appearance: Normal appearance. She is well-developed. She is obese. She is not ill-appearing.   HENT:      Head: Normocephalic and atraumatic.      Nose: No congestion.   Eyes:      General: No scleral icterus.     Conjunctiva/sclera: Conjunctivae normal.   Neck:      Vascular: No carotid bruit or JVD.   Cardiovascular:      Rate and Rhythm: Normal rate and regular rhythm.      Pulses: Normal pulses.      Heart sounds: Murmur heard.      Crescendo decrescendo systolic murmur is present with a grade of 3/6.      No friction rub. No gallop.   Pulmonary:      Effort: Pulmonary effort is normal. No respiratory distress.      Breath sounds: Normal breath sounds. No rales.   Abdominal:      General: There is no distension.      Palpations: Abdomen is soft.      Tenderness: There is no abdominal " "tenderness.   Musculoskeletal:         General: No swelling or tenderness.      Cervical back: Neck supple.      Right lower leg: Edema present.      Left lower leg: Edema present.   Skin:     General: Skin is warm.   Neurological:      General: No focal deficit present.      Mental Status: She is alert and oriented to person, place, and time. Mental status is at baseline.   Psychiatric:         Mood and Affect: Mood normal.         Behavior: Behavior normal.         Thought Content: Thought content normal.         Labs:  CBC:   Lab Results   Component Value Date    WBC 4.86 01/06/2025    RBC 5.30 (H) 01/06/2025    HGB 15.4 01/06/2025    HCT 45.8 01/06/2025    MCV 86 01/06/2025     01/06/2025    RDW 12.8 01/06/2025       CMP:   Lab Results   Component Value Date     09/24/2014    K 4.0 01/06/2025     01/06/2025    CO2 29 01/06/2025    ANIONGAP 8 09/24/2014    BUN 10 01/06/2025    CREATININE 0.84 01/06/2025    EGFR 70 01/06/2025    GLUCOSE 116 09/24/2014    CALCIUM 9.8 01/06/2025    AST 14 01/06/2025    ALT 14 01/06/2025    ALKPHOS 78 01/06/2025       Magnesium:  Lab Results   Component Value Date    MG 1.9 09/28/2022       Lipid Profile:   Lab Results   Component Value Date    HDL 43 (L) 01/06/2025    TRIG 119 01/06/2025    LDLCALC 129 (H) 01/06/2025       Thyroid Studies:   Lab Results   Component Value Date    [id number] 2.372 01/06/2025       No components found for: \"HGA1C\"    Lab Results   Component Value Date    INR 1.04 06/25/2024    INR 1.02 02/28/2021   5    Imaging: X-ray Chest 1 View Portable    Result Date: 2/28/2021  Narrative: CHEST INDICATION:   chest pain. COMPARISON:  12/11/2015 EXAM PERFORMED/VIEWS:  XR CHEST PORTABLE  AP semierect FINDINGS: Cardiomediastinal silhouette appears unremarkable. The lungs are clear.  No pneumothorax or pleural effusion. Osseous structures appear within normal limits for patient age.     Impression: No significant interval change from previous " examination. No acute abnormalities. Workstation performed: [id number]       Cardiac testing:   Results for orders placed during the hospital encounter of 21   Echo complete with contrast if indicated    Narrative [address] [address]    Transthoracic Echocardiogram  2D, M-mode, Doppler, and Color Doppler    Study date:  01-Mar-2021    Patient: REAL YA  MR number: [id number]  Account number: [id number]  : 1955  Age: 65 years  Gender: Female  Status: Outpatient  Location: [facility], [place]  Height: 60 in  Weight: 216.5 lb  BP: 122/ 57 mmHg    Indications: Chest pain.    Diagnoses: R07.9 - Chest pain, unspecified    Sonographer:  Gilma Ferrara RDCS  Referring Physician:  Hernán Robert MD  Group:  [facility]  Interpreting Physician:  Marlo Cowart MD    SUMMARY    LEFT VENTRICLE:  Size was normal.  Systolic function was normal. Ejection fraction was estimated to be 60 %.  There were no regional wall motion abnormalities.  Wall thickness was mildly increased.  The changes were consistent with concentric remodeling (increased wall thickness with normal wall mass).    MITRAL VALVE:  There was mild annular calcification.  There was trace regurgitation.    HISTORY: Symptoms: chest pain. Lower extremity edema. PRIOR HISTORY: Risk factors: hypertension, hypercholesterolemia, and morbid obesity.    PROCEDURE: The study was performed in the [facility], [place]. This was a routine study. The transthoracic approach was used. The study included complete 2D imaging, M-mode, complete spectral Doppler, and color Doppler. The  heart rate was 72 bpm, at the start of the study. Images were obtained from the parasternal, apical, subcostal, and suprasternal notch acoustic windows. Echocardiographic views were limited due to decreased penetration and lung  interference. This was a technically difficult  study.    LEFT VENTRICLE: Size was normal. Systolic function was normal. Ejection fraction was estimated to be 60 %. There were no regional wall motion abnormalities. Wall thickness was mildly increased. The changes were consistent with concentric  remodeling (increased wall thickness with normal wall mass). DOPPLER: Left ventricular diastolic function parameters were normal.    RIGHT VENTRICLE: The size was normal. Systolic function was normal. Wall thickness was normal.    LEFT ATRIUM: Size was normal.    RIGHT ATRIUM: Size was normal.    MITRAL VALVE: There was mild annular calcification. Valve structure was normal. There was normal leaflet separation. DOPPLER: The transmitral velocity was within the normal range. There was no evidence for stenosis. There was trace  regurgitation.    AORTIC VALVE: The valve was trileaflet. Leaflets exhibited mildly increased thickness, mild calcification, and lower normal cuspal separation. DOPPLER: Transaortic velocity was minimally increased. There was no evidence for stenosis. There  was no significant regurgitation.    TRICUSPID VALVE: The valve structure was normal. There was normal leaflet separation. DOPPLER: The transtricuspid velocity was within the normal range. There was no evidence for stenosis. There was no significant regurgitation.    PULMONIC VALVE: Leaflets exhibited normal thickness, no calcification, and normal cuspal separation. DOPPLER: The transpulmonic velocity was within the normal range. There was trace regurgitation.    PERICARDIUM: There was no pericardial effusion. The pericardium was normal in appearance.    AORTA: The root exhibited normal size.    SYSTEMIC VEINS: IVC: The inferior vena cava was not well visualized. The inferior vena cava was normal in size.    SYSTEM MEASUREMENT TABLES    2D  %FS: 32.21 %  Ao Diam: 2.88 cm  Ao asc: 3.18 cm  EDV(Teich): 93.69 ml  EF(Teich): 60.54 %  ESV(Teich): 36.97 ml  IVSd: 1.33 cm  LA Area: 18.19 cm2  LA Diam:  3.78 cm  LVEDV MOD A4C: 99.62 ml  LVEF MOD A4C: 66.08 %  LVESV MOD A4C: 33.79 ml  LVIDd: 4.53 cm  LVIDs: 3.07 cm  LVLd A4C: 7.35 cm  LVLs A4C: 5.92 cm  LVOT Diam: 2 cm  LVPWd: 1.18 cm  RA Area: 9.49 cm2  RVIDd: 2.77 cm  SV MOD A4C: 65.83 ml  SV(Teich): 56.72 ml    CW  AV Env.Ti: 304.47 ms  AV VTI: 33.67 cm  AV Vmax: 1.59 m/s  AV Vmean: 1.11 m/s  AV maxPG: 10.08 mmHg  AV meanP.58 mmHg  PV Env.Ti: 304.47 ms  PV VTI: 22.11 cm  PV Vmax: 0.98 m/s  PV Vmean: 0.73 m/s  PV maxPG: 3.87 mmHg  PV meanP.34 mmHg    MM  TAPSE: 2.27 cm    PW  AIDAN (VTI): 1.93 cm2  AIDAN Vmax: 1.83 cm2  AVAI (VTI): 0 cm2/m2  AVAI Vmax: 0 cm2/m2  E' Sept: 0.09 m/s  E/E' Sept: 8.67  LVOT Env.Ti: 296.86 ms  LVOT VTI: 20.62 cm  LVOT Vmax: 0.92 m/s  LVOT Vmean: 0.69 m/s  LVOT maxPG: 3.4 mmHg  LVOT meanP.14 mmHg  LVSI Dopp: 33.67 ml/m2  LVSV Dopp: 64.99 ml  MV A Cristian: 0.92 m/s  MV Dec [place]: 3.17 m/s2  MV DecT: 236.25 ms  MV E Cristian: 0.75 m/s  MV E/A Ratio: 0.81  RVOT Env.Ti: 319.7 ms  RVOT VTI: 13.63 cm  RVOT Vmax: 0.68 m/s  RVOT Vmean: 0.43 m/s  RVOT maxP.83 mmHg  RVOT meanP.86 mmHg    [facility]    Prepared and electronically signed by    Marlo Cowart MD  Signed 01-Mar-2021 12:04:58       No results found for this or any previous visit.  No results found for this or any previous visit.  No results found for this or any previous visit.

## 2025-04-17 ENCOUNTER — TELEPHONE (OUTPATIENT)
Dept: DERMATOLOGY | Facility: CLINIC | Age: 70
End: 2025-04-17

## 2025-04-17 NOTE — TELEPHONE ENCOUNTER
LVM for Pt advising we would like to schedule an appt for him/her based on referral (if still needed), advised Pt to callback, Letter sent
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
## 2025-04-29 ENCOUNTER — RA CDI HCC (OUTPATIENT)
Dept: OTHER | Facility: HOSPITAL | Age: 70
End: 2025-04-29

## 2025-05-06 ENCOUNTER — APPOINTMENT (OUTPATIENT)
Dept: RADIOLOGY | Facility: CLINIC | Age: 70
End: 2025-05-06
Payer: MEDICARE

## 2025-05-06 ENCOUNTER — OFFICE VISIT (OUTPATIENT)
Dept: INTERNAL MEDICINE CLINIC | Facility: CLINIC | Age: 70
End: 2025-05-06
Payer: MEDICARE

## 2025-05-06 ENCOUNTER — RESULTS FOLLOW-UP (OUTPATIENT)
Dept: INTERNAL MEDICINE CLINIC | Facility: CLINIC | Age: 70
End: 2025-05-06

## 2025-05-06 VITALS
HEART RATE: 67 BPM | BODY MASS INDEX: 38.18 KG/M2 | HEIGHT: 59 IN | SYSTOLIC BLOOD PRESSURE: 122 MMHG | DIASTOLIC BLOOD PRESSURE: 80 MMHG | WEIGHT: 189.4 LBS | TEMPERATURE: 97.6 F | OXYGEN SATURATION: 97 %

## 2025-05-06 DIAGNOSIS — Z12.31 ENCOUNTER FOR SCREENING MAMMOGRAM FOR BREAST CANCER: ICD-10-CM

## 2025-05-06 DIAGNOSIS — M25.531 RIGHT WRIST PAIN: ICD-10-CM

## 2025-05-06 DIAGNOSIS — R73.03 PREDIABETES: ICD-10-CM

## 2025-05-06 DIAGNOSIS — E78.2 MIXED HYPERLIPIDEMIA: ICD-10-CM

## 2025-05-06 DIAGNOSIS — M79.642 PAIN IN BOTH HANDS: ICD-10-CM

## 2025-05-06 DIAGNOSIS — E66.812 CLASS 2 SEVERE OBESITY DUE TO EXCESS CALORIES WITH SERIOUS COMORBIDITY AND BODY MASS INDEX (BMI) OF 36.0 TO 36.9 IN ADULT (HCC): ICD-10-CM

## 2025-05-06 DIAGNOSIS — G47.33 OSA (OBSTRUCTIVE SLEEP APNEA): ICD-10-CM

## 2025-05-06 DIAGNOSIS — I10 PRIMARY HYPERTENSION: Primary | ICD-10-CM

## 2025-05-06 DIAGNOSIS — F32.1 MODERATE MAJOR DEPRESSION, SINGLE EPISODE (HCC): ICD-10-CM

## 2025-05-06 DIAGNOSIS — R25.9 MIXED ACTION AND RESTING TREMOR: ICD-10-CM

## 2025-05-06 DIAGNOSIS — M79.645 PAIN OF LEFT THUMB: ICD-10-CM

## 2025-05-06 DIAGNOSIS — K76.0 FATTY LIVER: ICD-10-CM

## 2025-05-06 DIAGNOSIS — M79.641 PAIN IN BOTH HANDS: ICD-10-CM

## 2025-05-06 DIAGNOSIS — E53.8 B12 DEFICIENCY: Chronic | ICD-10-CM

## 2025-05-06 DIAGNOSIS — N18.30 STAGE 3 CHRONIC KIDNEY DISEASE, UNSPECIFIED WHETHER STAGE 3A OR 3B CKD (HCC): ICD-10-CM

## 2025-05-06 DIAGNOSIS — E55.9 VITAMIN D DEFICIENCY: Chronic | ICD-10-CM

## 2025-05-06 DIAGNOSIS — M15.0 PRIMARY OSTEOARTHRITIS INVOLVING MULTIPLE JOINTS: ICD-10-CM

## 2025-05-06 DIAGNOSIS — M85.89 OSTEOPENIA OF MULTIPLE SITES: ICD-10-CM

## 2025-05-06 DIAGNOSIS — G89.4 CHRONIC PAIN SYNDROME: ICD-10-CM

## 2025-05-06 DIAGNOSIS — E66.01 CLASS 2 SEVERE OBESITY DUE TO EXCESS CALORIES WITH SERIOUS COMORBIDITY AND BODY MASS INDEX (BMI) OF 36.0 TO 36.9 IN ADULT (HCC): ICD-10-CM

## 2025-05-06 PROCEDURE — G2211 COMPLEX E/M VISIT ADD ON: HCPCS | Performed by: INTERNAL MEDICINE

## 2025-05-06 PROCEDURE — 99214 OFFICE O/P EST MOD 30 MIN: CPT | Performed by: INTERNAL MEDICINE

## 2025-05-06 PROCEDURE — 73130 X-RAY EXAM OF HAND: CPT

## 2025-05-06 PROCEDURE — 73110 X-RAY EXAM OF WRIST: CPT

## 2025-05-06 RX ORDER — MELOXICAM 7.5 MG/1
7.5 TABLET ORAL DAILY
Qty: 30 TABLET | Refills: 0 | Status: SHIPPED | OUTPATIENT
Start: 2025-05-06

## 2025-05-06 NOTE — PROGRESS NOTES
Name: Sri Mcgrath      : 1955      MRN: [id number]  Encounter Provider: Sea Hadley DO  Encounter Date: 2025   Encounter department: [facility]  :  Assessment & Plan  Encounter for screening mammogram for breast cancer    Orders:    Mammo screening bilateral w 3d and cad; Future    Primary hypertension         Stage 3 chronic kidney disease, unspecified whether stage 3a or 3b CKD (HCC)  Lab Results   Component Value Date    EGFR 70 2025    EGFR 72 2024    EGFR 63 2024    CREATININE 0.84 2025    CREATININE 0.83 2024    CREATININE 0.92 2024            REKHA (obstructive sleep apnea)         Fatty liver         Osteopenia of multiple sites         Primary osteoarthritis involving multiple joints         Moderate major depression, single episode (HCC)           Chronic pain syndrome         Mixed action and resting tremor         B12 deficiency         Class 2 severe obesity due to excess calories with serious comorbidity and body mass index (BMI) of 36.0 to 36.9 in adult (HCC)           Mixed hyperlipidemia         Prediabetes         Vitamin D deficiency         Pain in both hands    Orders:    XR hand 3+ vw right; Future    XR hand 3+ vw left; Future    Ambulatory Referral to Occupational Therapy; Future    meloxicam (MOBIC) 7.5 mg tablet; Take 1 tablet (7.5 mg total) by mouth daily    Pain of left thumb    Orders:    Ambulatory Referral to Occupational Therapy; Future    meloxicam (MOBIC) 7.5 mg tablet; Take 1 tablet (7.5 mg total) by mouth daily    Right wrist pain    Orders:    Ambulatory Referral to Occupational Therapy; Future    XR wrist 3+ vw right; Future    A/P: Doing ok and labs are up to date.  PE w/o any findings other than some pain and positive finkelstein's. ?DJD vs CTD and ?CTS. No nodules for locking. Will start mobitc and OT. Check xrays. May need labs or EMG. Will order mammo. Continue current treatment and RTC four for f/u  "hands..        History of Present Illness   RHD WF RTC For f/u HTN, CKD, etc. Doing ok and no new issues except for several weeks of right last two fingers and left thumb pain. No swelling, redness, or increase temp. No trauma. NO h/o gout or CTD. Fingers sometimes lock up. . Remains active w/o difficulty and no  falls. Chronic pain and REKHA are manageable. MDD/MAGGIE are controlled. Due for  mammo. Labs are up to date.       Review of Systems   Constitutional:  Negative for activity change, chills, diaphoresis, fatigue and fever.   HENT: Negative.     Eyes:  Negative for visual disturbance.   Respiratory:  Negative for cough, chest tightness, shortness of breath and wheezing.    Cardiovascular:  Negative for chest pain, palpitations and leg swelling.   Gastrointestinal:  Negative for abdominal pain, constipation, diarrhea, nausea and vomiting.   Endocrine: Negative for cold intolerance and heat intolerance.   Genitourinary:  Negative for difficulty urinating, dysuria and frequency.   Musculoskeletal:  Positive for arthralgias. Negative for gait problem, joint swelling and myalgias.   Neurological:  Negative for dizziness, seizures, syncope, weakness, light-headedness, numbness and headaches.   Psychiatric/Behavioral:  Negative for confusion, dysphoric mood and sleep disturbance. The patient is not nervous/anxious.        Objective   /80   Pulse 67   Temp 97.6 °F (36.4 °C)   Ht 4' 11.45\" (1.51 m)   Wt 85.9 kg (189 lb 6.4 oz)   SpO2 97%   BMI 37.68 kg/m²      Physical Exam  Vitals and nursing note reviewed.   Constitutional:       General: She is not in acute distress.     Appearance: Normal appearance. She is not ill-appearing.   HENT:      Head: Normocephalic and atraumatic.      Mouth/Throat:      Mouth: Mucous membranes are moist.   Eyes:      Extraocular Movements: Extraocular movements intact.      Conjunctiva/sclera: Conjunctivae normal.      Pupils: Pupils are equal, round, and reactive to light. "   Neck:      Vascular: No carotid bruit.   Cardiovascular:      Rate and Rhythm: Normal rate and regular rhythm.      Heart sounds: Normal heart sounds. No murmur heard.  Pulmonary:      Effort: Pulmonary effort is normal. No respiratory distress.      Breath sounds: Normal breath sounds. No wheezing, rhonchi or rales.   Abdominal:      General: Bowel sounds are normal. There is no distension.      Palpations: Abdomen is soft.      Tenderness: There is no abdominal tenderness.   Musculoskeletal:         General: Tenderness present. No swelling or deformity. Normal range of motion.      Cervical back: Neck supple.      Right lower leg: No edema.      Left lower leg: No edema.      Comments: Bilat hand joints w/o any gross deformities, increase temp, erythema, or swelling. No crepitus. No effusions or ballotment. Joint integrity intact. No nodes. NO ulnar deviation. NO [place] neck deformities. ROM wnl. Tenderness diffusely and over the right wrist.  Grasp wnl. Negative tinel's and phalen's. Positive finkelstein on the left     Neurological:      General: No focal deficit present.      Mental Status: She is alert and oriented to person, place, and time. Mental status is at baseline.   Psychiatric:         Mood and Affect: Mood normal.         Behavior: Behavior normal.         Thought Content: Thought content normal.         Judgment: Judgment normal.

## 2025-05-06 NOTE — ASSESSMENT & PLAN NOTE
Lab Results   Component Value Date    EGFR 70 01/06/2025    EGFR 72 06/25/2024    EGFR 63 06/24/2024    CREATININE 0.84 01/06/2025    CREATININE 0.83 06/25/2024    CREATININE 0.92 06/24/2024

## 2025-05-06 NOTE — PATIENT INSTRUCTIONS
"Patient Education     High blood pressure in adults   The Basics   Written by the doctors and editors at [facility]   What is high blood pressure? -- High blood pressure is a condition that puts you at risk for heart attack, stroke, and kidney disease. It does not usually cause symptoms. But it can be serious.  When your doctor or nurse tells you your blood pressure, they say 2 numbers. For instance, your doctor or nurse might say that your blood pressure is \"130 over 80.\" The top number is the pressure inside your arteries when your heart is gasper. The bottom number is the pressure inside your arteries when your heart is relaxed.  \"Elevated blood pressure\" is a term doctors or nurses use as a warning. People with elevated blood pressure do not yet have high blood pressure. But their blood pressure is not as low as it should be for good health.  Many experts define high, elevated, and normal blood pressure as follows:   High - Top number of 130 or above and/or bottom number of 80 or above.   Elevated - Top number between 120 and 129 and bottom number of 79 or below.   Normal - Top number of 119 or below and bottom number of 79 or below.  This information is also in the table (table 1).  How can I lower my blood pressure? -- If your doctor or nurse prescribed blood pressure medicine, the most important thing you can do is to take it. If it causes side effects, do not just stop taking it. Instead, talk to your doctor or nurse about the problems it causes. They might be able to lower your dose or switch you to another medicine. If cost is a problem, mention that, too. They might be able to put you on a less expensive medicine. Taking your blood pressure medicine can keep you from having a heart attack or stroke, and it can save your life!  Can I do anything on my own? -- You have a lot of control over your blood pressure. To lower it:   Lose weight (if you are overweight).   Choose a diet low in fat and rich in " "fruits, vegetables, and low-fat dairy products.   Eat less salt.   Do something active for at least 30 minutes a day on most days of the week.   Drink less alcohol (if you drink more than 2 alcoholic drinks per day).  It's also a good idea to get a home blood pressure meter. People who check their own blood pressure at home do better at keeping it low and can sometimes even reduce the amount of medicine they take.  All topics are updated as new evidence becomes available and our peer review process is complete.  This topic retrieved from [employer] on: Feb 26, 2024.  Topic 20455 Version 23.0  Release: 32.2.4 - C32.56  © 2024 UpToDate, Inc. and/or its affiliates. All rights reserved.  table 1: Definition of normal and high blood pressure  Level  Top number  Bottom number    High 130 or above 80 or above   Elevated 120 to 129 79 or below   Normal 119 or below 79 or below   These definitions are from the American College of Cardiology/American Heart Association. Other expert groups might use slightly different definitions.  \"Elevated blood pressure\" is a term doctor or nurses use as a warning. It means you do not yet have high blood pressure, but your blood pressure is not as low as it should be for good health.  Graphic 90029 Version 6.0  Consumer Information Use and Disclaimer   Disclaimer: This generalized information is a limited summary of diagnosis, treatment, and/or medication information. It is not meant to be comprehensive and should be used as a tool to help the user understand and/or assess potential diagnostic and treatment options. It does NOT include all information about conditions, treatments, medications, side effects, or risks that may apply to a specific patient. It is not intended to be medical advice or a substitute for the medical advice, diagnosis, or treatment of a health care provider based on the health care provider's examination and assessment of a patient's specific and unique circumstances. " Patients must speak with a health care provider for complete information about their health, medical questions, and treatment options, including any risks or benefits regarding use of medications. This information does not endorse any treatments or medications as safe, effective, or approved for treating a specific patient. Up[employer]Date, Inc. and its affiliates disclaim any warranty or liability relating to this information or the use thereof.The use of this information is governed by the Terms of Use, available at https://www.[employer].com/en/know/clinical-effectiveness-terms. 2024© UpToDate, Inc. and its affiliates and/or licensors. All rights reserved.  Copyright   © 2024 [employer], Inc. and/or its affiliates. All rights reserved.

## 2025-05-12 ENCOUNTER — EVALUATION (OUTPATIENT)
Dept: OCCUPATIONAL THERAPY | Facility: CLINIC | Age: 70
End: 2025-05-12
Payer: MEDICARE

## 2025-05-12 DIAGNOSIS — M79.641 PAIN IN BOTH HANDS: Primary | ICD-10-CM

## 2025-05-12 DIAGNOSIS — M79.642 PAIN IN BOTH HANDS: Primary | ICD-10-CM

## 2025-05-12 DIAGNOSIS — M25.531 RIGHT WRIST PAIN: ICD-10-CM

## 2025-05-12 DIAGNOSIS — M79.645 PAIN OF LEFT THUMB: ICD-10-CM

## 2025-05-12 PROCEDURE — 97110 THERAPEUTIC EXERCISES: CPT

## 2025-05-12 PROCEDURE — 97167 OT EVAL HIGH COMPLEX 60 MIN: CPT

## 2025-05-12 NOTE — PROGRESS NOTES
Occupational Therapy Initial Evaluation        Visit/Unit Tracking  AUTH Status: Not Required Date               Visits  Authed: BOMN Used 1 (IE)              FOTO COUNT  compelted                PLAN OF CARE START: 25  PLAN OF CARE END: 2025  PROGRESS NOTE DUE/FOTO DUE: Needs to be scheduled in    FREQUENCY: 2x week for 6 weeks   PRECAUTIONS hypertension, hyperlipidemia, stage 3 chronic kidney disease, primary osteoarthritis of multiple joints, R wrist broken years ago, tremor   DIAGNOSIS: R ulnar nerve compression possible, DeQuervain's possible, bilateral arthritis   INSURANCE: Medicare/Medicaid     TEST FMC/DEXTERITY Next RE     Today's date: 2025  Patient name: Sri Mcgrath  : 1955  MRN: [id number]  Referring provider: Sea Hadley DO  Dx:   Encounter Diagnosis     ICD-10-CM    1. Pain in both hands  M79.641 Ambulatory Referral to Occupational Therapy    M79.642       2. Pain of left thumb  M79.645 Ambulatory Referral to Occupational Therapy      3. Right wrist pain  M25.531 Ambulatory Referral to Occupational Therapy             ASSESSMENT/PLAN:    SKILLED ANALYSIS:  Pt is a R hand dominant 69 year old female presenting to OP OT s/p R ulnar nerve compression, DeQuervains R side, and bilateral arthritis. Pt reports pain in R hand in pinky and ring that radiates up forearm into neck. Frequent numbness/tingling reported intermittently. Additionally, reports that the fingers lock in extension causing severe pain and she needs to manually place them into flexion when this occurs. DeQuervain's symptoms have started to occur on L arm recently. Pt has past medical history of neck pain due to arthritis and disc generation to be aware of. Bilateral tremor present that she is currently on medication for. Currently  at [employer] and reports repetitive movements when working at [employer]. 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 Frequently needs to shake hands out due to numbness and tingling when driving. Pt currently reports difficulty with completing job tasks including bending and extending the elbow when at . Post assessments pt demonstrating the following: decreased R  strength, shoulder stench, wrist strength, paresthesia, and pain that radiates into neck. Recommend OP OT 2x/wk for 6 weeks with focus on aforementioned deficits to maximize functional performance and improve QOL. Findings and recommendations discussed with pt, and they are in agreement. Educated pt on charges of insurance, assessments performed with standardized norms, POC, goal creation, and OP OT services.       SUBJECTIVE:    Subjective  Occupational Profile  *lives with: alone  *vocation:  at [employer]   *driving: needs to shake hands out due to numbness and tingling   *ADLs: pain when trying to pull up pants  *IADLs (cooking, cleaning, community mobility, medication management, finances): difficulty performing job related tasks due to pain  *AD: weighted built up pen for handwriting   *Sport/Leisure: no problems reported       PATIENT GOAL: \"To get rid of the pain and locking\"    PAIN: 9    HISTORY OF PRESENT ILLNESS:   Pt is a 69 y.o. female who was referred to Occupational Therapy s/p possible ulnar nerve compression, deQuervains, and bilateral advanced arthritis in multiple joints. Pt saw internal medicine on 5/6/25 with reported pain in both hands, pain in L thumb and pain in R wrist. Pt presents today with positive finkelstein's test and positive ulnar nerve compression. Pt's e-rays revealed advanced arthritis throughout bilateral hands.            PMH:   Past Medical History:   Diagnosis Date    Allergic     Arthritis     Cataract     ashley    Chronic diarrhea     Chronic kidney disease     Just in chart    Colon polyp     Depression     Diverticulitis of colon     Endometriosis     Long time ago    Environmental and seasonal " allergies     Fatty liver     Fluid retention     Gastric ulcer     Headache, acute     Heart murmur     Hyperlipidemia     Hypertension     Irritable bowel syndrome     Kidney problem     Memory loss     Pedal edema     Pneumonia     PONV (postoperative nausea and vomiting)     Rotator cuff tear, left     Sleep apnea 2021    Varicella     Childhood    Wears glasses     Wears partial dentures     upper       Past Surgical Hx:   Past Surgical History:   Procedure Laterality Date    ABDOMINAL ADHESION SURGERY      ANAL SPHINCTEROPLASTY      ANKLE SURGERY Right     tendon tear    APPENDECTOMY      CARPAL TUNNEL RELEASE Bilateral     COLONOSCOPY      HAND SURGERY Right     ganglion cyst    HEMORRHOID SURGERY      HEMORROIDECTOMY      HYSTERECTOMY      ILEOSTOMY      JOINT REPLACEMENT      KNEE ARTHROCENTESIS      ganglion Cyst    KNEE ARTHROSCOPY Left     LUNG SURGERY      My father had 1/3 of lung removed due to lead poisoning    OVARIAN CYST REMOVAL      OK SURGICAL ARTHROSCOPY SHOULDER W/ROTATOR CUFF RPR Left 02/19/2018    Procedure: ARTHROSCOPIC REPAIR ROTATOR CUFF,  SAD, BICEP TENODESIS;  Surgeon: aGrrett Coto MD;  Location: [facility] OR;  Service: Orthopedics    REPAIR RECTOCELE      REPLACEMENT TOTAL KNEE Left 09/28/2021    TONSILLECTOMY AND ADENOIDECTOMY      TUBAL LIGATION            OBJECTIVE:    Impairment Observations     Special Tests:   DeQuervain’s Tenosynovitis (POSITIVE)   Finkelstein: thumb flexion where the patient is asked to make a fist with the thumb tucked inside the palm and then ulnar deviation where the patient then bends the wrist toward the little finger and pain indication on the thumb side of the wrist, it is considered a positive Finkelstein test, which is suggestive of De Quervain's tenosynovitis    ULNAR NERVE (POSITIVE)  1. Tinel's Sign: This test involves tapping gently on the ulnar nerve at the elbow (cubital tunnel) or wrist (Guyon's canal) to elicit a tingling sensation in the  ulnar nerve distribution (ring and little fingers). (POSITIVE)  2. Cubital Tunnel Compression Test: The patient stands, and the examiner passively flexes the elbow to 20 degrees. Pressure is then applied to the ulnar nerve just proximal to the cubital tunnel. A positive test is indicated by numbness or tingling in the ulnar nerve distribution. (POSITIVE)  3. Wrist Flexion Test: This test assesses for ulnar nerve compression by having the patient fully flex their elbow with the shoulder slightly abducted, and hold this position. Tingling or paresthesia in the ulnar nerve distribution can indicate cubital tunnel syndrome. (NEGATIVE)  4. Froment's Sign: This test checks for weakness of the adductor pollicis muscle, which is innervated by the ulnar nerve. The patient is asked to hold a piece of paper between their thumb and index finger. If the paper is pulled away and the patient flexes the thumb's distal joint to maintain , it's a positive sign, indicating ulnar nerve pals (NEGATIVE)         RUE   5/12 LUE   5/12 Comments           /PINCH STRENGTH   Impaired Intact Dominant Hand: R   Dynamometer    - Gross Grasp   38 lbs 55 lbs    Pinch Meter     - Pincer   4 lbs 4 lbs     - Tripod   5 lbs 4 lbs     - Lateral   7 lbs  7 lbs          Thumb AROM              RUE   5/12 LUE   5/12 Comments    Impaired Intact    1st Digit MCP  Normal: 0-60 65 65    1st Digit IP:  Normal: 0-80 40 35    Opposition  WFL WFL    Thumb CMC Palmar Abduction (hand neutral on table)   Normal: 0-45 80 80    Thumb CMC Radial Abduction (palm flat on table)  Normal: 0-70 80 90          AROM UE              RUE   5/12 LUE   5/12     Impaired Intact    Shoulder FF  Normal: 0-180 WFL  WFL  Pain on R   Shoulder Ext  Normal: 50-60  WFL WFL     Shoulder Abd  Normal: 0-180 WFL  WFL     Internal Rotation (Supination)  Normal: 0-70 WFL WFL    Elbow Flex  Normal: 0-150 WFL  WFL     Elbow Ext  Normal: 0 WFL  WFL     Pronation  Normal: 0-90 WFL  WFL      Supination  Normal: 0-90 WFL  WFL     Wrist Flex   Normal: 0-80  58  60     Wrist Ext  Normal: 0-70  65  65     Ulnar Deviation  Normal: 0-30 25 20 Pain on R   Radial Deviation  Normal: 0-20 15 15 Pain on R   Digit Flex WFL  WFL     Digit Ex WFL  WFL           SENSATION       RUE 5/12 LUE 5/12    Monofilament Testing  Normal: 2.83 mm    Diminished light touch: 3.61 mm    Diminished protective sensation: 4.31 mm    Loss of protective sensation: 4.56    Complete loss of sensation / deep pressure: 6.65 Thumb: 2.83  Index: 3.61  Middle: 2.83  Rin.83  Pinky: 2.83 Thumb:  Index:  Middle:  Ring:  Pinky:  Dysesthesia        MANUAL MUSCLE TESTING:   “MMT is a standardized set of assessments that measure muscle strength and function.”  GRADING:   *0/5, no movement  *1/5, no visible movement, palpable or observable flicker (TRACE)  *2-, partial ROM in gravity eliminated plane   *2, full ROM in gravity eliminated plane (POOR)  *2+, moving less than longterm against gravity   *3-, moving more than longterm, but not full against gravity   *3, full ROM against gravity, but cannot withstand any pressure or weight so no strength (FAIR)  *3+, full ROM and can withstand slight pressure   *4-, full ROM and can withstand slight to moderate pressure   *4, hold test position against moderate resistance in gravity eliminated plane (GOOD)  *4+, full ROM against moderate to strong pressure   *5, muscle working at normal limits withstanding strong pressure~ full ROM and strength (NORMAL)     MUSCLE BEING TESTED  RUE  /12 LUE  5/12 COMMENTS    Shoulder FF 4-/5  Pain on R   Shoulder Ext 4/5     Shoulder Abduction 4+/5     Shoulder Adduction 4+/5     Elbow Flex 4+/5     Elbow Ext 4+/5     Supination 4+/5 4+/5    Pronation 4+/5 4+/5    Wrist flexion 4/5 4/5 Pain   Wrist extension 4+/5 4+/5        GOALS    STGs:    Pt will experience a 50% decrease in numbness and tingling/pain of RUE during work related tasks and IADLs.     Pt will increase  RUE shoulder ROM strength in all planes by (+/-1) grade on MMT to perform IADLs and work related tasks     Pt will increase RUE elbow ROM strength in all planes by (+/-1) grade on MMT to perform IADLs and work related tasks     Pt will increase RUE wrist ROM strength in all planes by (+/-1) grade on MMT to perform IADLs and work related tasks     LTGs:    Pt will be able to perform all work relates tasks with minimal increase in symptoms for R arm involving elbow and wrist ROM in all planes.     Pt will be consistent with HEP demonstrated by teach back method for increased ROM and strengthening of bilateral UE for functional activities and life roles     Pt will have increased light touch sensitivity in RUE moving up +1 grade on [employer] for increased participation in meaningful activities     Pt will increase RUE  strength by 10lbs for increased functional grasp strength for ADLs/IADLs    Pt will increase RUE two point, tripod, and lateral pinch strength by 1-2lbs for increased functional grasp strength for ADLs/IADLs        INTERVENTIONS:     Cervical exercises/stretches   Cervical mobilizations   Activity modification/compensation techniques   Nerve glides (ulnar)  HEP  Exercises to target DeQuervains  Modalities for Arthritis   Light Strengthening of intrinsics/wrist  Kinesiotaping  Manual Massage  Motor coordination training  Cupping  Therapeutic activities  Ultrasound  Paraffin bath   Cryotherapy   Moist Heat  TENS      PROTOCOL:     CUBITAL TUNNEL SYNDROME Non-Surgical Management    DESCRIPTION OF DIAGNOSIS  Cubital tunnel syndrome consists of compression of the ulnar nerve as it passes through the cubital  tunnel along the elbow. The nerve compression results in paresthesias along the ulnar nerve distribution, affecting the small finger and ulnar side of the ring. In addition, with long standing cubital tunnel syndrome, there may be residual motor weakness of the ulnar nerve innervated intrinsic  muscles. Severe, prolonged ulnar nerve compression may result in clawing of the ring and small fingers.    NON-SURGICAL MANAGEMENT - THERAPY  0 - 6 Weeks  An initial evaluation is performed. As part of the initial evaluation, include the following information:  onset of the medical condition, the frequency and duration of the symptoms, pain, functional  limitations, weakness and any daily tasks that provoke the symptoms. It is common for the symptoms to be pronounced while sleeping. A pre-fabricated and custom-fitted elbow pad (light compression) is applied along the medial posterior aspect of the elbow. The elbow pad is worn throughout the day, serving to protect the ulnar nerve from direct micro trauma or pressure as the elbow rests on hard surfaces. A custom-fabricated and custom-fitted static elbow orthosis is fitted with the elbow in 45° to 65° of elbow flexion to wear at night. The orthosis is worn to avoid full flexion of the elbow. This is particularly beneficial for those that sleep in the fetal position, with the elbows flexed. If the patient is particularly symptomatic and has painful paresthesias during the waking hours, the elbow orthosis may be worn during the day. Normal, light use of the arm is encouraged in daily activity. Nerve gliding exercises for the ulnar nerve are encouraged 1-2 times a day. 5-10 repetitions are performed gently/slowly, with long, gentle end range stretches (15 seconds). The goal of the nerve gliding exercises is to mobilize the nerve sufficiently to reduce direct, localized pressure on the ulnar nerve. [Patient Handout] Moist heat for 5 minutes prior to the nerve gliding exercises is encouraged. Patient education is important. The patient should be advised to minimize repetitive flexion and extension of the elbow, prolonged flexion of the elbow and direct pressure to the medial-posterior aspect of the elbow. Common sources of direct pressure on the elbow include: resting the  elbow on the car window or console between the front seats of the car, the arm of a chair, or when working on projects where the elbows are propped up on a hard surface. In addition, activities (vocational or avocational) that elicit high motor demands with the flexor carpi ulnaris, may serve as a causative factor for this condition and should be avoided.    6 Weeks  Typically, the patient is re-evaluated by the physician. Should the patient’s symptoms be subsiding, the non-surgical management with therapy is continued. In those cases where conservative management did not benefit the patient, consideration may be given to surgery. In most cases this would be an insitu cubital tunnel release. Patients who have experienced complete resolution of symptoms are encouraged to avoid direct forces against the medial and posterior aspect of the elbow and are encouraged to wear the elbow pad an additional 1-2 months.    CONSIDERATIONS  When fabricating the custom orthosis, fit the orthosis on the volar aspect of the arm to limit pressure on the ulnar nerve. In addition to the custom orthosis, wearing an elbow pad is recommended, to limit direct pressure on the nerve. Patients with long-standing symptoms (>= 9 months) and/or have previous trauma to the elbow are likely candidates to proceed with surgery versus non-surgical management with therapy. Patients with traumatic elbow dislocation and/or an elbow fracture, should perform nerve gliding exercises as the AROM exercises are initiated. This may offset the likelihood of the ulnar nerve becoming totally entrapped in scar, causing both pain and traction on the nerve.    RECOMMENDED READING  MARY García., AMY Ha., Antionette, S. MARLYN., Andi, S. S., Nedra, F. P., Foster, R. R., MARTA John., Kaylah, M. J. (1998). Changes in interstitial pressure and cross-sectional area of the cubital tunnel and of the ulnar nerve with flexion of the elbow: An experimental study in human  latriciaa.     DE QUERVAIN’S TENOSYNOVITIS: Non-Surgical Management    DESCRIPTION OF DIAGNOSIS  De Quervain’s tenosynovitis is an inflammatory disorder of the 1st dorsal compartment, which is  occupied by the tendons of the extensor pollicis brevis (EPB) and the abductor pollicis longus (APL).  Characteristic signs include pain, swelling, range of motion deficits of the thumb, and commonly a  positive Finkelstein’s test. A Finkelstein’s test is performed by having the patient hold their thumb flexed  with their fingers and simultaneously position their wrist in ulnar deviation. The test is positive when there  is pain present at the base of the thumb.    NON-SURGICAL MANAGEMENT - THERAPY  0 - 6-8 Weeks  An initial evaluation is performed.  A custom-fabricated wrist and thumb static orthosis is fitted with the thumb IP joint free. The orthosis is  fabricated with the wrist in 15º of extension, the thumb midway between palmar and radial abduction,  and the thumb MP joint in 10º of flexion. The orthosis is to be worn at all times, except for basic ADLs to  begin each day. [Note: When fitting the orthosis it is important that the thumb is able to oppose the  index and long finger; otherwise, the orthosis becomes a functional handicap for the patient. This may  result in the orthosis being worn on a limited basis or not at all.]  If an injection was not performed, consideration may be given to include phonophoresis or  iontophoresis. Overall, the modalities have been of limited benefit.  Light massage along the radial side of the forearm, wrist and thumb area is encouraged 2-3 times a  day to promote circulation and healing to the underlying soft tissue structures.  Patient education: Avoid holding/cradling a baby in one arm with the wrist flexed and hand  supporting the baby’s neck and head. Holding the arm in a sustained stretch of this nature may be in  and of itself the root cause of the De Quervain’s. Similarly,  avoid repetitive pinching activities or  activities pinching with the wrist in flexion or ulnar deviation for long periods of time.    3 Weeks  Begin exercises to stretch the radial side of the wrist, the APL and EPB. For example, perform gentle  passive ulnar deviation of the wrist, with the palm on the tabletop. Once this stretch is easy to perform  and not painful, perform gentle passive adduction of the thumb combined with gentle passive  extension of the wrist. Once this exercise can be easily performed, conclude with gentle passive  flexion of the CMC and MP joints of the thumb combined with ulnar deviation of the wrist. These  passive stretches may be replicated with active motion as well. [Note: Add moist heat for 5 minutes  prior to the gentle passive stretches.] Perform the passive stretches 2-3 times a day, 15 repetitions,  holding the end-range for 30 seconds.    6 - 8 Weeks  The orthosis may be gradually discontinued over a 3-4 week span of time should the symptoms be  gradually resolving.  Kinesiotaping may help provide support and prevent discomfort when the orthosis is not worn.    CONSIDERATIONS  If the symptoms have completely resolved following 6-8 weeks of immobilization, the orthosis may be  discontinued. If the symptoms have improved but are not totally resolved, continuing the orthosis an  additional 3-4 weeks is recommended. If the symptoms remain unchanged, the surgeon may  recommend surgical intervention.  Patient education: Avoid repetitive activities requiring pinching and pulling with the thumb. This  becomes particularly important when the patient discontinues the orthosis and begins to use the hand  normally once again.  A treatment regimen largely focused on an immobilization orthosis alone has been shown to have  limited success in managing de Quervain’s tenosynovitis.     ORTHO ORDERS:  evaluate and treat     SPLINT:     Manuals            Cervical Mob                                                               Neuro Re-Ed               Ulna Nerve Glides                           Ther Ex            Cervical Stretches            Cervical ROM            Elbow AROM            Wrist AROM                                                            Ther Activity            Education on HEP                                                                Modalities            Ultrasound            E-STIM w/ MH            CP            MH            Ice Massage            Cupping

## 2025-05-14 ENCOUNTER — OFFICE VISIT (OUTPATIENT)
Dept: OCCUPATIONAL THERAPY | Facility: CLINIC | Age: 70
End: 2025-05-14
Payer: MEDICARE

## 2025-05-14 DIAGNOSIS — M79.641 PAIN IN BOTH HANDS: Primary | ICD-10-CM

## 2025-05-14 DIAGNOSIS — M79.642 PAIN IN BOTH HANDS: Primary | ICD-10-CM

## 2025-05-14 DIAGNOSIS — M25.531 RIGHT WRIST PAIN: ICD-10-CM

## 2025-05-14 DIAGNOSIS — M79.645 PAIN OF LEFT THUMB: ICD-10-CM

## 2025-05-14 PROCEDURE — 97140 MANUAL THERAPY 1/> REGIONS: CPT

## 2025-05-14 PROCEDURE — 97110 THERAPEUTIC EXERCISES: CPT

## 2025-05-14 NOTE — PROGRESS NOTES
Daily Note     Today's date: 2025  Patient name: Sri Mcgrath  : 1955  MRN: [id number]  Referring provider: Sea Hadley DO  Dx:   Encounter Diagnosis     ICD-10-CM    1. Pain in both hands  M79.641     M79.642       2. Pain of left thumb  M79.645       3. Right wrist pain  M25.531                    Visit/Unit Tracking  AUTH Status: Not Required Date              Visits  Authed: BOMN Used 1 (IE) 2             FOTO COUNT  compelted                PLAN OF CARE START: 25  PLAN OF CARE END: 2025  PROGRESS NOTE DUE/FOTO DUE: Needs to be scheduled in    FREQUENCY: 2x week for 6 weeks   PRECAUTIONS hypertension, hyperlipidemia, stage 3 chronic kidney disease, primary osteoarthritis of multiple joints, R wrist broken years ago, tremor   DIAGNOSIS: R ulnar nerve compression possible, DeQuervain's possible, bilateral arthritis   INSURANCE: Medicare/Medicaid     TEST FMC/DEXTERITY Next RE   Subjective: It's really hurting today.      Objective: See treatment diary below      Assessment: Tolerated treatment well. Patient demonstrated fatigue post treatment, exhibited good technique with therapeutic exercises, and would benefit from continued OT. Pain pre 9, post tx 3. Cervical tightness noted pre tx, decrease tightness noted post IASTM and stretches. Trialed KT tape for R elbow and L thumb. Pt verbalized feeling better post tx. Encouraged pt todo cervical stretches and ulnar nerve glides at home.       Plan: Continue per plan of care.  Progress treatment as tolerated.       NTERVENTIONS:     Cervical exercises/stretches   Cervical mobilizations   Activity modification/compensation techniques   Nerve glides (ulnar)  HEP  Exercises to target DeQuervains  Modalities for Arthritis   Light Strengthening of intrinsics/wrist  Kinesiotaping  Manual Massage  Motor coordination training  Cupping  Therapeutic activities  Ultrasound  Paraffin bath   Cryotherapy   Moist Heat  TENS      PROTOCOL:      CUBITAL TUNNEL SYNDROME Non-Surgical Management    DESCRIPTION OF DIAGNOSIS  Cubital tunnel syndrome consists of compression of the ulnar nerve as it passes through the cubital  tunnel along the elbow. The nerve compression results in paresthesias along the ulnar nerve distribution, affecting the small finger and ulnar side of the ring. In addition, with long standing cubital tunnel syndrome, there may be residual motor weakness of the ulnar nerve innervated intrinsic muscles. Severe, prolonged ulnar nerve compression may result in clawing of the ring and small fingers.    NON-SURGICAL MANAGEMENT - THERAPY  0 - 6 Weeks  An initial evaluation is performed. As part of the initial evaluation, include the following information:  onset of the medical condition, the frequency and duration of the symptoms, pain, functional  limitations, weakness and any daily tasks that provoke the symptoms. It is common for the symptoms to be pronounced while sleeping. A pre-fabricated and custom-fitted elbow pad (light compression) is applied along the medial posterior aspect of the elbow. The elbow pad is worn throughout the day, serving to protect the ulnar nerve from direct micro trauma or pressure as the elbow rests on hard surfaces. A custom-fabricated and custom-fitted static elbow orthosis is fitted with the elbow in 45° to 65° of elbow flexion to wear at night. The orthosis is worn to avoid full flexion of the elbow. This is particularly beneficial for those that sleep in the fetal position, with the elbows flexed. If the patient is particularly symptomatic and has painful paresthesias during the waking hours, the elbow orthosis may be worn during the day. Normal, light use of the arm is encouraged in daily activity. Nerve gliding exercises for the ulnar nerve are encouraged 1-2 times a day. 5-10 repetitions are performed gently/slowly, with long, gentle end range stretches (15 seconds). The goal of the nerve gliding  exercises is to mobilize the nerve sufficiently to reduce direct, localized pressure on the ulnar nerve. [Patient Handout] Moist heat for 5 minutes prior to the nerve gliding exercises is encouraged. Patient education is important. The patient should be advised to minimize repetitive flexion and extension of the elbow, prolonged flexion of the elbow and direct pressure to the medial-posterior aspect of the elbow. Common sources of direct pressure on the elbow include: resting the elbow on the car window or console between the front seats of the car, the arm of a chair, or when working on projects where the elbows are propped up on a hard surface. In addition, activities (vocational or avocational) that elicit high motor demands with the flexor carpi ulnaris, may serve as a causative factor for this condition and should be avoided.    6 Weeks  Typically, the patient is re-evaluated by the physician. Should the patient’s symptoms be subsiding, the non-surgical management with therapy is continued. In those cases where conservative management did not benefit the patient, consideration may be given to surgery. In most cases this would be an insitu cubital tunnel release. Patients who have experienced complete resolution of symptoms are encouraged to avoid direct forces against the medial and posterior aspect of the elbow and are encouraged to wear the elbow pad an additional 1-2 months.    CONSIDERATIONS  When fabricating the custom orthosis, fit the orthosis on the volar aspect of the arm to limit pressure on the ulnar nerve. In addition to the custom orthosis, wearing an elbow pad is recommended, to limit direct pressure on the nerve. Patients with long-standing symptoms (>= 9 months) and/or have previous trauma to the elbow are likely candidates to proceed with surgery versus non-surgical management with therapy. Patients with traumatic elbow dislocation and/or an elbow fracture, should perform nerve gliding  exercises as the AROM exercises are initiated. This may offset the likelihood of the ulnar nerve becoming totally entrapped in scar, causing both pain and traction on the nerve.    RECOMMENDED READING  MARY García., MATTIE Ha, KAM Adan., Andi S. S., ISIDORO Sneed., MARY Hutchison., CRIS John, Kaylah, ANN MARIE GRAFF. (1998). Changes in interstitial pressure and cross-sectional area of the cubital tunnel and of the ulnar nerve with flexion of the elbow: An experimental study in human cadavera.     DE QUERVAIN’S TENOSYNOVITIS: Non-Surgical Management    DESCRIPTION OF DIAGNOSIS  De Quervain’s tenosynovitis is an inflammatory disorder of the 1st dorsal compartment, which is  occupied by the tendons of the extensor pollicis brevis (EPB) and the abductor pollicis longus (APL).  Characteristic signs include pain, swelling, range of motion deficits of the thumb, and commonly a  positive Finkelstein’s test. A Finkelstein’s test is performed by having the patient hold their thumb flexed  with their fingers and simultaneously position their wrist in ulnar deviation. The test is positive when there  is pain present at the base of the thumb.    NON-SURGICAL MANAGEMENT - THERAPY  0 - 6-8 Weeks  An initial evaluation is performed.  A custom-fabricated wrist and thumb static orthosis is fitted with the thumb IP joint free. The orthosis is  fabricated with the wrist in 15º of extension, the thumb midway between palmar and radial abduction,  and the thumb MP joint in 10º of flexion. The orthosis is to be worn at all times, except for basic ADLs to  begin each day. [Note: When fitting the orthosis it is important that the thumb is able to oppose the  index and long finger; otherwise, the orthosis becomes a functional handicap for the patient. This may  result in the orthosis being worn on a limited basis or not at all.]  If an injection was not performed, consideration may be given to include phonophoresis or  iontophoresis.  Overall, the modalities have been of limited benefit.  Light massage along the radial side of the forearm, wrist and thumb area is encouraged 2-3 times a  day to promote circulation and healing to the underlying soft tissue structures.  Patient education: Avoid holding/cradling a baby in one arm with the wrist flexed and hand  supporting the baby’s neck and head. Holding the arm in a sustained stretch of this nature may be in  and of itself the root cause of the De Quervain’s. Similarly, avoid repetitive pinching activities or  activities pinching with the wrist in flexion or ulnar deviation for long periods of time.    3 Weeks  Begin exercises to stretch the radial side of the wrist, the APL and EPB. For example, perform gentle  passive ulnar deviation of the wrist, with the palm on the tabletop. Once this stretch is easy to perform  and not painful, perform gentle passive adduction of the thumb combined with gentle passive  extension of the wrist. Once this exercise can be easily performed, conclude with gentle passive  flexion of the CMC and MP joints of the thumb combined with ulnar deviation of the wrist. These  passive stretches may be replicated with active motion as well. [Note: Add moist heat for 5 minutes  prior to the gentle passive stretches.] Perform the passive stretches 2-3 times a day, 15 repetitions,  holding the end-range for 30 seconds.    6 - 8 Weeks  The orthosis may be gradually discontinued over a 3-4 week span of time should the symptoms be  gradually resolving.  Kinesiotaping may help provide support and prevent discomfort when the orthosis is not worn.    CONSIDERATIONS  If the symptoms have completely resolved following 6-8 weeks of immobilization, the orthosis may be  discontinued. If the symptoms have improved but are not totally resolved, continuing the orthosis an  additional 3-4 weeks is recommended. If the symptoms remain unchanged, the surgeon may  recommend surgical  "intervention.  Patient education: Avoid repetitive activities requiring pinching and pulling with the thumb. This  becomes particularly important when the patient discontinues the orthosis and begins to use the hand  normally once again.  A treatment regimen largely focused on an immobilization orthosis alone has been shown to have  limited success in managing de Quervain’s tenosynovitis.     ORTHO ORDERS:  evaluate and treat     SPLINT:     Manuals 5/14           Cervical Mob                        Manual massage B/l hands and forearm, traps           IASTM Lthumb, traps, b/l forearms and hand            KT tape  R elbow, L thumb           Neuro Re-Ed   5/14            Ulna Nerve Glides  \"OK\"   X 5                         Ther Ex 5/14           Cervical Stretches            Cervical ROM 20 sec x 2           Elbow AROM            Wrist AROM            Rhomboid stretch 20 sec x 2           Scalene chair stretch 20 sec x 2                                   Ther Activity 5/14           Education on HEP                                                                Modalities 5/14           Ultrasound  Thumb  elbow 3 MHz  50%  .8           E-STIM w/ MH            CP            MH            Ice Massage            Cupping              "

## 2025-05-16 ENCOUNTER — APPOINTMENT (OUTPATIENT)
Dept: OCCUPATIONAL THERAPY | Facility: CLINIC | Age: 70
End: 2025-05-16
Payer: MEDICARE

## 2025-05-16 NOTE — PROGRESS NOTES
"Daily Note     Today's date: 2025  Patient name: Sri Mcgrath  : 1955  MRN: [id number]  Referring provider: Sea Hadley DO  Dx:   Encounter Diagnosis     ICD-10-CM    1. Pain in both hands  M79.641     M79.642                    Visit/Unit Tracking  AUTH Status: Not Required Date             Visits  Authed: BOMN Used 1 (IE) 2 3            FOTO COUNT  compelted                PLAN OF CARE START: 25  PLAN OF CARE END: 2025  PROGRESS NOTE DUE/FOTO DUE: Needs to be scheduled in    FREQUENCY: 2x week for 6 weeks   PRECAUTIONS hypertension, hyperlipidemia, stage 3 chronic kidney disease, primary osteoarthritis of multiple joints, R wrist broken years ago, tremor   DIAGNOSIS: R ulnar nerve compression possible, DeQuervain's possible, bilateral arthritis   INSURANCE: Medicare/Medicaid     TEST FMC/DEXTERITY Next RE   Subjective: \"It felt good with the tape on.\"      Objective: See treatment diary below      Assessment: Tolerated treatment well. Patient demonstrated fatigue post treatment, exhibited good technique with therapeutic exercises, and would benefit from continued OT. Pain pre 9, post tx 0. Cervical tightness noted pre tx, decrease tightness noted post IASTM and stretches. Pt verbalized KT tape helped.Tolerated additional light strengthening well. Pt verbalized feeling better post tx. Encouraged pt todo cervical stretches and ulnar nerve glides at home.       Plan: Continue per plan of care.  Progress treatment as tolerated.       NTERVENTIONS:     Cervical exercises/stretches   Cervical mobilizations   Activity modification/compensation techniques   Nerve glides (ulnar)  HEP  Exercises to target DeQuervains  Modalities for Arthritis   Light Strengthening of intrinsics/wrist  Kinesiotaping  Manual Massage  Motor coordination training  Cupping  Therapeutic activities  Ultrasound  Paraffin bath   Cryotherapy   Moist Heat  TENS      PROTOCOL:     CUBITAL TUNNEL SYNDROME " Non-Surgical Management    DESCRIPTION OF DIAGNOSIS  Cubital tunnel syndrome consists of compression of the ulnar nerve as it passes through the cubital  tunnel along the elbow. The nerve compression results in paresthesias along the ulnar nerve distribution, affecting the small finger and ulnar side of the ring. In addition, with long standing cubital tunnel syndrome, there may be residual motor weakness of the ulnar nerve innervated intrinsic muscles. Severe, prolonged ulnar nerve compression may result in clawing of the ring and small fingers.    NON-SURGICAL MANAGEMENT - THERAPY  0 - 6 Weeks  An initial evaluation is performed. As part of the initial evaluation, include the following information:  onset of the medical condition, the frequency and duration of the symptoms, pain, functional  limitations, weakness and any daily tasks that provoke the symptoms. It is common for the symptoms to be pronounced while sleeping. A pre-fabricated and custom-fitted elbow pad (light compression) is applied along the medial posterior aspect of the elbow. The elbow pad is worn throughout the day, serving to protect the ulnar nerve from direct micro trauma or pressure as the elbow rests on hard surfaces. A custom-fabricated and custom-fitted static elbow orthosis is fitted with the elbow in 45° to 65° of elbow flexion to wear at night. The orthosis is worn to avoid full flexion of the elbow. This is particularly beneficial for those that sleep in the fetal position, with the elbows flexed. If the patient is particularly symptomatic and has painful paresthesias during the waking hours, the elbow orthosis may be worn during the day. Normal, light use of the arm is encouraged in daily activity. Nerve gliding exercises for the ulnar nerve are encouraged 1-2 times a day. 5-10 repetitions are performed gently/slowly, with long, gentle end range stretches (15 seconds). The goal of the nerve gliding exercises is to mobilize the nerve  sufficiently to reduce direct, localized pressure on the ulnar nerve. [Patient Handout] Moist heat for 5 minutes prior to the nerve gliding exercises is encouraged. Patient education is important. The patient should be advised to minimize repetitive flexion and extension of the elbow, prolonged flexion of the elbow and direct pressure to the medial-posterior aspect of the elbow. Common sources of direct pressure on the elbow include: resting the elbow on the car window or console between the front seats of the car, the arm of a chair, or when working on projects where the elbows are propped up on a hard surface. In addition, activities (vocational or avocational) that elicit high motor demands with the flexor carpi ulnaris, may serve as a causative factor for this condition and should be avoided.    6 Weeks  Typically, the patient is re-evaluated by the physician. Should the patient’s symptoms be subsiding, the non-surgical management with therapy is continued. In those cases where conservative management did not benefit the patient, consideration may be given to surgery. In most cases this would be an insitu cubital tunnel release. Patients who have experienced complete resolution of symptoms are encouraged to avoid direct forces against the medial and posterior aspect of the elbow and are encouraged to wear the elbow pad an additional 1-2 months.    CONSIDERATIONS  When fabricating the custom orthosis, fit the orthosis on the volar aspect of the arm to limit pressure on the ulnar nerve. In addition to the custom orthosis, wearing an elbow pad is recommended, to limit direct pressure on the nerve. Patients with long-standing symptoms (>= 9 months) and/or have previous trauma to the elbow are likely candidates to proceed with surgery versus non-surgical management with therapy. Patients with traumatic elbow dislocation and/or an elbow fracture, should perform nerve gliding exercises as the AROM exercises are  initiated. This may offset the likelihood of the ulnar nerve becoming totally entrapped in scar, causing both pain and traction on the nerve.    RECOMMENDED READING  MARY García., MATTIE Ha, KAM Adan., Andi S. S., ISIDORO Sneed., MARY Hutchison., MARTA John., Kaylah, ANN MARIE GRAFF. (1998). Changes in interstitial pressure and cross-sectional area of the cubital tunnel and of the ulnar nerve with flexion of the elbow: An experimental study in human cadavera.     DE QUERVAIN’S TENOSYNOVITIS: Non-Surgical Management    DESCRIPTION OF DIAGNOSIS  De Quervain’s tenosynovitis is an inflammatory disorder of the 1st dorsal compartment, which is  occupied by the tendons of the extensor pollicis brevis (EPB) and the abductor pollicis longus (APL).  Characteristic signs include pain, swelling, range of motion deficits of the thumb, and commonly a  positive Finkelstein’s test. A Finkelstein’s test is performed by having the patient hold their thumb flexed  with their fingers and simultaneously position their wrist in ulnar deviation. The test is positive when there  is pain present at the base of the thumb.    NON-SURGICAL MANAGEMENT - THERAPY  0 - 6-8 Weeks  An initial evaluation is performed.  A custom-fabricated wrist and thumb static orthosis is fitted with the thumb IP joint free. The orthosis is  fabricated with the wrist in 15º of extension, the thumb midway between palmar and radial abduction,  and the thumb MP joint in 10º of flexion. The orthosis is to be worn at all times, except for basic ADLs to  begin each day. [Note: When fitting the orthosis it is important that the thumb is able to oppose the  index and long finger; otherwise, the orthosis becomes a functional handicap for the patient. This may  result in the orthosis being worn on a limited basis or not at all.]  If an injection was not performed, consideration may be given to include phonophoresis or  iontophoresis. Overall, the modalities have been of  limited benefit.  Light massage along the radial side of the forearm, wrist and thumb area is encouraged 2-3 times a  day to promote circulation and healing to the underlying soft tissue structures.  Patient education: Avoid holding/cradling a baby in one arm with the wrist flexed and hand  supporting the baby’s neck and head. Holding the arm in a sustained stretch of this nature may be in  and of itself the root cause of the De Quervain’s. Similarly, avoid repetitive pinching activities or  activities pinching with the wrist in flexion or ulnar deviation for long periods of time.    3 Weeks  Begin exercises to stretch the radial side of the wrist, the APL and EPB. For example, perform gentle  passive ulnar deviation of the wrist, with the palm on the tabletop. Once this stretch is easy to perform  and not painful, perform gentle passive adduction of the thumb combined with gentle passive  extension of the wrist. Once this exercise can be easily performed, conclude with gentle passive  flexion of the CMC and MP joints of the thumb combined with ulnar deviation of the wrist. These  passive stretches may be replicated with active motion as well. [Note: Add moist heat for 5 minutes  prior to the gentle passive stretches.] Perform the passive stretches 2-3 times a day, 15 repetitions,  holding the end-range for 30 seconds.    6 - 8 Weeks  The orthosis may be gradually discontinued over a 3-4 week span of time should the symptoms be  gradually resolving.  Kinesiotaping may help provide support and prevent discomfort when the orthosis is not worn.    CONSIDERATIONS  If the symptoms have completely resolved following 6-8 weeks of immobilization, the orthosis may be  discontinued. If the symptoms have improved but are not totally resolved, continuing the orthosis an  additional 3-4 weeks is recommended. If the symptoms remain unchanged, the surgeon may  recommend surgical intervention.  Patient education: Avoid repetitive  "activities requiring pinching and pulling with the thumb. This  becomes particularly important when the patient discontinues the orthosis and begins to use the hand  normally once again.  A treatment regimen largely focused on an immobilization orthosis alone has been shown to have  limited success in managing de Quervain’s tenosynovitis.     ORTHO ORDERS:  evaluate and treat     SPLINT:     Manuals 5/14 5/19         Cervical Mob                        Manual massage B/l hands and forearm, traps  B/L hands, forearm and traps         IASTM Lthumb, traps, b/l forearms and hand  L thumb, forearm and traps          KT tape  R elbow, L thumb           Neuro Re-Ed   5/14 5/19          Ulna Nerve Glides  \"OK\"   X 5  x 10                       Ther Ex 5/14 5/19         Cervical Stretches   lateral w/towel  20 sec x 3         Cervical ROM 20 sec x 2  20 sec x 3         Elbow AROM            digiflex   Red x 20         Rhomboid stretch 20 sec x 2           Scalene chair stretch 20 sec x 2  20 sec x 3         Finger abd   R yell RB x 20  L red RB x 20         Flex Bar  Twist  Bends  oscillations   Thin yellow  X 10  X 10  30 sec         Intrinsic strengthening  Purple splint  1/2 b/l      Ther Activity 5/14 5/19         Education on HEP                                                                Modalities 5/14 5/19         Ultrasound  Thumb  elbow 3 MHz  50%  .8  3 MHz  50%  .8         E-STIM w/ MH            CP            MH            Ice Massage            Cupping              "

## 2025-05-19 ENCOUNTER — OFFICE VISIT (OUTPATIENT)
Dept: OCCUPATIONAL THERAPY | Facility: CLINIC | Age: 70
End: 2025-05-19
Payer: MEDICARE

## 2025-05-19 DIAGNOSIS — M79.641 PAIN IN BOTH HANDS: Primary | ICD-10-CM

## 2025-05-19 DIAGNOSIS — M79.645 PAIN OF LEFT THUMB: ICD-10-CM

## 2025-05-19 DIAGNOSIS — M79.642 PAIN IN BOTH HANDS: Primary | ICD-10-CM

## 2025-05-19 PROCEDURE — 97110 THERAPEUTIC EXERCISES: CPT

## 2025-05-19 PROCEDURE — 97140 MANUAL THERAPY 1/> REGIONS: CPT

## 2025-05-19 PROCEDURE — 97035 APP MDLTY 1+ULTRASOUND EA 15: CPT

## 2025-05-21 ENCOUNTER — OFFICE VISIT (OUTPATIENT)
Dept: OCCUPATIONAL THERAPY | Facility: CLINIC | Age: 70
End: 2025-05-21
Payer: MEDICARE

## 2025-05-21 DIAGNOSIS — M79.642 PAIN IN BOTH HANDS: Primary | ICD-10-CM

## 2025-05-21 DIAGNOSIS — M25.531 RIGHT WRIST PAIN: ICD-10-CM

## 2025-05-21 DIAGNOSIS — M79.645 PAIN OF LEFT THUMB: ICD-10-CM

## 2025-05-21 DIAGNOSIS — M79.641 PAIN IN BOTH HANDS: Primary | ICD-10-CM

## 2025-05-21 PROCEDURE — 97110 THERAPEUTIC EXERCISES: CPT

## 2025-05-21 PROCEDURE — 97140 MANUAL THERAPY 1/> REGIONS: CPT

## 2025-05-21 NOTE — PROGRESS NOTES
"Daily Note     Today's date: 2025  Patient name: Sri Mcgrath  : 1955  MRN: [id number]  Referring provider: Sea Hadley DO  Dx:   Encounter Diagnosis     ICD-10-CM    1. Pain in both hands  M79.641     M79.642       2. Pain of left thumb  M79.645       3. Right wrist pain  M25.531                    Visit/Unit Tracking  AUTH Status: Not Required Date            Visits  Authed: BOMN Used 1 (IE) 2 3 4           FOTO COUNT  compelted                PLAN OF CARE START: 25  PLAN OF CARE END: 2025  PROGRESS NOTE DUE/FOTO DUE: Needs to be scheduled in    FREQUENCY: 2x week for 6 weeks   PRECAUTIONS hypertension, hyperlipidemia, stage 3 chronic kidney disease, primary osteoarthritis of multiple joints, R wrist broken years ago, tremor   DIAGNOSIS: R ulnar nerve compression possible, DeQuervain's possible, bilateral arthritis   INSURANCE: Medicare/Medicaid     TEST FMC/DEXTERITY Next RE   Subjective: \"I feel pretty good today.\"      Objective: See treatment diary below      Assessment: Tolerated treatment well. Patient demonstrated fatigue post treatment, exhibited good technique with therapeutic exercises, and would benefit from continued OT. Pain pre 4, post tx 0. Cervical tightness noted pre tx, decrease tightness noted post IASTM and stretches. Tolerated additional light strengthening well. Pt verbalized feeling better post tx. Encouraged pt to do cervical stretches and ulnar nerve glides at home.       Plan: Continue per plan of care.  Progress treatment as tolerated.       NTERVENTIONS:     Cervical exercises/stretches   Cervical mobilizations   Activity modification/compensation techniques   Nerve glides (ulnar)  HEP  Exercises to target DeQuervains  Modalities for Arthritis   Light Strengthening of intrinsics/wrist  Kinesiotaping  Manual Massage  Motor coordination training  Cupping  Therapeutic activities  Ultrasound  Paraffin bath   Cryotherapy   Moist " Heat  TENS      PROTOCOL:     CUBITAL TUNNEL SYNDROME Non-Surgical Management    DESCRIPTION OF DIAGNOSIS  Cubital tunnel syndrome consists of compression of the ulnar nerve as it passes through the cubital  tunnel along the elbow. The nerve compression results in paresthesias along the ulnar nerve distribution, affecting the small finger and ulnar side of the ring. In addition, with long standing cubital tunnel syndrome, there may be residual motor weakness of the ulnar nerve innervated intrinsic muscles. Severe, prolonged ulnar nerve compression may result in clawing of the ring and small fingers.    NON-SURGICAL MANAGEMENT - THERAPY  0 - 6 Weeks  An initial evaluation is performed. As part of the initial evaluation, include the following information:  onset of the medical condition, the frequency and duration of the symptoms, pain, functional  limitations, weakness and any daily tasks that provoke the symptoms. It is common for the symptoms to be pronounced while sleeping. A pre-fabricated and custom-fitted elbow pad (light compression) is applied along the medial posterior aspect of the elbow. The elbow pad is worn throughout the day, serving to protect the ulnar nerve from direct micro trauma or pressure as the elbow rests on hard surfaces. A custom-fabricated and custom-fitted static elbow orthosis is fitted with the elbow in 45° to 65° of elbow flexion to wear at night. The orthosis is worn to avoid full flexion of the elbow. This is particularly beneficial for those that sleep in the fetal position, with the elbows flexed. If the patient is particularly symptomatic and has painful paresthesias during the waking hours, the elbow orthosis may be worn during the day. Normal, light use of the arm is encouraged in daily activity. Nerve gliding exercises for the ulnar nerve are encouraged 1-2 times a day. 5-10 repetitions are performed gently/slowly, with long, gentle end range stretches (15 seconds). The goal of  the nerve gliding exercises is to mobilize the nerve sufficiently to reduce direct, localized pressure on the ulnar nerve. [Patient Handout] Moist heat for 5 minutes prior to the nerve gliding exercises is encouraged. Patient education is important. The patient should be advised to minimize repetitive flexion and extension of the elbow, prolonged flexion of the elbow and direct pressure to the medial-posterior aspect of the elbow. Common sources of direct pressure on the elbow include: resting the elbow on the car window or console between the front seats of the car, the arm of a chair, or when working on projects where the elbows are propped up on a hard surface. In addition, activities (vocational or avocational) that elicit high motor demands with the flexor carpi ulnaris, may serve as a causative factor for this condition and should be avoided.    6 Weeks  Typically, the patient is re-evaluated by the physician. Should the patient’s symptoms be subsiding, the non-surgical management with therapy is continued. In those cases where conservative management did not benefit the patient, consideration may be given to surgery. In most cases this would be an insitu cubital tunnel release. Patients who have experienced complete resolution of symptoms are encouraged to avoid direct forces against the medial and posterior aspect of the elbow and are encouraged to wear the elbow pad an additional 1-2 months.    CONSIDERATIONS  When fabricating the custom orthosis, fit the orthosis on the volar aspect of the arm to limit pressure on the ulnar nerve. In addition to the custom orthosis, wearing an elbow pad is recommended, to limit direct pressure on the nerve. Patients with long-standing symptoms (>= 9 months) and/or have previous trauma to the elbow are likely candidates to proceed with surgery versus non-surgical management with therapy. Patients with traumatic elbow dislocation and/or an elbow fracture, should perform  nerve gliding exercises as the AROM exercises are initiated. This may offset the likelihood of the ulnar nerve becoming totally entrapped in scar, causing both pain and traction on the nerve.    RECOMMENDED READING  MARY García., MATTIE Ha, KAM Adan., Andi SNreeyda S., IISDORO Sneed., MARY Hutchison., MARTA John., ANN MARIE Adrian. (1998). Changes in interstitial pressure and cross-sectional area of the cubital tunnel and of the ulnar nerve with flexion of the elbow: An experimental study in human cadavera.     DE QUERVAIN’S TENOSYNOVITIS: Non-Surgical Management    DESCRIPTION OF DIAGNOSIS  De Quervain’s tenosynovitis is an inflammatory disorder of the 1st dorsal compartment, which is  occupied by the tendons of the extensor pollicis brevis (EPB) and the abductor pollicis longus (APL).  Characteristic signs include pain, swelling, range of motion deficits of the thumb, and commonly a  positive Finkelstein’s test. A Finkelstein’s test is performed by having the patient hold their thumb flexed  with their fingers and simultaneously position their wrist in ulnar deviation. The test is positive when there  is pain present at the base of the thumb.    NON-SURGICAL MANAGEMENT - THERAPY  0 - 6-8 Weeks  An initial evaluation is performed.  A custom-fabricated wrist and thumb static orthosis is fitted with the thumb IP joint free. The orthosis is  fabricated with the wrist in 15º of extension, the thumb midway between palmar and radial abduction,  and the thumb MP joint in 10º of flexion. The orthosis is to be worn at all times, except for basic ADLs to  begin each day. [Note: When fitting the orthosis it is important that the thumb is able to oppose the  index and long finger; otherwise, the orthosis becomes a functional handicap for the patient. This may  result in the orthosis being worn on a limited basis or not at all.]  If an injection was not performed, consideration may be given to include phonophoresis  or  iontophoresis. Overall, the modalities have been of limited benefit.  Light massage along the radial side of the forearm, wrist and thumb area is encouraged 2-3 times a  day to promote circulation and healing to the underlying soft tissue structures.  Patient education: Avoid holding/cradling a baby in one arm with the wrist flexed and hand  supporting the baby’s neck and head. Holding the arm in a sustained stretch of this nature may be in  and of itself the root cause of the De Quervain’s. Similarly, avoid repetitive pinching activities or  activities pinching with the wrist in flexion or ulnar deviation for long periods of time.    3 Weeks  Begin exercises to stretch the radial side of the wrist, the APL and EPB. For example, perform gentle  passive ulnar deviation of the wrist, with the palm on the tabletop. Once this stretch is easy to perform  and not painful, perform gentle passive adduction of the thumb combined with gentle passive  extension of the wrist. Once this exercise can be easily performed, conclude with gentle passive  flexion of the CMC and MP joints of the thumb combined with ulnar deviation of the wrist. These  passive stretches may be replicated with active motion as well. [Note: Add moist heat for 5 minutes  prior to the gentle passive stretches.] Perform the passive stretches 2-3 times a day, 15 repetitions,  holding the end-range for 30 seconds.    6 - 8 Weeks  The orthosis may be gradually discontinued over a 3-4 week span of time should the symptoms be  gradually resolving.  Kinesiotaping may help provide support and prevent discomfort when the orthosis is not worn.    CONSIDERATIONS  If the symptoms have completely resolved following 6-8 weeks of immobilization, the orthosis may be  discontinued. If the symptoms have improved but are not totally resolved, continuing the orthosis an  additional 3-4 weeks is recommended. If the symptoms remain unchanged, the surgeon may  recommend  "surgical intervention.  Patient education: Avoid repetitive activities requiring pinching and pulling with the thumb. This  becomes particularly important when the patient discontinues the orthosis and begins to use the hand  normally once again.  A treatment regimen largely focused on an immobilization orthosis alone has been shown to have  limited success in managing de Quervain’s tenosynovitis.     ORTHO ORDERS:  evaluate and treat     SPLINT:     Manuals 5/14 5/19 5/21       Cervical Mob                        Manual massage B/l hands and forearm, traps  B/L hands, forearm and traps  B/lL hands, forearm and traps       IASTM Lthumb, traps, b/l forearms and hand  L thumb, forearm and traps  L thumb, and traps b/l hands        KT tape  R elbow, L thumb    L thumb       Neuro Re-Ed   5/14 5/19 5/21        Ulna Nerve Glides  \"OK\"   X 5  x 10  x 10  X 5 tip                      Ther Ex 5/14 5/19 5/21       Cervical Stretches   lateral w/towel  20 sec x 3  latera w/towel  20 sec x 3       Cervical ROM 20 sec x 2  20 sec x 3  20 sec x 3       Elbow AROM            digiflex   Red x 20  Red x 20       Rhomboid stretch 20 sec x 2           Scalene chair stretch 20 sec x 2  20 sec x 3  20 sec x 3       Finger abd   R yell RB x 20  L red RB x 20   yell RB x 20         Flex Bar  Twist  Bends  oscillations   Thin yellow  X 10  X 10  30 sec  thin yellow  X 20  X 20  30 sec       Intrinsic strengthening  Purple splint  1/2 b/l      PW      Thumb  Intrinsic plus  Tension pin   Teracotta  X 20  X 20  X 10  G/BTP B/L       Ther Activity 5/14 5/19 5/21       Education on HEP                                                                Modalities 5/14 5/19 5/21       Ultrasound  Thumb  elbow 3 MHz  50%  .8  3 MHz  50%  .8         E-STIM w/ MH            CP            MH            Ice Massage            Cupping              "

## 2025-05-23 ENCOUNTER — OFFICE VISIT (OUTPATIENT)
Dept: OCCUPATIONAL THERAPY | Facility: CLINIC | Age: 70
End: 2025-05-23
Payer: MEDICARE

## 2025-05-23 DIAGNOSIS — M79.642 PAIN IN BOTH HANDS: ICD-10-CM

## 2025-05-23 DIAGNOSIS — M79.641 PAIN IN BOTH HANDS: ICD-10-CM

## 2025-05-23 DIAGNOSIS — G56.21 ENTRAPMENT OF RIGHT ULNAR NERVE: Primary | ICD-10-CM

## 2025-05-23 DIAGNOSIS — M79.645 PAIN OF LEFT THUMB: ICD-10-CM

## 2025-05-23 PROCEDURE — 97110 THERAPEUTIC EXERCISES: CPT

## 2025-05-23 NOTE — PROGRESS NOTES
"Daily Note       Visit/Unit Tracking  AUTH Status: Not Required Date           Visits  Authed: BOMN Used 1 (IE) 2 3 4 5          FOTO COUNT  compelted                PLAN OF CARE START: 25  PLAN OF CARE END: 2025  PROGRESS NOTE DUE/FOTO DUE: Needs to be scheduled in    FREQUENCY: 2x week for 6 weeks   PRECAUTIONS hypertension, hyperlipidemia, stage 3 chronic kidney disease, primary osteoarthritis of multiple joints, R wrist broken years ago, tremor   DIAGNOSIS: R ulnar nerve compression possible, DeQuervain's possible, bilateral arthritis   INSURANCE: Medicare/Medicaid     TEST FMC/DEXTERITY Next RE           Today's date: 2025  Patient name: Sri Mcgrath  : 1955  MRN: [id number]  Referring provider: Sea Hadley DO  Dx:   Encounter Diagnosis     ICD-10-CM    1. Entrapment of right ulnar nerve  G56.21       2. Pain of left thumb  M79.645       3. Pain in both hands  M79.641     M79.642                        Subjective: \"I feel pretty good today.\"       Objective: See treatment diary below      Assessment: Tolerated treatment well. Patient required visual demonstrations for therapeutic exercises to demonstrate proper form. Reported tightness in cervical region during cervical stretches/AROM. States that during work as a  her arm tends to feel heavy and was experiencing the heavy feeling throughout exercises today. Numbness and tingling during ulnar nerve glide. Provided HEP on today's date. Pt would benefit from continued OT to address decreased R  strength, shoulder stench, wrist strength, paresthesia, and pain that radiates into neck.      Plan: Continue per plan of care.  Progress treatment as tolerated.       NTERVENTIONS:     Cervical exercises/stretches   Cervical mobilizations   Activity modification/compensation techniques   Nerve glides (ulnar)  HEP  Exercises to target DeQuervains  Modalities for Arthritis   Light Strengthening of " intrinsics/wrist  Kinesiotaping  Manual Massage  Motor coordination training  Cupping  Therapeutic activities  Ultrasound  Paraffin bath   Cryotherapy   Moist Heat  TENS      PROTOCOL: CUBITAL TUNNEL SYNDROME Non-Surgical Management    DESCRIPTION OF DIAGNOSIS  Cubital tunnel syndrome consists of compression of the ulnar nerve as it passes through the cubital  tunnel along the elbow. The nerve compression results in paresthesias along the ulnar nerve distribution, affecting the small finger and ulnar side of the ring. In addition, with long standing cubital tunnel syndrome, there may be residual motor weakness of the ulnar nerve innervated intrinsic muscles. Severe, prolonged ulnar nerve compression may result in clawing of the ring and small fingers.    NON-SURGICAL MANAGEMENT - THERAPY  0 - 6 Weeks  An initial evaluation is performed. As part of the initial evaluation, include the following information:  onset of the medical condition, the frequency and duration of the symptoms, pain, functional  limitations, weakness and any daily tasks that provoke the symptoms. It is common for the symptoms to be pronounced while sleeping. A pre-fabricated and custom-fitted elbow pad (light compression) is applied along the medial posterior aspect of the elbow. The elbow pad is worn throughout the day, serving to protect the ulnar nerve from direct micro trauma or pressure as the elbow rests on hard surfaces. A custom-fabricated and custom-fitted static elbow orthosis is fitted with the elbow in 45° to 65° of elbow flexion to wear at night. The orthosis is worn to avoid full flexion of the elbow. This is particularly beneficial for those that sleep in the fetal position, with the elbows flexed. If the patient is particularly symptomatic and has painful paresthesias during the waking hours, the elbow orthosis may be worn during the day. Normal, light use of the arm is encouraged in daily activity. Nerve gliding exercises for the  ulnar nerve are encouraged 1-2 times a day. 5-10 repetitions are performed gently/slowly, with long, gentle end range stretches (15 seconds). The goal of the nerve gliding exercises is to mobilize the nerve sufficiently to reduce direct, localized pressure on the ulnar nerve. [Patient Handout] Moist heat for 5 minutes prior to the nerve gliding exercises is encouraged. Patient education is important. The patient should be advised to minimize repetitive flexion and extension of the elbow, prolonged flexion of the elbow and direct pressure to the medial-posterior aspect of the elbow. Common sources of direct pressure on the elbow include: resting the elbow on the car window or console between the front seats of the car, the arm of a chair, or when working on projects where the elbows are propped up on a hard surface. In addition, activities (vocational or avocational) that elicit high motor demands with the flexor carpi ulnaris, may serve as a causative factor for this condition and should be avoided.    6 Weeks  Typically, the patient is re-evaluated by the physician. Should the patient’s symptoms be subsiding, the non-surgical management with therapy is continued. In those cases where conservative management did not benefit the patient, consideration may be given to surgery. In most cases this would be an insitu cubital tunnel release. Patients who have experienced complete resolution of symptoms are encouraged to avoid direct forces against the medial and posterior aspect of the elbow and are encouraged to wear the elbow pad an additional 1-2 months.    CONSIDERATIONS  When fabricating the custom orthosis, fit the orthosis on the volar aspect of the arm to limit pressure on the ulnar nerve. In addition to the custom orthosis, wearing an elbow pad is recommended, to limit direct pressure on the nerve. Patients with long-standing symptoms (>= 9 months) and/or have previous trauma to the elbow are likely candidates to  proceed with surgery versus non-surgical management with therapy. Patients with traumatic elbow dislocation and/or an elbow fracture, should perform nerve gliding exercises as the AROM exercises are initiated. This may offset the likelihood of the ulnar nerve becoming totally entrapped in scar, causing both pain and traction on the nerve.    RECOMMENDED READING  MARY García., MATTIE Ha, KAM Adan., Andi S. S., ISIDORO Sneed., MARY Hutchison., CRIS John, ANN MARIE Adrian (1998). Changes in interstitial pressure and cross-sectional area of the cubital tunnel and of the ulnar nerve with flexion of the elbow: An experimental study in human cadavera.     DE QUERVAIN’S TENOSYNOVITIS: Non-Surgical Management    DESCRIPTION OF DIAGNOSIS  De Quervain’s tenosynovitis is an inflammatory disorder of the 1st dorsal compartment, which is  occupied by the tendons of the extensor pollicis brevis (EPB) and the abductor pollicis longus (APL).  Characteristic signs include pain, swelling, range of motion deficits of the thumb, and commonly a  positive Finkelstein’s test. A Finkelstein’s test is performed by having the patient hold their thumb flexed  with their fingers and simultaneously position their wrist in ulnar deviation. The test is positive when there  is pain present at the base of the thumb.    NON-SURGICAL MANAGEMENT - THERAPY  0 - 6-8 Weeks  An initial evaluation is performed.  A custom-fabricated wrist and thumb static orthosis is fitted with the thumb IP joint free. The orthosis is  fabricated with the wrist in 15º of extension, the thumb midway between palmar and radial abduction,  and the thumb MP joint in 10º of flexion. The orthosis is to be worn at all times, except for basic ADLs to  begin each day. [Note: When fitting the orthosis it is important that the thumb is able to oppose the  index and long finger; otherwise, the orthosis becomes a functional handicap for the patient. This may  result in the  orthosis being worn on a limited basis or not at all.]  If an injection was not performed, consideration may be given to include phonophoresis or  iontophoresis. Overall, the modalities have been of limited benefit.  Light massage along the radial side of the forearm, wrist and thumb area is encouraged 2-3 times a  day to promote circulation and healing to the underlying soft tissue structures.  Patient education: Avoid holding/cradling a baby in one arm with the wrist flexed and hand  supporting the baby’s neck and head. Holding the arm in a sustained stretch of this nature may be in  and of itself the root cause of the De Quervain’s. Similarly, avoid repetitive pinching activities or  activities pinching with the wrist in flexion or ulnar deviation for long periods of time.    3 Weeks  Begin exercises to stretch the radial side of the wrist, the APL and EPB. For example, perform gentle  passive ulnar deviation of the wrist, with the palm on the tabletop. Once this stretch is easy to perform  and not painful, perform gentle passive adduction of the thumb combined with gentle passive  extension of the wrist. Once this exercise can be easily performed, conclude with gentle passive  flexion of the CMC and MP joints of the thumb combined with ulnar deviation of the wrist. These  passive stretches may be replicated with active motion as well. [Note: Add moist heat for 5 minutes  prior to the gentle passive stretches.] Perform the passive stretches 2-3 times a day, 15 repetitions,  holding the end-range for 30 seconds.    6 - 8 Weeks  The orthosis may be gradually discontinued over a 3-4 week span of time should the symptoms be  gradually resolving.  Kinesiotaping may help provide support and prevent discomfort when the orthosis is not worn.    CONSIDERATIONS  If the symptoms have completely resolved following 6-8 weeks of immobilization, the orthosis may be  discontinued. If the symptoms have improved but are not  "totally resolved, continuing the orthosis an  additional 3-4 weeks is recommended. If the symptoms remain unchanged, the surgeon may  recommend surgical intervention.  Patient education: Avoid repetitive activities requiring pinching and pulling with the thumb. This  becomes particularly important when the patient discontinues the orthosis and begins to use the hand  normally once again.  A treatment regimen largely focused on an immobilization orthosis alone has been shown to have  limited success in managing de Quervain’s tenosynovitis.     ORTHO ORDERS:  evaluate and treat     SPLINT:     5/23: provided scapular retraction, ulnar nerve glides, and de quervain's exercises to perform at home   Manuals 5/14 5/19 5/21 5/23     Cervical Mob                        Manual massage B/l hands and forearm, traps  B/L hands, forearm and traps  B/lL hands, forearm and traps       IASTM Lthumb, traps, b/l forearms and hand  L thumb, forearm and traps  L thumb, and traps b/l hands        KT tape  R elbow, L thumb    L thumb  L thumb      Neuro Re-Ed   5/14 5/19 5/21 5/23      Ulna Nerve Glides  \"OK\"   X 5  x 10  x 10  X 5 tip   x10 \"OK\"  X10 tip the                    Ther Ex 5/14 5/19 5/21 5/23     Cervical Stretches   lateral w/towel  20 sec x 3  latera w/towel  20 sec x 3  lateral w/towel  20 sec x3      Cervical ROM  Lateral bend  Rotation  Lateral Rotation  Flex/Ext 20 sec x 2  20 sec x 3  20 sec x 3  x5 each     Elbow AROM            digiflex   Red x 20  Red x 20  Red x20      Rhomboid stretch 20 sec x 2      20 sec x3     Scalene chair stretch  Middle   Anterior  Posterior   20 sec x 2  20 sec x 3  20 sec x 3  20 sec each     Finger abd   R yell RB x 20  L red RB x 20   yell RB x 20    Yellow RB x20     Flex Bar  Twist  Bends  oscillations   Thin yellow  X 10  X 10  30 sec  thin yellow  X 20  X 20  30 sec  Thin yellow   X20  X20  30 sec      Intrinsic strengthening  Purple splint  1/2 b/l  Purple " splint 1/2 b/l    PW      Thumb  Intrinsic plus  Tension pin   Teracotta  X 20  X 20  X 10  G/BTP B/L   Teracotta  X 20  X 20  X 10  G/BTP B/L    Ther Activity 5/14 5/19 5/21 5/23     Education on HEP       Provided  Scap ret  Ulnar nerve floss  Dart throwers                                                         Modalities 5/14 5/19 5/21 5/23     Ultrasound  Thumb  elbow 3 MHz  50%  .8  3 MHz  50%  .8         E-STIM w/ MH            CP            MH            Ice Massage            Cupping

## 2025-05-27 ENCOUNTER — OFFICE VISIT (OUTPATIENT)
Dept: OCCUPATIONAL THERAPY | Facility: CLINIC | Age: 70
End: 2025-05-27
Payer: MEDICARE

## 2025-05-27 DIAGNOSIS — G56.21 ENTRAPMENT OF RIGHT ULNAR NERVE: ICD-10-CM

## 2025-05-27 DIAGNOSIS — M79.642 PAIN IN BOTH HANDS: ICD-10-CM

## 2025-05-27 DIAGNOSIS — M79.645 PAIN OF LEFT THUMB: Primary | ICD-10-CM

## 2025-05-27 DIAGNOSIS — M79.641 PAIN IN BOTH HANDS: ICD-10-CM

## 2025-05-27 PROCEDURE — 97110 THERAPEUTIC EXERCISES: CPT

## 2025-05-27 PROCEDURE — 97140 MANUAL THERAPY 1/> REGIONS: CPT

## 2025-05-27 NOTE — PROGRESS NOTES
Daily Note     Today's date: 2025  Patient name: Sri Mcgrath  : 1955  MRN: [id number]  Referring provider: Sea Hadley DO  Dx:   Encounter Diagnosis     ICD-10-CM    1. Pain of left thumb  M79.645       2. Pain in both hands  M79.641     M79.642       3. Entrapment of right ulnar nerve  G56.21                      Subjective: My hands are really hurting today.      Objective: See treatment diary below      Assessment: Tolerated treatment well. Patient demonstrated fatigue post treatment, exhibited good technique with therapeutic exercises, and would benefit from continued OT. Pain pre 5, post tx 3. Pt reports triggering of RF/SF right hand, increase pain with same. Increase lateral rotation of neck noted.       Plan: Continue per plan of care.  Progress treatment as tolerated.       NTERVENTIONS:     Cervical exercises/stretches   Cervical mobilizations   Activity modification/compensation techniques   Nerve glides (ulnar)  HEP  Exercises to target DeQuervains  Modalities for Arthritis   Light Strengthening of intrinsics/wrist  Kinesiotaping  Manual Massage  Motor coordination training  Cupping  Therapeutic activities  Ultrasound  Paraffin bath   Cryotherapy   Moist Heat  TENS      PROTOCOL: CUBITAL TUNNEL SYNDROME Non-Surgical Management    DESCRIPTION OF DIAGNOSIS  Cubital tunnel syndrome consists of compression of the ulnar nerve as it passes through the cubital  tunnel along the elbow. The nerve compression results in paresthesias along the ulnar nerve distribution, affecting the small finger and ulnar side of the ring. In addition, with long standing cubital tunnel syndrome, there may be residual motor weakness of the ulnar nerve innervated intrinsic muscles. Severe, prolonged ulnar nerve compression may result in clawing of the ring and small fingers.    NON-SURGICAL MANAGEMENT - THERAPY  0 - 6 Weeks  An initial evaluation is performed. As part of the initial evaluation, include the following  information:  onset of the medical condition, the frequency and duration of the symptoms, pain, functional  limitations, weakness and any daily tasks that provoke the symptoms. It is common for the symptoms to be pronounced while sleeping. A pre-fabricated and custom-fitted elbow pad (light compression) is applied along the medial posterior aspect of the elbow. The elbow pad is worn throughout the day, serving to protect the ulnar nerve from direct micro trauma or pressure as the elbow rests on hard surfaces. A custom-fabricated and custom-fitted static elbow orthosis is fitted with the elbow in 45° to 65° of elbow flexion to wear at night. The orthosis is worn to avoid full flexion of the elbow. This is particularly beneficial for those that sleep in the fetal position, with the elbows flexed. If the patient is particularly symptomatic and has painful paresthesias during the waking hours, the elbow orthosis may be worn during the day. Normal, light use of the arm is encouraged in daily activity. Nerve gliding exercises for the ulnar nerve are encouraged 1-2 times a day. 5-10 repetitions are performed gently/slowly, with long, gentle end range stretches (15 seconds). The goal of the nerve gliding exercises is to mobilize the nerve sufficiently to reduce direct, localized pressure on the ulnar nerve. [Patient Handout] Moist heat for 5 minutes prior to the nerve gliding exercises is encouraged. Patient education is important. The patient should be advised to minimize repetitive flexion and extension of the elbow, prolonged flexion of the elbow and direct pressure to the medial-posterior aspect of the elbow. Common sources of direct pressure on the elbow include: resting the elbow on the car window or console between the front seats of the car, the arm of a chair, or when working on projects where the elbows are propped up on a hard surface. In addition, activities (vocational or avocational) that elicit high motor  demands with the flexor carpi ulnaris, may serve as a causative factor for this condition and should be avoided.    6 Weeks  Typically, the patient is re-evaluated by the physician. Should the patient’s symptoms be subsiding, the non-surgical management with therapy is continued. In those cases where conservative management did not benefit the patient, consideration may be given to surgery. In most cases this would be an insitu cubital tunnel release. Patients who have experienced complete resolution of symptoms are encouraged to avoid direct forces against the medial and posterior aspect of the elbow and are encouraged to wear the elbow pad an additional 1-2 months.    CONSIDERATIONS  When fabricating the custom orthosis, fit the orthosis on the volar aspect of the arm to limit pressure on the ulnar nerve. In addition to the custom orthosis, wearing an elbow pad is recommended, to limit direct pressure on the nerve. Patients with long-standing symptoms (>= 9 months) and/or have previous trauma to the elbow are likely candidates to proceed with surgery versus non-surgical management with therapy. Patients with traumatic elbow dislocation and/or an elbow fracture, should perform nerve gliding exercises as the AROM exercises are initiated. This may offset the likelihood of the ulnar nerve becoming totally entrapped in scar, causing both pain and traction on the nerve.    RECOMMENDED READING  Ricky, RNereyda CHAUHAN., AMY Ha., Antionette, S. MARLYN., Andi, S. S., Nedra, F. P., Foster, R. R., MARTA John., Adrian, M. J. (1998). Changes in interstitial pressure and cross-sectional area of the cubital tunnel and of the ulnar nerve with flexion of the elbow: An experimental study in human cadavera.     DE QUERVAIN’S TENOSYNOVITIS: Non-Surgical Management    DESCRIPTION OF DIAGNOSIS  De Quervain’s tenosynovitis is an inflammatory disorder of the 1st dorsal compartment, which is  occupied by the tendons of the extensor pollicis  brevis (EPB) and the abductor pollicis longus (APL).  Characteristic signs include pain, swelling, range of motion deficits of the thumb, and commonly a  positive Finkelstein’s test. A Finkelstein’s test is performed by having the patient hold their thumb flexed  with their fingers and simultaneously position their wrist in ulnar deviation. The test is positive when there  is pain present at the base of the thumb.    NON-SURGICAL MANAGEMENT - THERAPY  0 - 6-8 Weeks  An initial evaluation is performed.  A custom-fabricated wrist and thumb static orthosis is fitted with the thumb IP joint free. The orthosis is  fabricated with the wrist in 15º of extension, the thumb midway between palmar and radial abduction,  and the thumb MP joint in 10º of flexion. The orthosis is to be worn at all times, except for basic ADLs to  begin each day. [Note: When fitting the orthosis it is important that the thumb is able to oppose the  index and long finger; otherwise, the orthosis becomes a functional handicap for the patient. This may  result in the orthosis being worn on a limited basis or not at all.]  If an injection was not performed, consideration may be given to include phonophoresis or  iontophoresis. Overall, the modalities have been of limited benefit.  Light massage along the radial side of the forearm, wrist and thumb area is encouraged 2-3 times a  day to promote circulation and healing to the underlying soft tissue structures.  Patient education: Avoid holding/cradling a baby in one arm with the wrist flexed and hand  supporting the baby’s neck and head. Holding the arm in a sustained stretch of this nature may be in  and of itself the root cause of the De Quervain’s. Similarly, avoid repetitive pinching activities or  activities pinching with the wrist in flexion or ulnar deviation for long periods of time.    3 Weeks  Begin exercises to stretch the radial side of the wrist, the APL and EPB. For example, perform  gentle  passive ulnar deviation of the wrist, with the palm on the tabletop. Once this stretch is easy to perform  and not painful, perform gentle passive adduction of the thumb combined with gentle passive  extension of the wrist. Once this exercise can be easily performed, conclude with gentle passive  flexion of the CMC and MP joints of the thumb combined with ulnar deviation of the wrist. These  passive stretches may be replicated with active motion as well. [Note: Add moist heat for 5 minutes  prior to the gentle passive stretches.] Perform the passive stretches 2-3 times a day, 15 repetitions,  holding the end-range for 30 seconds.    6 - 8 Weeks  The orthosis may be gradually discontinued over a 3-4 week span of time should the symptoms be  gradually resolving.  Kinesiotaping may help provide support and prevent discomfort when the orthosis is not worn.    CONSIDERATIONS  If the symptoms have completely resolved following 6-8 weeks of immobilization, the orthosis may be  discontinued. If the symptoms have improved but are not totally resolved, continuing the orthosis an  additional 3-4 weeks is recommended. If the symptoms remain unchanged, the surgeon may  recommend surgical intervention.  Patient education: Avoid repetitive activities requiring pinching and pulling with the thumb. This  becomes particularly important when the patient discontinues the orthosis and begins to use the hand  normally once again.  A treatment regimen largely focused on an immobilization orthosis alone has been shown to have  limited success in managing de Quervain’s tenosynovitis.     ORTHO ORDERS:  evaluate and treat     SPLINT:     5/23: provided scapular retraction, ulnar nerve glides, and de quervain's exercises to perform at home   Manuals 5/14 5/19 5/21 5/23 5/27/25   Cervical Mob                        Manual massage B/l hands and forearm, traps  B/L hands, forearm and traps  B/lL hands, forearm and traps    B/L hands,  "foreram and   IASTM Lthumb, traps, b/l forearms and hand  L thumb, forearm and traps  L thumb, and traps b/l hands    L thumb    KT tape  R elbow, L thumb    L thumb  L thumb   L thumb   Neuro Re-Ed   5/14 5/19 5/21 5/23 5/27/25    Ulna Nerve Glides  \"OK\"   X 5  x 10  x 10  X 5 tip   x10 \"OK\"  X10 tip the                    Ther Ex 5/14 5/19 5/21 5/23 5/27/25   Cervical Stretches   lateral w/towel  20 sec x 3  latera w/towel  20 sec x 3  lateral w/towel  20 sec x3   lateral w/towel  20 sec x 3   Cervical ROM  Lateral bend  Rotation  Lateral Rotation  Flex/Ext 20 sec x 2  20 sec x 3  20 sec x 3  x5 each     Elbow AROM            digiflex   Red x 20  Red x 20  Red x20   Red x 20   Rhomboid stretch 20 sec x 2      20 sec x3     Scalene chair stretch  Middle   Anterior  Posterior   20 sec x 2  20 sec x 3  20 sec x 3  20 sec each  20 sec x 3   Finger abd   R yell RB x 20  L red RB x 20   yell RB x 20    Yellow RB x20  Yellow RB x 20   Flex Bar  Twist  Bends  oscillations   Thin yellow  X 10  X 10  30 sec  thin yellow  X 20  X 20  30 sec  Thin yellow   X20  X20  30 sec   thin yellow  X 20  X 20  30 sec    Intrinsic strengthening  Purple splint  1/2 b/l  Purple splint 1/2 b/l    PW      Thumb  Intrinsic plus  Tension pin   Teracotta  X 20  X 20  X 10  G/BTP B/L   Teracotta  X 20  X 20  X 10  G/BTP B/L Teracotta  X 20  X 20  X 20  G/RTP B/L   Ther Activity 5/14 5/19 5/21 5/23 5/27/25   Education on HEP       Provided  Scap ret  Ulnar nerve floss  Dart throwers                                                         Modalities 5/14 5/19 5/21 5/23 5/27/25   Ultrasound  Thumb  elbow 3 MHz  50%  .8  3 MHz  50%  .8         E-STIM w/ MH            CP            MH            Ice Massage            Cupping                "

## 2025-05-30 ENCOUNTER — OFFICE VISIT (OUTPATIENT)
Dept: OCCUPATIONAL THERAPY | Facility: CLINIC | Age: 70
End: 2025-05-30
Payer: MEDICARE

## 2025-05-30 DIAGNOSIS — M25.531 RIGHT WRIST PAIN: ICD-10-CM

## 2025-05-30 DIAGNOSIS — M79.642 PAIN IN BOTH HANDS: ICD-10-CM

## 2025-05-30 DIAGNOSIS — G56.21 ENTRAPMENT OF RIGHT ULNAR NERVE: ICD-10-CM

## 2025-05-30 DIAGNOSIS — M79.645 PAIN OF LEFT THUMB: Primary | ICD-10-CM

## 2025-05-30 DIAGNOSIS — M79.641 PAIN IN BOTH HANDS: ICD-10-CM

## 2025-05-30 PROCEDURE — 97140 MANUAL THERAPY 1/> REGIONS: CPT

## 2025-05-30 PROCEDURE — 97110 THERAPEUTIC EXERCISES: CPT

## 2025-05-30 NOTE — PROGRESS NOTES
Daily Note     Today's date: 2025  Patient name: Sri Mcgrath  : 1955  MRN: [id number]  Referring provider: Sea Hadley DO  Dx:   Encounter Diagnosis     ICD-10-CM    1. Pain of left thumb  M79.645       2. Pain in both hands  M79.641     M79.642       3. Entrapment of right ulnar nerve  G56.21       4. Right wrist pain  M25.531                      Subjective: I cut up a bunch food yesterday. My hands hurt but not as bad as I thought it would.      Objective: See treatment diary below      Assessment: Tolerated treatment well. Patient demonstrated fatigue post treatment, exhibited good technique with therapeutic exercises, and would benefit from continued OT. Pain pre 2, post tx 3. Pt reports triggering of RF/SF right hand,still happens but not as often. Reports numbness has gone away.      Plan: Continue per plan of care.  Progress treatment as tolerated.       NTERVENTIONS:     Cervical exercises/stretches   Cervical mobilizations   Activity modification/compensation techniques   Nerve glides (ulnar)  HEP  Exercises to target DeQuervains  Modalities for Arthritis   Light Strengthening of intrinsics/wrist  Kinesiotaping  Manual Massage  Motor coordination training  Cupping  Therapeutic activities  Ultrasound  Paraffin bath   Cryotherapy   Moist Heat  TENS      PROTOCOL: CUBITAL TUNNEL SYNDROME Non-Surgical Management    DESCRIPTION OF DIAGNOSIS  Cubital tunnel syndrome consists of compression of the ulnar nerve as it passes through the cubital  tunnel along the elbow. The nerve compression results in paresthesias along the ulnar nerve distribution, affecting the small finger and ulnar side of the ring. In addition, with long standing cubital tunnel syndrome, there may be residual motor weakness of the ulnar nerve innervated intrinsic muscles. Severe, prolonged ulnar nerve compression may result in clawing of the ring and small fingers.    NON-SURGICAL MANAGEMENT - THERAPY  0 - 6 Weeks  An initial  evaluation is performed. As part of the initial evaluation, include the following information:  onset of the medical condition, the frequency and duration of the symptoms, pain, functional  limitations, weakness and any daily tasks that provoke the symptoms. It is common for the symptoms to be pronounced while sleeping. A pre-fabricated and custom-fitted elbow pad (light compression) is applied along the medial posterior aspect of the elbow. The elbow pad is worn throughout the day, serving to protect the ulnar nerve from direct micro trauma or pressure as the elbow rests on hard surfaces. A custom-fabricated and custom-fitted static elbow orthosis is fitted with the elbow in 45° to 65° of elbow flexion to wear at night. The orthosis is worn to avoid full flexion of the elbow. This is particularly beneficial for those that sleep in the fetal position, with the elbows flexed. If the patient is particularly symptomatic and has painful paresthesias during the waking hours, the elbow orthosis may be worn during the day. Normal, light use of the arm is encouraged in daily activity. Nerve gliding exercises for the ulnar nerve are encouraged 1-2 times a day. 5-10 repetitions are performed gently/slowly, with long, gentle end range stretches (15 seconds). The goal of the nerve gliding exercises is to mobilize the nerve sufficiently to reduce direct, localized pressure on the ulnar nerve. [Patient Handout] Moist heat for 5 minutes prior to the nerve gliding exercises is encouraged. Patient education is important. The patient should be advised to minimize repetitive flexion and extension of the elbow, prolonged flexion of the elbow and direct pressure to the medial-posterior aspect of the elbow. Common sources of direct pressure on the elbow include: resting the elbow on the car window or console between the front seats of the car, the arm of a chair, or when working on projects where the elbows are propped up on a hard  surface. In addition, activities (vocational or avocational) that elicit high motor demands with the flexor carpi ulnaris, may serve as a causative factor for this condition and should be avoided.    6 Weeks  Typically, the patient is re-evaluated by the physician. Should the patient’s symptoms be subsiding, the non-surgical management with therapy is continued. In those cases where conservative management did not benefit the patient, consideration may be given to surgery. In most cases this would be an insitu cubital tunnel release. Patients who have experienced complete resolution of symptoms are encouraged to avoid direct forces against the medial and posterior aspect of the elbow and are encouraged to wear the elbow pad an additional 1-2 months.    CONSIDERATIONS  When fabricating the custom orthosis, fit the orthosis on the volar aspect of the arm to limit pressure on the ulnar nerve. In addition to the custom orthosis, wearing an elbow pad is recommended, to limit direct pressure on the nerve. Patients with long-standing symptoms (>= 9 months) and/or have previous trauma to the elbow are likely candidates to proceed with surgery versus non-surgical management with therapy. Patients with traumatic elbow dislocation and/or an elbow fracture, should perform nerve gliding exercises as the AROM exercises are initiated. This may offset the likelihood of the ulnar nerve becoming totally entrapped in scar, causing both pain and traction on the nerve.    RECOMMENDED READING  Ricky, MARY CHAUHAN., AMY Ha., Antionette SNereyda CLINE., Andi, S. S., ISIDORO Sneed P., Foster, R. R., Dora P., Adrian, M. J. (1998). Changes in interstitial pressure and cross-sectional area of the cubital tunnel and of the ulnar nerve with flexion of the elbow: An experimental study in human cadavera.     DE QUERVAIN’S TENOSYNOVITIS: Non-Surgical Management    DESCRIPTION OF DIAGNOSIS  De Quervain’s tenosynovitis is an inflammatory disorder of the  1st dorsal compartment, which is  occupied by the tendons of the extensor pollicis brevis (EPB) and the abductor pollicis longus (APL).  Characteristic signs include pain, swelling, range of motion deficits of the thumb, and commonly a  positive Finkelstein’s test. A Finkelstein’s test is performed by having the patient hold their thumb flexed  with their fingers and simultaneously position their wrist in ulnar deviation. The test is positive when there  is pain present at the base of the thumb.    NON-SURGICAL MANAGEMENT - THERAPY  0 - 6-8 Weeks  An initial evaluation is performed.  A custom-fabricated wrist and thumb static orthosis is fitted with the thumb IP joint free. The orthosis is  fabricated with the wrist in 15º of extension, the thumb midway between palmar and radial abduction,  and the thumb MP joint in 10º of flexion. The orthosis is to be worn at all times, except for basic ADLs to  begin each day. [Note: When fitting the orthosis it is important that the thumb is able to oppose the  index and long finger; otherwise, the orthosis becomes a functional handicap for the patient. This may  result in the orthosis being worn on a limited basis or not at all.]  If an injection was not performed, consideration may be given to include phonophoresis or  iontophoresis. Overall, the modalities have been of limited benefit.  Light massage along the radial side of the forearm, wrist and thumb area is encouraged 2-3 times a  day to promote circulation and healing to the underlying soft tissue structures.  Patient education: Avoid holding/cradling a baby in one arm with the wrist flexed and hand  supporting the baby’s neck and head. Holding the arm in a sustained stretch of this nature may be in  and of itself the root cause of the De Quervain’s. Similarly, avoid repetitive pinching activities or  activities pinching with the wrist in flexion or ulnar deviation for long periods of time.    3 Weeks  Begin exercises to  stretch the radial side of the wrist, the APL and EPB. For example, perform gentle  passive ulnar deviation of the wrist, with the palm on the tabletop. Once this stretch is easy to perform  and not painful, perform gentle passive adduction of the thumb combined with gentle passive  extension of the wrist. Once this exercise can be easily performed, conclude with gentle passive  flexion of the CMC and MP joints of the thumb combined with ulnar deviation of the wrist. These  passive stretches may be replicated with active motion as well. [Note: Add moist heat for 5 minutes  prior to the gentle passive stretches.] Perform the passive stretches 2-3 times a day, 15 repetitions,  holding the end-range for 30 seconds.    6 - 8 Weeks  The orthosis may be gradually discontinued over a 3-4 week span of time should the symptoms be  gradually resolving.  Kinesiotaping may help provide support and prevent discomfort when the orthosis is not worn.    CONSIDERATIONS  If the symptoms have completely resolved following 6-8 weeks of immobilization, the orthosis may be  discontinued. If the symptoms have improved but are not totally resolved, continuing the orthosis an  additional 3-4 weeks is recommended. If the symptoms remain unchanged, the surgeon may  recommend surgical intervention.  Patient education: Avoid repetitive activities requiring pinching and pulling with the thumb. This  becomes particularly important when the patient discontinues the orthosis and begins to use the hand  normally once again.  A treatment regimen largely focused on an immobilization orthosis alone has been shown to have  limited success in managing de Quervain’s tenosynovitis.     ORTHO ORDERS:  evaluate and treat     SPLINT:     5/23: provided scapular retraction, ulnar nerve glides, and de quervain's exercises to perform at home   Manuals 5/30 5/19 5/21 5/23 5/27/25   Cervical Mob                        Manual massage B/l forearm and hands  B/L  "hands, forearm and traps  B/lL hands, forearm and traps    B/L hands, foreram and   IASTM   L thumb, forearm and traps  L thumb, and traps b/l hands    L thumb    KT tape Deferred    L thumb  L thumb   L thumb   Neuro Re-Ed  5/30 5/19 5/21 5/23 5/27/25    Ulna Nerve Glides   x 10  x 10  X 5 tip   x10 \"OK\"  X10 tip the                   Ther Ex 5/30 5/19 5/21 5/23 5/27/25   Cervical Stretches Lateral w/towel 20 sec x 3  lateral w/towel  20 sec x 3  latera w/towel  20 sec x 3  lateral w/towel  20 sec x3   lateral w/towel  20 sec x 3   Cervical ROM  Lateral bend  Rotation  Lateral Rotation  Flex/Ext 20 sec x 3  20 sec x 3  20 sec x 3  x5 each     Elbow AROM            digiflex Green x 20  Red x 20  Red x 20  Red x20   Red x 20   Rhomboid stretch       20 sec x3     Scalene chair stretch  Middle   Anterior  Posterior   20 sec x 3  20 sec x 3  20 sec x 3  20 sec each  20 sec x 3   Finger abd Yellow RB x 20  R yell RB x 20  L red RB x 20   yell RB x 20    Yellow RB x20  Yellow RB x 20   Flex Bar  Twist  Bends  oscillations Thin red  X 20  X 20  30 sec   Thin yellow  X 10  X 10  30 sec  thin yellow  X 20  X 20  30 sec  Thin yellow   X20  X20  30 sec   thin yellow  X 20  X 20  30 sec    Intrinsic strengthening  Purple splint  1/2 b/l  Purple splint 1/2 b/l    PW      Thumb  Intrinsic plus  Tension pin  Lg knob cw/ccw Teracotta  X 20  X 20  X 20  R/GTP b/l  Vilas x 10  Teracotta  X 20  X 20  X 10  G/BTP B/L   Teracotta  X 20  X 20  X 10  G/BTP B/L Teracotta  X 20  X 20  X 20  G/RTP B/L   Ther Activity 5/30 5/19 5/21 5/23 5/27/25   Education on HEP       Provided  Scap ret  Ulnar nerve floss  Dart throwers                                                         Modalities 5/30 5/19 5/21 5/23 5/27/25   Ultrasound  Thumb  elbow   3 MHz  50%  .8         E-STIM w/ MH            CP            MH            Ice Massage            Cupping                "

## 2025-06-03 ENCOUNTER — OFFICE VISIT (OUTPATIENT)
Dept: OCCUPATIONAL THERAPY | Facility: CLINIC | Age: 70
End: 2025-06-03
Payer: MEDICARE

## 2025-06-03 DIAGNOSIS — M79.645 PAIN OF LEFT THUMB: Primary | ICD-10-CM

## 2025-06-03 DIAGNOSIS — M25.531 RIGHT WRIST PAIN: ICD-10-CM

## 2025-06-03 DIAGNOSIS — G56.21 ENTRAPMENT OF RIGHT ULNAR NERVE: ICD-10-CM

## 2025-06-03 DIAGNOSIS — M79.642 PAIN IN BOTH HANDS: ICD-10-CM

## 2025-06-03 DIAGNOSIS — M79.641 PAIN IN BOTH HANDS: ICD-10-CM

## 2025-06-03 PROCEDURE — 97140 MANUAL THERAPY 1/> REGIONS: CPT

## 2025-06-03 PROCEDURE — 97110 THERAPEUTIC EXERCISES: CPT

## 2025-06-03 NOTE — PROGRESS NOTES
Daily Note     Today's date: 6/3/2025  Patient name: Sri Mcgrath  : 1955  MRN: [id number]  Referring provider: Sea Hadley DO  Dx:   Encounter Diagnosis     ICD-10-CM    1. Pain of left thumb  M79.645       2. Pain in both hands  M79.641     M79.642       3. Entrapment of right ulnar nerve  G56.21       4. Right wrist pain  M25.531                      Subjective: I'm doing better.      Objective: See treatment diary below      Assessment: Tolerated treatment well. Patient demonstrated fatigue post treatment, exhibited good technique with therapeutic exercises, and would benefit from continued OT.  Pt still reports locking of RF and SF on right hand. Left latera rotation limited vs right, increase cervical ROM noted post tx. Denied pain post tx. Pt verbalized increase weakness and fatigue in BUE.      Plan: Continue per plan of care.  Progress treatment as tolerated.       NTERVENTIONS:     Cervical exercises/stretches   Cervical mobilizations   Activity modification/compensation techniques   Nerve glides (ulnar)  HEP  Exercises to target DeQuervains  Modalities for Arthritis   Light Strengthening of intrinsics/wrist  Kinesiotaping  Manual Massage  Motor coordination training  Cupping  Therapeutic activities  Ultrasound  Paraffin bath   Cryotherapy   Moist Heat  TENS      PROTOCOL: CUBITAL TUNNEL SYNDROME Non-Surgical Management    DESCRIPTION OF DIAGNOSIS  Cubital tunnel syndrome consists of compression of the ulnar nerve as it passes through the cubital  tunnel along the elbow. The nerve compression results in paresthesias along the ulnar nerve distribution, affecting the small finger and ulnar side of the ring. In addition, with long standing cubital tunnel syndrome, there may be residual motor weakness of the ulnar nerve innervated intrinsic muscles. Severe, prolonged ulnar nerve compression may result in clawing of the ring and small fingers.    NON-SURGICAL MANAGEMENT - THERAPY  0 - 6 Weeks  An  initial evaluation is performed. As part of the initial evaluation, include the following information:  onset of the medical condition, the frequency and duration of the symptoms, pain, functional  limitations, weakness and any daily tasks that provoke the symptoms. It is common for the symptoms to be pronounced while sleeping. A pre-fabricated and custom-fitted elbow pad (light compression) is applied along the medial posterior aspect of the elbow. The elbow pad is worn throughout the day, serving to protect the ulnar nerve from direct micro trauma or pressure as the elbow rests on hard surfaces. A custom-fabricated and custom-fitted static elbow orthosis is fitted with the elbow in 45° to 65° of elbow flexion to wear at night. The orthosis is worn to avoid full flexion of the elbow. This is particularly beneficial for those that sleep in the fetal position, with the elbows flexed. If the patient is particularly symptomatic and has painful paresthesias during the waking hours, the elbow orthosis may be worn during the day. Normal, light use of the arm is encouraged in daily activity. Nerve gliding exercises for the ulnar nerve are encouraged 1-2 times a day. 5-10 repetitions are performed gently/slowly, with long, gentle end range stretches (15 seconds). The goal of the nerve gliding exercises is to mobilize the nerve sufficiently to reduce direct, localized pressure on the ulnar nerve. [Patient Handout] Moist heat for 5 minutes prior to the nerve gliding exercises is encouraged. Patient education is important. The patient should be advised to minimize repetitive flexion and extension of the elbow, prolonged flexion of the elbow and direct pressure to the medial-posterior aspect of the elbow. Common sources of direct pressure on the elbow include: resting the elbow on the car window or console between the front seats of the car, the arm of a chair, or when working on projects where the elbows are propped up on a  hard surface. In addition, activities (vocational or avocational) that elicit high motor demands with the flexor carpi ulnaris, may serve as a causative factor for this condition and should be avoided.    6 Weeks  Typically, the patient is re-evaluated by the physician. Should the patient’s symptoms be subsiding, the non-surgical management with therapy is continued. In those cases where conservative management did not benefit the patient, consideration may be given to surgery. In most cases this would be an insitu cubital tunnel release. Patients who have experienced complete resolution of symptoms are encouraged to avoid direct forces against the medial and posterior aspect of the elbow and are encouraged to wear the elbow pad an additional 1-2 months.    CONSIDERATIONS  When fabricating the custom orthosis, fit the orthosis on the volar aspect of the arm to limit pressure on the ulnar nerve. In addition to the custom orthosis, wearing an elbow pad is recommended, to limit direct pressure on the nerve. Patients with long-standing symptoms (>= 9 months) and/or have previous trauma to the elbow are likely candidates to proceed with surgery versus non-surgical management with therapy. Patients with traumatic elbow dislocation and/or an elbow fracture, should perform nerve gliding exercises as the AROM exercises are initiated. This may offset the likelihood of the ulnar nerve becoming totally entrapped in scar, causing both pain and traction on the nerve.    RECOMMENDED READING  Ricky, MARY CHAUHAN., AMY Ha., Antionette SNereyda CLINE., Andi, S. S., ISIDORO Sneed P., Foster, R. R., MARTA John., Adrian, M. J. (1998). Changes in interstitial pressure and cross-sectional area of the cubital tunnel and of the ulnar nerve with flexion of the elbow: An experimental study in human cadavera.     DE QUERVAIN’S TENOSYNOVITIS: Non-Surgical Management    DESCRIPTION OF DIAGNOSIS  De Quervain’s tenosynovitis is an inflammatory disorder of  the 1st dorsal compartment, which is  occupied by the tendons of the extensor pollicis brevis (EPB) and the abductor pollicis longus (APL).  Characteristic signs include pain, swelling, range of motion deficits of the thumb, and commonly a  positive Finkelstein’s test. A Finkelstein’s test is performed by having the patient hold their thumb flexed  with their fingers and simultaneously position their wrist in ulnar deviation. The test is positive when there  is pain present at the base of the thumb.    NON-SURGICAL MANAGEMENT - THERAPY  0 - 6-8 Weeks  An initial evaluation is performed.  A custom-fabricated wrist and thumb static orthosis is fitted with the thumb IP joint free. The orthosis is  fabricated with the wrist in 15º of extension, the thumb midway between palmar and radial abduction,  and the thumb MP joint in 10º of flexion. The orthosis is to be worn at all times, except for basic ADLs to  begin each day. [Note: When fitting the orthosis it is important that the thumb is able to oppose the  index and long finger; otherwise, the orthosis becomes a functional handicap for the patient. This may  result in the orthosis being worn on a limited basis or not at all.]  If an injection was not performed, consideration may be given to include phonophoresis or  iontophoresis. Overall, the modalities have been of limited benefit.  Light massage along the radial side of the forearm, wrist and thumb area is encouraged 2-3 times a  day to promote circulation and healing to the underlying soft tissue structures.  Patient education: Avoid holding/cradling a baby in one arm with the wrist flexed and hand  supporting the baby’s neck and head. Holding the arm in a sustained stretch of this nature may be in  and of itself the root cause of the De Quervain’s. Similarly, avoid repetitive pinching activities or  activities pinching with the wrist in flexion or ulnar deviation for long periods of time.    3 Weeks  Begin  exercises to stretch the radial side of the wrist, the APL and EPB. For example, perform gentle  passive ulnar deviation of the wrist, with the palm on the tabletop. Once this stretch is easy to perform  and not painful, perform gentle passive adduction of the thumb combined with gentle passive  extension of the wrist. Once this exercise can be easily performed, conclude with gentle passive  flexion of the CMC and MP joints of the thumb combined with ulnar deviation of the wrist. These  passive stretches may be replicated with active motion as well. [Note: Add moist heat for 5 minutes  prior to the gentle passive stretches.] Perform the passive stretches 2-3 times a day, 15 repetitions,  holding the end-range for 30 seconds.    6 - 8 Weeks  The orthosis may be gradually discontinued over a 3-4 week span of time should the symptoms be  gradually resolving.  Kinesiotaping may help provide support and prevent discomfort when the orthosis is not worn.    CONSIDERATIONS  If the symptoms have completely resolved following 6-8 weeks of immobilization, the orthosis may be  discontinued. If the symptoms have improved but are not totally resolved, continuing the orthosis an  additional 3-4 weeks is recommended. If the symptoms remain unchanged, the surgeon may  recommend surgical intervention.  Patient education: Avoid repetitive activities requiring pinching and pulling with the thumb. This  becomes particularly important when the patient discontinues the orthosis and begins to use the hand  normally once again.  A treatment regimen largely focused on an immobilization orthosis alone has been shown to have  limited success in managing de Quervain’s tenosynovitis.     ORTHO ORDERS:  evaluate and treat     SPLINT:     5/23: provided scapular retraction, ulnar nerve glides, and de quervain's exercises to perform at home   Manuals 5/30 6/3   5/23  5/27/25   Cervical Mob                    Manual massage B/l forearm and hands B/l  "forearm, hands and L trap     B/L hands, foreram and   IASTM  B/l forearm, hands and trap     L thumb    KT tape Deferred    L thumb   L thumb   Neuro Re-Ed  5/30 6/3   5/23  5/27/25    Ulna Nerve Glides     x10 \"OK\"  X10 tip the                 Ther Ex 5/30 6/3   5/23  5/27/25   Cervical Stretches Lateral w/towel 20 sec x 3 Lateral w/towel  20 sec x 3   lateral w/towel  20 sec x3   lateral w/towel  20 sec x 3   Cervical ROM  Lateral bend  Rotation  Lateral Rotation  Flex/Ext 20 sec x 3 20 sec x 3   x5 each     Elbow AROM          digiflex Green x 20 Green x 20   Red x20   Red x 20   Rhomboid stretch     20 sec x3     Scalene chair stretch  Middle   Anterior  Posterior   20 sec x 3 20 sec x 3   20 sec each  20 sec x 3   Finger abd Yellow RB x 20 Yellow RB x 20   Yellow RB x20  Yellow RB x 20   Flex Bar  Twist  Bends  oscillations Thin red  X 20  X 20  30 sec  Thin red  X 20  X 20  30 sec   Thin yellow   X20  X20  30 sec   thin yellow  X 20  X 20  30 sec    Intrinsic strengthening    Purple splint 1/2 b/l    PW      Thumb  Intrinsic plus  Tension pin  Lg knob cw/ccw Terac[place]  X 20  X 20  X 20  R/GTP b/l  [place] x 10 Teracotta  X 20  X 20  X 20  R/GTP b/l  X10 cw/ccw  Teracotta  X 20  X 20  X 10  G/BTP B/L Teracotta  X 20  X 20  X 20  G/RTP B/L   Wrist AROM  Flex/ext  Ud/rd  Cw/ccw  1# DB  X 10/2  X 10/2  X 10/2      Ther Activity 5/30 6/3   5/23  5/27/25   Education on HEP     Provided  Scap ret  Ulnar nerve floss  Dart throwers                                                 Modalities 5/30 6/3   5/23  5/27/25   Ultrasound  Thumb  elbow          E-STIM w/ MH          CP            MH            Ice Massage            Cupping                  "

## 2025-06-06 ENCOUNTER — OFFICE VISIT (OUTPATIENT)
Dept: INTERNAL MEDICINE CLINIC | Facility: CLINIC | Age: 70
End: 2025-06-06
Payer: MEDICARE

## 2025-06-06 ENCOUNTER — OFFICE VISIT (OUTPATIENT)
Dept: OCCUPATIONAL THERAPY | Facility: CLINIC | Age: 70
End: 2025-06-06
Payer: MEDICARE

## 2025-06-06 VITALS
SYSTOLIC BLOOD PRESSURE: 124 MMHG | HEIGHT: 59 IN | DIASTOLIC BLOOD PRESSURE: 76 MMHG | BODY MASS INDEX: 38.1 KG/M2 | HEART RATE: 80 BPM | WEIGHT: 189 LBS

## 2025-06-06 DIAGNOSIS — G56.21 ENTRAPMENT OF RIGHT ULNAR NERVE: ICD-10-CM

## 2025-06-06 DIAGNOSIS — M79.642 PAIN IN BOTH HANDS: Primary | ICD-10-CM

## 2025-06-06 DIAGNOSIS — M79.642 PAIN IN BOTH HANDS: ICD-10-CM

## 2025-06-06 DIAGNOSIS — M79.641 PAIN IN BOTH HANDS: ICD-10-CM

## 2025-06-06 DIAGNOSIS — M79.641 PAIN IN BOTH HANDS: Primary | ICD-10-CM

## 2025-06-06 DIAGNOSIS — M79.645 PAIN OF LEFT THUMB: ICD-10-CM

## 2025-06-06 DIAGNOSIS — M25.531 RIGHT WRIST PAIN: ICD-10-CM

## 2025-06-06 DIAGNOSIS — M79.645 PAIN OF LEFT THUMB: Primary | ICD-10-CM

## 2025-06-06 PROCEDURE — 99213 OFFICE O/P EST LOW 20 MIN: CPT | Performed by: INTERNAL MEDICINE

## 2025-06-06 PROCEDURE — G2211 COMPLEX E/M VISIT ADD ON: HCPCS | Performed by: INTERNAL MEDICINE

## 2025-06-06 PROCEDURE — 97110 THERAPEUTIC EXERCISES: CPT

## 2025-06-06 PROCEDURE — 97140 MANUAL THERAPY 1/> REGIONS: CPT

## 2025-06-06 RX ORDER — LEVOCETIRIZINE DIHYDROCHLORIDE 5 MG/1
TABLET, FILM COATED ORAL
COMMUNITY
Start: 2025-05-14

## 2025-06-06 RX ORDER — AZELASTINE HYDROCHLORIDE 137 UG/1
1 SPRAY, METERED NASAL 2 TIMES DAILY
COMMUNITY
Start: 2025-05-14

## 2025-06-06 NOTE — PROGRESS NOTES
"Name: Sri Mcgrath      : 1955      MRN: [id number]  Encounter Provider: Sea Hadley DO  Encounter Date: 2025   Encounter department: [facility]  :  Assessment & Plan  Pain in both hands         Pain of left thumb         Right wrist pain         A/P: IMproving and discussed imaging. Appreciate OT input. Appears s/s mostly due to DJD and Cervical radiculopathy.  Will continue mobic and PRN tylenol. Discussed topicals and using tumeric/bioflex as well. Continue OT. RTC 4 months for routine.        History of Present Illness   WF RTC for several week f/u bilat hand and left thumb pain of ?etiology. Xrays with DJD changes only. Started on Mobic. Tolerating the meds. Sent to OT and attending sessions. Reports slow, but steady improvement.       Review of Systems   Constitutional:  Negative for activity change, chills, diaphoresis, fatigue and fever.   Respiratory:  Negative for cough, chest tightness, shortness of breath and wheezing.    Cardiovascular:  Negative for chest pain, palpitations and leg swelling.   Gastrointestinal:  Negative for abdominal pain, constipation, diarrhea, nausea and vomiting.   Genitourinary:  Negative for difficulty urinating, dysuria and frequency.   Musculoskeletal:  Positive for arthralgias. Negative for gait problem, joint swelling and myalgias.   Neurological:  Negative for dizziness, seizures, syncope, weakness, light-headedness, numbness and headaches.   Psychiatric/Behavioral:  Negative for confusion, dysphoric mood and sleep disturbance. The patient is not nervous/anxious.        Objective   /76   Pulse 80   Ht 4' 11.45\" (1.51 m)   Wt 85.7 kg (189 lb)   BMI 37.60 kg/m²      Physical Exam  Vitals and nursing note reviewed.   Constitutional:       General: She is not in acute distress.     Appearance: Normal appearance. She is not ill-appearing.   HENT:      Head: Normocephalic and atraumatic.      Mouth/Throat:      Mouth: Mucous membranes " are moist.     Eyes:      Extraocular Movements: Extraocular movements intact.      Conjunctiva/sclera: Conjunctivae normal.      Pupils: Pupils are equal, round, and reactive to light.       Musculoskeletal:         General: Tenderness present. No swelling or deformity.     Neurological:      General: No focal deficit present.      Mental Status: She is alert and oriented to person, place, and time. Mental status is at baseline.     Psychiatric:         Mood and Affect: Mood normal.         Behavior: Behavior normal.         Thought Content: Thought content normal.         Judgment: Judgment normal.

## 2025-06-06 NOTE — PROGRESS NOTES
Daily Note     Today's date: 2025  Patient name: Sri Mcgrath  : 1955  MRN: [id number]  Referring provider: Sea Hadley DO  Dx:   Encounter Diagnosis     ICD-10-CM    1. Pain of left thumb  M79.645       2. Pain in both hands  M79.641     M79.642       3. Entrapment of right ulnar nerve  G56.21       4. Right wrist pain  M25.531                      Subjective: My hand really hurts today.      Objective: See treatment diary below      Assessment: Tolerated treatment well. Patient demonstrated fatigue post treatment, exhibited good technique with therapeutic exercises, and would benefit from continued OT.  Pt still reports locking of RF and SF on right hand. Left latera rotation limited vs right, increase cervical ROM noted post tx. Pain 5, post tx 2 hand. Shoulder 4, post tx 0. Pt verbalized increase weakness and fatigue in BUE. Some exercises deferred secondary to R hand pain. Pain over first dorsal compartment of R hand.      Plan: Continue per plan of care.  Progress treatment as tolerated.       INTERVENTIONS:     Cervical exercises/stretches   Cervical mobilizations   Activity modification/compensation techniques   Nerve glides (ulnar)  HEP  Exercises to target DeQuervains  Modalities for Arthritis   Light Strengthening of intrinsics/wrist  Kinesiotaping  Manual Massage  Motor coordination training  Cupping  Therapeutic activities  Ultrasound  Paraffin bath   Cryotherapy   Moist Heat  TENS      PROTOCOL: CUBITAL TUNNEL SYNDROME Non-Surgical Management    DESCRIPTION OF DIAGNOSIS  Cubital tunnel syndrome consists of compression of the ulnar nerve as it passes through the cubital  tunnel along the elbow. The nerve compression results in paresthesias along the ulnar nerve distribution, affecting the small finger and ulnar side of the ring. In addition, with long standing cubital tunnel syndrome, there may be residual motor weakness of the ulnar nerve innervated intrinsic muscles. Severe, prolonged  ulnar nerve compression may result in clawing of the ring and small fingers.    NON-SURGICAL MANAGEMENT - THERAPY  0 - 6 Weeks  An initial evaluation is performed. As part of the initial evaluation, include the following information:  onset of the medical condition, the frequency and duration of the symptoms, pain, functional  limitations, weakness and any daily tasks that provoke the symptoms. It is common for the symptoms to be pronounced while sleeping. A pre-fabricated and custom-fitted elbow pad (light compression) is applied along the medial posterior aspect of the elbow. The elbow pad is worn throughout the day, serving to protect the ulnar nerve from direct micro trauma or pressure as the elbow rests on hard surfaces. A custom-fabricated and custom-fitted static elbow orthosis is fitted with the elbow in 45° to 65° of elbow flexion to wear at night. The orthosis is worn to avoid full flexion of the elbow. This is particularly beneficial for those that sleep in the fetal position, with the elbows flexed. If the patient is particularly symptomatic and has painful paresthesias during the waking hours, the elbow orthosis may be worn during the day. Normal, light use of the arm is encouraged in daily activity. Nerve gliding exercises for the ulnar nerve are encouraged 1-2 times a day. 5-10 repetitions are performed gently/slowly, with long, gentle end range stretches (15 seconds). The goal of the nerve gliding exercises is to mobilize the nerve sufficiently to reduce direct, localized pressure on the ulnar nerve. [Patient Handout] Moist heat for 5 minutes prior to the nerve gliding exercises is encouraged. Patient education is important. The patient should be advised to minimize repetitive flexion and extension of the elbow, prolonged flexion of the elbow and direct pressure to the medial-posterior aspect of the elbow. Common sources of direct pressure on the elbow include: resting the elbow on the car window or  console between the front seats of the car, the arm of a chair, or when working on projects where the elbows are propped up on a hard surface. In addition, activities (vocational or avocational) that elicit high motor demands with the flexor carpi ulnaris, may serve as a causative factor for this condition and should be avoided.    6 Weeks  Typically, the patient is re-evaluated by the physician. Should the patient’s symptoms be subsiding, the non-surgical management with therapy is continued. In those cases where conservative management did not benefit the patient, consideration may be given to surgery. In most cases this would be an insitu cubital tunnel release. Patients who have experienced complete resolution of symptoms are encouraged to avoid direct forces against the medial and posterior aspect of the elbow and are encouraged to wear the elbow pad an additional 1-2 months.    CONSIDERATIONS  When fabricating the custom orthosis, fit the orthosis on the volar aspect of the arm to limit pressure on the ulnar nerve. In addition to the custom orthosis, wearing an elbow pad is recommended, to limit direct pressure on the nerve. Patients with long-standing symptoms (>= 9 months) and/or have previous trauma to the elbow are likely candidates to proceed with surgery versus non-surgical management with therapy. Patients with traumatic elbow dislocation and/or an elbow fracture, should perform nerve gliding exercises as the AROM exercises are initiated. This may offset the likelihood of the ulnar nerve becoming totally entrapped in scar, causing both pain and traction on the nerve.    RECOMMENDED READING  MARY García., AMY Ha., Antionette SNereyda CLINE., Andi, S. S., Nedra, F. P., Foster, R. R., MARTA John., Kaylah, M. J. (1998). Changes in interstitial pressure and cross-sectional area of the cubital tunnel and of the ulnar nerve with flexion of the elbow: An experimental study in human cadavera.     DE  QUERVAIN’S TENOSYNOVITIS: Non-Surgical Management    DESCRIPTION OF DIAGNOSIS  De Quervain’s tenosynovitis is an inflammatory disorder of the 1st dorsal compartment, which is  occupied by the tendons of the extensor pollicis brevis (EPB) and the abductor pollicis longus (APL).  Characteristic signs include pain, swelling, range of motion deficits of the thumb, and commonly a  positive Finkelstein’s test. A Finkelstein’s test is performed by having the patient hold their thumb flexed  with their fingers and simultaneously position their wrist in ulnar deviation. The test is positive when there  is pain present at the base of the thumb.    NON-SURGICAL MANAGEMENT - THERAPY  0 - 6-8 Weeks  An initial evaluation is performed.  A custom-fabricated wrist and thumb static orthosis is fitted with the thumb IP joint free. The orthosis is  fabricated with the wrist in 15º of extension, the thumb midway between palmar and radial abduction,  and the thumb MP joint in 10º of flexion. The orthosis is to be worn at all times, except for basic ADLs to  begin each day. [Note: When fitting the orthosis it is important that the thumb is able to oppose the  index and long finger; otherwise, the orthosis becomes a functional handicap for the patient. This may  result in the orthosis being worn on a limited basis or not at all.]  If an injection was not performed, consideration may be given to include phonophoresis or  iontophoresis. Overall, the modalities have been of limited benefit.  Light massage along the radial side of the forearm, wrist and thumb area is encouraged 2-3 times a  day to promote circulation and healing to the underlying soft tissue structures.  Patient education: Avoid holding/cradling a baby in one arm with the wrist flexed and hand  supporting the baby’s neck and head. Holding the arm in a sustained stretch of this nature may be in  and of itself the root cause of the De Quervain’s. Similarly, avoid repetitive  pinching activities or  activities pinching with the wrist in flexion or ulnar deviation for long periods of time.    3 Weeks  Begin exercises to stretch the radial side of the wrist, the APL and EPB. For example, perform gentle  passive ulnar deviation of the wrist, with the palm on the tabletop. Once this stretch is easy to perform  and not painful, perform gentle passive adduction of the thumb combined with gentle passive  extension of the wrist. Once this exercise can be easily performed, conclude with gentle passive  flexion of the CMC and MP joints of the thumb combined with ulnar deviation of the wrist. These  passive stretches may be replicated with active motion as well. [Note: Add moist heat for 5 minutes  prior to the gentle passive stretches.] Perform the passive stretches 2-3 times a day, 15 repetitions,  holding the end-range for 30 seconds.    6 - 8 Weeks  The orthosis may be gradually discontinued over a 3-4 week span of time should the symptoms be  gradually resolving.  Kinesiotaping may help provide support and prevent discomfort when the orthosis is not worn.    CONSIDERATIONS  If the symptoms have completely resolved following 6-8 weeks of immobilization, the orthosis may be  discontinued. If the symptoms have improved but are not totally resolved, continuing the orthosis an  additional 3-4 weeks is recommended. If the symptoms remain unchanged, the surgeon may  recommend surgical intervention.  Patient education: Avoid repetitive activities requiring pinching and pulling with the thumb. This  becomes particularly important when the patient discontinues the orthosis and begins to use the hand  normally once again.  A treatment regimen largely focused on an immobilization orthosis alone has been shown to have  limited success in managing de Quervain’s tenosynovitis.     ORTHO ORDERS:  evaluate and treat     SPLINT:     5/23: provided scapular retraction, ulnar nerve glides, and de quervain's  exercises to perform at home   Manuals 5/30 6/3 6/6   5/27/25   Cervical Mob                  Manual massage B/l forearm and hands B/l forearm, hands and L trap R hand, forearm, L trap   B/L hands, foreram and   IASTM  B/l forearm, hands and trap R hand, trap  Cupping neck and R thumb   L thumb    KT tape Deferred  R thumb   L thumb   Neuro Re-Ed  5/30 6/3 6/6   5/27/25    Ulna Nerve Glides                   Ther Ex 5/30 6/3 6/6   5/27/25   Cervical Stretches Lateral w/towel 20 sec x 3 Lateral w/towel  20 sec x 3    lateral w/towel  20 sec x 3   Cervical ROM  Lateral bend  Rotation  Lateral Rotation  Flex/Ext 20 sec x 3 20 sec x 3       Elbow AROM         digiflex Green x 20 Green x 20 Green x 20 L only   Red x 20   Rhomboid stretch         Scalene chair stretch  Middle   Anterior  Posterior   20 sec x 3 20 sec x 3    20 sec x 3   Finger abd Yellow RB x 20 Yellow RB x 20 Yellow RB x 20 left only   Yellow RB x 20   Flex Bar  Twist  Bends  oscillations Thin red  X 20  X 20  30 sec  Thin red  X 20  X 20  30 sec deferred   thin yellow  X 20  X 20  30 sec    Intrinsic strengthening        PW      Thumb  Intrinsic plus  Tension pin  Lg knob cw/ccw [place]  X 20  X 20  X 20  R/GTP b/l  [place] x 10 Teracotta  X 20  X 20  X 20  R/GTP b/l  X10 cw/ccw Teracotta  X 20  X 20  X 20  Teracotta  X 20  X 20  X 20  G/RTP B/L   Wrist AROM  Flex/ext  Ud/rd  Cw/ccw  1# DB  X 10/2  X 10/2  X 10/2 1# DB  X 10/2  X 10/2  X 10/2     pulleys   X 20     Theraband  Ext  Tri  Row  IR   Green  X 15  X 15  X 15  X 15     UBC   2F/2B L 60     Ther Activity 5/30 6/3 6/6   5/27/25   Education on HEP                                                     Modalities 5/30 6/3 6/6   5/27/25   Ultrasound  Thumb  elbow   R thumb  50%  .8       E-STIM w/ MH          CP            MH            Ice Massage            Cupping

## 2025-06-09 NOTE — PROGRESS NOTES
Daily Note     Today's date: 2025  Patient name: Sri Mcgrath  : 1955  MRN: [id number]  Referring provider: Sea Hadley DO  Dx:   Encounter Diagnosis     ICD-10-CM    1. Pain of left thumb  M79.645       2. Pain in both hands  M79.641     M79.642       3. Entrapment of right ulnar nerve  G56.21       4. Right wrist pain  M25.531                    Visit/Unit Tracking  AUTH Status: Not Required Date 5/12 5/14 5/19 5/21 5/23 5/27 5/30 6/3 6/6 6/10     Visits  Authed: BOMN Used 1 (IE) 2 3 4 5 6 7 8 9 10     FOTO COUNT  compelted                PLAN OF CARE START: 25  PLAN OF CARE END: 2025  PROGRESS NOTE DUE/FOTO DUE: Needs to be scheduled in    FREQUENCY: 2x week for 6 weeks   PRECAUTIONS hypertension, hyperlipidemia, stage 3 chronic kidney disease, primary osteoarthritis of multiple joints, R wrist broken years ago, tremor   DIAGNOSIS: R ulnar nerve compression possible, DeQuervain's possible, bilateral arthritis   INSURANCE: Medicare/Medicaid     TEST FMC/DEXTERITY Next RE         Subjective: It's doing better.      Objective: See treatment diary below      Assessment: Tolerated treatment well. Patient demonstrated fatigue post treatment, exhibited good technique with therapeutic exercises, and would benefit from continued OT.  Left lateral rotation limited vs right, increase cervical ROM noted post tx.Denied pain. Pt verbalized increase weakness and fatigue in BUE. Doing better overall. Progress note next tx session.    Plan: Continue per plan of care.  Progress treatment as tolerated.       INTERVENTIONS:     Cervical exercises/stretches   Cervical mobilizations   Activity modification/compensation techniques   Nerve glides (ulnar)  HEP  Exercises to target DeQuervains  Modalities for Arthritis   Light Strengthening of intrinsics/wrist  Kinesiotaping  Manual Massage  Motor coordination training  Cupping  Therapeutic activities  Ultrasound  Paraffin bath   Cryotherapy   Moist  Heat  TENS      PROTOCOL: CUBITAL TUNNEL SYNDROME Non-Surgical Management    DESCRIPTION OF DIAGNOSIS  Cubital tunnel syndrome consists of compression of the ulnar nerve as it passes through the cubital  tunnel along the elbow. The nerve compression results in paresthesias along the ulnar nerve distribution, affecting the small finger and ulnar side of the ring. In addition, with long standing cubital tunnel syndrome, there may be residual motor weakness of the ulnar nerve innervated intrinsic muscles. Severe, prolonged ulnar nerve compression may result in clawing of the ring and small fingers.    NON-SURGICAL MANAGEMENT - THERAPY  0 - 6 Weeks  An initial evaluation is performed. As part of the initial evaluation, include the following information:  onset of the medical condition, the frequency and duration of the symptoms, pain, functional  limitations, weakness and any daily tasks that provoke the symptoms. It is common for the symptoms to be pronounced while sleeping. A pre-fabricated and custom-fitted elbow pad (light compression) is applied along the medial posterior aspect of the elbow. The elbow pad is worn throughout the day, serving to protect the ulnar nerve from direct micro trauma or pressure as the elbow rests on hard surfaces. A custom-fabricated and custom-fitted static elbow orthosis is fitted with the elbow in 45° to 65° of elbow flexion to wear at night. The orthosis is worn to avoid full flexion of the elbow. This is particularly beneficial for those that sleep in the fetal position, with the elbows flexed. If the patient is particularly symptomatic and has painful paresthesias during the waking hours, the elbow orthosis may be worn during the day. Normal, light use of the arm is encouraged in daily activity. Nerve gliding exercises for the ulnar nerve are encouraged 1-2 times a day. 5-10 repetitions are performed gently/slowly, with long, gentle end range stretches (15 seconds). The goal of the  nerve gliding exercises is to mobilize the nerve sufficiently to reduce direct, localized pressure on the ulnar nerve. [Patient Handout] Moist heat for 5 minutes prior to the nerve gliding exercises is encouraged. Patient education is important. The patient should be advised to minimize repetitive flexion and extension of the elbow, prolonged flexion of the elbow and direct pressure to the medial-posterior aspect of the elbow. Common sources of direct pressure on the elbow include: resting the elbow on the car window or console between the front seats of the car, the arm of a chair, or when working on projects where the elbows are propped up on a hard surface. In addition, activities (vocational or avocational) that elicit high motor demands with the flexor carpi ulnaris, may serve as a causative factor for this condition and should be avoided.    6 Weeks  Typically, the patient is re-evaluated by the physician. Should the patient’s symptoms be subsiding, the non-surgical management with therapy is continued. In those cases where conservative management did not benefit the patient, consideration may be given to surgery. In most cases this would be an insitu cubital tunnel release. Patients who have experienced complete resolution of symptoms are encouraged to avoid direct forces against the medial and posterior aspect of the elbow and are encouraged to wear the elbow pad an additional 1-2 months.    CONSIDERATIONS  When fabricating the custom orthosis, fit the orthosis on the volar aspect of the arm to limit pressure on the ulnar nerve. In addition to the custom orthosis, wearing an elbow pad is recommended, to limit direct pressure on the nerve. Patients with long-standing symptoms (>= 9 months) and/or have previous trauma to the elbow are likely candidates to proceed with surgery versus non-surgical management with therapy. Patients with traumatic elbow dislocation and/or an elbow fracture, should perform nerve  gliding exercises as the AROM exercises are initiated. This may offset the likelihood of the ulnar nerve becoming totally entrapped in scar, causing both pain and traction on the nerve.    RECOMMENDED READING  MARY García., MATTIE Ha, KAM Adan., Andi SNereyda S., ISIDORO Sneed., MARY Hutchison., MARTA John., Kaylah, ANN MARIE GRAFF. (1998). Changes in interstitial pressure and cross-sectional area of the cubital tunnel and of the ulnar nerve with flexion of the elbow: An experimental study in human cadavera.     DE QUERVAIN’S TENOSYNOVITIS: Non-Surgical Management    DESCRIPTION OF DIAGNOSIS  De Quervain’s tenosynovitis is an inflammatory disorder of the 1st dorsal compartment, which is  occupied by the tendons of the extensor pollicis brevis (EPB) and the abductor pollicis longus (APL).  Characteristic signs include pain, swelling, range of motion deficits of the thumb, and commonly a  positive Finkelstein’s test. A Finkelstein’s test is performed by having the patient hold their thumb flexed  with their fingers and simultaneously position their wrist in ulnar deviation. The test is positive when there  is pain present at the base of the thumb.    NON-SURGICAL MANAGEMENT - THERAPY  0 - 6-8 Weeks  An initial evaluation is performed.  A custom-fabricated wrist and thumb static orthosis is fitted with the thumb IP joint free. The orthosis is  fabricated with the wrist in 15º of extension, the thumb midway between palmar and radial abduction,  and the thumb MP joint in 10º of flexion. The orthosis is to be worn at all times, except for basic ADLs to  begin each day. [Note: When fitting the orthosis it is important that the thumb is able to oppose the  index and long finger; otherwise, the orthosis becomes a functional handicap for the patient. This may  result in the orthosis being worn on a limited basis or not at all.]  If an injection was not performed, consideration may be given to include phonophoresis  or  iontophoresis. Overall, the modalities have been of limited benefit.  Light massage along the radial side of the forearm, wrist and thumb area is encouraged 2-3 times a  day to promote circulation and healing to the underlying soft tissue structures.  Patient education: Avoid holding/cradling a baby in one arm with the wrist flexed and hand  supporting the baby’s neck and head. Holding the arm in a sustained stretch of this nature may be in  and of itself the root cause of the De Quervain’s. Similarly, avoid repetitive pinching activities or  activities pinching with the wrist in flexion or ulnar deviation for long periods of time.    3 Weeks  Begin exercises to stretch the radial side of the wrist, the APL and EPB. For example, perform gentle  passive ulnar deviation of the wrist, with the palm on the tabletop. Once this stretch is easy to perform  and not painful, perform gentle passive adduction of the thumb combined with gentle passive  extension of the wrist. Once this exercise can be easily performed, conclude with gentle passive  flexion of the CMC and MP joints of the thumb combined with ulnar deviation of the wrist. These  passive stretches may be replicated with active motion as well. [Note: Add moist heat for 5 minutes  prior to the gentle passive stretches.] Perform the passive stretches 2-3 times a day, 15 repetitions,  holding the end-range for 30 seconds.    6 - 8 Weeks  The orthosis may be gradually discontinued over a 3-4 week span of time should the symptoms be  gradually resolving.  Kinesiotaping may help provide support and prevent discomfort when the orthosis is not worn.    CONSIDERATIONS  If the symptoms have completely resolved following 6-8 weeks of immobilization, the orthosis may be  discontinued. If the symptoms have improved but are not totally resolved, continuing the orthosis an  additional 3-4 weeks is recommended. If the symptoms remain unchanged, the surgeon may  recommend  surgical intervention.  Patient education: Avoid repetitive activities requiring pinching and pulling with the thumb. This  becomes particularly important when the patient discontinues the orthosis and begins to use the hand  normally once again.  A treatment regimen largely focused on an immobilization orthosis alone has been shown to have  limited success in managing de Quervain’s tenosynovitis.     ORTHO ORDERS:  evaluate and treat     SPLINT:     5/23: provided scapular retraction, ulnar nerve glides, and de quervain's exercises to perform at home   Manuals 5/30 6/3 6/6 6/10  5/27/25   Cervical Mob                  Manual massage B/l forearm and hands B/l forearm, hands and L trap R hand, forearm, L trap R hand/forearm  B/L hands, foreram and   IASTM  B/l forearm, hands and trap R hand, trap  Cupping neck and R thumb R hand/forearm  L thumb    KT tape Deferred  R thumb   L thumb   Neuro Re-Ed  5/30 6/3 6/6 6/10  5/27/25    Ulna Nerve Glides                   Ther Ex 5/30 6/3 6/6 6/10  5/27/25   Cervical Stretches Lateral w/towel 20 sec x 3 Lateral w/towel  20 sec x 3    lateral w/towel  20 sec x 3   Cervical ROM  Lateral bend  Rotation  Lateral Rotation  Flex/Ext 20 sec x 3 20 sec x 3       Elbow AROM         digiflex Green x 20 Green x 20 Green x 20 L only Green x 20  Red x 20   Rhomboid stretch         Scalene chair stretch  Middle   Anterior  Posterior   20 sec x 3 20 sec x 3    20 sec x 3   Finger abd Yellow RB x 20 Yellow RB x 20 Yellow RB x 20 left only Yellow RB x 20  Yellow RB x 20   Flex Bar  Twist  Bends  oscillations Thin red  X 20  X 20  30 sec  Thin red  X 20  X 20  30 sec deferred Thin red  X 20  X 20  30 sec  thin yellow  X 20  X 20  30 sec    Intrinsic strengthening        PW      Thumb  Intrinsic plus  Tension pin  Lg knob cw/ccw Teracotta  X 20  X 20  X 20  R/GTP b/l  [place] x 10 Teracotta  X 20  X 20  X 20  R/GTP b/l  X10 cw/ccw Teracotta  X 20  X 20  X 20 Teracotta  X 20  X 20  X 20  R/GTP  b/l  X 10 cw/ccw Teracotta  X 20  X 20  X 20  G/RTP B/L   Wrist AROM  Flex/ext  Ud/rd  Cw/ccw  1# DB  X 10/2  X 10/2  X 10/2 1# DB  X 10/2  X 10/2  X 10/2 1# DB  X 10/2  X 10/2  X 10/2    pulleys   X 20     Theraband  Ext  Tri  Row  IR  ER  Bicep curl   Green  X 15  X 15  X 15  X 15 Green  X 15  X 15  X 15  X 15  X 15  X 15    UBC   2F/2B L 60 3F/3B L 60    Ther Activity 5/30 6/3 6/6 6/10  5/27/25   Education on HEP                                                     Modalities 5/30 6/3 6/6 6/10  5/27/25   Ultrasound  Thumb  elbow   R thumb  50%  .8       E-STIM w/ MH          CP            MH            Ice Massage            Cupping

## 2025-06-10 ENCOUNTER — OFFICE VISIT (OUTPATIENT)
Dept: OCCUPATIONAL THERAPY | Facility: CLINIC | Age: 70
End: 2025-06-10
Payer: MEDICARE

## 2025-06-10 DIAGNOSIS — G56.21 ENTRAPMENT OF RIGHT ULNAR NERVE: ICD-10-CM

## 2025-06-10 DIAGNOSIS — M25.531 RIGHT WRIST PAIN: ICD-10-CM

## 2025-06-10 DIAGNOSIS — M79.645 PAIN OF LEFT THUMB: Primary | ICD-10-CM

## 2025-06-10 DIAGNOSIS — M79.641 PAIN IN BOTH HANDS: ICD-10-CM

## 2025-06-10 DIAGNOSIS — M79.642 PAIN IN BOTH HANDS: ICD-10-CM

## 2025-06-10 PROCEDURE — 97140 MANUAL THERAPY 1/> REGIONS: CPT

## 2025-06-10 PROCEDURE — 97110 THERAPEUTIC EXERCISES: CPT

## 2025-06-12 ENCOUNTER — EVALUATION (OUTPATIENT)
Dept: OCCUPATIONAL THERAPY | Facility: CLINIC | Age: 70
End: 2025-06-12
Payer: MEDICARE

## 2025-06-12 DIAGNOSIS — M79.642 PAIN IN BOTH HANDS: Primary | ICD-10-CM

## 2025-06-12 DIAGNOSIS — G56.21 ENTRAPMENT OF RIGHT ULNAR NERVE: ICD-10-CM

## 2025-06-12 DIAGNOSIS — M79.641 PAIN IN BOTH HANDS: Primary | ICD-10-CM

## 2025-06-12 DIAGNOSIS — M79.645 PAIN OF LEFT THUMB: ICD-10-CM

## 2025-06-12 DIAGNOSIS — M25.531 RIGHT WRIST PAIN: ICD-10-CM

## 2025-06-12 PROCEDURE — 97110 THERAPEUTIC EXERCISES: CPT

## 2025-06-12 NOTE — PROGRESS NOTES
Occupational Therapy Initial Evaluation        Visit/Unit Tracking  AUTH Status: Not Required Date 5/12 5/14 5/19 5/21 5/23 5/27 5/30 6/3 6/6 6/10 6/12    Visits  Authed: BOMN Used 1 (IE) 2 3 4 5 6 7 8 9 10 11 (RE)    FOTO COUNT  compelted                PLAN OF CARE START: 25  PLAN OF CARE END: 25  PROGRESS NOTE DUE/FOTO DUE: Needs to be scheduled in July   FREQUENCY: 2x week for 6 weeks   PRECAUTIONS hypertension, hyperlipidemia, stage 3 chronic kidney disease, primary osteoarthritis of multiple joints, R wrist broken years ago, tremor   DIAGNOSIS: R ulnar nerve compression possible, DeQuervain's possible, bilateral arthritis   INSURANCE: Medicare/Medicaid       Today's date: 2025  Patient name: Sri Mcgrath  : 1955  MRN: [id number]  Referring provider: Sea Hadley DO  Dx:   Encounter Diagnosis     ICD-10-CM    1. Pain in both hands  M79.641     M79.642       2. Entrapment of right ulnar nerve  G56.21       3. Right wrist pain  M25.531       4. Pain of left thumb  M79.645                ASSESSMENT/PLAN:    SKILLED ANALYSIS:  Pt is a R hand dominant 69 year old female presenting to OP OT s/p R ulnar nerve compression, DeQuervains R side, and bilateral arthritis. Pt reports pain in R hand in pinky and ring that radiates up forearm into neck. Frequent numbness/tingling reported intermittently. Additionally, reports that the fingers lock in extension causing severe pain and she needs to manually place them into flexion when this occurs. DeQuervain's symptoms have started to occur on L arm recently. Pt has past medical history of neck pain due to arthritis and disc generation to be aware of. Bilateral tremor present that she is currently on medication for. Currently  at [employer] and reports repetitive movements when working at [employer]. 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 Frequently needs to shake hands out  due to numbness and tingling when driving. Pt currently reports difficulty with completing job tasks including bending and extending the elbow when at . Post assessments pt demonstrating the following: decreased R  strength, shoulder stench, wrist strength, paresthesia, and pain that radiates into neck. Recommend OP OT 2x/wk for 6 weeks with focus on aforementioned deficits to maximize functional performance and improve QOL. Findings and recommendations discussed with pt, and they are in agreement. Educated pt on charges of insurance, assessments performed with standardized norms, POC, goal creation, and OP OT services.     RE 6/12  Pt presents to re-evaluation on 6/12 status post R ulnar nerve compression, DeQuervains R side, and bilateral arthritis. As per interview, pt reports improvements in decreased pain in L thumb and decreased pain in R arm. Pt reports impairments in locking of pinky and ring finger on R hand and still difficult performing opening jars/containers, difficulty with decreased  with grabbing groceries at work, and continuing to drop items. Mallet finger deformity of R little finger. Post assessments, pt demonstrating improvements in experienced pain in L thumb and R arm. Pt continues to demonstrate impairments in bilateral  strength, dexterity, FMC, decreased sensation,and pain with pronation. Moving forward pt would like to focus on dexterity, FMC, increased sensation, and locking sensation of R hand little finger and ring finger. Pt  STGs/LTGs updated below. Pt would benefit from continued OT services focusing on impairments for 2x week for 6 more weeks with focus on aforementioned deficits to maximize functional performance and improve QOL. Pt educated on progress/therapy process and pt in understanding and agreement of recommendations. Recommending to continue HEP. Findings and recommendations discussed with pt, and they are in agreement. Educated pt on charges of  "insurance, assessments performed with standardized norms, POC, goal creation, and OP OT services.         SUBJECTIVE:    Subjective  Occupational Profile  *lives with: alone  *vocation:  at [employer]   *driving: needs to shake hands out due to numbness and tingling   *ADLs: pain when trying to pull up pants  *IADLs (cooking, cleaning, community mobility, medication management, finances): difficulty performing job related tasks due to pain  *AD: weighted built up pen for handwriting   *Sport/Leisure: no problems reported       PATIENT GOAL: \"To get rid of the pain and locking\"    PAIN: 1    HISTORY OF PRESENT ILLNESS:   Pt is a 69 y.o. female who was referred to Occupational Therapy s/p possible ulnar nerve compression, deQuervains, and bilateral advanced arthritis in multiple joints. Pt saw internal medicine on 5/6/25 with reported pain in both hands, pain in L thumb and pain in R wrist. Pt presents today with positive finkelstein's test and positive ulnar nerve compression. Pt's e-rays revealed advanced arthritis throughout bilateral hands.            PMH:   Past Medical History:   Diagnosis Date    Allergic     Arthritis     Cataract     ashley    Chronic diarrhea     Chronic kidney disease     Just in chart    Colon polyp     Depression     Diverticulitis of colon     Endometriosis     Long time ago    Environmental and seasonal allergies     Fatty liver     Fluid retention     Gastric ulcer     Headache, acute     Heart murmur     Hyperlipidemia     Hypertension     Irritable bowel syndrome     Kidney problem     Memory loss     Pedal edema     Pneumonia     PONV (postoperative nausea and vomiting)     Rotator cuff tear, left     Sleep apnea 2021    Varicella     Childhood    Wears glasses     Wears partial dentures     upper       Past Surgical Hx:   Past Surgical History:   Procedure Laterality Date    ABDOMINAL ADHESION SURGERY      ANAL SPHINCTEROPLASTY      ANKLE SURGERY Right     tendon tear    APPENDECTOMY "      CARPAL TUNNEL RELEASE Bilateral     COLONOSCOPY      HAND SURGERY Right     ganglion cyst    HEMORRHOID SURGERY      HEMORROIDECTOMY      HYSTERECTOMY      ILEOSTOMY      JOINT REPLACEMENT      KNEE ARTHROCENTESIS      ganglion Cyst    KNEE ARTHROSCOPY Left     LUNG SURGERY      My father had 1/3 of lung removed due to lead poisoning    OVARIAN CYST REMOVAL      WI SURGICAL ARTHROSCOPY SHOULDER W/ROTATOR CUFF RPR Left 02/19/2018    Procedure: ARTHROSCOPIC REPAIR ROTATOR CUFF,  SAD, BICEP TENODESIS;  Surgeon: Garrett Coto MD;  Location: [facility] OR;  Service: Orthopedics    REPAIR RECTOCELE      REPLACEMENT TOTAL KNEE Left 09/28/2021    TONSILLECTOMY AND ADENOIDECTOMY      TUBAL LIGATION            OBJECTIVE:    Impairment Observations     Special Tests:   DeQuervain’s Tenosynovitis (POSITIVE)   Finkelstein: thumb flexion where the patient is asked to make a fist with the thumb tucked inside the palm and then ulnar deviation where the patient then bends the wrist toward the little finger and pain indication on the thumb side of the wrist, it is considered a positive Finkelstein test, which is suggestive of De Quervain's tenosynovitis    ULNAR NERVE (POSITIVE)  1. Tinel's Sign: This test involves tapping gently on the ulnar nerve at the elbow (cubital tunnel) or wrist (Guyon's canal) to elicit a tingling sensation in the ulnar nerve distribution (ring and little fingers). (POSITIVE)  2. Cubital Tunnel Compression Test: The patient stands, and the examiner passively flexes the elbow to 20 degrees. Pressure is then applied to the ulnar nerve just proximal to the cubital tunnel. A positive test is indicated by numbness or tingling in the ulnar nerve distribution. (POSITIVE)  3. Wrist Flexion Test: This test assesses for ulnar nerve compression by having the patient fully flex their elbow with the shoulder slightly abducted, and hold this position. Tingling or paresthesia in the ulnar nerve distribution can  indicate cubital tunnel syndrome. (NEGATIVE)  4. Froment's Sign: This test checks for weakness of the adductor pollicis muscle, which is innervated by the ulnar nerve. The patient is asked to hold a piece of paper between their thumb and index finger. If the paper is pulled away and the patient flexes the thumb's distal joint to maintain , it's a positive sign, indicating ulnar nerve pals (NEGATIVE)         RUE    LUE   6 Comments           /PINCH STRENGTH   Impaired Intact Dominant Hand: R   Dynamometer    - Gross Grasp   25 lbs 35 lbs    Pinch Meter     - Pincer   2 lbs 4 lbs     - Tripod   5 lbs 4 lbs     - Lateral   7 lbs  7 lbs          Thumb AROM              RUE    LUE   612 Comments    Intact Impaired    1st Digit MCP  Normal: 0-60 65 65    1st Digit IP:  Normal: 0-80 45 45    Opposition  WFL WFL    Thumb CMC Palmar Abduction (hand neutral on table)   Normal: 0-45 80 80    Thumb CMC Radial Abduction (palm flat on table)  Normal: 0-70 80 90          AROM UE              RUE    LUE   612     Impaired Intact    Shoulder FF  Normal: 0-180 WFL  WFL  Pain on R   Shoulder Ext  Normal: 50-60  WFL WFL     Shoulder Abd  Normal: 0-180 WFL  WFL     Internal Rotation (Supination)  Normal: 0-70 WFL WFL    Elbow Flex  Normal: 0-150 WFL  WFL     Elbow Ext  Normal: 0 WFL  WFL     Pronation  Normal: 0-90 WFL  WFL     Supination  Normal: 0-90 WFL  WFL     Wrist Flex   Normal: 0-80  55 55     Wrist Ext  Normal: 0-70  65  65     Ulnar Deviation  Normal: 0-30 55 35 Pain on R   Radial Deviation  Normal: 0-20 30 20 Pain on R   Digit Flex WFL  WFL     Digit Ex WFL  WFL           SENSATION       RUE      Monofilament Testing  Normal: 2.83 mm    Diminished light touch: 3.61 mm    Diminished protective sensation: 4.31 mm    Loss of protective sensation: 4.56    Complete loss of sensation / deep pressure: 6.65 Thumb: 3.61  Index: 3.61  Middle: 2.83  Rin.83  Pinky: 3.61 Thumb: 3.61  Index:  3.61  Middle: 3.61  Ring: 3.61  Pinky: 3.61 Dysesthesia        MANUAL MUSCLE TESTING:   “MMT is a standardized set of assessments that measure muscle strength and function.”  GRADING:   *0/5, no movement  *1/5, no visible movement, palpable or observable flicker (TRACE)  *2-, partial ROM in gravity eliminated plane   *2, full ROM in gravity eliminated plane (POOR)  *2+, moving less than prison against gravity   *3-, moving more than prison, but not full against gravity   *3, full ROM against gravity, but cannot withstand any pressure or weight so no strength (FAIR)  *3+, full ROM and can withstand slight pressure   *4-, full ROM and can withstand slight to moderate pressure   *4, hold test position against moderate resistance in gravity eliminated plane (GOOD)  *4+, full ROM against moderate to strong pressure   *5, muscle working at normal limits withstanding strong pressure~ full ROM and strength (NORMAL)     MUSCLE BEING TESTED  RUE  6/12 LUE  6/12 COMMENTS    Shoulder FF 4/5  Pain on R   Shoulder Ext 4/5     Shoulder Abduction 4+/5     Shoulder Adduction 4+/5     Elbow Flex 4+/5     Elbow Ext 4+/5     Supination 4+/5 4+/5    Pronation 3+/5 4+/5 PAIN on R    Wrist flexion 4/5 4/5    Wrist extension 4/5 4/5          COORDINATION       RUE   6/12 LUE   6/12     Impaired Impaired    9 Hole Peg Test-  assesses dexterity/fine motor coordination      29 seconds 23 seconds Pt demonstrates decreased FMC compared to norms for age/sex    Fxnl Dexterity Test-assesses patient's ability to use the hand for daily tasks requiring a 3-jaw jeni prehension between the fingers and the thumb   43 seconds 50 seconds Pt demonstrates decreased dexterity compared to norms for age/sex          GOALS    STGs:    Pt will experience a 50% decrease in drops of items experienced at work due to numbness and tingling for increased participation in work related tasks.(Progressing)    Pt will increase RUE forearm pronation by (+/-1) grade on  MMT to perform IADLs and work related tasks (Maintained)    Pt will increase b/l  strength by 10lbs for increased sustained grasp for ADLs. (Maintained)     Pt will self report increase in  strength being able to lift and bag heavier groceries at work with no increase in symptoms for increased participation in work related tasks.  (Progressing)     LTGs:    Pt will have increased FDT b/l by -10 seconds for increased dexterity for IADLs/ADLs.     Pt will have increased FMC b/l by -10 seconds for increased dexterity for IADLs/ADLs.     Pt will be consistent with HEP demonstrated by teach back method for increased ROM and strengthening of bilateral UE for functional activities and life roles (Maintained)    Pt will have increased light touch sensitivity in RUE moving up +1 grade on [employer] for increased participation in meaningful activities (Maintained)    Pt will increase RUE two point, tripod, and lateral pinch strength by 1-2lbs for increased functional grasp strength for ADLs/IADLs (Maintained)        INTERVENTIONS:     Cervical exercises/stretches   Cervical mobilizations   Activity modification/compensation techniques   Nerve glides (ulnar)  HEP  Exercises to target DeQuervains  Modalities for Arthritis   Light Strengthening of intrinsics/wrist  Kinesiotaping  Manual Massage  Motor coordination training  Cupping  Therapeutic activities  Ultrasound  Paraffin bath   Cryotherapy   Moist Heat  TENS      PROTOCOL:     CUBITAL TUNNEL SYNDROME Non-Surgical Management    DESCRIPTION OF DIAGNOSIS  Cubital tunnel syndrome consists of compression of the ulnar nerve as it passes through the cubital  tunnel along the elbow. The nerve compression results in paresthesias along the ulnar nerve distribution, affecting the small finger and ulnar side of the ring. In addition, with long standing cubital tunnel syndrome, there may be residual motor weakness of the ulnar nerve innervated intrinsic muscles. Severe,  prolonged ulnar nerve compression may result in clawing of the ring and small fingers.    NON-SURGICAL MANAGEMENT - THERAPY  0 - 6 Weeks  An initial evaluation is performed. As part of the initial evaluation, include the following information:  onset of the medical condition, the frequency and duration of the symptoms, pain, functional  limitations, weakness and any daily tasks that provoke the symptoms. It is common for the symptoms to be pronounced while sleeping. A pre-fabricated and custom-fitted elbow pad (light compression) is applied along the medial posterior aspect of the elbow. The elbow pad is worn throughout the day, serving to protect the ulnar nerve from direct micro trauma or pressure as the elbow rests on hard surfaces. A custom-fabricated and custom-fitted static elbow orthosis is fitted with the elbow in 45° to 65° of elbow flexion to wear at night. The orthosis is worn to avoid full flexion of the elbow. This is particularly beneficial for those that sleep in the fetal position, with the elbows flexed. If the patient is particularly symptomatic and has painful paresthesias during the waking hours, the elbow orthosis may be worn during the day. Normal, light use of the arm is encouraged in daily activity. Nerve gliding exercises for the ulnar nerve are encouraged 1-2 times a day. 5-10 repetitions are performed gently/slowly, with long, gentle end range stretches (15 seconds). The goal of the nerve gliding exercises is to mobilize the nerve sufficiently to reduce direct, localized pressure on the ulnar nerve. [Patient Handout] Moist heat for 5 minutes prior to the nerve gliding exercises is encouraged. Patient education is important. The patient should be advised to minimize repetitive flexion and extension of the elbow, prolonged flexion of the elbow and direct pressure to the medial-posterior aspect of the elbow. Common sources of direct pressure on the elbow include: resting the elbow on the car  window or console between the front seats of the car, the arm of a chair, or when working on projects where the elbows are propped up on a hard surface. In addition, activities (vocational or avocational) that elicit high motor demands with the flexor carpi ulnaris, may serve as a causative factor for this condition and should be avoided.    6 Weeks  Typically, the patient is re-evaluated by the physician. Should the patient’s symptoms be subsiding, the non-surgical management with therapy is continued. In those cases where conservative management did not benefit the patient, consideration may be given to surgery. In most cases this would be an insitu cubital tunnel release. Patients who have experienced complete resolution of symptoms are encouraged to avoid direct forces against the medial and posterior aspect of the elbow and are encouraged to wear the elbow pad an additional 1-2 months.    CONSIDERATIONS  When fabricating the custom orthosis, fit the orthosis on the volar aspect of the arm to limit pressure on the ulnar nerve. In addition to the custom orthosis, wearing an elbow pad is recommended, to limit direct pressure on the nerve. Patients with long-standing symptoms (>= 9 months) and/or have previous trauma to the elbow are likely candidates to proceed with surgery versus non-surgical management with therapy. Patients with traumatic elbow dislocation and/or an elbow fracture, should perform nerve gliding exercises as the AROM exercises are initiated. This may offset the likelihood of the ulnar nerve becoming totally entrapped in scar, causing both pain and traction on the nerve.    RECOMMENDED READING  Ricky, R. TIEN., AMY Ha., Antionette, SNereyda CLINE., Andi, S. S., Nedra, F. P., Foster, R. R., MARTA John., Kaylah, M. J. (1998). Changes in interstitial pressure and cross-sectional area of the cubital tunnel and of the ulnar nerve with flexion of the elbow: An experimental study in human cadavera.     DE  QUERVAIN’S TENOSYNOVITIS: Non-Surgical Management    DESCRIPTION OF DIAGNOSIS  De Quervain’s tenosynovitis is an inflammatory disorder of the 1st dorsal compartment, which is  occupied by the tendons of the extensor pollicis brevis (EPB) and the abductor pollicis longus (APL).  Characteristic signs include pain, swelling, range of motion deficits of the thumb, and commonly a  positive Finkelstein’s test. A Finkelstein’s test is performed by having the patient hold their thumb flexed  with their fingers and simultaneously position their wrist in ulnar deviation. The test is positive when there  is pain present at the base of the thumb.    NON-SURGICAL MANAGEMENT - THERAPY  0 - 6-8 Weeks  An initial evaluation is performed.  A custom-fabricated wrist and thumb static orthosis is fitted with the thumb IP joint free. The orthosis is  fabricated with the wrist in 15º of extension, the thumb midway between palmar and radial abduction,  and the thumb MP joint in 10º of flexion. The orthosis is to be worn at all times, except for basic ADLs to  begin each day. [Note: When fitting the orthosis it is important that the thumb is able to oppose the  index and long finger; otherwise, the orthosis becomes a functional handicap for the patient. This may  result in the orthosis being worn on a limited basis or not at all.]  If an injection was not performed, consideration may be given to include phonophoresis or  iontophoresis. Overall, the modalities have been of limited benefit.  Light massage along the radial side of the forearm, wrist and thumb area is encouraged 2-3 times a  day to promote circulation and healing to the underlying soft tissue structures.  Patient education: Avoid holding/cradling a baby in one arm with the wrist flexed and hand  supporting the baby’s neck and head. Holding the arm in a sustained stretch of this nature may be in  and of itself the root cause of the De Quervain’s. Similarly, avoid repetitive  pinching activities or  activities pinching with the wrist in flexion or ulnar deviation for long periods of time.    3 Weeks  Begin exercises to stretch the radial side of the wrist, the APL and EPB. For example, perform gentle  passive ulnar deviation of the wrist, with the palm on the tabletop. Once this stretch is easy to perform  and not painful, perform gentle passive adduction of the thumb combined with gentle passive  extension of the wrist. Once this exercise can be easily performed, conclude with gentle passive  flexion of the CMC and MP joints of the thumb combined with ulnar deviation of the wrist. These  passive stretches may be replicated with active motion as well. [Note: Add moist heat for 5 minutes  prior to the gentle passive stretches.] Perform the passive stretches 2-3 times a day, 15 repetitions,  holding the end-range for 30 seconds.    6 - 8 Weeks  The orthosis may be gradually discontinued over a 3-4 week span of time should the symptoms be  gradually resolving.  Kinesiotaping may help provide support and prevent discomfort when the orthosis is not worn.    CONSIDERATIONS  If the symptoms have completely resolved following 6-8 weeks of immobilization, the orthosis may be  discontinued. If the symptoms have improved but are not totally resolved, continuing the orthosis an  additional 3-4 weeks is recommended. If the symptoms remain unchanged, the surgeon may  recommend surgical intervention.  Patient education: Avoid repetitive activities requiring pinching and pulling with the thumb. This  becomes particularly important when the patient discontinues the orthosis and begins to use the hand  normally once again.  A treatment regimen largely focused on an immobilization orthosis alone has been shown to have  limited success in managing de Quervain’s tenosynovitis.     ORTHO ORDERS:  evaluate and treat     SPLINT:     5/23: provided scapular retraction, ulnar nerve glides, and de quervain's  exercises to perform at home   Manuals 5/30 6/3 6/6 6/10  5/27/25   Cervical Mob                  Manual massage B/l forearm and hands B/l forearm, hands and L trap R hand, forearm, L trap R hand/forearm  B/L hands, foreram and   IASTM  B/l forearm, hands and trap R hand, trap  Cupping neck and R thumb R hand/forearm  L thumb    KT tape Deferred  R thumb   L thumb   Neuro Re-Ed  5/30 6/3 6/6 6/10  5/27/25    Ulna Nerve Glides                   Ther Ex 5/30 6/3 6/6 6/10  5/27/25   Cervical Stretches Lateral w/towel 20 sec x 3 Lateral w/towel  20 sec x 3    lateral w/towel  20 sec x 3   Cervical ROM  Lateral bend  Rotation  Lateral Rotation  Flex/Ext 20 sec x 3 20 sec x 3       Elbow AROM         digiflex Green x 20 Green x 20 Green x 20 L only Green x 20  Red x 20   Rhomboid stretch         Scalene chair stretch  Middle   Anterior  Posterior   20 sec x 3 20 sec x 3    20 sec x 3   Finger abd Yellow RB x 20 Yellow RB x 20 Yellow RB x 20 left only Yellow RB x 20  Yellow RB x 20   Flex Bar  Twist  Bends  oscillations Thin red  X 20  X 20  30 sec  Thin red  X 20  X 20  30 sec deferred Thin red  X 20  X 20  30 sec  thin yellow  X 20  X 20  30 sec    Intrinsic strengthening        PW      Thumb  Intrinsic plus  Tension pin  Lg knob cw/ccw [place]  X 20  X 20  X 20  R/GTP b/l  [place] x 10 Teracotta  X 20  X 20  X 20  R/GTP b/l  X10 cw/ccw Teracotta  X 20  X 20  X 20 Teracotta  X 20  X 20  X 20  R/GTP b/l  X 10 cw/ccw Terac[place]  X 20  X 20  X 20  G/RTP B/L   Wrist AROM  Flex/ext  Ud/rd  Cw/ccw  1# DB  X 10/2  X 10/2  X 10/2 1# DB  X 10/2  X 10/2  X 10/2 1# DB  X 10/2  X 10/2  X 10/2    pulleys   X 20     Theraband  Ext  Tri  Row  IR  ER  Bicep curl   Green  X 15  X 15  X 15  X 15 Green  X 15  X 15  X 15  X 15  X 15  X 15    UBC   2F/2B L 60 3F/3B L 60    Ther Activity 5/30 6/3 6/6 6/10  5/27/25   Education on HEP                                                     Modalities 5/30 6/3 6/6 6/10  5/27/25    Ultrasound  Thumb  elbow   R thumb  50%  .8       E-STIM w/ MH          CP            MH            Ice Massage            Cupping

## 2025-06-16 NOTE — PROGRESS NOTES
Daily Note     Today's date: 2025  Patient name: Sri Mcgrath  : 1955  MRN: [id number]  Referring provider: Sea Hadley DO  Dx:   Encounter Diagnosis     ICD-10-CM    1. Pain in both hands  M79.641     M79.642       2. Entrapment of right ulnar nerve  G56.21       3. Right wrist pain  M25.531       4. Pain of left thumb  M79.645                    Visit/Unit Tracking  AUTH Status: Not Required Date 5/12 5/14 5/19 5/21 5/23 5/27 5/30 6/3 6/6 6/10 6/12 6/17   Visits  Authed: BOMN Used 1 (IE) 2 3 4 5 6 7 8 9 10 11 (RE) 12   FOTO COUNT  compelted                PLAN OF CARE START: 25  PLAN OF CARE END: 25  PROGRESS NOTE DUE/FOTO DUE: Needs to be scheduled in July   FREQUENCY: 2x week for 6 weeks   PRECAUTIONS hypertension, hyperlipidemia, stage 3 chronic kidney disease, primary osteoarthritis of multiple joints, R wrist broken years ago, tremor   DIAGNOSIS: R ulnar nerve compression possible, DeQuervain's possible, bilateral arthritis   INSURANCE: Medicare/Medicaid     Subjective: It's doing better. I just feel discomfort.      Objective: See treatment diary below      Assessment: Tolerated treatment well. Patient demonstrated fatigue post treatment, exhibited good technique with therapeutic exercises, and would benefit from continued OT. Reports some locking of RF/SF. Tolerated additional exercises and reps.      Plan: Continue per plan of care.  Progress treatment as tolerated.       INTERVENTIONS:     Cervical exercises/stretches   Cervical mobilizations   Activity modification/compensation techniques   Nerve glides (ulnar)  HEP  Exercises to target DeQuervains  Modalities for Arthritis   Light Strengthening of intrinsics/wrist  Kinesiotaping  Manual Massage  Motor coordination training  Cupping  Therapeutic activities  Ultrasound  Paraffin bath   Cryotherapy   Moist Heat  TENS      PROTOCOL: CUBITAL TUNNEL SYNDROME Non-Surgical Management    DESCRIPTION OF DIAGNOSIS  Cubital tunnel  syndrome consists of compression of the ulnar nerve as it passes through the cubital  tunnel along the elbow. The nerve compression results in paresthesias along the ulnar nerve distribution, affecting the small finger and ulnar side of the ring. In addition, with long standing cubital tunnel syndrome, there may be residual motor weakness of the ulnar nerve innervated intrinsic muscles. Severe, prolonged ulnar nerve compression may result in clawing of the ring and small fingers.    NON-SURGICAL MANAGEMENT - THERAPY  0 - 6 Weeks  An initial evaluation is performed. As part of the initial evaluation, include the following information:  onset of the medical condition, the frequency and duration of the symptoms, pain, functional  limitations, weakness and any daily tasks that provoke the symptoms. It is common for the symptoms to be pronounced while sleeping. A pre-fabricated and custom-fitted elbow pad (light compression) is applied along the medial posterior aspect of the elbow. The elbow pad is worn throughout the day, serving to protect the ulnar nerve from direct micro trauma or pressure as the elbow rests on hard surfaces. A custom-fabricated and custom-fitted static elbow orthosis is fitted with the elbow in 45° to 65° of elbow flexion to wear at night. The orthosis is worn to avoid full flexion of the elbow. This is particularly beneficial for those that sleep in the fetal position, with the elbows flexed. If the patient is particularly symptomatic and has painful paresthesias during the waking hours, the elbow orthosis may be worn during the day. Normal, light use of the arm is encouraged in daily activity. Nerve gliding exercises for the ulnar nerve are encouraged 1-2 times a day. 5-10 repetitions are performed gently/slowly, with long, gentle end range stretches (15 seconds). The goal of the nerve gliding exercises is to mobilize the nerve sufficiently to reduce direct, localized pressure on the ulnar nerve.  [Patient Handout] Moist heat for 5 minutes prior to the nerve gliding exercises is encouraged. Patient education is important. The patient should be advised to minimize repetitive flexion and extension of the elbow, prolonged flexion of the elbow and direct pressure to the medial-posterior aspect of the elbow. Common sources of direct pressure on the elbow include: resting the elbow on the car window or console between the front seats of the car, the arm of a chair, or when working on projects where the elbows are propped up on a hard surface. In addition, activities (vocational or avocational) that elicit high motor demands with the flexor carpi ulnaris, may serve as a causative factor for this condition and should be avoided.    6 Weeks  Typically, the patient is re-evaluated by the physician. Should the patient’s symptoms be subsiding, the non-surgical management with therapy is continued. In those cases where conservative management did not benefit the patient, consideration may be given to surgery. In most cases this would be an insitu cubital tunnel release. Patients who have experienced complete resolution of symptoms are encouraged to avoid direct forces against the medial and posterior aspect of the elbow and are encouraged to wear the elbow pad an additional 1-2 months.    CONSIDERATIONS  When fabricating the custom orthosis, fit the orthosis on the volar aspect of the arm to limit pressure on the ulnar nerve. In addition to the custom orthosis, wearing an elbow pad is recommended, to limit direct pressure on the nerve. Patients with long-standing symptoms (>= 9 months) and/or have previous trauma to the elbow are likely candidates to proceed with surgery versus non-surgical management with therapy. Patients with traumatic elbow dislocation and/or an elbow fracture, should perform nerve gliding exercises as the AROM exercises are initiated. This may offset the likelihood of the ulnar nerve becoming totally  entrapped in scar, causing both pain and traction on the nerve.    RECOMMENDED READING  MARY García., MATTIE Ha, KAM Adan., Andi SNereyda S., ISIDORO Sneed., Foster RNereyda ELAM., CRIS John, ANN MARIE Adrian (1998). Changes in interstitial pressure and cross-sectional area of the cubital tunnel and of the ulnar nerve with flexion of the elbow: An experimental study in human cadavera.     DE QUERVAIN’S TENOSYNOVITIS: Non-Surgical Management    DESCRIPTION OF DIAGNOSIS  De Quervain’s tenosynovitis is an inflammatory disorder of the 1st dorsal compartment, which is  occupied by the tendons of the extensor pollicis brevis (EPB) and the abductor pollicis longus (APL).  Characteristic signs include pain, swelling, range of motion deficits of the thumb, and commonly a  positive Finkelstein’s test. A Finkelstein’s test is performed by having the patient hold their thumb flexed  with their fingers and simultaneously position their wrist in ulnar deviation. The test is positive when there  is pain present at the base of the thumb.    NON-SURGICAL MANAGEMENT - THERAPY  0 - 6-8 Weeks  An initial evaluation is performed.  A custom-fabricated wrist and thumb static orthosis is fitted with the thumb IP joint free. The orthosis is  fabricated with the wrist in 15º of extension, the thumb midway between palmar and radial abduction,  and the thumb MP joint in 10º of flexion. The orthosis is to be worn at all times, except for basic ADLs to  begin each day. [Note: When fitting the orthosis it is important that the thumb is able to oppose the  index and long finger; otherwise, the orthosis becomes a functional handicap for the patient. This may  result in the orthosis being worn on a limited basis or not at all.]  If an injection was not performed, consideration may be given to include phonophoresis or  iontophoresis. Overall, the modalities have been of limited benefit.  Light massage along the radial side of the forearm, wrist and  thumb area is encouraged 2-3 times a  day to promote circulation and healing to the underlying soft tissue structures.  Patient education: Avoid holding/cradling a baby in one arm with the wrist flexed and hand  supporting the baby’s neck and head. Holding the arm in a sustained stretch of this nature may be in  and of itself the root cause of the De Quervain’s. Similarly, avoid repetitive pinching activities or  activities pinching with the wrist in flexion or ulnar deviation for long periods of time.    3 Weeks  Begin exercises to stretch the radial side of the wrist, the APL and EPB. For example, perform gentle  passive ulnar deviation of the wrist, with the palm on the tabletop. Once this stretch is easy to perform  and not painful, perform gentle passive adduction of the thumb combined with gentle passive  extension of the wrist. Once this exercise can be easily performed, conclude with gentle passive  flexion of the CMC and MP joints of the thumb combined with ulnar deviation of the wrist. These  passive stretches may be replicated with active motion as well. [Note: Add moist heat for 5 minutes  prior to the gentle passive stretches.] Perform the passive stretches 2-3 times a day, 15 repetitions,  holding the end-range for 30 seconds.    6 - 8 Weeks  The orthosis may be gradually discontinued over a 3-4 week span of time should the symptoms be  gradually resolving.  Kinesiotaping may help provide support and prevent discomfort when the orthosis is not worn.    CONSIDERATIONS  If the symptoms have completely resolved following 6-8 weeks of immobilization, the orthosis may be  discontinued. If the symptoms have improved but are not totally resolved, continuing the orthosis an  additional 3-4 weeks is recommended. If the symptoms remain unchanged, the surgeon may  recommend surgical intervention.  Patient education: Avoid repetitive activities requiring pinching and pulling with the thumb. This  becomes  particularly important when the patient discontinues the orthosis and begins to use the hand  normally once again.  A treatment regimen largely focused on an immobilization orthosis alone has been shown to have  limited success in managing de Quervain’s tenosynovitis.     ORTHO ORDERS:  evaluate and treat     SPLINT:     5/23: provided scapular retraction, ulnar nerve glides, and de quervain's exercises to perform at home   Manuals 5/30 6/3 6/6 6/10 6/17   Cervical Mob                Manual massage B/l forearm and hands B/l forearm, hands and L trap R hand, forearm, L trap R hand/forearm R hand   IASTM  B/l forearm, hands and trap R hand, trap  Cupping neck and R thumb R hand/forearm     KT tape Deferred  R thumb     Neuro Re-Ed  5/30 6/3 6/6 6/10 6/17    Ulna Nerve Glides                 Ther Ex 5/30 6/3 6/6 6/10 6/17   Cervical Stretches Lateral w/towel 20 sec x 3 Lateral w/towel  20 sec x 3      Cervical ROM  Lateral bend  Rotation  Lateral Rotation  Flex/Ext 20 sec x 3 20 sec x 3      theraputty     Yellow search pinch and pull   digiflex Green x 20 Green x 20 Green x 20 L only Green x 20 Green x 25   Prayer stretch     20 sec x 3   Scalene chair stretch  Middle   Anterior  Posterior   20 sec x 3 20 sec x 3      Finger abd Yellow RB x 20 Yellow RB x 20 Yellow RB x 20 left only Yellow RB x 20 Yellow RB x 25   Flex Bar  Twist  Bends  oscillations Thin red  X 20  X 20  30 sec  Thin red  X 20  X 20  30 sec deferred Thin red  X 20  X 20  30 sec Green  X 20  X 20  30 sec   Intrinsic strengthening        PW      Thumb  Intrinsic plus  Tension pin  Lg knob cw/ccw Teracotta  X 20  X 20  X 20  R/GTP b/l  Elmore x 10 Teracotta  X 20  X 20  X 20  R/GTP b/l  X10 cw/ccw Teracotta  X 20  X 20  X 20 Teracotta  X 20  X 20  X 20  R/GTP b/l  X 10 cw/ccw Teracotta  X 25  X 25  X 25  G/BTP b/l  X 10 cw/ccw   Wrist AROM  Flex/ext  Ud/rd  Cw/ccw  1# DB  X 10/2  X 10/2  X 10/2 1# DB  X 10/2  X 10/2  X 10/2 1# DB  X 10/2  X 10/2  X  10/2 2#  X 20  X 20  X 20   pulleys   X 20     Theraband  Ext  Tri  Row  IR  ER  Bicep curl   Green  X 15  X 15  X 15  X 15 Green  X 15  X 15  X 15  X 15  X 15  X 15 Green  X 20  X 20  X 20  X 20  X 20  X 20   UBC   2F/2B L 60 3F/3B L 60 3F/3B L 60   Ther Activity 5/30 6/3 6/6 6/10 6/17   Education on HEP                                                Modalities 5/30 6/3 6/6 6/10 6/17   Ultrasound  Thumb  elbow   R thumb  50%  .8       E-STIM w/ MH          CP            MH            Ice Massage            Cupping

## 2025-06-17 ENCOUNTER — OFFICE VISIT (OUTPATIENT)
Dept: OCCUPATIONAL THERAPY | Facility: CLINIC | Age: 70
End: 2025-06-17
Payer: MEDICARE

## 2025-06-17 DIAGNOSIS — M25.531 RIGHT WRIST PAIN: ICD-10-CM

## 2025-06-17 DIAGNOSIS — M79.641 PAIN IN BOTH HANDS: Primary | ICD-10-CM

## 2025-06-17 DIAGNOSIS — G56.21 ENTRAPMENT OF RIGHT ULNAR NERVE: ICD-10-CM

## 2025-06-17 DIAGNOSIS — M79.642 PAIN IN BOTH HANDS: Primary | ICD-10-CM

## 2025-06-17 DIAGNOSIS — M79.645 PAIN OF LEFT THUMB: ICD-10-CM

## 2025-06-17 PROCEDURE — 97110 THERAPEUTIC EXERCISES: CPT

## 2025-06-18 ENCOUNTER — TELEPHONE (OUTPATIENT)
Dept: NEUROLOGY | Facility: CLINIC | Age: 70
End: 2025-06-18

## 2025-06-18 NOTE — TELEPHONE ENCOUNTER
LMOM that appointment will be changed to virtual if patient needs to reschedule or cancel can call office phone number provided.

## 2025-06-18 NOTE — PROGRESS NOTES
Daily Note     Today's date: 2025  Patient name: Sri Mcgrath  : 1955  MRN: [id number]  Referring provider: Sea Hadley DO  Dx:   Encounter Diagnosis     ICD-10-CM    1. Pain in both hands  M79.641     M79.642       2. Entrapment of right ulnar nerve  G56.21       3. Right wrist pain  M25.531       4. Pain of left thumb  M79.645                    Visit/Unit Tracking  AUTH Status: Not Required Date 5/12 5/14 5/19 5/21 5/23 5/27 5/30 6/3 6/6 6/10 6/12 6/17   Visits  Authed: BOMN Used 1 (IE) 2 3 4 5 6 7 8 9 10 11 (RE) 12   FOTO COUNT  compelted                PLAN OF CARE START: 25  PLAN OF CARE END: 25  PROGRESS NOTE DUE/FOTO DUE: Needs to be scheduled in July   FREQUENCY: 2x week for 6 weeks   PRECAUTIONS hypertension, hyperlipidemia, stage 3 chronic kidney disease, primary osteoarthritis of multiple joints, R wrist broken years ago, tremor   DIAGNOSIS: R ulnar nerve compression possible, DeQuervain's possible, bilateral arthritis   INSURANCE: Medicare/Medicaid     Subjective: I actually am doing really good.      Objective: See treatment diary below      Assessment: Tolerated treatment well. Patient demonstrated fatigue post treatment, exhibited good technique with therapeutic exercises, and would benefit from continued OT.  Tolerated additional exercises and increase strength well. Denied pain post tx.       Plan: Continue per plan of care.  Progress treatment as tolerated.       INTERVENTIONS:     Cervical exercises/stretches   Cervical mobilizations   Activity modification/compensation techniques   Nerve glides (ulnar)  HEP  Exercises to target DeQuervains  Modalities for Arthritis   Light Strengthening of intrinsics/wrist  Kinesiotaping  Manual Massage  Motor coordination training  Cupping  Therapeutic activities  Ultrasound  Paraffin bath   Cryotherapy   Moist Heat  TENS      PROTOCOL: CUBITAL TUNNEL SYNDROME Non-Surgical Management    DESCRIPTION OF DIAGNOSIS  Cubital tunnel  syndrome consists of compression of the ulnar nerve as it passes through the cubital  tunnel along the elbow. The nerve compression results in paresthesias along the ulnar nerve distribution, affecting the small finger and ulnar side of the ring. In addition, with long standing cubital tunnel syndrome, there may be residual motor weakness of the ulnar nerve innervated intrinsic muscles. Severe, prolonged ulnar nerve compression may result in clawing of the ring and small fingers.    NON-SURGICAL MANAGEMENT - THERAPY  0 - 6 Weeks  An initial evaluation is performed. As part of the initial evaluation, include the following information:  onset of the medical condition, the frequency and duration of the symptoms, pain, functional  limitations, weakness and any daily tasks that provoke the symptoms. It is common for the symptoms to be pronounced while sleeping. A pre-fabricated and custom-fitted elbow pad (light compression) is applied along the medial posterior aspect of the elbow. The elbow pad is worn throughout the day, serving to protect the ulnar nerve from direct micro trauma or pressure as the elbow rests on hard surfaces. A custom-fabricated and custom-fitted static elbow orthosis is fitted with the elbow in 45° to 65° of elbow flexion to wear at night. The orthosis is worn to avoid full flexion of the elbow. This is particularly beneficial for those that sleep in the fetal position, with the elbows flexed. If the patient is particularly symptomatic and has painful paresthesias during the waking hours, the elbow orthosis may be worn during the day. Normal, light use of the arm is encouraged in daily activity. Nerve gliding exercises for the ulnar nerve are encouraged 1-2 times a day. 5-10 repetitions are performed gently/slowly, with long, gentle end range stretches (15 seconds). The goal of the nerve gliding exercises is to mobilize the nerve sufficiently to reduce direct, localized pressure on the ulnar nerve.  [Patient Handout] Moist heat for 5 minutes prior to the nerve gliding exercises is encouraged. Patient education is important. The patient should be advised to minimize repetitive flexion and extension of the elbow, prolonged flexion of the elbow and direct pressure to the medial-posterior aspect of the elbow. Common sources of direct pressure on the elbow include: resting the elbow on the car window or console between the front seats of the car, the arm of a chair, or when working on projects where the elbows are propped up on a hard surface. In addition, activities (vocational or avocational) that elicit high motor demands with the flexor carpi ulnaris, may serve as a causative factor for this condition and should be avoided.    6 Weeks  Typically, the patient is re-evaluated by the physician. Should the patient’s symptoms be subsiding, the non-surgical management with therapy is continued. In those cases where conservative management did not benefit the patient, consideration may be given to surgery. In most cases this would be an insitu cubital tunnel release. Patients who have experienced complete resolution of symptoms are encouraged to avoid direct forces against the medial and posterior aspect of the elbow and are encouraged to wear the elbow pad an additional 1-2 months.    CONSIDERATIONS  When fabricating the custom orthosis, fit the orthosis on the volar aspect of the arm to limit pressure on the ulnar nerve. In addition to the custom orthosis, wearing an elbow pad is recommended, to limit direct pressure on the nerve. Patients with long-standing symptoms (>= 9 months) and/or have previous trauma to the elbow are likely candidates to proceed with surgery versus non-surgical management with therapy. Patients with traumatic elbow dislocation and/or an elbow fracture, should perform nerve gliding exercises as the AROM exercises are initiated. This may offset the likelihood of the ulnar nerve becoming totally  entrapped in scar, causing both pain and traction on the nerve.    RECOMMENDED READING  MARY García., MATTIE Ha, KAM Adan., Andi SNereyda S., ISIDORO Sneed., Foster RNereyda ELAM., CRIS John, ANN MARIE Adrian (1998). Changes in interstitial pressure and cross-sectional area of the cubital tunnel and of the ulnar nerve with flexion of the elbow: An experimental study in human cadavera.     DE QUERVAIN’S TENOSYNOVITIS: Non-Surgical Management    DESCRIPTION OF DIAGNOSIS  De Quervain’s tenosynovitis is an inflammatory disorder of the 1st dorsal compartment, which is  occupied by the tendons of the extensor pollicis brevis (EPB) and the abductor pollicis longus (APL).  Characteristic signs include pain, swelling, range of motion deficits of the thumb, and commonly a  positive Finkelstein’s test. A Finkelstein’s test is performed by having the patient hold their thumb flexed  with their fingers and simultaneously position their wrist in ulnar deviation. The test is positive when there  is pain present at the base of the thumb.    NON-SURGICAL MANAGEMENT - THERAPY  0 - 6-8 Weeks  An initial evaluation is performed.  A custom-fabricated wrist and thumb static orthosis is fitted with the thumb IP joint free. The orthosis is  fabricated with the wrist in 15º of extension, the thumb midway between palmar and radial abduction,  and the thumb MP joint in 10º of flexion. The orthosis is to be worn at all times, except for basic ADLs to  begin each day. [Note: When fitting the orthosis it is important that the thumb is able to oppose the  index and long finger; otherwise, the orthosis becomes a functional handicap for the patient. This may  result in the orthosis being worn on a limited basis or not at all.]  If an injection was not performed, consideration may be given to include phonophoresis or  iontophoresis. Overall, the modalities have been of limited benefit.  Light massage along the radial side of the forearm, wrist and  thumb area is encouraged 2-3 times a  day to promote circulation and healing to the underlying soft tissue structures.  Patient education: Avoid holding/cradling a baby in one arm with the wrist flexed and hand  supporting the baby’s neck and head. Holding the arm in a sustained stretch of this nature may be in  and of itself the root cause of the De Quervain’s. Similarly, avoid repetitive pinching activities or  activities pinching with the wrist in flexion or ulnar deviation for long periods of time.    3 Weeks  Begin exercises to stretch the radial side of the wrist, the APL and EPB. For example, perform gentle  passive ulnar deviation of the wrist, with the palm on the tabletop. Once this stretch is easy to perform  and not painful, perform gentle passive adduction of the thumb combined with gentle passive  extension of the wrist. Once this exercise can be easily performed, conclude with gentle passive  flexion of the CMC and MP joints of the thumb combined with ulnar deviation of the wrist. These  passive stretches may be replicated with active motion as well. [Note: Add moist heat for 5 minutes  prior to the gentle passive stretches.] Perform the passive stretches 2-3 times a day, 15 repetitions,  holding the end-range for 30 seconds.    6 - 8 Weeks  The orthosis may be gradually discontinued over a 3-4 week span of time should the symptoms be  gradually resolving.  Kinesiotaping may help provide support and prevent discomfort when the orthosis is not worn.    CONSIDERATIONS  If the symptoms have completely resolved following 6-8 weeks of immobilization, the orthosis may be  discontinued. If the symptoms have improved but are not totally resolved, continuing the orthosis an  additional 3-4 weeks is recommended. If the symptoms remain unchanged, the surgeon may  recommend surgical intervention.  Patient education: Avoid repetitive activities requiring pinching and pulling with the thumb. This  becomes  particularly important when the patient discontinues the orthosis and begins to use the hand  normally once again.  A treatment regimen largely focused on an immobilization orthosis alone has been shown to have  limited success in managing de Quervain’s tenosynovitis.     ORTHO ORDERS:  evaluate and treat     SPLINT:     5/23: provided scapular retraction, ulnar nerve glides, and de quervain's exercises to perform at home   Manuals 6/19  6/6 6/10 6/17   Cervical Mob                Manual massage   R hand, forearm, L trap R hand/forearm R hand   IASTM   R hand, trap  Cupping neck and R thumb R hand/forearm     KT tape   R thumb     Neuro Re-Ed  6/19  6/6 6/10 6/17    Ulna Nerve Glides                 Ther Ex 6/19  6/6 6/10 6/17   Cervical Stretches        Cervical ROM  Lateral bend  Rotation  Lateral Rotation  Flex/Ext        theraputty     Yellow search pinch and pull   digiflex Blue x 20  Green x 20 L only Green x 20 Green x 25   Prayer stretch     20 sec x 3   Scalene chair stretch  Middle   Anterior  Posterior          Finger abd Blue L 2  X 20  Yellow RB x 20 left only Yellow RB x 20 Yellow RB x 25   Flex Bar  Twist  Bends  oscillations Green  X 20  X 20  30 sec  deferred Thin red  X 20  X 20  30 sec Green  X 20  X 20  30 sec   Intrinsic strengthening Red theraputty  2 rows       PW      Thumb  Intrinsic plus  Tension pin  Lg knob cw/ccw   Teracotta  X 20  X 20  X 20 Teracotta  X 20  X 20  X 20  R/GTP b/l  X 10 cw/ccw Teracotta  X 25  X 25  X 25  G/BTP b/l  X 10 cw/ccw   Wrist AROM  Flex/ext  Ud/rd  Cw/ccw 2#  X 20  X 20  X 20  1# DB  X 10/2  X 10/2  X 10/2 1# DB  X 10/2  X 10/2  X 10/2 2#  X 20  X 20  X 20   Elbow flexion 2# x 30  X 20     Theraband  Ext  Tri  Row  IR  ER  Bicep curl Blue  X 20  X 20  X 20  X 20  X 20  X 20  Green  X 15  X 15  X 15  X 15 Green  X 15  X 15  X 15  X 15  X 15  X 15 Green  X 20  X 20  X 20  X 20  X 20  X 20   UBC 4F/4B L 60  2F/2B L 60 3F/3B L 60 3F/3B L 60   Ther Activity 6/19   6/6 6/10 6/17   Education on HEP                                                Modalities 6/19 6/3 6/6 6/10 6/17   Ultrasound  Thumb  elbow   R thumb  50%  .8       E-STIM w/ MH          CP            MH            Ice Massage            Cupping

## 2025-06-19 ENCOUNTER — OFFICE VISIT (OUTPATIENT)
Dept: OCCUPATIONAL THERAPY | Facility: CLINIC | Age: 70
End: 2025-06-19
Payer: MEDICARE

## 2025-06-19 DIAGNOSIS — G56.21 ENTRAPMENT OF RIGHT ULNAR NERVE: ICD-10-CM

## 2025-06-19 DIAGNOSIS — M79.645 PAIN OF LEFT THUMB: ICD-10-CM

## 2025-06-19 DIAGNOSIS — M79.642 PAIN IN BOTH HANDS: Primary | ICD-10-CM

## 2025-06-19 DIAGNOSIS — M79.641 PAIN IN BOTH HANDS: Primary | ICD-10-CM

## 2025-06-19 DIAGNOSIS — M25.531 RIGHT WRIST PAIN: ICD-10-CM

## 2025-06-19 PROCEDURE — 97110 THERAPEUTIC EXERCISES: CPT

## 2025-06-25 ENCOUNTER — TELEPHONE (OUTPATIENT)
Age: 70
End: 2025-06-25

## 2025-06-25 DIAGNOSIS — R25.9 MIXED ACTION AND RESTING TREMOR: ICD-10-CM

## 2025-06-25 NOTE — TELEPHONE ENCOUNTER
Patient called in and stated that the following medication needs to be switch from [facility] to [facility].     propranolol (INDERAL LA) 60 mg 24 hr capsule [[id number]]    Needs new script  to be transferred to [facility] pharmacy Located in: [facility]  Address: [address] [place] [address] [place] [address]  Phone: [phone]    Pt only has 2 pills left and said she has 5 refills.     Please assist.  Pt requesting a call back once transferred over and ready to be picked up.     I also provided [employer] phone # [phone] opt 5 to assist with resetting her pin and PW.

## 2025-06-26 RX ORDER — PROPRANOLOL HYDROCHLORIDE 60 MG/1
60 CAPSULE, EXTENDED RELEASE ORAL DAILY
Qty: 90 CAPSULE | Refills: 0 | Status: SHIPPED | OUTPATIENT
Start: 2025-06-26

## 2025-07-07 ENCOUNTER — OFFICE VISIT (OUTPATIENT)
Dept: OCCUPATIONAL THERAPY | Facility: CLINIC | Age: 70
End: 2025-07-07
Payer: MEDICARE

## 2025-07-07 ENCOUNTER — OFFICE VISIT (OUTPATIENT)
Dept: SLEEP CENTER | Facility: CLINIC | Age: 70
End: 2025-07-07
Payer: MEDICARE

## 2025-07-07 VITALS
TEMPERATURE: 97.5 F | SYSTOLIC BLOOD PRESSURE: 140 MMHG | WEIGHT: 195 LBS | BODY MASS INDEX: 38.28 KG/M2 | OXYGEN SATURATION: 97 % | HEART RATE: 69 BPM | DIASTOLIC BLOOD PRESSURE: 82 MMHG | HEIGHT: 60 IN

## 2025-07-07 DIAGNOSIS — E66.01 CLASS 2 SEVERE OBESITY DUE TO EXCESS CALORIES WITH SERIOUS COMORBIDITY AND BODY MASS INDEX (BMI) OF 37.0 TO 37.9 IN ADULT (HCC): ICD-10-CM

## 2025-07-07 DIAGNOSIS — M25.531 RIGHT WRIST PAIN: ICD-10-CM

## 2025-07-07 DIAGNOSIS — M79.641 PAIN IN BOTH HANDS: Primary | ICD-10-CM

## 2025-07-07 DIAGNOSIS — I10 HYPERTENSION, UNSPECIFIED TYPE: ICD-10-CM

## 2025-07-07 DIAGNOSIS — G47.33 OBSTRUCTIVE SLEEP APNEA TREATED WITH BILEVEL POSITIVE AIRWAY PRESSURE (BIPAP): Primary | ICD-10-CM

## 2025-07-07 DIAGNOSIS — M79.642 PAIN IN BOTH HANDS: Primary | ICD-10-CM

## 2025-07-07 DIAGNOSIS — E66.812 CLASS 2 SEVERE OBESITY DUE TO EXCESS CALORIES WITH SERIOUS COMORBIDITY AND BODY MASS INDEX (BMI) OF 37.0 TO 37.9 IN ADULT (HCC): ICD-10-CM

## 2025-07-07 DIAGNOSIS — M79.645 PAIN OF LEFT THUMB: ICD-10-CM

## 2025-07-07 DIAGNOSIS — G56.21 ENTRAPMENT OF RIGHT ULNAR NERVE: ICD-10-CM

## 2025-07-07 PROCEDURE — 97140 MANUAL THERAPY 1/> REGIONS: CPT

## 2025-07-07 PROCEDURE — 97110 THERAPEUTIC EXERCISES: CPT

## 2025-07-07 PROCEDURE — G2211 COMPLEX E/M VISIT ADD ON: HCPCS | Performed by: NURSE PRACTITIONER

## 2025-07-07 PROCEDURE — 99214 OFFICE O/P EST MOD 30 MIN: CPT | Performed by: NURSE PRACTITIONER

## 2025-07-07 RX ORDER — TRIAMCINOLONE ACETONIDE 1 MG/G
CREAM TOPICAL 2 TIMES DAILY
COMMUNITY
Start: 2025-05-14 | End: 2025-07-07 | Stop reason: ALTCHOICE

## 2025-07-07 RX ORDER — CLOBETASOL PROPIONATE 0.5 MG/G
CREAM TOPICAL 2 TIMES DAILY
COMMUNITY
Start: 2025-06-27

## 2025-07-07 NOTE — PATIENT INSTRUCTIONS
Patient Instructions:    1.  Continue use of PAP equipment nightly  2.  Continue to clean your equipment, as discussed  3.  Contact [facility] with any questions or concerns prior to your next visit, as needed  4.  Schedule visit for follow-up in 6 months  5.  Consider restarting use of the [employer] scout and call or message is events are consistently higher than 5  6.  If events are still elevated with the changes, we will need to do a titration study      Suggested PAP supply Replacement Frequency  Disposable filters....................................every 2 weeks  Replaceable nasal mask cushions.........every 2 weeks  Replaceable full face cushions...............every 1 month  Mask.......................................................every 3 months  Tubing.....................................................every 3 months  Head Gear..............................................every 6 months  Water chamber.......................................every 6 months         Thank you for trusting me with your care!    I know we often  cover a lot of information at the visit, so if you have follow-up questions, are unclear about the plan, or feel there were important items that we did not discuss or you did not receive clarity on, please don't hesitate to reach out to me.     [employer] messages are preferred for routine matters.  Please make sure to call for urgent matters as there can be a delay in responding to questions over [employer].    IMPORTANT- Prior to a sleep study (at home or in the sleep lab), I strongly recommend contacting your medical insurance first to understand your benefits (including deductible if applicable), coverage for this test, and out of pocket costs.  Even if the test is approved by medical insurance, the cost to you is determined by your medical benefits.   If you have concerns about your out of pocket costs for sleep testing, please contact me/the office before you complete the test  and we can discuss if there are alternate options.     I recommend following this advice in general before any lab test, imaging test, doctor visit, surgery, or ordering CPAP supplies as it is best to understand your coverage to avoid unexpected bills after the fact.       Nursing Support:  When: Monday through Friday 7:30A-4:30PM except holidays  Where: Our direct line is [phone]  *3  *1.      If you are having a true emergency please call 911.  In the event that the line is busy or it is after hours please leave a voice message and we will return your call.  Please speak clearly, leaving your full name, birth date, best number to reach you and the reason for your call.   Medication refills: We will need the name of the medication, the dosage, the ordering provider, whether you get a 30 or 90 day refill, and the pharmacy name and address.  Medications will be ordered by the provider only.  Nurses cannot call in prescriptions.  Please allow 7 days for medication refills.  Physician requested updates: If your provider requested that you call with an update after starting medication, please be ready to provide us the medication and dosage, what time you take your medication, the time you attempt to fall asleep, time you fall asleep, when you wake up, and what time you get out of bed.  Sleep Study Results: We will contact you with sleep study results and/or next steps after the physician has reviewed your testing.

## 2025-07-07 NOTE — PROGRESS NOTES
Name: Sri Mcgrath      : 1955      MRN: [id number]  Encounter Provider: IVAN Orozco  Encounter Date: 2025   Encounter department: [facility] SLEEP MEDICINE [place]  :  Assessment & Plan  Obstructive sleep apnea treated with bilevel positive airway pressure (BiPAP)  Today, we reviewed the results of the split night sleep study.  The importance of using the BiPAP equipment to treat the severe REKHA and to avoid consequences of untreated REKHA was discussed.  Compliance data was reviewed.  Initially, she had been using the BiPAP equipment nightly.  She initially met the compliance, but more recently, usage has decreased.  She admits that some nights she doesn't feel like she has enough air to start.  She likes the full face mask she uses.  She notices air leaks from the mask at times that bother her and cause her to remove it.  She doesn't feel she is able to put it back on.  On nights when she can keep it on all night and sleep for 5-6 hours, she feels very refreshed.        Current PAP Compliance Data:  Average usage:  She has been able to use the equipment 97% of all days recorded.  Average usage was 4 or more hours 97% of all days recorded.  Average hours used:  7 hours and 23 minutes  Average time in an air leak:  large at times - up to 85.9 L/min   Average pressure:  21.1/17.1 cm of water pressure  Residual AHI:  12.7/hour, including 6.6 OA, 2.7 CA, 0.7 hypopneas, 2.7 unknown      The patient feels they benefit from the use of PAP equipment and would like to continue PAP therapy.    Response to treatment has been fairly good.    The patient is at goal for hours of PAP use and not at goal for effectiveness of treatment.  A pressure change was ordered.  A mask fitting was ordered.    A prescription for supplies has been provided to last for the next year.  The patient was advised to continue to clean the equipment appropriately, as discussed and to change supplies regularly.  She was advised to  restart use of the [employer] scout and to call or message if events are consistently higher than 5/hour.      She will continue using this equipment at the settings noted above for the next 6 months.  At that timeshe will then return for a routine follow-up evaluation. I have asked the patient to contact the [facility] if she encounters any difficulties prior to that time.  Orders:    Resmed DME Pressure Change    PAP DME Resupply/Reorder    Hypertension, unspecified type  Hypertension - Blood pressure is slightly elevated today.  We reviewed the association between untreated obstructive sleep apnea and the increased risk for hypertension. Patient to continue on prescribed anti-hypertensive therapy (propranolol) and follow up with PCP for continuity of care.     Orders:    Resmed DME Pressure Change    PAP DME Resupply/Reorder    Class 2 severe obesity due to excess calories with serious comorbidity and body mass index (BMI) of 37.0 to 37.9 in adult (HCC)  Obesity - We reviewed the association of obesity on obstructive sleep apnea and sleep disordered breathing. Encouraged patient to follow healthy diet, regular exercise regimen, and pursue weight loss to manage symptoms.     Orders:    Resmed DME Pressure Change        History of Present Illness   Sri Mcgrath is a 69 year old female, who presents for follow up of moderate obstructive sleep apnea.  She has a PMHx of obesity, HTN, CKD stage 3, depression, chronic pain, vitamin D deficiency and vitamin B12 deficiency.  She completed a diagnostic sleep study in August 2021.  Weight at the time of the study was 208 pounds.  AHI was 20.8 worsening to 53 in REM sleep.  Oxygen jethro was 79% with 17.3 minutes spent at SpO2 less than 90%.  She then completed a CPAP titration study with best setting noted at close to 19.  Recommendation for settings was APAP 10 to 20 cm of water pressure.  Unfortunately, she did not follow up and treatment was never started.  She  "repeated a split night sleep study in December 2024.  During the study, there were a total of 119 respiratory events made up of 59 obstructive apneas, 2 mixed apneas, 0 central apneas, and 58 hypopneas resulting in an apnea/hypopnea index was 61.0 events per hour of sleep.  The AHI in the supine position was 61.0.  The AHI during REM sleep was 65.1.  The mean oxygen saturation was 94.4%.  The amount of sleep time below 90% was 12.4 minutes.  The lowest oxygen saturation was 72.0%.  Positive Airway Pressure was initiated at 4 cmH2O pressure and titrated to a maximum of 15 cmH2O pressure using a small Resmed Airfit F20 full face mask interface.  With use of CPAP, the mean oxygen saturation was 93.9%.  The amount of sleep time below 90% was 6.8 minutes.  The lowest oxygen saturation was 81.0%.  The use of CPAP was ineffective during the study and treatment with BiPAP equipment was started in February 2025.  She returns to review compliance and effectiveness of treatment at the earliest available appointment.          Review of Systems  Pertinent positives/negatives included in HPI and also as noted: none    Medical History Reviewed by provider this encounter:  Tobacco  Allergies  Meds  Problems  Med Hx  Surg Hx  Fam Hx     .  Medications Ordered Prior to Encounter[1]   Objective   /82 (BP Location: Right arm, Patient Position: Sitting, Cuff Size: Large)   Pulse 69   Temp 97.5 °F (36.4 °C) (Temporal)   Ht 4' 11.5\" (1.511 m)   Wt 88.5 kg (195 lb)   SpO2 97%   BMI 38.73 kg/m²        Physical Exam  Visit Vitals  /82 (BP Location: Right arm, Patient Position: Sitting, Cuff Size: Large)   Pulse 69   Temp 97.5 °F (36.4 °C) (Temporal)   Ht 4' 11.5\" (1.511 m)   Wt 88.5 kg (195 lb)   SpO2 97%   BMI 38.73 kg/m²   OB Status Hysterectomy   Smoking Status Never   BSA 1.84 m²         Constitutional: Alert, cooperative, no distress, appears stated age, obese  Skin: Warm, dry, no rashes noted  Lungs: Clear to " auscultation bilaterally, respirations unlabored  Heart: Normal rate and regular rhythm, S1/2 normal, no murmur noted, no rub or gallop  Extremities: Normal, no pedal edema  Neuro: No focal deficits noted      Data  Lab Results   Component Value Date    HGB 15.4 01/06/2025    HCT 45.8 01/06/2025    MCV 86 01/06/2025      Lab Results   Component Value Date    GLUCOSE 116 09/24/2014    CALCIUM 9.8 01/06/2025     09/24/2014    K 4.0 01/06/2025    CO2 29 01/06/2025     01/06/2025    BUN 10 01/06/2025    CREATININE 0.84 01/06/2025     Lab Results   Component Value Date    IRON 107 01/06/2025    TIBC 383.6 01/06/2025    FERRITIN 77 01/06/2025     Lab Results   Component Value Date    AST 14 01/06/2025    ALT 14 01/06/2025       Administrative Statements   I have spent a total time of 30 minutes in caring for this patient on the day of the visit/encounter including Risks and benefits of tx options, Instructions for management, Patient and family education, Importance of tx compliance, Risk factor reductions, Impressions, Counseling / Coordination of care, Documenting in the medical record, and Reviewing/placing orders in the medical record (including tests, medications, and/or procedures).         [1]   Current Outpatient Medications on File Prior to Visit   Medication Sig Dispense Refill    Azelastine HCl 137 MCG/SPRAY SOLN 1 spray by Each Nare route 2 (two) times a day      clobetasol (TEMOVATE) 0.05 % cream Apply topically 2 (two) times a day      levocetirizine (XYZAL) 5 MG tablet take 1 tablet by mouth at bedtime if needed as directed      meloxicam (MOBIC) 7.5 mg tablet Take 1 tablet (7.5 mg total) by mouth daily (Patient taking differently: Take 7.5 mg by mouth if needed for moderate pain) 30 tablet 0    montelukast (SINGULAIR) 10 mg tablet Take 1 tablet (10 mg total) by mouth daily at bedtime      propranolol (INDERAL LA) 60 mg 24 hr capsule Take 1 capsule (60 mg total) by mouth daily 90 capsule 0     rosuvastatin (CRESTOR) 10 MG tablet take 1 tablet by mouth once daily 90 tablet 1    [DISCONTINUED] fluticasone (FLONASE) 50 mcg/act nasal spray 1 spray into each nostril daily (Patient taking differently: 1 spray into each nostril if needed for rhinitis or allergies) 18.2 mL 0    [DISCONTINUED] triamcinolone (KENALOG) 0.1 % cream Apply topically 2 (two) times a day (Patient not taking: Reported on 7/7/2025)       No current facility-administered medications on file prior to visit.

## 2025-07-07 NOTE — PROGRESS NOTES
Daily Note     Today's date: 2025  Patient name: Sri Mcgrath  : 1955  MRN: [id number]  Referring provider: Sea Hadley DO  Dx:   Encounter Diagnosis     ICD-10-CM    1. Pain in both hands  M79.641     M79.642       2. Entrapment of right ulnar nerve  G56.21       3. Right wrist pain  M25.531       4. Pain of left thumb  M79.645                    Visit/Unit Tracking  AUTH Status: Not Required Date 5/12 5/14 5/19 5/21 5/23 5/27 5/30 6/3 6/6 6/10 6/12 6/17   Visits  Authed: BOMN Used 1 (IE) 2 3 4 5 6 7 8 9 10 11 (RE) 12   FOTO COUNT  compelted                AUTH Status: Not Required Date              Visits  Authed: BOMN Used 13 14             FOTO COUNT                  PLAN OF CARE START: 25  PLAN OF CARE END: 25  PROGRESS NOTE DUE/FOTO DUE: Needs to be scheduled in July   FREQUENCY: 2x week for 6 weeks   PRECAUTIONS hypertension, hyperlipidemia, stage 3 chronic kidney disease, primary osteoarthritis of multiple joints, R wrist broken years ago, tremor   DIAGNOSIS: R ulnar nerve compression possible, DeQuervain's possible, bilateral arthritis   INSURANCE: Medicare/Medicaid     Subjective: My right hand is really sore and I'm having difficulty turning my head to the left again.      Objective: See treatment diary below      Assessment: Tolerated treatment well. Patient demonstrated fatigue post treatment, exhibited good technique with therapeutic exercises, and would benefit from continued OT.  Pain pre 8, post tx 5. Encouraged pt to return to doing neck stretches at home.      Plan: Continue per plan of care.  Progress treatment as tolerated.       INTERVENTIONS:     Cervical exercises/stretches   Cervical mobilizations   Activity modification/compensation techniques   Nerve glides (ulnar)  HEP  Exercises to target DeQuervains  Modalities for Arthritis   Light Strengthening of intrinsics/wrist  Kinesiotaping  Manual Massage  Motor coordination training  Cupping  Therapeutic  activities  Ultrasound  Paraffin bath   Cryotherapy   Moist Heat  TENS      PROTOCOL: CUBITAL TUNNEL SYNDROME Non-Surgical Management    DESCRIPTION OF DIAGNOSIS  Cubital tunnel syndrome consists of compression of the ulnar nerve as it passes through the cubital  tunnel along the elbow. The nerve compression results in paresthesias along the ulnar nerve distribution, affecting the small finger and ulnar side of the ring. In addition, with long standing cubital tunnel syndrome, there may be residual motor weakness of the ulnar nerve innervated intrinsic muscles. Severe, prolonged ulnar nerve compression may result in clawing of the ring and small fingers.    NON-SURGICAL MANAGEMENT - THERAPY  0 - 6 Weeks  An initial evaluation is performed. As part of the initial evaluation, include the following information:  onset of the medical condition, the frequency and duration of the symptoms, pain, functional  limitations, weakness and any daily tasks that provoke the symptoms. It is common for the symptoms to be pronounced while sleeping. A pre-fabricated and custom-fitted elbow pad (light compression) is applied along the medial posterior aspect of the elbow. The elbow pad is worn throughout the day, serving to protect the ulnar nerve from direct micro trauma or pressure as the elbow rests on hard surfaces. A custom-fabricated and custom-fitted static elbow orthosis is fitted with the elbow in 45° to 65° of elbow flexion to wear at night. The orthosis is worn to avoid full flexion of the elbow. This is particularly beneficial for those that sleep in the fetal position, with the elbows flexed. If the patient is particularly symptomatic and has painful paresthesias during the waking hours, the elbow orthosis may be worn during the day. Normal, light use of the arm is encouraged in daily activity. Nerve gliding exercises for the ulnar nerve are encouraged 1-2 times a day. 5-10 repetitions are performed gently/slowly, with  long, gentle end range stretches (15 seconds). The goal of the nerve gliding exercises is to mobilize the nerve sufficiently to reduce direct, localized pressure on the ulnar nerve. [Patient Handout] Moist heat for 5 minutes prior to the nerve gliding exercises is encouraged. Patient education is important. The patient should be advised to minimize repetitive flexion and extension of the elbow, prolonged flexion of the elbow and direct pressure to the medial-posterior aspect of the elbow. Common sources of direct pressure on the elbow include: resting the elbow on the car window or console between the front seats of the car, the arm of a chair, or when working on projects where the elbows are propped up on a hard surface. In addition, activities (vocational or avocational) that elicit high motor demands with the flexor carpi ulnaris, may serve as a causative factor for this condition and should be avoided.    6 Weeks  Typically, the patient is re-evaluated by the physician. Should the patient’s symptoms be subsiding, the non-surgical management with therapy is continued. In those cases where conservative management did not benefit the patient, consideration may be given to surgery. In most cases this would be an insitu cubital tunnel release. Patients who have experienced complete resolution of symptoms are encouraged to avoid direct forces against the medial and posterior aspect of the elbow and are encouraged to wear the elbow pad an additional 1-2 months.    CONSIDERATIONS  When fabricating the custom orthosis, fit the orthosis on the volar aspect of the arm to limit pressure on the ulnar nerve. In addition to the custom orthosis, wearing an elbow pad is recommended, to limit direct pressure on the nerve. Patients with long-standing symptoms (>= 9 months) and/or have previous trauma to the elbow are likely candidates to proceed with surgery versus non-surgical management with therapy. Patients with traumatic  elbow dislocation and/or an elbow fracture, should perform nerve gliding exercises as the AROM exercises are initiated. This may offset the likelihood of the ulnar nerve becoming totally entrapped in scar, causing both pain and traction on the nerve.    RECOMMENDED READING  MARY Gacría., MATTIE Ha, KAM Adan, KAM Escamilla., ISIDORO Sneed., MARY Hutchison., MATRA John., Kaylah, ANN MARIE GRAFF. (1998). Changes in interstitial pressure and cross-sectional area of the cubital tunnel and of the ulnar nerve with flexion of the elbow: An experimental study in human cadavera.     DE QUERVAIN’S TENOSYNOVITIS: Non-Surgical Management    DESCRIPTION OF DIAGNOSIS  De Quervain’s tenosynovitis is an inflammatory disorder of the 1st dorsal compartment, which is  occupied by the tendons of the extensor pollicis brevis (EPB) and the abductor pollicis longus (APL).  Characteristic signs include pain, swelling, range of motion deficits of the thumb, and commonly a  positive Finkelstein’s test. A Finkelstein’s test is performed by having the patient hold their thumb flexed  with their fingers and simultaneously position their wrist in ulnar deviation. The test is positive when there  is pain present at the base of the thumb.    NON-SURGICAL MANAGEMENT - THERAPY  0 - 6-8 Weeks  An initial evaluation is performed.  A custom-fabricated wrist and thumb static orthosis is fitted with the thumb IP joint free. The orthosis is  fabricated with the wrist in 15º of extension, the thumb midway between palmar and radial abduction,  and the thumb MP joint in 10º of flexion. The orthosis is to be worn at all times, except for basic ADLs to  begin each day. [Note: When fitting the orthosis it is important that the thumb is able to oppose the  index and long finger; otherwise, the orthosis becomes a functional handicap for the patient. This may  result in the orthosis being worn on a limited basis or not at all.]  If an injection was not performed,  consideration may be given to include phonophoresis or  iontophoresis. Overall, the modalities have been of limited benefit.  Light massage along the radial side of the forearm, wrist and thumb area is encouraged 2-3 times a  day to promote circulation and healing to the underlying soft tissue structures.  Patient education: Avoid holding/cradling a baby in one arm with the wrist flexed and hand  supporting the baby’s neck and head. Holding the arm in a sustained stretch of this nature may be in  and of itself the root cause of the De Quervain’s. Similarly, avoid repetitive pinching activities or  activities pinching with the wrist in flexion or ulnar deviation for long periods of time.    3 Weeks  Begin exercises to stretch the radial side of the wrist, the APL and EPB. For example, perform gentle  passive ulnar deviation of the wrist, with the palm on the tabletop. Once this stretch is easy to perform  and not painful, perform gentle passive adduction of the thumb combined with gentle passive  extension of the wrist. Once this exercise can be easily performed, conclude with gentle passive  flexion of the CMC and MP joints of the thumb combined with ulnar deviation of the wrist. These  passive stretches may be replicated with active motion as well. [Note: Add moist heat for 5 minutes  prior to the gentle passive stretches.] Perform the passive stretches 2-3 times a day, 15 repetitions,  holding the end-range for 30 seconds.    6 - 8 Weeks  The orthosis may be gradually discontinued over a 3-4 week span of time should the symptoms be  gradually resolving.  Kinesiotaping may help provide support and prevent discomfort when the orthosis is not worn.    CONSIDERATIONS  If the symptoms have completely resolved following 6-8 weeks of immobilization, the orthosis may be  discontinued. If the symptoms have improved but are not totally resolved, continuing the orthosis an  additional 3-4 weeks is recommended. If the  symptoms remain unchanged, the surgeon may  recommend surgical intervention.  Patient education: Avoid repetitive activities requiring pinching and pulling with the thumb. This  becomes particularly important when the patient discontinues the orthosis and begins to use the hand  normally once again.  A treatment regimen largely focused on an immobilization orthosis alone has been shown to have  limited success in managing de Quervain’s tenosynovitis.     ORTHO ORDERS:  evaluate and treat     SPLINT:     5/23: provided scapular retraction, ulnar nerve glides, and de quervain's exercises to perform at home   Manuals 6/19 7/7 6/6 6/10 6/17   Cervical Mob                Manual massage  R thumb,thenar eminance, b/l traps R hand, forearm, L trap R hand/forearm R hand   IASTM  Rthumb, thenar eminance, b/l traps R hand, trap  Cupping neck and R thumb R hand/forearm     KT tape   R thumb     Neuro Re-Ed  6/19 7/7 6/6 6/10 6/17    Ulna Nerve Glides                 Ther Ex 6/19 7/7 6/6 6/10 6/17   Cervical Stretches  20 sec x 3      Cervical ROM  Lateral bend  Rotation  Lateral Rotation  Flex/Ext        theraputty     Yellow search pinch and pull   digiflex Blue x 20 Blue x 20 Green x 20 L only Green x 20 Green x 25   Prayer stretch     20 sec x 3   Scalene chair stretch  Middle   Anterior  Posterior          Finger abd Blue L 2  X 20 Blue L 2  X 20 Yellow RB x 20 left only Yellow RB x 20 Yellow RB x 25   Flex Bar  Twist  Bends  oscillations Green  X 20  X 20  30 sec Green  X 20  X 20  30 sec deferred Thin red  X 20  X 20  30 sec Green  X 20  X 20  30 sec   Intrinsic strengthening Red theraputty  2 rows       PW      Thumb  Intrinsic plus  Tension pin  Lg knob cw/ccw  Teracotta  X 20  X 20  X 20    X 10 cw/ccw Teracotta  X 20  X 20  X 20 Teracotta  X 20  X 20  X 20  R/GTP b/l  X 10 cw/ccw Teracotta  X 25  X 25  X 25  G/BTP b/l  X 10 cw/ccw   Wrist AROM  Flex/ext  Ud/rd  Cw/ccw 2#  X 20  X 20  X 20 2#  X 20  X 20  X 20 1#  DB  X 10/2  X 10/2  X 10/2 1# DB  X 10/2  X 10/2  X 10/2 2#  X 20  X 20  X 20   Elbow flexion 2# x 30  X 20     Theraband  Ext  Tri  Row  IR  ER  Bicep curl Blue  X 20  X 20  X 20  X 20  X 20  X 20  Green  X 15  X 15  X 15  X 15 Green  X 15  X 15  X 15  X 15  X 15  X 15 Green  X 20  X 20  X 20  X 20  X 20  X 20   UBC 4F/4B L 60 5F/5B L 60 2F/2B L 60 3F/3B L 60 3F/3B L 60   Ther Activity 6/19  6/6 6/10 6/17   Education on HEP                                                Modalities 6/19 7/7 6/6 6/10 6/17   Ultrasound  Thumb  elbow   R thumb  50%  .8       E-STIM w/ MH          CP            MH            Ice Massage            Cupping

## 2025-07-07 NOTE — ASSESSMENT & PLAN NOTE
Obesity - We reviewed the association of obesity on obstructive sleep apnea and sleep disordered breathing. Encouraged patient to follow healthy diet, regular exercise regimen, and pursue weight loss to manage symptoms.     Orders:    Resmed DME Pressure Change

## 2025-07-07 NOTE — ASSESSMENT & PLAN NOTE
Hypertension - Blood pressure is slightly elevated today.  We reviewed the association between untreated obstructive sleep apnea and the increased risk for hypertension. Patient to continue on prescribed anti-hypertensive therapy (propranolol) and follow up with PCP for continuity of care.     Orders:    Resmed DME Pressure Change    PAP DME Resupply/Reorder

## 2025-07-07 NOTE — ASSESSMENT & PLAN NOTE
Today, we reviewed the results of the split night sleep study.  The importance of using the BiPAP equipment to treat the severe REKHA and to avoid consequences of untreated REKHA was discussed.  Compliance data was reviewed.  Initially, she had been using the BiPAP equipment nightly.  She initially met the compliance, but more recently, usage has decreased.  She admits that some nights she doesn't feel like she has enough air to start.  She likes the full face mask she uses.  She notices air leaks from the mask at times that bother her and cause her to remove it.  She doesn't feel she is able to put it back on.  On nights when she can keep it on all night and sleep for 5-6 hours, she feels very refreshed.        Current PAP Compliance Data:  Average usage:  She has been able to use the equipment 97% of all days recorded.  Average usage was 4 or more hours 97% of all days recorded.  Average hours used:  7 hours and 23 minutes  Average time in an air leak:  large at times - up to 85.9 L/min   Average pressure:  21.1/17.1 cm of water pressure  Residual AHI:  12.7/hour, including 6.6 OA, 2.7 CA, 0.7 hypopneas, 2.7 unknown      The patient feels they benefit from the use of PAP equipment and would like to continue PAP therapy.    Response to treatment has been fairly good.    The patient is at goal for hours of PAP use and not at goal for effectiveness of treatment.  A pressure change was ordered.  A mask fitting was ordered.    A prescription for supplies has been provided to last for the next year.  The patient was advised to continue to clean the equipment appropriately, as discussed and to change supplies regularly.  She was advised to restart use of the [employer] scout and to call or message if events are consistently higher than 5/hour.      She will continue using this equipment at the settings noted above for the next 6 months.  At that timeshe will then return for a routine follow-up evaluation. I have asked the patient  to contact the [facility] if she encounters any difficulties prior to that time.  Orders:    Resmed DME Pressure Change    PAP DME Resupply/Reorder

## 2025-07-08 ENCOUNTER — TELEPHONE (OUTPATIENT)
Dept: SLEEP CENTER | Facility: CLINIC | Age: 70
End: 2025-07-08

## 2025-07-08 LAB

## 2025-07-22 ENCOUNTER — NURSE TRIAGE (OUTPATIENT)
Age: 70
End: 2025-07-22

## 2025-07-22 NOTE — TELEPHONE ENCOUNTER
Called patient and reviewed provider advice. Patient verbalized understanding. She will await phone call from clerical for appointment.

## 2025-07-22 NOTE — TELEPHONE ENCOUNTER
With itching and hx, would expect lichen to be most likely. Cysts generally minimally symptomatic. Would rec using clobetasol BID for up to 2wk on affected area. May offer refill as needed for this.     Clerical team -- pls assist pt in scheduling appt. Needs exam, nereyda if unresolved after trial

## 2025-07-22 NOTE — TELEPHONE ENCOUNTER
"REASON FOR CONVERSATION: Vaginal Problem    SYMPTOMS: External vaginal itching. Patient noticed bleeding on hand when itching external vaginal area and also with wiping.     OTHER HEALTH INFORMATION: Patient has hx of lichen sclerosis and pelvic cyst. Surgical hx of hysterectomy. She was seen for pelvic cyst on 10/01/24. Patient states she has not had recent intercourse. She has not been using estradiol as prescribed, states provider advised it was to use to reduce symptoms from intercourse. Patient also states she does not use clobetasol in chart for lichen sclerosis, she uses that for other parts of her body. Patient is concerned and seeking evaluation as soon as possible.     Unable to find appointment in appropriate timeframe. Message to clerical staff for assistance in scheduling. Also routing to provider in case additional advice prior to appointment.     PROTOCOL DISPOSITION: See Today in Office    CARE ADVICE PROVIDED: Genital hygiene tips reviewed. Advised patient should avoid scratching vaginal area. Call back if fevers or worsening symptoms. Patient verbalized understanding.      PRACTICE FOLLOW-UP: Protocol advises see today in office      Reason for Disposition   Patient wants to be seen    Answer Assessment - Initial Assessment Questions  1. SYMPTOM: \"What's the main symptom you're concerned about?\" (e.g., rash, itching, swelling, dryness)      Vaginal itching and bleeding - externally. Bleeding at times with wiping or with itching blood on hand.   2. LOCATION: \"Where is the  s/s located?\" (e.g., inside/outside, left/right)      External to vagina, patient not sure exactly where.   3. ONSET: \"When did the  s/s  start?\"      Not constant, occasionally. Worse with sweating.   4. PAIN: \"Is there any pain?\" If Yes, ask: \"How bad is it?\" (Scale: 1-10; mild, moderate, severe)      Denies  5. CAUSE: \"What do you think is causing the symptoms?\"      Pelvic cyst area - same concern as when seen at office " "visit  6. OTHER SYMPTOMS: \"Do you have any other symptoms?\" (e.g., fever, vaginal bleeding, pain with urination)      Denies fevers and chills. Very mild white discharge, small amount - normal for patient.  7. PREGNANCY: \"Is there any chance you are pregnant?\" \"When was your last menstrual period?\"      HX of hysterectomy  No recent intercourse that could be irritating that.    Protocols used: Vulvar Symptoms-Adult-[place]    "

## 2025-07-25 ENCOUNTER — OFFICE VISIT (OUTPATIENT)
Dept: OCCUPATIONAL THERAPY | Facility: CLINIC | Age: 70
End: 2025-07-25
Payer: MEDICARE

## 2025-07-25 DIAGNOSIS — G56.21 ENTRAPMENT OF RIGHT ULNAR NERVE: ICD-10-CM

## 2025-07-25 DIAGNOSIS — G56.23 ENTRAPMENT OF BOTH ULNAR NERVES: ICD-10-CM

## 2025-07-25 DIAGNOSIS — M79.642 PAIN IN BOTH HANDS: Primary | ICD-10-CM

## 2025-07-25 DIAGNOSIS — M79.645 PAIN OF LEFT THUMB: ICD-10-CM

## 2025-07-25 DIAGNOSIS — M79.641 PAIN IN BOTH HANDS: Primary | ICD-10-CM

## 2025-07-25 DIAGNOSIS — M25.531 RIGHT WRIST PAIN: ICD-10-CM

## 2025-07-25 DIAGNOSIS — M65.4 DE QUERVAIN'S TENOSYNOVITIS, RIGHT: ICD-10-CM

## 2025-07-25 PROCEDURE — 97140 MANUAL THERAPY 1/> REGIONS: CPT

## 2025-07-25 PROCEDURE — 97110 THERAPEUTIC EXERCISES: CPT

## 2025-07-25 PROCEDURE — 97014 ELECTRIC STIMULATION THERAPY: CPT

## 2025-07-25 PROCEDURE — 97112 NEUROMUSCULAR REEDUCATION: CPT

## 2025-07-25 NOTE — PROGRESS NOTES
Occupational Therapy RE Evaluation          AUTH Status: Not Required Date               Visits  Authed: BOMN Used 1 (RE)               Remaining 9              FOTO COUNT                  PLAN OF CARE START: 25  PLAN OF CARE END: 10/3/25  PROGRESS NOTE DUE/FOTO DUE: Needs to be scheduled on  Visit   FREQUENCY: 1-2x week for 10 weeks   PRECAUTIONS hypertension, hyperlipidemia, stage 3 chronic kidney disease, primary osteoarthritis of multiple joints, R wrist broken years ago, tremor   DIAGNOSIS: R ulnar nerve compression possible, DeQuervain's possible, bilateral arthritis   INSURANCE: Medicare/Medicaid       Today's date: 2025  Patient name: Sri Mcgrath  : 1955  MRN: [id number]  Referring provider: Sea Hadley DO  Dx:   Encounter Diagnosis     ICD-10-CM    1. Pain in both hands  M79.641     M79.642       2. Entrapment of right ulnar nerve  G56.21       3. Right wrist pain  M25.531       4. Pain of left thumb  M79.645       5. De Quervain's tenosynovitis, right  M65.4       6. Entrapment of both ulnar nerves  G56.23                ASSESSMENT/PLAN:    SKILLED ANALYSIS:  Pt is a R hand dominant 69 year old female presenting to OP OT s/p R ulnar nerve compression, DeQuervains R side, and bilateral arthritis. Pt reports pain in R hand in pinky and ring that radiates up forearm into neck. Frequent numbness/tingling reported intermittently. Additionally, reports that the fingers lock in extension causing severe pain and she needs to manually place them into flexion when this occurs. DeQuervain's symptoms have started to occur on L arm recently. Pt has past medical history of neck pain due to arthritis and disc generation to be aware of. Bilateral tremor present that she is currently on medication for. Currently  at [employer] and reports repetitive movements when working at [employer]. 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 Frequently needs to shake hands out due to numbness and tingling when driving. Pt currently reports difficulty with completing job tasks including bending and extending the elbow when at . Post assessments pt demonstrating the following: decreased R  strength, shoulder stench, wrist strength, paresthesia, and pain that radiates into neck. Recommend OP OT 2x/wk for 6 weeks with focus on aforementioned deficits to maximize functional performance and improve QOL. Findings and recommendations discussed with pt, and they are in agreement. Educated pt on charges of insurance, assessments performed with standardized norms, POC, goal creation, and OP OT services.       RE 67/25  Pt presents to re-evaluation on 7/25 status post R ulnar nerve compression, DeQuervains R side, and bilateral arthritis. Pt has had absence in therapy sessions due to work. Pt returns today reporting numbness in L shoulder. Additionally, reports swelling in R hand and tightness from fingertips to elbow. States she was cutting yesterday with cutting board which agitated symptoms and felt uncoordinated with limited grasp of knife. Mallet finger deformity of R little finger. Reports advil does not alleviate pain. Post assessments, pt demonstrating improvements in b/l  strength and increased dexterity on L hand. On manual massage pt continues to have tightness noted on dorsal and anterior aspects of b/l arms from digits to shoulder. Pt continues to demonstrate impairments in bilateral  strength, dexterity, FMC, decreased sensation,and pain with pronation. Pt  STGs/LTGs updated below. Pt would benefit from continued OT services focusing on impairments for 2x week for 10 more weeks with focus on aforementioned deficits to maximize functional performance and improve QOL. Pt educated on progress/therapy process and pt in understanding and agreement of recommendations. Recommending to continue HEP. Findings and recommendations  "discussed with pt, and they are in agreement. Educated pt on charges of insurance, assessments performed with standardized norms, POC, goal creation, and OP OT services.         SUBJECTIVE:    Subjective  Occupational Profile  *lives with: alone  *vocation:  at [email or web address]   *driving: needs to shake hands out due to numbness and tingling   *ADLs: pain when trying to pull up pants  *IADLs (cooking, cleaning, community mobility, medication management, finances): difficulty performing job related tasks due to pain  *AD: weighted built up pen for handwriting   *Sport/Leisure: no problems reported       PATIENT GOAL: \"To get rid of the pain and locking\"    PAIN: 1    HISTORY OF PRESENT ILLNESS:   Pt is a 69 y.o. female who was referred to Occupational Therapy s/p possible ulnar nerve compression, deQuervains, and bilateral advanced arthritis in multiple joints. Pt saw internal medicine on 5/6/25 with reported pain in both hands, pain in L thumb and pain in R wrist. Pt presents today with positive finkelstein's test and positive ulnar nerve compression. Pt's e-rays revealed advanced arthritis throughout bilateral hands.            PMH:   Past Medical History:   Diagnosis Date    Allergic     Arthritis     Cataract     ashley    Chronic diarrhea     Chronic kidney disease     Just in chart    Colon polyp     Depression     Years    Diverticulitis of colon     Endometriosis     Long time ago    Environmental and seasonal allergies     Fatty liver     Fluid retention     Gastric ulcer     Headache, acute     Heart murmur     Hyperlipidemia     Hypertension     Years    Irritable bowel syndrome     Kidney problem     Memory loss     Pedal edema     Pneumonia     PONV (postoperative nausea and vomiting)     Rotator cuff tear, left     Sleep apnea 2021    Varicella     Childhood    Wears glasses     Wears partial dentures     upper       Past Surgical Hx:   Past Surgical History:   Procedure Laterality Date    ABDOMINAL ADHESION " SURGERY      ANAL SPHINCTEROPLASTY      ANKLE SURGERY Right     tendon tear    APPENDECTOMY      CARPAL TUNNEL RELEASE Bilateral     COLONOSCOPY      HAND SURGERY Right     ganglion cyst    HEMORRHOID SURGERY      HEMORROIDECTOMY      HYSTERECTOMY      ILEOSTOMY      JOINT REPLACEMENT      KNEE ARTHROCENTESIS      ganglion Cyst    KNEE ARTHROSCOPY Left     LUNG SURGERY      My father had 1/3 of lung removed due to lead poisoning    OVARIAN CYST REMOVAL      CO SURGICAL ARTHROSCOPY SHOULDER W/ROTATOR CUFF RPR Left 02/19/2018    Procedure: ARTHROSCOPIC REPAIR ROTATOR CUFF,  SAD, BICEP TENODESIS;  Surgeon: Garrett Coto MD;  Location: [facility] OR;  Service: Orthopedics    REPAIR RECTOCELE      REPLACEMENT TOTAL KNEE Left 09/28/2021    TONSILLECTOMY AND ADENOIDECTOMY      TUBAL LIGATION            OBJECTIVE:    Impairment Observations     Special Tests:   DeQuervain’s Tenosynovitis (POSITIVE)   Finkelstein: thumb flexion where the patient is asked to make a fist with the thumb tucked inside the palm and then ulnar deviation where the patient then bends the wrist toward the little finger and pain indication on the thumb side of the wrist, it is considered a positive Finkelstein test, which is suggestive of De Quervain's tenosynovitis    ULNAR NERVE (POSITIVE)  1. Tinel's Sign: This test involves tapping gently on the ulnar nerve at the elbow (cubital tunnel) or wrist (Guyon's canal) to elicit a tingling sensation in the ulnar nerve distribution (ring and little fingers). (POSITIVE)  2. Cubital Tunnel Compression Test: The patient stands, and the examiner passively flexes the elbow to 20 degrees. Pressure is then applied to the ulnar nerve just proximal to the cubital tunnel. A positive test is indicated by numbness or tingling in the ulnar nerve distribution. (POSITIVE)  3. Wrist Flexion Test: This test assesses for ulnar nerve compression by having the patient fully flex their elbow with the shoulder slightly abducted,  and hold this position. Tingling or paresthesia in the ulnar nerve distribution can indicate cubital tunnel syndrome. (NEGATIVE)  4. Froment's Sign: This test checks for weakness of the adductor pollicis muscle, which is innervated by the ulnar nerve. The patient is asked to hold a piece of paper between their thumb and index finger. If the paper is pulled away and the patient flexes the thumb's distal joint to maintain , it's a positive sign, indicating ulnar nerve pals (NEGATIVE)         RUE   7/25 LUE   7/25 Comments           /PINCH STRENGTH   Impaired Intact Dominant Hand: R   Dynamometer    - Gross Grasp   34 lbs 45 lbs    Pinch Meter     - Pincer   3 lbs 7 lbs     - Tripod   3 lbs 3 lbs     - Lateral   7 lbs  8 lbs            Thumb AROM              RUE   6/12 LUE   6/12 Comments    Intact Impaired    1st Digit MCP  Normal: 0-60 65 65    1st Digit IP:  Normal: 0-80 45 45    Opposition  WFL WFL    Thumb CMC Palmar Abduction (hand neutral on table)   Normal: 0-45 80 80    Thumb CMC Radial Abduction (palm flat on table)  Normal: 0-70 80 90          AROM UE              RUE   6/12 LUE   6/12     Impaired Intact    Shoulder FF  Normal: 0-180 WFL  WFL  Pain on R   Shoulder Ext  Normal: 50-60  WFL WFL     Shoulder Abd  Normal: 0-180 WFL  WFL     Internal Rotation (Supination)  Normal: 0-70 WFL WFL    Elbow Flex  Normal: 0-150 WFL  WFL     Elbow Ext  Normal: 0 WFL  WFL     Pronation  Normal: 0-90 WFL  WFL     Supination  Normal: 0-90 WFL  WFL     Wrist Flex   Normal: 0-80  55 55     Wrist Ext  Normal: 0-70  65  65     Ulnar Deviation  Normal: 0-30 55 35 Pain on R   Radial Deviation  Normal: 0-20 30 20 Pain on R   Digit Flex WFL  WFL     Digit Ex WFL  WFL           SENSATION       RUE 7/25 LUE 7/25    Monofilament Testing  Normal: 2.83 mm    Diminished light touch: 3.61 mm    Diminished protective sensation: 4.31 mm    Loss of protective sensation: 4.56    Complete loss of sensation / deep pressure: 6.65  Thumb: 3.61  Index: 3.61  Middle: 3.61  Ring: 3.61  Pinky: 3.61 Thumb: 3.61  Index: 3.61  Middle: 3.61  Ring: 3.61  Pinky: 3.61 Dysesthesia        MANUAL MUSCLE TESTING:   “MMT is a standardized set of assessments that measure muscle strength and function.”  GRADING:   *0/5, no movement  *1/5, no visible movement, palpable or observable flicker (TRACE)  *2-, partial ROM in gravity eliminated plane   *2, full ROM in gravity eliminated plane (POOR)  *2+, moving less than half-way against gravity   *3-, moving more than half-way, but not full against gravity   *3, full ROM against gravity, but cannot withstand any pressure or weight so no strength (FAIR)  *3+, full ROM and can withstand slight pressure   *4-, full ROM and can withstand slight to moderate pressure   *4, hold test position against moderate resistance in gravity eliminated plane (GOOD)  *4+, full ROM against moderate to strong pressure   *5, muscle working at normal limits withstanding strong pressure~ full ROM and strength (NORMAL)     MUSCLE BEING TESTED  RUE  6/12 LUE  6/12 COMMENTS    Shoulder FF 4/5  Pain on R   Shoulder Ext 4/5     Shoulder Abduction 4+/5     Shoulder Adduction 4+/5     Elbow Flex 4+/5     Elbow Ext 4+/5     Supination 4+/5 4+/5    Pronation 3+/5 4+/5 PAIN on R    Wrist flexion 4/5 4/5    Wrist extension 4/5 4/5          COORDINATION       RUE   7/25 LUE   7/25     Impaired Impaired    9 Hole Peg Test-  assesses dexterity/fine motor coordination      27 seconds 22 seconds Pt demonstrates decreased FMC compared to norms for age/sex    Fxnl Dexterity Test-assesses patient's ability to use the hand for daily tasks requiring a 3-jaw jeni prehension between the fingers and the thumb   41 seconds    2 drops 41 seconds  2 compensations Pt demonstrates decreased dexterity compared to norms for age/sex          GOALS    STGs:    Pt will experience a 50% decrease in drops of items experienced at work/home due to numbness and tingling for  increased participation in work related tasks. (Maintained)    Pt will increase RUE forearm pronation by (+/-1) grade on MMT to perform IADLs and work related tasks (Maintained)    Pt will increase b/l  strength by 10lbs for increased sustained grasp for ADLs. (Met)     Pt will self report increase in  strength being able to lift and bag heavier groceries at work with no increase in symptoms for increased participation in work related tasks.  (Maintained)     LTGs:    Pt will have increased FDT b/l by -10 seconds for increased dexterity for IADLs/ADLs. (Met for L)     Pt will have increased FMC b/l by -10 seconds for increased dexterity for IADLs/ADLs.     Pt will be consistent with HEP demonstrated by teach back method for increased ROM and strengthening of bilateral UE for functional activities and life roles (Maintained)    Pt will have increased light touch sensitivity in RUE moving up +1 grade on semmes-jessica for increased participation in meaningful activities (Maintained)    Pt will increase RUE two point, tripod, and lateral pinch strength by 1-2lbs for increased functional grasp strength for ADLs/IADLs (Progressed)        INTERVENTIONS:     Cervical exercises/stretches   Cervical mobilizations   Activity modification/compensation techniques   Nerve glides (ulnar)  HEP  Exercises to target DeQuervains  Modalities for Arthritis   Light Strengthening of intrinsics/wrist  Kinesiotaping  Manual Massage  Motor coordination training  Cupping  Therapeutic activities  Ultrasound  Paraffin bath   Cryotherapy   Moist Heat  TENS      PROTOCOL:     CUBITAL TUNNEL SYNDROME Non-Surgical Management    DESCRIPTION OF DIAGNOSIS  Cubital tunnel syndrome consists of compression of the ulnar nerve as it passes through the cubital  tunnel along the elbow. The nerve compression results in paresthesias along the ulnar nerve distribution, affecting the small finger and ulnar side of the ring. In addition, with long  standing cubital tunnel syndrome, there may be residual motor weakness of the ulnar nerve innervated intrinsic muscles. Severe, prolonged ulnar nerve compression may result in clawing of the ring and small fingers.    NON-SURGICAL MANAGEMENT - THERAPY  0 - 6 Weeks  An initial evaluation is performed. As part of the initial evaluation, include the following information:  onset of the medical condition, the frequency and duration of the symptoms, pain, functional  limitations, weakness and any daily tasks that provoke the symptoms. It is common for the symptoms to be pronounced while sleeping. A pre-fabricated and custom-fitted elbow pad (light compression) is applied along the medial posterior aspect of the elbow. The elbow pad is worn throughout the day, serving to protect the ulnar nerve from direct micro trauma or pressure as the elbow rests on hard surfaces. A custom-fabricated and custom-fitted static elbow orthosis is fitted with the elbow in 45° to 65° of elbow flexion to wear at night. The orthosis is worn to avoid full flexion of the elbow. This is particularly beneficial for those that sleep in the fetal position, with the elbows flexed. If the patient is particularly symptomatic and has painful paresthesias during the waking hours, the elbow orthosis may be worn during the day. Normal, light use of the arm is encouraged in daily activity. Nerve gliding exercises for the ulnar nerve are encouraged 1-2 times a day. 5-10 repetitions are performed gently/slowly, with long, gentle end range stretches (15 seconds). The goal of the nerve gliding exercises is to mobilize the nerve sufficiently to reduce direct, localized pressure on the ulnar nerve. [Patient Handout] Moist heat for 5 minutes prior to the nerve gliding exercises is encouraged. Patient education is important. The patient should be advised to minimize repetitive flexion and extension of the elbow, prolonged flexion of the elbow and direct pressure to  the medial-posterior aspect of the elbow. Common sources of direct pressure on the elbow include: resting the elbow on the car window or console between the front seats of the car, the arm of a chair, or when working on projects where the elbows are propped up on a hard surface. In addition, activities (vocational or avocational) that elicit high motor demands with the flexor carpi ulnaris, may serve as a causative factor for this condition and should be avoided.    6 Weeks  Typically, the patient is re-evaluated by the physician. Should the patient’s symptoms be subsiding, the non-surgical management with therapy is continued. In those cases where conservative management did not benefit the patient, consideration may be given to surgery. In most cases this would be an insitu cubital tunnel release. Patients who have experienced complete resolution of symptoms are encouraged to avoid direct forces against the medial and posterior aspect of the elbow and are encouraged to wear the elbow pad an additional 1-2 months.    CONSIDERATIONS  When fabricating the custom orthosis, fit the orthosis on the volar aspect of the arm to limit pressure on the ulnar nerve. In addition to the custom orthosis, wearing an elbow pad is recommended, to limit direct pressure on the nerve. Patients with long-standing symptoms (>= 9 months) and/or have previous trauma to the elbow are likely candidates to proceed with surgery versus non-surgical management with therapy. Patients with traumatic elbow dislocation and/or an elbow fracture, should perform nerve gliding exercises as the AROM exercises are initiated. This may offset the likelihood of the ulnar nerve becoming totally entrapped in scar, causing both pain and traction on the nerve.    RECOMMENDED READING  Ricky, R. H., AMY Ha., Antionette, S. MARLYN., Andi, S. S., ISIDORO Sneed P., Foster, R. R., Dora P., Kaylah, M. J. (1998). Changes in interstitial pressure and  cross-sectional area of the cubital tunnel and of the ulnar nerve with flexion of the elbow: An experimental study in human cadavera.     DE QUERVAIN’S TENOSYNOVITIS: Non-Surgical Management    DESCRIPTION OF DIAGNOSIS  De Quervain’s tenosynovitis is an inflammatory disorder of the 1st dorsal compartment, which is  occupied by the tendons of the extensor pollicis brevis (EPB) and the abductor pollicis longus (APL).  Characteristic signs include pain, swelling, range of motion deficits of the thumb, and commonly a  positive Finkelstein’s test. A Finkelstein’s test is performed by having the patient hold their thumb flexed  with their fingers and simultaneously position their wrist in ulnar deviation. The test is positive when there  is pain present at the base of the thumb.    NON-SURGICAL MANAGEMENT - THERAPY  0 - 6-8 Weeks  An initial evaluation is performed.  A custom-fabricated wrist and thumb static orthosis is fitted with the thumb IP joint free. The orthosis is  fabricated with the wrist in 15º of extension, the thumb midway between palmar and radial abduction,  and the thumb MP joint in 10º of flexion. The orthosis is to be worn at all times, except for basic ADLs to  begin each day. [Note: When fitting the orthosis it is important that the thumb is able to oppose the  index and long finger; otherwise, the orthosis becomes a functional handicap for the patient. This may  result in the orthosis being worn on a limited basis or not at all.]  If an injection was not performed, consideration may be given to include phonophoresis or  iontophoresis. Overall, the modalities have been of limited benefit.  Light massage along the radial side of the forearm, wrist and thumb area is encouraged 2-3 times a  day to promote circulation and healing to the underlying soft tissue structures.  Patient education: Avoid holding/cradling a baby in one arm with the wrist flexed and hand  supporting the baby’s neck and head.  Holding the arm in a sustained stretch of this nature may be in  and of itself the root cause of the De Quervain’s. Similarly, avoid repetitive pinching activities or  activities pinching with the wrist in flexion or ulnar deviation for long periods of time.    3 Weeks  Begin exercises to stretch the radial side of the wrist, the APL and EPB. For example, perform gentle  passive ulnar deviation of the wrist, with the palm on the tabletop. Once this stretch is easy to perform  and not painful, perform gentle passive adduction of the thumb combined with gentle passive  extension of the wrist. Once this exercise can be easily performed, conclude with gentle passive  flexion of the CMC and MP joints of the thumb combined with ulnar deviation of the wrist. These  passive stretches may be replicated with active motion as well. [Note: Add moist heat for 5 minutes  prior to the gentle passive stretches.] Perform the passive stretches 2-3 times a day, 15 repetitions,  holding the end-range for 30 seconds.    6 - 8 Weeks  The orthosis may be gradually discontinued over a 3-4 week span of time should the symptoms be  gradually resolving.  Kinesiotaping may help provide support and prevent discomfort when the orthosis is not worn.    CONSIDERATIONS  If the symptoms have completely resolved following 6-8 weeks of immobilization, the orthosis may be  discontinued. If the symptoms have improved but are not totally resolved, continuing the orthosis an  additional 3-4 weeks is recommended. If the symptoms remain unchanged, the surgeon may  recommend surgical intervention.  Patient education: Avoid repetitive activities requiring pinching and pulling with the thumb. This  becomes particularly important when the patient discontinues the orthosis and begins to use the hand  normally once again.  A treatment regimen largely focused on an immobilization orthosis alone has been shown to have  limited success in managing de Quervain’s  tenosynovitis.     ORTHO ORDERS:  evaluate and treat     SPLINT:     5/23: provided scapular retraction, ulnar nerve glides, and de quervain's exercises to perform at home   Manuals 6/19 7/7 7/25 6/10 6/17   Cervical Mob                Manual massage  R thumb,thenar eminance, b/l traps Thenar eminence, forearm, proximal arm (anterior/dorsal)    R side Thenar eminence, forearm, proximal arm (anterior/dorsal)     R hand   IASTM  Rthumb, thenar eminance, b/l traps Anterior/dorsal thenar eminence, forearm, proximal arm R side  R hand/forearm     KT tape   R thumb     Cupping        Neuro Re-Ed  6/19 7/7 7/25 6/10 6/17   Ulna Nerve Glides  Ulanr Nerve Flossing  Prayer Floss    X15/2  X15/2    X15/2 X15/2  X15/2    X15/2             Ther Ex 6/19 7/7 7/25 6/10 6/17   Cervical Stretches  20 sec x 3      Cervical ROM  Lateral bend  Rotation  Lateral Rotation  Flex/Ext        Theraputty     Yellow search pinch and pull   Digiflex Blue x 20 Blue x 20  Green x 20 Green x 25   Prayer stretch     20 sec x 3   Scalene chair stretch  Middle   Anterior  Posterior          Power Pro Blue L 2  X 20 Blue L 2  X 20  Yellow RB x 20 Yellow RB x 25   Flex Bar  Twist  Bends  oscillations Green  X 20  X 20  30 sec Green  X 20  X 20  30 sec  Thin red  X 20  X 20  30 sec Green  X 20  X 20  30 sec   Intrinsic strengthening Red theraputty  2 rows       PW      Thumb  Intrinsic plus  Tension pin  Lg knob cw/ccw  Teracotta  X 20  X 20  X 20    X 10 cw/ccw  Teracotta  X 20  X 20  X 20  R/GTP b/l  X 10 cw/ccw Teracotta  X 25  X 25  X 25  G/BTP b/l  X 10 cw/ccw   Wrist AROM  Flex/ext  Ud/rd  Cw/ccw 2#  X 20  X 20  X 20 2#  X 20  X 20  X 20  1# DB  X 10/2  X 10/2  X 10/2 2#  X 20  X 20  X 20   Elbow flexion 2# x 30       Theraband  Ext  Tri  Row  IR  ER  Bicep curl Blue  X 20  X 20  X 20  X 20  X 20  X 20   Green  X 15  X 15  X 15  X 15  X 15  X 15 Green  X 20  X 20  X 20  X 20  X 20  X 20   UBC 4F/4B L 60 5F/5B L 60  3F/3B L 60 3F/3B L 60   Ther  Activity 6/19  7/25 6/10 6/17   Education on HEP                                                Modalities 6/19 7/7 7/25 6/10 6/17   Ultrasound  Thumb  elbow          E-STIM w/ MH   10' post session   ICS L 21       CP            MH           Ice Massage            Cupping

## 2025-07-26 ENCOUNTER — HOSPITAL ENCOUNTER (OUTPATIENT)
Dept: MRI IMAGING | Facility: HOSPITAL | Age: 70
Discharge: HOME | End: 2025-07-26
Attending: NURSE PRACTITIONER
Payer: MEDICARE

## 2025-07-26 DIAGNOSIS — K86.2 PANCREATIC CYST: ICD-10-CM

## 2025-07-26 PROCEDURE — 74183 MRI ABD W/O CNTR FLWD CNTR: CPT

## 2025-07-26 PROCEDURE — A9585 GADOBUTROL INJECTION: HCPCS | Performed by: NURSE PRACTITIONER

## 2025-07-26 RX ORDER — GADOBUTROL 604.72 MG/ML
8 INJECTION INTRAVENOUS
Status: COMPLETED | OUTPATIENT
Start: 2025-07-26 | End: 2025-07-26

## 2025-07-26 RX ADMIN — GADOBUTROL 8 ML: 604.72 INJECTION INTRAVENOUS at 13:41

## 2025-07-28 ENCOUNTER — OFFICE VISIT (OUTPATIENT)
Dept: OBGYN CLINIC | Facility: CLINIC | Age: 70
End: 2025-07-28
Payer: MEDICARE

## 2025-07-28 ENCOUNTER — OFFICE VISIT (OUTPATIENT)
Dept: OCCUPATIONAL THERAPY | Facility: CLINIC | Age: 70
End: 2025-07-28
Payer: MEDICARE

## 2025-07-28 VITALS
BODY MASS INDEX: 37.85 KG/M2 | HEIGHT: 60 IN | WEIGHT: 192.8 LBS | SYSTOLIC BLOOD PRESSURE: 140 MMHG | DIASTOLIC BLOOD PRESSURE: 82 MMHG

## 2025-07-28 DIAGNOSIS — B37.2 CANDIDAL INTERTRIGO: ICD-10-CM

## 2025-07-28 DIAGNOSIS — M65.4 DE QUERVAIN'S TENOSYNOVITIS, RIGHT: Primary | ICD-10-CM

## 2025-07-28 DIAGNOSIS — G56.23 ENTRAPMENT OF BOTH ULNAR NERVES: ICD-10-CM

## 2025-07-28 DIAGNOSIS — N95.2 VAGINAL ATROPHY: ICD-10-CM

## 2025-07-28 DIAGNOSIS — L90.0 LICHEN SCLEROSUS: Primary | ICD-10-CM

## 2025-07-28 PROCEDURE — 97140 MANUAL THERAPY 1/> REGIONS: CPT

## 2025-07-28 PROCEDURE — 99214 OFFICE O/P EST MOD 30 MIN: CPT | Performed by: STUDENT IN AN ORGANIZED HEALTH CARE EDUCATION/TRAINING PROGRAM

## 2025-07-28 PROCEDURE — 97110 THERAPEUTIC EXERCISES: CPT

## 2025-07-28 PROCEDURE — 97014 ELECTRIC STIMULATION THERAPY: CPT

## 2025-07-28 RX ORDER — ESTRADIOL 0.1 MG/G
1 CREAM VAGINAL 2 TIMES WEEKLY
Qty: 42.5 G | Refills: 2 | Status: SHIPPED | OUTPATIENT
Start: 2025-07-28

## 2025-07-28 RX ORDER — NYSTATIN 100000 U/G
CREAM TOPICAL 2 TIMES DAILY
Qty: 30 G | Refills: 2 | Status: SHIPPED | OUTPATIENT
Start: 2025-07-28

## 2025-07-28 RX ORDER — CLOBETASOL PROPIONATE 0.5 MG/G
CREAM TOPICAL 2 TIMES DAILY
Qty: 60 G | Refills: 1 | Status: SHIPPED | OUTPATIENT
Start: 2025-07-28

## 2025-07-30 ENCOUNTER — OFFICE VISIT (OUTPATIENT)
Dept: OCCUPATIONAL THERAPY | Facility: CLINIC | Age: 70
End: 2025-07-30
Payer: MEDICARE

## 2025-07-30 DIAGNOSIS — G56.21 ENTRAPMENT OF RIGHT ULNAR NERVE: ICD-10-CM

## 2025-07-30 DIAGNOSIS — M65.4 DE QUERVAIN'S TENOSYNOVITIS, RIGHT: Primary | ICD-10-CM

## 2025-07-30 DIAGNOSIS — G56.23 ENTRAPMENT OF BOTH ULNAR NERVES: ICD-10-CM

## 2025-07-30 DIAGNOSIS — M79.642 PAIN IN BOTH HANDS: ICD-10-CM

## 2025-07-30 DIAGNOSIS — M79.645 PAIN OF LEFT THUMB: ICD-10-CM

## 2025-07-30 DIAGNOSIS — M25.531 RIGHT WRIST PAIN: ICD-10-CM

## 2025-07-30 DIAGNOSIS — M79.641 PAIN IN BOTH HANDS: ICD-10-CM

## 2025-07-30 PROCEDURE — 97140 MANUAL THERAPY 1/> REGIONS: CPT

## 2025-07-30 PROCEDURE — 97110 THERAPEUTIC EXERCISES: CPT

## 2025-07-30 PROCEDURE — 97530 THERAPEUTIC ACTIVITIES: CPT

## 2025-08-04 ENCOUNTER — OFFICE VISIT (OUTPATIENT)
Dept: OCCUPATIONAL THERAPY | Facility: CLINIC | Age: 70
End: 2025-08-04
Payer: MEDICARE

## 2025-08-04 DIAGNOSIS — M25.531 RIGHT WRIST PAIN: Primary | ICD-10-CM

## 2025-08-04 PROCEDURE — 97110 THERAPEUTIC EXERCISES: CPT

## 2025-08-07 ENCOUNTER — CONSULT (OUTPATIENT)
Age: 70
End: 2025-08-07
Attending: INTERNAL MEDICINE

## 2025-08-07 VITALS
DIASTOLIC BLOOD PRESSURE: 84 MMHG | BODY MASS INDEX: 38.3 KG/M2 | OXYGEN SATURATION: 99 % | WEIGHT: 190 LBS | HEART RATE: 77 BPM | SYSTOLIC BLOOD PRESSURE: 140 MMHG | TEMPERATURE: 98.3 F | HEIGHT: 59 IN

## 2025-08-07 DIAGNOSIS — L30.9 ECZEMA, UNSPECIFIED TYPE: Primary | ICD-10-CM

## 2025-08-07 DIAGNOSIS — L30.9 DERMATITIS: ICD-10-CM

## 2025-08-07 RX ORDER — TRIAMCINOLONE ACETONIDE 1 MG/G
CREAM TOPICAL 2 TIMES DAILY
Qty: 80 G | Refills: 3 | Status: SHIPPED | OUTPATIENT
Start: 2025-08-07

## 2025-08-07 RX ORDER — TACROLIMUS 1 MG/G
OINTMENT TOPICAL 2 TIMES DAILY
Qty: 100 G | Refills: 1 | Status: SHIPPED | OUTPATIENT
Start: 2025-08-07

## 2025-08-08 ENCOUNTER — OFFICE VISIT (OUTPATIENT)
Dept: OCCUPATIONAL THERAPY | Facility: CLINIC | Age: 70
End: 2025-08-08
Payer: MEDICARE

## 2025-08-08 DIAGNOSIS — M65.4 DE QUERVAIN'S TENOSYNOVITIS, RIGHT: Primary | ICD-10-CM

## 2025-08-08 DIAGNOSIS — G56.23 ENTRAPMENT OF BOTH ULNAR NERVES: ICD-10-CM

## 2025-08-08 DIAGNOSIS — G56.21 ENTRAPMENT OF RIGHT ULNAR NERVE: ICD-10-CM

## 2025-08-08 PROCEDURE — 97110 THERAPEUTIC EXERCISES: CPT

## 2025-08-12 ENCOUNTER — OFFICE VISIT (OUTPATIENT)
Dept: OCCUPATIONAL THERAPY | Facility: CLINIC | Age: 70
End: 2025-08-12
Payer: MEDICARE

## 2025-08-21 ENCOUNTER — OFFICE VISIT (OUTPATIENT)
Dept: OCCUPATIONAL THERAPY | Facility: CLINIC | Age: 70
End: 2025-08-21
Payer: MEDICARE

## 2025-08-21 DIAGNOSIS — M79.645 PAIN OF LEFT THUMB: ICD-10-CM

## 2025-08-21 DIAGNOSIS — M79.641 PAIN IN BOTH HANDS: Primary | ICD-10-CM

## 2025-08-21 DIAGNOSIS — M79.642 PAIN IN BOTH HANDS: Primary | ICD-10-CM

## 2025-08-21 PROCEDURE — 97140 MANUAL THERAPY 1/> REGIONS: CPT

## 2025-08-21 PROCEDURE — 97110 THERAPEUTIC EXERCISES: CPT

## (undated) DEVICE — POSITIONER TRIMANO LIMB BEACH CHAIR

## (undated) DEVICE — INTENDED FOR TISSUE SEPARATION, AND OTHER PROCEDURES THAT REQUIRE A SHARP SURGICAL BLADE TO PUNCTURE OR CUT.: Brand: BARD-PARKER ® CARBON RIB-BACK BLADES

## (undated) DEVICE — SUT MONOCRYL 2-0 SH 27 IN Y417H

## (undated) DEVICE — NEEDLE HYPO 22G X 1-1/2 IN

## (undated) DEVICE — PUDDLE VAC

## (undated) DEVICE — SYRINGE 20ML LL

## (undated) DEVICE — KERLIX BANDAGE ROLL: Brand: KERLIX

## (undated) DEVICE — EXPRESSEW III SUTURE NEEDLE FOR USE WITH EXPRESSEW II OR III SUTURE PASSER: Brand: EXPRESSEW

## (undated) DEVICE — BURR  OVAL 4MM 13CM 8 FLUTE COOLCUT

## (undated) DEVICE — SCD SEQUENTIAL COMPRESSION COMFORT SLEEVE MEDIUM KNEE LENGTH: Brand: KENDALL SCD

## (undated) DEVICE — CHLORAPREP HI-LITE 26ML ORANGE

## (undated) DEVICE — GLOVE SRG BIOGEL 7.5

## (undated) DEVICE — BLADE SHAVER DISSECTOR 4MM 13CM COOLCUT

## (undated) DEVICE — VAPR COOLPULSE 90 ELECTRODE 90 DEGREES SUCTION WITH INTEGRATED HANDPIECE: Brand: VAPR COOLPULSE

## (undated) DEVICE — CHEST ROLL FOAM POSITIONER: Brand: CARDINAL HEALTH

## (undated) DEVICE — STRL ALLENTOWN HIP SHOULDER PK: Brand: CARDINAL HEALTH

## (undated) DEVICE — THREADED CLEAR CANNULA WITH OBTURATOR 5.5MM X 75MM

## (undated) DEVICE — 3M™ STERI-STRIP™ REINFORCED ADHESIVE SKIN CLOSURES, R1547, 1/2 IN X 4 IN (12 MM X 100 MM), 6 STRIPS/ENVELOPE: Brand: 3M™ STERI-STRIP™

## (undated) DEVICE — GLOVE INDICATOR PI UNDERGLOVE SZ 8.5 BLUE

## (undated) DEVICE — 3M™ STERI-DRAPE™ U-DRAPE 1015: Brand: STERI-DRAPE™

## (undated) DEVICE — TUBING SUCTION 5MM X 12 FT

## (undated) DEVICE — CYSTO TUBING TUR Y IRRIGATION

## (undated) DEVICE — MAYO STAND COVER: Brand: CONVERTORS

## (undated) DEVICE — POSITIONER OSI BEACH CHAIR FOAM

## (undated) DEVICE — GLOVE SRG BIOGEL 8

## (undated) DEVICE — THREADED CLEAR CANNULA WITH OBTURATOR 7MM X 75MM

## (undated) DEVICE — U-DRAPE: Brand: CONVERTORS

## (undated) DEVICE — NEEDLE SPINAL18G X 3.5 IN QUINCKE